# Patient Record
Sex: FEMALE | Race: WHITE | NOT HISPANIC OR LATINO | Employment: OTHER | URBAN - METROPOLITAN AREA
[De-identification: names, ages, dates, MRNs, and addresses within clinical notes are randomized per-mention and may not be internally consistent; named-entity substitution may affect disease eponyms.]

---

## 2017-03-28 ENCOUNTER — OFFICE VISIT (OUTPATIENT)
Dept: INTERNAL MEDICINE | Facility: CLINIC | Age: 57
End: 2017-03-28

## 2017-03-28 VITALS
DIASTOLIC BLOOD PRESSURE: 80 MMHG | SYSTOLIC BLOOD PRESSURE: 128 MMHG | HEART RATE: 75 BPM | HEIGHT: 55 IN | BODY MASS INDEX: 28 KG/M2 | WEIGHT: 121 LBS | OXYGEN SATURATION: 98 %

## 2017-03-28 DIAGNOSIS — R20.2 LEFT LEG PARESTHESIAS: ICD-10-CM

## 2017-03-28 DIAGNOSIS — R20.2 TINGLING IN EXTREMITIES: Primary | ICD-10-CM

## 2017-03-28 DIAGNOSIS — R20.2 ARM PARESTHESIA, LEFT: ICD-10-CM

## 2017-03-28 LAB
ALBUMIN SERPL-MCNC: 4.7 G/DL (ref 3.5–5.2)
ALBUMIN/GLOB SERPL: 1.6 G/DL
ALP SERPL-CCNC: 127 U/L (ref 39–117)
ALT SERPL-CCNC: 17 U/L (ref 1–33)
AST SERPL-CCNC: 22 U/L (ref 1–32)
BASOPHILS # BLD AUTO: 0.03 10*3/MM3 (ref 0–0.2)
BASOPHILS NFR BLD AUTO: 0.6 % (ref 0–1.5)
BILIRUB SERPL-MCNC: 0.3 MG/DL (ref 0.1–1.2)
BUN SERPL-MCNC: 11 MG/DL (ref 6–20)
BUN/CREAT SERPL: 15.9 (ref 7–25)
CALCIUM SERPL-MCNC: 9.7 MG/DL (ref 8.6–10.5)
CHLORIDE SERPL-SCNC: 97 MMOL/L (ref 98–107)
CO2 SERPL-SCNC: 27.1 MMOL/L (ref 22–29)
CREAT SERPL-MCNC: 0.69 MG/DL (ref 0.57–1)
EOSINOPHIL # BLD AUTO: 0.09 10*3/MM3 (ref 0–0.7)
EOSINOPHIL NFR BLD AUTO: 1.7 % (ref 0.3–6.2)
ERYTHROCYTE [DISTWIDTH] IN BLOOD BY AUTOMATED COUNT: 14.1 % (ref 11.7–13)
GLOBULIN SER CALC-MCNC: 2.9 GM/DL
GLUCOSE SERPL-MCNC: 90 MG/DL (ref 65–99)
HCT VFR BLD AUTO: 42.5 % (ref 35.6–45.5)
HGB BLD-MCNC: 13.4 G/DL (ref 11.9–15.5)
IMM GRANULOCYTES # BLD: 0 10*3/MM3 (ref 0–0.03)
IMM GRANULOCYTES NFR BLD: 0 % (ref 0–0.5)
LYMPHOCYTES # BLD AUTO: 1.5 10*3/MM3 (ref 0.9–4.8)
LYMPHOCYTES NFR BLD AUTO: 27.7 % (ref 19.6–45.3)
MCH RBC QN AUTO: 30.1 PG (ref 26.9–32)
MCHC RBC AUTO-ENTMCNC: 31.5 G/DL (ref 32.4–36.3)
MCV RBC AUTO: 95.5 FL (ref 80.5–98.2)
MONOCYTES # BLD AUTO: 0.3 10*3/MM3 (ref 0.2–1.2)
MONOCYTES NFR BLD AUTO: 5.5 % (ref 5–12)
NEUTROPHILS # BLD AUTO: 3.49 10*3/MM3 (ref 1.9–8.1)
NEUTROPHILS NFR BLD AUTO: 64.5 % (ref 42.7–76)
PLATELET # BLD AUTO: 327 10*3/MM3 (ref 140–500)
POTASSIUM SERPL-SCNC: 4.5 MMOL/L (ref 3.5–5.2)
PROT SERPL-MCNC: 7.6 G/DL (ref 6–8.5)
RBC # BLD AUTO: 4.45 10*6/MM3 (ref 3.9–5.2)
SODIUM SERPL-SCNC: 139 MMOL/L (ref 136–145)
TSH SERPL DL<=0.005 MIU/L-ACNC: 1.81 MIU/ML (ref 0.27–4.2)
WBC # BLD AUTO: 5.41 10*3/MM3 (ref 4.5–10.7)

## 2017-03-28 PROCEDURE — 99213 OFFICE O/P EST LOW 20 MIN: CPT | Performed by: INTERNAL MEDICINE

## 2017-03-28 NOTE — PROGRESS NOTES
Subjective     Noemi Bartholomew is a 56 y.o. female who presents with   Chief Complaint   Patient presents with   • Tingling     left side of body       History of Present Illness     Tingling on the left side of the body for 1-2 weeks.  No weakness.  It involves her face, entire arm and entire leg.  No pain is associated.  No HAs.  No vision changes.  No pain associated.  Denies any associated symptoms other than fatigue.  Greatly increase in stress at work in the last six weeks.      Review of Systems   Constitutional: Positive for fatigue. Negative for fever.   HENT: Negative for sore throat, trouble swallowing and voice change.    Eyes: Negative for visual disturbance.   Respiratory: Negative.    Cardiovascular: Negative.    Gastrointestinal: Negative.    Genitourinary: Negative.    Skin: Negative for rash.   Neurological: Negative for dizziness, tremors, seizures, light-headedness and headaches.   Psychiatric/Behavioral: The patient is nervous/anxious.        The following portions of the patient's history were reviewed and updated as appropriate: allergies, current medications and problem list.    Patient Active Problem List    Diagnosis Date Noted   • Carpal tunnel syndrome 06/08/2016   • Hyperlipidemia 06/08/2016   • Osteoporosis 06/08/2016     Note Last Updated: 6/8/2016     Description: Boniva for a number of years.  Forteo for a year.  Managed by Gyn. .  I do recommend 1200mg calcium and 1000 IUs of vitamin D in supplements/diet as well as weight bearing exercise to prevent further decline in BMD.         Current Outpatient Prescriptions on File Prior to Visit   Medication Sig Dispense Refill   • acetaminophen (TYLENOL) 500 MG tablet Take by mouth every 4 (four) hours as needed. Take 1 to 2 capsules.     • docusate sodium (COLACE) 100 MG capsule Take 1 capsule by mouth once a week.     • melatonin tablet Take 1 tablet by mouth nightly.     • vitamin B-6 (PYRIDOXINE) 50 MG tablet Take 100 mg by mouth daily.    "    No current facility-administered medications on file prior to visit.        Objective     /80  Pulse 75  Ht 54\" (137.2 cm)  Wt 121 lb (54.9 kg)  SpO2 98%  BMI 29.17 kg/m2    Physical Exam   Constitutional: She is oriented to person, place, and time. She appears well-developed and well-nourished.   HENT:   Head: Normocephalic and atraumatic.   Cardiovascular: Normal rate, regular rhythm and normal heart sounds.    Pulmonary/Chest: Effort normal and breath sounds normal.   Neurological: She is alert and oriented to person, place, and time. A sensory deficit is present. No cranial nerve deficit.   Reflex Scores:       Bicep reflexes are 2+ on the right side and 2+ on the left side.       Brachioradialis reflexes are 2+ on the right side and 2+ on the left side.       Patellar reflexes are 2+ on the right side and 2+ on the left side.       Achilles reflexes are 2+ on the right side and 2+ on the left side.  5/5 strength UE/LE     Altered sensation of left side of face/arm/leg.  She can feel but it is different.    Skin: Skin is warm and dry.   Psychiatric: She has a normal mood and affect. Her behavior is normal.       Assessment/Plan   Noemi was seen today for tingling.    Diagnoses and all orders for this visit:    Tingling in extremities  -     MRI Brain With & Without Contrast; Future  -     CBC & Differential  -     Comprehensive Metabolic Panel  -     TSH Rfx On Abnormal To Free T4    Left leg paresthesias  -     MRI Brain With & Without Contrast; Future  -     CBC & Differential  -     Comprehensive Metabolic Panel  -     TSH Rfx On Abnormal To Free T4    Arm paresthesia, left  -     MRI Brain With & Without Contrast; Future  -     CBC & Differential  -     Comprehensive Metabolic Panel  -     TSH Rfx On Abnormal To Free T4        Discussion    Patient presents with new symptoms over the past couple weeks involving the entire left side of her body including face/arm/leg.  It is of note associated " with greatly increased stress recently.  Add ASA 81 daily.  Check basic labs.  Set patient up for an MRI of the brain to further evaluate her new neurological issue.  15 minutes spent with the patient with greater than 50% of time spent counseling the following topics:, impressions         Future Appointments  Date Time Provider Department Center   6/9/2017 8:30 AM [facility] None   6/16/2017 7:45 AM Mary Lou Carroll MD [facility] None

## 2017-04-08 ENCOUNTER — HOSPITAL ENCOUNTER (OUTPATIENT)
Dept: MRI IMAGING | Facility: HOSPITAL | Age: 57
Discharge: HOME | End: 2017-04-08
Admitting: INTERNAL MEDICINE

## 2017-04-08 DIAGNOSIS — R20.2 TINGLING IN EXTREMITIES: ICD-10-CM

## 2017-04-08 DIAGNOSIS — R20.2 ARM PARESTHESIA, LEFT: ICD-10-CM

## 2017-04-08 DIAGNOSIS — R20.2 LEFT LEG PARESTHESIAS: ICD-10-CM

## 2017-04-08 PROCEDURE — A9577 INJ MULTIHANCE: HCPCS | Performed by: INTERNAL MEDICINE

## 2017-04-08 PROCEDURE — 70553 MRI BRAIN STEM W/O & W/DYE: CPT

## 2017-04-08 PROCEDURE — 0 GADOBENATE DIMEGLUMINE 529 MG/ML SOLUTION: Performed by: INTERNAL MEDICINE

## 2017-04-08 RX ADMIN — GADOBENATE DIMEGLUMINE 11 ML: 529 INJECTION, SOLUTION INTRAVENOUS at 07:40

## 2017-04-12 DIAGNOSIS — R20.0 NUMBNESS: Primary | ICD-10-CM

## 2017-04-18 ENCOUNTER — HOSPITAL ENCOUNTER (OUTPATIENT)
Dept: INFUSION THERAPY | Facility: HOSPITAL | Age: 57
Discharge: HOME | End: 2017-04-18
Attending: PSYCHIATRY & NEUROLOGY | Admitting: PSYCHIATRY & NEUROLOGY

## 2017-04-18 DIAGNOSIS — R20.0 NUMBNESS: ICD-10-CM

## 2017-04-18 PROCEDURE — 95909 NRV CNDJ TST 5-6 STUDIES: CPT

## 2017-04-18 PROCEDURE — 95886 MUSC TEST DONE W/N TEST COMP: CPT | Performed by: PSYCHIATRY & NEUROLOGY

## 2017-04-18 PROCEDURE — 95909 NRV CNDJ TST 5-6 STUDIES: CPT | Performed by: PSYCHIATRY & NEUROLOGY

## 2017-04-18 PROCEDURE — 95886 MUSC TEST DONE W/N TEST COMP: CPT

## 2017-05-09 ENCOUNTER — APPOINTMENT (OUTPATIENT)
Dept: WOMENS IMAGING | Facility: HOSPITAL | Age: 57
End: 2017-05-09

## 2017-05-09 PROCEDURE — 77067 SCR MAMMO BI INCL CAD: CPT | Performed by: RADIOLOGY

## 2017-06-06 RX ORDER — ATORVASTATIN CALCIUM 20 MG/1
TABLET, FILM COATED ORAL
Qty: 90 TABLET | Refills: 1 | Status: SHIPPED | OUTPATIENT
Start: 2017-06-06 | End: 2017-10-31 | Stop reason: SDUPTHER

## 2017-06-06 RX ORDER — LEVOTHYROXINE SODIUM 0.03 MG/1
TABLET ORAL
Qty: 90 TABLET | Refills: 1 | Status: SHIPPED | OUTPATIENT
Start: 2017-06-06 | End: 2017-10-31 | Stop reason: SDUPTHER

## 2017-06-09 DIAGNOSIS — Z00.00 LABORATORY TESTS ORDERED AS PART OF A COMPLETE PHYSICAL EXAM (CPE): Primary | ICD-10-CM

## 2017-06-09 LAB
ALBUMIN SERPL-MCNC: 4.7 G/DL (ref 3.5–5.2)
ALBUMIN/GLOB SERPL: 1.6 G/DL
ALP SERPL-CCNC: 128 U/L (ref 39–117)
ALT SERPL-CCNC: 13 U/L (ref 1–33)
APPEARANCE UR: CLEAR
AST SERPL-CCNC: 19 U/L (ref 1–32)
BACTERIA #/AREA URNS HPF: NORMAL /HPF
BASOPHILS # BLD AUTO: 0.05 10*3/MM3 (ref 0–0.2)
BASOPHILS NFR BLD AUTO: 0.9 % (ref 0–1.5)
BILIRUB SERPL-MCNC: 0.5 MG/DL (ref 0.1–1.2)
BILIRUB UR QL STRIP: NEGATIVE
BUN SERPL-MCNC: 6 MG/DL (ref 6–20)
BUN/CREAT SERPL: 9.1 (ref 7–25)
CALCIUM SERPL-MCNC: 9.3 MG/DL (ref 8.6–10.5)
CASTS URNS MICRO: NORMAL
CHLORIDE SERPL-SCNC: 98 MMOL/L (ref 98–107)
CHOLEST SERPL-MCNC: 286 MG/DL (ref 0–200)
CO2 SERPL-SCNC: 26.5 MMOL/L (ref 22–29)
COLOR UR: YELLOW
CREAT SERPL-MCNC: 0.66 MG/DL (ref 0.57–1)
EOSINOPHIL # BLD AUTO: 0.13 10*3/MM3 (ref 0–0.7)
EOSINOPHIL NFR BLD AUTO: 2.4 % (ref 0.3–6.2)
EPI CELLS #/AREA URNS HPF: NORMAL /HPF
ERYTHROCYTE [DISTWIDTH] IN BLOOD BY AUTOMATED COUNT: 14.2 % (ref 11.7–13)
GLOBULIN SER CALC-MCNC: 2.9 GM/DL
GLUCOSE SERPL-MCNC: 98 MG/DL (ref 65–99)
GLUCOSE UR QL: NEGATIVE
HCT VFR BLD AUTO: 41 % (ref 35.6–45.5)
HDLC SERPL-MCNC: 74 MG/DL (ref 40–60)
HGB BLD-MCNC: 13.2 G/DL (ref 11.9–15.5)
HGB UR QL STRIP: NEGATIVE
IMM GRANULOCYTES # BLD: 0 10*3/MM3 (ref 0–0.03)
IMM GRANULOCYTES NFR BLD: 0 % (ref 0–0.5)
KETONES UR QL STRIP: NEGATIVE
LDLC SERPL CALC-MCNC: 180 MG/DL (ref 0–100)
LEUKOCYTE ESTERASE UR QL STRIP: NEGATIVE
LYMPHOCYTES # BLD AUTO: 1.6 10*3/MM3 (ref 0.9–4.8)
LYMPHOCYTES NFR BLD AUTO: 29.4 % (ref 19.6–45.3)
MCH RBC QN AUTO: 30.6 PG (ref 26.9–32)
MCHC RBC AUTO-ENTMCNC: 32.2 G/DL (ref 32.4–36.3)
MCV RBC AUTO: 94.9 FL (ref 80.5–98.2)
MONOCYTES # BLD AUTO: 0.4 10*3/MM3 (ref 0.2–1.2)
MONOCYTES NFR BLD AUTO: 7.4 % (ref 5–12)
NEUTROPHILS # BLD AUTO: 3.26 10*3/MM3 (ref 1.9–8.1)
NEUTROPHILS NFR BLD AUTO: 59.9 % (ref 42.7–76)
NITRITE UR QL STRIP: NEGATIVE
PH UR STRIP: 6 [PH] (ref 5–8)
PLATELET # BLD AUTO: 302 10*3/MM3 (ref 140–500)
POTASSIUM SERPL-SCNC: 4.3 MMOL/L (ref 3.5–5.2)
PROT SERPL-MCNC: 7.6 G/DL (ref 6–8.5)
PROT UR QL STRIP: NEGATIVE
RBC # BLD AUTO: 4.32 10*6/MM3 (ref 3.9–5.2)
RBC #/AREA URNS HPF: NORMAL /HPF
SODIUM SERPL-SCNC: 139 MMOL/L (ref 136–145)
SP GR UR: 1.02 (ref 1–1.03)
TRIGL SERPL-MCNC: 160 MG/DL (ref 0–150)
TSH SERPL DL<=0.005 MIU/L-ACNC: 2.32 MIU/ML (ref 0.27–4.2)
UROBILINOGEN UR STRIP-MCNC: (no result) MG/DL
VLDLC SERPL CALC-MCNC: 32 MG/DL (ref 5–40)
WBC # BLD AUTO: 5.44 10*3/MM3 (ref 4.5–10.7)
WBC #/AREA URNS HPF: NORMAL /HPF

## 2017-06-12 ENCOUNTER — OFFICE VISIT (OUTPATIENT)
Dept: INTERNAL MEDICINE | Facility: CLINIC | Age: 57
End: 2017-06-12

## 2017-06-12 VITALS
BODY MASS INDEX: 32.29 KG/M2 | HEIGHT: 55 IN | SYSTOLIC BLOOD PRESSURE: 108 MMHG | WEIGHT: 139.5 LBS | HEART RATE: 64 BPM | OXYGEN SATURATION: 96 % | DIASTOLIC BLOOD PRESSURE: 72 MMHG

## 2017-06-12 DIAGNOSIS — E03.9 HYPOTHYROIDISM, UNSPECIFIED TYPE: Primary | ICD-10-CM

## 2017-06-12 DIAGNOSIS — E55.9 VITAMIN D DEFICIENCY: ICD-10-CM

## 2017-06-12 DIAGNOSIS — R73.03 PREDIABETES: ICD-10-CM

## 2017-06-12 DIAGNOSIS — E78.5 HYPERLIPIDEMIA, UNSPECIFIED HYPERLIPIDEMIA TYPE: ICD-10-CM

## 2017-06-12 PROCEDURE — 99213 OFFICE O/P EST LOW 20 MIN: CPT | Performed by: NURSE PRACTITIONER

## 2017-06-12 RX ORDER — CHOLECALCIFEROL (VITAMIN D3) 125 MCG
1 CAPSULE ORAL DAILY
COMMUNITY

## 2017-06-13 NOTE — PROGRESS NOTES
Subjective   Lexus Trinh is a 57 y.o. female. Patient is here today for   Chief Complaint   Patient presents with   • Hyperlipidemia     Pt here to follow up on HPL & elevated glucose. Pt denies needing any rf's today.    .    History of Present Illness   Patient is here to follow up on hyperlipidemia and is taking medication as prescribed with no side effects. She is currently on atorvastatin 20 mg daily.  She also is here to follow-up on elevated blood sugar.  Patient did not have her labs done prior to her appointment. Denies any problems.  Patient is here to f/u on hypothyroidism. Denies problems with fatigue, weight gain.     The following portions of the patient's history were reviewed and updated as appropriate: allergies, current medications, past family history, past medical history, past social history, past surgical history and problem list.    Review of Systems   Constitutional: Negative.    Respiratory: Negative.    Cardiovascular: Negative.    Gastrointestinal: Negative.    Endocrine: Negative.        Objective   Vitals:    06/12/17 1526   BP: 108/72   Pulse: 64   SpO2: 96%     Physical Exam   Constitutional: Vital signs are normal. She appears well-developed and well-nourished.   Neck: No thyroid mass and no thyromegaly present.   Cardiovascular: Normal rate, regular rhythm and normal heart sounds.    Pulmonary/Chest: Effort normal and breath sounds normal.   Skin: Skin is warm and dry.   Psychiatric: She has a normal mood and affect. Her speech is normal and behavior is normal. Thought content normal.   Nursing note and vitals reviewed.      Assessment/Plan   Lexus was seen today for hyperlipidemia.    Diagnoses and all orders for this visit:    Hypothyroidism, unspecified type  -     TSH    Hyperlipidemia, unspecified hyperlipidemia type  -     Comprehensive Metabolic Panel  -     Lipid Panel With / Chol / HDL Ratio    Vitamin D deficiency  -     Vitamin D 25 Hydroxy    Prediabetes  -      Comprehensive Metabolic Panel  -     Hemoglobin A1c     She will return in the next week or so to have labs done.  She will then follow up in 6 months with labs prior.

## 2017-06-16 ENCOUNTER — OFFICE VISIT (OUTPATIENT)
Dept: INTERNAL MEDICINE | Facility: CLINIC | Age: 57
End: 2017-06-16

## 2017-06-16 VITALS
SYSTOLIC BLOOD PRESSURE: 110 MMHG | WEIGHT: 122 LBS | BODY MASS INDEX: 28.23 KG/M2 | OXYGEN SATURATION: 98 % | HEIGHT: 55 IN | HEART RATE: 78 BPM | DIASTOLIC BLOOD PRESSURE: 85 MMHG

## 2017-06-16 DIAGNOSIS — M54.42 CHRONIC LEFT-SIDED LOW BACK PAIN WITH LEFT-SIDED SCIATICA: ICD-10-CM

## 2017-06-16 DIAGNOSIS — G89.29 CHRONIC LEFT-SIDED LOW BACK PAIN WITH LEFT-SIDED SCIATICA: ICD-10-CM

## 2017-06-16 DIAGNOSIS — Z00.00 WELL ADULT EXAM: Primary | ICD-10-CM

## 2017-06-16 PROCEDURE — 72110 X-RAY EXAM L-2 SPINE 4/>VWS: CPT | Performed by: INTERNAL MEDICINE

## 2017-06-16 PROCEDURE — 99396 PREV VISIT EST AGE 40-64: CPT | Performed by: INTERNAL MEDICINE

## 2017-06-16 PROCEDURE — 93000 ELECTROCARDIOGRAM COMPLETE: CPT | Performed by: INTERNAL MEDICINE

## 2017-06-16 RX ORDER — MELOXICAM 7.5 MG/1
7.5 TABLET ORAL DAILY
Qty: 30 TABLET | Refills: 2 | Status: SHIPPED | OUTPATIENT
Start: 2017-06-16 | End: 2018-06-10

## 2017-06-16 NOTE — PATIENT INSTRUCTIONS
Osteoporosis  Osteoporosis is the thinning and loss of density in the bones. Osteoporosis makes the bones more brittle, fragile, and likely to break (fracture). Over time, osteoporosis can cause the bones to become so weak that they fracture after a simple fall. The bones most likely to fracture are the bones in the hip, wrist, and spine.  CAUSES   The exact cause is not known.  RISK FACTORS  Anyone can develop osteoporosis. You may be at greater risk if you have a family history of the condition or have poor nutrition. You may also have a higher risk if you are:   · Female.    · 50 years old or older.  · A smoker.  · Not physically active.    · White or .  · Slender.  SIGNS AND SYMPTOMS   A fracture might be the first sign of the disease, especially if it results from a fall or injury that would not usually cause a bone to break. Other signs and symptoms include:   · Low back and neck pain.  · Stooped posture.  · Height loss.  DIAGNOSIS   To make a diagnosis, your health care provider may:  · Take a medical history.  · Perform a physical exam.  · Order tests, such as:    A bone mineral density test.    A dual-energy X-ray absorptiometry test.  TREATMENT   The goal of osteoporosis treatment is to strengthen your bones to reduce your risk of a fracture. Treatment may involve:  · Making lifestyle changes, such as:    Eating a diet rich in calcium.    Doing weight-bearing and muscle-strengthening exercises.    Stopping tobacco use.    Limiting alcohol intake.  · Taking medicine to slow the process of bone loss or to increase bone density.  · Monitoring your levels of calcium and vitamin D.  HOME CARE INSTRUCTIONS  · Include calcium and vitamin D in your diet. Calcium is important for bone health, and vitamin D helps the body absorb calcium.  · Perform weight-bearing and muscle-strengthening exercises as directed by your health care provider.  · Do not use any tobacco products, including cigarettes, chewing  "tobacco, and electronic cigarettes. If you need help quitting, ask your health care provider.  · Limit your alcohol intake.  · Take medicines only as directed by your health care provider.  · Keep all follow-up visits as directed by your health care provider. This is important.  · Take precautions at home to lower your risk of falling, such as:    Keeping rooms well lit and clutter free.    Installing safety rails on stairs.    Using rubber mats in the bathroom and other areas that are often wet or slippery.  SEEK IMMEDIATE MEDICAL CARE IF:   You fall or injure yourself.      This information is not intended to replace advice given to you by your health care provider. Make sure you discuss any questions you have with your health care provider.     Document Released: 09/27/2006 Document Revised: 04/10/2017 Document Reviewed: 05/28/2015  [employer] Interactive Patient Education ©2017 [employer]  DASH Eating Plan  DASH stands for \"Dietary Approaches to Stop Hypertension.\" The DASH eating plan is a healthy eating plan that has been shown to reduce high blood pressure (hypertension). Additional health benefits may include reducing the risk of type 2 diabetes mellitus, heart disease, and stroke. The DASH eating plan may also help with weight loss.  WHAT DO I NEED TO KNOW ABOUT THE DASH EATING PLAN?  For the DASH eating plan, you will follow these general guidelines:  · Choose foods with less than 150 milligrams of sodium per serving (as listed on the food label).  · Use salt-free seasonings or herbs instead of table salt or sea salt.  · Check with your health care provider or pharmacist before using salt substitutes.  · Eat lower-sodium products. These are often labeled as \"low-sodium\" or \"no salt added.\"  · Eat fresh foods. Avoid eating a lot of canned foods.  · Eat more vegetables, fruits, and low-fat dairy products.  · Choose whole grains. Look for the word \"whole\" as the first word in the ingredient list.  · Choose " fish and skinless chicken or turkey more often than red meat. Limit fish, poultry, and meat to 6 oz (170 g) each day.  · Limit sweets, desserts, sugars, and sugary drinks.  · Choose heart-healthy fats.  · Eat more home-cooked food and less restaurant, buffet, and fast food.  · Limit fried foods.  · Do not pruett foods. Cook foods using methods such as baking, boiling, grilling, and broiling instead.  · When eating at a restaurant, ask that your food be prepared with less salt, or no salt if possible.  WHAT FOODS CAN I EAT?  Seek help from a dietitian for individual calorie needs.  Grains  Whole grain or whole wheat bread. Brown rice. Whole grain or whole wheat pasta. Quinoa, bulgur, and whole grain cereals. Low-sodium cereals. Corn or whole wheat flour tortillas. Whole grain cornbread. Whole grain crackers. Low-sodium crackers.  Vegetables  Fresh or frozen vegetables (raw, steamed, roasted, or grilled). Low-sodium or reduced-sodium tomato and vegetable juices. Low-sodium or reduced-sodium tomato sauce and paste. Low-sodium or reduced-sodium canned vegetables.   Fruits  All fresh, canned (in natural juice), or frozen fruits.  Meat and Other Protein Products  Ground beef (85% or leaner), grass-fed beef, or beef trimmed of fat. Skinless chicken or turkey. Ground chicken or turkey. Pork trimmed of fat. All fish and seafood. Eggs. Dried beans, peas, or lentils. Unsalted nuts and seeds. Unsalted canned beans.  Dairy  Low-fat dairy products, such as skim or 1% milk, 2% or reduced-fat cheeses, low-fat ricotta or cottage cheese, or plain low-fat yogurt. Low-sodium or reduced-sodium cheeses.  Fats and Oils  Tub margarines without trans fats. Light or reduced-fat mayonnaise and salad dressings (reduced sodium). Avocado. Safflower, olive, or canola oils. Natural peanut or almond butter.  Other  Unsalted popcorn and pretzels.  The items listed above may not be a complete list of recommended foods or beverages. Contact your  dietitian for more options.  WHAT FOODS ARE NOT RECOMMENDED?  Grains  White bread. White pasta. White rice. Refined cornbread. Bagels and croissants. Crackers that contain trans fat.  Vegetables  Creamed or fried vegetables. Vegetables in a cheese sauce. Regular canned vegetables. Regular canned tomato sauce and paste. Regular tomato and vegetable juices.  Fruits  Canned fruit in light or heavy syrup. Fruit juice.  Meat and Other Protein Products  Fatty cuts of meat. Ribs, chicken wings, amin, sausage, bologna, salami, chitterlings, fatback, hot dogs, bratwurst, and packaged luncheon meats. Salted nuts and seeds. Canned beans with salt.  Dairy  Whole or 2% milk, cream, half-and-half, and cream cheese. Whole-fat or sweetened yogurt. Full-fat cheeses or blue cheese. Nondairy creamers and whipped toppings. Processed cheese, cheese spreads, or cheese curds.  Condiments  Onion and garlic salt, seasoned salt, table salt, and sea salt. Canned and packaged gravies. Worcestershire sauce. Tartar sauce. Barbecue sauce. Teriyaki sauce. Soy sauce, including reduced sodium. Steak sauce. Fish sauce. Oyster sauce. Cocktail sauce. Horseradish. Ketchup and mustard. Meat flavorings and tenderizers. Bouillon cubes. Hot sauce. Tabasco sauce. Marinades. Taco seasonings. Relishes.  Fats and Oils  Butter, stick margarine, lard, shortening, ghee, and amin fat. Coconut, palm kernel, or palm oils. Regular salad dressings.  Other  Pickles and olives. Salted popcorn and pretzels.  The items listed above may not be a complete list of foods and beverages to avoid. Contact your dietitian for more information.  WHERE CAN I FIND MORE INFORMATION?  National Heart, Lung, and Blood [employer]: www.nhlbi.nih.gov/health/health-topics/topics/dash/     This information is not intended to replace advice given to you by your health care provider. Make sure you discuss any questions you have with your health care provider.     Document Released: 12/06/2012  Document Revised: 04/10/2017 Document Reviewed: 10/22/2014  [employer]vier Interactive Patient Education ©2017 [employer] Inc.

## 2017-06-16 NOTE — PROGRESS NOTES
Subjective     Noemi Bartholomew is a 57 y.o. female who presents for a complete physical exam.      History of Present Illness     Left sided LBP.  Shoot down her left leg.  Going on several months.  During the day.  No weakness.  Previous back surgery by Dr. Dacosta.  She had a fusion.  Pain is 8/10 at worst.   HLD.  Cholesterol has gone up.  She states she sees room for improvement in diet and exercise regimen.   OP.  Discussed calcium and D. Gyn manages.     Review of Systems   Constitutional: Negative.    HENT: Negative.    Eyes: Negative.    Respiratory: Negative.    Cardiovascular: Negative.    Gastrointestinal: Negative.    Endocrine: Negative.    Genitourinary: Negative.    Musculoskeletal: Positive for back pain.   Skin: Negative.    Allergic/Immunologic: Negative.    Neurological: Negative.    Hematological: Negative.    Psychiatric/Behavioral: Negative.        The following portions of the patient's history were reviewed and updated as appropriate: allergies, current medications, past family history, past medical history, past social history, past surgical history and problem list.  Health maintenance tab was reviewed and updated with the patient.       Patient Active Problem List    Diagnosis Date Noted   • Carpal tunnel syndrome 06/08/2016   • Hyperlipidemia 06/08/2016   • Osteoporosis 06/08/2016     Note Last Updated: 6/8/2016     Description: Boniva for a number of years.  Forteo for a year.  Managed by Gyn. .  I do recommend 1200mg calcium and 1000 IUs of vitamin D in supplements/diet as well as weight bearing exercise to prevent further decline in BMD.         Past Medical History:   Diagnosis Date   • Abnormal alkaline phosphatase test 06/12/2015    Resolved   • Acute bronchitis 06/12/2015    Resolved   • Diverticulosis    • Ear pressure 06/12/2015    Resolved   • H/O electromyography 10/13/2014   • H/O mammogram 07/29/2014   • Hand numbness 06/12/2015    Resolved   • History of EKG 06/12/2015  "  • Hypercalcemia 06/12/2015    Resolved, Full w/u done, Likely secondary to Forteo   • Nasal congestion 06/12/2015    Resolved   • Paresthesia 06/12/2015    Resolved       Past Surgical History:   Procedure Laterality Date   • BACK SURGERY  1999   • COLONOSCOPY  05/20/2011       Family History   Problem Relation Age of Onset   • Breast cancer Mother    • Stroke Mother      Cerebrovascular Accident   • Colon cancer Mother    • Diabetes Father      Borderline Diabetes Mellitus   • Heart disease Father      Cardiac Disorder   • Heart failure Father    • Anxiety disorder Sister    • Nephrolithiasis Sister      Kidney Stones       Social History     Social History   • Marital status: Single     Spouse name: N/A   • Number of children: N/A   • Years of education: N/A     Occupational History   • Not on file.     Social History Main Topics   • Smoking status: Never Smoker   • Smokeless tobacco: Not on file   • Alcohol use No   • Drug use: Not on file   • Sexual activity: Not on file     Other Topics Concern   • Not on file     Social History Narrative       Current Outpatient Prescriptions on File Prior to Visit   Medication Sig Dispense Refill   • docusate sodium (COLACE) 100 MG capsule Take 1 capsule by mouth once a week.     • melatonin tablet Take 1 tablet by mouth nightly.     • [DISCONTINUED] vitamin B-6 (PYRIDOXINE) 50 MG tablet Take 100 mg by mouth daily.       No current facility-administered medications on file prior to visit.        Allergies   Allergen Reactions   • Penicillins        Immunization History   Administered Date(s) Administered   • Tdap 09/10/2009, 11/04/2016       Objective     /85  Pulse 78  Ht 54\" (137.2 cm)  Wt 122 lb (55.3 kg)  SpO2 98%  BMI 29.42 kg/m2      Physical Exam   Constitutional: She is oriented to person, place, and time. She appears well-developed and well-nourished.   HENT:   Head: Normocephalic and atraumatic.   Right Ear: Hearing, tympanic membrane and external ear " normal.   Left Ear: Hearing, tympanic membrane and external ear normal.   Nose: Nose normal.   Mouth/Throat: Oropharynx is clear and moist.   Neck: Neck supple. No thyromegaly present.   Cardiovascular: Normal rate, regular rhythm and normal heart sounds.    No murmur heard.  Pulmonary/Chest: Effort normal and breath sounds normal. Right breast exhibits no mass. Left breast exhibits no mass.   Abdominal: Soft. She exhibits no distension. There is no hepatosplenomegaly. There is no tenderness.   Genitourinary: No breast tenderness.   Lymphadenopathy:     She has no cervical adenopathy.   Neurological: She is alert and oriented to person, place, and time. She has normal strength. No sensory deficit.   Reflex Scores:       Patellar reflexes are 2+ on the right side and 2+ on the left side.       Achilles reflexes are 1+ on the right side and 2+ on the left side.  Skin: Skin is warm and dry.   Psychiatric: She has a normal mood and affect.     X-rays of the lumbar spine  performed today for following indication:   pain.  X-ray reveal DDD, spurs, fusion.  There is no available x-ray for comparison.  X-ray sent to radiology for official interpretation and findings.        ECG 12 Lead  Date/Time: 6/16/2017 8:19 AM  Performed by: GIULIANO DOS SANTOS  Authorized by: GIULIANO DOS SANTOS   Comparison: compared with previous ECG from 6/15/2016  Similar to previous ECG  Rhythm: sinus rhythm  Rate: normal  Conduction: conduction normal  ST Segments: ST segments normal  T Waves: T waves normal  QRS axis: normal  Other: no other findings  Clinical impression: normal ECG            Assessment/Plan   Noemi was seen today for annual exam.    Diagnoses and all orders for this visit:    Well adult exam  -     ECG 12 Lead    Chronic left-sided low back pain with left-sided sciatica  -     XR Spine Lumbar 4+ View  -     MRI lumbar spine wo contrast; Future  -     Ambulatory Referral to Physical Therapy    Other orders  -     meloxicam (MOBIC)  7.5 MG tablet; Take 1 tablet by mouth Daily.        Discussion    Patient presents today for a CPE.      Patient follows an adequate diet.   Patient follows an inadequate exercise regimen.   Mammogram is up to date.   Colon cancer screening is up to date.   Pap smears are performed by the patient's gynecologist.   Immunizations are up to date.   DEXA is up to date.   HLD.  We discussed diet.  I recommend a diet high in fruits, vegetables, whole grains, lean meats, nuts and beans.  I recommend limiting red meat, full fat dairy, eggs and processed white carbohydrates.  I recommend aerobic exercise at least 3 days per week. I also recommend to watch salt intake.    OP.  I recommend to get 1200 mg of calcium and 1000 IUs of vitamin D through diet and supplements and to participate in a weight based exercise to prevent loss of bone mineral density. Bone mineral will be monitored every two years.    Left sided LBP with sciatica for several months and previous back fusion.   Add meloxicam.  Trial of therpay.  Given concerning history MRI is ordered.     Health Maintenance   Topic Date Due   • DXA SCAN  07/30/2017   • INFLUENZA VACCINE  08/01/2017   • LIPID PANEL  06/09/2018   • MAMMOGRAM  08/03/2018   • COLONOSCOPY  05/16/2019   • PAP SMEAR  08/03/2019   • TDAP/TD VACCINES (3 - Td) 11/04/2026   • HEPATITIS C SCREENING  Addressed            No future appointments.

## 2017-06-16 NOTE — PROGRESS NOTES
"Eduardo Bartholomew is a 57 y.o. female who presents with   Chief Complaint   Patient presents with   • Annual Exam       History of Present Illness     Left sided LBP.  Shoot down her left leg.  Going on several months.  During the day.  No weakness.  Previous back surgery by Dr. Dacosta.  She had a fusion.  Pain is 8/10 at worst.     Review of Systems    The following portions of the patient's history were reviewed and updated as appropriate: allergies, current medications and problem list.    Patient Active Problem List    Diagnosis Date Noted   • Carpal tunnel syndrome 06/08/2016   • Hyperlipidemia 06/08/2016   • Osteoporosis 06/08/2016     Note Last Updated: 6/8/2016     Description: Boniva for a number of years.  Forteo for a year.  Managed by Gyn. .  I do recommend 1200mg calcium and 1000 IUs of vitamin D in supplements/diet as well as weight bearing exercise to prevent further decline in BMD.         Current Outpatient Prescriptions on File Prior to Visit   Medication Sig Dispense Refill   • docusate sodium (COLACE) 100 MG capsule Take 1 capsule by mouth once a week.     • melatonin tablet Take 1 tablet by mouth nightly.     • [DISCONTINUED] vitamin B-6 (PYRIDOXINE) 50 MG tablet Take 100 mg by mouth daily.       No current facility-administered medications on file prior to visit.        Objective     /85  Pulse 78  Ht 54\" (137.2 cm)  Wt 122 lb (55.3 kg)  SpO2 98%  BMI 29.42 kg/m2    Physical Exam    Assessment/Plan   {Assess/PlanSmartLinks:68617}    Discussion         No future appointments.      "

## 2017-06-22 LAB
25(OH)D3+25(OH)D2 SERPL-MCNC: 33 NG/ML (ref 30–100)
ALBUMIN SERPL-MCNC: 4.9 G/DL (ref 3.5–5.2)
ALBUMIN/GLOB SERPL: 1.8 G/DL
ALP SERPL-CCNC: 122 U/L (ref 39–117)
ALT SERPL-CCNC: 16 U/L (ref 1–33)
AST SERPL-CCNC: 20 U/L (ref 1–32)
BILIRUB SERPL-MCNC: 0.4 MG/DL (ref 0.1–1.2)
BUN SERPL-MCNC: 13 MG/DL (ref 6–20)
BUN/CREAT SERPL: 16.7 (ref 7–25)
CALCIUM SERPL-MCNC: 9.7 MG/DL (ref 8.6–10.5)
CHLORIDE SERPL-SCNC: 101 MMOL/L (ref 98–107)
CHOLEST SERPL-MCNC: 179 MG/DL (ref 0–200)
CHOLEST/HDLC SERPL: 2.8 {RATIO}
CO2 SERPL-SCNC: 26.1 MMOL/L (ref 22–29)
CREAT SERPL-MCNC: 0.78 MG/DL (ref 0.57–1)
GLOBULIN SER CALC-MCNC: 2.8 GM/DL
GLUCOSE SERPL-MCNC: 111 MG/DL (ref 65–99)
HBA1C MFR BLD: 6.2 % (ref 4.8–5.6)
HDLC SERPL-MCNC: 64 MG/DL (ref 40–60)
LDLC SERPL CALC-MCNC: 93 MG/DL (ref 0–100)
POTASSIUM SERPL-SCNC: 4.9 MMOL/L (ref 3.5–5.2)
PROT SERPL-MCNC: 7.7 G/DL (ref 6–8.5)
SODIUM SERPL-SCNC: 142 MMOL/L (ref 136–145)
TRIGL SERPL-MCNC: 110 MG/DL (ref 0–150)
TSH SERPL DL<=0.005 MIU/L-ACNC: 1.92 MIU/ML (ref 0.27–4.2)
VLDLC SERPL CALC-MCNC: 22 MG/DL (ref 5–40)

## 2017-07-01 ENCOUNTER — HOSPITAL ENCOUNTER (OUTPATIENT)
Dept: MRI IMAGING | Facility: HOSPITAL | Age: 57
Discharge: HOME | End: 2017-07-01
Admitting: INTERNAL MEDICINE

## 2017-07-01 DIAGNOSIS — M54.42 CHRONIC LEFT-SIDED LOW BACK PAIN WITH LEFT-SIDED SCIATICA: ICD-10-CM

## 2017-07-01 DIAGNOSIS — G89.29 CHRONIC LEFT-SIDED LOW BACK PAIN WITH LEFT-SIDED SCIATICA: ICD-10-CM

## 2017-07-01 PROCEDURE — A9577 INJ MULTIHANCE: HCPCS | Performed by: INTERNAL MEDICINE

## 2017-07-01 PROCEDURE — 72158 MRI LUMBAR SPINE W/O & W/DYE: CPT

## 2017-07-01 PROCEDURE — 0 GADOBENATE DIMEGLUMINE 529 MG/ML SOLUTION: Performed by: INTERNAL MEDICINE

## 2017-07-01 RX ADMIN — GADOBENATE DIMEGLUMINE 11 ML: 529 INJECTION, SOLUTION INTRAVENOUS at 12:50

## 2017-07-14 ENCOUNTER — HOSPITAL ENCOUNTER (OUTPATIENT)
Dept: PHYSICAL THERAPY | Facility: HOSPITAL | Age: 57
Setting detail: THERAPIES SERIES
Discharge: HOME | End: 2017-07-14

## 2017-07-14 DIAGNOSIS — M54.42 CHRONIC BILATERAL LOW BACK PAIN WITH LEFT-SIDED SCIATICA: Primary | ICD-10-CM

## 2017-07-14 DIAGNOSIS — G89.29 CHRONIC BILATERAL LOW BACK PAIN WITH LEFT-SIDED SCIATICA: Primary | ICD-10-CM

## 2017-07-14 PROCEDURE — 97161 PT EVAL LOW COMPLEX 20 MIN: CPT

## 2017-07-14 PROCEDURE — G8978 MOBILITY CURRENT STATUS: HCPCS

## 2017-07-14 PROCEDURE — G8979 MOBILITY GOAL STATUS: HCPCS

## 2017-07-14 PROCEDURE — 97110 THERAPEUTIC EXERCISES: CPT

## 2017-07-14 NOTE — THERAPY EVALUATION
Outpatient Physical Therapy Ortho Initial Evaluation  [facility]     Patient Name: Noemi Bartholomew  : 1960  MRN: [id number]  Today's Date: 2017      Visit Date: 2017    Patient Active Problem List   Diagnosis   • Carpal tunnel syndrome   • Hyperlipidemia   • Osteoporosis        Past Medical History:   Diagnosis Date   • Abnormal alkaline phosphatase test 2015    Resolved   • Acute bronchitis 2015    Resolved   • Diverticulosis    • Ear pressure 2015    Resolved   • H/O electromyography 10/13/2014   • H/O mammogram 2014   • Hand numbness 2015    Resolved   • History of EKG 2015   • Hypercalcemia 2015    Resolved, Full w/u done, Likely secondary to Forteo   • Nasal congestion 2015    Resolved   • Paresthesia 2015    Resolved        Past Surgical History:   Procedure Laterality Date   • BACK SURGERY     • COLONOSCOPY  2011       Visit Dx:     ICD-10-CM ICD-9-CM   1. Chronic bilateral low back pain with left-sided sciatica M54.42 724.2    G89.29 724.3     338.29             Patient History       17 1300          History    Chief Complaint Pain  -LS      Type of Pain Back pain  -LS      Date Current Problem(s) Began 17  -LS      Brief Description of Current Complaint Pt reports in March she began having L sided sciatic pain and B low back pain. She had similar issues in  and had lumbar fusion in . She did well following fusion. Now is unable to tolerate prolonged sitting, standing, or walking with constant aching pain in back, shooting pain in L upper leg and numbness in L plantar surface of foot.   -LS      Previous treatment for THIS PROBLEM Surgery  -LS      Onset Date- PT 17  -LS      Surgery Date: --    lumbar fusion  -LS      Patient/Caregiver Goals Relieve pain;Improve mobility;Improve strength  -LS      Hand Dominance right-handed  -LS      Occupation/sports/leisure activities Pt worked in [employer]  but was let go 2 weeks ago.  -LS      Patient seeing anyone else for problem(s)? Yes  -LS      How has patient tried to help current problem? taking Mobic  -LS      What clinical tests have you had for this problem? MRI  -LS      Results of Clinical Tests B spondylolysis at L5 with no significant spondylolisthesis  -LS      Surgery/Hospitalization hx of lumbar fusion 1999  -LS      Pain     Pain Location Back  -LS      Pain at Present 8  -LS      Pain at Best 8  -LS      Pain at Worst 8  -LS      Pain Frequency Constant/continuous  -LS      Pain Description Aching;Shooting;Numbness  -LS      What Performance Factors Make the Current Problem(s) WORSE? prolonged positions  -LS      What Performance Factors Make the Current Problem(s) BETTER? heat, ice, medication  -LS      Tolerance Time- Standing 10 minutes  -LS      Tolerance Time- Sitting 1 hour  -LS      Tolerance Time- Walking 20 minutes  -LS      Tolerance Time- Lying no inc in pain  -LS      Is your sleep disturbed? No  -LS      What position do you sleep in? Supine  -LS      Difficulties at work? Pt is currently not working.  -LS      Difficulties with ADL's? Pt is able to perform but has pain.  -LS      Difficulties with recreational activities? Yes, difficulty walking long periods of time.  -LS      Fall Risk Assessment    Any falls in the past year: No  -LS      Services    Prior Rehab/Home Health Experiences No  -LS      Daily Activities    Primary Language English  -LS      Pt Participated in POC and Goals Yes  -LS      Safety    Are you being hurt, hit, or frightened by anyone at home or in your life? No  -LS      Are you being neglected by a caregiver No  -LS        User Key  (r) = Recorded By, (t) = Taken By, (c) = Cosigned By    Initials Name Provider Type    LS Yohana Chua, PT Physical Therapist                PT Ortho       07/14/17 1500    Subjective Comments    Subjective Comments I had pain like this before but it has been fine for many years.   -LS    Precautions and Contraindications    Precautions hx of lumbar fusion 1999  -LS    Subjective Pain    Able to rate subjective pain? yes  -LS    Pre-Treatment Pain Level 8  -LS    Posture/Observations    Posture/Observations Comments inc lumbar lordosis, slightly inc thoracic kyphosis, rounded shoulders  -LS    Sensation    Additional Comments dec sensation LLE medial calf  -LS    DTR- Lower Quarter Clearing    Patellar tendon (L2-4) Bilateral:;2- Normal response  -LS    Achilles tendon (S1-2) Bilateral:;1- Minimal response  -LS    Sensory Screen for Light Touch- Lower Quarter Clearing    L2 (anterior mid thigh) Intact  -LS    L3 (distal anterior thigh) Intact  -LS    L4 (medial lower leg/foot) Left:;Diminished  -LS    L5 (lateral lower leg/great toe) Left:;Diminished  -LS    S1 (bottom of foot) Left:;Diminished  -LS    Myotomal Screen- Lower Quarter Clearing    Hip flexion (L2) Bilateral:;4- (Good -)  -LS    Knee extension (L3) Bilateral:;4+ (Good +)  -LS    Ankle DF (L4) Bilateral:;4+ (Good +)  -LS    Ankle PF (S1) Bilateral:;4+ (Good +)  -LS    Knee flexion (S2) Bilateral:;4+ (Good +)  -LS    Lumbar ROM Screen- Lower Quarter Clearing    Lumbar Flexion Normal  -LS    Lumbar Extension Normal  -LS    Lumbar Lateral Flexion Normal  -LS    Lumbar Rotation Normal  -LS    Lumbosacral Palpation    SI Bilateral:;Tender;Guarded/taut  -LS    Lumbosacral Segment Tender  -LS    Piriformis Bilateral:;Guarded/taut;Tender  -LS    Gluteus Porfirio Guarded/taut  -LS    Quadratus Lumborum Tender  -LS    Erector Spinae (Paraspinals) Guarded/taut  -LS    Lumbosacral Accessory Motions    PA Evans- L1 Hypomobile  -LS    PA Glide- L2 Hypomobile  -LS    PA Glide- L3 Hypomobile  -LS    PA Glide- L4 Hypomobile  -LS    PA Glide- L5 Hypomobile  -LS    PA glide- Sacral base Hypomobile   painful  -LS    Lumbar/SI Special Tests    Stork Test (SI Dysfunction) Negative  -LS    Slump Test (Neural Tension) Negative  -LS    SLR (Neural Tension)  Negative  -LS    SI Compression Test (SI Dysfunction) Negative  -LS    SI Distraction Test (SI Dysfunction) Negative  -LS    DEE (hip vs. SI Dysfunction) Negative  -LS    ROM (Range of Motion)    General ROM Detail BLE WFL  -LS    MMT (Manual Muscle Testing)    General MMT Assessment Detail sidelying hip abduction 4+/5  -LS    Lower Extremity Flexibility    Hamstrings WNL  -LS    Hip External Rotators WNL  -LS    Hip Internal Rotators WNL  -LS    Gastrocnemius WNL  -LS    Pathomechanics    Spine Pathomechanics Hinges into extension at one segment in lumbar  -LS    Gait Assessment/Treatment    Gait, Elkins Level independent  -LS    Gait, Distance (Feet) 100  -LS    Gait, Comment inc R lateral movement, shifted L in stance  -LS      User Key  (r) = Recorded By, (t) = Taken By, (c) = Cosigned By    Initials Name Provider Type    KOFFI Chua PT Physical Therapist                            Therapy Education       07/14/17 1528          Therapy Education    Given HEP;Symptoms/condition management;Pain management  -LS      Program New  -LS      How Provided Verbal;Demonstration;Written  -LS      Provided to Patient  -LS      Level of Understanding Teach back education performed;Verbalized;Demonstrated  -LS        User Key  (r) = Recorded By, (t) = Taken By, (c) = Cosigned By    Initials Name Provider Type    KOFFI Chua PT Physical Therapist                PT OP Goals       07/14/17 1500       PT Short Term Goals    STG Date to Achieve 07/28/17  -LS     STG 1 Pt will demonstrate understanding and compliance with initial HEP.  -LS     STG 1 Progress New  -LS     STG 2 Pt will report improved pain at worse from 8/10 to less than or equal to 6/10 with utilization of 90/90 position and exercises.  -LS     STG 2 Progress New  -LS     STG 3 Pt will demonstrate ability to activate core musculature in standing to improve her tolerance to prolonged standing/walking.  -LS     STG 3 Progress New  -LS     Long  Term Goals    LTG Date to Achieve 08/13/17  -LS     LTG 1 Pt will demonstrate reduced overall disability evidenced by improved Oswestry disability score from 26% to less than or equal to 15%.  -LS     LTG 1 Progress New  -LS     LTG 2 Pt will report reduced LLE numbness with centralization of symptoms to L knee or more proximal.  -LS     LTG 2 Progress New  -LS     LTG 3 Pt will demonstrate hip abduction strength B 5/5 to improve stability in gait.  -LS     LTG 3 Progress New  -LS     Time Calculation    PT Goal Re-Cert Due Date 08/13/17  -LS       User Key  (r) = Recorded By, (t) = Taken By, (c) = Cosigned By    Initials Name Provider Type    LS Yohana Chua, PT Physical Therapist                PT Assessment/Plan       07/14/17 2410       PT Assessment    Functional Limitations Limitations in community activities;Performance in sport activities;Performance in work activities;Impaired gait;Impaired locomotion;Performance in leisure activities;Limitation in home management;Performance in self-care ADL  -LS     Impairments Muscle strength;Range of motion;Pain;Sensation;Gait;Joint mobility;Impaired flexibility;Locomotion;Poor body mechanics;Impaired postural alignment;Motor function;Posture  -LS     Assessment Comments Pt is 58 yo female reporting 4 month hx of B LBP with L sided radicular symptoms to medial L foot described as numbness and tingling and occasional shooting pain that inc after prolonged standing or walking. She is shifted L in standing with inc lumbar lordosis and inc lateral movement to R during ambulation. Iliac crests appear at equal height. She demonstrates normal ROM with no change in pain with repeated extension, however does hinge at thoracolumbar junction. She reports relief with decompression position and flexion based exercises. She demonstrates dec sensation LLE medial calf and plantar surface of L foot. B patellar reflexes are intact and equal. She demonstrates normal strength B with B hip  flexion slightly dec (4/5). B hip abduction 4+/5. SLR, Tex's, Slump testing, SI provocation testing were all negative. She reports pain with PA at sacral base and hypomobility with lumbar PA. LAD did not change her symptoms. She is TTP with trigger points noted in B glute max and L piriformis. She demonstrates mild tightness in B back extensors. Hamstring flexibility WFL. 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 She has hx of lumbar fusion. She will benefit from continued skilled PT services to initiate flexion based exercises to improve segmental mobility, strengthen core, and reduce muscle guarding in B hips in order to reduce pt's pain and improve her tolerance to mobility.   -LS     Please refer to paper survey for additional self-reported information Yes  -LS     Rehab Potential Good  -LS     Patient/caregiver participated in establishment of treatment plan and goals Yes  -LS     Patient would benefit from skilled therapy intervention Yes  -LS     PT Plan    PT Frequency 2x/week  -LS     Predicted Duration of Therapy Intervention (days/wks) 4 weeks   -LS     Planned CPT's? PT EVAL LOW COMPLEXITY: 41909;PT RE-EVAL: 67365;PT MANUAL THERAPY EA 15 MIN: 24158;PT HOT OR COLD PACK TREAT MCARE;PT HOT/COLD PACK WC NONMCARE: 23612;PT ELECTRICAL STIM UNATTEND: ;PT THER PROC EA 15 MIN: 78124;PT THER ACT EA 15 MIN: 59066  -LS     PT Plan Comments Initiate flexion based exercise program with BLE flexibility, core strengthening, hip abductor strengthening. Consider DDN to B glutes, piriformis muscles.   -LS       User Key  (r) = Recorded By, (t) = Taken By, (c) = Cosigned By    Initials Name Provider Type    LS Yohana Chua, PT Physical Therapist                  Exercises       07/14/17 1500          Subjective Comments    Subjective Comments I had pain like this before but it has been fine for many years.  -LS      Subjective Pain    Able to rate subjective pain? yes   -LS      Pre-Treatment Pain Level 8  -LS      Exercise 1    Exercise Name 1 SKTC  -LS      Exercise 2    Exercise Name 2 modified piriformis stretch  -LS      Exercise 3    Exercise Name 3 prayer stretch  -LS      Exercise 4    Exercise Name 4 cat/camel  -LS      Exercise 5    Exercise Name 5 hooklying TA activation  -LS        User Key  (r) = Recorded By, (t) = Taken By, (c) = Cosigned By    Initials Name Provider Type    LS Yohana Chua PT Physical Therapist                              Outcome Measures       07/14/17 1500          Modified Oswestry    Modified Oswestry Score/Comments 26% disability  -LS      Functional Assessment    Outcome Measure Options Modifed Owestry  -LS        User Key  (r) = Recorded By, (t) = Taken By, (c) = Cosigned By    Initials Name Provider Type    LS Yohana Chua PT Physical Therapist            Time Calculation:   Start Time: 1345  Stop Time: 1445  Time Calculation (min): 60 min     Therapy Charges for Today     Code Description Service Date Service Provider Modifiers Qty    11027288033 HC PT MOBILITY CURRENT 7/14/2017 Yohana Chua, PT GP, CJ 1    13797871384 HC PT MOBILITY PROJECTED 7/14/2017 Yohana Chua, PT GP, CI 1    30470421484 HC PT EVAL LOW COMPLEXITY 3 7/14/2017 Yohana Chua, PT GP 1    68694561743 HC PT THER PROC EA 15 MIN 7/14/2017 Yohana Chua, PT GP 1          PT G-Codes  PT Professional Judgement Used?: Yes  Outcome Measure Options: Modifed Owestry  Score: 26% disability   Functional Limitation: Mobility: Walking and moving around  Mobility: Walking and Moving Around Current Status (): At least 20 percent but less than 40 percent impaired, limited or restricted  Mobility: Walking and Moving Around Goal Status (): At least 1 percent but less than 20 percent impaired, limited or restricted         Yohana Chua, PT  7/14/2017

## 2017-07-26 ENCOUNTER — HOSPITAL ENCOUNTER (OUTPATIENT)
Dept: PHYSICAL THERAPY | Facility: HOSPITAL | Age: 57
Setting detail: THERAPIES SERIES
Discharge: HOME | End: 2017-07-26

## 2017-07-26 DIAGNOSIS — M54.42 CHRONIC BILATERAL LOW BACK PAIN WITH LEFT-SIDED SCIATICA: Primary | ICD-10-CM

## 2017-07-26 DIAGNOSIS — G89.29 CHRONIC BILATERAL LOW BACK PAIN WITH LEFT-SIDED SCIATICA: Primary | ICD-10-CM

## 2017-07-26 PROCEDURE — 97110 THERAPEUTIC EXERCISES: CPT | Performed by: PHYSICAL THERAPIST

## 2017-07-26 PROCEDURE — 97140 MANUAL THERAPY 1/> REGIONS: CPT | Performed by: PHYSICAL THERAPIST

## 2017-07-26 NOTE — THERAPY TREATMENT NOTE
Outpatient Physical Therapy Ortho Treatment Note  [facility]     Patient Name: Noemi Bartholomew  : 1960  MRN: [id number]  Today's Date: 2017      Visit Date: 2017    Visit Dx:    ICD-10-CM ICD-9-CM   1. Chronic bilateral low back pain with left-sided sciatica M54.42 724.2    G89.29 724.3     338.29       Patient Active Problem List   Diagnosis   • Carpal tunnel syndrome   • Hyperlipidemia   • Osteoporosis        Past Medical History:   Diagnosis Date   • Abnormal alkaline phosphatase test 2015    Resolved   • Acute bronchitis 2015    Resolved   • Diverticulosis    • Ear pressure 2015    Resolved   • H/O electromyography 10/13/2014   • H/O mammogram 2014   • Hand numbness 2015    Resolved   • History of EKG 2015   • Hypercalcemia 2015    Resolved, Full w/u done, Likely secondary to Forteo   • Nasal congestion 2015    Resolved   • Paresthesia 2015    Resolved        Past Surgical History:   Procedure Laterality Date   • BACK SURGERY     • COLONOSCOPY  2011                             PT Assessment/Plan       17 0722       PT Assessment    Assessment Comments Ms. Bartholomew returns for her first follow up.  She reports mild improvement but same pain rating.  We reviewd HEP/added hip abd strengthening and initiated trial of DDN to L glut med/min. tissue.  She demonstrated minimal response and tolerated without immediate adverse response.   -GJ     PT Plan    PT Plan Comments assess response to DDN, continue core stregthening, ,hip abductor strengthening,  She may benefit from deep tissue work to L glut tissues, not sure she is a good candidate for DDN at this time, may reassess at a later date  -JONY       User Key  (r) = Recorded By, (t) = Taken By, (c) = Cosigned By    Initials Name Provider Type    JONY Adams, PT Physical Therapist                    Exercises       17 0700          Subjective Comments    Subjective  Comments I feel like I'm a little better.   -GJ      Subjective Pain    Pre-Treatment Pain Level 8  -GJ      Exercise 1    Exercise Name 1 SKTC  -GJ      Reps 1 3  -GJ      Time (Seconds) 1 20  -GJ      Exercise 2    Exercise Name 2 modified piriformis stretch  -GJ      Cueing 2 Demo  -GJ      Reps 2 3  -GJ      Time (Seconds) 2 20  -GJ      Exercise 3    Exercise Name 3 prayer stretch, with lateral bias  -GJ      Cueing 3 Demo  -GJ      Reps 3 3  -GJ      Time (Seconds) 3 20  -GJ      Exercise 4    Exercise Name 4 cat/camel  -GJ      Cueing 4 Demo  -GJ      Reps 4 10  -GJ      Exercise 6    Exercise Name 6 Nustep, UE/LE  -GJ      Time (Minutes) 6 5  -GJ      Exercise 7    Exercise Name 7 LTR  -GJ      Cueing 7 Demo  -GJ      Reps 7 10  -GJ      Time (Seconds) 7 5  -GJ      Exercise 8    Exercise Name 8 SL hip abd  -GJ      Cueing 8 Demo  -GJ      Reps 8 10  -GJ        User Key  (r) = Recorded By, (t) = Taken By, (c) = Cosigned By    Initials Name Provider Type    JONY Adams, PT Physical Therapist                        Manual Rx (last 36 hours)      Manual Treatments       07/26/17 0700          Manual Rx 1    Manual Rx 1 Location intramuscular manual therapy  -GJ Assessed pt. for Dry Needling and intramuscular manual therapy. Discussed risks/benefits of Dry Needling with pt, including but not limited to muscle soreness, bruising, vasovagal response, pneumothorax, nerve injury. Patient signed written consent to proceed with treatment.    Patient position during treatment: R SL with pillow between knees.  Muscles treated: L glut minimus, L glut medius.    Response to treatment: minimal response from either L glut med/min. Tissues.  She tolerated with minimal pain response and no immediate adverse response.     palpation used before, during, and after to facilitate assessment Clean needle technique observed at all times, precautions for lung fields, neurovascular structures observed.    DDN performed by Jose  SCOUT Adams II, PT, DPT       User Key  (r) = Recorded By, (t) = Taken By, (c) = Cosigned By    Initials Name Provider Type    JONY Adams, PT Physical Therapist                PT OP Goals       07/26/17 0700       PT Short Term Goals    STG Date to Achieve 07/28/17  -GJ     STG 1 Pt will demonstrate understanding and compliance with initial HEP.  -GJ     STG 1 Progress Ongoing  -GJ     STG 1 Progress Comments reviewed/cues given  -GJ     STG 2 Pt will report improved pain at worse from 8/10 to less than or equal to 6/10 with utilization of 90/90 position and exercises.  -GJ     STG 2 Progress Ongoing  -GJ     STG 2 Progress Comments reporting 8/10 today  -GJ     STG 3 Pt will demonstrate ability to activate core musculature in standing to improve her tolerance to prolonged standing/walking.  -GJ     STG 3 Progress Ongoing  -GJ     Long Term Goals    LTG Date to Achieve 08/13/17  -GJ     LTG 1 Pt will demonstrate reduced overall disability evidenced by improved Oswestry disability score from 26% to less than or equal to 15%.  -GJ     LTG 1 Progress Ongoing  -GJ     LTG 2 Pt will report reduced LLE numbness with centralization of symptoms to L knee or more proximal.  -GJ     LTG 2 Progress Ongoing  -GJ     LTG 3 Pt will demonstrate hip abduction strength B 5/5 to improve stability in gait.  -GJ     LTG 3 Progress Ongoing  -GJ       User Key  (r) = Recorded By, (t) = Taken By, (c) = Cosigned By    Initials Name Provider Type    JONY Adams, PT Physical Therapist                Therapy Education       07/26/17 0701          Therapy Education    Given HEP;Symptoms/condition management;Pain management;Posture/body mechanics;Mobility training  -GJ      Program New  -GJ      How Provided Verbal;Demonstration;Written  -GJ      Provided to Patient  -GJ      Level of Understanding Verbalized;Demonstrated;Teach back education performed  -GJ        User Key  (r) = Recorded By, (t) = Taken By, (c) = Cosigned By     Initials Name Provider Type     Jose Adams, PT Physical Therapist                Time Calculation:   Start Time: 0700  Stop Time: 0743  Time Calculation (min): 43 min    Therapy Charges for Today     Code Description Service Date Service Provider Modifiers Qty    61282519474 HC PT MANUAL THERAPY EA 15 MIN 7/26/2017 Jose Adams, PT GP 1    88979232808 HC PT THER PROC EA 15 MIN 7/26/2017 Jose Adams, PT GP 2                    Jose Adams, PT  7/26/2017

## 2017-07-31 ENCOUNTER — HOSPITAL ENCOUNTER (OUTPATIENT)
Dept: PHYSICAL THERAPY | Facility: HOSPITAL | Age: 57
Setting detail: THERAPIES SERIES
Discharge: HOME | End: 2017-07-31

## 2017-07-31 DIAGNOSIS — G89.29 CHRONIC BILATERAL LOW BACK PAIN WITH LEFT-SIDED SCIATICA: Primary | ICD-10-CM

## 2017-07-31 DIAGNOSIS — M54.42 CHRONIC BILATERAL LOW BACK PAIN WITH LEFT-SIDED SCIATICA: Primary | ICD-10-CM

## 2017-07-31 PROCEDURE — 97140 MANUAL THERAPY 1/> REGIONS: CPT

## 2017-07-31 PROCEDURE — 97110 THERAPEUTIC EXERCISES: CPT

## 2017-08-03 ENCOUNTER — TELEPHONE (OUTPATIENT)
Dept: INTERNAL MEDICINE | Facility: CLINIC | Age: 57
End: 2017-08-03

## 2017-08-03 NOTE — TELEPHONE ENCOUNTER
Pt called and stated she finishes her Physical Therapy on Aug. 14th. She would like a referral to a neurosurgeon. She would like to go see dr. Huy Tsang if ok with you. LG    Advise patient referral is placed.  STEVENW

## 2017-08-04 ENCOUNTER — HOSPITAL ENCOUNTER (OUTPATIENT)
Dept: PHYSICAL THERAPY | Facility: HOSPITAL | Age: 57
Setting detail: THERAPIES SERIES
Discharge: HOME | End: 2017-08-04

## 2017-08-04 DIAGNOSIS — M51.36 BULGE OF LUMBAR DISC WITHOUT MYELOPATHY: Primary | ICD-10-CM

## 2017-08-04 DIAGNOSIS — M54.42 CHRONIC BILATERAL LOW BACK PAIN WITH LEFT-SIDED SCIATICA: Primary | ICD-10-CM

## 2017-08-04 DIAGNOSIS — G89.29 CHRONIC BILATERAL LOW BACK PAIN WITH LEFT-SIDED SCIATICA: Primary | ICD-10-CM

## 2017-08-04 PROCEDURE — 97012 MECHANICAL TRACTION THERAPY: CPT | Performed by: PHYSICAL THERAPIST

## 2017-08-04 PROCEDURE — 97140 MANUAL THERAPY 1/> REGIONS: CPT | Performed by: PHYSICAL THERAPIST

## 2017-08-04 PROCEDURE — 97110 THERAPEUTIC EXERCISES: CPT | Performed by: PHYSICAL THERAPIST

## 2017-08-04 NOTE — THERAPY TREATMENT NOTE
Outpatient Physical Therapy Ortho Treatment Note  [facility]     Patient Name: Noemi Bartholomew  : 1960  MRN: [id number]  Today's Date: 2017      Visit Date: 2017    Visit Dx:    ICD-10-CM ICD-9-CM   1. Chronic bilateral low back pain with left-sided sciatica M54.42 724.2    G89.29 724.3     338.29       Patient Active Problem List   Diagnosis   • Carpal tunnel syndrome   • Hyperlipidemia   • Osteoporosis        Past Medical History:   Diagnosis Date   • Abnormal alkaline phosphatase test 2015    Resolved   • Acute bronchitis 2015    Resolved   • Diverticulosis    • Ear pressure 2015    Resolved   • H/O electromyography 10/13/2014   • H/O mammogram 2014   • Hand numbness 2015    Resolved   • History of EKG 2015   • Hypercalcemia 2015    Resolved, Full w/u done, Likely secondary to Forteo   • Nasal congestion 2015    Resolved   • Paresthesia 2015    Resolved        Past Surgical History:   Procedure Laterality Date   • BACK SURGERY     • COLONOSCOPY  2011                             PT Assessment/Plan       17 1231       PT Assessment    Assessment Comments After reviewing pt's MRI and discussing her surgery in  further, it does not appear she had a fusion, or at least not with hardware. MRI shows laminectomy defect. Pt. unsure what level or exactly what was done. Continue DDN today after a trial of light traction. Pt. very compliant with her well-progressed HEP.   -KJ     PT Plan    PT Plan Comments Assess response to light traction, continuing DDN  -KJ       User Key  (r) = Recorded By, (t) = Taken By, (c) = Cosigned By    Initials Name Provider Type    LEW Diggs, PT Physical Therapist                Modalities       17 1100          Subjective Comments    Subjective Comments I think it is getting better, 7/10 now. The needling helped I think.   -KJ      Subjective Pain    Pre-Treatment Pain Level 7  -KJ       Moist Heat    Location Lumbar spine during traction in supine 90/90  -KJ      Rx Minutes 10 mins  -KJ      Traction 39203    Traction Type Lumbar  -KJ      Rx Minutes 10  -KJ      Duration Intermittent  -KJ      Position Hook-lying  -KJ      Weight 45   /25  -KJ      Hold 30  -KJ      Relax 10  -KJ        User Key  (r) = Recorded By, (t) = Taken By, (c) = Cosigned By    Initials Name Provider Type    LEW Diggs, LENORA Physical Therapist                Exercises       08/04/17 1100          Subjective Comments    Subjective Comments I think it is getting better, 7/10 now. The needling helped I think.   -KJ      Subjective Pain    Pre-Treatment Pain Level 7  -KJ        User Key  (r) = Recorded By, (t) = Taken By, (c) = Cosigned By    Initials Name Provider Type    LEW Diggs, PT Physical Therapist                        Manual Rx (last 36 hours)      Manual Treatments       08/04/17 1100          Manual Rx 1    Manual Rx 1 Type Intramuscular manual therapy with DDN.    Patient position during treatment: R side lying with pillow between knees     Muscles treated: L gluteus medius    Response: a few LTRs noted, small. Minimal pain response.     Clean needle technique observed at all times, precautions for lung fields, neurovascular structures observed.     Manual palpation and assessment performed before, during, and after session.    -KJ        User Key  (r) = Recorded By, (t) = Taken By, (c) = Cosigned By    Initials Name Provider Type    LEW Diggs, PT Physical Therapist                PT OP Goals       08/04/17 1200       PT Short Term Goals    STG Date to Achieve 07/28/17  -KJ     STG 1 Pt will demonstrate understanding and compliance with initial HEP.  -KJ     STG 1 Progress Met  -KJ     STG 1 Progress Comments Pt. reports compliance and no pain with HEP.   -KJ     STG 2 Pt will report improved pain at worse from 8/10 to less than or equal to 6/10 with utilization of 90/90 position  and exercises.  -KJ     STG 2 Progress Progressing  -KJ     STG 2 Progress Comments Pt. reporting slowly decreasing pain. 7/10 today.   -KJ     STG 3 Pt will demonstrate ability to activate core musculature in standing to improve her tolerance to prolonged standing/walking.  -KJ     STG 3 Progress Ongoing  -KJ     STG 3 Progress Comments Continuing to focus.   -KJ     Long Term Goals    LTG Date to Achieve 08/13/17  -KJ     LTG 1 Pt will demonstrate reduced overall disability evidenced by improved Oswestry disability score from 26% to less than or equal to 15%.  -KJ     LTG 1 Progress Ongoing  -KJ     LTG 1 Progress Comments Not formally assessed   -KJ     LTG 2 Pt will report reduced LLE numbness with centralization of symptoms to L knee or more proximal.  -KJ     LTG 2 Progress Ongoing  -KJ     LTG 2 Progress Comments Reporting pain mainly in L gluteals/lateral hip.   -KJ     LTG 3 Pt will demonstrate hip abduction strength B 5/5 to improve stability in gait.  -KJ     LTG 3 Progress Ongoing  -KJ     LTG 3 Progress Comments Not formally assessed to date.   -KJ       User Key  (r) = Recorded By, (t) = Taken By, (c) = Cosigned By    Initials Name Provider Type    LEW Diggs, PT Physical Therapist                Therapy Education       08/04/17 1300          Therapy Education    Education Details Possible causes of pain, trigger points, lumbar traction benefits.   -KJ      Given HEP;Symptoms/condition management;Pain management;Posture/body mechanics;Mobility training  -KJ      Program Reinforced  -KJ      How Provided Verbal;Demonstration  -KJ      Provided to Patient  -KJ      Level of Understanding Verbalized;Demonstrated;Teach back education performed  -KJ        User Key  (r) = Recorded By, (t) = Taken By, (c) = Cosigned By    Initials Name Provider Type    LEW Diggs, PT Physical Therapist                Time Calculation:   Start Time: 1205  Stop Time: 1300  Time Calculation (min): 55  min    Therapy Charges for Today     Code Description Service Date Service Provider Modifiers Qty    68425710531 HC PT HOT OR COLD PACK TREAT MCARE 8/4/2017 Aster Diggs, PT GP 1    74296399104 HC PT TRACTION LUMBAR 8/4/2017 Aster Diggs, PT 59, GP 1    76959563041 HC PT MANUAL THERAPY EA 15 MIN 8/4/2017 Aster Diggs, PT 59, GP 1    45616284061 HC PT THER PROC EA 15 MIN 8/4/2017 Aster iDggs, PT GP 1                    Aster Diggs, PT  8/4/2017

## 2017-08-07 ENCOUNTER — HOSPITAL ENCOUNTER (OUTPATIENT)
Dept: PHYSICAL THERAPY | Facility: HOSPITAL | Age: 57
Setting detail: THERAPIES SERIES
Discharge: HOME | End: 2017-08-07

## 2017-08-07 DIAGNOSIS — M54.42 CHRONIC BILATERAL LOW BACK PAIN WITH LEFT-SIDED SCIATICA: Primary | ICD-10-CM

## 2017-08-07 DIAGNOSIS — G89.29 CHRONIC BILATERAL LOW BACK PAIN WITH LEFT-SIDED SCIATICA: Primary | ICD-10-CM

## 2017-08-07 PROCEDURE — 97012 MECHANICAL TRACTION THERAPY: CPT | Performed by: PHYSICAL THERAPIST

## 2017-08-07 PROCEDURE — 97110 THERAPEUTIC EXERCISES: CPT | Performed by: PHYSICAL THERAPIST

## 2017-08-07 NOTE — THERAPY TREATMENT NOTE
Outpatient Physical Therapy Ortho Treatment Note  [facility]     Patient Name: Noemi Bartholomew  : 1960  MRN: [id number]  Today's Date: 2017      Visit Date: 2017    Visit Dx:    ICD-10-CM ICD-9-CM   1. Chronic bilateral low back pain with left-sided sciatica M54.42 724.2    G89.29 724.3     338.29       Patient Active Problem List   Diagnosis   • Carpal tunnel syndrome   • Hyperlipidemia   • Osteoporosis        Past Medical History:   Diagnosis Date   • Abnormal alkaline phosphatase test 2015    Resolved   • Acute bronchitis 2015    Resolved   • Diverticulosis    • Ear pressure 2015    Resolved   • H/O electromyography 10/13/2014   • H/O mammogram 2014   • Hand numbness 2015    Resolved   • History of EKG 2015   • Hypercalcemia 2015    Resolved, Full w/u done, Likely secondary to Forteo   • Nasal congestion 2015    Resolved   • Paresthesia 2015    Resolved        Past Surgical History:   Procedure Laterality Date   • BACK SURGERY     • COLONOSCOPY  2011                             PT Assessment/Plan       17 0741       PT Assessment    Assessment Comments Ms. Bartholomew presents today reporting no difficulty with the traction which was iniated last session.  We reviewed ergonomics, log roll technique for sit to/from supine, added HS stretch an continued hip girdle/core strengthening.  We repeated lumbar spine traction increasing total time to 12 min..   Her overall pain rating is down and she continues to report numbnes of her LLE to her foot.    -GJ     PT Plan    PT Plan Comments assess response to traction, continue if beneficial, consider DDN to L>R glut tissues, continue hip/core strengthening.    -JONY       User Key  (r) = Recorded By, (t) = Taken By, (c) = Cosigned By    Initials Name Provider Type    JONY Adams, PT Physical Therapist                Modalities       17 0700          Moist Heat    Location  Lumbar spine during traction in supine 90/90  -GJ      Rx Minutes 12 mins  -GJ      Traction 45536    Traction Type Lumbar  -GJ      Rx Minutes 12  -GJ      Duration Intermittent  -GJ      Position Hook-lying  -GJ      Weight 45   /25  -GJ      Hold 30  -GJ      Relax 10  -GJ      Progression --   increased total time to 12 min.   -GJ        User Key  (r) = Recorded By, (t) = Taken By, (c) = Cosigned By    Initials Name Provider Type    GJ Jose Adams, PT Physical Therapist                Exercises       08/07/17 0700          Subjective Comments    Subjective Comments We tried traction last time for the first time.  It was ok.    -GJ      Subjective Pain    Pre-Treatment Pain Level 6  -GJ      Exercise 2    Exercise Name 2 modified piriformis stretch  -GJ      Cueing 2 Demo  -GJ      Reps 2 3  -GJ      Time (Seconds) 2 20  -GJ      Exercise 5    Exercise Name 5 standing HS stretch at stair, bilaterallly  -GJ      Cueing 5 Demo  -GJ      Reps 5 3  -GJ      Time (Seconds) 5 20  -GJ      Exercise 6    Exercise Name 6 Nustep, UE/LE  -GJ      Time (Minutes) 6 5  -GJ      Exercise 7    Exercise Name 7 LTR  -GJ      Cueing 7 Demo  -GJ      Reps 7 10  -GJ      Time (Seconds) 7 5  -GJ      Exercise 8    Exercise Name 8 SL hip abd (bilaterally)  -GJ      Cueing 8 Demo  -GJ      Sets 8 2  -GJ      Reps 8 10  -GJ      Exercise 10    Exercise Name 10 PPT  -GJ      Cueing 10 Verbal  -GJ      Reps 10 10  -GJ      Time (Seconds) 10 5  -GJ      Exercise 11    Exercise Name 11 Bridges with PPT  -GJ      Cueing 11 Verbal  -GJ      Sets 11 2  -GJ      Reps 11 10  -GJ      Time (Seconds) 11 3  -GJ      Exercise 12    Exercise Name 12 SL clam shells (bilaterally)  -GJ      Cueing 12 Verbal  -GJ      Sets 12 2  -GJ      Reps 12 10  -GJ        User Key  (r) = Recorded By, (t) = Taken By, (c) = Cosigned By    Initials Name Provider Type    JONY Adams PT Physical Therapist                               PT OP Goals       08/07/17  0700       PT Short Term Goals    STG Date to Achieve 07/28/17  -GJ     STG 1 Pt will demonstrate understanding and compliance with initial HEP.  -GJ     STG 1 Progress Met  -GJ     STG 2 Pt will report improved pain at worse from 8/10 to less than or equal to 6/10 with utilization of 90/90 position and exercises.  -GJ     STG 2 Progress Partially Met  -GJ     STG 2 Progress Comments reporting 6/10 pain today, will continue to monitor  -GJ     STG 3 Pt will demonstrate ability to activate core musculature in standing to improve her tolerance to prolonged standing/walking.  -GJ     STG 3 Progress Ongoing  -GJ     Long Term Goals    LTG Date to Achieve 08/13/17  -GJ     LTG 1 Pt will demonstrate reduced overall disability evidenced by improved Oswestry disability score from 26% to less than or equal to 15%.  -GJ     LTG 1 Progress Ongoing  -GJ     LTG 2 Pt will report reduced LLE numbness with centralization of symptoms to L knee or more proximal.  -GJ     LTG 2 Progress Ongoing  -GJ     LTG 2 Progress Comments continues to report numbness to foot of LLE  -GJ     LTG 3 Pt will demonstrate hip abduction strength B 5/5 to improve stability in gait.  -GJ     LTG 3 Progress Ongoing  -GJ     LTG 3 Progress Comments continuinig to work on bilateral hip strengthening, demonstrates continues weaknes/rapid fatigue  -GJ       User Key  (r) = Recorded By, (t) = Taken By, (c) = Cosigned By    Initials Name Provider Type    JONY Adams, PT Physical Therapist                Therapy Education       08/07/17 0702          Therapy Education    Given HEP;Symptoms/condition management;Pain management;Posture/body mechanics;Mobility training  -GJ      Program New  -GJ      How Provided Verbal;Demonstration;Written  -GJ      Provided to Patient  -GJ      Level of Understanding Verbalized;Teach back education performed;Demonstrated  -GJ        User Key  (r) = Recorded By, (t) = Taken By, (c) = Cosigned By    Initials Name Provider  Type    GJ Jose Adams, PT Physical Therapist                Time Calculation:   Start Time: 0702  Stop Time: 0753  Time Calculation (min): 51 min    Therapy Charges for Today     Code Description Service Date Service Provider Modifiers Qty    77395313261 HC PT THER PROC EA 15 MIN 8/7/2017 Jose Adams, PT GP 2    37014467752 HC PT HOT OR COLD PACK TREAT MCARE 8/7/2017 Jose Adams, PT GP 1    12579378777 HC PT TRACTION LUMBAR 8/7/2017 Jose Adams, PT GP 1                    Jose Adams, PT  8/7/2017

## 2017-08-09 ENCOUNTER — APPOINTMENT (OUTPATIENT)
Dept: PHYSICAL THERAPY | Facility: HOSPITAL | Age: 57
End: 2017-08-09

## 2017-08-10 ENCOUNTER — HOSPITAL ENCOUNTER (OUTPATIENT)
Dept: PHYSICAL THERAPY | Facility: HOSPITAL | Age: 57
Setting detail: THERAPIES SERIES
Discharge: HOME | End: 2017-08-10

## 2017-08-10 DIAGNOSIS — M54.42 CHRONIC BILATERAL LOW BACK PAIN WITH LEFT-SIDED SCIATICA: Primary | ICD-10-CM

## 2017-08-10 DIAGNOSIS — G89.29 CHRONIC BILATERAL LOW BACK PAIN WITH LEFT-SIDED SCIATICA: Primary | ICD-10-CM

## 2017-08-10 PROCEDURE — 97012 MECHANICAL TRACTION THERAPY: CPT

## 2017-08-10 PROCEDURE — 97110 THERAPEUTIC EXERCISES: CPT

## 2017-08-10 NOTE — THERAPY PROGRESS REPORT/RE-CERT
Outpatient Physical Therapy Ortho Re-Assessment  [facility]     Patient Name: Noemi Bartholomew  : 1960  MRN: [id number]  Today's Date: 8/10/2017      Visit Date: 08/10/2017    Patient Active Problem List   Diagnosis   • Carpal tunnel syndrome   • Hyperlipidemia   • Osteoporosis        Past Medical History:   Diagnosis Date   • Abnormal alkaline phosphatase test 2015    Resolved   • Acute bronchitis 2015    Resolved   • Diverticulosis    • Ear pressure 2015    Resolved   • H/O electromyography 10/13/2014   • H/O mammogram 2014   • Hand numbness 2015    Resolved   • History of EKG 2015   • Hypercalcemia 2015    Resolved, Full w/u done, Likely secondary to Forteo   • Nasal congestion 2015    Resolved   • Paresthesia 2015    Resolved        Past Surgical History:   Procedure Laterality Date   • BACK SURGERY     • COLONOSCOPY  2011       Visit Dx:     ICD-10-CM ICD-9-CM   1. Chronic bilateral low back pain with left-sided sciatica M54.42 724.2    G89.29 724.3     338.29                 PT Ortho       08/10/17 0900    MMT (Manual Muscle Testing)    General MMT Assessment Detail SL glute med MMT rated 4+/5 B  -CN      User Key  (r) = Recorded By, (t) = Taken By, (c) = Cosigned By    Initials Name Provider Type    DONNIE Pereyra, LENORA Physical Therapist                            Therapy Education       08/10/17 0921          Therapy Education    Given HEP;Symptoms/condition management;Pain management;Posture/body mechanics;Mobility training  -CN      Program Reinforced  -CN      How Provided Verbal;Demonstration  -CN      Provided to Patient  -CN      Level of Understanding Verbalized;Teach back education performed;Demonstrated  -CN        User Key  (r) = Recorded By, (t) = Taken By, (c) = Cosigned By    Initials Name Provider Type    DONNIE Pereyra, LENORA Physical Therapist                PT OP Goals       08/10/17 09       PT  Short Term Goals    STG Date to Achieve 07/28/17  -CN     STG 1 Pt will demonstrate understanding and compliance with initial HEP.  -CN     STG 1 Progress Met  -CN     STG 2 Pt will report improved pain at worse from 8/10 to less than or equal to 6/10 with utilization of 90/90 position and exercises.  -CN     STG 2 Progress Progressing  -CN     STG 2 Progress Comments Pain at worst is 6-7/10.   -CN     STG 3 Pt will demonstrate ability to activate core musculature in standing to improve her tolerance to prolonged standing/walking.  -CN     STG 3 Progress Ongoing  -CN     STG 3 Progress Comments Pt having difficulty incorporating TA activation with transitional movements. Reviewed today.   -CN     Long Term Goals    LTG Date to Achieve 08/13/17  -CN     LTG 1 Pt will demonstrate reduced overall disability evidenced by improved Oswestry disability score from 26% to less than or equal to 15%.  -CN     LTG 1 Progress Progressing  -CN     LTG 1 Progress Comments Pt scored 20% on the Oswestry where 0% is no disability.   -CN     LTG 2 Pt will report reduced LLE numbness with centralization of symptoms to L knee or more proximal.  -CN     LTG 2 Progress Progressing  -CN     LTG 2 Progress Comments Frequency and intensity of radicular symptoms is less, however continue to feel faintly in the foot at times.   -CN     LTG 3 Pt will demonstrate hip abduction strength B 5/5 to improve stability in gait.  -CN     LTG 3 Progress Ongoing  -CN     LTG 3 Progress Comments Pt continues to demonstrate slight hip abductor strength, rated 4+/5 today B.   -CN       User Key  (r) = Recorded By, (t) = Taken By, (c) = Cosigned By    Initials Name Provider Type    DONNIE Pereyra, PT Physical Therapist                PT Assessment/Plan       08/10/17 0931       PT Assessment    Functional Limitations Limitations in community activities;Performance in sport activities;Performance in work activities;Impaired gait;Impaired  locomotion;Performance in leisure activities;Limitation in home management;Performance in self-care ADL  -CN     Impairments Muscle strength;Range of motion;Pain;Sensation;Gait;Joint mobility;Impaired flexibility;Locomotion;Poor body mechanics;Impaired postural alignment;Motor function;Posture  -CN     Assessment Comments Pt has attended 6 skilled therapy sessions for treatment of low back pain with L sided sciatica. Pt reports gradual decrease in symptoms with PT, however continuing to have pain levels rated 6-7/10 and difficulty with transitions (supine to sit and in/out of car). Pt states that radicular symtpoms into L LE still occur, however frequency and intensity is less. In addition, low back pain continues to improve with PT exercises, although pt requires cueing to maintain TA activation during standing activities. Pt reports improvement in symptoms with DDN and gentle lumbar traction added last week. Pt continues with B hip abductor weakness and decreased self reported speed with movement. Pt scored 20% on the Oswestry where 0% is no disability. Pt would benefit from skilled PT to address the deficits and return to PLOF.   -CN     Please refer to paper survey for additional self-reported information Yes  -CN     Rehab Potential Good  -CN     Patient/caregiver participated in establishment of treatment plan and goals Yes  -CN     Patient would benefit from skilled therapy intervention Yes  -CN     PT Plan    PT Frequency 2x/week  -CN     Predicted Duration of Therapy Intervention (days/wks) 4 weeks  -CN     Planned CPT's? PT RE-EVAL: 87511;PT THER PROC EA 15 MIN: 79242;PT THER ACT EA 15 MIN: 37021;PT MANUAL THERAPY EA 15 MIN: 71267;PT NEUROMUSC RE-EDUCATION EA 15 MIN: 76228;PT GAIT TRAINING EA 15 MIN: 92757;PT AQUATIC THERAPY EA 15 MIN: 08114;PT HOT OR COLD PACK TREAT MCARE;PT ELECTRICAL STIM UNATTEND: ;PT TRACTION LUMBAR: 04892  -CN     Physical Therapy Interventions (Optional Details) neuromuscular  re-education;stretching;home exercise program;joint mobilization;patient/family education;lumbar stabilization;modalities;ROM (Range of Motion);strengthening;manual therapy techniques;postural re-education  -CN     PT Plan Comments PT to continue treating 2x/week for 4 weeks consisting of strengthening, flexibility and stability exercises. Continue to progress hip strengthening and consider DDN/traction next visit.   -CN       User Key  (r) = Recorded By, (t) = Taken By, (c) = Cosigned By    Initials Name Provider Type    DONNIE Pereyra, LENORA Physical Therapist                Modalities       08/10/17 0900          Moist Heat    Location Lumbar spine during traction in supine 90/90  -CN      Rx Minutes 12 mins  -CN      Traction 35553    Traction Type Lumbar  -CN      Rx Minutes 12  -CN      Duration Intermittent  -CN      Position Hook-lying  -CN      Weight 45   /25  -CN      Hold 30  -CN      Relax 10  -CN        User Key  (r) = Recorded By, (t) = Taken By, (c) = Cosigned By    Initials Name Provider Type    DONNIE Pereyra, LENORA Physical Therapist              Exercises       08/10/17 0900          Subjective Comments    Subjective Comments I know I'm getting better, its just slow. I have the most trouble with getting out of bed and out of the car.   -CN      Subjective Pain    Able to rate subjective pain? yes  -CN      Pre-Treatment Pain Level 7  -CN      Post-Treatment Pain Level 6  -CN      Exercise 2    Exercise Name 2 modified piriformis stretch  -CN      Cueing 2 Demo  -CN      Reps 2 3  -CN      Time (Seconds) 2 20  -CN      Exercise 5    Exercise Name 5 standing HS stretch at stair, bilaterallly  -CN      Cueing 5 Demo  -CN      Reps 5 3  -CN      Time (Seconds) 5 20  -CN      Exercise 6    Exercise Name 6 Nustep, UE/LE  -CN      Time (Minutes) 6 5  -CN      Exercise 7    Exercise Name 7 LTR  -CN      Cueing 7 Demo  -CN      Reps 7 10  -CN      Time (Seconds) 7 5  -CN      Exercise 8     Exercise Name 8 SL hip abd (bilaterally)  -CN      Cueing 8 Demo  -CN      Sets 8 2  -CN      Reps 8 10  -CN      Exercise 10    Exercise Name 10 PPT  -CN      Cueing 10 Verbal  -CN      Reps 10 10  -CN      Time (Seconds) 10 5  -CN      Exercise 11    Exercise Name 11 Bridges with PPT  -CN      Cueing 11 Verbal  -CN      Sets 11 2  -CN      Reps 11 10  -CN      Time (Seconds) 11 3  -CN      Exercise 12    Exercise Name 12 SL clam shells (bilaterally)  -CN      Cueing 12 Verbal  -CN      Sets 12 2  -CN      Reps 12 10  -CN        User Key  (r) = Recorded By, (t) = Taken By, (c) = Cosigned By    Initials Name Provider Type    DONNIE Pereyra PT Physical Therapist                              Outcome Measures       08/10/17 1000          Modified Oswestry    Modified Oswestry Score/Comments 20% where 0% is no disability  -CN      Functional Assessment    Outcome Measure Options Modifed Owestry  -CN        User Key  (r) = Recorded By, (t) = Taken By, (c) = Cosigned By    Initials Name Provider Type    DONNIE Pereyra PT Physical Therapist            Time Calculation:   Start Time: 0915  Stop Time: 1000  Time Calculation (min): 45 min     Therapy Charges for Today     Code Description Service Date Service Provider Modifiers Qty    40786892951 HC PT THER PROC EA 15 MIN 8/10/2017 Kiana Pereyra, PT GP 2    49333529915 HC PT HOT OR COLD PACK TREAT MCARE 8/10/2017 Kiana Pereyra, PT GP 1    02499788931 HC PT-TRACTION MECHANICAL 8/10/2017 Kiana Pereyra, PT  1          PT G-Codes  Outcome Measure Options: Modifed Owestry         Kiana Pereyra PT  8/10/2017

## 2017-08-14 ENCOUNTER — HOSPITAL ENCOUNTER (OUTPATIENT)
Dept: PHYSICAL THERAPY | Facility: HOSPITAL | Age: 57
Setting detail: THERAPIES SERIES
Discharge: HOME | End: 2017-08-14

## 2017-08-14 DIAGNOSIS — G89.29 CHRONIC BILATERAL LOW BACK PAIN WITH LEFT-SIDED SCIATICA: Primary | ICD-10-CM

## 2017-08-14 DIAGNOSIS — M54.42 CHRONIC BILATERAL LOW BACK PAIN WITH LEFT-SIDED SCIATICA: Primary | ICD-10-CM

## 2017-08-14 PROCEDURE — 97110 THERAPEUTIC EXERCISES: CPT | Performed by: PHYSICAL THERAPIST

## 2017-08-14 PROCEDURE — 97140 MANUAL THERAPY 1/> REGIONS: CPT | Performed by: PHYSICAL THERAPIST

## 2017-08-14 PROCEDURE — 97012 MECHANICAL TRACTION THERAPY: CPT | Performed by: PHYSICAL THERAPIST

## 2017-08-14 NOTE — THERAPY TREATMENT NOTE
Outpatient Physical Therapy Ortho Treatment Note  [facility]     Patient Name: Noemi Bartholomew  : 1960  MRN: [id number]  Today's Date: 2017      Visit Date: 2017    Visit Dx:    ICD-10-CM ICD-9-CM   1. Chronic bilateral low back pain with left-sided sciatica M54.42 724.2    G89.29 724.3     338.29       Patient Active Problem List   Diagnosis   • Carpal tunnel syndrome   • Hyperlipidemia   • Osteoporosis        Past Medical History:   Diagnosis Date   • Abnormal alkaline phosphatase test 2015    Resolved   • Acute bronchitis 2015    Resolved   • Diverticulosis    • Ear pressure 2015    Resolved   • H/O electromyography 10/13/2014   • H/O mammogram 2014   • Hand numbness 2015    Resolved   • History of EKG 2015   • Hypercalcemia 2015    Resolved, Full w/u done, Likely secondary to Forteo   • Nasal congestion 2015    Resolved   • Paresthesia 2015    Resolved        Past Surgical History:   Procedure Laterality Date   • BACK SURGERY     • COLONOSCOPY  2011                             PT Assessment/Plan       17 1106       PT Assessment    Assessment Comments Pt. reporting decreased pain in L LE, continued in low back. Slow overall improvement. Pt. may benefit from follow up with back specialist and/or imaging.   -KJ     PT Plan    PT Plan Comments Assess response to treatment this week, if no change, refer to back specialist. Await orthonet approval.   -KJ       User Key  (r) = Recorded By, (t) = Taken By, (c) = Cosigned By    Initials Name Provider Type    LEW Diggs, PT Physical Therapist                Modalities       17 0700          Subjective Comments    Subjective Comments The leg is better, still in the back. 6/10. I got a letter saying today was my last approved day.   -KJ      Subjective Pain    Pre-Treatment Pain Level 6  -KJ      Moist Heat    Location Lumbar spine during traction in supine 90/90   -KJ      Rx Minutes 12 mins  -KJ      Ice    Location Ice pack to L gluteals in R side lying following needling.   -KJ      Rx Minutes 10 mins  -KJ      Traction 63112    Traction Type Lumbar  -KJ      Rx Minutes 13  -KJ      Position Hook-lying  -KJ      Weight 50   /35; pt. weighs 120  -KJ      Hold 45  -KJ      Relax 10  -KJ        User Key  (r) = Recorded By, (t) = Taken By, (c) = Cosigned By    Initials Name Provider Type    LEW Diggs, PT Physical Therapist                Exercises       08/14/17 0700          Subjective Comments    Subjective Comments The leg is better, still in the back. 6/10. I got a letter saying today was my last approved day.   -KJ      Subjective Pain    Pre-Treatment Pain Level 6  -KJ        User Key  (r) = Recorded By, (t) = Taken By, (c) = Cosigned By    Initials Name Provider Type    LEW Diggs, PT Physical Therapist                        Manual Rx (last 36 hours)      Manual Treatments       08/14/17 0900          Manual Rx 1    Manual Rx 1 Type Intramuscular manual therapy with DDN.  Patient position during treatment: R side lying with pillow between knees     Muscles treated: L gluteus medius    Response: a few LTRs noted, small. Minimal to no pain response.     Clean needle technique observed at all times, precautions for lung fields, neurovascular structures observed.     Manual palpation and assessment performed before, during, and after session.    -KJ        User Key  (r) = Recorded By, (t) = Taken By, (c) = Cosigned By    Initials Name Provider Type    LEW Diggs, PT Physical Therapist                    Therapy Education       08/14/17 1103          Therapy Education    Education Details Option of imaging or referral to back specialist if no significant change this week.   -KJ      Given HEP;Symptoms/condition management;Pain management;Posture/body mechanics;Mobility training  -KJ      Program Reinforced  -KJ      How Provided  Verbal;Demonstration  -KJ      Provided to Patient  -KJ      Level of Understanding Verbalized;Teach back education performed;Demonstrated  -KJ        User Key  (r) = Recorded By, (t) = Taken By, (c) = Cosigned By    Initials Name Provider Type    LEW Diggs, PT Physical Therapist                Time Calculation:   Start Time: 0830  Stop Time: 0920  Time Calculation (min): 50 min    Therapy Charges for Today     Code Description Service Date Service Provider Modifiers Qty    26069874277 HC PT TRACTION LUMBAR 8/14/2017 Aster Diggs, PT 59, GP 1    82024898274 HC PT HOT OR COLD PACK TREAT MCARE 8/14/2017 Aster Diggs, PT GP 1    44692514289 HC PT MANUAL THERAPY EA 15 MIN 8/14/2017 Aster Diggs, PT 59, GP 1    31745913479 HC PT THER PROC EA 15 MIN 8/14/2017 Aster Diggs, PT GP 1                    Aster Diggs, PT  8/14/2017

## 2017-08-16 ENCOUNTER — APPOINTMENT (OUTPATIENT)
Dept: WOMENS IMAGING | Facility: HOSPITAL | Age: 57
End: 2017-08-16

## 2017-08-16 PROCEDURE — 77067 SCR MAMMO BI INCL CAD: CPT | Performed by: RADIOLOGY

## 2017-08-16 PROCEDURE — 77063 BREAST TOMOSYNTHESIS BI: CPT | Performed by: RADIOLOGY

## 2017-08-17 ENCOUNTER — APPOINTMENT (OUTPATIENT)
Dept: PHYSICAL THERAPY | Facility: HOSPITAL | Age: 57
End: 2017-08-17

## 2017-08-21 ENCOUNTER — APPOINTMENT (OUTPATIENT)
Dept: PHYSICAL THERAPY | Facility: HOSPITAL | Age: 57
End: 2017-08-21

## 2017-09-11 ENCOUNTER — OFFICE VISIT (OUTPATIENT)
Dept: INTERNAL MEDICINE | Facility: CLINIC | Age: 57
End: 2017-09-11

## 2017-09-11 VITALS
SYSTOLIC BLOOD PRESSURE: 120 MMHG | DIASTOLIC BLOOD PRESSURE: 88 MMHG | WEIGHT: 124 LBS | OXYGEN SATURATION: 96 % | BODY MASS INDEX: 28.7 KG/M2 | HEART RATE: 86 BPM | HEIGHT: 55 IN

## 2017-09-11 DIAGNOSIS — M25.512 LEFT SHOULDER PAIN, UNSPECIFIED CHRONICITY: Primary | ICD-10-CM

## 2017-09-11 DIAGNOSIS — R35.0 URINE FREQUENCY: ICD-10-CM

## 2017-09-11 LAB
BILIRUB BLD-MCNC: NEGATIVE MG/DL
CLARITY, POC: CLEAR
COLOR UR: YELLOW
GLUCOSE UR STRIP-MCNC: NEGATIVE MG/DL
KETONES UR QL: NEGATIVE
LEUKOCYTE EST, POC: NEGATIVE
NITRITE UR-MCNC: NEGATIVE MG/ML
PH UR: 7 [PH] (ref 5–8)
PROT UR STRIP-MCNC: ABNORMAL MG/DL
RBC # UR STRIP: NEGATIVE /UL
SP GR UR: 1.02 (ref 1–1.03)
UROBILINOGEN UR QL: NORMAL

## 2017-09-11 PROCEDURE — 99213 OFFICE O/P EST LOW 20 MIN: CPT | Performed by: INTERNAL MEDICINE

## 2017-09-11 PROCEDURE — 81003 URINALYSIS AUTO W/O SCOPE: CPT | Performed by: INTERNAL MEDICINE

## 2017-09-11 PROCEDURE — 73030 X-RAY EXAM OF SHOULDER: CPT | Performed by: INTERNAL MEDICINE

## 2017-09-11 RX ORDER — IBANDRONATE SODIUM 150 MG/1
150 TABLET, FILM COATED ORAL
COMMUNITY
End: 2018-06-10

## 2017-09-11 NOTE — PROGRESS NOTES
"Eduardo Bartholomew is a 57 y.o. female who presents with   Chief Complaint   Patient presents with   • left shoulder pain   • urine frequency       History of Present Illness     Left shoulder pain.  Started 2-3 months ago.  Bothered by lifting.  Certain motions bother it.  No injury.      Chronic LBP.  She is seeing ONEYDA next week.     Review of Systems   Genitourinary: Positive for frequency.   Musculoskeletal: Positive for back pain.   Neurological: Positive for numbness.       The following portions of the patient's history were reviewed and updated as appropriate: allergies, current medications and problem list.    Patient Active Problem List    Diagnosis Date Noted   • Carpal tunnel syndrome 06/08/2016   • Hyperlipidemia 06/08/2016   • Osteoporosis 06/08/2016     Note Last Updated: 6/8/2016     Description: Boniva for a number of years.  Forteo for a year.  Managed by Gyn. .  I do recommend 1200mg calcium and 1000 IUs of vitamin D in supplements/diet as well as weight bearing exercise to prevent further decline in BMD.         Current Outpatient Prescriptions on File Prior to Visit   Medication Sig Dispense Refill   • docusate sodium (COLACE) 100 MG capsule Take 1 capsule by mouth once a week.     • melatonin tablet Take 1 tablet by mouth nightly.     • meloxicam (MOBIC) 7.5 MG tablet Take 1 tablet by mouth Daily. 30 tablet 2     No current facility-administered medications on file prior to visit.        Objective     /88  Pulse 86  Ht 54\" (137.2 cm)  Wt 124 lb (56.2 kg)  SpO2 96%  BMI 29.9 kg/m2    Physical Exam   Constitutional: She is oriented to person, place, and time. She appears well-developed and well-nourished.   HENT:   Head: Normocephalic and atraumatic.   Pulmonary/Chest: Effort normal.   Musculoskeletal:        Left shoulder: She exhibits decreased range of motion. She exhibits no tenderness, no bony tenderness, no swelling and no effusion.   Neurological: She is alert and " oriented to person, place, and time.   Psychiatric: She has a normal mood and affect. Her behavior is normal.     X-rays of the left shoulder  performed today for following indication:   pain.  X-ray reveal nad.  There is no available x-ray for comparison.  X-ray sent to radiology for official interpretation and findings.        Assessment/Plan   Noemi was seen today for left shoulder pain and urine frequency.    Diagnoses and all orders for this visit:    Left shoulder pain, unspecified chronicity  -     XR Shoulder 1 View Left; Future    Urine frequency  -     POC Urinalysis Dipstick, Automated  -     Urine Culture        Discussion  Patient presents with new left shoulder pain for the past two months.  Trial of conservative management with NSAIDs.  I offered physical therapy referral.  She declines right now.    Let me know if not feeling better over the next several weeks or if there is any change in symptoms.  Urine frequency.  No evidence of infection on dip.  Send for culture.   Chronic LBP.  She has appointment with ONEYDA next week.           Future Appointments  Date Time Provider Department Center   9/27/2017 10:10 AM [facility] None   6/18/2018 8:00 AM [facility] None   6/22/2018 7:45 AM Mary Lou Carroll MD [facility] None

## 2017-09-13 LAB
BACTERIA UR CULT: NO GROWTH
BACTERIA UR CULT: NORMAL

## 2017-09-18 ENCOUNTER — HOSPITAL ENCOUNTER (OUTPATIENT)
Dept: GENERAL RADIOLOGY | Facility: HOSPITAL | Age: 57
Discharge: HOME | End: 2017-09-18
Attending: NEUROLOGICAL SURGERY | Admitting: NEUROLOGICAL SURGERY

## 2017-09-18 DIAGNOSIS — Z01.89 PHYSICAL THERAPY EVALUATION, INITIAL: ICD-10-CM

## 2017-09-18 PROCEDURE — 72110 X-RAY EXAM L-2 SPINE 4/>VWS: CPT

## 2017-09-27 DIAGNOSIS — E78.5 HYPERLIPIDEMIA, UNSPECIFIED HYPERLIPIDEMIA TYPE: Primary | ICD-10-CM

## 2017-09-27 LAB
ALBUMIN SERPL-MCNC: 4.6 G/DL (ref 3.5–5.2)
ALBUMIN/GLOB SERPL: 1.5 G/DL
ALP SERPL-CCNC: 126 U/L (ref 39–117)
ALT SERPL-CCNC: 21 U/L (ref 1–33)
AST SERPL-CCNC: 20 U/L (ref 1–32)
BILIRUB SERPL-MCNC: 0.2 MG/DL (ref 0.1–1.2)
BUN SERPL-MCNC: 21 MG/DL (ref 6–20)
BUN/CREAT SERPL: 32.3 (ref 7–25)
CALCIUM SERPL-MCNC: 9.6 MG/DL (ref 8.6–10.5)
CHLORIDE SERPL-SCNC: 103 MMOL/L (ref 98–107)
CHOLEST SERPL-MCNC: 287 MG/DL (ref 0–200)
CO2 SERPL-SCNC: 24.9 MMOL/L (ref 22–29)
CREAT SERPL-MCNC: 0.65 MG/DL (ref 0.57–1)
GLOBULIN SER CALC-MCNC: 3.1 GM/DL
GLUCOSE SERPL-MCNC: 95 MG/DL (ref 65–99)
HDLC SERPL-MCNC: 81 MG/DL (ref 40–60)
LDLC SERPL CALC-MCNC: 187 MG/DL (ref 0–100)
POTASSIUM SERPL-SCNC: 4.7 MMOL/L (ref 3.5–5.2)
PROT SERPL-MCNC: 7.7 G/DL (ref 6–8.5)
SODIUM SERPL-SCNC: 144 MMOL/L (ref 136–145)
TRIGL SERPL-MCNC: 97 MG/DL (ref 0–150)
VLDLC SERPL CALC-MCNC: 19.4 MG/DL (ref 5–40)

## 2017-10-02 RX ORDER — ATORVASTATIN CALCIUM 20 MG/1
20 TABLET, FILM COATED ORAL DAILY
Qty: 30 TABLET | Refills: 11 | Status: SHIPPED | OUTPATIENT
Start: 2017-10-02 | End: 2018-10-05 | Stop reason: SDUPTHER

## 2017-10-10 ENCOUNTER — TELEPHONE (OUTPATIENT)
Dept: INTERNAL MEDICINE | Facility: CLINIC | Age: 57
End: 2017-10-10

## 2017-10-10 DIAGNOSIS — G89.29 CHRONIC BILATERAL LOW BACK PAIN WITHOUT SCIATICA: Primary | ICD-10-CM

## 2017-10-10 DIAGNOSIS — M54.50 CHRONIC BILATERAL LOW BACK PAIN WITHOUT SCIATICA: Primary | ICD-10-CM

## 2017-10-10 NOTE — TELEPHONE ENCOUNTER
Pt called and would like a different pain management Dr. She does not feel comfortable with the one Dr. Tsang sent her too. He sent her to [facility].    I placed referral to [facility].  GREGORIO

## 2017-10-31 RX ORDER — LEVOTHYROXINE SODIUM 0.03 MG/1
TABLET ORAL
Qty: 90 TABLET | Refills: 1 | OUTPATIENT
Start: 2017-10-31

## 2017-10-31 RX ORDER — LEVOTHYROXINE SODIUM 0.03 MG/1
25 TABLET ORAL DAILY
Qty: 90 TABLET | Refills: 0 | Status: SHIPPED | OUTPATIENT
Start: 2017-10-31 | End: 2017-11-06 | Stop reason: SDUPTHER

## 2017-10-31 RX ORDER — ATORVASTATIN CALCIUM 20 MG/1
TABLET, FILM COATED ORAL
Qty: 90 TABLET | Refills: 1 | OUTPATIENT
Start: 2017-10-31

## 2017-10-31 RX ORDER — ATORVASTATIN CALCIUM 20 MG/1
20 TABLET, FILM COATED ORAL NIGHTLY
Qty: 90 TABLET | Refills: 1 | Status: SHIPPED | OUTPATIENT
Start: 2017-10-31 | End: 2017-11-06 | Stop reason: SDUPTHER

## 2017-11-06 RX ORDER — LEVOTHYROXINE SODIUM 0.03 MG/1
25 TABLET ORAL DAILY
Qty: 90 TABLET | Refills: 1 | Status: SHIPPED | OUTPATIENT
Start: 2017-11-06 | End: 2018-04-25 | Stop reason: SDUPTHER

## 2017-11-06 RX ORDER — ATORVASTATIN CALCIUM 20 MG/1
20 TABLET, FILM COATED ORAL NIGHTLY
Qty: 90 TABLET | Refills: 1 | Status: SHIPPED | OUTPATIENT
Start: 2017-11-06 | End: 2018-04-25 | Stop reason: SDUPTHER

## 2017-12-13 ENCOUNTER — OFFICE VISIT (OUTPATIENT)
Dept: FAMILY MEDICINE CLINIC | Facility: CLINIC | Age: 57
End: 2017-12-13

## 2017-12-13 VITALS
HEIGHT: 55 IN | OXYGEN SATURATION: 97 % | TEMPERATURE: 97.8 F | DIASTOLIC BLOOD PRESSURE: 80 MMHG | BODY MASS INDEX: 33.03 KG/M2 | SYSTOLIC BLOOD PRESSURE: 125 MMHG | HEART RATE: 68 BPM | WEIGHT: 142.7 LBS

## 2017-12-13 DIAGNOSIS — Z00.00 VISIT FOR PREVENTIVE HEALTH EXAMINATION: Primary | ICD-10-CM

## 2017-12-13 DIAGNOSIS — M81.0 OSTEOPOROSIS WITHOUT CURRENT PATHOLOGICAL FRACTURE, UNSPECIFIED OSTEOPOROSIS TYPE: ICD-10-CM

## 2017-12-13 DIAGNOSIS — R73.03 PREDIABETES: ICD-10-CM

## 2017-12-13 DIAGNOSIS — E03.9 HYPOTHYROIDISM, UNSPECIFIED TYPE: ICD-10-CM

## 2017-12-13 PROCEDURE — 99396 PREV VISIT EST AGE 40-64: CPT | Performed by: FAMILY MEDICINE

## 2017-12-13 RX ORDER — LANOLIN ALCOHOL/MO/W.PET/CERES
CREAM (GRAM) TOPICAL
COMMUNITY
Start: 2017-06-01

## 2017-12-13 RX ORDER — KETOCONAZOLE 20 MG/ML
SHAMPOO TOPICAL
Refills: 0 | COMMUNITY
Start: 2017-12-04

## 2017-12-13 RX ORDER — MINOCYCLINE HYDROCHLORIDE 90 MG/1
TABLET, FILM COATED, EXTENDED RELEASE ORAL
COMMUNITY
Start: 2017-11-29

## 2017-12-14 LAB
HBA1C MFR BLD: 6.3 % (ref 4.8–5.6)
TSH SERPL DL<=0.005 MIU/L-ACNC: 2.87 MIU/ML (ref 0.27–4.2)

## 2017-12-16 NOTE — PROGRESS NOTES
Subjective   Lexus Trinh is a 57 y.o. female. Pt presented to establish care and needs her annual physical. She was previously seen by Dr. Butt, transitioned to a nurse practitioner and is wanting to be seen by a doctor again.    Chief Complaint   Patient presents with   • Annual Exam     routine        History of Present Illness    Preventive exam  Pt had mammogram 4/2016 that was normal but has not had follow up. She had a normal pap smear last year with her gynecologist per her report. She needs to call for another appointment with them.   Colonoscopy without abnormality on path report with one ileocecal valve biopsy, no polyps, 2014.   She is due for TDap.  Has been told she is borderline for diabetes but does not have diagnosis of diabetes.  DXA 2015 with L spine osteoporosis and L hip osteopenia. No results for R hip on scanned in report. Taking Calcium and vitamin D3. On ibandronate monthly.    Per lab review pt had labs 12/2016 with elevated BG, TAGs, alk phos and A1c to 6.1. 6/2017 labs repeated and had normal cholesterol and TAGs, elevated BG and A1c of 6.2, alk phos still elevated but mildly, trended down. Vit D normal.       The following portions of the patient's history were reviewed and updated as appropriate: allergies, current medications, past family history, past medical history, past social history, past surgical history and problem list.      Review of Systems   Constitutional: Negative for activity change, appetite change, fatigue and unexpected weight change.   HENT: Negative for congestion, postnasal drip, rhinorrhea, sinus pain and sore throat.    Eyes: Negative for visual disturbance.   Respiratory: Negative for cough and shortness of breath.    Cardiovascular: Negative for chest pain and leg swelling.   Gastrointestinal: Negative for abdominal pain, blood in stool, constipation, diarrhea, nausea and vomiting.   Endocrine: Negative for polydipsia and polyuria.   Genitourinary: Negative  "for difficulty urinating, dysuria, frequency and urgency.   Musculoskeletal: Positive for arthralgias and back pain. Negative for gait problem and joint swelling.   Allergic/Immunologic: Positive for environmental allergies.   Neurological: Negative for weakness and headaches.   Psychiatric/Behavioral: Negative for dysphoric mood and sleep disturbance.       Objective   Blood pressure 125/80, pulse 68, temperature 97.8 °F (36.6 °C), height 137.2 cm (54\"), weight 64.7 kg (142 lb 11.2 oz), SpO2 97 %.  Physical Exam   Constitutional: She is oriented to person, place, and time. She appears well-nourished. No distress.   HENT:   Right Ear: External ear normal.   Left Ear: External ear normal.   Nose: Nose normal.   Mouth/Throat: Oropharynx is clear and moist. No oropharyngeal exudate.   Bilateral ear canals and tympanic membranes appear normal.   Eyes: Conjunctivae and EOM are normal. Right eye exhibits no discharge. Left eye exhibits no discharge. No scleral icterus.   Neck: Neck supple. No thyromegaly present.   Cardiovascular: Normal rate, regular rhythm, normal heart sounds and intact distal pulses.  Exam reveals no gallop and no friction rub.    No murmur heard.  Pulmonary/Chest: Effort normal and breath sounds normal. No respiratory distress. She has no wheezes. She has no rales. She exhibits no tenderness.   Abdominal: Soft. Bowel sounds are normal. She exhibits no distension and no mass. There is no tenderness. There is no guarding.   Musculoskeletal: She exhibits no edema or deformity.   No palpable spinal tenderness or paraspinal spasm.   Lymphadenopathy:     She has no cervical adenopathy.   Neurological: She is alert and oriented to person, place, and time. She exhibits normal muscle tone. Coordination normal.   Normal gait.   Skin: Skin is warm and dry.   Psychiatric: She has a normal mood and affect. Her behavior is normal.   Vitals reviewed.      Assessment/Plan   Lexus was seen today for annual " exam.    Diagnoses and all orders for this visit:    Visit for preventive health examination  -     DEXA Bone Density Axial; Future  -     TSH Rfx On Abnormal To Free T4  -     Hemoglobin A1c    Hypothyroidism, unspecified type  -     TSH Rfx On Abnormal To Free T4    Osteoporosis without current pathological fracture, unspecified osteoporosis type  -     DEXA Bone Density Axial; Future    Prediabetes  -     Hemoglobin A1c

## 2017-12-27 ENCOUNTER — HOSPITAL ENCOUNTER (OUTPATIENT)
Dept: BONE DENSITY | Facility: HOSPITAL | Age: 57
Discharge: HOME | End: 2017-12-27
Admitting: FAMILY MEDICINE

## 2017-12-27 DIAGNOSIS — Z00.00 VISIT FOR PREVENTIVE HEALTH EXAMINATION: ICD-10-CM

## 2017-12-27 DIAGNOSIS — M81.0 OSTEOPOROSIS WITHOUT CURRENT PATHOLOGICAL FRACTURE, UNSPECIFIED OSTEOPOROSIS TYPE: ICD-10-CM

## 2017-12-27 PROCEDURE — 77080 DXA BONE DENSITY AXIAL: CPT

## 2018-01-02 ENCOUNTER — TELEPHONE (OUTPATIENT)
Dept: FAMILY MEDICINE CLINIC | Facility: CLINIC | Age: 58
End: 2018-01-02

## 2018-01-02 NOTE — TELEPHONE ENCOUNTER
Left message about study results. Bone density, stable, continues to be in moderate osteopenic range. Continue her medications for this. Mammogram was normal. Call if she has any questions.

## 2018-01-29 ENCOUNTER — OFFICE VISIT (OUTPATIENT)
Dept: FAMILY MEDICINE CLINIC | Facility: CLINIC | Age: 58
End: 2018-01-29

## 2018-01-29 VITALS
HEART RATE: 104 BPM | SYSTOLIC BLOOD PRESSURE: 108 MMHG | WEIGHT: 141.9 LBS | HEIGHT: 55 IN | TEMPERATURE: 98.2 F | OXYGEN SATURATION: 96 % | DIASTOLIC BLOOD PRESSURE: 60 MMHG | BODY MASS INDEX: 32.84 KG/M2

## 2018-01-29 DIAGNOSIS — J30.9 ACUTE ALLERGIC RHINITIS, UNSPECIFIED SEASONALITY, UNSPECIFIED TRIGGER: Primary | ICD-10-CM

## 2018-01-29 DIAGNOSIS — K21.9 GASTROESOPHAGEAL REFLUX DISEASE WITHOUT ESOPHAGITIS: ICD-10-CM

## 2018-01-29 PROCEDURE — 99213 OFFICE O/P EST LOW 20 MIN: CPT | Performed by: NURSE PRACTITIONER

## 2018-01-29 RX ORDER — OMEPRAZOLE 20 MG/1
20 CAPSULE, DELAYED RELEASE ORAL DAILY
Qty: 30 CAPSULE | Refills: 3 | Status: SHIPPED | OUTPATIENT
Start: 2018-01-29 | End: 2018-04-25 | Stop reason: SDUPTHER

## 2018-01-29 NOTE — PATIENT INSTRUCTIONS
Food Choices for Gastroesophageal Reflux Disease, Adult  When you have gastroesophageal reflux disease (GERD), the foods you eat and your eating habits are very important. Choosing the right foods can help ease your discomfort.  What guidelines do I need to follow?  · Choose fruits, vegetables, whole grains, and low-fat dairy products.  · Choose low-fat meat, fish, and poultry.  · Limit fats such as oils, salad dressings, butter, nuts, and avocado.  · Keep a food diary. This helps you identify foods that cause symptoms.  · Avoid foods that cause symptoms. These may be different for everyone.  · Eat small meals often instead of 3 large meals a day.  · Eat your meals slowly, in a place where you are relaxed.  · Limit fried foods.  · Cook foods using methods other than frying.  · Avoid drinking alcohol.  · Avoid drinking large amounts of liquids with your meals.  · Avoid bending over or lying down until 2-3 hours after eating.  What foods are not recommended?  These are some foods and drinks that may make your symptoms worse:  Vegetables   Tomatoes. Tomato juice. Tomato and spaghetti sauce. Chili peppers. Onion and garlic. Horseradish.  Fruits   Oranges, grapefruit, and lemon (fruit and juice).  Meats   High-fat meats, fish, and poultry. This includes hot dogs, ribs, ham, sausage, salami, and li.  Dairy   Whole milk and chocolate milk. Sour cream. Cream. Butter. Ice cream. Cream cheese.  Drinks   Coffee and tea. Bubbly (carbonated) drinks or energy drinks.  Condiments   Hot sauce. Barbecue sauce.  Sweets/Desserts   Chocolate and cocoa. Donuts. Peppermint and spearmint.  Fats and Oils   High-fat foods. This includes French fries and potato chips.  Other   Vinegar. Strong spices. This includes black pepper, white pepper, red pepper, cayenne, ferreira powder, cloves, ginger, and chili powder.  The items listed above may not be a complete list of foods and drinks to avoid. Contact your dietitian for more information.    This information is not intended to replace advice given to you by your health care provider. Make sure you discuss any questions you have with your health care provider.  Document Released: 06/18/2013 Document Revised: 05/25/2017 Document Reviewed: 10/22/2014  Elsevier Interactive Patient Education © 2017 [employer]

## 2018-01-29 NOTE — PROGRESS NOTES
Subjective   Lexus Trinh is a 57 y.o. female presents with continued GERD. Symptoms have been going on for over a month. Now with worsening cough, post nasal drainage, mild runny nose. Denies increased belching or upper abdominal pain. Denies burning in throat/esophagus. Symptoms do not coincide with meals.    Heartburn   She complains of coughing, heartburn and a sore throat. She reports no abdominal pain, no belching, no chest pain, no choking, no dysphagia, no early satiety, no globus sensation, no hoarse voice, no nausea, no stridor, no tooth decay, no water brash or no wheezing. This is a new problem. The current episode started 1 to 4 weeks ago. The problem occurs occasionally. The problem has been waxing and waning. Nothing aggravates the symptoms. Pertinent negatives include no anemia, fatigue, melena, muscle weakness, orthopnea or weight loss.        The following portions of the patient's history were reviewed and updated as appropriate: allergies, current medications, past family history, past medical history, past social history, past surgical history and problem list.    Review of Systems   Constitutional: Negative.  Negative for activity change, appetite change, chills, fatigue, fever and weight loss.   HENT: Positive for postnasal drip, rhinorrhea and sore throat. Negative for hoarse voice.    Eyes: Negative.    Respiratory: Positive for cough. Negative for choking, shortness of breath and wheezing.    Cardiovascular: Negative.  Negative for chest pain.   Gastrointestinal: Positive for heartburn. Negative for abdominal distention, abdominal pain, constipation, diarrhea, dysphagia, melena and nausea.   Endocrine: Negative.    Musculoskeletal: Negative for muscle weakness.       Objective   Physical Exam   Constitutional: She is oriented to person, place, and time. She appears well-developed and well-nourished.   HENT:   Head: Normocephalic and atraumatic.   Mouth/Throat: Uvula is midline and mucous  membranes are normal. Posterior oropharyngeal erythema present. No tonsillar exudate.   Eyes: Pupils are equal, round, and reactive to light.   Cardiovascular: Normal rate, regular rhythm and normal heart sounds.  Exam reveals no gallop and no friction rub.    No murmur heard.  Pulmonary/Chest: Effort normal and breath sounds normal. No respiratory distress. She has no wheezes. She has no rales.   Abdominal: Soft. Bowel sounds are normal. She exhibits no distension. There is no tenderness.   Neurological: She is alert and oriented to person, place, and time.   Skin: Skin is warm and dry.   Psychiatric: She has a normal mood and affect.   Vitals reviewed.      Assessment/Plan   Lexus was seen today for heartburn.    Diagnoses and all orders for this visit:    Acute allergic rhinitis, unspecified seasonality, unspecified trigger    Gastroesophageal reflux disease without esophagitis    Other orders  -     omeprazole (PRILOSEC) 20 MG capsule; Take 1 capsule by mouth Daily.

## 2018-03-23 ENCOUNTER — TELEPHONE (OUTPATIENT)
Dept: INTERNAL MEDICINE | Facility: CLINIC | Age: 58
End: 2018-03-23

## 2018-03-23 DIAGNOSIS — M54.5 CHRONIC LOW BACK PAIN, UNSPECIFIED BACK PAIN LATERALITY, WITH SCIATICA PRESENCE UNSPECIFIED: Primary | ICD-10-CM

## 2018-03-23 DIAGNOSIS — G89.29 CHRONIC LOW BACK PAIN, UNSPECIFIED BACK PAIN LATERALITY, WITH SCIATICA PRESENCE UNSPECIFIED: Primary | ICD-10-CM

## 2018-03-23 NOTE — TELEPHONE ENCOUNTER
She has finished PT and needs a referral for Dr Noe for back pain. [facility]LC    Referral is placed.  SLW    Patient advised. CLC

## 2018-04-24 ENCOUNTER — TELEPHONE (OUTPATIENT)
Dept: NEUROSURGERY | Facility: CLINIC | Age: 58
End: 2018-04-24

## 2018-04-24 NOTE — TELEPHONE ENCOUNTER
----- Message from Aysha Reynolds sent at 4/24/2018 11:14 AM EDT -----  Regarding: appt  Dr. Carroll's office called today to request that this pt be moved up. Dr. Carroll is asking personally.

## 2018-04-24 NOTE — TELEPHONE ENCOUNTER
Left a message with the patient to call back so that her appt can be moved up. She can be worked in on 5/2/18 at 2:00. Her packet has already been scanned into the chart.

## 2018-04-24 NOTE — TELEPHONE ENCOUNTER
This is a second opinion for back pain. The patient recently saw Dr. Tsang. Dr. Carroll wants to know if you will move the patient up to a sooner appt. She is scheduled on 8/6/18.

## 2018-04-25 RX ORDER — LEVOTHYROXINE SODIUM 0.03 MG/1
25 TABLET ORAL DAILY
Qty: 90 TABLET | Refills: 1 | Status: SHIPPED | OUTPATIENT
Start: 2018-04-25 | End: 2019-01-24 | Stop reason: SDUPTHER

## 2018-04-25 RX ORDER — OMEPRAZOLE 20 MG/1
20 CAPSULE, DELAYED RELEASE ORAL DAILY
Qty: 90 CAPSULE | Refills: 1 | Status: SHIPPED | OUTPATIENT
Start: 2018-04-25 | End: 2018-10-29 | Stop reason: SDUPTHER

## 2018-04-25 RX ORDER — ATORVASTATIN CALCIUM 20 MG/1
20 TABLET, FILM COATED ORAL NIGHTLY
Qty: 90 TABLET | Refills: 1 | Status: SHIPPED | OUTPATIENT
Start: 2018-04-25 | End: 2019-04-10 | Stop reason: SDUPTHER

## 2018-05-02 ENCOUNTER — OFFICE VISIT (OUTPATIENT)
Dept: NEUROSURGERY | Facility: CLINIC | Age: 58
End: 2018-05-02

## 2018-05-02 VITALS
DIASTOLIC BLOOD PRESSURE: 67 MMHG | HEIGHT: 55 IN | SYSTOLIC BLOOD PRESSURE: 110 MMHG | WEIGHT: 129 LBS | HEART RATE: 81 BPM | BODY MASS INDEX: 29.85 KG/M2

## 2018-05-02 DIAGNOSIS — M51.36 DDD (DEGENERATIVE DISC DISEASE), LUMBAR: ICD-10-CM

## 2018-05-02 DIAGNOSIS — Q76.2 CONGENITAL SPONDYLOLYSIS, LUMBOSACRAL REGION: Primary | ICD-10-CM

## 2018-05-02 DIAGNOSIS — M54.16 CHRONIC LEFT LUMBAR RADICULOPATHY: ICD-10-CM

## 2018-05-02 PROBLEM — M51.369 DDD (DEGENERATIVE DISC DISEASE), LUMBAR: Status: ACTIVE | Noted: 2018-05-02

## 2018-05-02 PROCEDURE — 99244 OFF/OP CNSLTJ NEW/EST MOD 40: CPT | Performed by: NEUROLOGICAL SURGERY

## 2018-05-02 RX ORDER — GABAPENTIN 100 MG/1
100 CAPSULE ORAL 2 TIMES DAILY
Qty: 60 CAPSULE | Refills: 3 | Status: SHIPPED | OUTPATIENT
Start: 2018-05-02 | End: 2018-06-10

## 2018-05-02 NOTE — PROGRESS NOTES
Subjective   Patient ID: Noemi Bartholomew is a 58 y.o. female is being seen for consultation today at the request of Mary Lou Carroll MD for back pain.    Today the patient reports left sided low back pain that radiates into the left leg down to the foot. She also complains of numbness, tingling and weakness in the left leg.    Back Pain   This is a new problem. The current episode started more than 1 year ago. The problem occurs constantly. The problem has been gradually worsening since onset. The pain is present in the gluteal, lumbar spine and sacro-iliac. The quality of the pain is described as aching, shooting and stabbing. The pain radiates to the left foot. The pain is at a severity of 10/10. The pain is the same all the time. The symptoms are aggravated by bending, position, lying down, sitting and standing. Stiffness is present in the morning and all day. Associated symptoms include leg pain, numbness, paresthesias and tingling. Pertinent negatives include no abdominal pain, bladder incontinence, bowel incontinence, chest pain, dysuria, fever, headaches, paresis, pelvic pain, perianal numbness or weight loss. Risk factors include history of osteoporosis, lack of exercise, menopause and obesity.       The following portions of the patient's history were reviewed and updated as appropriate: allergies, current medications, past family history, past medical history, past social history, past surgical history and problem list.    Review of Systems   Constitutional: Negative for fever and weight loss.   Cardiovascular: Negative for chest pain.   Gastrointestinal: Negative for abdominal pain and bowel incontinence.   Genitourinary: Negative for bladder incontinence, dysuria and pelvic pain.   Musculoskeletal: Positive for back pain.   Neurological: Positive for tingling, numbness and paresthesias. Negative for headaches.   All other systems reviewed and are negative.    This very pleasant lady had a history of an  L5-S1 fusion by Dr. Cassidy in 1999. She said at that time she was having back pain and left leg pain and did quite well up until a little over a year ago when she had recurrence of the pain in the left buttock and leg and in the back. Most of the pain is radicular. She went to see Dr. Tsang who recommended injections which were done by Dr. Wells. From her description, it sounded like she had some epidural injections but also had a radiofrequency ablation which did not help. She has gone through physical therapy which really has not helped. She is an  and does a lot of sitting but has more difficulty with tolerating the symptoms. She feels that she cannot tolerate them much longer and they are getting worse. The right leg is fine. The low back pain is modest. It is mainly the buttock and the leg pain. We discussed the MRI. She may be having more problems at the L5-S1 level. She may, in fact, even be slipped at that level. I think it is time to get a myelogram and sort out if there are any surgical indications, even another fusion. Will also try some low-dose gabapentin, which has not yet been tried, at 100 mg b.i.d.      Objective   Physical Exam   Constitutional: She is oriented to person, place, and time. She appears well-developed and well-nourished.   HENT:   Head: Normocephalic and atraumatic.   Eyes: Conjunctivae and EOM are normal. Pupils are equal, round, and reactive to light.   Fundoscopic exam:       The right eye shows no papilledema. The right eye shows venous pulsations.        The left eye shows no papilledema. The left eye shows venous pulsations.   Neck: Carotid bruit is not present.   Neurological: She is oriented to person, place, and time. She has a normal Finger-Nose-Finger Test and a normal Heel to Shin Test. Gait normal.   Reflex Scores:       Tricep reflexes are 2+ on the right side and 2+ on the left side.       Bicep reflexes are 2+ on the right side and 2+ on the left  side.       Brachioradialis reflexes are 2+ on the right side and 2+ on the left side.       Patellar reflexes are 2+ on the right side and 2+ on the left side.       Achilles reflexes are 2+ on the right side and 2+ on the left side.  Psychiatric: Her speech is normal.     Neurologic Exam     Mental Status   Oriented to person, place, and time.   Registration of memory: Good recent and remote memory.   Attention: normal. Concentration: normal.   Speech: speech is normal   Level of consciousness: alert  Knowledge: consistent with education.     Cranial Nerves     CN II   Visual fields full to confrontation.   Visual acuity: normal    CN III, IV, VI   Pupils are equal, round, and reactive to light.  Extraocular motions are normal.     CN V   Facial sensation intact.   Right corneal reflex: normal  Left corneal reflex: normal    CN VII   Facial expression full, symmetric.   Right facial weakness: none  Left facial weakness: none    CN VIII   Hearing: intact    CN IX, X   Palate: symmetric    CN XI   Right sternocleidomastoid strength: normal  Left sternocleidomastoid strength: normal    CN XII   Tongue: not atrophic  Tongue deviation: none    Motor Exam   Muscle bulk: normal  Right arm tone: normal  Left arm tone: normal  Right leg tone: normal  Left leg tone: normal    Strength   Strength 5/5 except as noted.     Sensory Exam   Light touch normal.     Gait, Coordination, and Reflexes     Gait  Gait: normal    Coordination   Finger to nose coordination: normal  Heel to shin coordination: normal    Reflexes   Right brachioradialis: 2+  Left brachioradialis: 2+  Right biceps: 2+  Left biceps: 2+  Right triceps: 2+  Left triceps: 2+  Right patellar: 2+  Left patellar: 2+  Right achilles: 2+  Left achilles: 2+  Right : 2+  Left : 2+      Assessment/Plan   Independent Review of Radiographic Studies:    He had a lumbar MRI done 7/1/17 which did show a spondylolysis at L5-S1 and disc bulging at L1-L2.  Nothing overly  dramatic.  I agree with the report.      Medical Decision Making:    She does have a true lumbar radiculopathy of this may be related to some progression of problems at L5-S1.  We'll try some gabapentin 100 mg twice a day and then proceeded with a myelogram.  It's possible that she may need to be operated on again and maybe even fused again but will decide this after the myelogram.  She is aware of the risk of a spinal headache and the potential need for blood patch.      There are no diagnoses linked to this encounter.  No Follow-up on file.

## 2018-05-23 ENCOUNTER — APPOINTMENT (OUTPATIENT)
Dept: WOMENS IMAGING | Facility: HOSPITAL | Age: 58
End: 2018-05-23

## 2018-05-23 PROCEDURE — 77067 SCR MAMMO BI INCL CAD: CPT | Performed by: RADIOLOGY

## 2018-05-23 PROCEDURE — 77063 BREAST TOMOSYNTHESIS BI: CPT | Performed by: RADIOLOGY

## 2018-06-10 RX ORDER — ACETAMINOPHEN 500 MG
TABLET ORAL
COMMUNITY
Start: 2018-01-01 | End: 2018-06-10 | Stop reason: SDUPTHER

## 2018-06-10 RX ORDER — ACETAMINOPHEN 500 MG
500 TABLET ORAL EVERY 6 HOURS PRN
Status: ON HOLD | COMMUNITY
End: 2019-12-13

## 2018-06-10 RX ORDER — ALENDRONATE SODIUM 70 MG/1
70 TABLET ORAL
COMMUNITY
End: 2019-06-28

## 2018-06-10 RX ORDER — CETIRIZINE HYDROCHLORIDE 10 MG/1
TABLET ORAL AS NEEDED
COMMUNITY
Start: 2018-01-01 | End: 2019-06-28

## 2018-06-14 DIAGNOSIS — Z00.00 HEALTH MAINTENANCE EXAMINATION: Primary | ICD-10-CM

## 2018-06-15 ENCOUNTER — HOSPITAL ENCOUNTER (OUTPATIENT)
Dept: GENERAL RADIOLOGY | Facility: HOSPITAL | Age: 58
Discharge: HOME | End: 2018-06-15
Attending: NEUROLOGICAL SURGERY

## 2018-06-15 ENCOUNTER — HOSPITAL ENCOUNTER (OUTPATIENT)
Dept: CT IMAGING | Facility: HOSPITAL | Age: 58
Discharge: HOME | End: 2018-06-15
Attending: NEUROLOGICAL SURGERY | Admitting: NEUROLOGICAL SURGERY

## 2018-06-15 VITALS
TEMPERATURE: 97.1 F | BODY MASS INDEX: 27.08 KG/M2 | RESPIRATION RATE: 16 BRPM | HEIGHT: 55 IN | SYSTOLIC BLOOD PRESSURE: 112 MMHG | WEIGHT: 117 LBS | OXYGEN SATURATION: 97 % | HEART RATE: 59 BPM | DIASTOLIC BLOOD PRESSURE: 68 MMHG

## 2018-06-15 DIAGNOSIS — M51.36 DDD (DEGENERATIVE DISC DISEASE), LUMBAR: ICD-10-CM

## 2018-06-15 DIAGNOSIS — Q76.2 CONGENITAL SPONDYLOLYSIS, LUMBOSACRAL REGION: ICD-10-CM

## 2018-06-15 PROCEDURE — 62304 MYELOGRAPHY LUMBAR INJECTION: CPT

## 2018-06-15 PROCEDURE — 72131 CT LUMBAR SPINE W/O DYE: CPT

## 2018-06-15 PROCEDURE — 0 IOPAMIDOL 41 % SOLUTION: Performed by: RADIOLOGY

## 2018-06-15 PROCEDURE — 72240 MYELOGRAPHY NECK SPINE: CPT

## 2018-06-15 RX ORDER — LIDOCAINE HYDROCHLORIDE 10 MG/ML
10 INJECTION, SOLUTION INFILTRATION; PERINEURAL ONCE
Status: COMPLETED | OUTPATIENT
Start: 2018-06-15 | End: 2018-06-15

## 2018-06-15 RX ADMIN — LIDOCAINE HYDROCHLORIDE 8 ML: 10 INJECTION, SOLUTION INFILTRATION; PERINEURAL at 09:08

## 2018-06-15 RX ADMIN — IOPAMIDOL 15 ML: 408 INJECTION, SOLUTION INTRATHECAL at 09:12

## 2018-06-15 NOTE — NURSING NOTE
D/C instructions discussed and understood by patient and family member. No distress. Home by vehicle.

## 2018-06-15 NOTE — DISCHARGE INSTRUCTIONS

## 2018-06-15 NOTE — PROGRESS NOTES
Subjective   Patient ID: Noemi Bartholomew is a 58 y.o. female is here today for follow-up on back pain.    At the last visit the patient reported left sided low back pain that radiates into the left leg down to the foot along with numbness, tingling and weakness.    Today the patient is here with a new myelogram of the lumbar spine. She denies having any complications from the myelogram. She reports that there have been no changes with her symptoms since the last visit.    Back Pain   This is a chronic problem. The current episode started more than 1 year ago. The problem occurs constantly. The problem is unchanged. The pain is present in the gluteal, lumbar spine and sacro-iliac. The pain radiates to the left thigh, left knee and left foot. Associated symptoms include numbness, paresthesias, tingling and weakness. Pertinent negatives include no bladder incontinence or bowel incontinence.       The following portions of the patient's history were reviewed and updated as appropriate: allergies, current medications, past family history, past medical history, past social history, past surgical history and problem list.    Review of Systems   Gastrointestinal: Negative for bowel incontinence.   Genitourinary: Negative for bladder incontinence.   Musculoskeletal: Positive for back pain.   Neurological: Positive for tingling, weakness, numbness and paresthesias.   All other systems reviewed and are negative.    She returns after the myelogram. She has had about 1-1/2 years worth of left buttock and leg pain that has not gotten any better. She was previously decompressed and fused by Shiva Cassidy MD, in 1999 and did well up until recently. We discussed the myelogram. I simply do not see anything that another surgery or another fusion, in particular, would be helpful for. She has mainly buttock pain down the left. The gabapentin made her too sleepy. We talked about possibly considering spinal cord stimulation but she  was reluctant to do that. Alternatively we could try another medicine, specifically Topamax, and try selective nerve root injections involving the left L5 and S1 root by Mp Monreal MD. We will try that and then have her come back in a few months but I think ultimately we might need to revisit the idea of spinal cord stimulation. I do not think she has piriformis syndrome as the left buttock is not tender to palpation.         Objective   Physical Exam   Constitutional: She is oriented to person, place, and time. She appears well-developed and well-nourished.   HENT:   Head: Normocephalic and atraumatic.   Eyes: Conjunctivae and EOM are normal. Pupils are equal, round, and reactive to light.   Fundoscopic exam:       The right eye shows no papilledema. The right eye shows venous pulsations.        The left eye shows no papilledema. The left eye shows venous pulsations.   Neck: Carotid bruit is not present.   Neurological: She is oriented to person, place, and time. She has a normal Finger-Nose-Finger Test and a normal Heel to Shin Test. Gait normal.   Reflex Scores:       Tricep reflexes are 2+ on the right side and 2+ on the left side.       Bicep reflexes are 2+ on the right side and 2+ on the left side.       Brachioradialis reflexes are 2+ on the right side and 2+ on the left side.       Patellar reflexes are 2+ on the right side and 2+ on the left side.       Achilles reflexes are 2+ on the right side and 2+ on the left side.  Psychiatric: Her speech is normal.     Neurologic Exam     Mental Status   Oriented to person, place, and time.   Registration of memory: Good recent and remote memory.   Attention: normal. Concentration: normal.   Speech: speech is normal   Level of consciousness: alert  Knowledge: consistent with education.     Cranial Nerves     CN II   Visual fields full to confrontation.   Visual acuity: normal    CN III, IV, VI   Pupils are equal, round, and reactive to light.  Extraocular  motions are normal.     CN V   Facial sensation intact.   Right corneal reflex: normal  Left corneal reflex: normal    CN VII   Facial expression full, symmetric.   Right facial weakness: none  Left facial weakness: none    CN VIII   Hearing: intact    CN IX, X   Palate: symmetric    CN XI   Right sternocleidomastoid strength: normal  Left sternocleidomastoid strength: normal    CN XII   Tongue: not atrophic  Tongue deviation: none    Motor Exam   Muscle bulk: normal  Right arm tone: normal  Left arm tone: normal  Right leg tone: normal  Left leg tone: normal    Strength   Strength 5/5 except as noted.     Sensory Exam   Light touch normal.     Gait, Coordination, and Reflexes     Gait  Gait: normal    Coordination   Finger to nose coordination: normal  Heel to shin coordination: normal    Reflexes   Right brachioradialis: 2+  Left brachioradialis: 2+  Right biceps: 2+  Left biceps: 2+  Right triceps: 2+  Left triceps: 2+  Right patellar: 2+  Left patellar: 2+  Right achilles: 2+  Left achilles: 2+  Right : 2+  Left : 2+      Assessment/Plan   Independent Review of Radiographic Studies:    Reviewed the myelogram done 6/15/18 which does show minimal anterolisthesis at L5-S1 that is stable and pars defects at L5 but no significant nerve root compression and even on the lateral recess at L5 or S1.  Agree with the report.      Medical Decision Making:    Again, I don't think another fusion surgery is indicated.  She seems reluctant to consider spinal cord stimulation so we'll try Topamax at 25 mg daily at bedtime and selective nerve root injections on the left at the L5 and S1 root.  We'll have her come back in 3 months.      Noemi was seen today for back pain.    Diagnoses and all orders for this visit:    Congenital spondylolysis, lumbosacral region    DDD (degenerative disc disease), lumbar    Neuropathic pain, leg, left  -     Ambulatory Referral to Pain Management    Other orders  -     topiramate (TOPAMAX)  25 MG tablet; Take 1 tablet by mouth Every Night.      Return in about 3 months (around 9/25/2018).

## 2018-06-15 NOTE — H&P
Name: Noemi Bartholomew ADMIT: 6/15/2018   : 1960  PCP: Mary Lou Carroll MD    MRN: [id number] LOS: 0 days   AGE/SEX: 58 y.o. female  ROOM: Room/bed info not found       Chief complaint   Patient is a 58 y.o. female presents with pain.     Past Surgical History:  Past Surgical History:   Procedure Laterality Date   • BACK SURGERY     • COLONOSCOPY  2011   • TYMPANOPLASTY         Past Medical History:  Past Medical History:   Diagnosis Date   • Abnormal alkaline phosphatase test 2015    Resolved   • Acute bronchitis 2015    Resolved   • Diverticulosis    • Ear pressure 2015    Resolved   • H/O electromyography 10/13/2014   • H/O mammogram 2014   • Hand numbness 2015    Resolved   • High cholesterol    • History of EKG 2015   • Hypercalcemia 2015    Resolved, Full w/u done, Likely secondary to Forteo   • Nasal congestion 2015    Resolved   • Osteoporosis    • Paresthesia 2015    Resolved       Home Medications:    (Not in a hospital admission)    Allergies:  Penicillins    Family History:  Family History   Problem Relation Age of Onset   • Breast cancer Mother    • Stroke Mother         Cerebrovascular Accident   • Colon cancer Mother    • Diabetes Father         Borderline Diabetes Mellitus   • Heart disease Father         Cardiac Disorder   • Heart failure Father    • Anxiety disorder Sister    • Nephrolithiasis Sister         Kidney Stones       Social History:  Social History   Substance Use Topics   • Smoking status: Never Smoker   • Smokeless tobacco: Never Used   • Alcohol use No        Objective     Physical Exam:   n/a    Vital Signs  Temp:  [97.1 °F (36.2 °C)] 97.1 °F (36.2 °C)  Heart Rate:  [83] 83  Resp:  [16] 16  BP: (127)/(80) 127/80    Anticipated Surgical Procedure:  Lumbar myelogram    The risks, benefits and alternatives of this procedure have been discussed with the patient or responsible party: Yes        Calvin Hernandez,  MD  06/15/18  8:31 AM

## 2018-06-19 LAB
25(OH)D3+25(OH)D2 SERPL-MCNC: 22.2 NG/ML (ref 30–100)
ALBUMIN SERPL-MCNC: 4.9 G/DL (ref 3.5–5.2)
ALBUMIN/GLOB SERPL: 1.9 G/DL
ALP SERPL-CCNC: 104 U/L (ref 39–117)
ALT SERPL-CCNC: 32 U/L (ref 1–33)
APPEARANCE UR: ABNORMAL
AST SERPL-CCNC: 29 U/L (ref 1–32)
BACTERIA #/AREA URNS HPF: ABNORMAL /HPF
BASOPHILS # BLD AUTO: 0.03 10*3/MM3 (ref 0–0.2)
BASOPHILS NFR BLD AUTO: 0.4 % (ref 0–1.5)
BILIRUB SERPL-MCNC: 0.4 MG/DL (ref 0.1–1.2)
BILIRUB UR QL STRIP: NEGATIVE
BUN SERPL-MCNC: 12 MG/DL (ref 6–20)
BUN/CREAT SERPL: 17.6 (ref 7–25)
CALCIUM SERPL-MCNC: 9.8 MG/DL (ref 8.6–10.5)
CASTS URNS MICRO: ABNORMAL
CASTS URNS QL MICRO: PRESENT /LPF
CHLORIDE SERPL-SCNC: 104 MMOL/L (ref 98–107)
CHOLEST SERPL-MCNC: 163 MG/DL (ref 0–200)
CO2 SERPL-SCNC: 27.9 MMOL/L (ref 22–29)
COLOR UR: YELLOW
CREAT SERPL-MCNC: 0.68 MG/DL (ref 0.57–1)
EOSINOPHIL # BLD AUTO: 0.12 10*3/MM3 (ref 0–0.7)
EOSINOPHIL NFR BLD AUTO: 1.7 % (ref 0.3–6.2)
EPI CELLS #/AREA URNS HPF: ABNORMAL /HPF
ERYTHROCYTE [DISTWIDTH] IN BLOOD BY AUTOMATED COUNT: 15.2 % (ref 11.7–13)
GFR SERPLBLD CREATININE-BSD FMLA CKD-EPI: 108 ML/MIN/1.73
GFR SERPLBLD CREATININE-BSD FMLA CKD-EPI: 89 ML/MIN/1.73
GLOBULIN SER CALC-MCNC: 2.6 GM/DL
GLUCOSE SERPL-MCNC: 105 MG/DL (ref 65–99)
GLUCOSE UR QL: NEGATIVE
HCT VFR BLD AUTO: 44.1 % (ref 35.6–45.5)
HDLC SERPL-MCNC: 52 MG/DL (ref 40–60)
HGB BLD-MCNC: 13.9 G/DL (ref 11.9–15.5)
HGB UR QL STRIP: NEGATIVE
IMM GRANULOCYTES # BLD: 0 10*3/MM3 (ref 0–0.03)
IMM GRANULOCYTES NFR BLD: 0 % (ref 0–0.5)
KETONES UR QL STRIP: NEGATIVE
LDLC SERPL CALC-MCNC: 85 MG/DL (ref 0–100)
LEUKOCYTE ESTERASE UR QL STRIP: NEGATIVE
LYMPHOCYTES # BLD AUTO: 1.45 10*3/MM3 (ref 0.9–4.8)
LYMPHOCYTES NFR BLD AUTO: 21 % (ref 19.6–45.3)
MCH RBC QN AUTO: 30.5 PG (ref 26.9–32)
MCHC RBC AUTO-ENTMCNC: 31.5 G/DL (ref 32.4–36.3)
MCV RBC AUTO: 96.7 FL (ref 80.5–98.2)
MICRO URNS: ABNORMAL
MICRO URNS: ABNORMAL
MONOCYTES # BLD AUTO: 0.34 10*3/MM3 (ref 0.2–1.2)
MONOCYTES NFR BLD AUTO: 4.9 % (ref 5–12)
MUCOUS THREADS URNS QL MICRO: PRESENT /HPF
NEUTROPHILS # BLD AUTO: 4.97 10*3/MM3 (ref 1.9–8.1)
NEUTROPHILS NFR BLD AUTO: 72 % (ref 42.7–76)
NITRITE UR QL STRIP: NEGATIVE
PH UR STRIP: 5 [PH] (ref 5–7.5)
PLATELET # BLD AUTO: 268 10*3/MM3 (ref 140–500)
POTASSIUM SERPL-SCNC: 4.3 MMOL/L (ref 3.5–5.2)
PROT SERPL-MCNC: 7.5 G/DL (ref 6–8.5)
PROT UR QL STRIP: ABNORMAL
RBC # BLD AUTO: 4.56 10*6/MM3 (ref 3.9–5.2)
RBC #/AREA URNS HPF: ABNORMAL /HPF
SODIUM SERPL-SCNC: 145 MMOL/L (ref 136–145)
SP GR UR: 1.03 (ref 1–1.03)
TRIGL SERPL-MCNC: 129 MG/DL (ref 0–150)
TSH SERPL DL<=0.005 MIU/L-ACNC: 2.54 MIU/ML (ref 0.27–4.2)
URINALYSIS REFLEX: ABNORMAL
UROBILINOGEN UR STRIP-MCNC: 0.2 MG/DL (ref 0.2–1)
VLDLC SERPL CALC-MCNC: 25.8 MG/DL (ref 5–40)
WBC # BLD AUTO: 6.91 10*3/MM3 (ref 4.5–10.7)
WBC #/AREA URNS HPF: ABNORMAL /HPF

## 2018-06-22 ENCOUNTER — OFFICE VISIT (OUTPATIENT)
Dept: INTERNAL MEDICINE | Facility: CLINIC | Age: 58
End: 2018-06-22

## 2018-06-22 VITALS
SYSTOLIC BLOOD PRESSURE: 116 MMHG | HEIGHT: 55 IN | HEART RATE: 68 BPM | RESPIRATION RATE: 16 BRPM | WEIGHT: 117 LBS | TEMPERATURE: 98.2 F | DIASTOLIC BLOOD PRESSURE: 70 MMHG | BODY MASS INDEX: 27.08 KG/M2

## 2018-06-22 DIAGNOSIS — Z00.00 WELL ADULT EXAM: Primary | ICD-10-CM

## 2018-06-22 PROCEDURE — 93000 ELECTROCARDIOGRAM COMPLETE: CPT | Performed by: INTERNAL MEDICINE

## 2018-06-22 PROCEDURE — 99396 PREV VISIT EST AGE 40-64: CPT | Performed by: INTERNAL MEDICINE

## 2018-06-22 RX ORDER — HEPATITIS A VACCINE 1440 [IU]/ML
INJECTION, SUSPENSION INTRAMUSCULAR
Refills: 0 | COMMUNITY
Start: 2018-05-22 | End: 2018-09-24

## 2018-06-22 NOTE — PROGRESS NOTES
Subjective     Noemi Bartholomew is a 58 y.o. female who presents for a complete physical exam.      History of Present Illness     HLD.  She is maintained on atorvastatin.    OP.  She is maintained on Fosamax by gynecologist.    Review of Systems   Constitutional: Negative.    HENT: Negative.    Eyes: Negative.    Respiratory: Negative.    Cardiovascular: Negative.    Gastrointestinal: Negative.    Endocrine: Negative.    Genitourinary: Negative.    Musculoskeletal: Negative.    Skin: Negative.    Allergic/Immunologic: Negative.    Neurological: Negative.    Hematological: Negative.    Psychiatric/Behavioral: Negative.        The following portions of the patient's history were reviewed and updated as appropriate: allergies, current medications, past family history, past medical history, past social history, past surgical history and problem list.  Health maintenance tab was reviewed and updated with the patient.       Patient Active Problem List    Diagnosis Date Noted   • DDD (degenerative disc disease), lumbar 05/02/2018   • Chronic left lumbar radiculopathy 05/02/2018   • Congenital spondylolysis, lumbosacral region 05/02/2018   • Carpal tunnel syndrome 06/08/2016   • Hyperlipidemia 06/08/2016   • Osteoporosis 06/08/2016     Note Last Updated: 6/8/2016     Description: Boniva for a number of years.  Forteo for a year.  Managed by Gyn. .  I do recommend 1200mg calcium and 1000 IUs of vitamin D in supplements/diet as well as weight bearing exercise to prevent further decline in BMD.         Past Medical History:   Diagnosis Date   • Abnormal alkaline phosphatase test 06/12/2015    Resolved   • Acute bronchitis 06/12/2015    Resolved   • Diverticulosis    • Ear pressure 06/12/2015    Resolved   • H/O electromyography 10/13/2014   • H/O mammogram 07/29/2014   • Hand numbness 06/12/2015    Resolved   • High cholesterol    • History of EKG 06/12/2015   • Hypercalcemia 06/12/2015    Resolved, Full w/u done, Likely  secondary to Forteo   • Nasal congestion 06/12/2015    Resolved   • Osteoporosis    • Paresthesia 06/12/2015    Resolved       Past Surgical History:   Procedure Laterality Date   • BACK SURGERY  1999   • COLONOSCOPY  05/20/2011   • TYMPANOPLASTY         Family History   Problem Relation Age of Onset   • Breast cancer Mother    • Stroke Mother         Cerebrovascular Accident   • Colon cancer Mother    • Diabetes Father         Borderline Diabetes Mellitus   • Heart disease Father         Cardiac Disorder   • Heart failure Father    • Anxiety disorder Sister    • Nephrolithiasis Sister         Kidney Stones       Social History     Social History   • Marital status: Single     Spouse name: N/A   • Number of children: 0   • Years of education: college     Occupational History   •       Social History Main Topics   • Smoking status: Never Smoker   • Smokeless tobacco: Never Used   • Alcohol use No   • Drug use: No   • Sexual activity: Defer     Other Topics Concern   • Not on file     Social History Narrative   • No narrative on file       Current Outpatient Prescriptions on File Prior to Visit   Medication Sig Dispense Refill   • acetaminophen (TYLENOL) 500 MG tablet Take 500 mg by mouth Every 6 (Six) Hours As Needed for Mild Pain .     • alendronate (FOSAMAX) 70 MG tablet Take 70 mg by mouth Every 7 (Seven) Days.     • atorvastatin (LIPITOR) 20 MG tablet Take 1 tablet by mouth Daily. Due recheck labs in 1/1/2018 30 tablet 11   • cetirizine (ZYRTEC ALLERGY) 10 MG tablet As Needed.     • docusate sodium (COLACE) 100 MG capsule Take 1 capsule by mouth once a week.     • melatonin tablet Take 1 tablet by mouth At Night As Needed.       No current facility-administered medications on file prior to visit.        Allergies   Allergen Reactions   • Penicillins Rash       Immunization History   Administered Date(s) Administered   • Hepatitis A 05/22/2018   • Tdap 09/10/2009, 11/04/2016   • Zoster, Unspecified  "05/22/2018       Objective     /70   Pulse 68   Temp 98.2 °F (36.8 °C)   Resp 16   Ht 134.6 cm (52.99\")   Wt 53.1 kg (117 lb)   BMI 29.29 kg/m²       Physical Exam   Constitutional: She is oriented to person, place, and time. She appears well-developed and well-nourished.   HENT:   Head: Normocephalic and atraumatic.   Right Ear: Hearing, tympanic membrane and external ear normal.   Left Ear: Hearing, tympanic membrane and external ear normal.   Nose: Nose normal.   Mouth/Throat: Oropharynx is clear and moist.   Neck: Neck supple. No thyromegaly present.   Cardiovascular: Normal rate, regular rhythm and normal heart sounds.    No murmur heard.  Pulmonary/Chest: Effort normal and breath sounds normal. Right breast exhibits no mass. Left breast exhibits no mass.   Abdominal: Soft. She exhibits no distension. There is no hepatosplenomegaly. There is no tenderness.   Genitourinary: No breast tenderness.   Lymphadenopathy:     She has no cervical adenopathy.   Neurological: She is alert and oriented to person, place, and time.   Skin: Skin is warm and dry.   Psychiatric: She has a normal mood and affect. Her speech is normal and behavior is normal. Judgment and thought content normal. Cognition and memory are normal.       ECG 12 Lead  Date/Time: 6/22/2018 8:13 AM  Performed by: GIULIANO DOS SANTOS  Authorized by: GIULIANO DOS SANTOS   Comparison: compared with previous ECG from 6/16/2017  Rhythm: sinus rhythm  Rate: normal  Conduction: conduction normal  ST Segments: ST segments normal  T Waves: T waves normal  QRS axis: normal  Clinical impression: normal ECG            Assessment/Plan   Noemi was seen today for annual exam.    Diagnoses and all orders for this visit:    Well adult exam  -     ECG 12 Lead        Discussion    Patient presents today for a CPE.      Patient follows a healthy diet.   Patient follows an adequate exercise regimen. Mammogram is up to date.   Colon cancer screening is up to date.   Pap " smears are performed by the patient's gynecologist.   Immunizations are up to date.   DEXA is up to date.      Health Maintenance   Topic Date Due   • ZOSTER VACCINE (1 of 2) 04/30/2010   • DXA SCAN  07/30/2017   • INFLUENZA VACCINE  08/01/2018   • COLONOSCOPY  05/16/2019   • LIPID PANEL  06/18/2019   • ANNUAL PHYSICAL  06/23/2019   • MAMMOGRAM  08/16/2019   • PAP SMEAR  08/16/2020   • TDAP/TD VACCINES (3 - Td) 11/04/2026   • HEPATITIS C SCREENING  Addressed            Future Appointments  Date Time Provider Department Center   6/25/2018 1:30 PM Antonio Noe MD [facility] None   12/27/2018 8:10 AM [facility] None   6/21/2019 8:00 AM [facility] None   6/28/2019 7:45 AM Mary Lou Carroll MD [facility] None

## 2018-06-25 ENCOUNTER — OFFICE VISIT (OUTPATIENT)
Dept: NEUROSURGERY | Facility: CLINIC | Age: 58
End: 2018-06-25

## 2018-06-25 VITALS
HEART RATE: 76 BPM | SYSTOLIC BLOOD PRESSURE: 119 MMHG | BODY MASS INDEX: 27.08 KG/M2 | WEIGHT: 117 LBS | HEIGHT: 55 IN | DIASTOLIC BLOOD PRESSURE: 76 MMHG

## 2018-06-25 DIAGNOSIS — Q76.2 CONGENITAL SPONDYLOLYSIS, LUMBOSACRAL REGION: Primary | ICD-10-CM

## 2018-06-25 DIAGNOSIS — M79.2 NEUROPATHIC PAIN, LEG, LEFT: ICD-10-CM

## 2018-06-25 DIAGNOSIS — M51.36 DDD (DEGENERATIVE DISC DISEASE), LUMBAR: ICD-10-CM

## 2018-06-25 PROCEDURE — 99214 OFFICE O/P EST MOD 30 MIN: CPT | Performed by: NEUROLOGICAL SURGERY

## 2018-06-25 RX ORDER — TOPIRAMATE 25 MG/1
25 TABLET ORAL NIGHTLY
Qty: 30 TABLET | Refills: 3 | Status: SHIPPED | OUTPATIENT
Start: 2018-06-25 | End: 2018-09-24 | Stop reason: SINTOL

## 2018-07-23 ENCOUNTER — DOCUMENTATION (OUTPATIENT)
Dept: PHYSICAL THERAPY | Facility: HOSPITAL | Age: 58
End: 2018-07-23

## 2018-07-23 DIAGNOSIS — M54.42 CHRONIC BILATERAL LOW BACK PAIN WITH LEFT-SIDED SCIATICA: Primary | ICD-10-CM

## 2018-07-23 DIAGNOSIS — G89.29 CHRONIC BILATERAL LOW BACK PAIN WITH LEFT-SIDED SCIATICA: Primary | ICD-10-CM

## 2018-07-23 NOTE — THERAPY DISCHARGE NOTE
Outpatient Physical Therapy Discharge Summary         Patient Name: Noemi Bartholomew  : 1960  MRN: [id number]    Today's Date: 2018    Visit Dx:    ICD-10-CM ICD-9-CM   1. Chronic bilateral low back pain with left-sided sciatica M54.42 724.2    G89.29 724.3     338.29             PT OP Goals     Row Name 18 1300          PT Short Term Goals    STG Date to Achieve 17  -CN     STG 1 Pt will demonstrate understanding and compliance with initial HEP.  -CN     STG 1 Progress Met  -CN     STG 2 Pt will report improved pain at worse from 8/10 to less than or equal to 6/10 with utilization of 90/90 position and exercises.  -CN     STG 2 Progress Not Met  -CN     STG 3 Pt will demonstrate ability to activate core musculature in standing to improve her tolerance to prolonged standing/walking.  -CN     STG 3 Progress Not Met  -CN        Long Term Goals    LTG Date to Achieve 17  -CN     LTG 1 Pt will demonstrate reduced overall disability evidenced by improved Oswestry disability score from 26% to less than or equal to 15%.  -CN     LTG 1 Progress Not Met  -CN     LTG 2 Pt will report reduced LLE numbness with centralization of symptoms to L knee or more proximal.  -CN     LTG 2 Progress Not Met  -CN     LTG 3 Pt will demonstrate hip abduction strength B 5/5 to improve stability in gait.  -CN     LTG 3 Progress Not Met  -CN       User Key  (r) = Recorded By, (t) = Taken By, (c) = Cosigned By    Initials Name Provider Type    DONNIE Pereyra, PT Physical Therapist          OP PT Discharge Summary  Date of Discharge: 18  Reason for Discharge: Unable to pay/insurance denied care  Outcomes Achieved: Unable to make functional progress toward goals at this time  Discharge Destination: Home with home program  Discharge Instructions/Additional Comments: Pt attended 7 skilled therapy sessions for treatment of low back pain with L sided radiation. Pt given HEP with emphasis on TA  contraction for symptom management and D/C from PT due to insurance approval.       Time Calculation:        Therapy Suggested Charges     Code   Minutes Charges    None                       Kiana Pereyra, PT  7/23/2018

## 2018-08-21 ENCOUNTER — APPOINTMENT (OUTPATIENT)
Dept: WOMENS IMAGING | Facility: HOSPITAL | Age: 58
End: 2018-08-21

## 2018-08-21 PROCEDURE — 77067 SCR MAMMO BI INCL CAD: CPT | Performed by: RADIOLOGY

## 2018-08-21 PROCEDURE — 77063 BREAST TOMOSYNTHESIS BI: CPT | Performed by: RADIOLOGY

## 2018-09-17 NOTE — PROGRESS NOTES
Subjective   Patient ID: Noemi Bartholomew is a 58 y.o. female is here today for follow-up on back pain.    At the last visit the patient reported no changes with the left sided low back pain that radiates into the left leg down to the foot along with numbness, tingling and weakness. She was referred to pain management and started on Topamax 25 mg at the last visit.    Today the patient reports that she stopped taking the Topamax because it gave her a rash. She states that she decided that she didn't want to do the injections because her pain has improved.    Back Pain   This is a chronic problem. The current episode started more than 1 year ago. The problem occurs constantly. The problem has been gradually improving since onset. The pain is present in the lumbar spine. The pain radiates to the left thigh, left knee and left foot. Associated symptoms include numbness, paresthesias, tingling and weakness. Pertinent negatives include no bladder incontinence or bowel incontinence.       The following portions of the patient's history were reviewed and updated as appropriate: allergies, current medications, past family history, past medical history, past social history, past surgical history and problem list.    Review of Systems   Gastrointestinal: Negative for bowel incontinence.   Genitourinary: Negative for bladder incontinence.   Musculoskeletal: Positive for back pain.   Neurological: Positive for tingling, weakness, numbness and paresthesias.   All other systems reviewed and are negative.    She was decompressed and fused by another neurosurgeon in 1999. She had recurrent left buttock and leg pain about a year and half ago that has not got any better, at least the last time I spoke with her, but since then she feels that it has improved. She never ended up getting the transforaminal epidural blocks.  She tried Topamax for a month but it caused a rash and she stopped it. Overall, she is a little bit better.  No motor  deficits. She feels that she can live with her symptoms for now and so we can keep it open-ended. She was not interested in spinal cord stimulation, but a transforaminal block could be considered if she feels that she worsens.  She will let us know if that happens, otherwise will keep it open-ended.       Objective   Physical Exam   Constitutional: She is oriented to person, place, and time. She appears well-developed and well-nourished.   HENT:   Head: Normocephalic and atraumatic.   Eyes: Pupils are equal, round, and reactive to light. Conjunctivae and EOM are normal.   Fundoscopic exam:       The right eye shows no papilledema. The right eye shows venous pulsations.        The left eye shows no papilledema. The left eye shows venous pulsations.   Neck: Carotid bruit is not present.   Neurological: She is oriented to person, place, and time. She has a normal Finger-Nose-Finger Test and a normal Heel to Shin Test. Gait normal.   Reflex Scores:       Tricep reflexes are 2+ on the right side and 2+ on the left side.       Bicep reflexes are 2+ on the right side and 2+ on the left side.       Brachioradialis reflexes are 2+ on the right side and 2+ on the left side.       Patellar reflexes are 2+ on the right side and 2+ on the left side.       Achilles reflexes are 2+ on the right side and 2+ on the left side.  Psychiatric: Her speech is normal.     Neurologic Exam     Mental Status   Oriented to person, place, and time.   Registration of memory: Good recent and remote memory.   Attention: normal. Concentration: normal.   Speech: speech is normal   Level of consciousness: alert  Knowledge: consistent with education.     Cranial Nerves     CN II   Visual fields full to confrontation.   Visual acuity: normal    CN III, IV, VI   Pupils are equal, round, and reactive to light.  Extraocular motions are normal.     CN V   Facial sensation intact.   Right corneal reflex: normal  Left corneal reflex: normal    CN VII    Facial expression full, symmetric.   Right facial weakness: none  Left facial weakness: none    CN VIII   Hearing: intact    CN IX, X   Palate: symmetric    CN XI   Right sternocleidomastoid strength: normal  Left sternocleidomastoid strength: normal    CN XII   Tongue: not atrophic  Tongue deviation: none    Motor Exam   Muscle bulk: normal  Right arm tone: normal  Left arm tone: normal  Right leg tone: normal  Left leg tone: normal    Strength   Strength 5/5 except as noted.     Sensory Exam   Light touch normal.     Gait, Coordination, and Reflexes     Gait  Gait: normal    Coordination   Finger to nose coordination: normal  Heel to shin coordination: normal    Reflexes   Right brachioradialis: 2+  Left brachioradialis: 2+  Right biceps: 2+  Left biceps: 2+  Right triceps: 2+  Left triceps: 2+  Right patellar: 2+  Left patellar: 2+  Right achilles: 2+  Left achilles: 2+  Right : 2+  Left : 2+      Assessment/Plan   Independent Review of Radiographic Studies:    The myelogram done on 6/15/18 which does show minimal anterolisthesis at L5-S1 that is stable on a pars defect at L5 but no significant nerve root compression in the lateral recesses.  Agree with the report.      Medical Decision Making:    She feels that she can live with her symptoms for now and so we can keep it open-ended.  If symptoms flareup, she can gently be reevaluated and we will consider a transforaminal epidural blocks.      Noemi was seen today for back pain.    Diagnoses and all orders for this visit:    Congenital spondylolysis, lumbosacral region    DDD (degenerative disc disease), lumbar    Neuropathic pain, leg, left      Return if symptoms worsen or fail to improve.

## 2018-09-24 ENCOUNTER — OFFICE VISIT (OUTPATIENT)
Dept: NEUROSURGERY | Facility: CLINIC | Age: 58
End: 2018-09-24

## 2018-09-24 VITALS
HEIGHT: 55 IN | DIASTOLIC BLOOD PRESSURE: 72 MMHG | SYSTOLIC BLOOD PRESSURE: 111 MMHG | BODY MASS INDEX: 23.19 KG/M2 | HEART RATE: 65 BPM | WEIGHT: 100.2 LBS

## 2018-09-24 DIAGNOSIS — M79.2 NEUROPATHIC PAIN, LEG, LEFT: ICD-10-CM

## 2018-09-24 DIAGNOSIS — M51.36 DDD (DEGENERATIVE DISC DISEASE), LUMBAR: ICD-10-CM

## 2018-09-24 DIAGNOSIS — Q76.2 CONGENITAL SPONDYLOLYSIS, LUMBOSACRAL REGION: Primary | ICD-10-CM

## 2018-09-24 PROCEDURE — 99213 OFFICE O/P EST LOW 20 MIN: CPT | Performed by: NEUROLOGICAL SURGERY

## 2018-09-24 RX ORDER — FEXOFENADINE HCL 180 MG/1
TABLET ORAL
COMMUNITY
Start: 2018-08-13 | End: 2019-06-28

## 2018-09-24 RX ORDER — FLUTICASONE PROPIONATE 50 MCG
1 SPRAY, SUSPENSION (ML) NASAL NIGHTLY
Status: ON HOLD | COMMUNITY
Start: 2018-08-13 | End: 2019-12-13

## 2018-10-05 RX ORDER — ATORVASTATIN CALCIUM 20 MG/1
TABLET, FILM COATED ORAL
Qty: 30 TABLET | Refills: 9 | Status: SHIPPED | OUTPATIENT
Start: 2018-10-05 | End: 2019-08-02 | Stop reason: SDUPTHER

## 2018-10-24 ENCOUNTER — CLINICAL SUPPORT (OUTPATIENT)
Dept: FAMILY MEDICINE CLINIC | Facility: CLINIC | Age: 58
End: 2018-10-24

## 2018-10-24 VITALS — HEART RATE: 86 BPM | SYSTOLIC BLOOD PRESSURE: 112 MMHG | OXYGEN SATURATION: 98 % | DIASTOLIC BLOOD PRESSURE: 80 MMHG

## 2018-10-31 RX ORDER — OMEPRAZOLE 20 MG/1
CAPSULE, DELAYED RELEASE ORAL
Qty: 90 CAPSULE | Refills: 1 | Status: SHIPPED | OUTPATIENT
Start: 2018-10-31 | End: 2019-04-10 | Stop reason: SDUPTHER

## 2018-12-10 DIAGNOSIS — R73.03 PREDIABETES: Primary | ICD-10-CM

## 2018-12-10 LAB
ALBUMIN SERPL-MCNC: 4.7 G/DL (ref 3.5–5.2)
ALBUMIN/GLOB SERPL: 2 G/DL
ALP SERPL-CCNC: 94 U/L (ref 39–117)
ALT SERPL-CCNC: 32 U/L (ref 1–33)
AST SERPL-CCNC: 30 U/L (ref 1–32)
BILIRUB SERPL-MCNC: 0.3 MG/DL (ref 0.1–1.2)
BUN SERPL-MCNC: 14 MG/DL (ref 6–20)
BUN/CREAT SERPL: 18.9 (ref 7–25)
CALCIUM SERPL-MCNC: 9.6 MG/DL (ref 8.6–10.5)
CHLORIDE SERPL-SCNC: 99 MMOL/L (ref 98–107)
CO2 SERPL-SCNC: 27.6 MMOL/L (ref 22–29)
CREAT SERPL-MCNC: 0.74 MG/DL (ref 0.57–1)
GLOBULIN SER CALC-MCNC: 2.3 GM/DL
GLUCOSE SERPL-MCNC: 111 MG/DL (ref 65–99)
HBA1C MFR BLD: 5.82 % (ref 4.8–5.6)
POTASSIUM SERPL-SCNC: 4 MMOL/L (ref 3.5–5.2)
PROT SERPL-MCNC: 7 G/DL (ref 6–8.5)
SODIUM SERPL-SCNC: 139 MMOL/L (ref 136–145)

## 2018-12-11 ENCOUNTER — OFFICE (OUTPATIENT)
Dept: URBAN - METROPOLITAN AREA CLINIC 66 | Facility: CLINIC | Age: 58
End: 2018-12-11

## 2018-12-11 VITALS — HEART RATE: 116 BPM | SYSTOLIC BLOOD PRESSURE: 97 MMHG | DIASTOLIC BLOOD PRESSURE: 68 MMHG | WEIGHT: 102 LBS

## 2018-12-11 DIAGNOSIS — K21.9 GASTRO-ESOPHAGEAL REFLUX DISEASE WITHOUT ESOPHAGITIS: ICD-10-CM

## 2018-12-11 DIAGNOSIS — Z12.11 ENCOUNTER FOR SCREENING FOR MALIGNANT NEOPLASM OF COLON: ICD-10-CM

## 2018-12-11 DIAGNOSIS — Z80.0 FAMILY HISTORY OF MALIGNANT NEOPLASM OF DIGESTIVE ORGANS: ICD-10-CM

## 2018-12-11 PROCEDURE — 99203 OFFICE O/P NEW LOW 30 MIN: CPT | Performed by: NURSE PRACTITIONER

## 2018-12-17 ENCOUNTER — OFFICE VISIT (OUTPATIENT)
Dept: FAMILY MEDICINE CLINIC | Facility: CLINIC | Age: 58
End: 2018-12-17

## 2018-12-17 VITALS
SYSTOLIC BLOOD PRESSURE: 108 MMHG | WEIGHT: 101.1 LBS | DIASTOLIC BLOOD PRESSURE: 70 MMHG | BODY MASS INDEX: 24.38 KG/M2 | OXYGEN SATURATION: 99 % | HEART RATE: 77 BPM

## 2018-12-17 DIAGNOSIS — E03.9 ACQUIRED HYPOTHYROIDISM: ICD-10-CM

## 2018-12-17 DIAGNOSIS — E78.5 HYPERLIPIDEMIA, UNSPECIFIED HYPERLIPIDEMIA TYPE: ICD-10-CM

## 2018-12-17 DIAGNOSIS — Z00.00 VISIT FOR PREVENTIVE HEALTH EXAMINATION: Primary | ICD-10-CM

## 2018-12-17 PROBLEM — R73.9 HYPERGLYCEMIA: Status: ACTIVE | Noted: 2018-12-17

## 2018-12-17 PROBLEM — K21.9 ACID REFLUX: Status: ACTIVE | Noted: 2017-08-01

## 2018-12-17 LAB
CHOLEST SERPL-MCNC: 150 MG/DL (ref 0–200)
HDLC SERPL-MCNC: 55 MG/DL (ref 40–60)
LDLC SERPL CALC-MCNC: 77 MG/DL (ref 0–100)
TRIGL SERPL-MCNC: 90 MG/DL (ref 0–150)
TSH SERPL DL<=0.005 MIU/L-ACNC: 1.79 MIU/ML (ref 0.27–4.2)
VLDLC SERPL CALC-MCNC: 18 MG/DL (ref 5–40)

## 2018-12-17 PROCEDURE — 99396 PREV VISIT EST AGE 40-64: CPT | Performed by: FAMILY MEDICINE

## 2018-12-17 RX ORDER — HEPATITIS A VACCINE 1440 [IU]/ML
INJECTION, SUSPENSION INTRAMUSCULAR
Refills: 0 | COMMUNITY
Start: 2018-11-22

## 2018-12-17 RX ORDER — INFLUENZA A VIRUS A/MICHIGAN/45/2015 X-275 (H1N1) ANTIGEN (FORMALDEHYDE INACTIVATED), INFLUENZA A VIRUS A/SINGAPORE/INFIMH-16-0019/2016 IVR-186 (H3N2) ANTIGEN (FORMALDEHYDE INACTIVATED), INFLUENZA B VIRUS B/PHUKET/3073/2013 ANTIGEN (FORMALDEHYDE INACTIVATED), AND INFLUENZA B VIRUS B/MARYLAND/15/2016 BX-69A ANTIGEN (FORMALDEHYDE INACTIVATED) 15; 15; 15; 15 UG/.5ML; UG/.5ML; UG/.5ML; UG/.5ML
INJECTION, SUSPENSION INTRAMUSCULAR
Refills: 0 | COMMUNITY
Start: 2018-10-05

## 2018-12-17 RX ORDER — ZOSTER VACCINE RECOMBINANT, ADJUVANTED 50 MCG/0.5
KIT INTRAMUSCULAR
Refills: 0 | COMMUNITY
Start: 2018-11-22

## 2018-12-17 RX ORDER — CETIRIZINE HYDROCHLORIDE 10 MG/1
TABLET ORAL
COMMUNITY
Start: 2018-03-01

## 2018-12-17 NOTE — PROGRESS NOTES
Subjective    Chief Complaint   Patient presents with   • Annual Exam       Lexus Trinh is a 58 y.o. female.     History of Present Illness   Hyperglycemia  She has hx of diabetes   Needs a not for weight watchers  She has lost 43 lbs    Hx of lung nodule  Reviewed pt's CT scans and a nodule identified 2013 had been followed up in 2014 and 2015.  Showed stability and benign appearance for over 2 years.  Her screening with this nodule is completed.  Recommended no scans today.    Annual exam   Has colonoscopy and endoscopy scheduled for 3/2019 to evaluate her acid reflux and for colon cancer screening. She does have hx colon cancer in the family.  Seen by gynecology.  She was seen this past May and is up-to-date on her pelvic exams and mammograms.  She had a bone density scan last December that showed moderate osteopenia.    The following portions of the patient's history were reviewed and updated as appropriate: allergies, current medications, past family history, past medical history, past social history, past surgical history and problem list.    Review of Systems   Constitutional: Negative.    HENT: Negative.    Eyes: Negative.    Respiratory: Negative.    Cardiovascular: Negative.    Gastrointestinal: Negative.    Endocrine: Negative.    Genitourinary: Negative.    Musculoskeletal: Negative.    Skin: Negative.    Allergic/Immunologic: Negative.    Neurological: Positive for dizziness.   Psychiatric/Behavioral: Negative.        Objective    Vitals:    12/17/18 0835   BP: 108/70   Pulse: 77   SpO2: 99%     Physical Exam   Constitutional: She is oriented to person, place, and time. She appears well-nourished. No distress.   HENT:   Right Ear: External ear normal.   Left Ear: External ear normal.   Nose: Nose normal.   Mouth/Throat: Oropharynx is clear and moist.   TMs and canals are clear, normal.   Eyes: Conjunctivae are normal. Right eye exhibits no discharge. Left eye exhibits no discharge. No scleral icterus.    Neck: Neck supple. No thyromegaly present.   Cardiovascular: Normal rate, regular rhythm, normal heart sounds and intact distal pulses.   No murmur heard.  Pulmonary/Chest: Effort normal and breath sounds normal. No respiratory distress. She has no wheezes.   Abdominal: Soft. Bowel sounds are normal. She exhibits no distension. There is no tenderness.   Musculoskeletal: She exhibits no edema.   Lymphadenopathy:     She has no cervical adenopathy.   Neurological: She is alert and oriented to person, place, and time. She exhibits normal muscle tone.   Psychiatric: She has a normal mood and affect. Her behavior is normal.   Vitals reviewed.      Assessment/Plan   Lexus was seen today for annual exam.    Diagnoses and all orders for this visit:    Visit for preventive health examination  Reviewed lab results she does have mild hyperglycemia but her A1c has trended down from 6.3-5.8.  Likely related to her weight loss.  She was encouraged to continue working on her diet and she is close to her weight loss goals.  I did prepare a note today for [employer] indicating that her weight loss goals are nearly obtained.  Weight goal  pounds as this will put her well within the normal range of her BMI.  Acquired hypothyroidism  -     TSH Rfx On Abnormal To Free T4  Check in on the thyroid function at the one-year point which is today.  Hyperlipidemia, unspecified hyperlipidemia type  -     Lipid Panel  Last lipids were checked in 6/2017 but were very well controlled.  Her HDL was 64 and other lipids in normal range.

## 2019-01-24 RX ORDER — LEVOTHYROXINE SODIUM 0.03 MG/1
TABLET ORAL
Qty: 90 TABLET | Refills: 1 | Status: SHIPPED | OUTPATIENT
Start: 2019-01-24 | End: 2019-07-17 | Stop reason: SDUPTHER

## 2019-03-11 ENCOUNTER — HOSPITAL ENCOUNTER (OUTPATIENT)
Facility: HOSPITAL | Age: 59
Setting detail: HOSPITAL OUTPATIENT SURGERY
End: 2019-03-11
Attending: INTERNAL MEDICINE | Admitting: INTERNAL MEDICINE

## 2019-03-15 ENCOUNTER — ANESTHESIA (OUTPATIENT)
Dept: GASTROENTEROLOGY | Facility: HOSPITAL | Age: 59
End: 2019-03-15

## 2019-03-15 ENCOUNTER — HOSPITAL ENCOUNTER (OUTPATIENT)
Facility: HOSPITAL | Age: 59
Setting detail: HOSPITAL OUTPATIENT SURGERY
Discharge: HOME | End: 2019-03-15
Attending: INTERNAL MEDICINE | Admitting: INTERNAL MEDICINE

## 2019-03-15 ENCOUNTER — ON CAMPUS - OUTPATIENT (OUTPATIENT)
Dept: URBAN - METROPOLITAN AREA HOSPITAL 114 | Facility: HOSPITAL | Age: 59
End: 2019-03-15
Payer: COMMERCIAL

## 2019-03-15 ENCOUNTER — ANESTHESIA EVENT (OUTPATIENT)
Dept: GASTROENTEROLOGY | Facility: HOSPITAL | Age: 59
End: 2019-03-15

## 2019-03-15 VITALS
DIASTOLIC BLOOD PRESSURE: 87 MMHG | RESPIRATION RATE: 18 BRPM | SYSTOLIC BLOOD PRESSURE: 113 MMHG | TEMPERATURE: 98 F | OXYGEN SATURATION: 97 % | BODY MASS INDEX: 25.21 KG/M2 | WEIGHT: 104.56 LBS | HEART RATE: 77 BPM

## 2019-03-15 DIAGNOSIS — Z80.0 FAMILY HISTORY OF MALIGNANT NEOPLASM OF DIGESTIVE ORGANS: ICD-10-CM

## 2019-03-15 DIAGNOSIS — R12 HEARTBURN: ICD-10-CM

## 2019-03-15 DIAGNOSIS — K57.30 DIVERTICULOSIS OF LARGE INTESTINE WITHOUT PERFORATION OR ABS: ICD-10-CM

## 2019-03-15 DIAGNOSIS — K21.0 GASTRO-ESOPHAGEAL REFLUX DISEASE WITH ESOPHAGITIS: ICD-10-CM

## 2019-03-15 DIAGNOSIS — K21.9 ACID REFLUX: ICD-10-CM

## 2019-03-15 PROCEDURE — 43239 EGD BIOPSY SINGLE/MULTIPLE: CPT | Performed by: INTERNAL MEDICINE

## 2019-03-15 PROCEDURE — 45378 DIAGNOSTIC COLONOSCOPY: CPT | Mod: 33 | Performed by: INTERNAL MEDICINE

## 2019-03-15 PROCEDURE — 25010000002 PROPOFOL 10 MG/ML EMULSION: Performed by: ANESTHESIOLOGY

## 2019-03-15 PROCEDURE — 88305 TISSUE EXAM BY PATHOLOGIST: CPT | Performed by: INTERNAL MEDICINE

## 2019-03-15 RX ORDER — PROPOFOL 10 MG/ML
VIAL (ML) INTRAVENOUS AS NEEDED
Status: DISCONTINUED | OUTPATIENT
Start: 2019-03-15 | End: 2019-03-15 | Stop reason: SURG

## 2019-03-15 RX ORDER — SODIUM CHLORIDE, SODIUM LACTATE, POTASSIUM CHLORIDE, CALCIUM CHLORIDE 600; 310; 30; 20 MG/100ML; MG/100ML; MG/100ML; MG/100ML
1000 INJECTION, SOLUTION INTRAVENOUS CONTINUOUS
Status: DISCONTINUED | OUTPATIENT
Start: 2019-03-15 | End: 2019-03-15 | Stop reason: HOSPADM

## 2019-03-15 RX ADMIN — PROPOFOL 50 MG: 10 INJECTION, EMULSION INTRAVENOUS at 08:09

## 2019-03-15 RX ADMIN — SODIUM CHLORIDE, POTASSIUM CHLORIDE, SODIUM LACTATE AND CALCIUM CHLORIDE 1000 ML: 600; 310; 30; 20 INJECTION, SOLUTION INTRAVENOUS at 07:25
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
## 2019-03-15 NOTE — DISCHARGE INSTRUCTIONS
For the next 24 hours patient needs to be with a responsible adult.    For 24 hours DO NOT drive, operate machinery, appliances, drink alcohol, make important decisions or sign legal documents.    Start with a light or bland diet if you are feeling sick to your stomach otherwise advance to regular diet as tolerated.    Follow recommendations on procedure report if provided by your doctor.    Call Dr Lawson for problems [phone]    Problems may include but not limited to: large amounts of bleeding, trouble breathing, repeated vomiting, severe unrelieved pain, fever or chills.

## 2019-03-15 NOTE — ANESTHESIA POSTPROCEDURE EVALUATION
Patient: Lexus BRADFORD Ziggy    Procedure Summary     Date:  03/15/19 Room / Location:   [facility]    Anesthesia Start:  0806 Anesthesia Stop:  0840    Procedures:       COLONOSCOPY to Cecum and TI (N/A )      ESOPHAGOGASTRODUODENOSCOPY with Bx's (N/A Esophagus) Diagnosis:      Surgeon:  Simeon Lawson MD Provider:  Manisha Antonio MD    Anesthesia Type:  MAC ASA Status:  2          Anesthesia Type: MAC  Last vitals  BP   111/72 (03/15/19 0842)   Temp   36.7 °C (98 °F) (03/15/19 0842)   Pulse   66 (03/15/19 0842)   Resp   14 (03/15/19 0842)     SpO2   97 % (03/15/19 0842)     Post Anesthesia Care and Evaluation    Patient location during evaluation: PACU  Patient participation: complete - patient participated  Level of consciousness: sleepy but conscious  Pain score: 0  Pain management: adequate  Airway patency: patent  Anesthetic complications: No anesthetic complications    Cardiovascular status: acceptable  Respiratory status: acceptable  Hydration status: acceptable    Comments: Blood pressure 111/72, pulse 66, temperature 36.7 °C (98 °F), resp. rate 14, weight 47.4 kg (104 lb 9 oz), SpO2 97 %.    No anesthesia care post op

## 2019-03-18 LAB
CYTO UR: NORMAL
LAB AP CASE REPORT: NORMAL
PATH REPORT.FINAL DX SPEC: NORMAL
PATH REPORT.GROSS SPEC: NORMAL

## 2019-04-10 RX ORDER — ATORVASTATIN CALCIUM 20 MG/1
TABLET, FILM COATED ORAL
Qty: 90 TABLET | Refills: 1 | Status: SHIPPED | OUTPATIENT
Start: 2019-04-10 | End: 2019-11-06 | Stop reason: SDUPTHER

## 2019-04-10 RX ORDER — OMEPRAZOLE 20 MG/1
CAPSULE, DELAYED RELEASE ORAL
Qty: 90 CAPSULE | Refills: 1 | Status: SHIPPED | OUTPATIENT
Start: 2019-04-10 | End: 2019-11-06 | Stop reason: SDUPTHER

## 2019-05-17 ENCOUNTER — APPOINTMENT (OUTPATIENT)
Dept: CT IMAGING | Facility: HOSPITAL | Age: 59
End: 2019-05-17

## 2019-05-17 ENCOUNTER — HOSPITAL ENCOUNTER (EMERGENCY)
Facility: HOSPITAL | Age: 59
Discharge: HOME | End: 2019-05-17
Attending: EMERGENCY MEDICINE | Admitting: EMERGENCY MEDICINE

## 2019-05-17 ENCOUNTER — APPOINTMENT (OUTPATIENT)
Dept: GENERAL RADIOLOGY | Facility: HOSPITAL | Age: 59
End: 2019-05-17

## 2019-05-17 VITALS
WEIGHT: 89.3 LBS | OXYGEN SATURATION: 99 % | TEMPERATURE: 96.6 F | HEIGHT: 55 IN | BODY MASS INDEX: 20.67 KG/M2 | RESPIRATION RATE: 16 BRPM | SYSTOLIC BLOOD PRESSURE: 108 MMHG | HEART RATE: 80 BPM | DIASTOLIC BLOOD PRESSURE: 57 MMHG

## 2019-05-17 DIAGNOSIS — R55 SYNCOPE AND COLLAPSE: Primary | ICD-10-CM

## 2019-05-17 DIAGNOSIS — S02.2XXA CLOSED FRACTURE OF NASAL BONE, INITIAL ENCOUNTER: ICD-10-CM

## 2019-05-17 LAB
ALBUMIN SERPL-MCNC: 4.9 G/DL (ref 3.5–5.2)
ALBUMIN/GLOB SERPL: 1.6 G/DL
ALP SERPL-CCNC: 105 U/L (ref 39–117)
ALT SERPL W P-5'-P-CCNC: 18 U/L (ref 1–33)
ANION GAP SERPL CALCULATED.3IONS-SCNC: 18 MMOL/L
AST SERPL-CCNC: 33 U/L (ref 1–32)
BACTERIA UR QL AUTO: ABNORMAL /HPF
BASOPHILS # BLD AUTO: 0.06 10*3/MM3 (ref 0–0.2)
BASOPHILS NFR BLD AUTO: 0.5 % (ref 0–1.5)
BILIRUB SERPL-MCNC: 0.6 MG/DL (ref 0.2–1.2)
BILIRUB UR QL STRIP: NEGATIVE
BUN BLD-MCNC: 13 MG/DL (ref 6–20)
BUN/CREAT SERPL: 17.6 (ref 7–25)
CALCIUM SPEC-SCNC: 9.2 MG/DL (ref 8.6–10.5)
CHLORIDE SERPL-SCNC: 101 MMOL/L (ref 98–107)
CLARITY UR: ABNORMAL
CO2 SERPL-SCNC: 21 MMOL/L (ref 22–29)
COLOR UR: YELLOW
CREAT BLD-MCNC: 0.74 MG/DL (ref 0.57–1)
DEPRECATED RDW RBC AUTO: 47.7 FL (ref 37–54)
EOSINOPHIL # BLD AUTO: 0.02 10*3/MM3 (ref 0–0.4)
EOSINOPHIL NFR BLD AUTO: 0.2 % (ref 0.3–6.2)
ERYTHROCYTE [DISTWIDTH] IN BLOOD BY AUTOMATED COUNT: 13.4 % (ref 12.3–15.4)
GFR SERPL CREATININE-BSD FRML MDRD: 80 ML/MIN/1.73
GLOBULIN UR ELPH-MCNC: 3.1 GM/DL
GLUCOSE BLD-MCNC: 103 MG/DL (ref 65–99)
GLUCOSE UR STRIP-MCNC: NEGATIVE MG/DL
HCT VFR BLD AUTO: 46.2 % (ref 34–46.6)
HGB BLD-MCNC: 14.3 G/DL (ref 12–15.9)
HGB UR QL STRIP.AUTO: NEGATIVE
HYALINE CASTS UR QL AUTO: ABNORMAL /LPF
IMM GRANULOCYTES # BLD AUTO: 0.05 10*3/MM3 (ref 0–0.05)
IMM GRANULOCYTES NFR BLD AUTO: 0.4 % (ref 0–0.5)
KETONES UR QL STRIP: ABNORMAL
LEUKOCYTE ESTERASE UR QL STRIP.AUTO: NEGATIVE
LYMPHOCYTES # BLD AUTO: 0.82 10*3/MM3 (ref 0.7–3.1)
LYMPHOCYTES NFR BLD AUTO: 6.6 % (ref 19.6–45.3)
MCH RBC QN AUTO: 29.7 PG (ref 26.6–33)
MCHC RBC AUTO-ENTMCNC: 31 G/DL (ref 31.5–35.7)
MCV RBC AUTO: 96 FL (ref 79–97)
MONOCYTES # BLD AUTO: 0.51 10*3/MM3 (ref 0.1–0.9)
MONOCYTES NFR BLD AUTO: 4.1 % (ref 5–12)
NEUTROPHILS # BLD AUTO: 11.04 10*3/MM3 (ref 1.7–7)
NEUTROPHILS NFR BLD AUTO: 88.2 % (ref 42.7–76)
NITRITE UR QL STRIP: NEGATIVE
NRBC BLD AUTO-RTO: 0 /100 WBC (ref 0–0.2)
NT-PROBNP SERPL-MCNC: 51.1 PG/ML (ref 5–900)
PH UR STRIP.AUTO: <=5 [PH] (ref 5–8)
PLATELET # BLD AUTO: 254 10*3/MM3 (ref 140–450)
PMV BLD AUTO: 9.9 FL (ref 6–12)
POTASSIUM BLD-SCNC: 4.9 MMOL/L (ref 3.5–5.2)
PROT SERPL-MCNC: 8 G/DL (ref 6–8.5)
PROT UR QL STRIP: ABNORMAL
RBC # BLD AUTO: 4.81 10*6/MM3 (ref 3.77–5.28)
RBC # UR: ABNORMAL /HPF
REF LAB TEST METHOD: ABNORMAL
SODIUM BLD-SCNC: 140 MMOL/L (ref 136–145)
SP GR UR STRIP: >=1.03 (ref 1–1.03)
SQUAMOUS #/AREA URNS HPF: ABNORMAL /HPF
TROPONIN T SERPL-MCNC: <0.01 NG/ML (ref 0–0.03)
UROBILINOGEN UR QL STRIP: ABNORMAL
WBC NRBC COR # BLD: 12.5 10*3/MM3 (ref 3.4–10.8)
WBC UR QL AUTO: ABNORMAL /HPF

## 2019-05-17 PROCEDURE — 93005 ELECTROCARDIOGRAM TRACING: CPT | Performed by: NURSE PRACTITIONER

## 2019-05-17 PROCEDURE — 70450 CT HEAD/BRAIN W/O DYE: CPT

## 2019-05-17 PROCEDURE — 83880 ASSAY OF NATRIURETIC PEPTIDE: CPT | Performed by: NURSE PRACTITIONER

## 2019-05-17 PROCEDURE — 96360 HYDRATION IV INFUSION INIT: CPT

## 2019-05-17 PROCEDURE — 80053 COMPREHEN METABOLIC PANEL: CPT | Performed by: NURSE PRACTITIONER

## 2019-05-17 PROCEDURE — 71045 X-RAY EXAM CHEST 1 VIEW: CPT

## 2019-05-17 PROCEDURE — 93010 ELECTROCARDIOGRAM REPORT: CPT | Performed by: INTERNAL MEDICINE

## 2019-05-17 PROCEDURE — 99284 EMERGENCY DEPT VISIT MOD MDM: CPT

## 2019-05-17 PROCEDURE — 84484 ASSAY OF TROPONIN QUANT: CPT | Performed by: NURSE PRACTITIONER

## 2019-05-17 PROCEDURE — 81001 URINALYSIS AUTO W/SCOPE: CPT | Performed by: NURSE PRACTITIONER

## 2019-05-17 PROCEDURE — 85025 COMPLETE CBC W/AUTO DIFF WBC: CPT | Performed by: NURSE PRACTITIONER

## 2019-05-17 PROCEDURE — 70486 CT MAXILLOFACIAL W/O DYE: CPT

## 2019-05-17 RX ADMIN — SODIUM CHLORIDE 1000 ML: 9 INJECTION, SOLUTION INTRAVENOUS at 08:32

## 2019-06-04 ENCOUNTER — APPOINTMENT (OUTPATIENT)
Dept: WOMENS IMAGING | Facility: HOSPITAL | Age: 59
End: 2019-06-04

## 2019-06-04 PROCEDURE — 77063 BREAST TOMOSYNTHESIS BI: CPT | Performed by: RADIOLOGY

## 2019-06-04 PROCEDURE — 77067 SCR MAMMO BI INCL CAD: CPT | Performed by: RADIOLOGY

## 2019-06-21 DIAGNOSIS — Z00.00 HEALTH CARE MAINTENANCE: Primary | ICD-10-CM

## 2019-06-21 DIAGNOSIS — M81.0 OSTEOPOROSIS, UNSPECIFIED OSTEOPOROSIS TYPE, UNSPECIFIED PATHOLOGICAL FRACTURE PRESENCE: ICD-10-CM

## 2019-06-22 LAB
25(OH)D3+25(OH)D2 SERPL-MCNC: 23 NG/ML (ref 30–100)
ALBUMIN SERPL-MCNC: 5 G/DL (ref 3.5–5.2)
ALBUMIN/GLOB SERPL: 2.2 G/DL
ALP SERPL-CCNC: 85 U/L (ref 39–117)
ALT SERPL-CCNC: 17 U/L (ref 1–33)
APPEARANCE UR: CLEAR
AST SERPL-CCNC: 23 U/L (ref 1–32)
BACTERIA #/AREA URNS HPF: NORMAL /HPF
BASOPHILS # BLD AUTO: 0.06 10*3/MM3 (ref 0–0.2)
BASOPHILS NFR BLD AUTO: 1.5 % (ref 0–1.5)
BILIRUB SERPL-MCNC: 0.5 MG/DL (ref 0.2–1.2)
BILIRUB UR QL STRIP: NEGATIVE
BUN SERPL-MCNC: 11 MG/DL (ref 6–20)
BUN/CREAT SERPL: 17.2 (ref 7–25)
CALCIUM SERPL-MCNC: 9.2 MG/DL (ref 8.6–10.5)
CHLORIDE SERPL-SCNC: 102 MMOL/L (ref 98–107)
CHOLEST SERPL-MCNC: 203 MG/DL (ref 0–200)
CO2 SERPL-SCNC: 28.7 MMOL/L (ref 22–29)
COLOR UR: YELLOW
CREAT SERPL-MCNC: 0.64 MG/DL (ref 0.57–1)
EOSINOPHIL # BLD AUTO: 0.06 10*3/MM3 (ref 0–0.4)
EOSINOPHIL NFR BLD AUTO: 1.5 % (ref 0.3–6.2)
EPI CELLS #/AREA URNS HPF: NORMAL /HPF
ERYTHROCYTE [DISTWIDTH] IN BLOOD BY AUTOMATED COUNT: 13.7 % (ref 12.3–15.4)
GLOBULIN SER CALC-MCNC: 2.3 GM/DL
GLUCOSE SERPL-MCNC: 91 MG/DL (ref 65–99)
GLUCOSE UR QL: NEGATIVE
HCT VFR BLD AUTO: 40.3 % (ref 34–46.6)
HDLC SERPL-MCNC: 90 MG/DL (ref 40–60)
HGB BLD-MCNC: 12.8 G/DL (ref 12–15.9)
HGB UR QL STRIP: NEGATIVE
IMM GRANULOCYTES # BLD AUTO: 0.01 10*3/MM3 (ref 0–0.05)
IMM GRANULOCYTES NFR BLD AUTO: 0.2 % (ref 0–0.5)
KETONES UR QL STRIP: NEGATIVE
LDLC SERPL CALC-MCNC: 98 MG/DL (ref 0–100)
LEUKOCYTE ESTERASE UR QL STRIP: NEGATIVE
LYMPHOCYTES # BLD AUTO: 1.34 10*3/MM3 (ref 0.7–3.1)
LYMPHOCYTES NFR BLD AUTO: 32.7 % (ref 19.6–45.3)
MCH RBC QN AUTO: 30 PG (ref 26.6–33)
MCHC RBC AUTO-ENTMCNC: 31.8 G/DL (ref 31.5–35.7)
MCV RBC AUTO: 94.6 FL (ref 79–97)
MICRO URNS: ABNORMAL
MICRO URNS: ABNORMAL
MONOCYTES # BLD AUTO: 0.31 10*3/MM3 (ref 0.1–0.9)
MONOCYTES NFR BLD AUTO: 7.6 % (ref 5–12)
MUCOUS THREADS URNS QL MICRO: PRESENT /HPF
NEUTROPHILS # BLD AUTO: 2.32 10*3/MM3 (ref 1.7–7)
NEUTROPHILS NFR BLD AUTO: 56.5 % (ref 42.7–76)
NITRITE UR QL STRIP: NEGATIVE
NRBC BLD AUTO-RTO: 0 /100 WBC (ref 0–0.2)
PH UR STRIP: 5.5 [PH] (ref 5–7.5)
PLATELET # BLD AUTO: 274 10*3/MM3 (ref 140–450)
POTASSIUM SERPL-SCNC: 4.5 MMOL/L (ref 3.5–5.2)
PROT SERPL-MCNC: 7.3 G/DL (ref 6–8.5)
PROT UR QL STRIP: NEGATIVE
RBC # BLD AUTO: 4.26 10*6/MM3 (ref 3.77–5.28)
RBC #/AREA URNS HPF: NORMAL /HPF
SODIUM SERPL-SCNC: 141 MMOL/L (ref 136–145)
SP GR UR: >=1.03 (ref 1–1.03)
TRIGL SERPL-MCNC: 75 MG/DL (ref 0–150)
TSH SERPL DL<=0.005 MIU/L-ACNC: 2.14 MIU/ML (ref 0.27–4.2)
URINALYSIS REFLEX: ABNORMAL
UROBILINOGEN UR STRIP-MCNC: 1 MG/DL (ref 0.2–1)
VLDLC SERPL CALC-MCNC: 15 MG/DL
WBC # BLD AUTO: 4.1 10*3/MM3 (ref 3.4–10.8)
WBC #/AREA URNS HPF: NORMAL /HPF

## 2019-06-28 ENCOUNTER — OFFICE VISIT (OUTPATIENT)
Dept: INTERNAL MEDICINE | Facility: CLINIC | Age: 59
End: 2019-06-28

## 2019-06-28 VITALS
SYSTOLIC BLOOD PRESSURE: 112 MMHG | OXYGEN SATURATION: 98 % | BODY MASS INDEX: 20.37 KG/M2 | WEIGHT: 88 LBS | DIASTOLIC BLOOD PRESSURE: 74 MMHG | HEART RATE: 61 BPM | HEIGHT: 55 IN

## 2019-06-28 DIAGNOSIS — M81.0 OSTEOPOROSIS, UNSPECIFIED OSTEOPOROSIS TYPE, UNSPECIFIED PATHOLOGICAL FRACTURE PRESENCE: ICD-10-CM

## 2019-06-28 DIAGNOSIS — E78.5 HYPERLIPIDEMIA, UNSPECIFIED HYPERLIPIDEMIA TYPE: ICD-10-CM

## 2019-06-28 DIAGNOSIS — Z00.00 WELL ADULT EXAM: Primary | ICD-10-CM

## 2019-06-28 DIAGNOSIS — R20.0 BILATERAL HAND NUMBNESS: ICD-10-CM

## 2019-06-28 PROCEDURE — 99396 PREV VISIT EST AGE 40-64: CPT | Performed by: INTERNAL MEDICINE

## 2019-06-28 RX ORDER — OMEPRAZOLE 20 MG/1
20 CAPSULE, DELAYED RELEASE ORAL DAILY
Refills: 2 | COMMUNITY
Start: 2019-06-06 | End: 2019-08-07

## 2019-06-28 RX ORDER — CHOLECALCIFEROL (VITAMIN D3) 125 MCG
2000 CAPSULE ORAL DAILY
Qty: 30 TABLET | Status: ON HOLD
Start: 2019-06-28 | End: 2019-12-13

## 2019-06-28 RX ORDER — CETIRIZINE HYDROCHLORIDE 10 MG/1
10 TABLET ORAL DAILY
Status: ON HOLD | COMMUNITY
End: 2019-12-13

## 2019-06-28 RX ORDER — IBANDRONATE SODIUM 150 MG/1
TABLET, FILM COATED ORAL
Refills: 2 | COMMUNITY
Start: 2019-06-09 | End: 2019-10-14

## 2019-06-28 RX ORDER — FEXOFENADINE HCL 180 MG/1
180 TABLET ORAL DAILY
Start: 2019-06-28 | End: 2019-10-14
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
## 2019-07-17 ENCOUNTER — TELEPHONE (OUTPATIENT)
Dept: FAMILY MEDICINE CLINIC | Facility: CLINIC | Age: 59
End: 2019-07-17

## 2019-07-17 RX ORDER — LEVOTHYROXINE SODIUM 0.03 MG/1
25 TABLET ORAL DAILY
Qty: 90 TABLET | Refills: 0 | Status: SHIPPED | OUTPATIENT
Start: 2019-07-17 | End: 2019-11-05 | Stop reason: SDUPTHER

## 2019-08-02 RX ORDER — ATORVASTATIN CALCIUM 20 MG/1
TABLET, FILM COATED ORAL
Qty: 30 TABLET | Refills: 9 | Status: SHIPPED | OUTPATIENT
Start: 2019-08-02 | End: 2019-12-02

## 2019-08-07 ENCOUNTER — OFFICE VISIT (OUTPATIENT)
Dept: INTERNAL MEDICINE | Facility: CLINIC | Age: 59
End: 2019-08-07

## 2019-08-07 VITALS
SYSTOLIC BLOOD PRESSURE: 100 MMHG | BODY MASS INDEX: 20.6 KG/M2 | HEIGHT: 55 IN | DIASTOLIC BLOOD PRESSURE: 68 MMHG | OXYGEN SATURATION: 98 % | HEART RATE: 64 BPM | WEIGHT: 89 LBS

## 2019-08-07 DIAGNOSIS — S02.2XXS CLOSED FRACTURE OF NASAL BONE, SEQUELA: Primary | ICD-10-CM

## 2019-08-07 PROCEDURE — 99212 OFFICE O/P EST SF 10 MIN: CPT | Performed by: INTERNAL MEDICINE

## 2019-08-07 NOTE — PROGRESS NOTES
Subjective     Noemi Bartholomew is a 59 y.o. female who presents with   Chief Complaint   Patient presents with   • Upper lip Pain       History of Present Illness     Patient has continued to have pain between lips and nose after recent fall in May.  She sustained nasal fractures at that time.  Pain increased with yawning or speaking.     Review of Systems    The following portions of the patient's history were reviewed and updated as appropriate: allergies, current medications and problem list.    Patient Active Problem List    Diagnosis Date Noted   • Neuropathic pain, leg, left 06/25/2018   • DDD (degenerative disc disease), lumbar 05/02/2018   • Chronic left lumbar radiculopathy 05/02/2018   • Congenital spondylolysis, lumbosacral region 05/02/2018   • Carpal tunnel syndrome 06/08/2016   • Hyperlipidemia 06/08/2016   • Osteoporosis 06/08/2016     Note Last Updated: 6/8/2016     Description: Boniva for a number of years.  Forteo for a year.  Managed by Gyn. .  I do recommend 1200mg calcium and 1000 IUs of vitamin D in supplements/diet as well as weight bearing exercise to prevent further decline in BMD.         Current Outpatient Medications on File Prior to Visit   Medication Sig Dispense Refill   • acetaminophen (TYLENOL) 500 MG tablet Take 500 mg by mouth Every 6 (Six) Hours As Needed for Mild Pain .     • atorvastatin (LIPITOR) 20 MG tablet TAKE 1 TABLET BY MOUTH EVERY DAY 30 tablet 9   • Calcium-Magnesium-Vitamin D ER (CITRACAL SLOW RELEASE) 600- MG-MG-UNIT tablet sustained-release 24 hour Take 2 tablets daily     • cetirizine (zyrTEC) 10 MG tablet Take 10 mg by mouth Daily.     • Cholecalciferol (VITAMIN D3) 2000 units tablet Take 2,000 Units by mouth Daily. 30 tablet    • docusate sodium (COLACE) 100 MG capsule Take 1 capsule by mouth once a week.     • fexofenadine (ALLEGRA ALLERGY) 180 MG tablet Take 1 tablet by mouth Daily.     • fluticasone (FLONASE) 50 MCG/ACT nasal spray      • ibandronate  "(BONIVA) 150 MG tablet TAKE 1 TABLET BY MOUTH ONCE A MONTH  2   • melatonin tablet Take 1 tablet by mouth At Night As Needed.     • [DISCONTINUED] omeprazole (priLOSEC) 20 MG capsule Take 20 mg by mouth Daily.  2     No current facility-administered medications on file prior to visit.        Objective     /68   Pulse 64   Ht 134.6 cm (52.99\")   Wt 40.4 kg (89 lb)   SpO2 98%   BMI 22.28 kg/m²     Physical Exam   Constitutional: She is oriented to person, place, and time. She appears well-developed and well-nourished.   HENT:   Head: Normocephalic and atraumatic.   Nose: Sinus tenderness present. No nose lacerations, nasal deformity or septal deviation.       Pulmonary/Chest: Effort normal.   Neurological: She is alert and oriented to person, place, and time.   Psychiatric: She has a normal mood and affect. Her behavior is normal.       Assessment/Plan   Noemi was seen today for upper lip pain.    Diagnoses and all orders for this visit:    Closed fracture of nasal bone, sequela        Discussion    Patient presents with persistent pain in face after recent nasal fracture.  She is going to start with ENT follow up.  She will let me know if he feels she needs an alternative specialist.        Future Appointments   Date Time Provider Department Center   12/30/2019  9:50 AM [facility] None   6/24/2020  8:20 AM [facility] None   6/29/2020  7:45 AM Mary Lou Carroll MD [facility] None         "

## 2019-08-22 ENCOUNTER — APPOINTMENT (OUTPATIENT)
Dept: WOMENS IMAGING | Facility: HOSPITAL | Age: 59
End: 2019-08-22

## 2019-08-22 PROCEDURE — 77063 BREAST TOMOSYNTHESIS BI: CPT | Performed by: RADIOLOGY

## 2019-08-22 PROCEDURE — 77067 SCR MAMMO BI INCL CAD: CPT | Performed by: RADIOLOGY

## 2019-10-14 ENCOUNTER — OFFICE VISIT (OUTPATIENT)
Dept: INTERNAL MEDICINE | Facility: CLINIC | Age: 59
End: 2019-10-14

## 2019-10-14 VITALS
BODY MASS INDEX: 20.6 KG/M2 | DIASTOLIC BLOOD PRESSURE: 70 MMHG | OXYGEN SATURATION: 98 % | HEIGHT: 55 IN | WEIGHT: 89 LBS | HEART RATE: 76 BPM | SYSTOLIC BLOOD PRESSURE: 110 MMHG

## 2019-10-14 DIAGNOSIS — J20.8 ACUTE BRONCHITIS DUE TO OTHER SPECIFIED ORGANISMS: Primary | ICD-10-CM
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
## 2019-10-14 PROCEDURE — 99213 OFFICE O/P EST LOW 20 MIN: CPT | Performed by: INTERNAL MEDICINE

## 2019-10-14 RX ORDER — BENZONATATE 100 MG/1
100 CAPSULE ORAL 3 TIMES DAILY PRN
Qty: 30 CAPSULE | Refills: 0 | Status: SHIPPED | OUTPATIENT
Start: 2019-10-14 | End: 2019-10-23

## 2019-10-14 RX ORDER — AZITHROMYCIN 250 MG/1
TABLET, FILM COATED ORAL
Qty: 6 TABLET | Refills: 0 | Status: SHIPPED | OUTPATIENT
Start: 2019-10-14 | End: 2019-10-23

## 2019-10-14 NOTE — PROGRESS NOTES
Subjective     Noemi Bartholomew is a 59 y.o. female who presents with   Chief Complaint   Patient presents with   • Cough   • Sore Throat   • Nasal Congestion   • Fatigue   • Arm Pain     from fall on 10/10/19       History of Present Illness     She fell last Thursday by tripping.  She fell on arm.  Both arms are aching.      C/o head and nasal congestion for one week.  Cough is associated.  No Fever.  No SOA.  No wheezing.  No sinus pain.  No teeth pain.  Started one week ago.  Symptoms are constant.  Cough with production.      Review of Systems   Constitutional: Positive for fatigue.       The following portions of the patient's history were reviewed and updated as appropriate: allergies, current medications and problem list.    Patient Active Problem List    Diagnosis Date Noted   • Neuropathic pain, leg, left 06/25/2018   • DDD (degenerative disc disease), lumbar 05/02/2018   • Chronic left lumbar radiculopathy 05/02/2018   • Congenital spondylolysis, lumbosacral region 05/02/2018   • Carpal tunnel syndrome 06/08/2016   • Hyperlipidemia 06/08/2016   • Osteoporosis 06/08/2016     Note Last Updated: 6/8/2016     Description: Boniva for a number of years.  Forteo for a year.  Managed by Gyn. .  I do recommend 1200mg calcium and 1000 IUs of vitamin D in supplements/diet as well as weight bearing exercise to prevent further decline in BMD.         Current Outpatient Medications on File Prior to Visit   Medication Sig Dispense Refill   • acetaminophen (TYLENOL) 500 MG tablet Take 500 mg by mouth Every 6 (Six) Hours As Needed for Mild Pain .     • atorvastatin (LIPITOR) 20 MG tablet TAKE 1 TABLET BY MOUTH EVERY DAY 30 tablet 9   • Calcium-Magnesium-Vitamin D ER (CITRACAL SLOW RELEASE) 600- MG-MG-UNIT tablet sustained-release 24 hour Take 2 tablets daily     • cetirizine (zyrTEC) 10 MG tablet Take 10 mg by mouth Daily.     • Cholecalciferol (VITAMIN D3) 2000 units tablet Take 2,000 Units by mouth Daily. 30 tablet   "  • docusate sodium (COLACE) 100 MG capsule Take 1 capsule by mouth once a week.     • fluticasone (FLONASE) 50 MCG/ACT nasal spray      • melatonin tablet Take 1 tablet by mouth At Night As Needed.       No current facility-administered medications on file prior to visit.        Objective     /70   Pulse 76   Ht 134.6 cm (52.99\")   Wt 40.4 kg (89 lb)   SpO2 98%   BMI 22.28 kg/m²     Physical Exam   Constitutional: She is oriented to person, place, and time. She appears well-developed and well-nourished.   HENT:   Head: Normocephalic and atraumatic.   Right Ear: Hearing and tympanic membrane normal.   Left Ear: Hearing and tympanic membrane normal.   Mouth/Throat: No oropharyngeal exudate or posterior oropharyngeal erythema.   Cardiovascular: Normal rate, regular rhythm and normal heart sounds.   Pulmonary/Chest: Effort normal and breath sounds normal.   Musculoskeletal:        Right forearm: She exhibits tenderness. She exhibits no bony tenderness, no swelling, no edema and no deformity.        Left forearm: She exhibits tenderness. She exhibits no bony tenderness, no swelling, no edema and no deformity.   Neurological: She is alert and oriented to person, place, and time.   Skin: Skin is warm and dry.   Psychiatric: She has a normal mood and affect. Her behavior is normal.       Assessment/Plan   Noemi was seen today for cough, sore throat, nasal congestion, fatigue and arm pain.    Diagnoses and all orders for this visit:    Acute bronchitis due to other specified organisms    Fall, initial encounter    Other orders  -     azithromycin (ZITHROMAX Z-JESUS) 250 MG tablet; Take 2 tablets the first day, then 1 tablet daily for 4 days.  -     benzonatate (TESSALON PERLES) 100 MG capsule; Take 1 capsule by mouth 3 (Three) Times a Day As Needed for Cough.        Discussion    Patient presents with episode of acute bronchitis.  A prescription for antibiotics is provided today.  The patient is instructed to take " along with tessalon perrles.  Let me know they are not feeling better over the next 3 days or if there is any change in symptoms.    Fall.  No evidence of fracture or other internal derangement on exam.         Future Appointments   Date Time Provider Department Center   12/30/2019  9:50 AM [facility] None   6/24/2020  8:20 AM [facility] None   6/29/2020  7:45 AM Mary Lou Carroll MD [facility] None

## 2019-10-17 ENCOUNTER — TRANSCRIBE ORDERS (OUTPATIENT)
Dept: ADMINISTRATIVE | Facility: HOSPITAL | Age: 59
End: 2019-10-17

## 2019-10-17 DIAGNOSIS — M81.0 SENILE OSTEOPOROSIS: Primary | ICD-10-CM

## 2019-10-23 ENCOUNTER — OFFICE VISIT (OUTPATIENT)
Dept: INTERNAL MEDICINE | Facility: CLINIC | Age: 59
End: 2019-10-23

## 2019-10-23 VITALS
OXYGEN SATURATION: 98 % | DIASTOLIC BLOOD PRESSURE: 80 MMHG | WEIGHT: 88 LBS | HEART RATE: 76 BPM | SYSTOLIC BLOOD PRESSURE: 114 MMHG | BODY MASS INDEX: 20.37 KG/M2 | HEIGHT: 55 IN

## 2019-10-23 DIAGNOSIS — R20.2 LEFT LEG PARESTHESIAS: ICD-10-CM

## 2019-10-23 DIAGNOSIS — M25.512 ACUTE PAIN OF LEFT SHOULDER: Primary | ICD-10-CM

## 2019-10-23 DIAGNOSIS — R53.83 OTHER FATIGUE: ICD-10-CM
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
## 2019-10-23 DIAGNOSIS — R53.1 LEFT-SIDED WEAKNESS: ICD-10-CM

## 2019-10-23 DIAGNOSIS — IMO0001 PARESTHESIAS/NUMBNESS: ICD-10-CM

## 2019-10-23 PROCEDURE — 73030 X-RAY EXAM OF SHOULDER: CPT | Performed by: INTERNAL MEDICINE

## 2019-10-23 PROCEDURE — 99214 OFFICE O/P EST MOD 30 MIN: CPT | Performed by: INTERNAL MEDICINE

## 2019-10-23 NOTE — PROGRESS NOTES
Subjective     Noemi Bartholomew is a 59 y.o. female who presents with   Chief Complaint   Patient presents with   • left side weakness       History of Present Illness     She feels overall tired and no energy.  She states her entire the arm is hurting.  Hurts to lift things with her hands.  She feels like her entire left side of her body is weak.  Her leg is tingling as well.  Mild LBP.  No trouble with speech.  No facial weakness, numbness, tingling.  She is unsure if all of this has occurred since the fall.  Since her fall her left shoulder hurts as well.     Review of Systems   Constitutional: Positive for fatigue.   HENT: Negative.    Respiratory: Negative.    Cardiovascular: Negative.    Gastrointestinal: Negative.    Musculoskeletal: Positive for back pain and neck pain.   Neurological: Positive for weakness and numbness. Negative for dizziness and headaches.       The following portions of the patient's history were reviewed and updated as appropriate: allergies, current medications and problem list.    Patient Active Problem List    Diagnosis Date Noted   • Neuropathic pain, leg, left 06/25/2018   • DDD (degenerative disc disease), lumbar 05/02/2018   • Chronic left lumbar radiculopathy 05/02/2018   • Congenital spondylolysis, lumbosacral region 05/02/2018   • Carpal tunnel syndrome 06/08/2016   • Hyperlipidemia 06/08/2016   • Osteoporosis 06/08/2016     Note Last Updated: 6/8/2016     Description: Boniva for a number of years.  Forteo for a year.  Managed by Gyn. .  I do recommend 1200mg calcium and 1000 IUs of vitamin D in supplements/diet as well as weight bearing exercise to prevent further decline in BMD.         Current Outpatient Medications on File Prior to Visit   Medication Sig Dispense Refill   • acetaminophen (TYLENOL) 500 MG tablet Take 500 mg by mouth Every 6 (Six) Hours As Needed for Mild Pain .     • atorvastatin (LIPITOR) 20 MG tablet TAKE 1 TABLET BY MOUTH EVERY DAY 30 tablet 9   •  "Calcium-Magnesium-Vitamin D ER (CITRACAL SLOW RELEASE) 600- MG-MG-UNIT tablet sustained-release 24 hour Take 2 tablets daily     • cetirizine (zyrTEC) 10 MG tablet Take 10 mg by mouth Daily.     • Cholecalciferol (VITAMIN D3) 2000 units tablet Take 2,000 Units by mouth Daily. 30 tablet    • docusate sodium (COLACE) 100 MG capsule Take 1 capsule by mouth once a week.     • fluticasone (FLONASE) 50 MCG/ACT nasal spray      • melatonin tablet Take 1 tablet by mouth At Night As Needed.       No current facility-administered medications on file prior to visit.        Objective     /80   Pulse 76   Ht 134.6 cm (52.99\")   Wt 39.9 kg (88 lb)   SpO2 98%   BMI 22.03 kg/m²     Physical Exam   Constitutional: She is oriented to person, place, and time. She appears well-developed and well-nourished.   HENT:   Head: Normocephalic and atraumatic.   Right Ear: Hearing and tympanic membrane normal.   Left Ear: Hearing and tympanic membrane normal.   Mouth/Throat: No oropharyngeal exudate or posterior oropharyngeal erythema.   Cardiovascular: Normal rate, regular rhythm and normal heart sounds.   Pulmonary/Chest: Effort normal and breath sounds normal.   Musculoskeletal:        Left shoulder: She exhibits normal range of motion, no tenderness, no bony tenderness, no swelling and no effusion.   Neurological: She is alert and oriented to person, place, and time. She has normal strength. No cranial nerve deficit or sensory deficit.   Reflex Scores:       Bicep reflexes are 2+ on the right side and 2+ on the left side.       Brachioradialis reflexes are 2+ on the right side and 2+ on the left side.       Patellar reflexes are 2+ on the right side and 2+ on the left side.       Achilles reflexes are 1+ on the right side and 1+ on the left side.  Skin: Skin is warm and dry.   Psychiatric: She has a normal mood and affect. Her behavior is normal.     X-rays of the left shoulder  performed today for following indication:   " pain.  X-ray reveal nad.  There is no available x-ray for comparison.  X-ray sent to radiology for official interpretation and findings.        Assessment/Plan   Noemi was seen today for left side weakness.    Diagnoses and all orders for this visit:    Acute pain of left shoulder  -     XR Shoulder 2+ View Left (In Office)    Left-sided weakness  -     CBC & Differential  -     Comprehensive Metabolic Panel  -     TSH Rfx On Abnormal To Free T4  -     Magnesium  -     MRI Brain With & Without Contrast; Future  -     MRI Cervical Spine Without Contrast; Future    Left leg paresthesias  -     CBC & Differential  -     Comprehensive Metabolic Panel  -     TSH Rfx On Abnormal To Free T4  -     Magnesium  -     MRI Brain With & Without Contrast; Future  -     MRI Cervical Spine Without Contrast; Future    Paresthesias/numbness  -     CBC & Differential  -     Comprehensive Metabolic Panel  -     TSH Rfx On Abnormal To Free T4  -     Magnesium  -     MRI Brain With & Without Contrast; Future  -     MRI Cervical Spine Without Contrast; Future    Fall, subsequent encounter    Other fatigue        Discussion    Patient presents with new left sided weakness and paresthesias with a rather unremarkable exam.  She is also fatigued.  Check labs to further evaluate.  Given these concerning symptoms, an MRI of the brain and cervical spine is ordered.    Left shoulder pain after fall.  Xray is unremarkable.  Offered PT to start.  She declines today.    15/25 minutes was spent in counseling of the following topics:, test results, impressions         Future Appointments   Date Time Provider Department Center   10/31/2019  2:30 PM REFERRED INJECTION CHAIR EP [facility]   11/14/2019  4:45 PM [facility] MRI 2  [facility]   11/14/2019  5:45 PM [facility] MRI 3  [facility]   12/30/2019  9:50 AM [facility] None   5/4/2020  2:30 PM REFERRED INJECTION CHAIR EP [facility]   6/24/2020  8:20 AM [facility] None   6/29/2020  7:45 AM Mary Lou Carroll MD [facility] None

## 2019-10-24 LAB
ALBUMIN SERPL-MCNC: 5.2 G/DL (ref 3.5–5.2)
ALBUMIN/GLOB SERPL: 2.2 G/DL
ALP SERPL-CCNC: 79 U/L (ref 39–117)
ALT SERPL-CCNC: 14 U/L (ref 1–33)
AST SERPL-CCNC: 20 U/L (ref 1–32)
BASOPHILS # BLD AUTO: 0.05 10*3/MM3 (ref 0–0.2)
BASOPHILS NFR BLD AUTO: 0.9 % (ref 0–1.5)
BILIRUB SERPL-MCNC: 0.3 MG/DL (ref 0.2–1.2)
BUN SERPL-MCNC: 8 MG/DL (ref 6–20)
BUN/CREAT SERPL: 12.7 (ref 7–25)
CALCIUM SERPL-MCNC: 10.6 MG/DL (ref 8.6–10.5)
CHLORIDE SERPL-SCNC: 97 MMOL/L (ref 98–107)
CO2 SERPL-SCNC: 32.5 MMOL/L (ref 22–29)
CREAT SERPL-MCNC: 0.63 MG/DL (ref 0.57–1)
EOSINOPHIL # BLD AUTO: 0.11 10*3/MM3 (ref 0–0.4)
EOSINOPHIL NFR BLD AUTO: 2.1 % (ref 0.3–6.2)
ERYTHROCYTE [DISTWIDTH] IN BLOOD BY AUTOMATED COUNT: 12.9 % (ref 12.3–15.4)
GLOBULIN SER CALC-MCNC: 2.4 GM/DL
GLUCOSE SERPL-MCNC: 93 MG/DL (ref 65–99)
HCT VFR BLD AUTO: 40.1 % (ref 34–46.6)
HGB BLD-MCNC: 13.1 G/DL (ref 12–15.9)
IMM GRANULOCYTES # BLD AUTO: 0.01 10*3/MM3 (ref 0–0.05)
IMM GRANULOCYTES NFR BLD AUTO: 0.2 % (ref 0–0.5)
LYMPHOCYTES # BLD AUTO: 1.76 10*3/MM3 (ref 0.7–3.1)
LYMPHOCYTES NFR BLD AUTO: 32.8 % (ref 19.6–45.3)
MAGNESIUM SERPL-MCNC: 2.2 MG/DL (ref 1.6–2.6)
MCH RBC QN AUTO: 30.4 PG (ref 26.6–33)
MCHC RBC AUTO-ENTMCNC: 32.7 G/DL (ref 31.5–35.7)
MCV RBC AUTO: 93 FL (ref 79–97)
MONOCYTES # BLD AUTO: 0.4 10*3/MM3 (ref 0.1–0.9)
MONOCYTES NFR BLD AUTO: 7.5 % (ref 5–12)
NEUTROPHILS # BLD AUTO: 3.03 10*3/MM3 (ref 1.7–7)
NEUTROPHILS NFR BLD AUTO: 56.5 % (ref 42.7–76)
NRBC BLD AUTO-RTO: 0 /100 WBC (ref 0–0.2)
PLATELET # BLD AUTO: 275 10*3/MM3 (ref 140–450)
POTASSIUM SERPL-SCNC: 4.3 MMOL/L (ref 3.5–5.2)
PROT SERPL-MCNC: 7.6 G/DL (ref 6–8.5)
RBC # BLD AUTO: 4.31 10*6/MM3 (ref 3.77–5.28)
SODIUM SERPL-SCNC: 141 MMOL/L (ref 136–145)
TSH SERPL DL<=0.005 MIU/L-ACNC: 2.18 UIU/ML (ref 0.27–4.2)
WBC # BLD AUTO: 5.36 10*3/MM3 (ref 3.4–10.8)

## 2019-10-30 DIAGNOSIS — IMO0001 PARESTHESIAS/NUMBNESS: ICD-10-CM

## 2019-10-30 DIAGNOSIS — R20.2 LEFT LEG PARESTHESIAS: ICD-10-CM

## 2019-10-30 DIAGNOSIS — R53.1 LEFT-SIDED WEAKNESS: Primary | ICD-10-CM

## 2019-10-31 ENCOUNTER — INFUSION (OUTPATIENT)
Dept: ONCOLOGY | Facility: HOSPITAL | Age: 59
End: 2019-10-31

## 2019-10-31 VITALS
DIASTOLIC BLOOD PRESSURE: 77 MMHG | WEIGHT: 88.6 LBS | HEART RATE: 75 BPM | OXYGEN SATURATION: 97 % | TEMPERATURE: 97.6 F | SYSTOLIC BLOOD PRESSURE: 120 MMHG | BODY MASS INDEX: 22.18 KG/M2

## 2019-10-31 DIAGNOSIS — M81.0 OSTEOPOROSIS, UNSPECIFIED OSTEOPOROSIS TYPE, UNSPECIFIED PATHOLOGICAL FRACTURE PRESENCE: Primary | ICD-10-CM

## 2019-10-31 NOTE — NURSING NOTE
Arrived per ambulatory alone to receive her first Prolia injection. Reviewed informational booklet with her and she states that she had a root canal done in July. This nurse verified with pharmacist whom stated that it would be adviseable to hold todays injection and that the patient would need to reschedule after talkng with Dr. REECE Hernandez and her dentist. Also advised pt that the labs would need to be redone closer to time of next scheduled time for injection. Understanding verbalized. Left unit with the printed informational brouchere.

## 2019-11-05 RX ORDER — LEVOTHYROXINE SODIUM 25 MCG
TABLET ORAL
Qty: 90 TABLET | Refills: 0 | Status: SHIPPED | OUTPATIENT
Start: 2019-11-05

## 2019-11-06 ENCOUNTER — TELEPHONE (OUTPATIENT)
Dept: INTERNAL MEDICINE | Facility: CLINIC | Age: 59
End: 2019-11-06

## 2019-11-06 RX ORDER — OMEPRAZOLE 20 MG/1
20 CAPSULE, DELAYED RELEASE ORAL DAILY
Qty: 90 CAPSULE | Refills: 1 | Status: SHIPPED | OUTPATIENT
Start: 2019-11-06

## 2019-11-06 RX ORDER — OMEPRAZOLE 20 MG/1
CAPSULE, DELAYED RELEASE ORAL
Qty: 90 CAPSULE | Refills: 1 | Status: SHIPPED | OUTPATIENT
Start: 2019-11-06 | End: 2019-11-11

## 2019-11-06 RX ORDER — ATORVASTATIN CALCIUM 20 MG/1
TABLET, FILM COATED ORAL
Qty: 90 TABLET | Refills: 1 | Status: SHIPPED | OUTPATIENT
Start: 2019-11-06

## 2019-11-06 NOTE — TELEPHONE ENCOUNTER
For FYI purposes.  Patient calling as she received a message from [facility] RX for Omeprazole ws approved.  Patient is not on medication, the medication belongs to her twin sister.  Reviewed the medication refill and it was sent in by Dr. Shante Chang's office.  I explained to patient it was an honest error, I would contact the office where the RX was ordered from to stop the RX from being sent.  Patient expressed concern about another office being in her chart.  I tried to explain [facility] [place] has the same EHR system statewide.  I also informed patient I would be contacting compliance department for direction.

## 2019-11-07 NOTE — TELEPHONE ENCOUNTER
I have a pt with very similar name and appearance and same medication requested today. That one was filled. This one needs to be approved by Dr. Carroll. Thanks.

## 2019-11-14 ENCOUNTER — HOSPITAL ENCOUNTER (OUTPATIENT)
Dept: CT IMAGING | Facility: HOSPITAL | Age: 59
Discharge: HOME | End: 2019-11-14

## 2019-11-14 ENCOUNTER — HOSPITAL ENCOUNTER (OUTPATIENT)
Dept: MRI IMAGING | Facility: HOSPITAL | Age: 59
Discharge: HOME | End: 2019-11-14
Admitting: INTERNAL MEDICINE

## 2019-11-14 DIAGNOSIS — IMO0001 PARESTHESIAS/NUMBNESS: ICD-10-CM

## 2019-11-14 DIAGNOSIS — R20.2 LEFT LEG PARESTHESIAS: ICD-10-CM

## 2019-11-14 DIAGNOSIS — R53.1 LEFT-SIDED WEAKNESS: ICD-10-CM

## 2019-11-14 LAB — CREAT BLDA-MCNC: 0.6 MG/DL (ref 0.6–1.3)

## 2019-11-14 PROCEDURE — 0 IOPAMIDOL PER 1 ML: Performed by: INTERNAL MEDICINE

## 2019-11-14 PROCEDURE — 82565 ASSAY OF CREATININE: CPT

## 2019-11-14 PROCEDURE — 70470 CT HEAD/BRAIN W/O & W/DYE: CPT

## 2019-11-14 PROCEDURE — 72141 MRI NECK SPINE W/O DYE: CPT

## 2019-11-14 RX ADMIN — IOPAMIDOL 50 ML: 755 INJECTION, SOLUTION INTRAVENOUS at 16:55

## 2019-11-18 DIAGNOSIS — R53.1 LEFT-SIDED WEAKNESS: ICD-10-CM

## 2019-11-18 DIAGNOSIS — IMO0001 PARESTHESIAS/NUMBNESS: ICD-10-CM

## 2019-11-18 DIAGNOSIS — M48.02 CERVICAL SPINAL STENOSIS: Primary | ICD-10-CM

## 2019-11-19 NOTE — PROGRESS NOTES
Subjective   History of Present Illness: Noemi Bartholomew is a 59 y.o. female is here today for constant severe neck, left arm pain and left arm weakness.  She is also having left leg pain, but no low back pain or leg weakness. She has N/T right toes and bilateral hands/fingers.  She was recently diagnosed with carpal tunnel by Dr Lipscomb and had steroid injection right hand and is to return in 2 weeks for left hand injection. She is wearing bilateral wrist brace at night.  She denies problems with urinary incontinence.  She has noticed unsteadiness with her gait and difficulty with fine motor skills of her hands.  She has osteoporosis and usually gets Prolia, however, she has had recent dental surgery and cannot have Prolia at this time.     Patient states that her symptoms started in mid October without cause.  Patient had cervical MRI at [facility] 11.14.19 as ordered by her PCP.  She has not had any PT, SANDRA or pain management    Patient was last seen 9.24.18 for low back and left leg pain    Neck Pain    The current episode started more than 1 month ago. The problem occurs constantly. The pain is present in the midline. The pain is at a severity of 10/10. The pain is severe. Associated symptoms include leg pain, numbness and weakness.   Arm Pain    The pain is present in the left forearm and upper left arm. The pain is at a severity of 9/10. The pain is severe. The pain has been constant since the incident. Associated symptoms include numbness.   Extremity Weakness    The pain is present in the left arm. The current episode started 1 to 4 weeks ago. The problem occurs constantly. The pain is at a severity of 5/10. The pain is moderate. Associated symptoms include numbness.   Leg Pain    The pain is present in the left leg. The pain is at a severity of 9/10. Associated symptoms include numbness.       The following portions of the patient's history were reviewed and updated as appropriate: allergies, current medications,  "past family history, past medical history, past social history, past surgical history and problem list.    Review of Systems   Musculoskeletal: Positive for extremity weakness and neck pain. Negative for arthralgias, back pain, gait problem, joint swelling, myalgias and neck stiffness.   Neurological: Positive for weakness and numbness.   All other systems reviewed and are negative.      Objective     Vitals:    11/21/19 1348   BP: 122/74   Pulse: 72   Weight: 39.9 kg (88 lb)   Height: 134.6 cm (52.99\")     Body mass index is 22.03 kg/m².      Physical Exam   Constitutional: She is oriented to person, place, and time.   Neurological: She is oriented to person, place, and time. She has an abnormal Romberg Test. Gait normal.   Reflex Scores:       Tricep reflexes are 3+ on the right side and 3+ on the left side.       Bicep reflexes are 3+ on the right side and 3+ on the left side.       Brachioradialis reflexes are 3+ on the right side and 3+ on the left side.       Patellar reflexes are 3+ on the right side and 3+ on the left side.       Achilles reflexes are 2+ on the right side and 2+ on the left side.  Psychiatric: Her speech is normal.     Neurologic Exam     Mental Status   Oriented to person, place, and time.   Speech: speech is normal   Level of consciousness: alert    Motor Exam   Muscle bulk: normal  Overall muscle tone: normal    Strength   Right deltoid: 5/5  Left deltoid: 5/5  Right biceps: 5/5  Left biceps: 5/5  Right triceps: 5/5  Left triceps: 5/5  Right interossei: 4/5  Left interossei: 4/5  Right iliopsoas: 5/5  Left iliopsoas: 5/5  Right quadriceps: 5/5  Left quadriceps: 5/5  Right anterior tibial: 4/5  Left anterior tibial: 5/5  Right gastroc: 5/5  Left gastroc: 5/5    Sensory Exam   Right arm pinprick: decreased from wrist  Left arm pinprick: decreased from wrist    Gait, Coordination, and Reflexes     Gait  Gait: normal    Coordination   Romberg: positive    Reflexes   Right brachioradialis: " 3+  Left brachioradialis: 3+  Right biceps: 3+  Left biceps: 3+  Right triceps: 3+  Left triceps: 3+  Right patellar: 3+  Left patellar: 3+  Right achilles: 2+  Left achilles: 2+  Right Rivers: present  Left Rivers: present        Assessment/Plan   Independent Review of Radiographic Studies:      I personally reviewed the images from the following studies.  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  Disc osteophyte complex in combination with the OPLL contributes to severe central stenosis C5-6 and C6-7 and to a lesser degree at L3-4 and 4 5.  There appears to be myelomalacia posterior to the C5 vertebral body.      Medical Decision Making:      She is clearly myelopathic on exam.  She describes Lhermitte's and has bilateral Rivers's.  She has quicker than average reflexes.  She describes imbalance and incoordination of her hands.  In this situation there is really no conservative treatment that can offer decompression of the cervical spine.  The only treatment option would be surgery to decompress the spinal cord.  We will have her follow-up with Dr. Noe as soon as possible to discuss surgical treatments.  She understands that because the myelomalacia, surgery is offered more to prevent progression of symptoms and only offers a 50% chance of complete symptom medic recovery.    Noemi was seen today for neck pain, arm pain, extremity weakness, leg pain and numbness.    Diagnoses and all orders for this visit:    Cervical spondylosis with myelopathy    Ossification of spinal ligament      Return for with Dr Noe.

## 2019-11-21 ENCOUNTER — OFFICE VISIT (OUTPATIENT)
Dept: NEUROSURGERY | Facility: CLINIC | Age: 59
End: 2019-11-21

## 2019-11-21 VITALS
HEART RATE: 72 BPM | SYSTOLIC BLOOD PRESSURE: 122 MMHG | DIASTOLIC BLOOD PRESSURE: 74 MMHG | BODY MASS INDEX: 20.37 KG/M2 | HEIGHT: 55 IN | WEIGHT: 88 LBS

## 2019-11-21 DIAGNOSIS — M24.28: ICD-10-CM

## 2019-11-21 DIAGNOSIS — M47.12 CERVICAL SPONDYLOSIS WITH MYELOPATHY: Primary | ICD-10-CM

## 2019-11-21 PROCEDURE — 99214 OFFICE O/P EST MOD 30 MIN: CPT | Performed by: NURSE PRACTITIONER

## 2019-11-21 NOTE — PROGRESS NOTES
Subjective   She is with her sister. I have seen her in the past for some low back issues. Over the last 6 weeks she has begun developing some gait ataxia and some weakness. She has some pain in her left trapezius region and some burning symptoms I her arms and legs. She was found to have cervical stenosis from OPLL mainly at C6-C7 but also involving C5-C6 and C7-T1. She is clearly myelopathic, as documented below and she needs to have surgery. She does have a history of osteoporosis and I am concerned about hardware problems but I think the initial operation should be a corpectomy at C6-C7 with cage and plate from C5 to T1. I think because of her small stature we can do this from the front but it is possible she might need a supplemental fusion later on. She is generally healthy. I told her that she will probably need about 3 months to recover before going to work but I do not want her to drive. In fact, I do not want her to work right now. She knows that I am out of town next week but will get this done on 12/06/2019. I described the surgery below. Will watch the C2-C3 herniated disk for now, as I do not think that is really causing a problem. Will try some gabapentin to help some of her dysesthetic pain between now and the surgery.     ID: Noemi Bartholomew is a 59 y.o. female is here today for follow-up to discuss surgery.   Patient was seen yesterday 11.21.19 and was found to be myelopathic and hyper reflexive.     Patient has constant severe neck pain, left arm pain and weakness, problems with her balance and gait and N/T bilateral hands/fingers and right toes.  Patient also has left leg pain and weakness.  She has osteoporosis and is on Prolia, but has not had it recently since she has had recently due to dental procedure.  She denies urinary incontinence.    She has not had PT, SANDRA or pain management.      Neck Pain    The problem occurs constantly. The problem has been unchanged. The pain is present in the  midline. The pain is at a severity of 7/10. The pain is moderate. Associated symptoms include leg pain, numbness and weakness.   Arm Pain    The pain is present in the upper left arm and left forearm. The quality of the pain is described as shooting. The pain radiates to the left arm. The pain is at a severity of 7/10. The pain is moderate. The pain has been constant since the incident. Associated symptoms include numbness.   Extremity Weakness    The pain is present in the left arm, left lower leg and left upper leg. The problem occurs constantly. The pain is at a severity of 5/10. The pain is moderate. Associated symptoms include numbness.   Difficulty Walking   The problem occurs constantly. The problem has been unchanged. Associated symptoms include neck pain, numbness and weakness. Pertinent negatives include no arthralgias or myalgias.   Leg Pain    The pain is present in the left leg. The pain is at a severity of 6/10. The pain is moderate. Associated symptoms include numbness.       The following portions of the patient's history were reviewed and updated as appropriate: allergies, current medications, past family history, past medical history, past social history, past surgical history and problem list.    Review of Systems   Musculoskeletal: Positive for extremity weakness, gait problem, neck pain and neck stiffness. Negative for arthralgias and myalgias.   Neurological: Positive for weakness and numbness.   All other systems reviewed and are negative.      Objective   Physical Exam   Constitutional: She is oriented to person, place, and time. She appears well-developed and well-nourished.   HENT:   Head: Normocephalic and atraumatic.   Eyes: Conjunctivae and EOM are normal. Pupils are equal, round, and reactive to light.   Fundoscopic exam:       The right eye shows no papilledema. The right eye shows venous pulsations.        The left eye shows no papilledema. The left eye shows venous pulsations.    Neck: Carotid bruit is not present.   Neurological: She is oriented to person, place, and time. She has a normal Finger-Nose-Finger Test and a normal Heel to Shin Test. Gait normal.   Reflex Scores:       Tricep reflexes are 3+ on the right side and 3+ on the left side.       Bicep reflexes are 3+ on the right side and 3+ on the left side.       Brachioradialis reflexes are 3+ on the right side and 3+ on the left side.       Patellar reflexes are 4+ on the right side and 4+ on the left side.       Achilles reflexes are 4+ on the right side and 4+ on the left side.  Psychiatric: Her speech is normal.     Neurologic Exam     Mental Status   Oriented to person, place, and time.   Registration of memory: Good recent and remote memory.   Attention: normal. Concentration: normal.   Speech: speech is normal   Level of consciousness: alert  Knowledge: consistent with education.     Cranial Nerves     CN II   Visual fields full to confrontation.   Visual acuity: normal    CN III, IV, VI   Pupils are equal, round, and reactive to light.  Extraocular motions are normal.     CN V   Facial sensation intact.   Right corneal reflex: normal  Left corneal reflex: normal    CN VII   Facial expression full, symmetric.   Right facial weakness: none  Left facial weakness: none    CN VIII   Hearing: intact    CN IX, X   Palate: symmetric    CN XI   Right sternocleidomastoid strength: normal  Left sternocleidomastoid strength: normal    CN XII   Tongue: not atrophic  Tongue deviation: none    Motor Exam   Muscle bulk: normal  Right arm tone: normal  Left arm tone: normal  Right leg tone: normal  Left leg tone: normal    Strength   Strength 5/5 except as noted.     Sensory Exam   Light touch normal.     Gait, Coordination, and Reflexes     Gait  Gait: normal    Coordination   Finger to nose coordination: normal  Heel to shin coordination: normal    Reflexes   Right brachioradialis: 3+  Left brachioradialis: 3+  Right biceps: 3+  Left  biceps: 3+  Right triceps: 3+  Left triceps: 3+  Right patellar: 4+  Left patellar: 4+  Right achilles: 4+  Left achilles: 4+  Right : 2+  Left : 2+  Right Rivers: present  Left Rivers: present      Assessment/Plan   Independent Review of Radiographic Studies:    I reviewed the cervical MRI done on 11/14/1990 OPLL behind the C6-C7 vertebral body with cervical stenosis from C5-C6 to C6-7 and C7-T1.  There is a right-sided herniated disc at C2-C3 and loss of the cervical lordosis.  Agree with the report.      Medical Decision Making:    I described and recommended and anterior cervical corpectomy with cage and plate at C6 and C7. The goal of surgery is arrest of a progressive myelopathy, hopefully return of spinal cord function, and reduction in overall neck and arm pain, if present. The risks include, but are not limited to, infection, hemorrhage requiring transfusion or reoperation, CSF leak requiring reoperation, incomplete relief of symptoms, difficulty swallowing, hoarseness of voice, hardware problems requiring revision surgery, potential need for additional surgery in the future, stroke, paralysis, coma, and death. The patient agrees to proceed.     Noemi was seen today for neck pain, arm pain, extremity weakness, difficulty walking and extremity weakness.    Diagnoses and all orders for this visit:    Ossification of spinal ligament    Cervical spinal stenosis    Cervical spondylosis with myelopathy      Return for f/u with Kyung, 2 weeks.

## 2019-11-22 ENCOUNTER — PREP FOR SURGERY (OUTPATIENT)
Dept: OTHER | Facility: HOSPITAL | Age: 59
End: 2019-11-22

## 2019-11-22 ENCOUNTER — OFFICE VISIT (OUTPATIENT)
Dept: NEUROSURGERY | Facility: CLINIC | Age: 59
End: 2019-11-22

## 2019-11-22 DIAGNOSIS — M24.28: ICD-10-CM

## 2019-11-22 DIAGNOSIS — M24.28: Primary | ICD-10-CM

## 2019-11-22 DIAGNOSIS — M48.02 CERVICAL SPINAL STENOSIS: ICD-10-CM

## 2019-11-22 DIAGNOSIS — M47.12 CERVICAL SPONDYLOSIS WITH MYELOPATHY: ICD-10-CM

## 2019-11-22 DIAGNOSIS — M47.12 CERVICAL SPONDYLOSIS WITH MYELOPATHY: Primary | ICD-10-CM

## 2019-11-22 DIAGNOSIS — M48.02 SPINAL STENOSIS IN CERVICAL REGION: ICD-10-CM

## 2019-11-22 PROCEDURE — 99214 OFFICE O/P EST MOD 30 MIN: CPT | Performed by: NEUROLOGICAL SURGERY

## 2019-11-22 RX ORDER — GABAPENTIN 100 MG/1
100 CAPSULE ORAL 2 TIMES DAILY
Qty: 60 CAPSULE | Refills: 3 | Status: ON HOLD | OUTPATIENT
Start: 2019-11-22 | End: 2019-12-13

## 2019-12-02 ENCOUNTER — APPOINTMENT (OUTPATIENT)
Dept: PREADMISSION TESTING | Facility: HOSPITAL | Age: 59
End: 2019-12-02

## 2019-12-02 VITALS
DIASTOLIC BLOOD PRESSURE: 85 MMHG | HEIGHT: 55 IN | OXYGEN SATURATION: 98 % | WEIGHT: 87.5 LBS | RESPIRATION RATE: 16 BRPM | BODY MASS INDEX: 20.25 KG/M2 | HEART RATE: 86 BPM | SYSTOLIC BLOOD PRESSURE: 121 MMHG | TEMPERATURE: 97.3 F

## 2019-12-02 LAB
ANION GAP SERPL CALCULATED.3IONS-SCNC: 10 MMOL/L (ref 5–15)
BUN BLD-MCNC: 14 MG/DL (ref 6–20)
BUN/CREAT SERPL: 20.6 (ref 7–25)
CALCIUM SPEC-SCNC: 9.6 MG/DL (ref 8.6–10.5)
CHLORIDE SERPL-SCNC: 99 MMOL/L (ref 98–107)
CO2 SERPL-SCNC: 31 MMOL/L (ref 22–29)
CREAT BLD-MCNC: 0.68 MG/DL (ref 0.57–1)
DEPRECATED RDW RBC AUTO: 45.2 FL (ref 37–54)
ERYTHROCYTE [DISTWIDTH] IN BLOOD BY AUTOMATED COUNT: 13.2 % (ref 12.3–15.4)
GFR SERPL CREATININE-BSD FRML MDRD: 89 ML/MIN/1.73
GLUCOSE BLD-MCNC: 108 MG/DL (ref 65–99)
HCT VFR BLD AUTO: 40.4 % (ref 34–46.6)
HGB BLD-MCNC: 13.3 G/DL (ref 12–15.9)
MCH RBC QN AUTO: 30.9 PG (ref 26.6–33)
MCHC RBC AUTO-ENTMCNC: 32.9 G/DL (ref 31.5–35.7)
MCV RBC AUTO: 94 FL (ref 79–97)
PLATELET # BLD AUTO: 284 10*3/MM3 (ref 140–450)
PMV BLD AUTO: 8.7 FL (ref 6–12)
POTASSIUM BLD-SCNC: 4 MMOL/L (ref 3.5–5.2)
RBC # BLD AUTO: 4.3 10*6/MM3 (ref 3.77–5.28)
SODIUM BLD-SCNC: 140 MMOL/L (ref 136–145)
WBC NRBC COR # BLD: 7.12 10*3/MM3 (ref 3.4–10.8)

## 2019-12-02 PROCEDURE — 85027 COMPLETE CBC AUTOMATED: CPT | Performed by: NEUROLOGICAL SURGERY

## 2019-12-02 PROCEDURE — 36415 COLL VENOUS BLD VENIPUNCTURE: CPT

## 2019-12-02 PROCEDURE — 80048 BASIC METABOLIC PNL TOTAL CA: CPT | Performed by: NEUROLOGICAL SURGERY

## 2019-12-02 RX ORDER — ATORVASTATIN CALCIUM 20 MG/1
20 TABLET, FILM COATED ORAL NIGHTLY
COMMUNITY
End: 2019-12-06 | Stop reason: SDUPTHER

## 2019-12-02 NOTE — DISCHARGE INSTRUCTIONS
Take the following medications the morning of surgery with a small sip of water:  GABAPENTIN      General Instructions: CLEAR LIQUIDS UNTIL 11:30 AM MORNING OF SURGERY  • Do not eat solid food after midnight the night before surgery.  • You may drink clear liquids day of surgery but must stop at least one hour before your hospital arrival time.  • It is beneficial for you to have a clear drink that contains carbohydrates the day of surgery.  We suggest a 12 to 20 ounce bottle of Gatorade or Powerade for non-diabetic patients or a 12 to 20 ounce bottle of G2 or Powerade Zero for diabetic patients. (Pediatric patients, are not advised to drink a 12 to 20 ounce carbohydrate drink)    Clear liquids are liquids you can see through.  Nothing red in color.     Plain water                               Sports drinks  Sodas                                   Gelatin (Jell-O)  Fruit juices without pulp such as white grape juice and apple juice  Popsicles that contain no fruit or yogurt  Tea or coffee (no cream or milk added)  Gatorade / Powerade  G2 / Powerade Zero    • Infants may have breast milk up to four hours before surgery.  • Infants drinking formula may drink formula up to six hours before surgery.   • Patients who avoid smoking, chewing tobacco and alcohol for 4 weeks prior to surgery have a reduced risk of post-operative complications.  Quit smoking as many days before surgery as you can.  • Do not smoke, use chewing tobacco or drink alcohol the day of surgery.   • If applicable bring your C-PAP/ BI-PAP machine.  • Bring any papers given to you in the doctor’s office.  • Wear clean comfortable clothes.  • Do not wear contact lenses, false eyelashes or make-up.  Bring a case for your glasses.   • Bring crutches or walker if applicable.  • Remove all piercings.  Leave jewelry and any other valuables at home.  • Hair extensions with metal clips must be removed prior to surgery.  • The Pre-Admission Testing nurse will  instruct you to bring medications if unable to obtain an accurate list in Pre-Admission Testing.        If you were given a blood bank ID arm band remember to bring it with you the day of surgery.    Preventing a Surgical Site Infection:  • For 2 to 3 days before surgery, avoid shaving with a razor because the razor can irritate skin and make it easier to develop an infection.    • Any areas of open skin can increase the risk of a post-operative wound infection by allowing bacteria to enter and travel throughout the body.  Notify your surgeon if you have any skin wounds / rashes even if it is not near the expected surgical site.  The area will need assessed to determine if surgery should be delayed until it is healed.  • The night prior to surgery sleep in a clean bed with clean clothing.  Do not allow pets to sleep with you.  • Shower on the morning of surgery using a fresh bar of anti-bacterial soap (such as Dial) and clean washcloth.  Dry with a clean towel and dress in clean clothing.  • Ask your surgeon if you will be receiving antibiotics prior to surgery.  • Make sure you, your family, and all healthcare providers clean their hands with soap and water or an alcohol based hand  before caring for you or your wound.    Day of surgery: 12/6/2019 ARRIVAL TIME 12:30 PM  Your arrival time is approximately two hours before your scheduled surgery time.  Upon arrival, a Pre-op nurse and Anesthesiologist will review your health history, obtain vital signs, and answer questions you may have.  The only belongings needed at this time will be a list of your home medications and if applicable your C-PAP/BI-PAP machine.  If you are staying overnight your family can leave the rest of your belongings in the car and bring them to your room later.  A Pre-op nurse will start an IV and you may receive medication in preparation for surgery, including something to help you relax.  Your family will be able to see you in the  Pre-op area.  Two visitors at a time will be allowed in the Pre-op room.  While you are in surgery your family should notify the waiting room  if they leave the waiting room area and provide a contact phone number.    Please be aware that surgery does come with discomfort.  We want to make every effort to control your discomfort so please discuss any uncontrolled symptoms with your nurse.   Your doctor will most likely have prescribed pain medications.      If you are going home after surgery you will receive individualized written care instructions before being discharged.  A responsible adult must drive you to and from the hospital on the day of your surgery and stay with you for 24 hours.    If you are staying overnight following surgery, you will be transported to your hospital room following the recovery period.  [facility] has all private rooms.    You have received a list of surgical assistants for your reference.  If you have any questions please call Pre-Admission Testing at [phone].  Deductibles and co-payments are collected on the day of service. Please be prepared to pay the required co-pay, deductible or deposit on the day of service as defined by your plan.

## 2019-12-06 ENCOUNTER — ANESTHESIA (OUTPATIENT)
Dept: PERIOP | Facility: HOSPITAL | Age: 59
End: 2019-12-06

## 2019-12-06 ENCOUNTER — ANESTHESIA EVENT (OUTPATIENT)
Dept: PERIOP | Facility: HOSPITAL | Age: 59
End: 2019-12-06

## 2019-12-06 ENCOUNTER — APPOINTMENT (OUTPATIENT)
Dept: GENERAL RADIOLOGY | Facility: HOSPITAL | Age: 59
End: 2019-12-06

## 2019-12-06 ENCOUNTER — HOSPITAL ENCOUNTER (INPATIENT)
Facility: HOSPITAL | Age: 59
LOS: 7 days | End: 2019-12-13
Attending: NEUROLOGICAL SURGERY | Admitting: NEUROLOGICAL SURGERY

## 2019-12-06 PROBLEM — M48.02 CERVICAL STENOSIS OF SPINE: Status: ACTIVE | Noted: 2019-12-06

## 2019-12-06 PROCEDURE — 25010000002 MIDAZOLAM PER 1 MG: Performed by: ANESTHESIOLOGY

## 2019-12-06 PROCEDURE — 76000 FLUOROSCOPY <1 HR PHYS/QHP: CPT

## 2019-12-06 PROCEDURE — 0RB30ZZ EXCISION OF CERVICAL VERTEBRAL DISC, OPEN APPROACH: ICD-10-PCS | Performed by: NEUROLOGICAL SURGERY

## 2019-12-06 PROCEDURE — 63081 REMOVE VERT BODY DCMPRN CRVL: CPT | Performed by: SPECIALIST/TECHNOLOGIST, OTHER

## 2019-12-06 PROCEDURE — C1713 ANCHOR/SCREW BN/BN,TIS/BN: HCPCS | Performed by: NEUROLOGICAL SURGERY

## 2019-12-06 PROCEDURE — 00NW0ZZ RELEASE CERVICAL SPINAL CORD, OPEN APPROACH: ICD-10-PCS | Performed by: NEUROLOGICAL SURGERY

## 2019-12-06 PROCEDURE — 25010000002 FENTANYL CITRATE (PF) 100 MCG/2ML SOLUTION: Performed by: NURSE ANESTHETIST, CERTIFIED REGISTERED

## 2019-12-06 PROCEDURE — 22554 ARTHRD ANT NTRBD MIN DSC CRV: CPT | Performed by: SPECIALIST/TECHNOLOGIST, OTHER

## 2019-12-06 PROCEDURE — 25010000002 METHOCARBAMOL 1000 MG/10ML SOLUTION: Performed by: NEUROLOGICAL SURGERY

## 2019-12-06 PROCEDURE — 63082 REMOVE VERTEBRAL BODY ADD-ON: CPT | Performed by: SPECIALIST/TECHNOLOGIST, OTHER

## 2019-12-06 PROCEDURE — 25010000002 DEXAMETHASONE PER 1 MG: Performed by: NURSE ANESTHETIST, CERTIFIED REGISTERED

## 2019-12-06 PROCEDURE — 0RG20A0 FUSION OF 2 OR MORE CERVICAL VERTEBRAL JOINTS WITH INTERBODY FUSION DEVICE, ANTERIOR APPROACH, ANTERIOR COLUMN, OPEN APPROACH: ICD-10-PCS | Performed by: NEUROLOGICAL SURGERY

## 2019-12-06 PROCEDURE — 72040 X-RAY EXAM NECK SPINE 2-3 VW: CPT

## 2019-12-06 PROCEDURE — 22585 ARTHRD ANT NTRBD MIN DSC EA: CPT | Performed by: NEUROLOGICAL SURGERY

## 2019-12-06 PROCEDURE — 22854 INSJ BIOMECHANICAL DEVICE: CPT | Performed by: NEUROLOGICAL SURGERY

## 2019-12-06 PROCEDURE — 25010000002 PROPOFOL 10 MG/ML EMULSION: Performed by: NURSE ANESTHETIST, CERTIFIED REGISTERED

## 2019-12-06 PROCEDURE — 63081 REMOVE VERT BODY DCMPRN CRVL: CPT | Performed by: NEUROLOGICAL SURGERY

## 2019-12-06 PROCEDURE — 25010000002 VANCOMYCIN 1 G RECONSTITUTED SOLUTION 1 EACH VIAL: Performed by: NEUROLOGICAL SURGERY

## 2019-12-06 PROCEDURE — 25010000002 HEPARIN (PORCINE) PER 1000 UNITS: Performed by: NEUROLOGICAL SURGERY

## 2019-12-06 PROCEDURE — 63082 REMOVE VERTEBRAL BODY ADD-ON: CPT | Performed by: NEUROLOGICAL SURGERY

## 2019-12-06 PROCEDURE — L0172 CERV COL SR FOAM 2PC PRE OTS: HCPCS | Performed by: NEUROLOGICAL SURGERY

## 2019-12-06 PROCEDURE — 22846 INSERT SPINE FIXATION DEVICE: CPT | Performed by: SPECIALIST/TECHNOLOGIST, OTHER

## 2019-12-06 PROCEDURE — 22846 INSERT SPINE FIXATION DEVICE: CPT | Performed by: NEUROLOGICAL SURGERY

## 2019-12-06 PROCEDURE — 22585 ARTHRD ANT NTRBD MIN DSC EA: CPT | Performed by: SPECIALIST/TECHNOLOGIST, OTHER

## 2019-12-06 PROCEDURE — 0RG2070 FUSION OF 2 OR MORE CERVICAL VERTEBRAL JOINTS WITH AUTOLOGOUS TISSUE SUBSTITUTE, ANTERIOR APPROACH, ANTERIOR COLUMN, OPEN APPROACH: ICD-10-PCS | Performed by: NEUROLOGICAL SURGERY

## 2019-12-06 PROCEDURE — 22854 INSJ BIOMECHANICAL DEVICE: CPT | Performed by: SPECIALIST/TECHNOLOGIST, OTHER

## 2019-12-06 PROCEDURE — L0120 CERV FLEX N/ADJ FOAM PRE OTS: HCPCS | Performed by: NEUROLOGICAL SURGERY

## 2019-12-06 PROCEDURE — 22554 ARTHRD ANT NTRBD MIN DSC CRV: CPT | Performed by: NEUROLOGICAL SURGERY

## 2019-12-06 DEVICE — STRUT 6241041 ANATOMIC 14X11X5MM
Type: IMPLANTABLE DEVICE | Site: SPINE CERVICAL | Status: FUNCTIONAL
Brand: VERTE-STACK® SPINAL SYSTEM

## 2019-12-06 DEVICE — SEALANT WND FIBRIN TISSEEL PREFIL/SYR/PRIMAFZ 4ML: Type: IMPLANTABLE DEVICE | Site: SPINE CERVICAL | Status: FUNCTIONAL

## 2019-12-06 DEVICE — SCREW 7723515 ZEVO VAR ST 3.5MM X 15MM
Type: IMPLANTABLE DEVICE | Status: FUNCTIONAL
Brand: ZEVO™ ANTERIOR CERVICAL PLATE SYSTEM

## 2019-12-06 DEVICE — DURAGEN® PLUS DURAL REGENERATION MATRIX, 2 IN X 2 IN (5 CM X 5 CM)
Type: IMPLANTABLE DEVICE | Site: SPINE CERVICAL | Status: FUNCTIONAL
Brand: DURAGEN® PLUS

## 2019-12-06 DEVICE — PLATE 3003045 ZEVO 45MM 3 LVL
Type: IMPLANTABLE DEVICE | Status: FUNCTIONAL
Brand: ZEVO™ ANTERIOR CERVICAL PLATE SYSTEM

## 2019-12-06 DEVICE — DBM T43103 2.5CC GRAFTON PUTTY
Type: IMPLANTABLE DEVICE | Site: SPINE CERVICAL | Status: FUNCTIONAL
Brand: GRAFTON®AND GRAFTON PLUS®DEMINERALIZED BONE MATRIX (DBM)

## 2019-12-06 RX ORDER — MAGNESIUM HYDROXIDE 1200 MG/15ML
LIQUID ORAL AS NEEDED
Status: DISCONTINUED | OUTPATIENT
Start: 2019-12-06 | End: 2019-12-06 | Stop reason: HOSPADM

## 2019-12-06 RX ORDER — HYDRALAZINE HYDROCHLORIDE 20 MG/ML
5 INJECTION INTRAMUSCULAR; INTRAVENOUS
Status: DISCONTINUED | OUTPATIENT
Start: 2019-12-06 | End: 2019-12-07

## 2019-12-06 RX ORDER — PROMETHAZINE HYDROCHLORIDE 25 MG/ML
6.25 INJECTION, SOLUTION INTRAMUSCULAR; INTRAVENOUS
Status: DISCONTINUED | OUTPATIENT
Start: 2019-12-06 | End: 2019-12-07

## 2019-12-06 RX ORDER — MIDAZOLAM HYDROCHLORIDE 1 MG/ML
1 INJECTION INTRAMUSCULAR; INTRAVENOUS
Status: DISCONTINUED | OUTPATIENT
Start: 2019-12-06 | End: 2019-12-06 | Stop reason: HOSPADM

## 2019-12-06 RX ORDER — FAMOTIDINE 10 MG/ML
20 INJECTION, SOLUTION INTRAVENOUS ONCE
Status: COMPLETED | OUTPATIENT
Start: 2019-12-06 | End: 2019-12-06

## 2019-12-06 RX ORDER — LABETALOL HYDROCHLORIDE 5 MG/ML
5 INJECTION, SOLUTION INTRAVENOUS
Status: DISCONTINUED | OUTPATIENT
Start: 2019-12-06 | End: 2019-12-07

## 2019-12-06 RX ORDER — HYDROMORPHONE HYDROCHLORIDE 1 MG/ML
0.5 INJECTION, SOLUTION INTRAMUSCULAR; INTRAVENOUS; SUBCUTANEOUS
Status: DISCONTINUED | OUTPATIENT
Start: 2019-12-06 | End: 2019-12-07

## 2019-12-06 RX ORDER — PROMETHAZINE HYDROCHLORIDE 25 MG/1
25 TABLET ORAL ONCE AS NEEDED
Status: DISCONTINUED | OUTPATIENT
Start: 2019-12-06 | End: 2019-12-07

## 2019-12-06 RX ORDER — SODIUM CHLORIDE 0.9 % (FLUSH) 0.9 %
3-10 SYRINGE (ML) INJECTION AS NEEDED
Status: DISCONTINUED | OUTPATIENT
Start: 2019-12-06 | End: 2019-12-06 | Stop reason: HOSPADM

## 2019-12-06 RX ORDER — HYDROMORPHONE HCL IN 0.9% NACL 10 MG/50ML
PATIENT CONTROLLED ANALGESIA SYRINGE INTRAVENOUS CONTINUOUS
Status: DISCONTINUED | OUTPATIENT
Start: 2019-12-06 | End: 2019-12-08

## 2019-12-06 RX ORDER — DIPHENHYDRAMINE HYDROCHLORIDE 50 MG/ML
12.5 INJECTION INTRAMUSCULAR; INTRAVENOUS
Status: DISCONTINUED | OUTPATIENT
Start: 2019-12-06 | End: 2019-12-07

## 2019-12-06 RX ORDER — NALOXONE HCL 0.4 MG/ML
0.2 VIAL (ML) INJECTION AS NEEDED
Status: DISCONTINUED | OUTPATIENT
Start: 2019-12-06 | End: 2019-12-07

## 2019-12-06 RX ORDER — ROCURONIUM BROMIDE 10 MG/ML
INJECTION, SOLUTION INTRAVENOUS AS NEEDED
Status: DISCONTINUED | OUTPATIENT
Start: 2019-12-06 | End: 2019-12-06 | Stop reason: SURG

## 2019-12-06 RX ORDER — SODIUM CHLORIDE, SODIUM LACTATE, POTASSIUM CHLORIDE, CALCIUM CHLORIDE 600; 310; 30; 20 MG/100ML; MG/100ML; MG/100ML; MG/100ML
9 INJECTION, SOLUTION INTRAVENOUS CONTINUOUS
Status: DISCONTINUED | OUTPATIENT
Start: 2019-12-06 | End: 2019-12-07

## 2019-12-06 RX ORDER — HYDROCODONE BITARTRATE AND ACETAMINOPHEN 7.5; 325 MG/1; MG/1
1 TABLET ORAL ONCE AS NEEDED
Status: DISCONTINUED | OUTPATIENT
Start: 2019-12-06 | End: 2019-12-07

## 2019-12-06 RX ORDER — MIDAZOLAM HYDROCHLORIDE 1 MG/ML
2 INJECTION INTRAMUSCULAR; INTRAVENOUS
Status: DISCONTINUED | OUTPATIENT
Start: 2019-12-06 | End: 2019-12-06 | Stop reason: HOSPADM

## 2019-12-06 RX ORDER — METHOCARBAMOL 100 MG/ML
500 INJECTION, SOLUTION INTRAMUSCULAR; INTRAVENOUS ONCE
Status: COMPLETED | OUTPATIENT
Start: 2019-12-06 | End: 2019-12-06

## 2019-12-06 RX ORDER — MEPERIDINE HYDROCHLORIDE 25 MG/ML
12.5 INJECTION INTRAMUSCULAR; INTRAVENOUS; SUBCUTANEOUS
Status: DISCONTINUED | OUTPATIENT
Start: 2019-12-06 | End: 2019-12-07

## 2019-12-06 RX ORDER — DIPHENHYDRAMINE HCL 25 MG
25 CAPSULE ORAL
Status: DISCONTINUED | OUTPATIENT
Start: 2019-12-06 | End: 2019-12-07

## 2019-12-06 RX ORDER — OXYCODONE AND ACETAMINOPHEN 7.5; 325 MG/1; MG/1
1 TABLET ORAL ONCE AS NEEDED
Status: DISCONTINUED | OUTPATIENT
Start: 2019-12-06 | End: 2019-12-07

## 2019-12-06 RX ORDER — ACETAMINOPHEN 325 MG/1
650 TABLET ORAL ONCE AS NEEDED
Status: DISCONTINUED | OUTPATIENT
Start: 2019-12-06 | End: 2019-12-07

## 2019-12-06 RX ORDER — PROMETHAZINE HYDROCHLORIDE 25 MG/1
25 SUPPOSITORY RECTAL ONCE AS NEEDED
Status: DISCONTINUED | OUTPATIENT
Start: 2019-12-06 | End: 2019-12-07

## 2019-12-06 RX ORDER — LIDOCAINE HYDROCHLORIDE 10 MG/ML
0.5 INJECTION, SOLUTION EPIDURAL; INFILTRATION; INTRACAUDAL; PERINEURAL ONCE AS NEEDED
Status: DISCONTINUED | OUTPATIENT
Start: 2019-12-06 | End: 2019-12-06 | Stop reason: HOSPADM

## 2019-12-06 RX ORDER — LIDOCAINE HYDROCHLORIDE 20 MG/ML
INJECTION, SOLUTION INFILTRATION; PERINEURAL AS NEEDED
Status: DISCONTINUED | OUTPATIENT
Start: 2019-12-06 | End: 2019-12-06 | Stop reason: SURG

## 2019-12-06 RX ORDER — PROPOFOL 10 MG/ML
VIAL (ML) INTRAVENOUS AS NEEDED
Status: DISCONTINUED | OUTPATIENT
Start: 2019-12-06 | End: 2019-12-06 | Stop reason: SURG

## 2019-12-06 RX ORDER — FENTANYL CITRATE 50 UG/ML
INJECTION, SOLUTION INTRAMUSCULAR; INTRAVENOUS
Status: COMPLETED
Start: 2019-12-06 | End: 2019-12-06

## 2019-12-06 RX ORDER — ONDANSETRON 2 MG/ML
4 INJECTION INTRAMUSCULAR; INTRAVENOUS ONCE AS NEEDED
Status: DISCONTINUED | OUTPATIENT
Start: 2019-12-06 | End: 2019-12-07

## 2019-12-06 RX ORDER — EPHEDRINE SULFATE 50 MG/ML
INJECTION, SOLUTION INTRAVENOUS AS NEEDED
Status: DISCONTINUED | OUTPATIENT
Start: 2019-12-06 | End: 2019-12-06 | Stop reason: SURG

## 2019-12-06 RX ORDER — EPHEDRINE SULFATE 50 MG/ML
5 INJECTION, SOLUTION INTRAVENOUS ONCE AS NEEDED
Status: DISCONTINUED | OUTPATIENT
Start: 2019-12-06 | End: 2019-12-07

## 2019-12-06 RX ORDER — FENTANYL CITRATE 50 UG/ML
INJECTION, SOLUTION INTRAMUSCULAR; INTRAVENOUS AS NEEDED
Status: DISCONTINUED | OUTPATIENT
Start: 2019-12-06 | End: 2019-12-06 | Stop reason: SURG

## 2019-12-06 RX ORDER — NALOXONE HCL 0.4 MG/ML
0.1 VIAL (ML) INJECTION
Status: DISCONTINUED | OUTPATIENT
Start: 2019-12-06 | End: 2019-12-08

## 2019-12-06 RX ORDER — HYDROMORPHONE HYDROCHLORIDE 1 MG/ML
INJECTION, SOLUTION INTRAMUSCULAR; INTRAVENOUS; SUBCUTANEOUS
Status: DISPENSED
Start: 2019-12-06 | End: 2019-12-07

## 2019-12-06 RX ORDER — DEXAMETHASONE SODIUM PHOSPHATE 10 MG/ML
INJECTION INTRAMUSCULAR; INTRAVENOUS AS NEEDED
Status: DISCONTINUED | OUTPATIENT
Start: 2019-12-06 | End: 2019-12-06 | Stop reason: SURG

## 2019-12-06 RX ORDER — ATORVASTATIN CALCIUM 20 MG/1
20 TABLET, FILM COATED ORAL NIGHTLY
Qty: 90 TABLET | Refills: 1 | Status: ON HOLD | OUTPATIENT
Start: 2019-12-06 | End: 2019-12-13

## 2019-12-06 RX ORDER — FLUMAZENIL 0.1 MG/ML
0.2 INJECTION INTRAVENOUS AS NEEDED
Status: DISCONTINUED | OUTPATIENT
Start: 2019-12-06 | End: 2019-12-07

## 2019-12-06 RX ORDER — SODIUM CHLORIDE, SODIUM LACTATE, POTASSIUM CHLORIDE, CALCIUM CHLORIDE 600; 310; 30; 20 MG/100ML; MG/100ML; MG/100ML; MG/100ML
30 INJECTION, SOLUTION INTRAVENOUS CONTINUOUS
Status: DISCONTINUED | OUTPATIENT
Start: 2019-12-06 | End: 2019-12-08

## 2019-12-06 RX ORDER — PROMETHAZINE HYDROCHLORIDE 25 MG/ML
12.5 INJECTION, SOLUTION INTRAMUSCULAR; INTRAVENOUS ONCE AS NEEDED
Status: DISCONTINUED | OUTPATIENT
Start: 2019-12-06 | End: 2019-12-07

## 2019-12-06 RX ORDER — FENTANYL CITRATE 50 UG/ML
50 INJECTION, SOLUTION INTRAMUSCULAR; INTRAVENOUS
Status: DISCONTINUED | OUTPATIENT
Start: 2019-12-06 | End: 2019-12-06 | Stop reason: HOSPADM

## 2019-12-06 RX ORDER — SODIUM CHLORIDE 0.9 % (FLUSH) 0.9 %
3 SYRINGE (ML) INJECTION EVERY 12 HOURS SCHEDULED
Status: DISCONTINUED | OUTPATIENT
Start: 2019-12-06 | End: 2019-12-06 | Stop reason: HOSPADM

## 2019-12-06 RX ORDER — FENTANYL CITRATE 50 UG/ML
50 INJECTION, SOLUTION INTRAMUSCULAR; INTRAVENOUS
Status: DISCONTINUED | OUTPATIENT
Start: 2019-12-06 | End: 2019-12-07

## 2019-12-06 RX ADMIN — LIDOCAINE HYDROCHLORIDE 100 MG: 20 INJECTION, SOLUTION INFILTRATION; PERINEURAL at 18:53

## 2019-12-06 RX ADMIN — SUGAMMADEX 157 MG: 100 INJECTION, SOLUTION INTRAVENOUS at 22:01

## 2019-12-06 RX ADMIN — EPHEDRINE SULFATE 5 MG: 50 INJECTION INTRAVENOUS at 20:52

## 2019-12-06 RX ADMIN — PROPOFOL 100 MG: 10 INJECTION, EMULSION INTRAVENOUS at 18:53

## 2019-12-06 RX ADMIN — MIDAZOLAM 2 MG: 1 INJECTION INTRAMUSCULAR; INTRAVENOUS at 16:36

## 2019-12-06 RX ADMIN — MIDAZOLAM 2 MG: 1 INJECTION INTRAMUSCULAR; INTRAVENOUS at 14:25

## 2019-12-06 RX ADMIN — METHOCARBAMOL 500 MG: 100 INJECTION, SOLUTION INTRAMUSCULAR; INTRAVENOUS at 22:51

## 2019-12-06 RX ADMIN — HYDROMORPHONE HYDROCHLORIDE: 10 INJECTION, SOLUTION INTRAMUSCULAR; INTRAVENOUS; SUBCUTANEOUS at 22:44

## 2019-12-06 RX ADMIN — DEXAMETHASONE SODIUM PHOSPHATE 10 MG: 10 INJECTION INTRAMUSCULAR; INTRAVENOUS at 19:03

## 2019-12-06 RX ADMIN — FENTANYL CITRATE 50 MCG: 50 INJECTION INTRAMUSCULAR; INTRAVENOUS at 21:26

## 2019-12-06 RX ADMIN — PROPOFOL 20 MG: 10 INJECTION, EMULSION INTRAVENOUS at 19:14

## 2019-12-06 RX ADMIN — FENTANYL CITRATE 50 MCG: 50 INJECTION INTRAMUSCULAR; INTRAVENOUS at 19:33

## 2019-12-06 RX ADMIN — SODIUM CHLORIDE 500 MG: 900 INJECTION, SOLUTION INTRAVENOUS at 16:55

## 2019-12-06 RX ADMIN — ROCURONIUM BROMIDE 10 MG: 10 INJECTION INTRAVENOUS at 20:57

## 2019-12-06 RX ADMIN — FAMOTIDINE 20 MG: 10 INJECTION INTRAVENOUS at 14:25

## 2019-12-06 RX ADMIN — DEXAMETHASONE SODIUM PHOSPHATE 10 MG: 10 INJECTION INTRAMUSCULAR; INTRAVENOUS at 20:35

## 2019-12-06 RX ADMIN — FENTANYL CITRATE 100 MCG: 50 INJECTION INTRAMUSCULAR; INTRAVENOUS at 18:47

## 2019-12-06 RX ADMIN — SODIUM CHLORIDE, POTASSIUM CHLORIDE, SODIUM LACTATE AND CALCIUM CHLORIDE 9 ML/HR: 600; 310; 30; 20 INJECTION, SOLUTION INTRAVENOUS at 14:24

## 2019-12-06 RX ADMIN — EPHEDRINE SULFATE 5 MG: 50 INJECTION INTRAVENOUS at 20:45

## 2019-12-06 RX ADMIN — ROCURONIUM BROMIDE 10 MG: 10 INJECTION INTRAVENOUS at 19:51

## 2019-12-06 RX ADMIN — SODIUM CHLORIDE, POTASSIUM CHLORIDE, SODIUM LACTATE AND CALCIUM CHLORIDE 75 ML/HR: 600; 310; 30; 20 INJECTION, SOLUTION INTRAVENOUS at 22:41

## 2019-12-06 RX ADMIN — ROCURONIUM BROMIDE 40 MG: 10 INJECTION INTRAVENOUS at 18:53

## 2019-12-06 NOTE — ANESTHESIA PREPROCEDURE EVALUATION
Anesthesia Evaluation     Patient summary reviewed and Nursing notes reviewed   NPO Solid Status: > 8 hours  NPO Liquid Status: > 8 hours           Airway   Mallampati: II  Neck ROM: full  No difficulty expected  Dental      Comment: Upper front tooth cracked, loose sensitive    Pulmonary     breath sounds clear to auscultation  Cardiovascular     Rhythm: regular    (+) hyperlipidemia,       Neuro/Psych  (+) headaches, numbness,     GI/Hepatic/Renal/Endo      Musculoskeletal     Abdominal    Substance History      OB/GYN          Other   arthritis,                      Anesthesia Plan    ASA 2     general   ([facility])  intravenous induction     Anesthetic plan, all risks, benefits, and alternatives have been provided, discussed and informed consent has been obtained with: patient.

## 2019-12-07 LAB
ANION GAP SERPL CALCULATED.3IONS-SCNC: 12.5 MMOL/L (ref 5–15)
BASOPHILS # BLD AUTO: 0.02 10*3/MM3 (ref 0–0.2)
BASOPHILS NFR BLD AUTO: 0.2 % (ref 0–1.5)
BUN BLD-MCNC: 10 MG/DL (ref 6–20)
BUN/CREAT SERPL: 18.5 (ref 7–25)
CALCIUM SPEC-SCNC: 8.1 MG/DL (ref 8.6–10.5)
CHLORIDE SERPL-SCNC: 99 MMOL/L (ref 98–107)
CO2 SERPL-SCNC: 22.5 MMOL/L (ref 22–29)
CREAT BLD-MCNC: 0.54 MG/DL (ref 0.57–1)
DEPRECATED RDW RBC AUTO: 45 FL (ref 37–54)
EOSINOPHIL # BLD AUTO: 0 10*3/MM3 (ref 0–0.4)
EOSINOPHIL NFR BLD AUTO: 0 % (ref 0.3–6.2)
ERYTHROCYTE [DISTWIDTH] IN BLOOD BY AUTOMATED COUNT: 13.3 % (ref 12.3–15.4)
GFR SERPL CREATININE-BSD FRML MDRD: 116 ML/MIN/1.73
GLUCOSE BLD-MCNC: 152 MG/DL (ref 65–99)
HCT VFR BLD AUTO: 31.9 % (ref 34–46.6)
HGB BLD-MCNC: 10.4 G/DL (ref 12–15.9)
IMM GRANULOCYTES # BLD AUTO: 0.07 10*3/MM3 (ref 0–0.05)
IMM GRANULOCYTES NFR BLD AUTO: 0.6 % (ref 0–0.5)
LYMPHOCYTES # BLD AUTO: 0.5 10*3/MM3 (ref 0.7–3.1)
LYMPHOCYTES NFR BLD AUTO: 4.4 % (ref 19.6–45.3)
MCH RBC QN AUTO: 30.1 PG (ref 26.6–33)
MCHC RBC AUTO-ENTMCNC: 32.6 G/DL (ref 31.5–35.7)
MCV RBC AUTO: 92.5 FL (ref 79–97)
MONOCYTES # BLD AUTO: 0.39 10*3/MM3 (ref 0.1–0.9)
MONOCYTES NFR BLD AUTO: 3.4 % (ref 5–12)
NEUTROPHILS # BLD AUTO: 10.51 10*3/MM3 (ref 1.7–7)
NEUTROPHILS NFR BLD AUTO: 91.4 % (ref 42.7–76)
NRBC BLD AUTO-RTO: 0 /100 WBC (ref 0–0.2)
PLATELET # BLD AUTO: 192 10*3/MM3 (ref 140–450)
PMV BLD AUTO: 8.3 FL (ref 6–12)
POTASSIUM BLD-SCNC: 4.6 MMOL/L (ref 3.5–5.2)
RBC # BLD AUTO: 3.45 10*6/MM3 (ref 3.77–5.28)
SODIUM BLD-SCNC: 134 MMOL/L (ref 136–145)
WBC NRBC COR # BLD: 11.49 10*3/MM3 (ref 3.4–10.8)

## 2019-12-07 PROCEDURE — 80048 BASIC METABOLIC PNL TOTAL CA: CPT | Performed by: NEUROLOGICAL SURGERY

## 2019-12-07 PROCEDURE — 25010000002 VANCOMYCIN 1 G RECONSTITUTED SOLUTION 1 EACH VIAL: Performed by: NEUROLOGICAL SURGERY

## 2019-12-07 PROCEDURE — 99024 POSTOP FOLLOW-UP VISIT: CPT | Performed by: NURSE PRACTITIONER

## 2019-12-07 PROCEDURE — 25010000003 HYDROCORTISONE SOD SUCCINATE PF 250 MG RECONSTITUTED SOLUTION: Performed by: NURSE PRACTITIONER

## 2019-12-07 PROCEDURE — 25010000002 ONDANSETRON PER 1 MG: Performed by: NEUROLOGICAL SURGERY

## 2019-12-07 PROCEDURE — 85025 COMPLETE CBC W/AUTO DIFF WBC: CPT | Performed by: NEUROLOGICAL SURGERY

## 2019-12-07 RX ORDER — FAMOTIDINE 10 MG/ML
20 INJECTION, SOLUTION INTRAVENOUS EVERY 12 HOURS SCHEDULED
Status: DISCONTINUED | OUTPATIENT
Start: 2019-12-07 | End: 2019-12-13 | Stop reason: HOSPADM

## 2019-12-07 RX ORDER — SENNA AND DOCUSATE SODIUM 50; 8.6 MG/1; MG/1
1 TABLET, FILM COATED ORAL NIGHTLY PRN
Status: DISCONTINUED | OUTPATIENT
Start: 2019-12-07 | End: 2019-12-13 | Stop reason: HOSPADM

## 2019-12-07 RX ORDER — HYDROCODONE BITARTRATE AND ACETAMINOPHEN 5; 325 MG/1; MG/1
1 TABLET ORAL EVERY 4 HOURS PRN
Status: DISCONTINUED | OUTPATIENT
Start: 2019-12-07 | End: 2019-12-13 | Stop reason: HOSPADM

## 2019-12-07 RX ORDER — GABAPENTIN 100 MG/1
100 CAPSULE ORAL 2 TIMES DAILY
Status: DISCONTINUED | OUTPATIENT
Start: 2019-12-07 | End: 2019-12-08

## 2019-12-07 RX ORDER — ONDANSETRON 4 MG/1
4 TABLET, FILM COATED ORAL EVERY 6 HOURS PRN
Status: DISCONTINUED | OUTPATIENT
Start: 2019-12-07 | End: 2019-12-13 | Stop reason: HOSPADM

## 2019-12-07 RX ORDER — ONDANSETRON 2 MG/ML
4 INJECTION INTRAMUSCULAR; INTRAVENOUS EVERY 6 HOURS PRN
Status: DISCONTINUED | OUTPATIENT
Start: 2019-12-07 | End: 2019-12-13 | Stop reason: HOSPADM

## 2019-12-07 RX ORDER — DOCUSATE SODIUM 100 MG/1
100 CAPSULE, LIQUID FILLED ORAL 2 TIMES DAILY PRN
Status: DISCONTINUED | OUTPATIENT
Start: 2019-12-07 | End: 2019-12-07

## 2019-12-07 RX ORDER — FLUTICASONE PROPIONATE 50 MCG
1 SPRAY, SUSPENSION (ML) NASAL NIGHTLY
Status: DISCONTINUED | OUTPATIENT
Start: 2019-12-07 | End: 2019-12-12

## 2019-12-07 RX ORDER — SODIUM CHLORIDE 0.9 % (FLUSH) 0.9 %
10 SYRINGE (ML) INJECTION AS NEEDED
Status: DISCONTINUED | OUTPATIENT
Start: 2019-12-07 | End: 2019-12-08

## 2019-12-07 RX ORDER — DOCUSATE SODIUM 100 MG/1
100 CAPSULE, LIQUID FILLED ORAL 2 TIMES DAILY
Status: DISCONTINUED | OUTPATIENT
Start: 2019-12-07 | End: 2019-12-13 | Stop reason: HOSPADM

## 2019-12-07 RX ORDER — ACETAMINOPHEN 325 MG/1
650 TABLET ORAL EVERY 4 HOURS PRN
Status: DISCONTINUED | OUTPATIENT
Start: 2019-12-07 | End: 2019-12-13 | Stop reason: HOSPADM

## 2019-12-07 RX ORDER — SODIUM CHLORIDE 0.9 % (FLUSH) 0.9 %
3 SYRINGE (ML) INJECTION EVERY 12 HOURS SCHEDULED
Status: DISCONTINUED | OUTPATIENT
Start: 2019-12-07 | End: 2019-12-13 | Stop reason: HOSPADM

## 2019-12-07 RX ORDER — ATORVASTATIN CALCIUM 20 MG/1
20 TABLET, FILM COATED ORAL NIGHTLY
Status: DISCONTINUED | OUTPATIENT
Start: 2019-12-07 | End: 2019-12-13 | Stop reason: HOSPADM

## 2019-12-07 RX ORDER — CYCLOBENZAPRINE HCL 10 MG
10 TABLET ORAL 3 TIMES DAILY PRN
Status: DISCONTINUED | OUTPATIENT
Start: 2019-12-07 | End: 2019-12-13 | Stop reason: HOSPADM

## 2019-12-07 RX ORDER — CETIRIZINE HYDROCHLORIDE 10 MG/1
10 TABLET ORAL DAILY
Status: DISCONTINUED | OUTPATIENT
Start: 2019-12-07 | End: 2019-12-12

## 2019-12-07 RX ADMIN — HYDROCODONE BITARTRATE AND ACETAMINOPHEN 1 TABLET: 5; 325 TABLET ORAL at 01:57

## 2019-12-07 RX ADMIN — VANCOMYCIN HYDROCHLORIDE 500 MG: 1 INJECTION, POWDER, LYOPHILIZED, FOR SOLUTION INTRAVENOUS at 04:35

## 2019-12-07 RX ADMIN — SODIUM CHLORIDE, PRESERVATIVE FREE 3 ML: 5 INJECTION INTRAVENOUS at 01:00

## 2019-12-07 RX ADMIN — GABAPENTIN 100 MG: 100 CAPSULE ORAL at 09:39

## 2019-12-07 RX ADMIN — VANCOMYCIN HYDROCHLORIDE 500 MG: 1 INJECTION, POWDER, LYOPHILIZED, FOR SOLUTION INTRAVENOUS at 17:07

## 2019-12-07 RX ADMIN — SODIUM CHLORIDE, PRESERVATIVE FREE 3 ML: 5 INJECTION INTRAVENOUS at 20:54

## 2019-12-07 RX ADMIN — SODIUM CHLORIDE, POTASSIUM CHLORIDE, SODIUM LACTATE AND CALCIUM CHLORIDE 75 ML/HR: 600; 310; 30; 20 INJECTION, SOLUTION INTRAVENOUS at 12:55

## 2019-12-07 RX ADMIN — DOCUSATE SODIUM 100 MG: 100 CAPSULE, LIQUID FILLED ORAL at 20:52

## 2019-12-07 RX ADMIN — ONDANSETRON 4 MG: 2 INJECTION INTRAMUSCULAR; INTRAVENOUS at 11:19

## 2019-12-07 RX ADMIN — HYDROCODONE BITARTRATE AND ACETAMINOPHEN 1 TABLET: 5; 325 TABLET ORAL at 20:51

## 2019-12-07 RX ADMIN — ATORVASTATIN CALCIUM 20 MG: 20 TABLET, FILM COATED ORAL at 20:52

## 2019-12-07 RX ADMIN — GABAPENTIN 100 MG: 100 CAPSULE ORAL at 20:52

## 2019-12-07 RX ADMIN — HYDROCODONE BITARTRATE AND ACETAMINOPHEN 1 TABLET: 5; 325 TABLET ORAL at 05:43

## 2019-12-07 RX ADMIN — ONDANSETRON 4 MG: 2 INJECTION INTRAMUSCULAR; INTRAVENOUS at 17:08

## 2019-12-07 RX ADMIN — HYDROCORTISONE SODIUM SUCCINATE 250 MG: 250 INJECTION, POWDER, FOR SOLUTION INTRAMUSCULAR; INTRAVENOUS at 20:50

## 2019-12-07 RX ADMIN — FAMOTIDINE 20 MG: 10 INJECTION INTRAVENOUS at 20:51

## 2019-12-07 RX ADMIN — HYDROCODONE BITARTRATE AND ACETAMINOPHEN 1 TABLET: 5; 325 TABLET ORAL at 10:18

## 2019-12-07 RX ADMIN — ATORVASTATIN CALCIUM 20 MG: 20 TABLET, FILM COATED ORAL at 01:57

## 2019-12-07 RX ADMIN — HYDROCODONE BITARTRATE AND ACETAMINOPHEN 1 TABLET: 5; 325 TABLET ORAL at 17:08

## 2019-12-07 NOTE — ANESTHESIA POSTPROCEDURE EVALUATION
Patient: Noemi LIU [place]    Procedure Summary     Date:  12/06/19 Room / Location:  [facility] OR  / [facility] MAIN OR    Anesthesia Start:  1843 Anesthesia Stop:  2233    Procedure:  CERVICAL 6, CERVICAL 7 ANTERIOR CERVICAL CORPECTOMY WITH CAGE AND PLATE (N/A ) Diagnosis:       Ossification of spinal ligament      Spinal stenosis in cervical region      Cervical spondylosis with myelopathy      (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])      (Spinal stenosis in cervical region [M48.02])      (Cervical spondylosis with myelopathy [M47.12])    Surgeon:  Antonio Noe MD Provider:  Abran Madrigal MD    Anesthesia Type:  general ASA Status:  2          Anesthesia Type: general  Last vitals  BP   98/56 (12/06/19 2315)   Temp   37 °C (98.6 °F) (12/06/19 2226)   Pulse   93 (12/06/19 2315)   Resp   16 (12/06/19 2315)     SpO2   100 % (12/06/19 2315)     Post Anesthesia Care and Evaluation    Patient location during evaluation: bedside  Patient participation: complete - patient participated  Level of consciousness: awake and alert  Pain management: adequate  Airway patency: patent  Anesthetic complications: No anesthetic complications    Cardiovascular status: acceptable  Respiratory status: acceptable  Hydration status: acceptable    Comments: BP 98/56 (BP Location: Left arm, Patient Position: Lying)   Pulse 93   Temp 37 °C (98.6 °F) (Oral)   Resp 16   Wt 39.3 kg (86 lb 10.3 oz)   SpO2 100%   BMI 21.69 kg/m²

## 2019-12-07 NOTE — PLAN OF CARE
Problem: Patient Care Overview  Goal: Plan of Care Review  Outcome: Ongoing (interventions implemented as appropriate)  Flowsheets (Taken 12/7/2019 0637)  Progress: no change  Plan of Care Reviewed With: patient; sibling  Note:   Pt is AOx4, VSS, No neuro changes. Pt came up to floor around 0030 after C6-C7 Anterior cervical corpectomy with cage and plate. Dressing CDI. HOB at 15 degrees, bedrest maintained. Soft collar applied. ALONDRA has only scant drainage in tubing. [place] in and draining. Prn pain meds given, Pt having trouble pushing PCA button d/t baseline weakness in her hands. Will cont to monitor.      Problem: Patient Care Overview  Goal: Individualization and Mutuality  Outcome: Ongoing (interventions implemented as appropriate)     Problem: Pain, Acute (Adult)  Goal: Identify Related Risk Factors and Signs and Symptoms  Outcome: Ongoing (interventions implemented as appropriate)     Problem: Pain, Acute (Adult)  Goal: Acceptable Pain Control/Comfort Level  Outcome: Ongoing (interventions implemented as appropriate)  Flowsheets (Taken 12/7/2019 0637)  Acceptable Pain Control/Comfort Level: making progress toward outcome

## 2019-12-07 NOTE — PROGRESS NOTES
"Postoperative visit      Complains of worsening pain in her forearms and hands.  Also having some forearm weakness and  weakness.  Swallowing okay.  Denies headache.  Tolerating bedrest okay.    Blood pressure 110/68, pulse 62, temperature 97.8 °F (36.6 °C), temperature source Oral, resp. rate 16, height 134.6 cm (52.99\"), weight 39.3 kg (86 lb 10.3 oz), SpO2 100 %, not currently breastfeeding.        Cervical collar in place.  Anterior cervical incision well approximated.  Dressing is dry and intact.  Biceps strength 4/5 left; 5/5 right; triceps strength 3 out of 5 bilaterally; wrist extension strength 2 out of 5 bilaterally.  Intrinsics 3 out of 5 bilaterally.   strength 3 out of 5 bilaterally.      Results from last 7 days   Lab Units 12/07/19  0551   WBC 10*3/mm3 11.49*   HEMOGLOBIN g/dL 10.4*   HEMATOCRIT % 31.9*   PLATELETS 10*3/mm3 192         POD 1 anterior cervical corpectomy C6, C7 with peek cage and cervical plate C5-T1 for C5-T1 cervical stenosis with ossification of the posterior longitudinal ligament and progressive cervical myelopathy.  CSF leak repair.  Postoperative cervical neuritis  Postoperative leukocytosis      Repeat CBC a.m.  Maintain bedrest with head of bed flat  Add steroids        "

## 2019-12-07 NOTE — BRIEF OP NOTE
ANTERIOR CHANNEL VERTEBRECTOMY/CORPECTOMY WITH INSTRUMENTATION  Progress Note    Noemi Bartholomew  12/6/2019    Pre-op Diagnosis:   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]  Spinal stenosis in cervical region [M48.02]  Cervical spondylosis with myelopathy [M47.12]       Post-Op Diagnosis Codes:     * 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]     * Spinal stenosis in cervical region [M48.02]     * Cervical spondylosis with myelopathy [M47.12]    Procedure/CPT® Codes:      Procedure(s):  CERVICAL 6, CERVICAL 7 ANTERIOR CERVICAL CORPECTOMY WITH CAGE AND ZEVO PLATE    Surgeon(s):  Antonio Noe MD    Anesthesia: General    Staff:   Cell Saver : Jacob Martin  Circulator: Irina Mcgregor RN; Shannon Beltran RN  Scrub Person: Essie Negro; Heather Watson; Nathalie Vincent; Bettye Neal RN  Assistant: Cheli Bran CSA    Estimated Blood Loss: 150 mL    Urine Voided: NONE    Specimens:              NONE          Drains:   Closed/Suction Drain Midline Neck Bulb 10 Fr. (Active)       Findings: severe cord compression, dura adherent to PLL    Complications: CSF leak, sealed with DuraGen and Tisseel glue      Antonio Noe MD     Date: 12/6/2019  Time: 10:02 PM       #chest pain with recent cocaine use  #abnormal EKG  -chest pain likely from GERD/acid reflux  -cont PPI  -ASA, statin  -EKG noted  -cardio consult, Dr Girard  -pain control, may be related to cancer spreading?    #Delirium/agitation  -Possible hospital acquired  -Psych consulted- no capacity at this time, but improved today  -Tried to reach daughter/significant other from numbers in EMR, unsuccessful at this time    #hypotension  -SBP 80s-90s, pt alert, oriented, awake- better now- 90s-100s  -pt reports SBP ~105 baseline  -will monitor BP    #aortic aneurysm  -per CT report, stable    #prostate cancer on chemo  -pt reports getting chemo at [facility]. He reports that they didn't tell him anything  -CT noted with peritoneal carcinomatosis and new hepatic lobe lesion  -oncology consult, Dr Youssef    #advanced care planning  -discussed with patient regarding advanced directives/code status. Pt reports "I've seen people get chest compressions. I want to be DNR". Pt reports he doesn't want to be intubated either.  -pt is DNR/DNI    DVT ppx  -Heparin SC

## 2019-12-07 NOTE — OP NOTE
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) and progresive myelopathy    Postoperative diagnosis: Same as above    Procedures performed: Anterior cervical corpectomy C6, C7 with PEEK cage and anterior Zevo cervical plate from C5 to T1    Surgeon: Hasmukh    First Assistant: Cheli Bran  (She greatly assisted in the exposure, visualization of neural structures, control of bleeding, retraction, and closure of the incision.)    Anesthesia: GET    EBL: 100 cc    Complications: CSF leak, sealed with DuraGen and Tissell glue    Specimen sent: none    Drains: 10 mm round ALONDRA drain    Findings: severe cord compression, dura adherent to PLL    Postoperative condition: good    Indications for the operation: The patient is a 59-year-old female whom I have been following for lumbar problems. Over the last few months she has developed new problems involving her balance, hand function and strength. She was seen in our office and found to have severe cord compression particularly at C6-C7 from a combination of cervical disc disease as well as ossification of the posterior longitudinal ligament worse at C6-C7 with cord signal changes. Because of progressive myelopathy and her downhill course, she was brought to the operating room for a corpectomy.         Informed consent: she understood that the goal of surgery is arrest of a progressive myelopathy, hopefully return of spinal cord function, and reduction in overall neck and arm pain, if present. The risks include, but are not limited to, infection, hemorrhage requiring transfusion or reoperation, CSF leak requiring reoperation, incomplete relief of symptoms, difficulty swallowing, hoarseness of voice, hardware problems requiring revision surgery, potential need for additional surgery in the future, stroke, paralysis, coma, and death. The patient agrees to proceed.       Details of the operation: The  patient was taken to the operating room and placed on the operating table in the supine position. After induction of endotracheal intubation, she got 500 mg of vancomycin. No Lizarraga was placed initially. SCDs were placed. She was put in extension with a shoulder roll and the shoulders were taped inferiorly. Carey-Wells tongs were placed. The anterior surface of the neck was then prepped and draped in the usual sterile fashion. We did a surgical timeout. I made a transverse incision at the level of the C6-C7 with a #10 skin knife. Hemostasis was obtained with monopolar cautery. Dissection was taken all the way down to the platysmas and divided in a transverse fashion and undermine with Metzenbaum scissors. The omohyoid was identified and retracted medially. The sternocleidomastoid was identified and retracted laterally. The pretracheal and prevertebral fascia were opened up sharply. The anterior surface of the spine was palpated. I got a picture with 3 needles and 3 disc spaces by C-arm to be at C5-C6, C6-C7 and C7-T1. I mobilized the longus coli and marked the disc spaces and put self-retaining retractors medially, laterally, superiorly and inferiorly. The microscope was draped and brought into the field. Its use was essential to the performance of microneurosurgical technique. Starting with the C6-C7 and using the electric Chattaroy drill using the 3 mm matchstick, I did a generous discectomy at C6-C7 from uncovertebral joint to uncovertebral joint all the way down to the posterior longitudinal ligament. I did the same thing at C5-C6 and C7-T1. I then used Leksell rongeur and harvested vertebral body autograft at C6 and C7. I continued with use of the microscope and  magnification. With a 3 mmm matchstick. I did a near complete 100% corpectomy at C6 and C7. I drove all the way down to the posterior longitudinal ligament. The ligament was very thickened and adherent to the dura. When I finally  it, it  created a durotomy which was fairly significant because the dura was struck off in the process of removing the bone. There was obvious CSF that was seen, but I continued on decompressing the cord at its greatest degree of compression at C6-C7, but also taking out the C5-C6 and C7-T1 disc. There was calcified ligament that was removed as well as thickened bone, but the cord, which was compressing more on the left compared to the right was totally unroofed and much more pulsatile. Copious irrigation was used and I sealed the dural defect with duragen and Tisseel glue. I measured out for a 3 mm cervical cage consisting of an 8 x 14 x 11 mm dimension. I packed it with autograft and demineralized bone matrix and impacted into place as anesthesia provided axial traction. I then used a 45 mm Zebo plate and secured it in place with 4 x 15 mm screws, 2 at C5 and 2 at T1 angled in upwards with excellent purchase. I tightened up the central locking screws and got pictures and the PA and lateral view with C-arm which showed good position of the cage, plate and screws. Hemostasis was obtained. I added more Tisseel glue and Floseal and placed a 10 mm round Dontae-Ricardo drain in the pretracheal space and exited through a separate stab incision and secured to the skin with 2-0 silk. The subcutaneous layer was closed with 3-0 interrupted Vicryl suture and the skin was closed with running 4-0 Vicryl subcuticular. Steri-Strips and a clean dry dressing was placed. She was put in a hard collar and extubated and took her to recovery in satisfactory condition. Because of the CSF leak, we added a Lizarraga catheter prior to extubating her. She will need to stay flat for a few days.

## 2019-12-07 NOTE — ANESTHESIA PROCEDURE NOTES
Airway  Urgency: elective    Date/Time: 12/6/2019 7:08 PM  Difficult airway    General Information and Staff    Patient location during procedure: OR  Anesthesiologist: Adrian Graham MD  CRNA: Nicky Bal CRNA    Indications and Patient Condition  Indications for airway management: airway protection    Preoxygenated: yes  MILS not maintained throughout  Mask difficulty assessment: 1 - vent by mask    Final Airway Details  Final airway type: endotracheal airway      Successful airway: ETT  Cuffed: yes   Successful intubation technique: flexible bronchoscopy and fiberoptic  Facilitating devices/methods: Bougie and intubating stylet  Endotracheal tube insertion site: oral  ETT size (mm): 6.5  Cormack-Lehane Classification: grade IV - neither glottis nor epiglottis seen  Placement verified by: chest auscultation and capnometry   Cuff volume (mL): 6  Measured from: lips  ETT/EBT  to lips (cm): 22  Number of attempts at approach: 3 or more  Assessment: lips, teeth, and gum same as pre-op and atraumatic intubation    Additional Comments  Pt preoxygenated prior to induction, easy mask airway,DL x 1 with Mac 3, Vincent 2, CMAC D & 3, fiberoptic.  Grade IV.   Fiberoptic Atraumatic intubation,+ ETCO2, + bs bilat,  ETT secured and connected to ventilator.    Direct and video laryngoscopy were challenging given small oropharynx and limited mouth opening. For future intubations, I would recommend fiberoptic intubation.

## 2019-12-08 ENCOUNTER — APPOINTMENT (OUTPATIENT)
Dept: MRI IMAGING | Facility: HOSPITAL | Age: 59
End: 2019-12-08

## 2019-12-08 LAB
DEPRECATED RDW RBC AUTO: 43.6 FL (ref 37–54)
ERYTHROCYTE [DISTWIDTH] IN BLOOD BY AUTOMATED COUNT: 13.3 % (ref 12.3–15.4)
HCT VFR BLD AUTO: 29.6 % (ref 34–46.6)
HGB BLD-MCNC: 10.2 G/DL (ref 12–15.9)
MCH RBC QN AUTO: 31.3 PG (ref 26.6–33)
MCHC RBC AUTO-ENTMCNC: 34.5 G/DL (ref 31.5–35.7)
MCV RBC AUTO: 90.8 FL (ref 79–97)
PLATELET # BLD AUTO: 184 10*3/MM3 (ref 140–450)
PMV BLD AUTO: 9.1 FL (ref 6–12)
RBC # BLD AUTO: 3.26 10*6/MM3 (ref 3.77–5.28)
WBC NRBC COR # BLD: 14.76 10*3/MM3 (ref 3.4–10.8)

## 2019-12-08 PROCEDURE — 85027 COMPLETE CBC AUTOMATED: CPT | Performed by: NURSE PRACTITIONER

## 2019-12-08 PROCEDURE — 25010000003 HYDROCORTISONE SOD SUCCINATE PF 250 MG RECONSTITUTED SOLUTION: Performed by: NURSE PRACTITIONER

## 2019-12-08 PROCEDURE — 72141 MRI NECK SPINE W/O DYE: CPT

## 2019-12-08 PROCEDURE — 94799 UNLISTED PULMONARY SVC/PX: CPT

## 2019-12-08 PROCEDURE — 99024 POSTOP FOLLOW-UP VISIT: CPT | Performed by: NURSE PRACTITIONER

## 2019-12-08 RX ORDER — GABAPENTIN 100 MG/1
100 CAPSULE ORAL EVERY 8 HOURS SCHEDULED
Status: DISCONTINUED | OUTPATIENT
Start: 2019-12-08 | End: 2019-12-13 | Stop reason: HOSPADM

## 2019-12-08 RX ADMIN — HYDROCODONE BITARTRATE AND ACETAMINOPHEN 1 TABLET: 5; 325 TABLET ORAL at 22:41

## 2019-12-08 RX ADMIN — SODIUM CHLORIDE, PRESERVATIVE FREE 3 ML: 5 INJECTION INTRAVENOUS at 21:14

## 2019-12-08 RX ADMIN — FAMOTIDINE 20 MG: 10 INJECTION INTRAVENOUS at 09:48

## 2019-12-08 RX ADMIN — DOCUSATE SODIUM 100 MG: 100 CAPSULE, LIQUID FILLED ORAL at 21:14

## 2019-12-08 RX ADMIN — HYDROCORTISONE SODIUM SUCCINATE 250 MG: 250 INJECTION, POWDER, FOR SOLUTION INTRAMUSCULAR; INTRAVENOUS at 09:48

## 2019-12-08 RX ADMIN — HYDROCODONE BITARTRATE AND ACETAMINOPHEN 1 TABLET: 5; 325 TABLET ORAL at 18:22

## 2019-12-08 RX ADMIN — HYDROCORTISONE SODIUM SUCCINATE 250 MG: 250 INJECTION, POWDER, FOR SOLUTION INTRAMUSCULAR; INTRAVENOUS at 01:12

## 2019-12-08 RX ADMIN — GABAPENTIN 100 MG: 100 CAPSULE ORAL at 09:48

## 2019-12-08 RX ADMIN — HYDROCODONE BITARTRATE AND ACETAMINOPHEN 1 TABLET: 5; 325 TABLET ORAL at 04:49

## 2019-12-08 RX ADMIN — HYDROCODONE BITARTRATE AND ACETAMINOPHEN 1 TABLET: 5; 325 TABLET ORAL at 01:12

## 2019-12-08 RX ADMIN — HYDROCODONE BITARTRATE AND ACETAMINOPHEN 1 TABLET: 5; 325 TABLET ORAL at 12:30

## 2019-12-08 RX ADMIN — SODIUM CHLORIDE, PRESERVATIVE FREE 3 ML: 5 INJECTION INTRAVENOUS at 09:48

## 2019-12-08 RX ADMIN — ATORVASTATIN CALCIUM 20 MG: 20 TABLET, FILM COATED ORAL at 21:13

## 2019-12-08 RX ADMIN — HYDROCORTISONE SODIUM SUCCINATE 250 MG: 250 INJECTION, POWDER, FOR SOLUTION INTRAMUSCULAR; INTRAVENOUS at 18:16

## 2019-12-08 RX ADMIN — GABAPENTIN 100 MG: 100 CAPSULE ORAL at 21:14

## 2019-12-08 RX ADMIN — FAMOTIDINE 20 MG: 10 INJECTION INTRAVENOUS at 21:14

## 2019-12-08 NOTE — PROGRESS NOTES
"Postoperative visit      Hands and forearms are feeling better with the steroids.  She is able to rest her hands on the pillows beside her body and appears more at ease.  She still has weakness in her forearms and hands.  Unable to feed herself.      Blood pressure 90/59, pulse 67, temperature 97.9 °F (36.6 °C), temperature source Oral, resp. rate 16, height 134.6 cm (52.99\"), weight 39.3 kg (86 lb 10.3 oz), SpO2 95 %, not currently breastfeeding.        .  Results from last 7 days   Lab Units 12/08/19  0506 12/07/19  0551 12/02/19  1601   WBC 10*3/mm3 14.76* 11.49* 7.12   HEMOGLOBIN g/dL 10.2* 10.4* 13.3   HEMATOCRIT % 29.6* 31.9* 40.4   PLATELETS 10*3/mm3 184 192 284       .  Results from last 7 days   Lab Units 12/07/19  0551 12/02/19  1600   SODIUM mmol/L 134* 140   POTASSIUM mmol/L 4.6 4.0   CHLORIDE mmol/L 99 99   CO2 mmol/L 22.5 31.0*   BUN mg/dL 10 14   CREATININE mg/dL 0.54* 0.68   GLUCOSE mg/dL 152* 108*   CALCIUM mg/dL 8.1* 9.6         POD 2 anterior cervical corpectomy C6, C7 with cage and plate C5-T1 for C5-T1 cervical stenosis and ossification of the posterior longitudinal ligament; progressive cervical myelopathy. CSF leak repair  Postoperative cervical neuritis; forearm/hand weakness; unable to perform ADLs, self-care  Postoperative leukocytosis slightly worse; secondary to steroids  Hypotension, hyponatremia, hyperglycemia (secondary to steroids)      Consult hospitalist for management of above medical issues  Consult OT for assessment of hand strength at bedside today  Continue bedrest; keep Lizarraga until tomorrow  Keep drain until tomorrow after patient mobilizes.  Continue IV steroids; switch to oral taper tomorrow if pain persists; increase gabapentin  PT/OT/rehab consults after patient able to mobilize  CBC, BMP in a.m.  Not able to use PCA; will DC      ADDENDUM:    Spoke to Dr. Castillo regarding the continued weakness in the arms and hands.  Will order MRI cervical spine without contrast to " evaluate for compression.

## 2019-12-08 NOTE — PLAN OF CARE
Problem: Patient Care Overview  Goal: Plan of Care Review  Outcome: Ongoing (interventions implemented as appropriate)  Flowsheets (Taken 12/8/2019 0518)  Progress: improving  Plan of Care Reviewed With: patient; family  Outcome Summary: AOx4, VSS, pain well controlled with prn oral medicine. Pt has not been using PCA pump. ALONDRA drain has some drainage in tubing. Small amount of drainage on dressing. No c/o headache. Soft collar applied, HOB at 15, bedrest maintained. Will cont to monitor.

## 2019-12-09 PROBLEM — T38.0X5A STEROID-INDUCED HYPERGLYCEMIA: Status: ACTIVE | Noted: 2019-12-09

## 2019-12-09 PROBLEM — R73.9 STEROID-INDUCED HYPERGLYCEMIA: Status: ACTIVE | Noted: 2019-12-09

## 2019-12-09 PROBLEM — D62 POSTOPERATIVE ANEMIA DUE TO ACUTE BLOOD LOSS: Status: ACTIVE | Noted: 2019-12-09

## 2019-12-09 PROBLEM — G95.20 CORD COMPRESSION: Status: ACTIVE | Noted: 2019-12-09

## 2019-12-09 PROBLEM — D72.829 LEUKOCYTOSIS: Status: ACTIVE | Noted: 2019-12-09

## 2019-12-09 LAB
ANION GAP SERPL CALCULATED.3IONS-SCNC: 9.7 MMOL/L (ref 5–15)
BUN BLD-MCNC: 9 MG/DL (ref 6–20)
BUN/CREAT SERPL: 19.1 (ref 7–25)
CALCIUM SPEC-SCNC: 7.9 MG/DL (ref 8.6–10.5)
CHLORIDE SERPL-SCNC: 106 MMOL/L (ref 98–107)
CO2 SERPL-SCNC: 26.3 MMOL/L (ref 22–29)
CREAT BLD-MCNC: 0.47 MG/DL (ref 0.57–1)
DEPRECATED RDW RBC AUTO: 43.6 FL (ref 37–54)
ERYTHROCYTE [DISTWIDTH] IN BLOOD BY AUTOMATED COUNT: 13 % (ref 12.3–15.4)
GFR SERPL CREATININE-BSD FRML MDRD: 136 ML/MIN/1.73
GLUCOSE BLD-MCNC: 141 MG/DL (ref 65–99)
HCT VFR BLD AUTO: 26.9 % (ref 34–46.6)
HGB BLD-MCNC: 8.7 G/DL (ref 12–15.9)
MCH RBC QN AUTO: 30 PG (ref 26.6–33)
MCHC RBC AUTO-ENTMCNC: 32.3 G/DL (ref 31.5–35.7)
MCV RBC AUTO: 92.8 FL (ref 79–97)
PLATELET # BLD AUTO: 177 10*3/MM3 (ref 140–450)
PMV BLD AUTO: 9.3 FL (ref 6–12)
POTASSIUM BLD-SCNC: 3.6 MMOL/L (ref 3.5–5.2)
RBC # BLD AUTO: 2.9 10*6/MM3 (ref 3.77–5.28)
SODIUM BLD-SCNC: 142 MMOL/L (ref 136–145)
WBC NRBC COR # BLD: 12.98 10*3/MM3 (ref 3.4–10.8)

## 2019-12-09 PROCEDURE — 97162 PT EVAL MOD COMPLEX 30 MIN: CPT

## 2019-12-09 PROCEDURE — 99024 POSTOP FOLLOW-UP VISIT: CPT | Performed by: PHYSICIAN ASSISTANT

## 2019-12-09 PROCEDURE — 85027 COMPLETE CBC AUTOMATED: CPT | Performed by: NURSE PRACTITIONER

## 2019-12-09 PROCEDURE — 25010000003 HYDROCORTISONE SOD SUCCINATE PF 250 MG RECONSTITUTED SOLUTION: Performed by: PHYSICIAN ASSISTANT

## 2019-12-09 PROCEDURE — 80048 BASIC METABOLIC PNL TOTAL CA: CPT | Performed by: NURSE PRACTITIONER

## 2019-12-09 PROCEDURE — 97110 THERAPEUTIC EXERCISES: CPT

## 2019-12-09 RX ORDER — POLYETHYLENE GLYCOL 3350 17 G/17G
17 POWDER, FOR SOLUTION ORAL DAILY PRN
Status: DISCONTINUED | OUTPATIENT
Start: 2019-12-09 | End: 2019-12-13 | Stop reason: HOSPADM

## 2019-12-09 RX ADMIN — ONDANSETRON HYDROCHLORIDE 4 MG: 4 TABLET, FILM COATED ORAL at 09:42

## 2019-12-09 RX ADMIN — DOCUSATE SODIUM 100 MG: 100 CAPSULE, LIQUID FILLED ORAL at 09:42

## 2019-12-09 RX ADMIN — HYDROCODONE BITARTRATE AND ACETAMINOPHEN 1 TABLET: 5; 325 TABLET ORAL at 02:37

## 2019-12-09 RX ADMIN — HYDROCORTISONE SODIUM SUCCINATE 250 MG: 250 INJECTION, POWDER, FOR SOLUTION INTRAMUSCULAR; INTRAVENOUS at 19:58

## 2019-12-09 RX ADMIN — HYDROCODONE BITARTRATE AND ACETAMINOPHEN 1 TABLET: 5; 325 TABLET ORAL at 19:58

## 2019-12-09 RX ADMIN — GABAPENTIN 100 MG: 100 CAPSULE ORAL at 21:22

## 2019-12-09 RX ADMIN — HYDROCODONE BITARTRATE AND ACETAMINOPHEN 1 TABLET: 5; 325 TABLET ORAL at 06:37

## 2019-12-09 RX ADMIN — SODIUM CHLORIDE, PRESERVATIVE FREE 3 ML: 5 INJECTION INTRAVENOUS at 21:22

## 2019-12-09 RX ADMIN — FAMOTIDINE 20 MG: 10 INJECTION INTRAVENOUS at 21:00

## 2019-12-09 RX ADMIN — HYDROCORTISONE SODIUM SUCCINATE 250 MG: 250 INJECTION, POWDER, FOR SOLUTION INTRAMUSCULAR; INTRAVENOUS at 13:04

## 2019-12-09 RX ADMIN — FAMOTIDINE 20 MG: 10 INJECTION INTRAVENOUS at 09:42

## 2019-12-09 RX ADMIN — DOCUSATE SODIUM 100 MG: 100 CAPSULE, LIQUID FILLED ORAL at 21:22

## 2019-12-09 RX ADMIN — GABAPENTIN 100 MG: 100 CAPSULE ORAL at 06:36

## 2019-12-09 RX ADMIN — GABAPENTIN 100 MG: 100 CAPSULE ORAL at 13:04

## 2019-12-09 RX ADMIN — FLUTICASONE PROPIONATE 1 SPRAY: 50 SPRAY, METERED NASAL at 21:22

## 2019-12-09 RX ADMIN — ATORVASTATIN CALCIUM 20 MG: 20 TABLET, FILM COATED ORAL at 21:22

## 2019-12-09 RX ADMIN — SODIUM CHLORIDE, PRESERVATIVE FREE 3 ML: 5 INJECTION INTRAVENOUS at 09:43

## 2019-12-09 NOTE — CONSULTS
Patient Name:  Noemi Bartholomew  YOB: 1960  MRN:  [id number]  Date of Admission:  12/6/2019  Date of Consult:  12/9/2019  Patient Care Team:  Mary Lou Carroll MD as PCP - General (Internal Medicine)    Inpatient Internal Medicine Consult  Consult performed by: Deejay Cottrell MD  Consult ordered by: Audrey Funez APRN  Reason for consult: Evaluate status and make recommendations regarding treatment for hyperglycemia and hypotension.        Subjective   History of Present Illness  Ms. Bartholomew is a 59 y.o. female that has been admitted to [facility] following elective CERVICAL 6, CERVICAL 7 ANTERIOR CERVICAL CORPECTOMY WITH CAGE AND PLATE.  She has been admitted to an orthopedic floor following surgery and we were asked to see and assist with her medical problems, specifically relating to her blood pressure which has been low and blood sugar which has been high.  At the time of my visit she denies any chest pain, SOA, vomiting or diarrhea.  She has tolerated a diet.  She does complain of expected postoperative discomfort.  She reports being in a normal state of health leading up to surgery.  She reports discomfort and tingling in both of her upper extremities.  Mild nausea.  No history of diabetes or elevated blood sugar.  She does not take BP medication.      Past Medical History:   Diagnosis Date   • Abnormal alkaline phosphatase test 06/12/2015    Resolved   • Acute bronchitis 06/12/2015    Resolved   • Allergic child age    pencillin   • Cervical spinal stenosis    • Diverticulosis    • H/O electromyography 10/13/2014   • H/O mammogram 07/29/2014   • Headache occassionally   • High cholesterol    • History of EKG 06/12/2015   • Hypercalcemia 06/12/2015    Resolved, Full w/u done, Likely secondary to Forteo   • Left arm pain    • Low back pain March 2017   • Numbness in both hands    • Osteoporosis    • Paresthesia 06/12/2015    Resolved     Past Surgical History:   Procedure  Laterality Date   • BACK SURGERY  1999   • COLONOSCOPY  05/20/2011   • SPINE SURGERY  1999 back    dr abad quintero   • TONSILLECTOMY  child age   • TYMPANOPLASTY       Family History   Problem Relation Age of Onset   • Breast cancer Mother    • Stroke Mother         Cerebrovascular Accident   • Colon cancer Mother    • Diabetes Father         Borderline Diabetes Mellitus   • Heart disease Father         Cardiac Disorder   • Heart failure Father    • Anxiety disorder Sister    • Nephrolithiasis Sister         Kidney Stones   • Malig Hyperthermia Neg Hx      Social History     Tobacco Use   • Smoking status: Never Smoker   • Smokeless tobacco: Never Used   Substance Use Topics   • Alcohol use: No   • Drug use: No     Medications Prior to Admission   Medication Sig Dispense Refill Last Dose   • acetaminophen (TYLENOL) 500 MG tablet Take 500 mg by mouth Every 6 (Six) Hours As Needed for Mild Pain .   Past Week at Unknown time   • atorvastatin (LIPITOR) 20 MG tablet Take 1 tablet by mouth Every Night. 90 tablet 1 12/5/2019 at 2200   • Cholecalciferol (VITAMIN D3) 2000 units tablet Take 2,000 Units by mouth Daily. 30 tablet  12/5/2019 at 1200   • gabapentin (NEURONTIN) 100 MG capsule Take 1 capsule by mouth 2 (Two) Times a Day. 60 capsule 3 12/6/2019 at 0900   • Tretinoin Microsphere (RETIN-A MICRO PUMP) 0.06 % gel Apply  topically As Needed.   Patient Taking Differently at Unknown time   • Calcium-Magnesium-Vitamin D ER (CITRACAL SLOW RELEASE) 600- MG-MG-UNIT tablet sustained-release 24 hour Take 2 tablets daily (Patient taking differently: Take 2 tablets daily/HOLD BEFORE SURGERY)   12/2/2019   • cetirizine (zyrTEC) 10 MG tablet Take 10 mg by mouth Daily.   More than a month at Unknown time   • docusate sodium (COLACE) 100 MG capsule Take 1 capsule by mouth once a week.   12/1/2019   • fluticasone (FLONASE) 50 MCG/ACT nasal spray 1 spray into the nostril(s) as directed by provider Every Night.   More than a  month at Unknown time   • melatonin tablet Take 1 tablet by mouth At Night As Needed.   More than a month at Unknown time     Allergies:  Penicillins and Topamax [topiramate]    Review of Systems   HENT: Negative.    Respiratory: Negative.    Cardiovascular: Negative.    Gastrointestinal: Negative.    Endocrine: Negative.    Genitourinary: Negative.    Musculoskeletal: Negative.    Skin: Negative.    Neurological: Positive for weakness and numbness.   Hematological: Negative.    Psychiatric/Behavioral: Negative.        Objective      Vital Signs  Temp:  [97.2 °F (36.2 °C)-98.3 °F (36.8 °C)] 97.2 °F (36.2 °C)  Heart Rate:  [63-65] 65  Resp:  [16-18] 18  BP: ()/(52-66) 104/66  Body mass index is 21.69 kg/m².    Physical Exam   Constitutional: She is oriented to person, place, and time. She appears well-developed and well-nourished. No distress.   HENT:   Head: Normocephalic and atraumatic.   Eyes: Conjunctivae and EOM are normal. No scleral icterus.   Neck:   Anterior surgical wound dressed   Cardiovascular: Normal rate and regular rhythm.   Pulmonary/Chest: Effort normal and breath sounds normal.   Abdominal: Soft. Bowel sounds are normal. She exhibits no distension. There is no tenderness.   Musculoskeletal: Normal range of motion. She exhibits no edema.   Neurological: She is alert and oriented to person, place, and time.   Skin: Skin is warm and dry.   Psychiatric: She has a normal mood and affect. Her behavior is normal.   Nursing note and vitals reviewed.      Results Review:   I reviewed the patient's new clinical results.  I reviewed the patient's new imaging results and agree with the interpretation.  I reviewed the patient's other test results and agree with the interpretation  I personally viewed and interpreted the patient's EKG/Telemetry data         Assessment/Plan     Active Hospital Problems    Diagnosis POA   • **Spinal stenosis in cervical region [M48.02] Yes     Added automatically from  request for surgery [id number]     • Postoperative anemia due to acute blood loss [D62] Yes   • Steroid-induced hyperglycemia [R73.9, T38.0X5A] No   • Cord compression (CMS/HCC) [G95.20] Unknown   • Leukocytosis (due to steroids) [D72.829] No   • Cervical stenosis of spine [M48.02] Yes   • 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] Yes     Added automatically from request for surgery [id number]     • Cervical spondylosis with myelopathy [M47.12] Yes     Added automatically from request for surgery [id number]     • Hyperlipidemia [E78.5] Yes       Ms. Bartholomew is a 59 y.o. female who is s/p CERVICAL 6, CERVICAL 7 ANTERIOR CERVICAL CORPECTOMY WITH CAGE AND PLATE.    · Discussed with ONEYDA.  Steroids to be continued at least additional 24 hours.  At this point only mild elevations in blood glucose that should not require any insulin coverage.  Continue to monitor with repeat BMP in a.m.  · BP slightly low which is likely chronic as she is asymptomatic.  · She does have significant postoperative blood loss anemia with hemoglobin prior to surgery 13.3 down to 8.7 today.  Defer further management and monitoring of this to primary.  · Suspect elevated WBC related to steroid use.  No signs of infection.  · Continue home dose atorvastatin for hyperglycemia.  · SCDs have been ordered for DVT prophylaxis per surgery.      Thank you very much for asking [facility] to be involved in this patient's care. We will follow along with you.      Deejay Cottrell MD  [facility]  12/09/19  9:08 AM

## 2019-12-09 NOTE — PROGRESS NOTES
"Doing ok. Steroids helping.  Concerned with residual N/T in hands as well as bilateral hand/arm weakness. This is all stable compared to before surgery per the patient.  I reassured her that none of the N/T or weakness was expected to improve immediately and will take quite a while.  She is swallowing well.  Pain well controlled.     Had a HA this am but it is NOT positional    Both Dr. Castillo and Dr. Noe have reviewed the MRI.  It does show new cord edema which is not unexpected given the amount of manipulation required to address the OPLL/stenosis.  The fluid is also expected given known CSF leak (calcified ligament had adhered to the dura).  No unexpected findings that warrant any additional intervention.       Blood pressure 118/72, pulse 74, temperature 98 °F (36.7 °C), temperature source Oral, resp. rate 17, height 134.6 cm (52.99\"), weight 39.3 kg (86 lb 10.3 oz), SpO2 96 %, not currently breastfeeding.      AA, Ox3  Neck soft and flat  ALONDRA:  Empty-RN asked to remove  MONEA well.  Has hand>bilateral tricep weakness, all stable per pt and family        ..  Results from last 7 days   Lab Units 12/09/19  0515   WBC 10*3/mm3 12.98*   HEMOGLOBIN g/dL 8.7*   HEMATOCRIT % 26.9*   PLATELETS 10*3/mm3 177       ..  Results from last 7 days   Lab Units 12/09/19  0515   SODIUM mmol/L 142   POTASSIUM mmol/L 3.6   CHLORIDE mmol/L 106   CO2 mmol/L 26.3   BUN mg/dL 9   CREATININE mg/dL 0.47*   GLUCOSE mg/dL 141*   CALCIUM mg/dL 7.9*       POD#3 s/p ACCF at C6, C7 with cage and plate from C5-T1 for stenosis secondary to OPLL/myelopathy  CSF leak-kept flat till today  tricep and hand weakness-stable compared to preop      Begin to mobilize. Asked RN to sit her up and if she tolerates that then mobilize  PT/OT/rehab eval  D/c drain  CBC in am  Cont with steroids for 24 hrs, then switch to MDP tomorrow    "

## 2019-12-09 NOTE — PLAN OF CARE
Problem: Patient Care Overview  Goal: Plan of Care Review  Flowsheets (Taken 12/9/2019 1122)  Outcome Summary: Pt agreeable to PT eval this am. SHe is s/p C6-C7 anterior cervical corpectomy with cage and plate. She is now off of bedrest after having a CSF leak. Pt with minimal complaints of pain. She does continue to report BUE weakness. Today, pt able to sit EOB with mod A; however, complained of increased dizziness with sitting and then nausea.  Therefore, pt unable to attempt standing or ambulation at this time. Unsure of DC plans. Pt may need rehab pending progress with therapy.  She will contiue to benefit from skilled PT to maximize safety and independence with mobility.

## 2019-12-09 NOTE — PLAN OF CARE
Problem: Patient Care Overview  Goal: Plan of Care Review  Outcome: Ongoing (interventions implemented as appropriate)  Flowsheets (Taken 12/9/2019 0780)  Progress: improving  Plan of Care Reviewed With: patient; family  Outcome Summary: VSS. Dressing is CDI. No output in ALONDRA drain this shift. No HA this shift. Still c/o pain/weakness in her hands. Prn Norco given. Called in [facility] consult, Hanny Beasley to see her today. Will cont to monitor.

## 2019-12-09 NOTE — PAYOR COMM NOTE
"UR CONTACT:  DAYLIN  P: [phone]        F: [phone]  CONTINUED STAY REVIEW    REF #[id number]        Noemi Pugh (59 y.o. Female)     Date of Birth Social Security Number Address Home Phone MRN    1960  [address] [facility] [address] [phone] [id number]    Worship Marital Status          [facility] Single       Admission Date Admission Type Admitting Provider Attending Provider Department, Room/Bed    12/6/19 Elective Antonio Noe MD Villanueva, Wayne, MD [facility] [address] [place], P591/1    Discharge Date Discharge Disposition Discharge Destination                       Attending Provider:  Antonio Noe MD    Allergies:  Penicillins, Topamax [Topiramate]    Isolation:  None   Infection:  None   Code Status:  CPR    Ht:  134.6 cm (52.99\")   Wt:  39.3 kg (86 lb 10.3 oz)    Admission Cmt:  None   Principal Problem:  Spinal stenosis in cervical region [M48.02] More...                 Active Insurance as of 12/6/2019     Primary Coverage     Payor Plan Insurance Group Employer/Plan Group    [employer]HEM BLUE CROSS ANTHEM BLUE CROSS BLUE SHIELD PPO [id number]     Payor Plan Address Payor Plan Phone Number Payor Plan Fax Number Effective Dates    [address] [phone]  1/1/2019 - None Entered    [place] [address]       Subscriber Name Subscriber Birth Date Member ID       NOEMI PUGH 1960 [id number]                 Emergency Contacts      (Rel.) Home Phone Work Phone Mobile Phone    Donita Pugh (Sister) [phone] -- [phone]            Lines, Drains & Airways    Active LDAs     Name:   Placement date:   Placement time:   Site:   Days:    Peripheral IV 12/06/19 1350 Right Hand   12/06/19    1350    Hand   2    Closed/Suction Drain Midline Neck Bulb 10 Fr.   12/06/19    2114    Neck   2    Urethral Catheter Silicone 12 Fr.   12/06/19    2212     2                Hospital Medications (active)       Dose Frequency Start End    acetaminophen " "(TYLENOL) tablet 650 mg 650 mg Every 4 Hours PRN 12/7/2019     Route: Oral    atorvastatin (LIPITOR) tablet 20 mg 20 mg Nightly 12/7/2019     Admin Instructions: Avoid grapefruit juice.    Route: Oral    cetirizine (zyrTEC) tablet 10 mg 10 mg Daily 12/7/2019     Route: Oral    cyclobenzaprine (FLEXERIL) tablet 10 mg 10 mg 3 Times Daily PRN 12/7/2019     Route: Oral    docusate sodium (COLACE) capsule 100 mg 100 mg 2 Times Daily 12/7/2019     Admin Instructions: Swallow whole.  Do not open, crush, or chew capsule.    Route: Oral    famotidine (PEPCID) injection 20 mg 20 mg Every 12 Hours Scheduled 12/7/2019     Admin Instructions: Dilute to 10 mL total volume and give IV push over 2 minutes.    Route: Intravenous    fluticasone (FLONASE) 50 MCG/ACT nasal spray 1 spray 1 spray Nightly 12/7/2019     Route: Nasal    gabapentin (NEURONTIN) capsule 100 mg 100 mg Every 8 Hours Scheduled 12/8/2019     Admin Instructions:     Route: Oral    HYDROcodone-acetaminophen (NORCO) 5-325 MG per tablet 1 tablet 1 tablet Every 4 Hours PRN 12/7/2019 12/17/2019    Admin Instructions: [AFTAB]<BR><BR>Do not exceed 4 grams of acetaminophen in a 24 hr period.<BR><BR>If given for pain, use the following pain scale: <BR>Mild Pain = Pain Score of 1-3, CPOT 1-2<BR>Moderate Pain = Pain Score of 4-6, CPOT 3-4<BR>Severe Pain = Pain Score of 7-10, CPOT 5-8    Route: Oral    hydrocortisone sod succinate PF (Solu-CORTEF) injection 250 mg 250 mg Every 8 Hours 12/9/2019 12/10/2019    Admin Instructions: Caution: Look alike/sound alike drug alert    Route: Intravenous    Cosign for Ordering: Required by Antonio Noe MD    ondansetron (ZOFRAN) injection 4 mg 4 mg Every 6 Hours PRN 12/7/2019     Admin Instructions: If BOTH ondansetron (ZOFRAN) and promethazine (PHENERGAN) are ordered use ondansetron first and THEN promethazine IF ondansetron is ineffective.    Route: Intravenous    Linked Group 1:  \"Or\" Linked Group Details        ondansetron (ZOFRAN) " "tablet 4 mg 4 mg Every 6 Hours PRN 12/7/2019     Admin Instructions: If BOTH ondansetron (ZOFRAN) and promethazine (PHENERGAN) are ordered use ondansetron first and THEN promethazine IF ondansetron is ineffective.    Route: Oral    Linked Group 1:  \"Or\" Linked Group Details        polyethylene glycol (MIRALAX) powder 17 g 17 g Daily PRN 12/9/2019     Admin Instructions: Mix dose in 6-8 ounces of water.    Route: Oral    senna-docusate sodium (SENOKOT-S) 8.6-50 MG tablet 1 tablet 1 tablet Nightly PRN 12/7/2019     Route: Oral    sodium chloride 0.9 % flush 3 mL 3 mL Every 12 Hours Scheduled 12/7/2019     Route: Intravenous          Orders (active)      Start     Ordered    12/10/19 0600  Basic Metabolic Panel  Morning Draw      12/09/19 1014    12/10/19 0600  CBC (No Diff)  Morning Draw      12/09/19 1014    12/09/19 1400  Inpatient Physical Medicine Rehab Consult  Once     Specialty:  Physical Medicine and Rehabilitation  Provider:  Shiva Abraham MD    12/08/19 1236    12/09/19 1200  PT Consult: Eval & Treat As Tolerated; Post Surgery Care, Functional Mobility Below Baseline  Once     Comments:  Please evaluate patient for ambulatory independence.  Patient will be allowed out of bed on Monday, 12/9 12/08/19 1236    12/09/19 1145  hydrocortisone sod succinate PF (Solu-CORTEF) injection 250 mg  Every 8 Hours      12/09/19 1048    12/09/19 1111  Ambulate Patient  Every Shift      12/09/19 1110    12/09/19 0940  polyethylene glycol (MIRALAX) powder 17 g  Daily PRN      12/09/19 0940    12/08/19 2200  gabapentin (NEURONTIN) capsule 100 mg  Every 8 Hours Scheduled      12/08/19 1236    12/08/19 1236  Change Dressing  Once     Comments:  Change dressing now; leave drain in place.  Notify neurosurgery if there is any evidence of drainage on the new dressing.    12/08/19 1236    12/08/19 1234  Inpatient Case Management  Consult  Once     Provider:  (Not yet assigned)    12/08/19 1236    12/08/19 " 1230  OT Consult: Eval & Treat  Once      12/08/19 1236    12/07/19 2100  docusate sodium (COLACE) capsule 100 mg  2 Times Daily      12/07/19 1635    12/07/19 2100  famotidine (PEPCID) injection 20 mg  Every 12 Hours Scheduled      12/07/19 1635    12/07/19 0900  cetirizine (zyrTEC) tablet 10 mg  Daily      12/07/19 0058    12/07/19 0400  Strict Intake and Output  Every 4 Hours      12/07/19 0058    12/07/19 0100  atorvastatin (LIPITOR) tablet 20 mg  Nightly      12/07/19 0058    12/07/19 0100  fluticasone (FLONASE) 50 MCG/ACT nasal spray 1 spray  Nightly      12/07/19 0058    12/07/19 0100  sodium chloride 0.9 % flush 3 mL  Every 12 Hours Scheduled      12/07/19 0058    12/07/19 0059  Notify Provider of Changes in Neuro Status  Until Discontinued      12/07/19 0058    12/07/19 0059  Advance Diet as Tolerated  Until Discontinued      12/07/19 0058    12/07/19 0059  Insert Peripheral IV  Once      12/07/19 0058    12/07/19 0059  Saline Lock & Maintain IV Access  Continuous      12/07/19 0058    12/07/19 0059  Code Status and Medical Interventions:  Continuous      12/07/19 0058    12/07/19 0059  Head of Bed  Until Discontinued      12/07/19 0058    12/07/19 0059  Empty Drain  Every Shift      12/07/19 0058    12/07/19 0059  Assess Need for Indwelling Urinary Catheter - Follow Removal Protocol  Continuous     Comments:  Indwelling Urinary Catheter Removal Criteria  Discontinue Indwelling Urinary Catheter Unless One of the Following is Present:  Urinary Retention or Obstruction  Chronic Urinary Catheter Use  End of Life  Critical Illness with Strict I/O   Tract or Abdominal Surgery  Stage 3/4 Sacral / Perineal Wound  Required Activity Restriction: Trauma  Required Activity Restriction: Spine Surgery  If Patient is Being Followed by Urology Contact Them PRIOR to Removal  Do Not Remove Indwelling Urinary Catheter Order is Present with a CLINICAL REASON to Maintain the Catheter. Provider is Required to Include a  Clinical Reason to Maintain a Urinary Catheter    Patient Admitted With Indwelling Urinary Catheter (Not Placed at [facility])  Assess for Continued Need & Document Medical Necessity  If Infection is Suspected, Contact the Provider        12/07/19 0058    12/07/19 0059  Urinary Catheter Care  Every Shift      12/07/19 0058    12/07/19 0059  Diet Regular  Diet Effective Now      12/07/19 0058    12/07/19 0058  acetaminophen (TYLENOL) tablet 650 mg  Every 4 Hours PRN      12/07/19 0058    12/07/19 0058  senna-docusate sodium (SENOKOT-S) 8.6-50 MG tablet 1 tablet  Nightly PRN      12/07/19 0058    12/07/19 0058  ondansetron (ZOFRAN) tablet 4 mg  Every 6 Hours PRN      12/07/19 0058    12/07/19 0058  ondansetron (ZOFRAN) injection 4 mg  Every 6 Hours PRN      12/07/19 0058    12/07/19 0058  HYDROcodone-acetaminophen (NORCO) 5-325 MG per tablet 1 tablet  Every 4 Hours PRN      12/07/19 0058    12/07/19 0058  cyclobenzaprine (FLEXERIL) tablet 10 mg  3 Times Daily PRN      12/07/19 0058    12/07/19 0058  Maintain Sequential Compression Device  Continuous      12/07/19 0057    12/06/19 2210  Pulse Oximetry, Continuous  Continuous      12/06/19 2209    12/06/19 1836  HYDROmorphone PF (DILAUDID) 1 MG/ML injection  - ADS Override Pull     Note to Pharmacy:  Created by cabinet override    12/06/19 1836    12/06/19 1618  Follow Anesthesia Guidelines / Standing Orders  Continuous      12/06/19 1617    Unscheduled  Oxygen Therapy- Nasal Cannula; Titrate for SPO2: Per Policy  Continuous PRN      12/06/19 1358    Unscheduled  Oxygen Therapy- Nasal Cannula; Titrate for SPO2: per policy  Continuous PRN      12/06/19 2209    Unscheduled  Apply Ice to Affected Area / Incision As Needed For Pain or Swelling  As Needed      12/07/19 0058                   Operative/Procedure Notes       Antonio Noe MD at 12/06/19 1927  Version 1 of 1         ANTERIOR CHANNEL VERTEBRECTOMY/CORPECTOMY WITH INSTRUMENTATION  Progress Note    Noemi  ALEXA Bartholomew  12/6/2019    Pre-op Diagnosis:   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]  Spinal stenosis in cervical region [M48.02]  Cervical spondylosis with myelopathy [M47.12]       Post-Op Diagnosis Codes:     * 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]     * Spinal stenosis in cervical region [M48.02]     * Cervical spondylosis with myelopathy [M47.12]    Procedure/CPT® Codes:      Procedure(s):  CERVICAL 6, CERVICAL 7 ANTERIOR CERVICAL CORPECTOMY WITH CAGE AND ZEVO PLATE    Surgeon(s):  Antonio Noe MD    Anesthesia: General    Staff:   Cell Saver : Jacob Martin  Circulator: Irina Mcgregor RN; Shannon Beltran RN  Scrub Person: Essie Negro; Heather Watson; Nathalie Vincent; Bettye Neal RN  Assistant: Cheli Bran CSA    Estimated Blood Loss: 150 mL    Urine Voided: NONE    Specimens:              NONE          Drains:   Closed/Suction Drain Midline Neck Bulb 10 Fr. (Active)       Findings: severe cord compression, dura adherent to PLL    Complications: CSF leak, sealed with DuraGen and Tisseel glue      Antonio Noe MD     Date: 12/6/2019  Time: 10:02 PM        Electronically signed by Antonio Noe MD at 12/06/19 2203     Antonio Noe MD at 12/06/19 1927  Version 2 of 2       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) and progresive myelopathy    Postoperative diagnosis: Same as above    Procedures performed: Anterior cervical corpectomy C6, C7 with PEEK cage and anterior Zevo cervical plate from C5 to T1    Surgeon: Hasmukh    First Assistant: Cheli Bran  (She greatly assisted in the exposure, visualization of neural structures, control of bleeding, retraction, and closure of the incision.)    Anesthesia: GET    EBL: 100 cc    Complications: CSF leak, sealed with DuraGen and Tissell glue    Specimen sent: none    Drains: 10 mm round ALONDRA drain    Findings: severe cord  compression, dura adherent to PLL    Postoperative condition: good    Indications for the operation: The patient is a 59-year-old female whom I have been following for lumbar problems. Over the last few months she has developed new problems involving her balance, hand function and strength. She was seen in our office and found to have severe cord compression particularly at C6-C7 from a combination of cervical disc disease as well as ossification of the posterior longitudinal ligament worse at C6-C7 with cord signal changes. Because of progressive myelopathy and her downhill course, she was brought to the operating room for a corpectomy.         Informed consent: she understood that the goal of surgery is arrest of a progressive myelopathy, hopefully return of spinal cord function, and reduction in overall neck and arm pain, if present. The risks include, but are not limited to, infection, hemorrhage requiring transfusion or reoperation, CSF leak requiring reoperation, incomplete relief of symptoms, difficulty swallowing, hoarseness of voice, hardware problems requiring revision surgery, potential need for additional surgery in the future, stroke, paralysis, coma, and death. The patient agrees to proceed.       Details of the operation:  Dictation on: 12/06/2019 10:13 PM by: ANTONIO KOWALSKI [[id number]]         Electronically signed by Antonio Kowalski MD at 12/07/19 1022     Antonio Kowalski MD at 12/06/19 1927  Version 1 of 2       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) and progresive myelopathy    Postoperative diagnosis: Same as above    Procedures performed: Anterior cervical corpectomy C6, C7 with PEEK cage and anterior Zevo cervical plate from C5 to T1    Surgeon: Hasmukh    First Assistant: Cheli Bran  (She greatly assisted in the exposure, visualization of neural structures, control of bleeding, retraction, and closure of  the incision.)    Anesthesia: GET    EBL: 100 cc    Complications: CSF leak, sealed with DuraGen and Tissell glue    Specimen sent: none    Drains: 10 mm round ALONDRA drain    Findings: severe cord compression, dura adherent to PLL    Postoperative condition: good    Indications for the operation:  Dictation on: 12/06/2019 10:07 PM by: ANTONIO KOWALSKI [[id number]]         Informed consent: she understood that the goal of surgery is arrest of a progressive myelopathy, hopefully return of spinal cord function, and reduction in overall neck and arm pain, if present. The risks include, but are not limited to, infection, hemorrhage requiring transfusion or reoperation, CSF leak requiring reoperation, incomplete relief of symptoms, difficulty swallowing, hoarseness of voice, hardware problems requiring revision surgery, potential need for additional surgery in the future, stroke, paralysis, coma, and death. The patient agrees to proceed.       Details of the operation:  Dictation on: 12/06/2019 10:13 PM by: ANTONIO KOWALSKI [[id number]]         Electronically signed by Antonio Kowalski MD at 12/06/19 3292          Physician Progress Notes       Nicole Wagner PA-C at 12/09/19 1110          Doing ok. Steroids helping.  Concerned with residual N/T in hands as well as bilateral hand/arm weakness. This is all stable compared to before surgery per the patient.  I reassured her that none of the N/T or weakness was expected to improve immediately and will take quite a while.  She is swallowing well.  Pain well controlled.     Had a HA this am but it is NOT positional    Both Dr. Castillo and Dr. Kowalski have reviewed the MRI.  It does show new cord edema which is not unexpected given the amount of manipulation required to address the OPLL/stenosis.  The fluid is also expected given known CSF leak (calcified ligament had adhered to the dura).  No unexpected findings that warrant any additional intervention.       Blood pressure  "118/72, pulse 74, temperature 98 °F (36.7 °C), temperature source Oral, resp. rate 17, height 134.6 cm (52.99\"), weight 39.3 kg (86 lb 10.3 oz), SpO2 96 %, not currently breastfeeding.      AA, Ox3  Neck soft and flat  ALONDRA:  Empty-RN asked to remove  MONAE well.  Has hand>bilateral tricep weakness, all stable per pt and family        ..  Results from last 7 days   Lab Units 12/09/19  0515   WBC 10*3/mm3 12.98*   HEMOGLOBIN g/dL 8.7*   HEMATOCRIT % 26.9*   PLATELETS 10*3/mm3 177       ..  Results from last 7 days   Lab Units 12/09/19  0515   SODIUM mmol/L 142   POTASSIUM mmol/L 3.6   CHLORIDE mmol/L 106   CO2 mmol/L 26.3   BUN mg/dL 9   CREATININE mg/dL 0.47*   GLUCOSE mg/dL 141*   CALCIUM mg/dL 7.9*       POD#3 s/p ACCF at C6, C7 with cage and plate from C5-T1 for stenosis secondary to OPLL/myelopathy  CSF leak-kept flat till today  tricep and hand weakness-stable compared to preop      Begin to mobilize. Asked RN to sit her up and if she tolerates that then mobilize  PT/OT/rehab eval  D/c drain  CBC in am  Cont with steroids for 24 hrs, then switch to MDP tomorrow      Electronically signed by Nicole Wagner PA-C at 12/09/19 1124     Audrey Funez APRN at 12/08/19 1140     Attestation signed by Ke Castillo MD at 12/09/19 0848    I have reviewed the documentation above and agree.                    Postoperative visit      Hands and forearms are feeling better with the steroids.  She is able to rest her hands on the pillows beside her body and appears more at ease.  She still has weakness in her forearms and hands.  Unable to feed herself.      Blood pressure 90/59, pulse 67, temperature 97.9 °F (36.6 °C), temperature source Oral, resp. rate 16, height 134.6 cm (52.99\"), weight 39.3 kg (86 lb 10.3 oz), SpO2 95 %, not currently breastfeeding.        .  Results from last 7 days   Lab Units 12/08/19  0506 12/07/19  0551 12/02/19  1601   WBC 10*3/mm3 14.76* 11.49* 7.12   HEMOGLOBIN g/dL 10.2* 10.4* 13.3   " "  HEMATOCRIT % 29.6* 31.9* 40.4   PLATELETS 10*3/mm3 184 192 284       .  Results from last 7 days   Lab Units 12/07/19  0551 12/02/19  1600   SODIUM mmol/L 134* 140   POTASSIUM mmol/L 4.6 4.0   CHLORIDE mmol/L 99 99   CO2 mmol/L 22.5 31.0*   BUN mg/dL 10 14   CREATININE mg/dL 0.54* 0.68   GLUCOSE mg/dL 152* 108*   CALCIUM mg/dL 8.1* 9.6         POD 2 anterior cervical corpectomy C6, C7 with cage and plate C5-T1 for C5-T1 cervical stenosis and ossification of the posterior longitudinal ligament; progressive cervical myelopathy. CSF leak repair  Postoperative cervical neuritis; forearm/hand weakness; unable to perform ADLs, self-care  Postoperative leukocytosis slightly worse; secondary to steroids  Hypotension, hyponatremia, hyperglycemia (secondary to steroids)      Consult hospitalist for management of above medical issues  Consult OT for assessment of hand strength at bedside today  Continue bedrest; keep [place] until tomorrow  Keep drain until tomorrow after patient mobilizes.  Continue IV steroids; switch to oral taper tomorrow if pain persists; increase gabapentin  PT/OT/rehab consults after patient able to mobilize  CBC, BMP in a.m.  Not able to use PCA; will DC      ADDENDUM:    Spoke to Dr. Castillo regarding the continued weakness in the arms and hands.  Will order MRI cervical spine without contrast to evaluate for compression.           Electronically signed by Ke Castillo MD at 12/09/19 0848     Audrey Funez, APRN at 12/07/19 1628     Attestation signed by Ke Castillo MD at 12/09/19 0850    I have reviewed the documentation above and agree.                    Postoperative visit      Complains of worsening pain in her forearms and hands.  Also having some forearm weakness and  weakness.  Swallowing okay.  Denies headache.  Tolerating bedrest okay.    Blood pressure 110/68, pulse 62, temperature 97.8 °F (36.6 °C), temperature source Oral, resp. rate 16, height 134.6 cm (52.99\"), " weight 39.3 kg (86 lb 10.3 oz), SpO2 100 %, not currently breastfeeding.        Cervical collar in place.  Anterior cervical incision well approximated.  Dressing is dry and intact.  Biceps strength 4/5 left; 5/5 right; triceps strength 3 out of 5 bilaterally; wrist extension strength 2 out of 5 bilaterally.  Intrinsics 3 out of 5 bilaterally.   strength 3 out of 5 bilaterally.      Results from last 7 days   Lab Units 12/07/19  0551   WBC 10*3/mm3 11.49*   HEMOGLOBIN g/dL 10.4*   HEMATOCRIT % 31.9*   PLATELETS 10*3/mm3 192         POD 1 anterior cervical corpectomy C6, C7 with peek cage and cervical plate C5-T1 for C5-T1 cervical stenosis with ossification of the posterior longitudinal ligament and progressive cervical myelopathy.  CSF leak repair.  Postoperative cervical neuritis  Postoperative leukocytosis      Repeat CBC a.m.  Maintain bedrest with head of bed flat  Add steroids          Electronically signed by Ke Castillo MD at 12/09/19 0850          Consult Notes       Deejay Cottrell MD at 12/09/19 0908      Consult Orders    1. Inpatient Internal Medicine Consult [[id number]] ordered by Audrey Funez APRN at 12/08/19 0947                    Patient Name:  Noemi Bartholomew  YOB: 1960  MRN:  [id number]  Date of Admission:  12/6/2019  Date of Consult:  12/9/2019  Patient Care Team:  Mary Lou Carroll MD as PCP - General (Internal Medicine)    Inpatient Internal Medicine Consult  Consult performed by: Deejay Cottrell MD  Consult ordered by: Audrey Funez APRN  Reason for consult: Evaluate status and make recommendations regarding treatment for hyperglycemia and hypotension.        Subjective   History of Present Illness  Ms. Bartholomew is a 59 y.o. female that has been admitted to [facility] following elective CERVICAL 6, CERVICAL 7 ANTERIOR CERVICAL CORPECTOMY WITH CAGE AND PLATE.  She has been admitted to an orthopedic floor following surgery and we were asked to see  and assist with her medical problems, specifically relating to her blood pressure which has been low and blood sugar which has been high.  At the time of my visit she denies any chest pain, SOA, vomiting or diarrhea.  She has tolerated a diet.  She does complain of expected postoperative discomfort.  She reports being in a normal state of health leading up to surgery.  She reports discomfort and tingling in both of her upper extremities.  Mild nausea.  No history of diabetes or elevated blood sugar.  She does not take BP medication.      Past Medical History:   Diagnosis Date   • Abnormal alkaline phosphatase test 06/12/2015    Resolved   • Acute bronchitis 06/12/2015    Resolved   • Allergic child age    pencillin   • Cervical spinal stenosis    • Diverticulosis    • H/O electromyography 10/13/2014   • H/O mammogram 07/29/2014   • Headache occassionally   • High cholesterol    • History of EKG 06/12/2015   • Hypercalcemia 06/12/2015    Resolved, Full w/u done, Likely secondary to Forteo   • Left arm pain    • Low back pain March 2017   • Numbness in both hands    • Osteoporosis    • Paresthesia 06/12/2015    Resolved     Past Surgical History:   Procedure Laterality Date   • BACK SURGERY  1999   • COLONOSCOPY  05/20/2011   • SPINE SURGERY  1999 back    dr abad quintero   • TONSILLECTOMY  child age   • TYMPANOPLASTY       Family History   Problem Relation Age of Onset   • Breast cancer Mother    • Stroke Mother         Cerebrovascular Accident   • Colon cancer Mother    • Diabetes Father         Borderline Diabetes Mellitus   • Heart disease Father         Cardiac Disorder   • Heart failure Father    • Anxiety disorder Sister    • Nephrolithiasis Sister         Kidney Stones   • Malig Hyperthermia Neg Hx      Social History     Tobacco Use   • Smoking status: Never Smoker   • Smokeless tobacco: Never Used   Substance Use Topics   • Alcohol use: No   • Drug use: No     Medications Prior to Admission   Medication  Sig Dispense Refill Last Dose   • acetaminophen (TYLENOL) 500 MG tablet Take 500 mg by mouth Every 6 (Six) Hours As Needed for Mild Pain .   Past Week at Unknown time   • atorvastatin (LIPITOR) 20 MG tablet Take 1 tablet by mouth Every Night. 90 tablet 1 12/5/2019 at 2200   • Cholecalciferol (VITAMIN D3) 2000 units tablet Take 2,000 Units by mouth Daily. 30 tablet  12/5/2019 at 1200   • gabapentin (NEURONTIN) 100 MG capsule Take 1 capsule by mouth 2 (Two) Times a Day. 60 capsule 3 12/6/2019 at 0900   • Tretinoin Microsphere (RETIN-A MICRO PUMP) 0.06 % gel Apply  topically As Needed.   Patient Taking Differently at Unknown time   • Calcium-Magnesium-Vitamin D ER (CITRACAL SLOW RELEASE) 600- MG-MG-UNIT tablet sustained-release 24 hour Take 2 tablets daily (Patient taking differently: Take 2 tablets daily/HOLD BEFORE SURGERY)   12/2/2019   • cetirizine (zyrTEC) 10 MG tablet Take 10 mg by mouth Daily.   More than a month at Unknown time   • docusate sodium (COLACE) 100 MG capsule Take 1 capsule by mouth once a week.   12/1/2019   • fluticasone (FLONASE) 50 MCG/ACT nasal spray 1 spray into the nostril(s) as directed by provider Every Night.   More than a month at Unknown time   • melatonin tablet Take 1 tablet by mouth At Night As Needed.   More than a month at Unknown time     Allergies:  Penicillins and Topamax [topiramate]    Review of Systems   HENT: Negative.    Respiratory: Negative.    Cardiovascular: Negative.    Gastrointestinal: Negative.    Endocrine: Negative.    Genitourinary: Negative.    Musculoskeletal: Negative.    Skin: Negative.    Neurological: Positive for weakness and numbness.   Hematological: Negative.    Psychiatric/Behavioral: Negative.        Objective      Vital Signs  Temp:  [97.2 °F (36.2 °C)-98.3 °F (36.8 °C)] 97.2 °F (36.2 °C)  Heart Rate:  [63-65] 65  Resp:  [16-18] 18  BP: ()/(52-66) 104/66  Body mass index is 21.69 kg/m².    Physical Exam   Constitutional: She is oriented to  person, place, and time. She appears well-developed and well-nourished. No distress.   HENT:   Head: Normocephalic and atraumatic.   Eyes: Conjunctivae and EOM are normal. No scleral icterus.   Neck:   Anterior surgical wound dressed   Cardiovascular: Normal rate and regular rhythm.   Pulmonary/Chest: Effort normal and breath sounds normal.   Abdominal: Soft. Bowel sounds are normal. She exhibits no distension. There is no tenderness.   Musculoskeletal: Normal range of motion. She exhibits no edema.   Neurological: She is alert and oriented to person, place, and time.   Skin: Skin is warm and dry.   Psychiatric: She has a normal mood and affect. Her behavior is normal.   Nursing note and vitals reviewed.      Results Review:   I reviewed the patient's new clinical results.  I reviewed the patient's new imaging results and agree with the interpretation.  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 Bartholomew is a 59 y.o. female who is s/p CERVICAL 6, CERVICAL 7 ANTERIOR CERVICAL CORPECTOMY WITH CAGE AND PLATE.    · Discussed with ONEYDA.  Steroids to be continued at least additional 24 hours.  At this point only mild elevations in blood  glucose that should not require any insulin coverage.  Continue to monitor with repeat BMP in a.m.  · BP slightly low which is likely chronic as she is asymptomatic.  · She does have significant postoperative blood loss anemia with hemoglobin prior to surgery 13.3 down to 8.7 today.  Defer further management and monitoring of this to primary.  · Suspect elevated WBC related to steroid use.  No signs of infection.  · Continue home dose atorvastatin for hyperglycemia.  · SCDs have been ordered for DVT prophylaxis per surgery.      Thank you very much for asking A to be involved in this patient's care. We will follow along with you.      Deejay Cottrell MD  [facility]  12/09/19  9:08 AM      Electronically signed by Deejay Cottrell MD at 12/09/19 1014             Lynn Tanner, PT   Physical Therapist   Physical Therapy   Plan of Care   Signed   Date of Service:  12/09/19 1131   Creation Time:  12/09/19 1131            Signed                Problem: Patient Care Overview  Goal: Plan of Care Review  Flowsheets (Taken 12/9/2019 1122)  Outcome Summary: Pt agreeable to PT eval this am. SHe is s/p C6-C7 anterior cervical corpectomy with cage and plate. She is now off of bedrest after having a CSF leak. Pt with minimal complaints of pain. She does continue to report BUE weakness. Today, pt able to sit EOB with mod A; however, complained of increased dizziness with sitting and then nausea.  Therefore, pt unable to attempt standing or ambulation at this time. Unsure of DC plans. Pt may need rehab pending progress with therapy.  She will contiue to benefit from skilled PT to maximize safety and independence with mobility.                         Diya Lemos, RN   Registered Nurse   Neurology   Plan of Care   Signed   Date of Service:  12/09/19 0532   Creation Time:  12/09/19 0532            Signed                Problem: Patient Care Overview  Goal: Plan of Care Review  Outcome: Ongoing  (interventions implemented as appropriate)  Flowsheets (Taken 12/9/2019 0525)  Progress: improving  Plan of Care Reviewed With: patient; family  Outcome Summary: VSS. Dressing is CDI. No output in ALONDRA drain this shift. No HA this shift. Still c/o pain/weakness in her hands. Prn Norco given. Called in [facility] consult, Hanny Beasley to see her today. Will cont to monitor.                     Osbaldo Deleon, RN   Registered Nurse      Plan of Care   Signed   Date of Service:  12/08/19 2033   Creation Time:  12/08/19 2033            Signed                Problem: Patient Care Overview  Goal: Plan of Care Review  Outcome: Ongoing (interventions implemented as appropriate)  Note:   VSS.  A&Ox4. Norco given for pain in bilateral hands. Soft collar in place.  ALONDRA drain in place/Dressing changed per order. HOB flat. Had MRI today and Neurosurgery notified of results. Lizarraga in place. Family visited.                      Diya Lemos RN   Registered Nurse   Neurology   Plan of Care   Signed   Date of Service:  12/07/19 0652   Creation Time:  12/07/19 0652            Signed                Problem: Patient Care Overview  Goal: Plan of Care Review  Outcome: Ongoing (interventions implemented as appropriate)  Flowsheets (Taken 12/7/2019 0637)  Progress: no change  Plan of Care Reviewed With: patient; sibling  Note:   Pt is AOx4, VSS, No neuro changes. Pt came up to floor around 0030 after C6-C7 Anterior cervical corpectomy with cage and plate. Dressing CDI. HOB at 15 degrees, bedrest maintained. Soft collar applied. ALONDRA has only scant drainage in tubing. Lizarraga in and draining. Prn pain meds given, Pt having trouble pushing PCA button d/t baseline weakness in her hands. Will cont to monitor.

## 2019-12-09 NOTE — THERAPY EVALUATION
Patient Name: Noemi Bartholomew  : 1960    MRN: [id number]                              Today's Date: 2019       Admit Date: 2019    Visit Dx: No diagnosis found.  Patient Active Problem List   Diagnosis   • Carpal tunnel syndrome   • Hyperlipidemia   • Osteoporosis   • DDD (degenerative disc disease), lumbar   • Chronic left lumbar radiculopathy   • Congenital spondylolysis, lumbosacral region   • Neuropathic pain, leg, left   • Ossification of spinal ligament   • Spinal stenosis in cervical region   • Cervical spondylosis with myelopathy   • Cervical stenosis of spine   • Postoperative anemia due to acute blood loss   • Steroid-induced hyperglycemia   • Cord compression (CMS/HCC)   • Leukocytosis (due to steroids)     Past Medical History:   Diagnosis Date   • Abnormal alkaline phosphatase test 2015    Resolved   • Acute bronchitis 2015    Resolved   • Allergic child age    pencillin   • Cervical spinal stenosis    • Diverticulosis    • H/O electromyography 10/13/2014   • H/O mammogram 2014   • Headache occassionally   • High cholesterol    • History of EKG 2015   • Hypercalcemia 2015    Resolved, Full w/u done, Likely secondary to Forteo   • Left arm pain    • Low back pain 2017   • Numbness in both hands    • Osteoporosis    • Paresthesia 2015    Resolved     Past Surgical History:   Procedure Laterality Date   • BACK SURGERY     • COLONOSCOPY  2011   • SPINE SURGERY   back    dr abad quintero   • TONSILLECTOMY  child age   • TYMPANOPLASTY       General Information     Row Name 19 1120          PT Evaluation Time/Intention    Document Type  evaluation  -EJ     Mode of Treatment  physical therapy  -EJ     Row Name 19 1120          General Information    Patient Profile Reviewed?  yes  -EJ     Prior Level of Function  independent:;ADL's;all household mobility  -EJ     Existing Precautions/Restrictions  fall;brace worn when out of  bed cervical collar on at all times  -     Barriers to Rehab  none identified  -[facility] Name 12/09/19 1120          Relationship/Environment    Lives With  sibling(s)  -[facility] Name 12/09/19 1120          Resource/Environmental Concerns    Current Living Arrangements  home/apartment/condo  -[facility] Name 12/09/19 1120          Home Main Entrance    Number of Stairs, Main Entrance  three  -[facility] Name 12/09/19 1120          Stairs Within Home, Primary    Number of Stairs, Within Home, Primary  none  -[facility] Name 12/09/19 1120          Cognitive Assessment/Intervention- PT/OT    Orientation Status (Cognition)  oriented x 3  -[facility] Name 12/09/19 1120          Safety Issues, Functional Mobility    Impairments Affecting Function (Mobility)  balance;motor control;pain;range of motion (ROM);strength  -       User Key  (r) = Recorded By, (t) = Taken By, (c) = Cosigned By    Initials Name Provider Type     Lynn Tanner, PT Physical Therapist        Mobility     Row Name 12/09/19 1121          Bed Mobility Assessment/Treatment    Bed Mobility Assessment/Treatment  supine-sit;sit-supine  -     Supine-Sit Grant (Bed Mobility)  verbal cues;nonverbal cues (demo/gesture);minimum assist (75% patient effort);moderate assist (50% patient effort)  -     Sit-Supine Grant (Bed Mobility)  verbal cues;minimum assist (75% patient effort)  -     Assistive Device (Bed Mobility)  bed rails;head of bed elevated  -[facility] Name 12/09/19 1121          Transfer Assessment/Treatment    Comment (Transfers)  unable to attempt due to increased dizziness with sitting EOB  -[facility] Name 12/09/19 1121          Bed-Chair Transfer    Bed-Chair Grant (Transfers)  unable to assess  -[facility] Name 12/09/19 1121          Sit-Stand Transfer    Sit-Stand Grant (Transfers)  unable to assess  -[facility] Name 12/09/19 1121          Gait/Stairs Assessment/Training    Grant Level  (Gait)  unable to assess  -EJ       User Key  (r) = Recorded By, (t) = Taken By, (c) = Cosigned By    Initials Name Provider Type    Lynn Correa, PT Physical Therapist        Obj/Interventions     Row Name 12/09/19 1122          General ROM    GENERAL ROM COMMENTS  BLE WFL  -EJ     Row Name 12/09/19 1122          MMT (Manual Muscle Testing)    General MMT Comments  BUE weakness, difficult to assess BLE due to dizziness  -EJ     Row Name 12/09/19 1122          Static Sitting Balance    Level of [employer] (Unsupported Sitting, Static Balance)  supervision  -EJ     Sitting Position (Unsupported Sitting, Static Balance)  sitting on edge of bed  -EJ     Time Able to Maintain Position (Unsupported Sitting, Static Balance)  3 to 4 minutes  -EJ       User Key  (r) = Recorded By, (t) = Taken By, (c) = Cosigned By    Initials Name Provider Type    Lynn Correa, PT Physical Therapist        Goals/Plan     Row Name 12/09/19 1129          Bed Mobility Goal 1 (PT)    Activity/Assistive Device (Bed Mobility Goal 1, PT)  bed mobility activities, all  -EJ     [employer] Level/Cues Needed (Bed Mobility Goal 1, PT)  supervision required  -EJ     Time Frame (Bed Mobility Goal 1, PT)  1 week  -EJ     Row Name 12/09/19 1129          Transfer Goal 1 (PT)    Activity/Assistive Device (Transfer Goal 1, PT)  transfers, all;walker, rolling  -EJ     [employer] Level/Cues Needed (Transfer Goal 1, PT)  standby assist  -EJ     Time Frame (Transfer Goal 1, PT)  1 week  -EJ     Row Name 12/09/19 1129          Gait Training Goal 1 (PT)    Activity/Assistive Device (Gait Training Goal 1, PT)  gait (walking locomotion);walker, rolling  -EJ     [employer] Level (Gait Training Goal 1, PT)  contact guard assist  -EJ     Distance (Gait Goal 1, PT)  100  -EJ     Time Frame (Gait Training Goal 1, PT)  1 week  -EJ       User Key  (r) = Recorded By, (t) = Taken By, (c) = Cosigned By    Initials Name Provider Type    JAZZMINE Tanner  Lynn ABBOTT, PT Physical Therapist        Clinical Impression     Row Name 12/09/19 1122          Pain Assessment    Additional Documentation  Pain Scale: Numbers Pre/Post-Treatment (Group)  -EJ     Row Name 12/09/19 1122          Pain Scale: Numbers Pre/Post-Treatment    Pain Scale: Numbers, Pretreatment  4/10  -EJ     Pain Scale: Numbers, Post-Treatment  4/10  -EJ     Pain Location  neck  -EJ     Pain Intervention(s)  Repositioned  -EJ     Row Name 12/09/19 1122          Plan of Care Review    Plan of Care Reviewed With  patient;sibling  -EJ     Progress  improving  -EJ     Outcome Summary  Pt agreeable to PT eval this am. SHe is s/p C6-C7 anterior cervical corpectomy with cage and plate. She is now off of bedrest after having a CSF leak. Pt with minimal complaints of pain. She does continue to report BUE weakness. Today, pt able to sit EOB with mod A; however, complained of increased dizziness with sitting and then nausea.  Therefore, pt unable to attempt standing or ambulation at this time. Unsure of DC plans. Pt may need rehab pending progress with therapy.  She will contiue to benefit from skilled PT to maximize safety and independence with mobility.    -     Row Name 12/09/19 1122          Physical Therapy Clinical Impression    Patient/Family Goals Statement (PT Clinical Impression)  pt may need SNU at [place], pending progress  -EJ     Criteria for Skilled Interventions Met (PT Clinical Impression)  yes  -EJ     Rehab Potential (PT Clinical Summary)  good, to achieve stated therapy goals  -EJ     Row Name 12/09/19 1122          Vital Signs    O2 Delivery Pre Treatment  room air  -EJ     O2 Delivery Intra Treatment  room air  -EJ     O2 Delivery Post Treatment  room air  -EJ     Pre Patient Position  Supine  -EJ     Intra Patient Position  Sitting  -EJ     Post Patient Position  Supine  -EJ     Row Name 12/09/19 1122          Positioning and Restraints    Pre-Treatment Position  in bed  -EJ     Post Treatment  Position  bed  -EJ     In Bed  notified nsg;supine;call light within reach;encouraged to call for assist;exit alarm on;with family/caregiver  -EJ       User Key  (r) = Recorded By, (t) = Taken By, (c) = Cosigned By    Initials Name Provider Type    Lynn Correa, PT Physical Therapist        Outcome Measures     Row Name 12/09/19 1130          How much help from another person do you currently need...    Turning from your back to your side while in flat bed without using bedrails?  3  -EJ     Moving from lying on back to sitting on the side of a flat bed without bedrails?  2  -EJ     Moving to and from a bed to a chair (including a wheelchair)?  1  -EJ     Standing up from a chair using your arms (e.g., wheelchair, bedside chair)?  1  -EJ     Climbing 3-5 steps with a railing?  1  -EJ     To walk in hospital room?  1  -EJ     AM-PAC 6 Clicks Score (PT)  9  -EJ     Row Name 12/09/19 1130          Functional Assessment    Outcome Measure Options  AM-PAC 6 Clicks Basic Mobility (PT)  -EJ       User Key  (r) = Recorded By, (t) = Taken By, (c) = Cosigned By    Initials Name Provider Type    Lynn Correa, PT Physical Therapist          PT Recommendation and Plan  Planned Therapy Interventions (PT Eval): balance training, bed mobility training, gait training, home exercise program, patient/family education, ROM (range of motion), stair training, strengthening, transfer training  Outcome Summary/Treatment Plan (PT)  Anticipated Discharge Disposition (PT): home with assist, skilled nursing facility, home with home health  Plan of Care Reviewed With: patient, sibling  Progress: improving  Outcome Summary: Pt agreeable to PT eval this am. SHe is s/p C6-C7 anterior cervical corpectomy with cage and plate. She is now off of bedrest after having a CSF leak. Pt with minimal complaints of pain. She does continue to report BUE weakness. Today, pt able to sit EOB with mod A; however, complained of increased dizziness  with sitting and then nausea.  Therefore, pt unable to attempt standing or ambulation at this time. Unsure of DC plans. Pt may need rehab pending progress with therapy.  She will contiue to benefit from skilled PT to maximize safety and independence with mobility.       Time Calculation:   PT Charges     Row Name 12/09/19 1131             Time Calculation    Start Time  1050  -EJ      Stop Time  1114  -EJ      Time Calculation (min)  24 min  -EJ      PT Received On  12/09/19  -EJ      PT - Next Appointment  12/10/19  -EJ      PT Goal Re-Cert Due Date  12/16/19  -EJ         Time Calculation- PT    Total Timed Code Minutes- PT  14 minute(s)  -EJ        User Key  (r) = Recorded By, (t) = Taken By, (c) = Cosigned By    Initials Name Provider Type    EJ Lynn Tanner, PT Physical Therapist        Therapy Charges for Today     Code Description Service Date Service Provider Modifiers Qty    41086736691 HC PT EVAL MOD COMPLEXITY 2 12/9/2019 Lynn Tanner, PT GP 1    40253742345 HC PT THER PROC EA 15 MIN 12/9/2019 Lynn Tanner, PT GP 1          PT G-Codes  Outcome Measure Options: AM-PAC 6 Clicks Basic Mobility (PT)  AM-PAC 6 Clicks Score (PT): 9    Lynn Tanner PT  12/9/2019

## 2019-12-09 NOTE — PLAN OF CARE
Problem: Patient Care Overview  Goal: Plan of Care Review  Outcome: Ongoing (interventions implemented as appropriate)  Note:   VSS.  A&Ox4. Norco given for pain in bilateral hands. Soft collar in place.  ALONDRA drain in place/Dressing changed per order. HOB flat. Had MRI today and Neurosurgery notified of results. Lizarraga in place. Family visited.      Problem: Pain, Acute (Adult)  Goal: Identify Related Risk Factors and Signs and Symptoms  Outcome: Ongoing (interventions implemented as appropriate)     Problem: Pain, Acute (Adult)  Goal: Acceptable Pain Control/Comfort Level  Outcome: Ongoing (interventions implemented as appropriate)     Problem: Skin Injury Risk (Adult)  Goal: Identify Related Risk Factors and Signs and Symptoms  Outcome: Ongoing (interventions implemented as appropriate)     Problem: Skin Injury Risk (Adult)  Goal: Skin Health and Integrity  Outcome: Ongoing (interventions implemented as appropriate)

## 2019-12-09 NOTE — DISCHARGE PLACEMENT REQUEST
"Noemi Pugh (59 y.o. Female)     Date of Birth Social Security Number Address Home Phone MRN    1960  [address] [address] [phone] [id number]    [employer] Mar[employer] Status          [facility] Single       Admission Date Admission Type Admitting Provider Attending Provider Department, Room/Bed    12/6/19 Elective Antonio Noe MD Villanueva, Wayne, MD [address], P591/1    Discharge Date Discharge Disposition Discharge Destination                       Attending Provider:  Antonio Noe MD    Allergies:  Penicillins, Topamax [Topiramate]    Isolation:  None   Infection:  None   Code Status:  CPR    Ht:  134.6 cm (52.99\")   Wt:  39.3 kg (86 lb 10.3 oz)    Admission Cmt:  None   Principal Problem:  Spinal stenosis in cervical region [M48.02] More...                 Active Insurance as of 12/6/2019     Primary Coverage     Payor Plan Insurance Group Employer/Plan Group    [facility] [employer] [facility] [employer] [facility] [id number]     Payor Plan Address Payor Plan Phone Number Payor Plan Fax Number Effective Dates    [address] [phone]  1/1/2019 - None Entered    [facility] [id number]       Subscriber Name Subscriber Birth Date Member ID       NOEMI PUGH 1960 [id number]                 Emergency Contacts      (Rel.) Home Phone Work Phone Mobile Phone    Donita Pugh (Sister) [phone] -- [phone]              "

## 2019-12-10 DIAGNOSIS — M48.02 CERVICAL SPINAL STENOSIS: Primary | ICD-10-CM

## 2019-12-10 DIAGNOSIS — M47.12 CERVICAL SPONDYLOSIS WITH MYELOPATHY: ICD-10-CM

## 2019-12-10 LAB
ANION GAP SERPL CALCULATED.3IONS-SCNC: 10.9 MMOL/L (ref 5–15)
BUN BLD-MCNC: 12 MG/DL (ref 6–20)
BUN/CREAT SERPL: 26.7 (ref 7–25)
CALCIUM SPEC-SCNC: 8.3 MG/DL (ref 8.6–10.5)
CHLORIDE SERPL-SCNC: 102 MMOL/L (ref 98–107)
CO2 SERPL-SCNC: 26.1 MMOL/L (ref 22–29)
CREAT BLD-MCNC: 0.45 MG/DL (ref 0.57–1)
DEPRECATED RDW RBC AUTO: 44.9 FL (ref 37–54)
ERYTHROCYTE [DISTWIDTH] IN BLOOD BY AUTOMATED COUNT: 13.2 % (ref 12.3–15.4)
GFR SERPL CREATININE-BSD FRML MDRD: 143 ML/MIN/1.73
GLUCOSE BLD-MCNC: 135 MG/DL (ref 65–99)
HCT VFR BLD AUTO: 29.2 % (ref 34–46.6)
HGB BLD-MCNC: 9.7 G/DL (ref 12–15.9)
MCH RBC QN AUTO: 30.6 PG (ref 26.6–33)
MCHC RBC AUTO-ENTMCNC: 33.2 G/DL (ref 31.5–35.7)
MCV RBC AUTO: 92.1 FL (ref 79–97)
PLATELET # BLD AUTO: 192 10*3/MM3 (ref 140–450)
PMV BLD AUTO: 9.1 FL (ref 6–12)
POTASSIUM BLD-SCNC: 3.5 MMOL/L (ref 3.5–5.2)
RBC # BLD AUTO: 3.17 10*6/MM3 (ref 3.77–5.28)
SODIUM BLD-SCNC: 139 MMOL/L (ref 136–145)
WBC NRBC COR # BLD: 10.02 10*3/MM3 (ref 3.4–10.8)

## 2019-12-10 PROCEDURE — 97535 SELF CARE MNGMENT TRAINING: CPT

## 2019-12-10 PROCEDURE — 25010000003 HYDROCORTISONE SOD SUCCINATE PF 250 MG RECONSTITUTED SOLUTION: Performed by: PHYSICIAN ASSISTANT

## 2019-12-10 PROCEDURE — 97165 OT EVAL LOW COMPLEX 30 MIN: CPT

## 2019-12-10 PROCEDURE — 99024 POSTOP FOLLOW-UP VISIT: CPT | Performed by: NURSE PRACTITIONER

## 2019-12-10 PROCEDURE — 85027 COMPLETE CBC AUTOMATED: CPT | Performed by: HOSPITALIST

## 2019-12-10 PROCEDURE — 63710000001 METHYLPREDNISOLONE 4 MG TABLET THERAPY PACK 21 EACH DISP PACK: Performed by: NURSE PRACTITIONER

## 2019-12-10 PROCEDURE — 80048 BASIC METABOLIC PNL TOTAL CA: CPT | Performed by: HOSPITALIST

## 2019-12-10 RX ORDER — METHYLPREDNISOLONE 4 MG/1
4 TABLET ORAL
Status: DISCONTINUED | OUTPATIENT
Start: 2019-12-14 | End: 2019-12-13 | Stop reason: HOSPADM

## 2019-12-10 RX ORDER — METHYLPREDNISOLONE 4 MG/1
8 TABLET ORAL
Status: COMPLETED | OUTPATIENT
Start: 2019-12-11 | End: 2019-12-11

## 2019-12-10 RX ORDER — METHYLPREDNISOLONE 4 MG/1
4 TABLET ORAL
Status: COMPLETED | OUTPATIENT
Start: 2019-12-12 | End: 2019-12-12

## 2019-12-10 RX ORDER — METHYLPREDNISOLONE 4 MG/1
4 TABLET ORAL
Status: COMPLETED | OUTPATIENT
Start: 2019-12-11 | End: 2019-12-11

## 2019-12-10 RX ORDER — METHYLPREDNISOLONE 4 MG/1
4 TABLET ORAL
Status: COMPLETED | OUTPATIENT
Start: 2019-12-10 | End: 2019-12-10

## 2019-12-10 RX ORDER — METHYLPREDNISOLONE 4 MG/1
8 TABLET ORAL
Status: COMPLETED | OUTPATIENT
Start: 2019-12-10 | End: 2019-12-10

## 2019-12-10 RX ORDER — METHYLPREDNISOLONE 4 MG/1
4 TABLET ORAL
Status: DISCONTINUED | OUTPATIENT
Start: 2019-12-13 | End: 2019-12-13 | Stop reason: HOSPADM

## 2019-12-10 RX ORDER — METHYLPREDNISOLONE 4 MG/1
4 TABLET ORAL
Status: DISCONTINUED | OUTPATIENT
Start: 2019-12-15 | End: 2019-12-13 | Stop reason: HOSPADM

## 2019-12-10 RX ORDER — BISACODYL 10 MG
10 SUPPOSITORY, RECTAL RECTAL EVERY OTHER DAY
Status: DISCONTINUED | OUTPATIENT
Start: 2019-12-11 | End: 2019-12-13 | Stop reason: HOSPADM

## 2019-12-10 RX ADMIN — DOCUSATE SODIUM 100 MG: 100 CAPSULE, LIQUID FILLED ORAL at 09:20

## 2019-12-10 RX ADMIN — GABAPENTIN 100 MG: 100 CAPSULE ORAL at 14:30

## 2019-12-10 RX ADMIN — GABAPENTIN 100 MG: 100 CAPSULE ORAL at 05:52

## 2019-12-10 RX ADMIN — GABAPENTIN 100 MG: 100 CAPSULE ORAL at 22:54

## 2019-12-10 RX ADMIN — HYDROCODONE BITARTRATE AND ACETAMINOPHEN 1 TABLET: 5; 325 TABLET ORAL at 09:21

## 2019-12-10 RX ADMIN — METHYLPREDNISOLONE 4 MG: 4 TABLET ORAL at 18:08

## 2019-12-10 RX ADMIN — POLYETHYLENE GLYCOL 3350 17 G: 17 POWDER, FOR SOLUTION ORAL at 09:20

## 2019-12-10 RX ADMIN — METHYLPREDNISOLONE 8 MG: 4 TABLET ORAL at 18:07

## 2019-12-10 RX ADMIN — ATORVASTATIN CALCIUM 20 MG: 20 TABLET, FILM COATED ORAL at 22:54

## 2019-12-10 RX ADMIN — ACETAMINOPHEN 650 MG: 325 TABLET, FILM COATED ORAL at 18:07

## 2019-12-10 RX ADMIN — HYDROCODONE BITARTRATE AND ACETAMINOPHEN 1 TABLET: 5; 325 TABLET ORAL at 04:30

## 2019-12-10 RX ADMIN — HYDROCORTISONE SODIUM SUCCINATE 250 MG: 250 INJECTION, POWDER, FOR SOLUTION INTRAMUSCULAR; INTRAVENOUS at 04:26

## 2019-12-10 RX ADMIN — METHYLPREDNISOLONE 8 MG: 4 TABLET ORAL at 22:52

## 2019-12-10 RX ADMIN — FAMOTIDINE 20 MG: 10 INJECTION INTRAVENOUS at 09:20

## 2019-12-10 RX ADMIN — ACETAMINOPHEN 650 MG: 325 TABLET, FILM COATED ORAL at 22:54

## 2019-12-10 RX ADMIN — SODIUM CHLORIDE, PRESERVATIVE FREE 3 ML: 5 INJECTION INTRAVENOUS at 09:20

## 2019-12-10 RX ADMIN — SENNOSIDES AND DOCUSATE SODIUM 1 TABLET: 8.6; 5 TABLET ORAL at 22:54

## 2019-12-10 RX ADMIN — DOCUSATE SODIUM 100 MG: 100 CAPSULE, LIQUID FILLED ORAL at 22:54

## 2019-12-10 NOTE — NURSING NOTE
Spoke with pt aunt. She was independent prior and will have 24/7 asst after dc if needed. Will follow with therapies and with Dr. Abraham.     Jenny Casas RN  [facility] Admission Nurse

## 2019-12-10 NOTE — PLAN OF CARE
Problem: Patient Care Overview  Goal: Plan of Care Review  Outcome: Ongoing (interventions implemented as appropriate)  Flowsheets (Taken 12/10/2019 1431)  Plan of Care Reviewed With: patient; family  Outcome Summary: Pt presents to OT with decreased UE ROM and strength, limited grasp with both right and left hand but pt is able to lightly grasp washcloth with LUE. Pt may benefit from built up foam for utensils to assist with initiation of self feeding skills. Pt motivated for therapy and will continue to benefit from OT. OT eval limited today due to pt fatigue as pt states just returned to bed after being on BSC.

## 2019-12-10 NOTE — THERAPY EVALUATION
Acute Care - Occupational Therapy Initial Evaluation  [facility]     Patient Name: Noemi Bartholomew  : 1960  MRN: [id number]  Today's Date: 12/10/2019             Admit Date: 2019     No diagnosis found.  Patient Active Problem List   Diagnosis   • Carpal tunnel syndrome   • Hyperlipidemia   • Osteoporosis   • DDD (degenerative disc disease), lumbar   • Chronic left lumbar radiculopathy   • Congenital spondylolysis, lumbosacral region   • Neuropathic pain, leg, left   • Ossification of spinal ligament   • Spinal stenosis in cervical region   • Cervical spondylosis with myelopathy   • Cervical stenosis of spine   • Postoperative anemia due to acute blood loss   • Steroid-induced hyperglycemia   • Cord compression (CMS/HCC)   • Leukocytosis (due to steroids)     Past Medical History:   Diagnosis Date   • Abnormal alkaline phosphatase test 2015    Resolved   • Acute bronchitis 2015    Resolved   • Allergic child age    pencillin   • Cervical spinal stenosis    • Diverticulosis    • H/O electromyography 10/13/2014   • H/O mammogram 2014   • Headache occassionally   • High cholesterol    • History of EKG 2015   • Hypercalcemia 2015    Resolved, Full w/u done, Likely secondary to Forteo   • Left arm pain    • Low back pain 2017   • Numbness in both hands    • Osteoporosis    • Paresthesia 2015    Resolved     Past Surgical History:   Procedure Laterality Date   • BACK SURGERY     • COLONOSCOPY  2011   • SPINE SURGERY   back    dr abad quintero   • TONSILLECTOMY  child age   • TYMPANOPLASTY            OT ASSESSMENT FLOWSHEET (last 12 hours)      Occupational Therapy Evaluation     Row Name 12/10/19 1419                   OT Evaluation Time/Intention    Document Type  evaluation  -SG        Mode of Treatment  individual therapy;occupational therapy  -SG           General Information    Patient Profile Reviewed?  yes  -SG        Prior Level of Function   independent:;ADL's  -SG        Existing Precautions/Restrictions  fall cervical collar at all times  -SG        Limitations/Impairments  hearing request talk to right side  -SG           Cognitive Assessment/Intervention- PT/OT    Orientation Status (Cognition)  oriented x 3  -SG        Follows Commands (Cognition)  WFL  -SG           ADL Assessment/Intervention    BADL Assessment/Intervention  grooming;feeding;toileting;bathing;upper body dressing;lower body dressing  -SG           Bathing Assessment/Intervention    Bathing [place] Level  bathing skills;dependent (less than 25% patient effort)  -SG        Comment (Bathing)  pt able to grasp washcloth with left hand  with max effort. unable to manipulate to perform bathing task  -SG           Grooming Assessment/Training    [place] Level (Grooming)  dependent (less than 25% patient effort)  -SG        Comment (Grooming)  unable to hold items for task. Limited UE ROM  -SG           Self-Feeding Assessment/Training    [place] Level (Feeding)  feeding skills;dependent (less than 25% patient effort)  -SG        Comment (Feeding)  unable to grasp with RUE, Limited grasp LUE . May benefit from built up foam  to assist  -SG           Toileting Assessment/Training    [place] Level (Toileting)  -- pt and family states dependent. Assist for balance with sit  -SG        Comment (Toileting)  Pt states just trasferred to BSC and back to bed. Fatigued   -SG           BADL Safety/Performance    Impairments, BADL Safety/Performance  balance;endurance/activity tolerance;grasp/prehension;sensory awareness;strength  -SG        Skilled BADL Treatment/Intervention  BADL process/adaptation training;hand-over-hand training/cues  -SG           General ROM    GENERAL ROM COMMENTS  right hand  limited to 1/8 finger flexion, Left finger flexion 2/3 AROM. [place] elbow flexion 2/3 AROM, [place] shld flex 1/4 AROM  -SG           Sensory Assessment/Intervention    Sensory  General Assessment  light touch sensation deficits identified  -SG           Light Touch Sensation Assessment    Left Upper Extremity: Light Touch Sensation Assessment  moderate impairment, 50 to 74% correct responses  -SG        Right Upper Extremity: Light Touch Sensation Assessment  moderate impairment, 50 to 74% correct responses  -SG           Positioning and Restraints    Pre-Treatment Position  in bed  -SG        Post Treatment Position  bed  -SG        In Bed  supine;call light within reach;encouraged to call for assist;exit alarm on;with family/caregiver  -SG           Pain Assessment    Additional Documentation  Pain Scale: Word Pre/Post-Treatment (Group)  -SG           Pain Scale: Word Pre/Post-Treatment    Pain: Word Scale, Pretreatment  2 - mild pain  -SG        Pain: Word Scale, Post-Treatment  2 - mild pain  -SG           Wound 12/06/19 2151 Other (See comments) neck Incision    Wound - Properties Group Date first assessed: 12/06/19  - Time first assessed: 2151  -JF Side: Other (See comments)  -JF Location: neck  -JF Primary Wound Type: Incision  -JF       Wound 12/06/19 2153 Other (See comments) neck Incision    Wound - Properties Group Date first assessed: 12/06/19  - Time first assessed: 2153  -JF Side: Other (See comments)  -JF Location: neck  -JF Primary Wound Type: Incision  -JF       Clinical Impression (OT)    OT Diagnosis  need for assist with personal care  -        Criteria for Skilled Therapeutic Interventions Met (OT Eval)  yes;treatment indicated  -        Therapy Frequency (OT Eval)  5 times/wk  -SG        Care Plan Review (OT)  evaluation/treatment results reviewed  -SG        Care Plan Review, Other Participant (OT Eval)  family  -SG           Planned OT Interventions    Planned Therapy Interventions (OT Eval)  activity tolerance training;adaptive equipment training;BADL retraining;ROM/therapeutic exercise  -SG           OT Goals    Grooming Goal Selection (OT)  grooming, OT  goal 1  -SG        Self-Feeding Goal Selection (OT)  self feeding, OT goal 1  -SG        Activity Tolerance Goal Selection (OT)  activity tolerance, OT goal 1  -SG        Additional Documentation  Self-Feeding Goal Selection (OT) (Row);Grooming Goal Selection (OT) (Row);Activity Tolerance Goal Selection (OT) (Row)  -SG           Grooming Goal 1 (OT)    Activity/Device (Grooming Goal 1, OT)  grooming skills, all;wash face, hands  -SG        [place] (Grooming Goal 1, OT)  moderate assist (50-74% patient effort)  -SG        Time Frame (Grooming Goal 1, OT)  1 week  -SG        Progress/Outcome (Grooming Goal 1, OT)  goal ongoing  -SG           Self-Feeding Goal 1 (OT)    Activity/Assistive Device (Self-Feeding Goal 1, OT)  self-feeding skills, all;built-up handle utensils  -SG        [place] Level/Cues Needed (Self-Feeding Goal 1, OT)  moderate assist (50-74% patient effort)  -SG        Time Frame (Self-Feeding Goal 1, OT)  1 week  -SG        Progress/Outcomes (Self-Feeding Goal 1, OT)  goal ongoing  -SG            Activity Tolerance Goal 1 (OT)    Activity Tolerance Goal 1 (OT)  Increase activity tolerance to assist with adls  -SG        Activity Level (Endurance Goal 1, OT)  20 min activity  -SG        Time Frame (Activity Tolerance Goal 1, OT)  1 week  -SG        Progress/Outcome (Activity Tolerance Goal 1, OT)  goal ongoing  -SG          User Key  (r) = Recorded By, (t) = Taken By, (c) = Cosigned By    Initials Name Effective Dates    SG Danay Hassan OTR 06/08/18 -      Shannon Beltran RN 06/16/16 -                OT Recommendation and Plan  Planned Therapy Interventions (OT Eval): activity tolerance training, adaptive equipment training, BADL retraining, ROM/therapeutic exercise  Therapy Frequency (OT Eval): 5 times/wk  Plan of Care Review  Plan of Care Reviewed With: patient, family  Plan of Care Reviewed With: patient, family  Outcome Summary: Pt presents to OT with decreased UE ROM and strength,  limited grasp with both right and left hand but pt is able to lightly grasp washcloth with LUE. Pt may benefit from built up foam for utensils to assist with initiation of self feeding skills. Pt motivated for therapy and will continue to benefit from OT. OT eval limited today due to pt fatigue as pt states just returned to bed after being on BSC.    Outcome Measures     Row Name 12/10/19 1436             How much help from another is currently needed...    Putting on and taking off regular lower body clothing?  1  -SG      Bathing (including washing, rinsing, and drying)  1  -SG      Toileting (which includes using toilet bed pan or urinal)  1  -SG      Putting on and taking off regular upper body clothing  1  -SG      Taking care of personal grooming (such as brushing teeth)  1  -SG      Eating meals  1  -SG      AM-PAC 6 Clicks Score (OT)  6  -SG         Functional Assessment    Outcome Measure Options  AM-PAC 6 Clicks Daily Activity (OT)  -SG        User Key  (r) = Recorded By, (t) = Taken By, (c) = Cosigned By    Initials Name Provider Type    Danay Tran OTR Occupational Therapist          Time Calculation:   Time Calculation- OT     Row Name 12/10/19 1437             Time Calculation- OT    OT Start Time  1359  -SG      OT Stop Time  1413  -      OT Time Calculation (min)  14 min  -      Total Timed Code Minutes- OT  8 minute(s)  -      OT Received On  12/10/19  -      OT Goal Re-Cert Due Date  12/17/19  -        User Key  (r) = Recorded By, (t) = Taken By, (c) = Cosigned By    Initials Name Provider Type    Danay Tran OTR Occupational Therapist        Therapy Charges for Today     Code Description Service Date Service Provider Modifiers Qty    12161443513 HC OT EVAL LOW COMPLEXITY 2 12/10/2019 Danay Hassan OTR GO 1    15188913505 HC OT SELF CARE/MGMT/TRAIN EA 15 MIN 12/10/2019 Danay Hassan OTR GO 1               BREONNA Cabral  12/10/2019

## 2019-12-10 NOTE — PROGRESS NOTES
Consult request received today.  Chart reviewed.  Therapy notes reviewed.  Off bedrest and started with PT today.   OT evaluation pending.  Will plan to see late tomorrow for full rehab assessment.

## 2019-12-10 NOTE — PROGRESS NOTES
Name: Noemi Bartholomew ADMIT: 2019   : 1960  PCP: Mary Lou Carroll MD    MRN: [id number] LOS: 4 days   AGE/SEX: 59 y.o. female  ROOM: [facility]     Subjective   Subjective   She denies any chest pain, SOA, nausea, vomiting or diarrhea.      Objective   Objective   Vital Signs  Temp:  [97.7 °F (36.5 °C)-98.2 °F (36.8 °C)] 98 °F (36.7 °C)  Heart Rate:  [56-80] 80  Resp:  [16-18] 17  BP: (117-143)/(70-86) 136/82  SpO2:  [93 %-96 %] 96 %  on   ;   Device (Oxygen Therapy): room air  Body mass index is 21.69 kg/m².  Physical Exam   Constitutional: She is oriented to person, place, and time. No distress.   Cardiovascular: Normal rate and regular rhythm.   No murmur heard.  Pulmonary/Chest: Effort normal and breath sounds normal.   Abdominal: Soft. Bowel sounds are normal. She exhibits no distension. There is no tenderness.   Musculoskeletal: Normal range of motion. She exhibits no edema.   Neurological: She is alert and oriented to person, place, and time.   Skin: Skin is warm and dry. She is not diaphoretic.       Results Review:       I reviewed the patient's new clinical results.  Results from last 7 days   Lab Units 12/10/19  051915 19  0506 19  0551   WBC 10*3/mm3 10.02 12.98* 14.76* 11.49*   HEMOGLOBIN g/dL 9.7* 8.7* 10.2* 10.4*   PLATELETS 10*3/mm3 192 177 184 192     Results from last 7 days   Lab Units 12/10/19  0517 19  0515 19  0551   SODIUM mmol/L 139 142 134*   POTASSIUM mmol/L 3.5 3.6 4.6   CHLORIDE mmol/L 102 106 99   CO2 mmol/L 26.1 26.3 22.5   BUN mg/dL 12 9 10   CREATININE mg/dL 0.45* 0.47* 0.54*   GLUCOSE mg/dL 135* 141* 152*   Estimated Creatinine Clearance: 83.5 mL/min (A) (by C-G formula based on SCr of 0.45 mg/dL (L)).    Results from last 7 days   Lab Units 12/10/19  0517 19  0515 19  0551   CALCIUM mg/dL 8.3* 7.9* 8.1*       No results found for: HGBA1C, POCGLU      atorvastatin 20 mg Oral Nightly   cetirizine 10 mg Oral Daily   docusate  sodium 100 mg Oral BID   famotidine 20 mg Intravenous Q12H   fluticasone 1 spray Nasal Nightly   gabapentin 100 mg Oral Q8H   sodium chloride 3 mL Intravenous Q12H      Diet Regular       Assessment/Plan     Active Hospital Problems    Diagnosis  POA   • **Spinal stenosis in cervical region [M48.02]  Yes   • Postoperative anemia due to acute blood loss [D62]  Yes   • Steroid-induced hyperglycemia [R73.9, T38.0X5A]  No   • Cord compression (CMS/HCC) [G95.20]  Unknown   • Leukocytosis (due to steroids) [D72.829]  No   • Cervical stenosis of spine [M48.02]  Yes   • 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]  Yes   • Cervical spondylosis with myelopathy [M47.12]  Yes   • Hyperlipidemia [E78.5]  Yes      Resolved Hospital Problems   No resolved problems to display.       Ms. Bartholomew is a 59 y.o. female who is POD#4 CERVICAL 6, CERVICAL 7 ANTERIOR CERVICAL CORPECTOMY WITH CAGE AND PLATE.    · Only complaint remains some discomfort and numbness in her hands.  She is frustrated that she has not been able to do much therapy.  · Otherwise seems very stable medically.  · Hemoglobin improved today.  · Renal function and electrolytes stable.  · Not much else to offer.  Will follow peripherally.      Deejay Cottrell MD  [facility]  12/10/19  2:16 PM

## 2019-12-10 NOTE — PROGRESS NOTES
"Postoperative visit      Feeling some better.  The pain in her arms forearms and hands is better.  Although there is still some weakness, she reports slight improvement in forearm strength. The hands are still considerably weak.  She is having no difficulty swallowing.  When I raise the head of her bed, she denied any headache.  Sat on the edge of the bed with physical therapy this morning but became nauseated. Standing not attempted for this reason.  Occupational therapy ordered on Sunday. Patient has still not been seen.  Dr. Abraham is to see the patient sometime today for acute rehab consult.      .Blood pressure 143/86, pulse 60, temperature 98.2 °F (36.8 °C), temperature source Oral, resp. rate 17, height 134.6 cm (52.99\"), weight 39.3 kg (86 lb 10.3 oz), SpO2 96 %, not currently breastfeeding.        Awake, alert, oriented x 3.  No distress noted.  Voice quality normal.  Anterior cervical incision is well approximated.    Mild surrounding edema; nonfluctuant.  Wearing soft cervical collar  Able to raise both arms off of bed above shoulder level.  Bicep weakness is slightly improved although still weaker on the right.  Continues to have bilateral tricep, wrist extensor, and intrinsic weakness bilaterally right greater than left.      .  Results from last 7 days   Lab Units 12/10/19  0517 12/09/19  0515 12/08/19  0506   WBC 10*3/mm3 10.02 12.98* 14.76*   HEMOGLOBIN g/dL 9.7* 8.7* 10.2*   HEMATOCRIT % 29.2* 26.9* 29.6*   PLATELETS 10*3/mm3 192 177 184       POD 4 anterior cervical corpectomy C6, C7 with cage and plate C5-T1 for C5-T1 cervical stenosis and OPPL and progressive cervical myelopathy.   CSF leak repair up yesterday, tolerating well  Postoperative cervical neuritis; improved with steroids  Distal arm and hand weakness bilaterally  Postoperative leukocytosis resolved  Postoperative acute blood loss anemia, improving    Off IV steroids.  Switched to oral Medrol Dosepak.  Continue to mobilize  Await " acute rehab evaluation  CBC in a.m.

## 2019-12-10 NOTE — CONSULTS
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  She has been to [facility] on December 6, 2019 and underwent anterior cervical corpectomy C6, C7 with cage and anterior cervical plate from C5-T1.  CSF leak repair.  Postoperative cervical neuritis.  Treated with steroids.  Postoperatively she is had increased weakness in her arms and hands.  She complains of numbness in her hands up to the mid forearm and in the feet up to the distal shins.  Has weakness in the left arm more than the right arm.  Also has some weakness in the left leg.  Her last bowel movement was 4 days ago.  She did void on her own today without having to be catheterized and had a low bladder scan postvoid residual of 0 cc.  She denies any difficulty with her swallowing..  With occupational therapy she was dependent for her bathing and grooming and feeding.  With physical therapy she sat on the edge of bed with moderate assist but had complaints of dizziness and  nausea.  Was unable to attempt standing yesterday.   Review of Systems   Some neck pain.  No difficulty with swallowing.  Comfortable with breathing.  No abdominal pain.  Voiding on her own.  Constipation.  Past Medical History:   Diagnosis Date   • Abnormal alkaline phosphatase test 06/12/2015    Resolved   • Acute bronchitis 06/12/2015    Resolved   • Allergic child age    pencillin   • Cervical spinal stenosis    • Diverticulosis    • H/O electromyography 10/13/2014   • H/O mammogram 07/29/2014   • Headache occassionally   • High cholesterol    • History of EKG 06/12/2015   • Hypercalcemia 06/12/2015    Resolved, Full w/u done, Likely secondary to Forteo   • Left arm pain    • Low back pain March 2017   • Numbness in both hands    • Osteoporosis    • Paresthesia 06/12/2015    Resolved   ,   Past Surgical History:   Procedure Laterality Date   • BACK SURGERY  1999   • COLONOSCOPY  05/20/2011   • SPINE SURGERY  1999 back    dr abad quintero   • TONSILLECTOMY  child age   • TYMPANOPLASTY     ,   Family History   Problem Relation Age of Onset   • Breast cancer Mother    • Stroke Mother         Cerebrovascular Accident   • Colon cancer Mother    • Diabetes Father         Borderline Diabetes Mellitus   • Heart disease Father         Cardiac Disorder   • Heart failure Father    • Anxiety disorder Sister    • Nephrolithiasis Sister         Kidney Stones   • Malig Hyperthermia Neg Hx    ,   Social History     Tobacco Use   • Smoking status: Never Smoker   • Smokeless tobacco: Never Used   Substance Use Topics   • Alcohol use: No   • Drug use: No   Patient works in accounting.  She lives in a home with 3 steps to enter, first floor bedroom / bathroom.  Her sister is available to provide some assistance.      Scheduled Meds:    atorvastatin 20 mg Oral Nightly   cetirizine 10 mg Oral Daily   docusate sodium 100 mg Oral BID   famotidine 20 mg Intravenous Q12H   fluticasone 1 spray Nasal Nightly   gabapentin 100 mg Oral  Q8H   methylPREDNISolone 4 mg Oral After Lunch   methylPREDNISolone 4 mg Oral After Dinner   [START ON 12/11/2019] methylPREDNISolone 4 mg Oral TID Around Food   [START ON 12/12/2019] methylPREDNISolone 4 mg Oral 4x Daily Taper   [START ON 12/13/2019] methylPREDNISolone 4 mg Oral TID Around Food   [START ON 12/14/2019] methylPREDNISolone 4 mg Oral Before Breakfast   [START ON 12/14/2019] methylPREDNISolone 4 mg Oral Tonight   [START ON 12/15/2019] methylPREDNISolone 4 mg Oral Before Breakfast   methylPREDNISolone 8 mg Oral Before Breakfast   methylPREDNISolone 8 mg Oral Tonight   [START ON 12/11/2019] methylPREDNISolone 8 mg Oral Tonight   sodium chloride 3 mL Intravenous Q12H   , Continuous Infusions:   , PRN Meds:  •  acetaminophen  •  cyclobenzaprine  •  HYDROcodone-acetaminophen  •  ondansetron **OR** ondansetron  •  polyethylene glycol  •  senna-docusate sodium and Allergies:  Penicillins and Topamax [topiramate]    Objective      Vital Signs  Temp:  [97.7 °F (36.5 °C)-98.2 °F (36.8 °C)] 98 °F (36.7 °C)  Heart Rate:  [56-80] 80  Resp:  [16-17] 17  BP: (117-143)/(70-86) 136/82    Physical Exam  MENTAL STATUS -  AWAKE / ALERT  HEENT- NCAT,   SCLERA NON-ICTERIC, CONJUNCTIVA PINK, OP MOIST, cervical collar soft  LUNGS - CTA, NO WHEEZES, RALES OR RHONCHI  HEART- RRR, NO RUB, MURMUR, OR GALLOP  ABD - NORMOACTIVE BOWEL SOUNDS, SOFT, NT.  EXT - NO EDEMA OR CYANOSIS  NEURO -awake alert.  Oriented x4.  Light touch decreased distally in the hands and feet.  Pinprick was not available.  Proprioception accurate at the great toes bilaterally.  MOTOR EXAM -right/left shoulder flexion 5/4, elbow flexion 4/3+, wrist extension 3/3-, elbow extension 3/2, finger flexion 2-/2-, hand intrinsics 2-/2-, hip flexion 5/3, knee extension 5/4, ankle dorsiflexion 5/3.    Results Review:   Results from last 7 days   Lab Units 12/10/19  0517 12/09/19  0515 12/08/19  0506   WBC 10*3/mm3 10.02 12.98* 14.76*   HEMOGLOBIN g/dL 9.7* 8.7* 10.2*    HEMATOCRIT % 29.2* 26.9* 29.6*   PLATELETS 10*3/mm3 192 177 184     Results from last 7 days   Lab Units 12/10/19  0517 12/09/19  0515 12/07/19  0551   SODIUM mmol/L 139 142 134*   POTASSIUM mmol/L 3.5 3.6 4.6   CHLORIDE mmol/L 102 106 99   CO2 mmol/L 26.1 26.3 22.5   BUN mg/dL 12 9 10   CREATININE mg/dL 0.45* 0.47* 0.54*   CALCIUM mg/dL 8.3* 7.9* 8.1*   GLUCOSE mg/dL 135* 141* 152*        I reviewed the patient's new clinical results.        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  Add scheduled suppository every other day after lunch to take advantage of gastrocolic reflex.    Current plan will be to pursue acute inpatient rehab.  This would be a comprehensive interdisciplinary program with physical therapy 1.5 hours and occupational therapy 1.5 hours 5 days a week.  Areas to be addressed include gross and fine motor control, strengthening, ADL training with adaptive techniques, trunk control, transfers, balance, progressive ambulation.  Rehabilitation nursing for carryover monitoring of her neurologic status, bowel bladder and skin.  Ongoing physician  follow-up.  I discussed the patients findings and my recommendations with patient, family and nursing staff    Shiva Abraham MD  12/10/19  5:22 PM    Time:

## 2019-12-11 ENCOUNTER — PREP FOR SURGERY (OUTPATIENT)
Dept: OTHER | Facility: HOSPITAL | Age: 59
End: 2019-12-11

## 2019-12-11 DIAGNOSIS — G97.82 POSTOPERATIVE CSF LEAK: Primary | ICD-10-CM

## 2019-12-11 DIAGNOSIS — G96.00 POSTOPERATIVE CSF LEAK: Primary | ICD-10-CM

## 2019-12-11 LAB
DEPRECATED RDW RBC AUTO: 45.3 FL (ref 37–54)
ERYTHROCYTE [DISTWIDTH] IN BLOOD BY AUTOMATED COUNT: 13.4 % (ref 12.3–15.4)
HCT VFR BLD AUTO: 30.2 % (ref 34–46.6)
HGB BLD-MCNC: 10.1 G/DL (ref 12–15.9)
MCH RBC QN AUTO: 30.5 PG (ref 26.6–33)
MCHC RBC AUTO-ENTMCNC: 33.4 G/DL (ref 31.5–35.7)
MCV RBC AUTO: 91.2 FL (ref 79–97)
PLATELET # BLD AUTO: 224 10*3/MM3 (ref 140–450)
PMV BLD AUTO: 9.1 FL (ref 6–12)
RBC # BLD AUTO: 3.31 10*6/MM3 (ref 3.77–5.28)
WBC NRBC COR # BLD: 10.04 10*3/MM3 (ref 3.4–10.8)

## 2019-12-11 PROCEDURE — 99024 POSTOP FOLLOW-UP VISIT: CPT | Performed by: NURSE PRACTITIONER

## 2019-12-11 PROCEDURE — 25010000002 ONDANSETRON PER 1 MG: Performed by: NEUROLOGICAL SURGERY

## 2019-12-11 PROCEDURE — 97530 THERAPEUTIC ACTIVITIES: CPT

## 2019-12-11 PROCEDURE — 85027 COMPLETE CBC AUTOMATED: CPT | Performed by: NURSE PRACTITIONER

## 2019-12-11 PROCEDURE — 63710000001 METHYLPREDNISOLONE 4 MG TABLET THERAPY PACK 21 EACH DISP PACK: Performed by: NURSE PRACTITIONER

## 2019-12-11 PROCEDURE — 97112 NEUROMUSCULAR REEDUCATION: CPT | Performed by: OCCUPATIONAL THERAPIST

## 2019-12-11 RX ADMIN — ACETAMINOPHEN 650 MG: 325 TABLET, FILM COATED ORAL at 08:57

## 2019-12-11 RX ADMIN — DOCUSATE SODIUM 100 MG: 100 CAPSULE, LIQUID FILLED ORAL at 21:40

## 2019-12-11 RX ADMIN — SODIUM CHLORIDE, PRESERVATIVE FREE 3 ML: 5 INJECTION INTRAVENOUS at 08:08

## 2019-12-11 RX ADMIN — GABAPENTIN 100 MG: 100 CAPSULE ORAL at 21:40

## 2019-12-11 RX ADMIN — HYDROCODONE BITARTRATE AND ACETAMINOPHEN 1 TABLET: 5; 325 TABLET ORAL at 17:46

## 2019-12-11 RX ADMIN — GABAPENTIN 100 MG: 100 CAPSULE ORAL at 15:09

## 2019-12-11 RX ADMIN — GABAPENTIN 100 MG: 100 CAPSULE ORAL at 08:06

## 2019-12-11 RX ADMIN — ACETAMINOPHEN 650 MG: 325 TABLET, FILM COATED ORAL at 15:09

## 2019-12-11 RX ADMIN — DOCUSATE SODIUM 100 MG: 100 CAPSULE, LIQUID FILLED ORAL at 08:08

## 2019-12-11 RX ADMIN — METHYLPREDNISOLONE 4 MG: 4 TABLET ORAL at 08:07

## 2019-12-11 RX ADMIN — FAMOTIDINE 20 MG: 10 INJECTION INTRAVENOUS at 21:41

## 2019-12-11 RX ADMIN — ONDANSETRON 4 MG: 2 INJECTION INTRAMUSCULAR; INTRAVENOUS at 09:59

## 2019-12-11 RX ADMIN — HYDROCODONE BITARTRATE AND ACETAMINOPHEN 1 TABLET: 5; 325 TABLET ORAL at 12:28

## 2019-12-11 RX ADMIN — ATORVASTATIN CALCIUM 20 MG: 20 TABLET, FILM COATED ORAL at 21:40

## 2019-12-11 RX ADMIN — METHYLPREDNISOLONE 4 MG: 4 TABLET ORAL at 12:28

## 2019-12-11 RX ADMIN — SODIUM CHLORIDE, PRESERVATIVE FREE 3 ML: 5 INJECTION INTRAVENOUS at 21:42

## 2019-12-11 RX ADMIN — HYDROCODONE BITARTRATE AND ACETAMINOPHEN 1 TABLET: 5; 325 TABLET ORAL at 21:40

## 2019-12-11 RX ADMIN — METHYLPREDNISOLONE 4 MG: 4 TABLET ORAL at 17:46

## 2019-12-11 RX ADMIN — FAMOTIDINE 20 MG: 10 INJECTION INTRAVENOUS at 08:08

## 2019-12-11 RX ADMIN — METHYLPREDNISOLONE 8 MG: 4 TABLET ORAL at 21:42

## 2019-12-11 RX ADMIN — ACETAMINOPHEN 650 MG: 325 TABLET, FILM COATED ORAL at 04:46

## 2019-12-11 RX ADMIN — ONDANSETRON 4 MG: 2 INJECTION INTRAMUSCULAR; INTRAVENOUS at 17:46

## 2019-12-11 NOTE — PROGRESS NOTES
Continued Stay Note  [facility]     Patient Name: Noemi Bartholomew  MRN: [id number]  Today's Date: 12/11/2019    Admit Date: 12/6/2019    Discharge Plan     Row Name 12/11/19 1533       Plan    Plan  BAR (pending Pre-cert)    Plan Comments  Pre-cert still pending for BAR        Discharge Codes    No documentation.             Danay Kaufman RN

## 2019-12-11 NOTE — PLAN OF CARE
Problem: Patient Care Overview  Goal: Plan of Care Review  Flowsheets (Taken 12/11/2019 8882)  Progress: improving  Plan of Care Reviewed With: patient;sibling  Outcome Summary: pt completed opposition w. B digits, AROM/AAROM B hands, elbow, forearm and wrist and hands. pt progressing well and ed on built up foam for utensils. pt cont to benefit from OT to address FM, ADLs, and strength.

## 2019-12-11 NOTE — THERAPY TREATMENT NOTE
Patient Name: Noemi Bartholomew  : 1960    MRN: [id number]                              Today's Date: 2019       Admit Date: 2019    Visit Dx: No diagnosis found.  Patient Active Problem List   Diagnosis   • Carpal tunnel syndrome   • Hyperlipidemia   • Osteoporosis   • DDD (degenerative disc disease), lumbar   • Chronic left lumbar radiculopathy   • Congenital spondylolysis, lumbosacral region   • Neuropathic pain, leg, left   • Ossification of spinal ligament   • Spinal stenosis in cervical region   • Cervical spondylosis with myelopathy   • Cervical stenosis of spine   • Postoperative anemia due to acute blood loss   • Steroid-induced hyperglycemia   • Cord compression (CMS/HCC)   • Leukocytosis (due to steroids)     Past Medical History:   Diagnosis Date   • Abnormal alkaline phosphatase test 2015    Resolved   • Acute bronchitis 2015    Resolved   • Allergic child age    pencillin   • Cervical spinal stenosis    • Diverticulosis    • H/O electromyography 10/13/2014   • H/O mammogram 2014   • Headache occassionally   • High cholesterol    • History of EKG 2015   • Hypercalcemia 2015    Resolved, Full w/u done, Likely secondary to Forteo   • Left arm pain    • Low back pain 2017   • Numbness in both hands    • Osteoporosis    • Paresthesia 2015    Resolved     Past Surgical History:   Procedure Laterality Date   • BACK SURGERY     • COLONOSCOPY  2011   • SPINE SURGERY   back    dr abad quintero   • TONSILLECTOMY  child age   • TYMPANOPLASTY       General Information     Row Name 19 0956          PT Evaluation Time/Intention    Document Type  therapy note (daily note)  -     Mode of Treatment  physical therapy  -     Row Name 19 0956          General Information    Existing Precautions/Restrictions  fall;brace worn when out of bed soft c-collar  -     Row Name 19 0956          Cognitive Assessment/Intervention- PT/OT     Orientation Status (Cognition)  oriented x 3  -SM       User Key  (r) = Recorded By, (t) = Taken By, (c) = Cosigned By    Initials Name Provider Type    Harriet Davidson PTA Physical Therapy Assistant        Mobility     Row Name 12/11/19 0956          Bed Mobility Assessment/Treatment    Bed Mobility Assessment/Treatment  sit-supine  -SM     Supine-Sit [place] (Bed Mobility)  not tested  -     Sit-Supine [place] (Bed Mobility)  moderate assist (50% patient effort)  -SM     Row Name 12/11/19 0956          Sit-Stand Transfer    Sit-Stand [place] (Transfers)  contact guard  -     Assistive Device (Sit-Stand Transfers)  -- HHA  -SM     Row Name 12/11/19 0956          Gait/Stairs Assessment/Training    [place] Level (Gait)  minimum assist (75% patient effort)  -     Assistive Device (Gait)  -- HHA  -     Distance in Feet (Gait)  3  -SM     Pattern (Gait)  step-to  -SM     Deviations/Abnormal Patterns (Gait)  ally decreased;stride length decreased  -SM     Bilateral Gait Deviations  forward flexed posture  -     [place] Level (Stairs)  moderate assist (50% patient effort)  -     Comment (Gait/Stairs)  stepped up onto step stool, though difficulty bringing R LE after leading w/ L; required mod A to step up  -SM       User Key  (r) = Recorded By, (t) = Taken By, (c) = Cosigned By    Initials Name Provider Type    Harriet Davidson PTA Physical Therapy Assistant        Obj/Interventions     Row Name 12/11/19 1002          Therapeutic Exercise    Lower Extremity Range of Motion (Therapeutic Exercise)  ankle dorsiflexion/plantar flexion, bilateral  -       User Key  (r) = Recorded By, (t) = Taken By, (c) = Cosigned By    Initials Name Provider Type    Harriet Davidson PTA Physical Therapy Assistant        Goals/Plan    No documentation.       Clinical Impression     Row Name 12/11/19 1003          Pain Assessment    Additional Documentation  Pain Scale: Numbers  "Pre/Post-Treatment (Group)  -     Row Name 12/11/19 1003          Pain Scale: Numbers Pre/Post-Treatment    Pre/Post Treatment Pain Comment  \"headache and nauseas\"  -     Row Name 12/11/19 1003          Positioning and Restraints    Pre-Treatment Position  sitting in chair/recliner  -     Post Treatment Position  bed  -SM     In Bed  supine;call light within reach;encouraged to call for assist;with family/caregiver;with nsg  -       User Key  (r) = Recorded By, (t) = Taken By, (c) = Cosigned By    Initials Name Provider Type    Harriet Davidson PTA Physical Therapy Assistant        Outcome Measures     Row Name 12/11/19 1005          How much help from another person do you currently need...    Turning from your back to your side while in flat bed without using bedrails?  3  -SM     Moving from lying on back to sitting on the side of a flat bed without bedrails?  2  -SM     Moving to and from a bed to a chair (including a wheelchair)?  3  -SM     Standing up from a chair using your arms (e.g., wheelchair, bedside chair)?  3  -SM     Climbing 3-5 steps with a railing?  2  -SM     To walk in hospital room?  2  -SM     AM-PAC 6 Clicks Score (PT)  15  -     Row Name 12/11/19 1005          Functional Assessment    Outcome Measure Options  AM-PAC 6 Clicks Basic Mobility (PT)  -       User Key  (r) = Recorded By, (t) = Taken By, (c) = Cosigned By    Initials Name Provider Type    Harriet Davidson PTA Physical Therapy Assistant          PT Recommendation and Plan     Outcome Summary/Treatment Plan (PT)  Anticipated Discharge Disposition (PT): skilled nursing facility  Plan of Care Reviewed With: patient  Progress: improving  Outcome Summary: Pt tolerated treatment fair this date. C/o headache and nausea, limiting overall activity. Pt requested to get back in bed, required min A to ambulate 3ft, then mod A to step onto step stool. Good success leading w/ L LE on step, though increased difficulty " raising R LE. Encouraged pt to perform LE exercises in bed throughout the day. Pt will continue to address functional mobility deficits as tolerated.     Time Calculation:   PT Charges     Row Name 12/11/19 1009             Time Calculation    Start Time  0938  -      Stop Time  0954  -      Time Calculation (min)  16 min  -      PT Received On  12/11/19  -      PT - Next Appointment  12/12/19  -        User Key  (r) = Recorded By, (t) = Taken By, (c) = Cosigned By    Initials Name Provider Type     Harriet Vincent PTA Physical Therapy Assistant        Therapy Charges for Today     Code Description Service Date Service Provider Modifiers Qty    15863935638 HC PT THERAPEUTIC ACT EA 15 MIN 12/11/2019 Harriet Vincent PTA GP 1          PT G-Codes  Outcome Measure Options: AM-PAC 6 Clicks Basic Mobility (PT)  AM-PAC 6 Clicks Score (PT): 15  AM-PAC 6 Clicks Score (OT): 6    Harriet Vincent PTA  12/11/2019

## 2019-12-11 NOTE — PROGRESS NOTES
Postop   LOS: 5 days   Patient Care Team:  Mary Lou Carroll MD as PCP - General (Internal Medicine)    Chief Complaint: Postop cervical fusion    Subjective     Complains of headache 10/10 this morning.  She denied it being positional but then stated it was worse after she sat up in the chair this morning.  Sister at bedside.    Interval History:     History taken from: patient chart RN    Objective      Alert and orient x3  Sensation intact with subjective dysesthesia in both hands  No hoarseness of voice  Anterior cervical incision dry and intact, no swelling, steri-strips in place  Soft collar on  Bilateral upper extremity reflexes 3/4, it appears preop bilateral Rivers's has resolved  Bilateral hand grasps 3/5  Lung effort normal    Vital Signs  Temp:  [97.6 °F (36.4 °C)-98.4 °F (36.9 °C)] 98 °F (36.7 °C)  Heart Rate:  [52-80] 61  Resp:  [14-17] 16  BP: (126-136)/(75-85) 127/77       Results Review:     I reviewed the patient's new clinical results.    .  Results from last 7 days   Lab Units 12/11/19  0457 12/10/19  0517 12/09/19  0515   WBC 10*3/mm3 10.04 10.02 12.98*   HEMOGLOBIN g/dL 10.1* 9.7* 8.7*   HEMATOCRIT % 30.2* 29.2* 26.9*   PLATELETS 10*3/mm3 224 192 177       Assessment/Plan       Spinal stenosis in cervical region    Hyperlipidemia    Ossification of spinal ligament    Cervical spondylosis with myelopathy    Cervical stenosis of spine    Postoperative anemia due to acute blood loss    Steroid-induced hyperglycemia    Cord compression (CMS/HCC)    Leukocytosis (due to steroids)    Postop day #5 anterior cervical corpectomy C6-C7 with cage and plate C5-T1 for cervical spinal stenosis with myelopathy and OPLL by Dr. Noe.  Incidental durotomy during decompression.  She is doing fairly well from a surgical perspective.  Bilateral upper extremity weakness persists, but she feels like she has had some improvement.  No leukocytosis today and hemoglobin improving.  Dr. Abraham has evaluated for  acute rehab.  She reports a severe headache today 10/10.  She says she has had a mild headache since surgery but worse today.  Did encourage her to try Norco as she has been trying to avoid pain medication.  Has also had some postop nausea. Discussed the headache with Dr Noe. Will place her flat again and keep her NPO after midnight. Will recheck in the morning to see if the headache persists with HOB elevated early tomorrow. If it does, Dr Noe will likely take her to OR for lumbar drain and discuss this with the patient prior to proceeding.         Kyung Hamilton, APRN  12/11/19  11:18 AM

## 2019-12-11 NOTE — PROGRESS NOTES
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  Also has a complaint of headache.  From neurosurgery's description sounds positional.  She continues with weakness in the upper extremities.  Does not describe any new change in her strength today.  Voiding on her own.  Has not yet had a bowel movement.  To do   suppository after lunch today.    History taken from: patient chart    Objective     Vital Signs  Temp:  [97.6 °F (36.4 °C)-98.4 °F (36.9 °C)] 98 °F (36.7 °C)  Heart Rate:  [52-65] 61  Resp:  [14-16] 16  BP: (126-135)/(75-85) 127/77    Objective  Physical Exam  MENTAL STATUS -  AWAKE / ALERT  HEENT- NCAT,   SCLERA NON-ICTERIC, CONJUNCTIVA PINK, OP MOIST, cervical collar soft  LUNGS - CTA, NO WHEEZES, RALES OR RHONCHI  HEART- RRR, NO RUB, MURMUR, OR GALLOP  ABD - NORMOACTIVE BOWEL SOUNDS, SOFT, NT.  EXT - NO EDEMA OR CYANOSIS  NEURO -awake alert.  Oriented x4.  Light touch decreased distally in the hands and feet.  Pinprick was not available.  Proprioception accurate at the great toes bilaterally.  MOTOR EXAM -right/left shoulder flexion 5/4, elbow flexion 4/3+, wrist extension 3/3-, elbow extension 3/2, finger flexion 2-/2-, hand intrinsics 2-/2-, hip flexion 5/3, knee extension 5/4, ankle dorsiflexion 5/3.  Results Review:     I reviewed the patient's new clinical  results.  Results from last 7 days   Lab Units 12/11/19  0457 12/10/19  0517 12/09/19  0515   WBC 10*3/mm3 10.04 10.02 12.98*   HEMOGLOBIN g/dL 10.1* 9.7* 8.7*   HEMATOCRIT % 30.2* 29.2* 26.9*   PLATELETS 10*3/mm3 224 192 177     Results from last 7 days   Lab Units 12/10/19  0517 12/09/19  0515 12/07/19  0551   SODIUM mmol/L 139 142 134*   POTASSIUM mmol/L 3.5 3.6 4.6   CHLORIDE mmol/L 102 106 99   CO2 mmol/L 26.1 26.3 22.5   BUN mg/dL 12 9 10   CREATININE mg/dL 0.45* 0.47* 0.54*   CALCIUM mg/dL 8.3* 7.9* 8.1*   GLUCOSE mg/dL 135* 141* 152*       Medication Review: done  Scheduled Meds:  atorvastatin 20 mg Oral Nightly   bisacodyl 10 mg Rectal Every Other Day   cetirizine 10 mg Oral Daily   docusate sodium 100 mg Oral BID   famotidine 20 mg Intravenous Q12H   fluticasone 1 spray Nasal Nightly   gabapentin 100 mg Oral Q8H   methylPREDNISolone 4 mg Oral TID Around Food   [START ON 12/12/2019] methylPREDNISolone 4 mg Oral 4x Daily Taper   [START ON 12/13/2019] methylPREDNISolone 4 mg Oral TID Around Food   [START ON 12/14/2019] methylPREDNISolone 4 mg Oral Before Breakfast   [START ON 12/14/2019] methylPREDNISolone 4 mg Oral Tonight   [START ON 12/15/2019] methylPREDNISolone 4 mg Oral Before Breakfast   methylPREDNISolone 8 mg Oral Tonight   sodium chloride 3 mL Intravenous Q12H     Continuous Infusions:   PRN Meds:.•  acetaminophen  •  cyclobenzaprine  •  HYDROcodone-acetaminophen  •  ondansetron **OR** ondansetron  •  polyethylene glycol  •  senna-docusate sodium      Assessment/Plan       Spinal stenosis in cervical region    Hyperlipidemia    Ossification of spinal ligament    Cervical spondylosis with myelopathy    Cervical stenosis of spine    Postoperative anemia due to acute blood loss    Steroid-induced hyperglycemia    Cord compression (CMS/HCC)    Leukocytosis (due to steroids)      Assessment & Plan  Cervical myelopathy with upper extremity greater than  lower extremity and left side greater than right  side involvement  History of cervical stenosis C5 to T1 with 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  Add scheduled suppository every other day after lunch to take advantage of gastrocolic reflex.     Current plan will be to pursue acute inpatient rehab.  This would be a comprehensive interdisciplinary program with physical therapy 1.5 hours and occupational therapy 1.5 hours 5 days a week.  Areas to be addressed include gross and fine motor control, strengthening, ADL training with adaptive techniques, trunk control, transfers, balance, progressive ambulation.  Rehabilitation nursing for carryover monitoring of her neurologic status, bowel bladder and skin.  Ongoing physician follow-up.    Shiva Abraham MD  12/11/19  1:05 PM    Time:

## 2019-12-11 NOTE — PLAN OF CARE
A+0x4.   and upper extremity strength very weak.  C/o severe HA today relieved some w/ tylenol and norco.  Ice between shoulder blades greatly helping stiffness per patient.  NPO at MN and strict bed rest for rest of day per Dr. WOODS  Neurosurgery to re-evaluate in AM for possible continued CSF leak.  Continue to monitor and progress towards goals as tolerated.

## 2019-12-11 NOTE — PLAN OF CARE
Problem: Patient Care Overview  Goal: Plan of Care Review  Outcome: Ongoing (interventions implemented as appropriate)  Flowsheets (Taken 12/11/2019 1006)  Progress: improving  Plan of Care Reviewed With: patient  Outcome Summary: Pt tolerated treatment fair this date. C/o headache and nausea, limiting overall activity. Pt requested to get back in bed, required min A to ambulate 3ft, then mod A to step onto step stool. Good success leading w/ L LE on step, though increased difficulty raising R LE. Encouraged pt to perform LE exercises in bed throughout the day. Pt will continue to address functional mobility deficits as tolerated.

## 2019-12-11 NOTE — THERAPY TREATMENT NOTE
Acute Care - Occupational Therapy Treatment Note  [facility]     Patient Name: Noemi Bartholomew  : 1960  MRN: [id number]  Today's Date: 2019             Admit Date: 2019     No diagnosis found.  Patient Active Problem List   Diagnosis   • Carpal tunnel syndrome   • Hyperlipidemia   • Osteoporosis   • DDD (degenerative disc disease), lumbar   • Chronic left lumbar radiculopathy   • Congenital spondylolysis, lumbosacral region   • Neuropathic pain, leg, left   • Ossification of spinal ligament   • Spinal stenosis in cervical region   • Cervical spondylosis with myelopathy   • Cervical stenosis of spine   • Postoperative anemia due to acute blood loss   • Steroid-induced hyperglycemia   • Cord compression (CMS/HCC)   • Leukocytosis (due to steroids)     Past Medical History:   Diagnosis Date   • Abnormal alkaline phosphatase test 2015    Resolved   • Acute bronchitis 2015    Resolved   • Allergic child age    pencillin   • Cervical spinal stenosis    • Diverticulosis    • H/O electromyography 10/13/2014   • H/O mammogram 2014   • Headache occassionally   • High cholesterol    • History of EKG 2015   • Hypercalcemia 2015    Resolved, Full w/u done, Likely secondary to Forteo   • Left arm pain    • Low back pain 2017   • Numbness in both hands    • Osteoporosis    • Paresthesia 2015    Resolved     Past Surgical History:   Procedure Laterality Date   • BACK SURGERY     • COLONOSCOPY  2011   • SPINE SURGERY   back    dr abad quintero   • TONSILLECTOMY  child age   • TYMPANOPLASTY         Therapy Treatment    Rehabilitation Treatment Summary     Row Name 19 1553             Treatment Time/Intention    Discipline  occupational therapist  -      Document Type  therapy note (daily note)  -      Subjective Information  no complaints  -      Mode of Treatment  individual therapy;occupational therapy  -      Patient/Family Observations   pt supine in bed. sister present in room.  -      Care Plan Review  care plan/treatment goals reviewed  -      Care Plan Review, Other Participant(s)  sibling  -      Patient Effort  excellent  -KP      Comment  tx in bed, due to posisble csf leak. bed rest orders. but allowed to complete therapy ex in bed  -KP      Existing Precautions/Restrictions  fall;other (see comments);spinal neck brace at all times  -KP      Recorded by [] Aster Ziegler, OTR 12/11/19 1557      Row Name 12/11/19 1553             Cognitive Assessment/Intervention- PT/OT    Orientation Status (Cognition)  oriented x 4  -KP      Follows Commands (Cognition)  WFL  -KP      Recorded by [] Aster Ziegler, OTR 12/11/19 1557      Row Name 12/11/19 1553             Bed Mobility Assessment/Treatment    Comment (Bed Mobility)  NT  -KP      Recorded by [] Aster Ziegler, OTR 12/11/19 1557      Row Name 12/11/19 1553             Grooming Assessment/Training    Comment (Grooming)  washed up this am w. nsg aide A  -KP      Recorded by [] Aster Ziegler, OTR 12/11/19 1557      Row Name 12/11/19 1553             Motor Skills Assessment/Interventions    Additional Documentation  Therapeutic Exercise (Group);Therapeutic Exercise Interventions (Group);Fine Motor Testing & Training (Group)  -KP      Recorded by [] Aster Ziegler, OTR 12/11/19 1557      Row Name 12/11/19 1553             Therapeutic Exercise    Upper Extremity (Therapeutic Exercise)  bicep curl, right;bicep curl, left;tricep extension, left;tricep extension, right  -      Upper Extremity Range of Motion (Therapeutic Exercise)  wrist flexion/extension, left;wrist flexion/extension, right;forearm supination/pronation, left;forearm supination/pronation, right  -KP      Hand (Therapeutic Exercise)  hand , right;hand , left;thumb finger opposition, right;thumb finger opposition, left  -KP      Exercise Type (Therapeutic Exercise)  AROM  (active range of motion);AAROM (active assistive range of motion)  -KP      Position (Therapeutic Exercise)  supine  -KP      Sets/Reps (Therapeutic Exercise)  10x3  -KP      Recorded by [KP] Aster Ziegler, OTR 12/11/19 1557      Row Name 12/11/19 1553             Fine Motor Testing & Training    Comment, Fine Motor Coordination  opposition. and built up foam supplied and ed and demo for pt on how to use  -KP      Recorded by [KP] Aster Ziegler OTR 12/11/19 1557      Row Name 12/11/19 1553             Positioning and Restraints    Pre-Treatment Position  in bed  -KP      Post Treatment Position  bed  -KP      In Bed  supine;call light within reach;encouraged to call for assist;exit alarm on;with family/caregiver  -KP      Recorded by [KP] Aster Ziegler, OTR 12/11/19 1557      Row Name 12/11/19 1553             Pain Scale: Numbers Pre/Post-Treatment    Pain Scale: Numbers, Pretreatment  5/10  -KP      Pain Scale: Numbers, Post-Treatment  5/10  -KP      Pain Location  head  -KP      Pain Intervention(s)  Medication (See MAR)  -KP      Recorded by [KP] Aster Ziegler, OTR 12/11/19 1557      Row Name                Wound 12/06/19 2151 Other (See comments) neck Incision    Wound - Properties Group Date first assessed: 12/06/19 [JF] Time first assessed: 2151 [JF] Side: Other (See comments) [JF] Location: neck [JF] Primary Wound Type: Incision [JF] Recorded by:  [FIDEL] Shannon Beltran RN 12/06/19 2151    Row Name                Wound 12/06/19 2153 Other (See comments) neck Incision    Wound - Properties Group Date first assessed: 12/06/19 [JF] Time first assessed: 2153 [JF] Side: Other (See comments) [JF] Location: neck [JF] Primary Wound Type: Incision [JF] Recorded by:  [FIDEL] Shannon Beltran RN 12/06/19 2153    Row Name 12/11/19 1553             Plan of Care Review    Plan of Care Reviewed With  patient;sibling  -KP      Progress  improving  -KP      Outcome Summary  pt completed heavenly tapia  B digits, AROM/AAROM B hands, elbow, forearm and wrist and hands. pt progressing well and ed on built up foam for utensils. pt cont to benefit from OT to address FM, ADLs, and strength.  -KP      Recorded by [KP] Aster Ziegler, OTR 12/11/19 1557      Row Name 12/11/19 1553             Outcome Summary/Treatment Plan (OT)    Daily Summary of Progress (OT)  progress toward functional goals is good  -KP      Recorded by [KP] Aster Ziegler, OTR 12/11/19 1557        User Key  (r) = Recorded By, (t) = Taken By, (c) = Cosigned By    Initials Name Effective Dates Discipline    KP Aster Ziegler OTR 06/08/18 -  OT    JF Shannon Beltran RN 06/16/16 -  Nurse        Wound 12/06/19 2151 Other (See comments) neck Incision (Active)   Dressing Appearance dry;intact;dried drainage 12/11/2019  8:30 AM   Closure Liquid skin adhesive;Approximated 12/11/2019  8:30 AM   Base blanchable;pink 12/11/2019  8:30 AM   Periwound ecchymotic;swelling 12/11/2019  8:30 AM   Periwound Temperature warm 12/11/2019  8:30 AM   Periwound Skin Turgor firm 12/11/2019  8:30 AM   Drainage Characteristics/Odor serosanguineous 12/10/2019 10:54 PM   Drainage Amount scant 12/10/2019 10:54 PM   Dressing Care, Wound open to air 12/11/2019  8:30 AM       Wound 12/06/19 2153 Other (See comments) neck Incision (Active)   Dressing Appearance dry;intact 12/11/2019  8:30 AM   Closure Liquid skin adhesive 12/11/2019  8:30 AM   Base pink 12/11/2019  8:30 AM   Periwound Skin Turgor firm 12/11/2019  8:30 AM           OT Recommendation and Plan  Outcome Summary/Treatment Plan (OT)  Daily Summary of Progress (OT): progress toward functional goals is good  Daily Summary of Progress (OT): progress toward functional goals is good  Plan of Care Review  Plan of Care Reviewed With: patient, sibling  Plan of Care Reviewed With: patient, sibling  Outcome Summary: pt completed opposition w. B digits, AROM/AAROM B hands, elbow, forearm and wrist and hands. pt  progressing well and ed on built up foam for utensils. pt cont to benefit from OT to address FM, ADLs, and strength.  Outcome Measures     Row Name 12/11/19 1557 12/10/19 1436          How much help from another is currently needed...    Putting on and taking off regular lower body clothing?  2  -KP  1  -SG     Bathing (including washing, rinsing, and drying)  2  -KP  1  -SG     Toileting (which includes using toilet bed pan or urinal)  2  -KP  1  -SG     Putting on and taking off regular upper body clothing  2  -KP  1  -SG     Taking care of personal grooming (such as brushing teeth)  2  -KP  1  -SG     Eating meals  2  -KP  1  -SG     AM-PAC 6 Clicks Score (OT)  12  -  6  -SG        Functional Assessment    Outcome Measure Options  AM-PAC 6 Clicks Daily Activity (OT)  -KP  AM-PAC 6 Clicks Daily Activity (OT)  -       User Key  (r) = Recorded By, (t) = Taken By, (c) = Cosigned By    Initials Name Provider Type    Danay Tran OTR Occupational Therapist    Aster Bush OTR Occupational Therapist           Time Calculation:   Time Calculation- OT     Row Name 12/11/19 1557             Time Calculation- OT    OT Start Time  1504  -      OT Stop Time  1532  -      OT Time Calculation (min)  28 min  -      Total Timed Code Minutes- OT  28 minute(s)  -      OT Received On  12/11/19  -        User Key  (r) = Recorded By, (t) = Taken By, (c) = Cosigned By    Initials Name Provider Type    Aster Bush OTR Occupational Therapist        Therapy Charges for Today     Code Description Service Date Service Provider Modifiers Qty    32260282287  OT NEUROMUSC RE EDUCATION EA 15 MIN 12/11/2019 Aster Ziegler OTR GO 2               BREONNA Jimenez  12/11/2019

## 2019-12-12 PROCEDURE — 97112 NEUROMUSCULAR REEDUCATION: CPT | Performed by: OCCUPATIONAL THERAPIST

## 2019-12-12 PROCEDURE — 25010000002 ONDANSETRON PER 1 MG: Performed by: NEUROLOGICAL SURGERY

## 2019-12-12 PROCEDURE — 63710000001 METHYLPREDNISOLONE 4 MG TABLET THERAPY PACK 21 EACH DISP PACK: Performed by: NURSE PRACTITIONER

## 2019-12-12 PROCEDURE — 99024 POSTOP FOLLOW-UP VISIT: CPT | Performed by: NURSE PRACTITIONER

## 2019-12-12 PROCEDURE — 97116 GAIT TRAINING THERAPY: CPT

## 2019-12-12 PROCEDURE — 97110 THERAPEUTIC EXERCISES: CPT

## 2019-12-12 RX ORDER — SENNA AND DOCUSATE SODIUM 50; 8.6 MG/1; MG/1
2 TABLET, FILM COATED ORAL NIGHTLY
Status: DISCONTINUED | OUTPATIENT
Start: 2019-12-12 | End: 2019-12-13 | Stop reason: HOSPADM

## 2019-12-12 RX ORDER — FLUTICASONE PROPIONATE 50 MCG
1 SPRAY, SUSPENSION (ML) NASAL NIGHTLY PRN
Status: DISCONTINUED | OUTPATIENT
Start: 2019-12-12 | End: 2019-12-13 | Stop reason: HOSPADM

## 2019-12-12 RX ADMIN — SENNOSIDES AND DOCUSATE SODIUM 2 TABLET: 8.6; 5 TABLET ORAL at 20:50

## 2019-12-12 RX ADMIN — ATORVASTATIN CALCIUM 20 MG: 20 TABLET, FILM COATED ORAL at 20:50

## 2019-12-12 RX ADMIN — METHYLPREDNISOLONE 4 MG: 4 TABLET ORAL at 18:00

## 2019-12-12 RX ADMIN — SODIUM CHLORIDE, PRESERVATIVE FREE 3 ML: 5 INJECTION INTRAVENOUS at 11:34

## 2019-12-12 RX ADMIN — METHYLPREDNISOLONE 4 MG: 4 TABLET ORAL at 20:49

## 2019-12-12 RX ADMIN — POLYETHYLENE GLYCOL 3350 17 G: 17 POWDER, FOR SOLUTION ORAL at 11:34

## 2019-12-12 RX ADMIN — FAMOTIDINE 20 MG: 10 INJECTION INTRAVENOUS at 11:33

## 2019-12-12 RX ADMIN — METHYLPREDNISOLONE 4 MG: 4 TABLET ORAL at 11:33

## 2019-12-12 RX ADMIN — GABAPENTIN 100 MG: 100 CAPSULE ORAL at 23:46

## 2019-12-12 RX ADMIN — FAMOTIDINE 20 MG: 10 INJECTION INTRAVENOUS at 20:50

## 2019-12-12 RX ADMIN — ONDANSETRON 4 MG: 2 INJECTION INTRAMUSCULAR; INTRAVENOUS at 11:33

## 2019-12-12 RX ADMIN — HYDROCODONE BITARTRATE AND ACETAMINOPHEN 1 TABLET: 5; 325 TABLET ORAL at 15:59

## 2019-12-12 RX ADMIN — GABAPENTIN 100 MG: 100 CAPSULE ORAL at 15:58

## 2019-12-12 RX ADMIN — METHYLPREDNISOLONE 4 MG: 4 TABLET ORAL at 12:57

## 2019-12-12 RX ADMIN — DOCUSATE SODIUM 100 MG: 100 CAPSULE, LIQUID FILLED ORAL at 11:34

## 2019-12-12 RX ADMIN — SODIUM CHLORIDE, PRESERVATIVE FREE 3 ML: 5 INJECTION INTRAVENOUS at 20:51

## 2019-12-12 RX ADMIN — ACETAMINOPHEN 650 MG: 325 TABLET, FILM COATED ORAL at 11:34

## 2019-12-12 NOTE — PROGRESS NOTES
NEUROSURGERY PROGRESS NOTE    PATIENT IDENTIFICATION:   Name:  Noemi Bartholomew      MRN:  [id number]     59 y.o.  female               CC: POD #6 s/p anterior cervical corpectomy C6-C7 with cage and plate C5-T1 for cervical spinal stenosis with myelopathy/ BUE weakness      Subjective     Interval History: has complaints of ongoing hand N/T/weakness. HA much better this morning, sitting up in chair at beside    Objective     Vital signs in last 24 hours:  Temp:  [97.6 °F (36.4 °C)-98.7 °F (37.1 °C)] 97.8 °F (36.6 °C)  Heart Rate:  [57-70] 70  Resp:  [14-18] 17  BP: (116-142)/(74-87) 142/87  ICP ranges-    Intake/Output last 3 shifts:  No intake/output data recorded.    Intake/Output this shift:  No intake/output data recorded.      Physical Exam:  General:   Awake, alert, and oriented x 3. NAD  Appears well  Sitting up in bedside chair  Well cervical soft collar  R ant incision CDI with steri strips, normal surrounding skin  Decreased strength BUE, worse distally  Able to touch all fingers to thumb- improvement with dexterity/fine motor movement  Full cervical ROM  Walking with assistance      LABS:    Lab Results (last 24 hours)     ** No results found for the last 24 hours. **          IMAGING STUDIES:    No new imaging      Meds reviewed/changed: Yes    Current Facility-Administered Medications   Medication Dose Route Frequency Provider Last Rate Last Dose   • acetaminophen (TYLENOL) tablet 650 mg  650 mg Oral Q4H PRN Antonio Noe MD   650 mg at 12/11/19 1509   • atorvastatin (LIPITOR) tablet 20 mg  20 mg Oral Nightly Antonio Noe MD   20 mg at 12/11/19 2140   • bisacodyl (DULCOLAX) suppository 10 mg  10 mg Rectal Every Other Day Shiva Abraham MD   Stopped at 12/11/19 1228   • cetirizine (zyrTEC) tablet 10 mg  10 mg Oral Daily Antonio Noe MD       • cyclobenzaprine (FLEXERIL) tablet 10 mg  10 mg Oral TID PRN Antonio Noe MD       • docusate sodium (COLACE) capsule 100 mg  100 mg  Oral BID Audrey Funez APRN   100 mg at 12/11/19 2140   • famotidine (PEPCID) injection 20 mg  20 mg Intravenous Q12H Audrey Funez APRN   20 mg at 12/11/19 2141   • fluticasone (FLONASE) 50 MCG/ACT nasal spray 1 spray  1 spray Nasal Nightly Antonio Noe MD   1 spray at 12/09/19 2122   • gabapentin (NEURONTIN) capsule 100 mg  100 mg Oral Q8H Audrey Funez APRN   100 mg at 12/11/19 2140   • HYDROcodone-acetaminophen (NORCO) 5-325 MG per tablet 1 tablet  1 tablet Oral Q4H PRN Antonio Noe MD   1 tablet at 12/11/19 2140   • methylPREDNISolone (MEDROL (JESUS)) tablet 4 mg  4 mg Oral 4x Daily Taper Audrey Funez APRN       • [START ON 12/13/2019] methylPREDNISolone (MEDROL (JESUS)) tablet 4 mg  4 mg Oral TID Around Food Audrey Funez APRN       • [START ON 12/14/2019] methylPREDNISolone (MEDROL (JESUS)) tablet 4 mg  4 mg Oral Before Breakfast Audrey Funez APRN       • [START ON 12/14/2019] methylPREDNISolone (MEDROL (JESUS)) tablet 4 mg  4 mg Oral Tonight Audrey Funez APRN       • [START ON 12/15/2019] methylPREDNISolone (MEDROL (JESUS)) tablet 4 mg  4 mg Oral Before Breakfast Audrey Funez APRN       • ondansetron (ZOFRAN) tablet 4 mg  4 mg Oral Q6H PRN Antonio Noe MD   4 mg at 12/09/19 0942    Or   • ondansetron (ZOFRAN) injection 4 mg  4 mg Intravenous Q6H PRN Antonio Noe MD   4 mg at 12/11/19 1746   • polyethylene glycol (MIRALAX) powder 17 g  17 g Oral Daily PRN Nicole Wagner PA-C   17 g at 12/10/19 0920   • senna-docusate sodium (SENOKOT-S) 8.6-50 MG tablet 1 tablet  1 tablet Oral Nightly PRN Antonio Noe MD   1 tablet at 12/10/19 7443   • sodium chloride 0.9 % flush 3 mL  3 mL Intravenous Q12H Antoino Noe MD   3 mL at 12/11/19 2226       Assessment/Plan     ASSESSMENT:      Spinal stenosis in cervical region    Hyperlipidemia    Ossification of spinal ligament    Cervical spondylosis with myelopathy    Cervical stenosis of spine    Postoperative anemia due  "to acute blood loss    Steroid-induced hyperglycemia    Cord compression (CMS/HCC)    Leukocytosis (due to steroids)    Postoperative CSF leak      PLAN: HA pain is much better this morning. Will hold off on lumbar drain for now and she can resume a diet. Continue therapies, hopeful DC to [facility] soon.      I discussed the patients findings and my recommendations with patient, nursing staff and Dr Noe       LOS: 6 days       Ruby Preciado, APRN  12/12/2019  10:12 AM      \"Dictated utilizing Dragon dictation\".      "

## 2019-12-12 NOTE — PLAN OF CARE
Pt cont to complain of headache, dull and consistant. MD aware and plan to eval pt tomorrow for treatment plan for possibly OR. Family at bs with quesitons and concerns addressed, pain tolerated with po pain meds per mar. Pt NPO at midnight for possibility of surgical treatment on 12/12. VSS no s/s of distress noted, will cont to monitor

## 2019-12-12 NOTE — PROGRESS NOTES
Continued Stay Note  [facility]     Patient Name: Noemi Bartholomew  MRN: [id number]  Today's Date: 12/12/2019    Admit Date: 12/6/2019    Discharge Plan     Row Name 12/12/19 1557       Plan    Plan  [facility]    Plan Comments  Poss admit to [facility] tomorrow. Pre-cert obtained.        Discharge Codes    No documentation.             Danay Kaufman RN

## 2019-12-12 NOTE — PLAN OF CARE
Problem: Patient Care Overview  Goal: Plan of Care Review  Flowsheets (Taken 12/12/2019 0614)  Progress: improving  Plan of Care Reviewed With: patient;sibling  Outcome Summary: pt complete opposition w. B hands, wrist fl/ext, elbow fl/ext. and forearm pron/sup. Sat EOB SBA. Bed mob min A. pt cont to benefit from OT to incr ADL, FMC, and tsf.

## 2019-12-12 NOTE — NURSING NOTE
Precert obtained. Events noted. WIll continue to follow progress and will plan for admit when medically ready. WIll inform insurance of delayed admission.     Jenny Casas RN  Acute Rehab Admission Nurse

## 2019-12-12 NOTE — THERAPY TREATMENT NOTE
Acute Care - Occupational Therapy Treatment Note  [facility]     Patient Name: Noemi Bartholomew  : 1960  MRN: [id number]  Today's Date: 2019             Admit Date: 2019     No diagnosis found.  Patient Active Problem List   Diagnosis   • Carpal tunnel syndrome   • Hyperlipidemia   • Osteoporosis   • DDD (degenerative disc disease), lumbar   • Chronic left lumbar radiculopathy   • Congenital spondylolysis, lumbosacral region   • Neuropathic pain, leg, left   • Ossification of spinal ligament   • Spinal stenosis in cervical region   • Cervical spondylosis with myelopathy   • Cervical stenosis of spine   • Postoperative anemia due to acute blood loss   • Steroid-induced hyperglycemia   • Cord compression (CMS/HCC)   • Leukocytosis (due to steroids)   • Postoperative CSF leak     Past Medical History:   Diagnosis Date   • Abnormal alkaline phosphatase test 2015    Resolved   • Acute bronchitis 2015    Resolved   • Allergic child age    pencillin   • Cervical spinal stenosis    • Diverticulosis    • H/O electromyography 10/13/2014   • H/O mammogram 2014   • Headache occassionally   • High cholesterol    • History of EKG 2015   • Hypercalcemia 2015    Resolved, Full w/u done, Likely secondary to Forteo   • Left arm pain    • Low back pain 2017   • Numbness in both hands    • Osteoporosis    • Paresthesia 2015    Resolved     Past Surgical History:   Procedure Laterality Date   • BACK SURGERY     • COLONOSCOPY  2011   • SPINE SURGERY   back    dr abad quintero   • TONSILLECTOMY  child age   • TYMPANOPLASTY         Therapy Treatment    Rehabilitation Treatment Summary     Row Name 19 1539             Treatment Time/Intention    Discipline  occupational therapist  -      Document Type  therapy note (daily note)  -      Subjective Information  no complaints  -      Mode of Treatment  individual therapy;occupational therapy  -       Patient/Family Observations  pt supine in bed. sister present in room  -KP      Patient Effort  excellent  -KP      Comment  pt allowed to get OOB now  -KP      Existing Precautions/Restrictions  fall;spinal;other (see comments) neck brace as needed now per pt.   -KP      Recorded by [KP] Aster Ziegler, OTR 12/12/19 1543      Row Name 12/12/19 1539             Cognitive Assessment/Intervention- PT/OT    Orientation Status (Cognition)  oriented x 4  -KP      Follows Commands (Cognition)  WFL  -KP      Recorded by [KP] Aster Ziegler, OTR 12/12/19 1543      Row Name 12/12/19 1539             Bed Mobility Assessment/Treatment    Supine-Sit [place] (Bed Mobility)  set up;minimum assist (75% patient effort)  -KP      Sit-Supine [place] (Bed Mobility)  set up;minimum assist (75% patient effort)  -KP      Recorded by [KP] Aster Ziegler, OTR 12/12/19 1543      Row Name 12/12/19 1539             Grooming Assessment/Training    Comment (Grooming)  states she doesn't want to wash up now, would rather work on FMC and UE strength  -KP      Recorded by [KP] Aster Ziegler, OTR 12/12/19 1543      Row Name 12/12/19 1539             Therapeutic Exercise    Upper Extremity (Therapeutic Exercise)  bicep curl, left;bicep curl, right  -KP      Upper Extremity Range of Motion (Therapeutic Exercise)  forearm supination/pronation, right;forearm supination/pronation, left;wrist flexion/extension, left;wrist flexion/extension, right;elbow flexion/extension, left;elbow flexion/extension, right  -KP      Hand (Therapeutic Exercise)  thumb finger opposition, left;thumb finger opposition, right;finger flexion/extension, right;finger flexion/extension, left  -KP      Exercise Type (Therapeutic Exercise)  AROM (active range of motion);AAROM (active assistive range of motion)  -KP      Position (Therapeutic Exercise)  seated;supine  -KP      Sets/Reps (Therapeutic Exercise)  10x3 w rest breaks. AAROM L UE  at times, also AROM w. L UE. R UE AROM  -KP      Expected Outcome (Therapeutic Exercise)  improve neuromuscular control;improve performance, BADLs;improve performance, transfer skills;improve functional tolerance, self-care activity;improve motor control  -KP      Recorded by [KP] Aster Ziegler, OTR 12/12/19 1543      Row Name 12/12/19 1539             Static Sitting Balance    Level of [place] (Unsupported Sitting, Static Balance)  supervision  -KP      Sitting Position (Unsupported Sitting, Static Balance)  sitting on edge of bed  -KP      Time Able to Maintain Position (Unsupported Sitting, Static Balance)  more than 5 minutes 10 minutes  -KP      Recorded by [KP] Aster Ziegler OTR 12/12/19 1543      Row Name 12/12/19 1539             Fine Motor Testing & Training    Comment, Fine Motor Coordination  stacked cups in various locations w. OT A pt in getting cup into her hand to icnr GM opposition w. L and R hand with min difficulty, opposition improving  -KP      Recorded by [KP] Aster Ziegler OTR 12/12/19 1544      Row Name 12/12/19 1539             Positioning and Restraints    Pre-Treatment Position  in bed  -KP      Post Treatment Position  bed  -KP      In Bed  supine;call light within reach;encouraged to call for assist;exit alarm on;with family/caregiver  -KP      Recorded by [KP] Aster Ziegler, OTR 12/12/19 1543      Row Name 12/12/19 1539             Pain Scale: Numbers Pre/Post-Treatment    Pain Scale: Numbers, Pretreatment  0/10 - no pain  -KP      Pain Scale: Numbers, Post-Treatment  0/10 - no pain  -KP      Recorded by [KP] Aster Ziegler, OTR 12/12/19 1543      Row Name                Wound 12/06/19 2151 Other (See comments) neck Incision    Wound - Properties Group Date first assessed: 12/06/19 [JF] Time first assessed: 2151 [JF] Side: Other (See comments) [JF] Location: neck [JF] Primary Wound Type: Incision [JF] Recorded by:  [JF] Shannon Beltran RN  12/06/19 2151    Row Name                Wound 12/06/19 2153 Other (See comments) neck Incision    Wound - Properties Group Date first assessed: 12/06/19 [JF] Time first assessed: 2153 [JF] Side: Other (See comments) [JF] Location: neck [JF] Primary Wound Type: Incision [JF] Recorded by:  [JF] Shannon Beltran RN 12/06/19 2153    Row Name 12/12/19 1539             Plan of Care Review    Plan of Care Reviewed With  patient;sibling  -KP      Progress  improving  -KP      Outcome Summary  pt complete opposition w. B hands, wrist fl/ext, elbow fl/ext. and forearm pron/sup. Sat EOB SBA. Bed mob min A. pt cont to benefit from OT to incr ADL, FMC, and tsf.  -KP      Recorded by [AMBER] Aster Ziegler OTR 12/12/19 1543      Row Name 12/12/19 1539             Outcome Summary/Treatment Plan (OT)    Daily Summary of Progress (OT)  progress toward functional goals is good  -KP      Recorded by [KP] Aster Ziegler OTR 12/12/19 1543        User Key  (r) = Recorded By, (t) = Taken By, (c) = Cosigned By    Initials Name Effective Dates Discipline    KP Aster Ziegler OTR 06/08/18 -  OT    JF Shannon Beltran RN 06/16/16 -  Nurse        Wound 12/06/19 2151 Other (See comments) neck Incision (Active)   Dressing Appearance dry;intact;dried drainage 12/12/2019 11:47 AM   Closure Liquid skin adhesive;Approximated 12/12/2019 11:47 AM   Base blanchable;pink 12/12/2019 11:47 AM   Periwound ecchymotic;swelling 12/12/2019 11:47 AM   Periwound Temperature warm 12/12/2019 11:47 AM   Periwound Skin Turgor firm 12/12/2019 11:47 AM   Drainage Amount none 12/12/2019 11:47 AM   Dressing Care, Wound open to air 12/12/2019 11:47 AM       Wound 12/06/19 2153 Other (See comments) neck Incision (Active)   Dressing Appearance dry;intact 12/12/2019 11:47 AM   Closure Liquid skin adhesive 12/12/2019 11:47 AM   Base pink 12/12/2019 11:47 AM   Periwound Skin Turgor firm 12/12/2019 11:47 AM   Drainage Amount none 12/12/2019 11:47 AM   Dressing  Care, Wound open to air 12/12/2019 11:47 AM           OT Recommendation and Plan  Outcome Summary/Treatment Plan (OT)  Daily Summary of Progress (OT): progress toward functional goals is good  Daily Summary of Progress (OT): progress toward functional goals is good  Plan of Care Review  Plan of Care Reviewed With: patient, sibling  Plan of Care Reviewed With: patient, sibling  Outcome Summary: pt complete opposition w. B hands, wrist fl/ext, elbow fl/ext. and forearm pron/sup. Sat EOB SBA. Bed mob min A. pt cont to benefit from OT to incr ADL, FMC, and tsf.  Outcome Measures     Row Name 12/12/19 1543 12/11/19 1557 12/10/19 1436       How much help from another is currently needed...    Putting on and taking off regular lower body clothing?  2  -KP  2  -KP  1  -SG    Bathing (including washing, rinsing, and drying)  2  -KP  2  -KP  1  -SG    Toileting (which includes using toilet bed pan or urinal)  2  -KP  2  -KP  1  -SG    Putting on and taking off regular upper body clothing  3  -KP  2  -KP  1  -SG    Taking care of personal grooming (such as brushing teeth)  3  -KP  2  -KP  1  -SG    Eating meals  3  -KP  2  -KP  1  -SG    AM-PAC 6 Clicks Score (OT)  15  -KP  12  -KP  6  -SG       Functional Assessment    Outcome Measure Options  AM-PAC 6 Clicks Daily Activity (OT)  -KP  AM-PAC 6 Clicks Daily Activity (OT)  -  AM-PAC 6 Clicks Daily Activity (OT)  -      User Key  (r) = Recorded By, (t) = Taken By, (c) = Cosigned By    Initials Name Provider Type    SG Danay Hassan, OTR Occupational Therapist     Aster Ziegler, OTR Occupational Therapist           Time Calculation:   Time Calculation- OT     Row Name 12/12/19 1544             Time Calculation- OT    OT Start Time  1440  -      OT Stop Time  1510  -      OT Time Calculation (min)  30 min  -      Total Timed Code Minutes- OT  30 minute(s)  -      OT Received On  12/12/19  -        User Key  (r) = Recorded By, (t) = Taken By, (c) =  Cosigned By    Initials Name Provider Type     Aster Ziegler, OTHUMBLE Occupational Therapist        Therapy Charges for Today     Code Description Service Date Service Provider Modifiers Qty    14745141968 HC OT NEUROMUSC RE EDUCATION EA 15 MIN 12/11/2019 Aster Ziegler, BREONNA GO 2    82123630625 HC OT NEUROMUSC RE EDUCATION EA 15 MIN 12/12/2019 Aster Ziegler, BREONNA GO 2               BREONNA Jimenez  12/12/2019

## 2019-12-12 NOTE — THERAPY TREATMENT NOTE
Patient Name: Noemi Bartholomew  : 1960    MRN: [id number]                              Today's Date: 2019       Admit Date: 2019    Visit Dx: No diagnosis found.  Patient Active Problem List   Diagnosis   • Carpal tunnel syndrome   • Hyperlipidemia   • Osteoporosis   • DDD (degenerative disc disease), lumbar   • Chronic left lumbar radiculopathy   • Congenital spondylolysis, lumbosacral region   • Neuropathic pain, leg, left   • Ossification of spinal ligament   • Spinal stenosis in cervical region   • Cervical spondylosis with myelopathy   • Cervical stenosis of spine   • Postoperative anemia due to acute blood loss   • Steroid-induced hyperglycemia   • Cord compression (CMS/HCC)   • Leukocytosis (due to steroids)   • Postoperative CSF leak     Past Medical History:   Diagnosis Date   • Abnormal alkaline phosphatase test 2015    Resolved   • Acute bronchitis 2015    Resolved   • Allergic child age    pencillin   • Cervical spinal stenosis    • Diverticulosis    • H/O electromyography 10/13/2014   • H/O mammogram 2014   • Headache occassionally   • High cholesterol    • History of EKG 2015   • Hypercalcemia 2015    Resolved, Full w/u done, Likely secondary to Forteo   • Left arm pain    • Low back pain 2017   • Numbness in both hands    • Osteoporosis    • Paresthesia 2015    Resolved     Past Surgical History:   Procedure Laterality Date   • BACK SURGERY     • COLONOSCOPY  2011   • SPINE SURGERY   back    dr abad quintero   • TONSILLECTOMY  child age   • TYMPANOPLASTY       General Information     Row Name 19 1609          PT Evaluation Time/Intention    Document Type  therapy note (daily note)  -     Mode of Treatment  physical therapy  -     Row Name 19 1609          General Information    Existing Precautions/Restrictions  fall  -     Row Name 19 1609          Cognitive Assessment/Intervention- PT/OT     Orientation Status (Cognition)  oriented x 4  -       User Key  (r) = Recorded By, (t) = Taken By, (c) = Cosigned By    Initials Name Provider Type    Harriet Davidson PTA Physical Therapy Assistant        Mobility     Row Name 12/12/19 1610          Bed Mobility Assessment/Treatment    Bed Mobility Assessment/Treatment  supine-sit;sit-supine  -SM     Supine-Sit [place] (Bed Mobility)  minimum assist (75% patient effort)  -     Sit-Supine [place] (Bed Mobility)  minimum assist (75% patient effort)  -     Assistive Device (Bed Mobility)  bed rails  -     Row Name 12/12/19 1610          Sit-Stand Transfer    Sit-Stand [place] (Transfers)  contact guard;minimum assist (75% patient effort)  -     Assistive Device (Sit-Stand Transfers)  -- HHA  -[facility] Name 12/12/19 1610          Gait/Stairs Assessment/Training    [place] Level (Gait)  contact guard;minimum assist (75% patient effort);2 person assist  -     Assistive Device (Gait)  -- HHA  -     Distance in Feet (Gait)  25  -     Pattern (Gait)  step-through  -     Deviations/Abnormal Patterns (Gait)  ally decreased;stride length decreased  -SM       User Key  (r) = Recorded By, (t) = Taken By, (c) = Cosigned By    Initials Name Provider Type    Harriet Davidson PTA Physical Therapy Assistant        Obj/Interventions     Row Name 12/12/19 1611          Therapeutic Exercise    Lower Extremity (Therapeutic Exercise)  LAQ (long arc quad), bilateral;marching while seated  -     Lower Extremity Range of Motion (Therapeutic Exercise)  ankle dorsiflexion/plantar flexion, bilateral  -     Exercise Type (Therapeutic Exercise)  AROM (active range of motion)  -     Position (Therapeutic Exercise)  seated  -     Sets/Reps (Therapeutic Exercise)  10 reps  -       User Key  (r) = Recorded By, (t) = Taken By, (c) = Cosigned By    Initials Name Provider Type    Harriet Davidson PTA Physical Therapy Assistant         Goals/Plan    No documentation.       Clinical Impression     Row Name 12/12/19 1612          Pain Assessment    Additional Documentation  Pain Scale: Numbers Pre/Post-Treatment (Group)  -[facility] Name 12/12/19 1612          Pain Scale: Numbers Pre/Post-Treatment    Pain Scale: Numbers, Pretreatment  0/10 - no pain  -     Pain Scale: Numbers, Post-Treatment  0/10 - no pain  -[facility] Name 12/12/19 1612          Positioning and Restraints    Pre-Treatment Position  in bed  -     Post Treatment Position  bed  -SM     In Bed  supine;call light within reach;encouraged to call for assist;exit alarm on;with family/caregiver  -       User Key  (r) = Recorded By, (t) = Taken By, (c) = Cosigned By    Initials Name Provider Type    Harriet Davidson PTA Physical Therapy Assistant        Outcome Measures     [facility] Name 12/12/19 1613          How much help from another person do you currently need...    Turning from your back to your side while in flat bed without using bedrails?  3  -SM     Moving from lying on back to sitting on the side of a flat bed without bedrails?  3  -SM     Moving to and from a bed to a chair (including a wheelchair)?  3  -SM     Standing up from a chair using your arms (e.g., wheelchair, bedside chair)?  3  -SM     Climbing 3-5 steps with a railing?  2  -SM     To walk in hospital room?  3  -SM     AM-PAC 6 Clicks Score (PT)  17  -[facility] Name 12/12/19 1613          Functional Assessment    Outcome Measure Options  AM-PAC 6 Clicks Basic Mobility (PT)  -       User Key  (r) = Recorded By, (t) = Taken By, (c) = Cosigned By    Initials Name Provider Type    Harriet Davidson PTA Physical Therapy Assistant          PT Recommendation and Plan     Outcome Summary/Treatment Plan (PT)  Anticipated Discharge Disposition (PT): skilled nursing facility  Plan of Care Reviewed With: patient  Progress: improving  Outcome Summary: Pt tolerated treatment well this date. Increased gait  distance to 25ft w/ HHA-min A x2. Much improved success, though still having some difficulty w/ stepping up. Educated pt to step up w/ R LE first. PT will continue to address functional mobility deficits as tolerated.     Time Calculation:   PT Charges     Row Name 12/12/19 1618             Time Calculation    Start Time  1530  -SM      Stop Time  1556  -SM      Time Calculation (min)  26 min  -SM      PT Received On  12/12/19  -      PT - Next Appointment  12/13/19  -        User Key  (r) = Recorded By, (t) = Taken By, (c) = Cosigned By    Initials Name Provider Type     Harriet Vincent PTA Physical Therapy Assistant        Therapy Charges for Today     Code Description Service Date Service Provider Modifiers Qty    58470920396 HC PT THERAPEUTIC ACT EA 15 MIN 12/11/2019 Harriet Vincent PTA GP 1    34896546967 HC GAIT TRAINING EA 15 MIN 12/12/2019 Harriet Vincent, EBENEZER GP 1    04343445392 HC PT THER PROC EA 15 MIN 12/12/2019 Harriet Vincent PTA GP 1    82444206941 HC PT THER SUPP EA 15 MIN 12/12/2019 Harriet Vincent, EBENEZER GP 1          PT G-Codes  Outcome Measure Options: AM-PAC 6 Clicks Basic Mobility (PT)  AM-PAC 6 Clicks Score (PT): 17  AM-PAC 6 Clicks Score (OT): 15    Harriet Vincent PTA  12/12/2019

## 2019-12-13 ENCOUNTER — HOSPITAL ENCOUNTER (INPATIENT)
Facility: HOSPITAL | Age: 59
LOS: 6 days | Discharge: NURSING, REHAB OR LONG-TERM CARE | End: 2019-12-19
Attending: PHYSICAL MEDICINE & REHABILITATION | Admitting: PHYSICAL MEDICINE & REHABILITATION

## 2019-12-13 VITALS
HEART RATE: 95 BPM | SYSTOLIC BLOOD PRESSURE: 135 MMHG | TEMPERATURE: 97.6 F | HEIGHT: 55 IN | RESPIRATION RATE: 18 BRPM | OXYGEN SATURATION: 97 % | WEIGHT: 86.64 LBS | BODY MASS INDEX: 20.05 KG/M2 | DIASTOLIC BLOOD PRESSURE: 82 MMHG

## 2019-12-13 DIAGNOSIS — G96.00 POSTOPERATIVE CSF LEAK: ICD-10-CM

## 2019-12-13 DIAGNOSIS — G97.82 POSTOPERATIVE CSF LEAK: ICD-10-CM

## 2019-12-13 DIAGNOSIS — Z74.09 IMPAIRED FUNCTIONAL MOBILITY, BALANCE, GAIT, AND ENDURANCE: Primary | ICD-10-CM

## 2019-12-13 PROBLEM — G95.9 CERVICAL MYELOPATHY: Status: ACTIVE | Noted: 2019-12-13

## 2019-12-13 PROCEDURE — 97535 SELF CARE MNGMENT TRAINING: CPT

## 2019-12-13 PROCEDURE — 99024 POSTOP FOLLOW-UP VISIT: CPT | Performed by: PHYSICIAN ASSISTANT

## 2019-12-13 PROCEDURE — 63710000001 METHYLPREDNISOLONE 4 MG TABLET THERAPY PACK 21 EACH DISP PACK: Performed by: PHYSICAL MEDICINE & REHABILITATION

## 2019-12-13 PROCEDURE — 63710000001 METHYLPREDNISOLONE 4 MG TABLET THERAPY PACK 21 EACH DISP PACK: Performed by: NURSE PRACTITIONER

## 2019-12-13 PROCEDURE — 97116 GAIT TRAINING THERAPY: CPT

## 2019-12-13 RX ORDER — BISACODYL 10 MG
10 SUPPOSITORY, RECTAL RECTAL EVERY OTHER DAY
Status: DISCONTINUED | OUTPATIENT
Start: 2019-12-15 | End: 2019-12-19 | Stop reason: HOSPADM

## 2019-12-13 RX ORDER — BISACODYL 10 MG
10 SUPPOSITORY, RECTAL RECTAL EVERY OTHER DAY
Status: CANCELLED | OUTPATIENT
Start: 2019-12-13

## 2019-12-13 RX ORDER — GABAPENTIN 100 MG/1
100 CAPSULE ORAL EVERY 8 HOURS SCHEDULED
Status: CANCELLED | OUTPATIENT
Start: 2019-12-13

## 2019-12-13 RX ORDER — METHYLPREDNISOLONE 4 MG/1
4 TABLET ORAL
Status: COMPLETED | OUTPATIENT
Start: 2019-12-15 | End: 2019-12-15

## 2019-12-13 RX ORDER — METHYLPREDNISOLONE 4 MG/1
4 TABLET ORAL
Status: COMPLETED | OUTPATIENT
Start: 2019-12-14 | End: 2019-12-14

## 2019-12-13 RX ORDER — ONDANSETRON 2 MG/ML
4 INJECTION INTRAMUSCULAR; INTRAVENOUS EVERY 6 HOURS PRN
Status: DISCONTINUED | OUTPATIENT
Start: 2019-12-13 | End: 2019-12-19 | Stop reason: HOSPADM

## 2019-12-13 RX ORDER — DOCUSATE SODIUM 100 MG/1
100 CAPSULE, LIQUID FILLED ORAL 2 TIMES DAILY
Status: CANCELLED | OUTPATIENT
Start: 2019-12-13

## 2019-12-13 RX ORDER — METHYLPREDNISOLONE 4 MG/1
4 TABLET ORAL
Status: CANCELLED | OUTPATIENT
Start: 2019-12-14 | End: 2019-12-15

## 2019-12-13 RX ORDER — METHYLPREDNISOLONE 4 MG/1
4 TABLET ORAL
Status: COMPLETED | OUTPATIENT
Start: 2019-12-13 | End: 2019-12-13

## 2019-12-13 RX ORDER — SODIUM CHLORIDE 0.9 % (FLUSH) 0.9 %
3 SYRINGE (ML) INJECTION EVERY 12 HOURS SCHEDULED
Status: CANCELLED | OUTPATIENT
Start: 2019-12-13

## 2019-12-13 RX ORDER — ONDANSETRON 4 MG/1
4 TABLET, FILM COATED ORAL EVERY 6 HOURS PRN
Status: CANCELLED | OUTPATIENT
Start: 2019-12-13

## 2019-12-13 RX ORDER — METHYLPREDNISOLONE 4 MG/1
4 TABLET ORAL
Status: CANCELLED | OUTPATIENT
Start: 2019-12-13 | End: 2019-12-14

## 2019-12-13 RX ORDER — UREA 10 %
3 LOTION (ML) TOPICAL NIGHTLY PRN
Status: DISCONTINUED | OUTPATIENT
Start: 2019-12-13 | End: 2019-12-19 | Stop reason: HOSPADM

## 2019-12-13 RX ORDER — UREA 10 %
3 LOTION (ML) TOPICAL NIGHTLY PRN
Status: CANCELLED | OUTPATIENT
Start: 2019-12-13

## 2019-12-13 RX ORDER — FLUTICASONE PROPIONATE 50 MCG
1 SPRAY, SUSPENSION (ML) NASAL NIGHTLY PRN
Status: CANCELLED | OUTPATIENT
Start: 2019-12-13

## 2019-12-13 RX ORDER — METHYLPREDNISOLONE 4 MG/1
4 TABLET ORAL
Status: CANCELLED | OUTPATIENT
Start: 2019-12-15 | End: 2019-12-16

## 2019-12-13 RX ORDER — CYCLOBENZAPRINE HCL 10 MG
10 TABLET ORAL 3 TIMES DAILY PRN
Status: DISCONTINUED | OUTPATIENT
Start: 2019-12-13 | End: 2019-12-19 | Stop reason: HOSPADM

## 2019-12-13 RX ORDER — SENNA AND DOCUSATE SODIUM 50; 8.6 MG/1; MG/1
1 TABLET, FILM COATED ORAL NIGHTLY PRN
Status: DISCONTINUED | OUTPATIENT
Start: 2019-12-13 | End: 2019-12-19 | Stop reason: HOSPADM

## 2019-12-13 RX ORDER — SENNA AND DOCUSATE SODIUM 50; 8.6 MG/1; MG/1
2 TABLET, FILM COATED ORAL NIGHTLY
Status: CANCELLED | OUTPATIENT
Start: 2019-12-13

## 2019-12-13 RX ORDER — ACETAMINOPHEN 325 MG/1
650 TABLET ORAL EVERY 4 HOURS PRN
Status: CANCELLED | OUTPATIENT
Start: 2019-12-13

## 2019-12-13 RX ORDER — ACETAMINOPHEN 325 MG/1
650 TABLET ORAL EVERY 4 HOURS PRN
Status: DISCONTINUED | OUTPATIENT
Start: 2019-12-13 | End: 2019-12-19 | Stop reason: HOSPADM

## 2019-12-13 RX ORDER — DOCUSATE SODIUM 100 MG/1
100 CAPSULE, LIQUID FILLED ORAL 2 TIMES DAILY
Status: DISCONTINUED | OUTPATIENT
Start: 2019-12-13 | End: 2019-12-19 | Stop reason: HOSPADM

## 2019-12-13 RX ORDER — SENNA AND DOCUSATE SODIUM 50; 8.6 MG/1; MG/1
2 TABLET, FILM COATED ORAL NIGHTLY
Status: DISCONTINUED | OUTPATIENT
Start: 2019-12-13 | End: 2019-12-19 | Stop reason: HOSPADM

## 2019-12-13 RX ORDER — GABAPENTIN 100 MG/1
100 CAPSULE ORAL EVERY 8 HOURS SCHEDULED
Status: DISCONTINUED | OUTPATIENT
Start: 2019-12-13 | End: 2019-12-19 | Stop reason: HOSPADM

## 2019-12-13 RX ORDER — HYDROCODONE BITARTRATE AND ACETAMINOPHEN 5; 325 MG/1; MG/1
1 TABLET ORAL EVERY 4 HOURS PRN
Status: CANCELLED | OUTPATIENT
Start: 2019-12-13 | End: 2019-12-17

## 2019-12-13 RX ORDER — POLYETHYLENE GLYCOL 3350 17 G/17G
17 POWDER, FOR SOLUTION ORAL DAILY PRN
Status: CANCELLED | OUTPATIENT
Start: 2019-12-13

## 2019-12-13 RX ORDER — ATORVASTATIN CALCIUM 20 MG/1
20 TABLET, FILM COATED ORAL NIGHTLY
COMMUNITY
End: 2020-06-29 | Stop reason: SDUPTHER

## 2019-12-13 RX ORDER — CYCLOBENZAPRINE HCL 10 MG
10 TABLET ORAL 3 TIMES DAILY PRN
Status: CANCELLED | OUTPATIENT
Start: 2019-12-13

## 2019-12-13 RX ORDER — FAMOTIDINE 10 MG/ML
20 INJECTION, SOLUTION INTRAVENOUS EVERY 12 HOURS SCHEDULED
Status: CANCELLED | OUTPATIENT
Start: 2019-12-13

## 2019-12-13 RX ORDER — ONDANSETRON 2 MG/ML
4 INJECTION INTRAMUSCULAR; INTRAVENOUS EVERY 6 HOURS PRN
Status: CANCELLED | OUTPATIENT
Start: 2019-12-13

## 2019-12-13 RX ORDER — ATORVASTATIN CALCIUM 20 MG/1
20 TABLET, FILM COATED ORAL NIGHTLY
Status: CANCELLED | OUTPATIENT
Start: 2019-12-13

## 2019-12-13 RX ORDER — ONDANSETRON 4 MG/1
4 TABLET, FILM COATED ORAL EVERY 6 HOURS PRN
Status: DISCONTINUED | OUTPATIENT
Start: 2019-12-13 | End: 2019-12-19 | Stop reason: HOSPADM

## 2019-12-13 RX ORDER — HYDROCODONE BITARTRATE AND ACETAMINOPHEN 5; 325 MG/1; MG/1
1 TABLET ORAL EVERY 4 HOURS PRN
Status: DISPENSED | OUTPATIENT
Start: 2019-12-13 | End: 2019-12-17

## 2019-12-13 RX ORDER — FAMOTIDINE 10 MG/ML
20 INJECTION, SOLUTION INTRAVENOUS EVERY 12 HOURS SCHEDULED
Status: DISCONTINUED | OUTPATIENT
Start: 2019-12-13 | End: 2019-12-13

## 2019-12-13 RX ORDER — DOCUSATE SODIUM 100 MG/1
100 CAPSULE, LIQUID FILLED ORAL WEEKLY
Status: ON HOLD | COMMUNITY
End: 2019-12-27

## 2019-12-13 RX ORDER — GABAPENTIN 100 MG/1
100 CAPSULE ORAL 2 TIMES DAILY
Status: ON HOLD | COMMUNITY
End: 2020-01-12 | Stop reason: SDUPTHER

## 2019-12-13 RX ORDER — FLUTICASONE PROPIONATE 50 MCG
1 SPRAY, SUSPENSION (ML) NASAL NIGHTLY PRN
Status: DISCONTINUED | OUTPATIENT
Start: 2019-12-13 | End: 2019-12-19 | Stop reason: HOSPADM

## 2019-12-13 RX ORDER — SENNA AND DOCUSATE SODIUM 50; 8.6 MG/1; MG/1
1 TABLET, FILM COATED ORAL NIGHTLY PRN
Status: CANCELLED | OUTPATIENT
Start: 2019-12-13

## 2019-12-13 RX ORDER — ACETAMINOPHEN 500 MG
1000 TABLET ORAL EVERY 6 HOURS PRN
COMMUNITY
End: 2020-01-12 | Stop reason: HOSPADM

## 2019-12-13 RX ORDER — POLYETHYLENE GLYCOL 3350 17 G/17G
17 POWDER, FOR SOLUTION ORAL DAILY PRN
Status: DISCONTINUED | OUTPATIENT
Start: 2019-12-13 | End: 2019-12-19 | Stop reason: HOSPADM

## 2019-12-13 RX ORDER — OMEGA-3S/DHA/EPA/FISH OIL/D3 300MG-1000
2000 CAPSULE ORAL DAILY
COMMUNITY
End: 2022-07-12

## 2019-12-13 RX ORDER — ATORVASTATIN CALCIUM 20 MG/1
20 TABLET, FILM COATED ORAL NIGHTLY
Status: DISCONTINUED | OUTPATIENT
Start: 2019-12-13 | End: 2019-12-19 | Stop reason: HOSPADM

## 2019-12-13 RX ORDER — FAMOTIDINE 20 MG/1
20 TABLET, FILM COATED ORAL
Status: DISCONTINUED | OUTPATIENT
Start: 2019-12-13 | End: 2019-12-19 | Stop reason: HOSPADM

## 2019-12-13 RX ADMIN — BISACODYL 10 MG: 10 SUPPOSITORY RECTAL at 13:38

## 2019-12-13 RX ADMIN — DOCUSATE SODIUM 100 MG: 100 CAPSULE, LIQUID FILLED ORAL at 09:28

## 2019-12-13 RX ADMIN — SENNOSIDES AND DOCUSATE SODIUM 2 TABLET: 8.6; 5 TABLET ORAL at 20:53

## 2019-12-13 RX ADMIN — ACETAMINOPHEN 650 MG: 325 TABLET, FILM COATED ORAL at 17:01

## 2019-12-13 RX ADMIN — ACETAMINOPHEN 650 MG: 325 TABLET, FILM COATED ORAL at 20:54

## 2019-12-13 RX ADMIN — METHYLPREDNISOLONE 4 MG: 4 TABLET ORAL at 13:38

## 2019-12-13 RX ADMIN — GABAPENTIN 100 MG: 100 CAPSULE ORAL at 07:32

## 2019-12-13 RX ADMIN — GABAPENTIN 100 MG: 100 CAPSULE ORAL at 13:38

## 2019-12-13 RX ADMIN — METHYLPREDNISOLONE 4 MG: 4 TABLET ORAL at 09:28

## 2019-12-13 RX ADMIN — METHYLPREDNISOLONE 4 MG: 4 TABLET ORAL at 17:02

## 2019-12-13 RX ADMIN — GABAPENTIN 100 MG: 100 CAPSULE ORAL at 20:54

## 2019-12-13 RX ADMIN — FAMOTIDINE 20 MG: 10 INJECTION INTRAVENOUS at 09:28

## 2019-12-13 RX ADMIN — FAMOTIDINE 20 MG: 20 TABLET, FILM COATED ORAL at 20:53

## 2019-12-13 RX ADMIN — ATORVASTATIN CALCIUM 20 MG: 20 TABLET, FILM COATED ORAL at 20:53

## 2019-12-13 RX ADMIN — DOCUSATE SODIUM 100 MG: 100 CAPSULE, LIQUID FILLED ORAL at 20:54

## 2019-12-13 RX ADMIN — SODIUM CHLORIDE, PRESERVATIVE FREE 3 ML: 5 INJECTION INTRAVENOUS at 09:29

## 2019-12-13 NOTE — PROGRESS NOTES
Continued Stay Note  [facility]     Patient Name: Noemi Bartholomew  MRN: [id number]  Today's Date: 12/13/2019    Admit Date: 12/6/2019    Discharge Plan     Row Name 12/13/19 1611       Plan    Final Discharge Disposition Code  62 - inpatient rehab facility    Final Note  Discharged BAR        Discharge Codes    No documentation.       Expected Discharge Date and Time     Expected Discharge Date Expected Discharge Time    Dec 13, 2019             Danay Kaufman RN

## 2019-12-13 NOTE — PROGRESS NOTES
Case Management Discharge Note      Final Note: Discharged BAR    Provided post acute provider list?: Yes  Post Acute Provider Lists: Home Health, Nursing Home  Post Acuite Provider List: Delivered  Delivered To: Support Person  Support Person: The patient's sister Donita Bartholomew [phone] was provided with a HH/SNF list and CCP telephone contact number.    Method of Delivery: In person         Final Discharge Disposition Code: 62 - inpatient rehab facility

## 2019-12-13 NOTE — DISCHARGE SUMMARY
Date of admission: December 6, 2019    Date of discharge: December 13, 2019    Hospital course: Ms. Bartholomew underwent an uncomplicated anterior cervical corpectomy at C6 and C7 with cage and plate from C5-T1 by Dr. Noe on December 6, 2019.  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  She was kept flat for 3 days.      Postoperatively she continued to complain of hand numbness tingling and weakness, similar to her preoperative state.  An MRI was done which revealed the CSF collection as expected given her known leak.  There was new cord edema which was not surprising given the degree of manipulation required to address the stenosis.  She did complain of a headache several days after she was mobilized.  Dr. Noe did consider placing a lumbar drain but her headache thankfully improved and at this point she is ambulating without any headache and has very little discomfort.  Her hand numbness tingling and weakness is again at baseline.  She is weaning off of steroids with a Medrol Dosepak.  Her wound is healing nicely.  She is voiding without difficulty. No swallowing issues.       Will be transferred to acute rehab today for further therapy and rehabilitation.      Wound care: She can shower daily.  Please clean the wound with peroxide twice daily and call with any fever or chills or incisional redness swelling or drainage.      Activity restrictions: No lifting over 5 pounds.  She has a soft collar to use for comfort purposes only.  The head of bed can be as tolerated.  She is encouraged to ambulate and participate with therapies as tolerated. No driving.

## 2019-12-13 NOTE — PLAN OF CARE
Problem: Patient Care Overview  Goal: Plan of Care Review  Outcome: Ongoing (interventions implemented as appropriate)  Note:   Pt is assist of 1 to get OOB, ambulate to bathroom for toileting and grooming.  Education on body mechanics and adaptive measures given during tasks.  Pt with fatigue and ready to return to bed.

## 2019-12-13 NOTE — THERAPY TREATMENT NOTE
Patient Name: Noemi Bartholomew  : 1960    MRN: [id number]                              Today's Date: 2019       Admit Date: 2019    Visit Dx: No diagnosis found.  Patient Active Problem List   Diagnosis   • Carpal tunnel syndrome   • Hyperlipidemia   • Osteoporosis   • DDD (degenerative disc disease), lumbar   • Chronic left lumbar radiculopathy   • Congenital spondylolysis, lumbosacral region   • Neuropathic pain, leg, left   • Ossification of spinal ligament   • Spinal stenosis in cervical region   • Cervical spondylosis with myelopathy   • Cervical stenosis of spine   • Postoperative anemia due to acute blood loss   • Steroid-induced hyperglycemia   • Cord compression (CMS/HCC)   • Leukocytosis (due to steroids)   • Postoperative CSF leak     Past Medical History:   Diagnosis Date   • Abnormal alkaline phosphatase test 2015    Resolved   • Acute bronchitis 2015    Resolved   • Allergic child age    pencillin   • Cervical spinal stenosis    • Diverticulosis    • H/O electromyography 10/13/2014   • H/O mammogram 2014   • Headache occassionally   • High cholesterol    • History of EKG 2015   • Hypercalcemia 2015    Resolved, Full w/u done, Likely secondary to Forteo   • Left arm pain    • Low back pain 2017   • Numbness in both hands    • Osteoporosis    • Paresthesia 2015    Resolved     Past Surgical History:   Procedure Laterality Date   • BACK SURGERY     • COLONOSCOPY  2011   • SPINE SURGERY   back    dr abad quintero   • TONSILLECTOMY  child age   • TYMPANOPLASTY       General Information     Row Name 19 1500          PT Evaluation Time/Intention    Document Type  therapy note (daily note)  -     Mode of Treatment  physical therapy  -     Row Name 19 1500          General Information    Existing Precautions/Restrictions  fall  -     Row Name 19 1500          Cognitive Assessment/Intervention- PT/OT     Orientation Status (Cognition)  oriented x 4  -       User Key  (r) = Recorded By, (t) = Taken By, (c) = Cosigned By    Initials Name Provider Type    Harriet Davidson PTA Physical Therapy Assistant        Mobility     Row Name 12/13/19 1500          Bed Mobility Assessment/Treatment    Bed Mobility Assessment/Treatment  sit-supine  -SM     Supine-Sit [place] (Bed Mobility)  not tested  -     Sit-Supine [place] (Bed Mobility)  moderate assist (50% patient effort)  -     Assistive Device (Bed Mobility)  bed rails  -     Comment (Bed Mobility)  required more assist since pt was on EOB; did not want to use step stool this date  -     Row Name 12/13/19 1500          Sit-Stand Transfer    Sit-Stand [place] (Transfers)  contact guard  -     Assistive Device (Sit-Stand Transfers)  -- HHA  -[facility] Name 12/13/19 1500          Gait/Stairs Assessment/Training    [place] Level (Gait)  contact guard;minimum assist (75% patient effort)  -     Assistive Device (Gait)  -- HHA  -     Distance in Feet (Gait)  60  -     Pattern (Gait)  step-through  -     Deviations/Abnormal Patterns (Gait)  ally decreased;stride length decreased  -       User Key  (r) = Recorded By, (t) = Taken By, (c) = Cosigned By    Initials Name Provider Type    Harriet Davidson PTA Physical Therapy Assistant        Obj/Interventions    No documentation.       Goals/Plan    No documentation.       Clinical Impression     Row Name 12/13/19 1504          Pain Assessment    Additional Documentation  Pain Scale: Numbers Pre/Post-Treatment (Group)  -SM     Row Name 12/13/19 1504          Pain Scale: Numbers Pre/Post-Treatment    Pain Scale: Numbers, Pretreatment  0/10 - no pain  -     Pain Scale: Numbers, Post-Treatment  0/10 - no pain  -[facility] Name 12/13/19 1504          Positioning and Restraints    Pre-Treatment Position  standing in room  -SM     Post Treatment Position  bed  -SM     In Bed   supine;call light within reach;encouraged to call for assist;with family/caregiver  -       User Key  (r) = Recorded By, (t) = Taken By, (c) = Cosigned By    Initials Name Provider Type    Harriet Davidson PTA Physical Therapy Assistant        Outcome Measures     Row Name 12/13/19 150          How much help from another person do you currently need...    Turning from your back to your side while in flat bed without using bedrails?  3  -SM     Moving from lying on back to sitting on the side of a flat bed without bedrails?  3  -SM     Moving to and from a bed to a chair (including a wheelchair)?  3  -SM     Standing up from a chair using your arms (e.g., wheelchair, bedside chair)?  3  -SM     Climbing 3-5 steps with a railing?  2  -SM     To walk in hospital room?  3  -SM     AM-PAC 6 Clicks Score (PT)  17  -SM     Row Name 12/13/19 1509          Functional Assessment    Outcome Measure Options  AM-PAC 6 Clicks Basic Mobility (PT)  -       User Key  (r) = Recorded By, (t) = Taken By, (c) = Cosigned By    Initials Name Provider Type    Harriet Davidson PTA Physical Therapy Assistant          PT Recommendation and Plan     Outcome Summary/Treatment Plan (PT)  Anticipated Discharge Disposition (PT): inpatient rehabilitation facility, skilled nursing facility  Plan of Care Reviewed With: patient  Progress: improving  Outcome Summary: Pt tolerated treatment well this date. Increased gait distance to 60ft w/ HHA-min A. Slightly unsteady at times. Pt required more assist to get back into bed d/t sitting on EOB, though pt requested to not use step stool today. Pt is to be transferred to rehab today.     Time Calculation:   PT Charges     Row Name 12/13/19 6972             Time Calculation    Start Time  1433  -      Stop Time  1450  -      Time Calculation (min)  17 min  -      PT Received On  12/13/19  -      PT - Next Appointment  12/14/19  -        User Key  (r) = Recorded By, (t) = Taken  By, (c) = Cosigned By    Initials Name Provider Type     Harriet Vincent, PTA Physical Therapy Assistant        Therapy Charges for Today     Code Description Service Date Service Provider Modifiers Qty    33200024106 HC GAIT TRAINING EA 15 MIN 12/12/2019 Harriet Vincent, EBENEZER GP 1    89275690192 HC PT THER PROC EA 15 MIN 12/12/2019 Harriet Vincent, EBENEZER GP 1    86925526602 HC PT THER SUPP EA 15 MIN 12/12/2019 Harriet Vincent, EBENEZER GP 1    19365859065 HC GAIT TRAINING EA 15 MIN 12/13/2019 Harriet Vincent, EBENEZER GP 1          PT G-Codes  Outcome Measure Options: AM-PAC 6 Clicks Basic Mobility (PT)  AM-PAC 6 Clicks Score (PT): 17  AM-PAC 6 Clicks Score (OT): 15    Harriet Vincent PTA  12/13/2019

## 2019-12-13 NOTE — PLAN OF CARE
Pt A&O, VSS.  No s/s of distress and no acute changes overnight.  Pt sibling at bedside assisting in care.  Pt still prefers to lay flat, encouraged to increase mobility. No BM  since 12/6/19.  No new neuro deficits, baseline deficits noted.  Continue to educate patient on ambulation, safety and increased IS use. CTM

## 2019-12-13 NOTE — PLAN OF CARE
Problem: Patient Care Overview  Goal: Plan of Care Review  Outcome: Ongoing (interventions implemented as appropriate)  Flowsheets (Taken 12/13/2019 1505)  Progress: improving  Plan of Care Reviewed With: patient  Outcome Summary: Pt tolerated treatment well this date. Increased gait distance to 60ft w/ HHA-min A. Slightly unsteady at times. Pt required more assist to get back into bed d/t sitting on EOB, though pt requested to not use step stool today. Pt is to be transferred to rehab today.

## 2019-12-13 NOTE — PROGRESS NOTES
Continued Stay Note  [facility]     Patient Name: Noemi Bartholomew  MRN: [id number]  Today's Date: 12/13/2019    Admit Date: 12/6/2019    Discharge Plan     Row Name 12/13/19 1024       Plan    Plan  [facility]    Plan Comments  Per Jenny with [facility], patient approved and bed available for admit today. Informend Harriet SILVER with [facility].         Discharge Codes    No documentation.             Danay Kaufman RN

## 2019-12-13 NOTE — THERAPY TREATMENT NOTE
Acute Care - Occupational Therapy Progress Note  [facility]     Patient Name: Noemi Bartholomew  : 1960  MRN: [id number]  Today's Date: 2019             Admit Date: 2019     No diagnosis found.  Patient Active Problem List   Diagnosis   • Carpal tunnel syndrome   • Hyperlipidemia   • Osteoporosis   • DDD (degenerative disc disease), lumbar   • Chronic left lumbar radiculopathy   • Congenital spondylolysis, lumbosacral region   • Neuropathic pain, leg, left   • Ossification of spinal ligament   • Spinal stenosis in cervical region   • Cervical spondylosis with myelopathy   • Cervical stenosis of spine   • Postoperative anemia due to acute blood loss   • Steroid-induced hyperglycemia   • Cord compression (CMS/HCC)   • Leukocytosis (due to steroids)   • Postoperative CSF leak     Past Medical History:   Diagnosis Date   • Abnormal alkaline phosphatase test 2015    Resolved   • Acute bronchitis 2015    Resolved   • Allergic child age    pencillin   • Cervical spinal stenosis    • Diverticulosis    • H/O electromyography 10/13/2014   • H/O mammogram 2014   • Headache occassionally   • High cholesterol    • History of EKG 2015   • Hypercalcemia 2015    Resolved, Full w/u done, Likely secondary to Forteo   • Left arm pain    • Low back pain 2017   • Numbness in both hands    • Osteoporosis    • Paresthesia 2015    Resolved     Past Surgical History:   Procedure Laterality Date   • BACK SURGERY     • COLONOSCOPY  2011   • SPINE SURGERY   back    dr abad quintero   • TONSILLECTOMY  child age   • TYMPANOPLASTY         Therapy Treatment    Rehabilitation Treatment Summary     Row Name 19 1008             Treatment Time/Intention    Discipline  occupational therapist  -LE      Document Type  therapy note (daily note)  -LE      Subjective Information  complains of;fatigue  -LE      Mode of Treatment  individual therapy;occupational therapy  -LE       Patient/Family Observations  supine in bed.  soft collar donned while up out of bed and took off per pt request when supine.   -LE      Care Plan Review  care plan/treatment goals reviewed  -LE      Patient Effort  good  -LE      Existing Precautions/Restrictions  fall soft collar on when OOB.   -LE      Recorded by [LE] Audrey Mcdermott, OTR 12/13/19 1010      Row Name 12/13/19 1008             Vital Signs    Pre SpO2 (%)  96  -LE      O2 Delivery Pre Treatment  room air  -LE      Activity Duration  -- no SOA but fatigue with walk to BR  -LE      Recorded by [LE] Audrey Mcdermott, OTR 12/13/19 1010      Row Name 12/13/19 1008             Bed Mobility Assessment/Treatment    Supine-Sit [place] (Bed Mobility)  minimum assist (75% patient effort)  -LE      Sit-Supine [place] (Bed Mobility)  minimum assist (75% patient effort)  -LE      Bed Mobility, Safety Issues  decreased use of legs for bridging/pushing;decreased use of arms for pushing/pulling;impaired trunk control for bed mobility  -LE      Assistive Device (Bed Mobility)  bed rails  -LE      Comment (Bed Mobility)  cues and assist for body mechanics during task.    -LE      Recorded by [LE] Audrey Mcdermott, OTR 12/13/19 1010      Row Name 12/13/19 1008             Functional Mobility    Functional Mobility- Ind. Level  contact guard assist  -LE      Functional Mobility- Device  -- hand held assist  -LE      Functional Mobility-Distance (Feet)  -- to/from bathroom  -LE      Functional Mobility- Safety Issues  balance decreased during turns;sequencing ability decreased;step length decreased  -LE      Functional Mobility- Comment  increase unsteadiness when fatigue for walk back to bed  -LE      Recorded by [LE] Audrey Mcdermott, OTR 12/13/19 1010      Row Name 12/13/19 1008             Transfer Assessment/Treatment    Transfer Assessment/Treatment  toilet transfer  -LE      Recorded by [LE] Audrey Mcdermott, OTR 12/13/19 1010      Row Name 12/13/19 1008              Sit-Stand Transfer    Sit-Stand [place] (Transfers)  contact guard  -LE      Recorded by [LE] Audrey Mcdermott OTR 12/13/19 1010      Row Name 12/13/19 1008             Toilet Transfer    Type (Toilet Transfer)  stand pivot/stand step  -LE      [place] Level (Toilet Transfer)  contact guard  -LE      Assistive Device (Toilet Transfer)  commode, bedside without drop arms BSC next to toilet (BSC lower than standard toilet)  -LE      Recorded by [LE] Audrey Mcdermott, OTR 12/13/19 1010      Row Name 12/13/19 1008             Grooming Assessment/Training    [place] Level (Grooming)  set up;supervision;oral care regimen  -LE      Comment (Grooming)  SBA stand at sink.  ed using larger muscles to squeeze paste onto brush  -LE      Recorded by [LE] Audrey Mcdermott OTR 12/13/19 1014      Row Name 12/13/19 1008             Toileting Assessment/Training    [place] Level (Toileting)  maximum assist (25% patient effort);adjust/manage clothing;perform perineal hygiene  -LE      Comment (Toileting)  pt reports diff reach bottom. ed stand tech with pt improved reach but still requires assist thoroughness.  ed using thumb web space of hands to push underwear down.  -LE      Recorded by [LE] Audrey Mcdermott, OTR 12/13/19 1014      Row Name 12/13/19 1008             Static Sitting Balance    Level of [place] (Unsupported Sitting, Static Balance)  supervision  -LE      Recorded by [LE] Audrey Mcdermott OTR 12/13/19 1014      Row Name 12/13/19 1008             Positioning and Restraints    Pre-Treatment Position  in bed  -LE      Post Treatment Position  bed  -LE      In Bed  supine;call light within reach;encouraged to call for assist;exit alarm on;RUE elevated;LUE elevated  -LE      Recorded by [LE] Audrey Mcdermott, OTR 12/13/19 1014      Row Name 12/13/19 1008             Pain Scale: Numbers Pre/Post-Treatment    Pain Scale: Numbers, Pretreatment  2/10  -LE      Pain Scale: Numbers, Post-Treatment  2/10  -LE      Pain  Location  neck  -LE      Pre/Post Treatment Pain Comment  adjust position in bed to comfort  -LE      Recorded by [LE] Audrey Mcdermott, OTR 12/13/19 1014      Row Name                Wound 12/06/19 2151 Other (See comments) neck Incision    Wound - Properties Group Date first assessed: 12/06/19 [JF] Time first assessed: 2151 [JF] Side: Other (See comments) [JF] Location: neck [JF] Primary Wound Type: Incision [JF] Recorded by:  [JF] Shannon Beltran RN 12/06/19 2151    Row Name                Wound 12/06/19 2153 Other (See comments) neck Incision    Wound - Properties Group Date first assessed: 12/06/19 [JF] Time first assessed: 2153 [JF] Side: Other (See comments) [JF] Location: neck [JF] Primary Wound Type: Incision [JF] Recorded by:  [JF] Shannon Beltran RN 12/06/19 2153    Row Name 12/13/19 1008             Coping    Observed Emotional State  calm;cooperative  -LE      Recorded by [LE] Audrey Mcdermott OTR 12/13/19 1014        User Key  (r) = Recorded By, (t) = Taken By, (c) = Cosigned By    Initials Name Effective Dates Discipline    LE Audrey Mcdermott, BREONNA 06/08/18 -  OT    Shannon Jin RN 06/16/16 -  Nurse        Wound 12/06/19 2151 Other (See comments) neck Incision (Active)   Dressing Appearance dry;intact;dried drainage 12/12/2019  8:50 PM   Closure Liquid skin adhesive;Approximated;Adhesive closure strips 12/12/2019  8:50 PM   Base blanchable;pink 12/12/2019  8:50 PM   Periwound ecchymotic;swelling 12/12/2019  8:50 PM   Periwound Temperature warm 12/12/2019  8:50 PM   Periwound Skin Turgor firm 12/12/2019  8:50 PM   Drainage Amount none 12/12/2019  8:50 PM   Dressing Care, Wound open to air 12/12/2019 11:47 AM       Wound 12/06/19 2153 Other (See comments) neck Incision (Active)   Dressing Appearance dry;intact 12/12/2019 11:47 AM   Closure Liquid skin adhesive 12/12/2019 11:47 AM   Base pink 12/12/2019 11:47 AM   Periwound Skin Turgor firm 12/12/2019 11:47 AM   Drainage Amount none 12/12/2019 11:47 AM   Dressing  Care, Wound open to air 12/12/2019 11:47 AM           OT Recommendation and Plan        Outcome Measures     Row Name 12/13/19 1006 12/13/19 1000 12/12/19 1543       How much help from another is currently needed...    Putting on and taking off regular lower body clothing?  2  -LE  --  2  -KP    Bathing (including washing, rinsing, and drying)  2  -LE  --  2  -KP    Toileting (which includes using toilet bed pan or urinal)  2  -LE  --  2  -KP    Putting on and taking off regular upper body clothing  3  -LE  --  3  -KP    Taking care of personal grooming (such as brushing teeth)  3  -LE  --  3  -KP    Eating meals  3  -LE  --  3  -KP    AM-PAC 6 Clicks Score (OT)  15  -LE  --  15  -KP       Functional Assessment    Outcome Measure Options  --  AM-PAC 6 Clicks Daily Activity (OT)  -LE  AM-PAC 6 Clicks Daily Activity (OT)  -KP    Row Name 12/11/19 1557 12/10/19 1436          How much help from another is currently needed...    Putting on and taking off regular lower body clothing?  2  -KP  1  -SG     Bathing (including washing, rinsing, and drying)  2  -KP  1  -SG     Toileting (which includes using toilet bed pan or urinal)  2  -KP  1  -SG     Putting on and taking off regular upper body clothing  2  -KP  1  -SG     Taking care of personal grooming (such as brushing teeth)  2  -KP  1  -SG     Eating meals  2  -KP  1  -SG     AM-PAC 6 Clicks Score (OT)  12  -  6  -SG        Functional Assessment    Outcome Measure Options  AM-PAC 6 Clicks Daily Activity (OT)  -  AM-PAC 6 Clicks Daily Activity (OT)  -SG       User Key  (r) = Recorded By, (t) = Taken By, (c) = Cosigned By    Initials Name Provider Type    Danay Tran, OTR Occupational Therapist    Audrey Yeung, OTR Occupational Therapist    Aster Bush, OTR Occupational Therapist           Time Calculation:   Time Calculation- OT     Row Name 12/13/19 1014             Time Calculation- OT    OT Start Time  0931  -LE      OT Stop Time  1004   -LE      OT Time Calculation (min)  33 min  -LE      Total Timed Code Minutes- OT  33 minute(s)  -LE      OT Received On  12/13/19  -LE        User Key  (r) = Recorded By, (t) = Taken By, (c) = Cosigned By    Initials Name Provider Type    Audrey Yeung OTR Occupational Therapist        Therapy Charges for Today     Code Description Service Date Service Provider Modifiers Qty    21610167928 HC OT SELF CARE/MGMT/TRAIN EA 15 MIN 12/13/2019 Audrey Mcdermott OTR GO 2               BREONNA Chery  12/13/2019

## 2019-12-13 NOTE — NURSING NOTE
Precert obtained with admit date 12/13/19 if medically ready. A bed is available today. Please complete the DISCHARGE/READMIT with orders you wish to continue while on rehab alone with a DC SUMMARY and also please address cervical precautions and if/ when pt may shower.     Thank you    Jenny Casas RN  Acute Rehab Admission Nurse  [phone]    Pt's last recorded BM was 12/6/19. Discussed with DARLING Miles (reports + bowel sounds)  and also with CHELI Rivera. Will inform acute rehab RN as well.

## 2019-12-14 PROCEDURE — 63710000001 METHYLPREDNISOLONE 4 MG TABLET THERAPY PACK 21 EACH DISP PACK: Performed by: PHYSICAL MEDICINE & REHABILITATION

## 2019-12-14 PROCEDURE — 97535 SELF CARE MNGMENT TRAINING: CPT

## 2019-12-14 PROCEDURE — 97110 THERAPEUTIC EXERCISES: CPT

## 2019-12-14 PROCEDURE — 97165 OT EVAL LOW COMPLEX 30 MIN: CPT

## 2019-12-14 PROCEDURE — 97162 PT EVAL MOD COMPLEX 30 MIN: CPT

## 2019-12-14 PROCEDURE — 97112 NEUROMUSCULAR REEDUCATION: CPT

## 2019-12-14 RX ADMIN — FAMOTIDINE 20 MG: 20 TABLET, FILM COATED ORAL at 15:32

## 2019-12-14 RX ADMIN — CYCLOBENZAPRINE 10 MG: 10 TABLET, FILM COATED ORAL at 22:03

## 2019-12-14 RX ADMIN — DOCUSATE SODIUM 100 MG: 100 CAPSULE, LIQUID FILLED ORAL at 20:21

## 2019-12-14 RX ADMIN — DOCUSATE SODIUM 100 MG: 100 CAPSULE, LIQUID FILLED ORAL at 08:11

## 2019-12-14 RX ADMIN — ATORVASTATIN CALCIUM 20 MG: 20 TABLET, FILM COATED ORAL at 20:20

## 2019-12-14 RX ADMIN — GABAPENTIN 100 MG: 100 CAPSULE ORAL at 22:03

## 2019-12-14 RX ADMIN — FAMOTIDINE 20 MG: 20 TABLET, FILM COATED ORAL at 05:57

## 2019-12-14 RX ADMIN — CYCLOBENZAPRINE 10 MG: 10 TABLET, FILM COATED ORAL at 08:11

## 2019-12-14 RX ADMIN — METHYLPREDNISOLONE 4 MG: 4 TABLET ORAL at 20:21

## 2019-12-14 RX ADMIN — METHYLPREDNISOLONE 4 MG: 4 TABLET ORAL at 08:11

## 2019-12-14 RX ADMIN — HYDROCODONE BITARTRATE AND ACETAMINOPHEN 1 TABLET: 5; 325 TABLET ORAL at 15:31

## 2019-12-14 RX ADMIN — GABAPENTIN 100 MG: 100 CAPSULE ORAL at 05:55

## 2019-12-14 RX ADMIN — HYDROCODONE BITARTRATE AND ACETAMINOPHEN 1 TABLET: 5; 325 TABLET ORAL at 05:56

## 2019-12-14 RX ADMIN — HYDROCODONE BITARTRATE AND ACETAMINOPHEN 1 TABLET: 5; 325 TABLET ORAL at 10:57

## 2019-12-14 RX ADMIN — GABAPENTIN 100 MG: 100 CAPSULE ORAL at 15:31

## 2019-12-15 PROCEDURE — 97110 THERAPEUTIC EXERCISES: CPT

## 2019-12-15 PROCEDURE — 63710000001 METHYLPREDNISOLONE 4 MG TABLET THERAPY PACK 21 EACH DISP PACK: Performed by: PHYSICAL MEDICINE & REHABILITATION

## 2019-12-15 RX ADMIN — GABAPENTIN 100 MG: 100 CAPSULE ORAL at 07:10

## 2019-12-15 RX ADMIN — CYCLOBENZAPRINE 10 MG: 10 TABLET, FILM COATED ORAL at 16:02

## 2019-12-15 RX ADMIN — HYDROCODONE BITARTRATE AND ACETAMINOPHEN 1 TABLET: 5; 325 TABLET ORAL at 21:57

## 2019-12-15 RX ADMIN — FAMOTIDINE 20 MG: 20 TABLET, FILM COATED ORAL at 07:10

## 2019-12-15 RX ADMIN — HYDROCODONE BITARTRATE AND ACETAMINOPHEN 1 TABLET: 5; 325 TABLET ORAL at 16:02

## 2019-12-15 RX ADMIN — ATORVASTATIN CALCIUM 20 MG: 20 TABLET, FILM COATED ORAL at 21:46

## 2019-12-15 RX ADMIN — METHYLPREDNISOLONE 4 MG: 4 TABLET ORAL at 07:10

## 2019-12-15 RX ADMIN — HYDROCODONE BITARTRATE AND ACETAMINOPHEN 1 TABLET: 5; 325 TABLET ORAL at 01:07

## 2019-12-15 RX ADMIN — GABAPENTIN 100 MG: 100 CAPSULE ORAL at 21:46

## 2019-12-15 RX ADMIN — CYCLOBENZAPRINE 10 MG: 10 TABLET, FILM COATED ORAL at 07:12

## 2019-12-15 RX ADMIN — GABAPENTIN 100 MG: 100 CAPSULE ORAL at 12:28

## 2019-12-15 RX ADMIN — HYDROCODONE BITARTRATE AND ACETAMINOPHEN 1 TABLET: 5; 325 TABLET ORAL at 07:10

## 2019-12-15 RX ADMIN — FAMOTIDINE 20 MG: 20 TABLET, FILM COATED ORAL at 17:28

## 2019-12-15 RX ADMIN — HYDROCODONE BITARTRATE AND ACETAMINOPHEN 1 TABLET: 5; 325 TABLET ORAL at 12:28

## 2019-12-15 RX ADMIN — SENNOSIDES AND DOCUSATE SODIUM 2 TABLET: 8.6; 5 TABLET ORAL at 21:46

## 2019-12-15 RX ADMIN — DOCUSATE SODIUM 100 MG: 100 CAPSULE, LIQUID FILLED ORAL at 07:46

## 2019-12-15 RX ADMIN — DOCUSATE SODIUM 100 MG: 100 CAPSULE, LIQUID FILLED ORAL at 21:46

## 2019-12-16 PROCEDURE — 97110 THERAPEUTIC EXERCISES: CPT

## 2019-12-16 PROCEDURE — 97535 SELF CARE MNGMENT TRAINING: CPT

## 2019-12-16 RX ORDER — LIDOCAINE 50 MG/G
1 PATCH TOPICAL EVERY 24 HOURS
Status: DISCONTINUED | OUTPATIENT
Start: 2019-12-16 | End: 2019-12-19 | Stop reason: HOSPADM

## 2019-12-16 RX ADMIN — HYDROCODONE BITARTRATE AND ACETAMINOPHEN 1 TABLET: 5; 325 TABLET ORAL at 21:05

## 2019-12-16 RX ADMIN — GABAPENTIN 100 MG: 100 CAPSULE ORAL at 21:05

## 2019-12-16 RX ADMIN — DOCUSATE SODIUM 100 MG: 100 CAPSULE, LIQUID FILLED ORAL at 08:33

## 2019-12-16 RX ADMIN — GABAPENTIN 100 MG: 100 CAPSULE ORAL at 13:41

## 2019-12-16 RX ADMIN — HYDROCODONE BITARTRATE AND ACETAMINOPHEN 1 TABLET: 5; 325 TABLET ORAL at 02:31

## 2019-12-16 RX ADMIN — DOCUSATE SODIUM 100 MG: 100 CAPSULE, LIQUID FILLED ORAL at 21:05

## 2019-12-16 RX ADMIN — LIDOCAINE 1 PATCH: 50 PATCH CUTANEOUS at 13:37

## 2019-12-16 RX ADMIN — HYDROCODONE BITARTRATE AND ACETAMINOPHEN 1 TABLET: 5; 325 TABLET ORAL at 11:59

## 2019-12-16 RX ADMIN — FAMOTIDINE 20 MG: 20 TABLET, FILM COATED ORAL at 05:59

## 2019-12-16 RX ADMIN — FAMOTIDINE 20 MG: 20 TABLET, FILM COATED ORAL at 16:35

## 2019-12-16 RX ADMIN — ATORVASTATIN CALCIUM 20 MG: 20 TABLET, FILM COATED ORAL at 21:05

## 2019-12-16 RX ADMIN — SENNOSIDES AND DOCUSATE SODIUM 2 TABLET: 8.6; 5 TABLET ORAL at 21:05

## 2019-12-16 RX ADMIN — HYDROCODONE BITARTRATE AND ACETAMINOPHEN 1 TABLET: 5; 325 TABLET ORAL at 16:35

## 2019-12-16 RX ADMIN — GABAPENTIN 100 MG: 100 CAPSULE ORAL at 05:59

## 2019-12-16 RX ADMIN — HYDROCODONE BITARTRATE AND ACETAMINOPHEN 1 TABLET: 5; 325 TABLET ORAL at 07:30

## 2019-12-16 NOTE — PAYOR COMM NOTE
"UR CONTACT:  DAYLIN  P: [phone]        F: [phone]  REF #[id number]     AND DC SUMMARY        Noemi Pugh (59 y.o. Female)     Date of Birth Social Security Number Address Home Phone MRN    1960  [address] [facility] [phone] [id number]    Protestant Marital Status          [place] Single       Admission Date Admission Type Admitting Provider Attending Provider Department, Room/Bed    12/6/19 Elective Antonio Noe MD  [address], P591/1    Discharge Date Discharge Disposition Discharge Destination        12/13/2019 Rehab Facility or Unit ([facility])              Attending Provider:  (none)   Allergies:  Penicillins, Topamax [Topiramate]    Isolation:  None   Infection:  None   Code Status:  CPR    Ht:  134.6 cm (52.99\")   Wt:  39.3 kg (86 lb 10.3 oz)    Admission Cmt:  None   Principal Problem:  Spinal stenosis in cervical region [M48.02] More...                 Active Insurance as of 12/6/2019     Primary Coverage     Payor Plan Insurance Group Employer/Plan Group    ANTHEM BLUE CROSS ANTHEM BLUE CROSS BLUE SHIELD PPO [id number]     Payor Plan Address Payor Plan Phone Number Payor Plan Fax Number Effective Dates    [phone]  1/1/2019 - None Entered    [place] [address]       Subscriber Name Subscriber Birth Date Member ID       NOEMI PUGH 1960 [id number]                 Emergency Contacts      (Rel.) Home Phone Work Phone Mobile Phone    Donita Pugh (Sister) [phone] -- [phone]               Physician Progress Notes       Ruby Preciado APRN at 12/12/19 1009     Attestation signed by Antonio Noe MD at 12/12/19 1235    I have reviewed the documentation above and agree.                    NEUROSURGERY PROGRESS NOTE    PATIENT IDENTIFICATION:   Name:  Noemi Pugh      MRN:  [id number]     59 y.o.  female               CC: POD #6 s/p anterior cervical corpectomy C6-C7 " with cage and plate C5-T1 for cervical spinal stenosis with myelopathy/ BUE weakness      Subjective     Interval History: has complaints of ongoing hand N/T/weakness. HA much better this morning, sitting up in chair at beside    Objective     Vital signs in last 24 hours:  Temp:  [97.6 °F (36.4 °C)-98.7 °F (37.1 °C)] 97.8 °F (36.6 °C)  Heart Rate:  [57-70] 70  Resp:  [14-18] 17  BP: (116-142)/(74-87) 142/87  ICP ranges-    Intake/Output last 3 shifts:  No intake/output data recorded.    Intake/Output this shift:  No intake/output data recorded.      Physical Exam:  General:   Awake, alert, and oriented x 3. NAD  Appears well  Sitting up in bedside chair  Well cervical soft collar  R ant incision CDI with steri strips, normal surrounding skin  Decreased strength BUE, worse distally  Able to touch all fingers to thumb- improvement with dexterity/fine motor movement  Full cervical ROM  Walking with assistance      LABS:    Lab Results (last 24 hours)     ** No results found for the last 24 hours. **          IMAGING STUDIES:    No new imaging      Meds reviewed/changed: Yes    Current Facility-Administered Medications   Medication Dose Route Frequency Provider Last Rate Last Dose   • acetaminophen (TYLENOL) tablet 650 mg  650 mg Oral Q4H PRN Antonio Noe MD   650 mg at 12/11/19 1509   • atorvastatin (LIPITOR) tablet 20 mg  20 mg Oral Nightly Antonio Noe MD   20 mg at 12/11/19 2140   • bisacodyl (DULCOLAX) suppository 10 mg  10 mg Rectal Every Other Day Shiva Abraham MD   Stopped at 12/11/19 1228   • cetirizine (zyrTEC) tablet 10 mg  10 mg Oral Daily Antonio Noe MD       • cyclobenzaprine (FLEXERIL) tablet 10 mg  10 mg Oral TID PRN Antonio Noe MD       • docusate sodium (COLACE) capsule 100 mg  100 mg Oral BID Audrey Funez APRN   100 mg at 12/11/19 2140   • famotidine (PEPCID) injection 20 mg  20 mg Intravenous Q12H Audrey Funez APRN   20 mg at 12/11/19 2141   • fluticasone  (FLONASE) 50 MCG/ACT nasal spray 1 spray  1 spray Nasal Nightly Antonio Noe MD   1 spray at 12/09/19 2122   • gabapentin (NEURONTIN) capsule 100 mg  100 mg Oral Q8H Audrey Funez APRN   100 mg at 12/11/19 2140   • HYDROcodone-acetaminophen (NORCO) 5-325 MG per tablet 1 tablet  1 tablet Oral Q4H PRN Antonio Noe MD   1 tablet at 12/11/19 2140   • methylPREDNISolone (MEDROL (JESUS)) tablet 4 mg  4 mg Oral 4x Daily Taper Audrey Funez APRN       • [START ON 12/13/2019] methylPREDNISolone (MEDROL (JESUS)) tablet 4 mg  4 mg Oral TID Around Food Audrey Funez APRN       • [START ON 12/14/2019] methylPREDNISolone (MEDROL (JESUS)) tablet 4 mg  4 mg Oral Before Breakfast Audrey Funez APRN       • [START ON 12/14/2019] methylPREDNISolone (MEDROL (JESUS)) tablet 4 mg  4 mg Oral Tonight Audrey Funez APRN       • [START ON 12/15/2019] methylPREDNISolone (MEDROL (JESUS)) tablet 4 mg  4 mg Oral Before Breakfast Audrey Funez APRN       • ondansetron (ZOFRAN) tablet 4 mg  4 mg Oral Q6H PRN Antonio Noe MD   4 mg at 12/09/19 0942    Or   • ondansetron (ZOFRAN) injection 4 mg  4 mg Intravenous Q6H PRN Antonio Noe MD   4 mg at 12/11/19 1746   • polyethylene glycol (MIRALAX) powder 17 g  17 g Oral Daily PRN Nicole Wagner PA-C   17 g at 12/10/19 0920   • senna-docusate sodium (SENOKOT-S) 8.6-50 MG tablet 1 tablet  1 tablet Oral Nightly PRN Antonio Noe MD   1 tablet at 12/10/19 2254   • sodium chloride 0.9 % flush 3 mL  3 mL Intravenous Q12H Antonio Noe MD   3 mL at 12/11/19 2142       Assessment/Plan     ASSESSMENT:      Spinal stenosis in cervical region    Hyperlipidemia    Ossification of spinal ligament    Cervical spondylosis with myelopathy    Cervical stenosis of spine    Postoperative anemia due to acute blood loss    Steroid-induced hyperglycemia    Cord compression (CMS/HCC)    Leukocytosis (due to steroids)    Postoperative CSF leak      PLAN: HA pain is much better this  "morning. Will hold off on lumbar drain for now and she can resume a diet. Continue therapies, hopeful DC to [facility] soon.      I discussed the patients findings and my recommendations with patient, nursing staff and Dr Noe       LOS: 6 days       Ruby CLEMENT Preciado, APRN  12/12/2019  10:12 AM      \"Dictated utilizing Dragon dictation\".        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  Also has a complaint of headache.  From neurosurgery's description sounds positional.  She continues with weakness in the upper extremities.  Does not describe any new change in her strength today.  Voiding on her own.  Has not yet had a bowel movement.  To do   suppository after lunch today.    History taken from: patient chart    Objective     Vital Signs  Temp:  [97.6 °F (36.4 °C)-98.4 °F (36.9 °C)] 98 °F (36.7 °C)  Heart Rate:  [52-65] 61  Resp:  [14-16] 16  BP: (126-135)/(75-85) 127/77    Objective  Physical Exam  MENTAL STATUS -  AWAKE / ALERT  HEENT- NCAT,   SCLERA NON-ICTERIC, CONJUNCTIVA PINK, OP MOIST, cervical collar soft  LUNGS - CTA, NO WHEEZES, RALES OR RHONCHI  HEART- RRR, NO RUB, MURMUR, OR GALLOP  ABD - NORMOACTIVE BOWEL SOUNDS, SOFT, NT.  EXT - NO EDEMA " OR CYANOSIS  NEURO -awake alert.  Oriented x4.  Light touch decreased distally in the hands and feet.  Pinprick was not available.  Proprioception accurate at the great toes bilaterally.  MOTOR EXAM -right/left shoulder flexion 5/4, elbow flexion 4/3+, wrist extension 3/3-, elbow extension 3/2, finger flexion 2-/2-, hand intrinsics 2-/2-, hip flexion 5/3, knee extension 5/4, ankle dorsiflexion 5/3.  Results Review:     I reviewed the patient's new clinical results.  Results from last 7 days   Lab Units 12/11/19  0457 12/10/19  0517 12/09/19  0515   WBC 10*3/mm3 10.04 10.02 12.98*   HEMOGLOBIN g/dL 10.1* 9.7* 8.7*   HEMATOCRIT % 30.2* 29.2* 26.9*   PLATELETS 10*3/mm3 224 192 177     Results from last 7 days   Lab Units 12/10/19  0517 12/09/19  0515 12/07/19  0551   SODIUM mmol/L 139 142 134*   POTASSIUM mmol/L 3.5 3.6 4.6   CHLORIDE mmol/L 102 106 99   CO2 mmol/L 26.1 26.3 22.5   BUN mg/dL 12 9 10   CREATININE mg/dL 0.45* 0.47* 0.54*   CALCIUM mg/dL 8.3* 7.9* 8.1*   GLUCOSE mg/dL 135* 141* 152*       Medication Review: done  Scheduled Meds:  atorvastatin 20 mg Oral Nightly   bisacodyl 10 mg Rectal Every Other Day   cetirizine 10 mg Oral Daily   docusate sodium 100 mg Oral BID   famotidine 20 mg Intravenous Q12H   fluticasone 1 spray Nasal Nightly   gabapentin 100 mg Oral Q8H   methylPREDNISolone 4 mg Oral TID Around Food   [START ON 12/12/2019] methylPREDNISolone 4 mg Oral 4x Daily Taper   [START ON 12/13/2019] methylPREDNISolone 4 mg Oral TID Around Food   [START ON 12/14/2019] methylPREDNISolone 4 mg Oral Before Breakfast   [START ON 12/14/2019] methylPREDNISolone 4 mg Oral Tonight   [START ON 12/15/2019] methylPREDNISolone 4 mg Oral Before Breakfast   methylPREDNISolone 8 mg Oral Tonight   sodium chloride 3 mL Intravenous Q12H     Continuous Infusions:   PRN Meds:.•  acetaminophen  •  cyclobenzaprine  •  HYDROcodone-acetaminophen  •  ondansetron **OR** ondansetron  •  polyethylene glycol  •  senna-docusate  sodium      Assessment/Plan       Spinal stenosis in cervical region    Hyperlipidemia    Ossification of spinal ligament    Cervical spondylosis with myelopathy    Cervical stenosis of spine    Postoperative anemia due to acute blood loss    Steroid-induced hyperglycemia    Cord compression (CMS/HCC)    Leukocytosis (due to steroids)      Assessment & Plan  Cervical myelopathy with upper extremity greater than  lower extremity and left side greater than right side involvement  History of cervical stenosis C5 to T1 with 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  Add scheduled suppository every other day after lunch to take advantage of gastrocolic reflex.     Current plan will be to pursue acute inpatient rehab.  This would be a comprehensive interdisciplinary program with physical therapy 1.5 hours and occupational therapy 1.5 hours 5 days a week.  Areas to be addressed include gross and fine motor control, strengthening, ADL training with adaptive techniques, trunk control, transfers, balance, progressive ambulation.  Rehabilitation nursing for carryover monitoring of her neurologic status, bowel bladder and skin.  Ongoing physician follow-up.    Shiva Abraham MD  12/11/19  1:05 PM    Time:         Electronically signed by Shiva Abraham MD at 12/11/19 1307     Kyung Hamilton, MIRYAM at 12/11/19 1118     Attestation signed by Antonio Noe MD at 12/11/19 1527    I have reviewed the documentation above and agree.                    Postop   LOS: 5 days   Patient Care Team:  Mary Lou Carroll MD as PCP - General (Internal Medicine)    Chief  Complaint: Postop cervical fusion    Subjective     Complains of headache 10/10 this morning.  She denied it being positional but then stated it was worse after she sat up in the chair this morning.  Sister at bedside.    Interval History:     History taken from: patient chart RN    Objective      Alert and orient x3  Sensation intact with subjective dysesthesia in both hands  No hoarseness of voice  Anterior cervical incision dry and intact, no swelling, steri-strips in place  Soft collar on  Bilateral upper extremity reflexes 3/4, it appears preop bilateral Rivers's has resolved  Bilateral hand grasps 3/5  Lung effort normal    Vital Signs  Temp:  [97.6 °F (36.4 °C)-98.4 °F (36.9 °C)] 98 °F (36.7 °C)  Heart Rate:  [52-80] 61  Resp:  [14-17] 16  BP: (126-136)/(75-85) 127/77       Results Review:     I reviewed the patient's new clinical results.    .  Results from last 7 days   Lab Units 12/11/19  0457 12/10/19  0517 12/09/19  0515   WBC 10*3/mm3 10.04 10.02 12.98*   HEMOGLOBIN g/dL 10.1* 9.7* 8.7*   HEMATOCRIT % 30.2* 29.2* 26.9*   PLATELETS 10*3/mm3 224 192 177       Assessment/Plan       Spinal stenosis in cervical region    Hyperlipidemia    Ossification of spinal ligament    Cervical spondylosis with myelopathy    Cervical stenosis of spine    Postoperative anemia due to acute blood loss    Steroid-induced hyperglycemia    Cord compression (CMS/HCC)    Leukocytosis (due to steroids)    Postop day #5 anterior cervical corpectomy C6-C7 with cage and plate C5-T1 for cervical spinal stenosis with myelopathy and OPLL by Dr. Noe.  Incidental durotomy during decompression.  She is doing fairly well from a surgical perspective.  Bilateral upper extremity weakness persists, but she feels like she has had some improvement.  No leukocytosis today and hemoglobin improving.  Dr. Abraham has evaluated for acute rehab.  She reports a severe headache today 10/10.  She says she has had a mild headache since surgery but  worse today.  Did encourage her to try Norco as she has been trying to avoid pain medication.  Has also had some postop nausea. Discussed the headache with Dr Noe. Will place her flat again and keep her NPO after midnight. Will recheck in the morning to see if the headache persists with HOB elevated early tomorrow. If it does, Dr Noe will likely take her to OR for lumbar drain and discuss this with the patient prior to proceeding.         Kyung Hamilton, APRN  19  11:18 AM    Electronically signed by Antonio Noe MD at 19 1527     Deejay Cottrell MD at 12/10/19 1416              Name: Noemi Bartholomew ADMIT: 2019   : 1960  PCP: Mary Lou Carroll MD    MRN: [id number] LOS: 4 days   AGE/SEX: 59 y.o. female  ROOM: [facility]     Subjective   Subjective   She denies any chest pain, SOA, nausea, vomiting or diarrhea.     Objective   Objective   Vital Signs  Temp:  [97.7 °F (36.5 °C)-98.2 °F (36.8 °C)] 98 °F (36.7 °C)  Heart Rate:  [56-80] 80  Resp:  [16-18] 17  BP: (117-143)/(70-86) 136/82  SpO2:  [93 %-96 %] 96 %  on   ;   Device (Oxygen Therapy): room air  Body mass index is 21.69 kg/m².  Physical Exam   Constitutional: She is oriented to person, place, and time. No distress.   Cardiovascular: Normal rate and regular rhythm.   No murmur heard.  Pulmonary/Chest: Effort normal and breath sounds normal.   Abdominal: Soft. Bowel sounds are normal. She exhibits no distension. There is no tenderness.   Musculoskeletal: Normal range of motion. She exhibits no edema.   Neurological: She is alert and oriented to person, place, and time.   Skin: Skin is warm and dry. She is not diaphoretic.       Results Review:       I reviewed the patient's new clinical results.  Results from last 7 days   Lab Units 12/10/19  0517 19  0515 19  0506 19  0551   WBC 10*3/mm3 10.02 12.98* 14.76* 11.49*   HEMOGLOBIN g/dL 9.7* 8.7* 10.2* 10.4*   PLATELETS 10*3/mm3 192 177 184 192     Results  from last 7 days   Lab Units 12/10/19  0517 12/09/19  0515 12/07/19  0551   SODIUM mmol/L 139 142 134*   POTASSIUM mmol/L 3.5 3.6 4.6   CHLORIDE mmol/L 102 106 99   CO2 mmol/L 26.1 26.3 22.5   BUN mg/dL 12 9 10   CREATININE mg/dL 0.45* 0.47* 0.54*   GLUCOSE mg/dL 135* 141* 152*   Estimated Creatinine Clearance: 83.5 mL/min (A) (by C-G formula based on SCr of 0.45 mg/dL (L)).    Results from last 7 days   Lab Units 12/10/19  0517 12/09/19  0515 12/07/19  0551   CALCIUM mg/dL 8.3* 7.9* 8.1*       No results found for: HGBA1C, POCGLU      atorvastatin 20 mg Oral Nightly   cetirizine 10 mg Oral Daily   docusate sodium 100 mg Oral BID   famotidine 20 mg Intravenous Q12H   fluticasone 1 spray Nasal Nightly   gabapentin 100 mg Oral Q8H   sodium chloride 3 mL Intravenous Q12H      Diet Regular      Assessment/Plan     Active Hospital Problems    Diagnosis  POA   • **Spinal stenosis in cervical region [M48.02]  Yes   • Postoperative anemia due to acute blood loss [D62]  Yes   • Steroid-induced hyperglycemia [R73.9, T38.0X5A]  No   • Cord compression (CMS/HCC) [G95.20]  Unknown   • Leukocytosis (due to steroids) [D72.829]  No   • Cervical stenosis of spine [M48.02]  Yes   • 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]  Yes   • Cervical spondylosis with myelopathy [M47.12]  Yes   • Hyperlipidemia [E78.5]  Yes      Resolved Hospital Problems   No resolved problems to display.       Ms. Bartholomew is a 59 y.o. female who is POD#4 CERVICAL 6, CERVICAL 7 ANTERIOR CERVICAL CORPECTOMY WITH CAGE AND PLATE.    · Only complaint remains some discomfort and numbness in her hands.  She is frustrated that she has not been able to do much therapy.  · Otherwise seems very stable medically.  · Hemoglobin improved today.  · Renal function and electrolytes stable.  · Not much else to offer.  Will follow peripherally.      Deejay Cottrell MD  [facility]  12/10/19  2:16 PM        Electronically signed by Deejay Cottrell MD at 12/10/19  "1933     Audrey Funez, APRN at 12/10/19 1240          Postoperative visit      Feeling some better.  The pain in her arms forearms and hands is better.  Although there is still some weakness, she reports slight improvement in forearm strength. The hands are still considerably weak.  She is having no difficulty swallowing.  When I raise the head of her bed, she denied any headache.  Sat on the edge of the bed with physical therapy this morning but became nauseated. Standing not attempted for this reason.  Occupational therapy ordered on Sunday. Patient has still not been seen.  Dr. Abraham is to see the patient sometime today for acute rehab consult.      .Blood pressure 143/86, pulse 60, temperature 98.2 °F (36.8 °C), temperature source Oral, resp. rate 17, height 134.6 cm (52.99\"), weight 39.3 kg (86 lb 10.3 oz), SpO2 96 %, not currently breastfeeding.        Awake, alert, oriented x 3.  No distress noted.  Voice quality normal.  Anterior cervical incision is well approximated.    Mild surrounding edema; nonfluctuant.  Wearing soft cervical collar  Able to raise both arms off of bed above shoulder level.  Bicep weakness is slightly improved although still weaker on the right.  Continues to have bilateral tricep, wrist extensor, and intrinsic weakness bilaterally right greater than left.      .  Results from last 7 days   Lab Units 12/10/19  0517 12/09/19  0515 12/08/19  0506   WBC 10*3/mm3 10.02 12.98* 14.76*   HEMOGLOBIN g/dL 9.7* 8.7* 10.2*   HEMATOCRIT % 29.2* 26.9* 29.6*   PLATELETS 10*3/mm3 192 177 184       POD 4 anterior cervical corpectomy C6, C7 with cage and plate C5-T1 for C5-T1 cervical stenosis and  OPPL and progressive cervical myelopathy.   CSF leak repair up yesterday, tolerating well  Postoperative cervical neuritis; improved with steroids  Distal arm and hand weakness bilaterally  Postoperative leukocytosis resolved  Postoperative acute blood loss anemia, improving    Off IV steroids.  Switched " "to oral Medrol Dosepak.  Continue to mobilize  Await acute rehab evaluation  CBC in a.m.    Electronically signed by Audrey Funez APRN at 12/10/19 1549     Shiva Abraham MD at 12/09/19 1920        Consult request received today.  Chart reviewed.  Therapy notes reviewed.  Off bedrest and started with PT today.   OT evaluation pending.  Will plan to see late tomorrow for full rehab assessment.     Electronically signed by Shiva Abraham MD at 12/09/19 1922     Nicole Wagner PA-C at 12/09/19 1110     Attestation signed by Antonio Noe MD at 12/09/19 1229    I have reviewed the documentation above and agree.                    Doing ok. Steroids helping.  Concerned with residual N/T in hands as well as bilateral hand/arm weakness. This is all stable compared to before surgery per the patient.  I reassured her that none of the N/T or weakness was expected to improve immediately and will take quite a while.  She is swallowing well.  Pain well controlled.     Had a HA this am but it is NOT positional    Both Dr. Castillo and Dr. Noe have reviewed the MRI.  It does show new cord edema which is not unexpected given the amount of manipulation required to address the OPLL/stenosis.  The fluid is also expected given known CSF leak (calcified ligament had adhered to the dura).  No unexpected findings that warrant any additional intervention.       Blood pressure 118/72, pulse 74, temperature 98 °F (36.7 °C), temperature source Oral, resp. rate 17, height 134.6 cm (52.99\"), weight 39.3 kg (86 lb 10.3 oz), SpO2 96 %, not currently breastfeeding.      AA, Ox3  Neck soft and flat  ALONDRA:  Empty-RN asked to remove  MONAE well.  Has hand>bilateral tricep weakness, all stable per pt and family        ..  Results from last 7 days   Lab Units 12/09/19  0515   WBC 10*3/mm3 12.98*   HEMOGLOBIN g/dL 8.7*   HEMATOCRIT % 26.9*   PLATELETS 10*3/mm3 177       ..  Results from last 7 days   Lab Units 12/09/19  0515 "   SODIUM mmol/L 142   POTASSIUM mmol/L 3.6   CHLORIDE mmol/L 106   CO2 mmol/L 26.3   BUN mg/dL 9   CREATININE mg/dL 0.47*   GLUCOSE mg/dL 141*   CALCIUM mg/dL 7.9*       POD#3 s/p ACCF at C6, C7 with cage and plate from C5-T1 for stenosis secondary to OPLL/myelopathy  CSF leak-kept flat till today  tricep and hand weakness-stable compared to preop      Begin to mobilize. Asked RN to sit her up and if she tolerates that then mobilize  PT/OT/rehab eval  D/c drain  CBC in am  Cont with steroids for 24 hrs, then switch to MDP tomorrow      Electronically signed by Antonio Noe MD at 12/09/19 1229          Consult Notes       Deejay Cottrell MD at 12/09/19 0908      Consult Orders    1. Inpatient Internal Medicine Consult [[id number]] ordered by Audrey Funez APRN at 12/08/19 0947                    Patient Name:  Noemi Bartholomew  YOB: 1960  MRN:  [id number]  Date of Admission:  12/6/2019  Date of Consult:  12/9/2019  Patient Care Team:  Mary Lou Carroll MD as PCP - General (Internal Medicine)    Inpatient Internal Medicine Consult  Consult performed by: Deejay Cottrell MD  Consult ordered by: Audrey Funez APRN  Reason for consult: Evaluate status and make recommendations regarding treatment for hyperglycemia and hypotension.        Subjective   History of Present Illness  Ms. Bartholomew is a 59 y.o. female that has been admitted to [facility] following elective CERVICAL 6, CERVICAL 7 ANTERIOR CERVICAL CORPECTOMY WITH CAGE AND PLATE.  She has been admitted to an orthopedic floor following surgery and we were asked to see and assist with her medical problems, specifically relating to her blood pressure which has been low and blood sugar which has been high.  At the time of my visit she denies any chest pain, SOA, vomiting or diarrhea.  She has tolerated a diet.  She does complain of expected postoperative discomfort.  She reports being in a normal state of health leading up to surgery.   She reports discomfort and tingling in both of her upper extremities.  Mild nausea.  No history of diabetes or elevated blood sugar.  She does not take BP medication.      Past Medical History:   Diagnosis Date   • Abnormal alkaline phosphatase test 06/12/2015    Resolved   • Acute bronchitis 06/12/2015    Resolved   • Allergic child age    pencillin   • Cervical spinal stenosis    • Diverticulosis    • H/O electromyography 10/13/2014   • H/O mammogram 07/29/2014   • Headache occassionally   • High cholesterol    • History of EKG 06/12/2015   • Hypercalcemia 06/12/2015    Resolved, Full w/u done, Likely secondary to Forteo   • Left arm pain    • Low back pain March 2017   • Numbness in both hands    • Osteoporosis    • Paresthesia 06/12/2015    Resolved     Past Surgical History:   Procedure Laterality Date   • BACK SURGERY  1999   • COLONOSCOPY  05/20/2011   • SPINE SURGERY  1999 back    dr abad quintero   • TONSILLECTOMY  child age   • TYMPANOPLASTY       Family History   Problem Relation Age of Onset   • Breast cancer Mother    • Stroke Mother         Cerebrovascular Accident   • Colon cancer Mother    • Diabetes Father         Borderline Diabetes Mellitus   • Heart disease Father         Cardiac Disorder   • Heart failure Father    • Anxiety disorder Sister    • Nephrolithiasis Sister         Kidney Stones   • Malig Hyperthermia Neg Hx      Social History     Tobacco Use   • Smoking status: Never Smoker   • Smokeless tobacco: Never Used   Substance Use Topics   • Alcohol use: No   • Drug use: No     Medications Prior to Admission   Medication Sig Dispense Refill Last Dose   • acetaminophen (TYLENOL) 500 MG tablet Take 500 mg by mouth Every 6 (Six) Hours As Needed for Mild Pain .   Past Week at Unknown time   • atorvastatin (LIPITOR) 20 MG tablet Take 1 tablet by mouth Every Night. 90 tablet 1 12/5/2019 at 2200   • Cholecalciferol (VITAMIN D3) 2000 units tablet Take 2,000 Units by mouth Daily. 30 tablet   12/5/2019 at 1200   • gabapentin (NEURONTIN) 100 MG capsule Take 1 capsule by mouth 2 (Two) Times a Day. 60 capsule 3 12/6/2019 at 0900   • Tretinoin Microsphere (RETIN-A MICRO PUMP) 0.06 % gel Apply  topically As Needed.   Patient Taking Differently at Unknown time   • Calcium-Magnesium-Vitamin D ER (CITRACAL SLOW RELEASE) 600- MG-MG-UNIT tablet sustained-release 24 hour Take 2 tablets daily (Patient taking differently: Take 2 tablets daily/HOLD BEFORE SURGERY)   12/2/2019   • cetirizine (zyrTEC) 10 MG tablet Take 10 mg by mouth Daily.   More than a month at Unknown time   • docusate sodium (COLACE) 100 MG capsule Take 1 capsule by mouth once a week.   12/1/2019   • fluticasone (FLONASE) 50 MCG/ACT nasal spray 1 spray into the nostril(s) as directed by provider Every Night.   More than a month at Unknown time   • melatonin tablet Take 1 tablet by mouth At Night As Needed.   More than a month at Unknown time     Allergies:  Penicillins and Topamax [topiramate]    Review of Systems   HENT: Negative.    Respiratory: Negative.    Cardiovascular: Negative.    Gastrointestinal: Negative.    Endocrine: Negative.    Genitourinary: Negative.    Musculoskeletal: Negative.    Skin: Negative.    Neurological: Positive for weakness and numbness.   Hematological: Negative.    Psychiatric/Behavioral: Negative.        Objective      Vital Signs  Temp:  [97.2 °F (36.2 °C)-98.3 °F (36.8 °C)] 97.2 °F (36.2 °C)  Heart Rate:  [63-65] 65  Resp:  [16-18] 18  BP: ()/(52-66) 104/66  Body mass index is 21.69 kg/m².    Physical Exam   Constitutional: She is oriented to person, place, and time. She appears well-developed and well-nourished. No distress.   HENT:   Head: Normocephalic and atraumatic.   Eyes: Conjunctivae and EOM are normal. No scleral icterus.   Neck:   Anterior surgical wound dressed   Cardiovascular: Normal rate and regular rhythm.   Pulmonary/Chest: Effort normal and breath sounds normal.   Abdominal: Soft. Bowel  sounds are normal. She exhibits no distension. There is no tenderness.   Musculoskeletal: Normal range of motion. She exhibits no edema.   Neurological: She is alert and oriented to person, place, and time.   Skin: Skin is warm and dry.   Psychiatric: She has a normal mood and affect. Her behavior is normal.   Nursing note and vitals reviewed.      Results Review:   I reviewed the patient's new clinical results.  I reviewed the patient's new imaging results and agree with the interpretation.  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 Bartholomew is a 59 y.o. female who is s/p CERVICAL 6, CERVICAL 7 ANTERIOR CERVICAL CORPECTOMY WITH CAGE AND PLATE.    · Discussed with ONEYDA.  Steroids to be continued at least additional 24 hours.  At this point only mild elevations in blood glucose that should not require any insulin coverage.  Continue to monitor with repeat BMP in a.m.  · BP slightly low which is likely chronic as she is asymptomatic.  · She does have significant postoperative blood loss anemia with hemoglobin prior to surgery 13.3 down to 8.7 today.  Defer further management and monitoring of this to primary.  · Suspect elevated WBC  related to steroid use.  No signs of infection.  · Continue home dose atorvastatin for hyperglycemia.  · SCDs have been ordered for DVT prophylaxis per surgery.      Thank you very much for asking A to be involved in this patient's care. We will follow along with you.      Deejay Cottrell MD  [facility]  12/09/19  9:08 AM      Electronically signed by Deejay Cottrell MD at 12/09/19 1014     Shiva Abraham MD at 12/10/19 1722      Consult Orders    1. 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  She has been to [facility] on December 6, 2019 and underwent anterior cervical corpectomy C6, C7 with cage and anterior cervical plate from C5-T1.  CSF leak repair.  Postoperative cervical neuritis.  Treated with steroids.  Postoperatively she is had increased weakness in her arms and hands.   She complains of numbness in her hands up to the mid forearm and in the feet up to the distal shins.  Has weakness in the left arm more than the right arm.  Also has some weakness in the left leg.  Her last bowel movement was 4 days ago.  She did void on her own today without having to be catheterized and had a low bladder scan postvoid residual of 0 cc.  She denies any difficulty with her swallowing..  With occupational therapy she was dependent for her bathing and grooming and feeding.  With physical therapy she sat on the edge of bed with moderate assist but had complaints of dizziness and nausea.  Was unable to attempt standing yesterday.   Review of Systems   Some neck pain.  No difficulty with swallowing.  Comfortable with breathing.  No abdominal pain.  Voiding on her own.  Constipation.  Past Medical History:   Diagnosis Date   • Abnormal alkaline phosphatase test 06/12/2015    Resolved   • Acute bronchitis 06/12/2015    Resolved   • Allergic child age    pencillin   • Cervical spinal stenosis    • Diverticulosis    • H/O electromyography 10/13/2014   • H/O mammogram 07/29/2014   • Headache occassionally   • High cholesterol    • History of EKG 06/12/2015   • Hypercalcemia 06/12/2015    Resolved, Full w/u done, Likely secondary to Forteo   • Left arm pain    • Low back pain March 2017   • Numbness in both hands    • Osteoporosis    • Paresthesia 06/12/2015    Resolved   ,   Past Surgical History:   Procedure Laterality Date   • BACK SURGERY  1999   • COLONOSCOPY  05/20/2011   • SPINE SURGERY  1999 back    dr abad quintero   • TONSILLECTOMY  child age   • TYMPANOPLASTY     ,   Family History   Problem Relation Age of Onset   • Breast cancer Mother    • Stroke Mother         Cerebrovascular Accident   • Colon cancer Mother    • Diabetes Father         Borderline Diabetes Mellitus   • Heart disease Father         Cardiac Disorder   • Heart failure Father    • Anxiety disorder Sister    • Nephrolithiasis  Sister         Kidney Stones   • Malig Hyperthermia Neg Hx    ,   Social History     Tobacco Use   • Smoking status: Never Smoker   • Smokeless tobacco: Never Used   Substance Use Topics   • Alcohol use: No   • Drug use: No   Patient works in accounting.  She lives in a home with 3 steps to enter, first floor bedroom / bathroom.  Her sister is available to provide some assistance.      Scheduled Meds:    atorvastatin 20 mg Oral Nightly   cetirizine 10 mg Oral Daily   docusate sodium 100 mg Oral BID   famotidine 20 mg Intravenous Q12H   fluticasone 1 spray Nasal Nightly   gabapentin 100 mg Oral Q8H   methylPREDNISolone 4 mg Oral After Lunch   methylPREDNISolone 4 mg Oral After Dinner   [START ON 12/11/2019] methylPREDNISolone 4 mg Oral TID Around Food   [START ON 12/12/2019] methylPREDNISolone 4 mg Oral 4x Daily Taper   [START ON 12/13/2019] methylPREDNISolone 4 mg Oral TID Around Food   [START ON 12/14/2019] methylPREDNISolone 4 mg Oral Before Breakfast   [START ON 12/14/2019] methylPREDNISolone 4 mg Oral Tonight   [START ON 12/15/2019] methylPREDNISolone 4 mg Oral Before Breakfast   methylPREDNISolone 8 mg Oral Before Breakfast   methylPREDNISolone 8 mg Oral Tonight   [START ON 12/11/2019] methylPREDNISolone 8 mg Oral Tonight   sodium chloride 3 mL Intravenous Q12H   , Continuous Infusions:   , PRN Meds:  •  acetaminophen  •  cyclobenzaprine  •  HYDROcodone-acetaminophen  •  ondansetron **OR** ondansetron  •  polyethylene glycol  •  senna-docusate sodium and Allergies:  Penicillins and Topamax [topiramate]    Objective      Vital Signs  Temp:  [97.7 °F (36.5 °C)-98.2 °F (36.8 °C)] 98 °F (36.7 °C)  Heart Rate:  [56-80] 80  Resp:  [16-17] 17  BP: (117-143)/(70-86) 136/82    Physical Exam  MENTAL STATUS -  AWAKE / ALERT  HEENT- NCAT,   SCLERA NON-ICTERIC, CONJUNCTIVA PINK, OP MOIST, cervical collar soft  LUNGS - CTA, NO WHEEZES, RALES OR RHONCHI  HEART- RRR, NO RUB, MURMUR, OR GALLOP  ABD - NORMOACTIVE BOWEL SOUNDS,  SOFT, NT.  EXT - NO EDEMA OR CYANOSIS  NEURO -awake alert.  Oriented x4.  Light touch decreased distally in the hands and feet.  Pinprick was not available.  Proprioception accurate at the great toes bilaterally.  MOTOR EXAM -right/left shoulder flexion 5/4, elbow flexion 4/3+, wrist extension 3/3-, elbow extension 3/2, finger flexion 2-/2-, hand intrinsics 2-/2-, hip flexion 5/3, knee extension 5/4, ankle dorsiflexion 5/3.    Results Review:   Results from last 7 days   Lab Units 12/10/19  0517 12/09/19  0515 12/08/19  0506   WBC 10*3/mm3 10.02 12.98* 14.76*   HEMOGLOBIN g/dL 9.7* 8.7* 10.2*   HEMATOCRIT % 29.2* 26.9* 29.6*   PLATELETS 10*3/mm3 192 177 184     Results from last 7 days   Lab Units 12/10/19  0517 12/09/19  0515 12/07/19  0551   SODIUM mmol/L 139 142 134*   POTASSIUM mmol/L 3.5 3.6 4.6   CHLORIDE mmol/L 102 106 99   CO2 mmol/L 26.1 26.3 22.5   BUN mg/dL 12 9 10   CREATININE mg/dL 0.45* 0.47* 0.54*   CALCIUM mg/dL 8.3* 7.9* 8.1*   GLUCOSE mg/dL 135* 141* 152*        I reviewed the patient's new clinical results.        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  Add scheduled suppository every other day after lunch to take advantage of gastrocolic reflex.    Current plan will be to pursue acute inpatient rehab.  This would be a comprehensive interdisciplinary program with physical therapy 1.5 hours and occupational therapy 1.5 hours 5 days a week.  Areas to be addressed include gross and fine motor control, strengthening, ADL training with adaptive techniques, trunk control, transfers, balance, progressive ambulation.  Rehabilitation nursing for carryover monitoring of her neurologic status, bowel bladder and skin.  Ongoing physician follow-up.  I discussed the patients findings and my recommendations with patient, family and nursing staff    Shiva Abraham MD  12/10/19  5:22 PM    Time:         Electronically signed by Shiva Abraham MD at 12/10/19 6792          Discharge Summary      Nicole Wagner PA-C at 12/13/19 1157     Attestation signed by Antonio Noe MD at 12/13/19 1605    Agree with above.                Date of admission: December 6, 2019    Date of discharge: December 13, 2019    Hospital course: Ms. Bartholomew underwent an uncomplicated anterior cervical corpectomy at C6 and C7 with cage and plate from C5-T1 by Dr. Noe on December 6, 2019.  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  She was kept flat for 3 days.      Postoperatively she continued to complain of hand numbness tingling and weakness, similar to her preoperative state.  An MRI was done which revealed the CSF collection as expected given her known leak.  There was new cord edema which was not surprising given the degree of manipulation required to address the stenosis.  She did complain of a headache several days after she was  mobilized.  Dr. Noe did consider placing a lumbar drain but her headache thankfully improved and at this point she is ambulating without any headache and has very little discomfort.  Her hand numbness tingling and weakness is again at baseline.  She is weaning off of steroids with a Medrol Dosepak.  Her wound is healing nicely.  She is voiding without difficulty. No swallowing issues.       Will be transferred to acute rehab today for further therapy and rehabilitation.      Wound care: She can shower daily.  Please clean the wound with peroxide twice daily and call with any fever or chills or incisional redness swelling or drainage.      Activity restrictions: No lifting over 5 pounds.  She has a soft collar to use for comfort purposes only.  The head of bed can be as tolerated.  She is encouraged to ambulate and participate with therapies as tolerated. No driving.     Electronically signed by Antonio Noe MD at 12/13/19 5620

## 2019-12-17 ENCOUNTER — APPOINTMENT (OUTPATIENT)
Dept: MRI IMAGING | Facility: HOSPITAL | Age: 59
End: 2019-12-17

## 2019-12-17 ENCOUNTER — APPOINTMENT (OUTPATIENT)
Dept: GENERAL RADIOLOGY | Facility: HOSPITAL | Age: 59
End: 2019-12-17

## 2019-12-17 ENCOUNTER — HOSPITAL ENCOUNTER (OUTPATIENT)
Facility: HOSPITAL | Age: 59
Setting detail: SURGERY ADMIT
End: 2019-12-17
Attending: NEUROLOGICAL SURGERY | Admitting: NEUROLOGICAL SURGERY

## 2019-12-17 LAB
BASOPHILS # BLD AUTO: 0.02 10*3/MM3 (ref 0–0.2)
BASOPHILS NFR BLD AUTO: 0.2 % (ref 0–1.5)
DEPRECATED RDW RBC AUTO: 45.5 FL (ref 37–54)
EOSINOPHIL # BLD AUTO: 0.1 10*3/MM3 (ref 0–0.4)
EOSINOPHIL NFR BLD AUTO: 0.9 % (ref 0.3–6.2)
ERYTHROCYTE [DISTWIDTH] IN BLOOD BY AUTOMATED COUNT: 13.2 % (ref 12.3–15.4)
HCT VFR BLD AUTO: 35.7 % (ref 34–46.6)
HGB BLD-MCNC: 12.1 G/DL (ref 12–15.9)
IMM GRANULOCYTES # BLD AUTO: 0.1 10*3/MM3 (ref 0–0.05)
IMM GRANULOCYTES NFR BLD AUTO: 0.9 % (ref 0–0.5)
LYMPHOCYTES # BLD AUTO: 1.76 10*3/MM3 (ref 0.7–3.1)
LYMPHOCYTES NFR BLD AUTO: 15 % (ref 19.6–45.3)
MCH RBC QN AUTO: 31.6 PG (ref 26.6–33)
MCHC RBC AUTO-ENTMCNC: 33.9 G/DL (ref 31.5–35.7)
MCV RBC AUTO: 93.2 FL (ref 79–97)
MONOCYTES # BLD AUTO: 0.64 10*3/MM3 (ref 0.1–0.9)
MONOCYTES NFR BLD AUTO: 5.5 % (ref 5–12)
NEUTROPHILS # BLD AUTO: 9.08 10*3/MM3 (ref 1.7–7)
NEUTROPHILS NFR BLD AUTO: 77.5 % (ref 42.7–76)
NRBC BLD AUTO-RTO: 0 /100 WBC (ref 0–0.2)
PLATELET # BLD AUTO: 231 10*3/MM3 (ref 140–450)
PMV BLD AUTO: 8.5 FL (ref 6–12)
RBC # BLD AUTO: 3.83 10*6/MM3 (ref 3.77–5.28)
WBC NRBC COR # BLD: 11.7 10*3/MM3 (ref 3.4–10.8)

## 2019-12-17 PROCEDURE — 99024 POSTOP FOLLOW-UP VISIT: CPT | Performed by: NURSE PRACTITIONER

## 2019-12-17 PROCEDURE — 97110 THERAPEUTIC EXERCISES: CPT

## 2019-12-17 PROCEDURE — 72156 MRI NECK SPINE W/O & W/DYE: CPT

## 2019-12-17 PROCEDURE — 85025 COMPLETE CBC W/AUTO DIFF WBC: CPT | Performed by: NURSE PRACTITIONER

## 2019-12-17 PROCEDURE — 97535 SELF CARE MNGMENT TRAINING: CPT | Performed by: OCCUPATIONAL THERAPIST

## 2019-12-17 PROCEDURE — A9577 INJ MULTIHANCE: HCPCS | Performed by: PHYSICAL MEDICINE & REHABILITATION

## 2019-12-17 PROCEDURE — 72040 X-RAY EXAM NECK SPINE 2-3 VW: CPT

## 2019-12-17 PROCEDURE — 0 GADOBENATE DIMEGLUMINE 529 MG/ML SOLUTION: Performed by: PHYSICAL MEDICINE & REHABILITATION

## 2019-12-17 RX ORDER — HYDROCODONE BITARTRATE AND ACETAMINOPHEN 5; 325 MG/1; MG/1
1 TABLET ORAL EVERY 4 HOURS PRN
Status: DISCONTINUED | OUTPATIENT
Start: 2019-12-17 | End: 2019-12-19 | Stop reason: HOSPADM

## 2019-12-17 RX ADMIN — GABAPENTIN 100 MG: 100 CAPSULE ORAL at 14:00

## 2019-12-17 RX ADMIN — FAMOTIDINE 20 MG: 20 TABLET, FILM COATED ORAL at 18:04

## 2019-12-17 RX ADMIN — HYDROCODONE BITARTRATE AND ACETAMINOPHEN 1 TABLET: 5; 325 TABLET ORAL at 11:51

## 2019-12-17 RX ADMIN — LIDOCAINE 1 PATCH: 50 PATCH CUTANEOUS at 14:00

## 2019-12-17 RX ADMIN — HYDROCODONE BITARTRATE AND ACETAMINOPHEN 1 TABLET: 5; 325 TABLET ORAL at 18:04

## 2019-12-17 RX ADMIN — GABAPENTIN 100 MG: 100 CAPSULE ORAL at 06:20

## 2019-12-17 RX ADMIN — ATORVASTATIN CALCIUM 20 MG: 20 TABLET, FILM COATED ORAL at 20:27

## 2019-12-17 RX ADMIN — DOCUSATE SODIUM 100 MG: 100 CAPSULE, LIQUID FILLED ORAL at 07:50

## 2019-12-17 RX ADMIN — HYDROCODONE BITARTRATE AND ACETAMINOPHEN 1 TABLET: 5; 325 TABLET ORAL at 03:23

## 2019-12-17 RX ADMIN — FAMOTIDINE 20 MG: 20 TABLET, FILM COATED ORAL at 06:19

## 2019-12-17 RX ADMIN — GABAPENTIN 100 MG: 100 CAPSULE ORAL at 20:28

## 2019-12-17 RX ADMIN — HYDROCODONE BITARTRATE AND ACETAMINOPHEN 1 TABLET: 5; 325 TABLET ORAL at 07:50

## 2019-12-17 RX ADMIN — SENNOSIDES AND DOCUSATE SODIUM 2 TABLET: 8.6; 5 TABLET ORAL at 20:27

## 2019-12-17 RX ADMIN — DOCUSATE SODIUM 100 MG: 100 CAPSULE, LIQUID FILLED ORAL at 20:27

## 2019-12-17 RX ADMIN — GADOBENATE DIMEGLUMINE 7 ML: 529 INJECTION, SOLUTION INTRAVENOUS at 16:57

## 2019-12-18 LAB
ALBUMIN SERPL-MCNC: 3.4 G/DL (ref 3.5–5.2)
ALBUMIN/GLOB SERPL: 1.1 G/DL
ALP SERPL-CCNC: 66 U/L (ref 39–117)
ALT SERPL W P-5'-P-CCNC: 10 U/L (ref 1–33)
ANION GAP SERPL CALCULATED.3IONS-SCNC: 9.6 MMOL/L (ref 5–15)
AST SERPL-CCNC: 13 U/L (ref 1–32)
BILIRUB SERPL-MCNC: 0.3 MG/DL (ref 0.2–1.2)
BUN BLD-MCNC: 17 MG/DL (ref 6–20)
BUN/CREAT SERPL: 45.9 (ref 7–25)
CALCIUM SPEC-SCNC: 8.6 MG/DL (ref 8.6–10.5)
CHLORIDE SERPL-SCNC: 92 MMOL/L (ref 98–107)
CO2 SERPL-SCNC: 27.4 MMOL/L (ref 22–29)
CREAT BLD-MCNC: 0.37 MG/DL (ref 0.57–1)
GFR SERPL CREATININE-BSD FRML MDRD: >150 ML/MIN/1.73
GLOBULIN UR ELPH-MCNC: 3.1 GM/DL
GLUCOSE BLD-MCNC: 95 MG/DL (ref 65–99)
INR PPP: 0.98 (ref 0.9–1.1)
OSMOLALITY UR: 811 MOSM/KG
POTASSIUM BLD-SCNC: 4.1 MMOL/L (ref 3.5–5.2)
PROT SERPL-MCNC: 6.5 G/DL (ref 6–8.5)
PROTHROMBIN TIME: 12.7 SECONDS (ref 11.7–14.2)
SODIUM BLD-SCNC: 129 MMOL/L (ref 136–145)

## 2019-12-18 PROCEDURE — 97110 THERAPEUTIC EXERCISES: CPT

## 2019-12-18 PROCEDURE — 85610 PROTHROMBIN TIME: CPT | Performed by: NURSE PRACTITIONER

## 2019-12-18 PROCEDURE — 99024 POSTOP FOLLOW-UP VISIT: CPT | Performed by: NURSE PRACTITIONER

## 2019-12-18 PROCEDURE — 83935 ASSAY OF URINE OSMOLALITY: CPT | Performed by: PHYSICAL MEDICINE & REHABILITATION

## 2019-12-18 PROCEDURE — 80053 COMPREHEN METABOLIC PANEL: CPT | Performed by: NURSE PRACTITIONER

## 2019-12-18 RX ADMIN — DOCUSATE SODIUM 100 MG: 100 CAPSULE, LIQUID FILLED ORAL at 20:55

## 2019-12-18 RX ADMIN — HYDROCODONE BITARTRATE AND ACETAMINOPHEN 1 TABLET: 5; 325 TABLET ORAL at 20:55

## 2019-12-18 RX ADMIN — FAMOTIDINE 20 MG: 20 TABLET, FILM COATED ORAL at 17:00

## 2019-12-18 RX ADMIN — ATORVASTATIN CALCIUM 20 MG: 20 TABLET, FILM COATED ORAL at 20:56

## 2019-12-18 RX ADMIN — SENNOSIDES AND DOCUSATE SODIUM 2 TABLET: 8.6; 5 TABLET ORAL at 20:56

## 2019-12-18 RX ADMIN — HYDROCODONE BITARTRATE AND ACETAMINOPHEN 1 TABLET: 5; 325 TABLET ORAL at 13:05

## 2019-12-18 RX ADMIN — GABAPENTIN 100 MG: 100 CAPSULE ORAL at 13:05

## 2019-12-18 RX ADMIN — GABAPENTIN 100 MG: 100 CAPSULE ORAL at 20:56

## 2019-12-18 RX ADMIN — HYDROCODONE BITARTRATE AND ACETAMINOPHEN 1 TABLET: 5; 325 TABLET ORAL at 04:31

## 2019-12-18 RX ADMIN — HYDROCODONE BITARTRATE AND ACETAMINOPHEN 1 TABLET: 5; 325 TABLET ORAL at 17:00

## 2019-12-18 RX ADMIN — LIDOCAINE 1 PATCH: 50 PATCH CUTANEOUS at 13:05

## 2019-12-18 RX ADMIN — HYDROCODONE BITARTRATE AND ACETAMINOPHEN 1 TABLET: 5; 325 TABLET ORAL at 08:34

## 2019-12-19 ENCOUNTER — ANESTHESIA (OUTPATIENT)
Dept: PERIOP | Facility: HOSPITAL | Age: 59
End: 2019-12-19

## 2019-12-19 ENCOUNTER — ANESTHESIA EVENT (OUTPATIENT)
Dept: PERIOP | Facility: HOSPITAL | Age: 59
End: 2019-12-19

## 2019-12-19 ENCOUNTER — HOSPITAL ENCOUNTER (INPATIENT)
Facility: HOSPITAL | Age: 59
LOS: 8 days | End: 2019-12-27
Attending: NEUROLOGICAL SURGERY | Admitting: NEUROLOGICAL SURGERY

## 2019-12-19 VITALS
DIASTOLIC BLOOD PRESSURE: 67 MMHG | HEIGHT: 55 IN | SYSTOLIC BLOOD PRESSURE: 110 MMHG | OXYGEN SATURATION: 98 % | HEART RATE: 93 BPM | RESPIRATION RATE: 18 BRPM | WEIGHT: 88 LBS | TEMPERATURE: 98.5 F | BODY MASS INDEX: 20.37 KG/M2

## 2019-12-19 PROBLEM — G96.00 CSF LEAK: Status: ACTIVE | Noted: 2019-12-19

## 2019-12-19 LAB
ANION GAP SERPL CALCULATED.3IONS-SCNC: 11.2 MMOL/L (ref 5–15)
BASOPHILS # BLD AUTO: 0.02 10*3/MM3 (ref 0–0.2)
BASOPHILS NFR BLD AUTO: 0.2 % (ref 0–1.5)
BUN BLD-MCNC: 18 MG/DL (ref 6–20)
BUN/CREAT SERPL: 43.9 (ref 7–25)
CALCIUM SPEC-SCNC: 8.4 MG/DL (ref 8.6–10.5)
CHLORIDE SERPL-SCNC: 102 MMOL/L (ref 98–107)
CO2 SERPL-SCNC: 27.8 MMOL/L (ref 22–29)
CREAT BLD-MCNC: 0.41 MG/DL (ref 0.57–1)
DEPRECATED RDW RBC AUTO: 44 FL (ref 37–54)
EOSINOPHIL # BLD AUTO: 0.12 10*3/MM3 (ref 0–0.4)
EOSINOPHIL NFR BLD AUTO: 1.2 % (ref 0.3–6.2)
ERYTHROCYTE [DISTWIDTH] IN BLOOD BY AUTOMATED COUNT: 13 % (ref 12.3–15.4)
GFR SERPL CREATININE-BSD FRML MDRD: >150 ML/MIN/1.73
GLUCOSE BLD-MCNC: 99 MG/DL (ref 65–99)
HCT VFR BLD AUTO: 32.3 % (ref 34–46.6)
HGB BLD-MCNC: 10.7 G/DL (ref 12–15.9)
IMM GRANULOCYTES # BLD AUTO: 0.05 10*3/MM3 (ref 0–0.05)
IMM GRANULOCYTES NFR BLD AUTO: 0.5 % (ref 0–0.5)
LYMPHOCYTES # BLD AUTO: 1.3 10*3/MM3 (ref 0.7–3.1)
LYMPHOCYTES NFR BLD AUTO: 12.8 % (ref 19.6–45.3)
MCH RBC QN AUTO: 31.2 PG (ref 26.6–33)
MCHC RBC AUTO-ENTMCNC: 33.1 G/DL (ref 31.5–35.7)
MCV RBC AUTO: 94.2 FL (ref 79–97)
MONOCYTES # BLD AUTO: 0.52 10*3/MM3 (ref 0.1–0.9)
MONOCYTES NFR BLD AUTO: 5.1 % (ref 5–12)
NEUTROPHILS # BLD AUTO: 8.12 10*3/MM3 (ref 1.7–7)
NEUTROPHILS NFR BLD AUTO: 80.2 % (ref 42.7–76)
NRBC BLD AUTO-RTO: 0 /100 WBC (ref 0–0.2)
OSMOLALITY SERPL: 291 MOSM/KG (ref 275–300)
PLATELET # BLD AUTO: 271 10*3/MM3 (ref 140–450)
PMV BLD AUTO: 8.9 FL (ref 6–12)
POTASSIUM BLD-SCNC: 4.3 MMOL/L (ref 3.5–5.2)
RBC # BLD AUTO: 3.43 10*6/MM3 (ref 3.77–5.28)
SODIUM BLD-SCNC: 141 MMOL/L (ref 136–145)
WBC NRBC COR # BLD: 10.13 10*3/MM3 (ref 3.4–10.8)

## 2019-12-19 PROCEDURE — 25010000002 ONDANSETRON PER 1 MG: Performed by: ANESTHESIOLOGY

## 2019-12-19 PROCEDURE — C1729 CATH, DRAINAGE: HCPCS | Performed by: NEUROLOGICAL SURGERY

## 2019-12-19 PROCEDURE — 10140 I&D HMTMA SEROMA/FLUID COLLJ: CPT | Performed by: NEUROLOGICAL SURGERY

## 2019-12-19 PROCEDURE — 25010000002 DEXAMETHASONE PER 1 MG: Performed by: NURSE ANESTHETIST, CERTIFIED REGISTERED

## 2019-12-19 PROCEDURE — 00QT0ZZ REPAIR SPINAL MENINGES, OPEN APPROACH: ICD-10-PCS | Performed by: NEUROLOGICAL SURGERY

## 2019-12-19 PROCEDURE — 25010000002 PROPOFOL 10 MG/ML EMULSION: Performed by: NURSE ANESTHETIST, CERTIFIED REGISTERED

## 2019-12-19 PROCEDURE — 25010000002 FENTANYL CITRATE (PF) 100 MCG/2ML SOLUTION: Performed by: NURSE ANESTHETIST, CERTIFIED REGISTERED

## 2019-12-19 PROCEDURE — 85025 COMPLETE CBC W/AUTO DIFF WBC: CPT | Performed by: NURSE PRACTITIONER

## 2019-12-19 PROCEDURE — 80048 BASIC METABOLIC PNL TOTAL CA: CPT | Performed by: PHYSICAL MEDICINE & REHABILITATION

## 2019-12-19 PROCEDURE — 62272 THER SPI PNXR DRG CSF: CPT | Performed by: NEUROLOGICAL SURGERY

## 2019-12-19 PROCEDURE — 83930 ASSAY OF BLOOD OSMOLALITY: CPT | Performed by: PHYSICAL MEDICINE & REHABILITATION

## 2019-12-19 PROCEDURE — 97535 SELF CARE MNGMENT TRAINING: CPT

## 2019-12-19 PROCEDURE — 25010000002 VANCOMYCIN 1 G RECONSTITUTED SOLUTION 1 EACH VIAL: Performed by: NEUROLOGICAL SURGERY

## 2019-12-19 PROCEDURE — 97110 THERAPEUTIC EXERCISES: CPT

## 2019-12-19 RX ORDER — LIDOCAINE 50 MG/G
1 PATCH TOPICAL EVERY 24 HOURS
Status: DISCONTINUED | OUTPATIENT
Start: 2019-12-20 | End: 2019-12-26

## 2019-12-19 RX ORDER — SENNA AND DOCUSATE SODIUM 50; 8.6 MG/1; MG/1
1 TABLET, FILM COATED ORAL NIGHTLY PRN
Status: CANCELLED | OUTPATIENT
Start: 2019-12-19

## 2019-12-19 RX ORDER — MIDAZOLAM HYDROCHLORIDE 1 MG/ML
1 INJECTION INTRAMUSCULAR; INTRAVENOUS
Status: DISCONTINUED | OUTPATIENT
Start: 2019-12-19 | End: 2019-12-19 | Stop reason: HOSPADM

## 2019-12-19 RX ORDER — CYCLOBENZAPRINE HCL 10 MG
10 TABLET ORAL 3 TIMES DAILY PRN
Status: DISCONTINUED | OUTPATIENT
Start: 2019-12-19 | End: 2019-12-27 | Stop reason: HOSPADM

## 2019-12-19 RX ORDER — ATORVASTATIN CALCIUM 20 MG/1
20 TABLET, FILM COATED ORAL NIGHTLY
Status: CANCELLED | OUTPATIENT
Start: 2019-12-19

## 2019-12-19 RX ORDER — ONDANSETRON 2 MG/ML
4 INJECTION INTRAMUSCULAR; INTRAVENOUS EVERY 6 HOURS PRN
Status: DISCONTINUED | OUTPATIENT
Start: 2019-12-19 | End: 2019-12-22 | Stop reason: SDUPTHER

## 2019-12-19 RX ORDER — MIDAZOLAM HYDROCHLORIDE 1 MG/ML
2 INJECTION INTRAMUSCULAR; INTRAVENOUS
Status: DISCONTINUED | OUTPATIENT
Start: 2019-12-19 | End: 2019-12-19 | Stop reason: HOSPADM

## 2019-12-19 RX ORDER — FAMOTIDINE 20 MG/1
20 TABLET, FILM COATED ORAL
Status: CANCELLED | OUTPATIENT
Start: 2019-12-19

## 2019-12-19 RX ORDER — OMEGA-3S/DHA/EPA/FISH OIL/D3 300MG-1000
2000 CAPSULE ORAL DAILY
Status: DISCONTINUED | OUTPATIENT
Start: 2019-12-20 | End: 2019-12-27 | Stop reason: HOSPADM

## 2019-12-19 RX ORDER — DOCUSATE SODIUM 100 MG/1
100 CAPSULE, LIQUID FILLED ORAL 2 TIMES DAILY
Status: CANCELLED | OUTPATIENT
Start: 2019-12-19

## 2019-12-19 RX ORDER — GABAPENTIN 100 MG/1
100 CAPSULE ORAL 2 TIMES DAILY
Status: DISCONTINUED | OUTPATIENT
Start: 2019-12-19 | End: 2019-12-27 | Stop reason: HOSPADM

## 2019-12-19 RX ORDER — CALCIUM CARBONATE 500(1250)
500 TABLET ORAL 2 TIMES DAILY
Status: DISCONTINUED | OUTPATIENT
Start: 2019-12-19 | End: 2019-12-27 | Stop reason: HOSPADM

## 2019-12-19 RX ORDER — ACETAMINOPHEN 325 MG/1
650 TABLET ORAL EVERY 4 HOURS PRN
Status: DISCONTINUED | OUTPATIENT
Start: 2019-12-19 | End: 2019-12-27 | Stop reason: HOSPADM

## 2019-12-19 RX ORDER — GABAPENTIN 100 MG/1
100 CAPSULE ORAL EVERY 8 HOURS SCHEDULED
Status: DISCONTINUED | OUTPATIENT
Start: 2019-12-19 | End: 2019-12-22 | Stop reason: SDUPTHER

## 2019-12-19 RX ORDER — LIDOCAINE HYDROCHLORIDE 20 MG/ML
INJECTION, SOLUTION INFILTRATION; PERINEURAL AS NEEDED
Status: DISCONTINUED | OUTPATIENT
Start: 2019-12-19 | End: 2019-12-19 | Stop reason: SURG

## 2019-12-19 RX ORDER — SENNA AND DOCUSATE SODIUM 50; 8.6 MG/1; MG/1
1 TABLET, FILM COATED ORAL NIGHTLY PRN
Status: DISCONTINUED | OUTPATIENT
Start: 2019-12-19 | End: 2019-12-19 | Stop reason: SDUPTHER

## 2019-12-19 RX ORDER — FENTANYL CITRATE 50 UG/ML
50 INJECTION, SOLUTION INTRAMUSCULAR; INTRAVENOUS
Status: DISCONTINUED | OUTPATIENT
Start: 2019-12-19 | End: 2019-12-19 | Stop reason: HOSPADM

## 2019-12-19 RX ORDER — ONDANSETRON 4 MG/1
4 TABLET, FILM COATED ORAL EVERY 6 HOURS PRN
Status: DISCONTINUED | OUTPATIENT
Start: 2019-12-19 | End: 2019-12-27 | Stop reason: HOSPADM

## 2019-12-19 RX ORDER — ACETAMINOPHEN 500 MG
1000 TABLET ORAL EVERY 6 HOURS PRN
Status: DISCONTINUED | OUTPATIENT
Start: 2019-12-19 | End: 2019-12-27 | Stop reason: HOSPADM

## 2019-12-19 RX ORDER — SODIUM CHLORIDE 0.9 % (FLUSH) 0.9 %
10 SYRINGE (ML) INJECTION AS NEEDED
Status: DISCONTINUED | OUTPATIENT
Start: 2019-12-19 | End: 2019-12-27 | Stop reason: HOSPADM

## 2019-12-19 RX ORDER — ROCURONIUM BROMIDE 10 MG/ML
INJECTION, SOLUTION INTRAVENOUS AS NEEDED
Status: DISCONTINUED | OUTPATIENT
Start: 2019-12-19 | End: 2019-12-19 | Stop reason: SURG

## 2019-12-19 RX ORDER — ACETAMINOPHEN 325 MG/1
650 TABLET ORAL EVERY 4 HOURS PRN
Status: DISCONTINUED | OUTPATIENT
Start: 2019-12-19 | End: 2019-12-19 | Stop reason: SDUPTHER

## 2019-12-19 RX ORDER — DOCUSATE SODIUM 100 MG/1
100 CAPSULE, LIQUID FILLED ORAL 2 TIMES DAILY
Status: DISCONTINUED | OUTPATIENT
Start: 2019-12-19 | End: 2019-12-27 | Stop reason: HOSPADM

## 2019-12-19 RX ORDER — BISACODYL 10 MG
10 SUPPOSITORY, RECTAL RECTAL EVERY OTHER DAY
Status: CANCELLED | OUTPATIENT
Start: 2019-12-19

## 2019-12-19 RX ORDER — FLUTICASONE PROPIONATE 50 MCG
1 SPRAY, SUSPENSION (ML) NASAL NIGHTLY PRN
Status: CANCELLED | OUTPATIENT
Start: 2019-12-19

## 2019-12-19 RX ORDER — POLYETHYLENE GLYCOL 3350 17 G/17G
17 POWDER, FOR SOLUTION ORAL DAILY PRN
Status: CANCELLED | OUTPATIENT
Start: 2019-12-19

## 2019-12-19 RX ORDER — POLYETHYLENE GLYCOL 3350 17 G/17G
17 POWDER, FOR SOLUTION ORAL DAILY PRN
Status: DISCONTINUED | OUTPATIENT
Start: 2019-12-19 | End: 2019-12-27 | Stop reason: HOSPADM

## 2019-12-19 RX ORDER — ONDANSETRON 4 MG/1
4 TABLET, FILM COATED ORAL EVERY 6 HOURS PRN
Status: CANCELLED | OUTPATIENT
Start: 2019-12-19

## 2019-12-19 RX ORDER — ACETAMINOPHEN 325 MG/1
650 TABLET ORAL EVERY 4 HOURS PRN
Status: CANCELLED | OUTPATIENT
Start: 2019-12-19

## 2019-12-19 RX ORDER — ONDANSETRON 4 MG/1
4 TABLET, FILM COATED ORAL EVERY 6 HOURS PRN
Status: DISCONTINUED | OUTPATIENT
Start: 2019-12-19 | End: 2019-12-22 | Stop reason: SDUPTHER

## 2019-12-19 RX ORDER — ONDANSETRON 2 MG/ML
INJECTION INTRAMUSCULAR; INTRAVENOUS AS NEEDED
Status: DISCONTINUED | OUTPATIENT
Start: 2019-12-19 | End: 2019-12-19 | Stop reason: SURG

## 2019-12-19 RX ORDER — NALOXONE HCL 0.4 MG/ML
0.4 VIAL (ML) INJECTION
Status: DISCONTINUED | OUTPATIENT
Start: 2019-12-19 | End: 2019-12-27 | Stop reason: HOSPADM

## 2019-12-19 RX ORDER — MORPHINE SULFATE 2 MG/ML
1 INJECTION, SOLUTION INTRAMUSCULAR; INTRAVENOUS EVERY 4 HOURS PRN
Status: DISCONTINUED | OUTPATIENT
Start: 2019-12-19 | End: 2019-12-27 | Stop reason: HOSPADM

## 2019-12-19 RX ORDER — SODIUM CHLORIDE, SODIUM LACTATE, POTASSIUM CHLORIDE, CALCIUM CHLORIDE 600; 310; 30; 20 MG/100ML; MG/100ML; MG/100ML; MG/100ML
50 INJECTION, SOLUTION INTRAVENOUS CONTINUOUS
Status: DISCONTINUED | OUTPATIENT
Start: 2019-12-19 | End: 2019-12-24

## 2019-12-19 RX ORDER — UREA 10 %
3 LOTION (ML) TOPICAL NIGHTLY PRN
Status: CANCELLED | OUTPATIENT
Start: 2019-12-19

## 2019-12-19 RX ORDER — ONDANSETRON 2 MG/ML
4 INJECTION INTRAMUSCULAR; INTRAVENOUS EVERY 6 HOURS PRN
Status: DISCONTINUED | OUTPATIENT
Start: 2019-12-19 | End: 2019-12-27 | Stop reason: HOSPADM

## 2019-12-19 RX ORDER — PROPOFOL 10 MG/ML
VIAL (ML) INTRAVENOUS AS NEEDED
Status: DISCONTINUED | OUTPATIENT
Start: 2019-12-19 | End: 2019-12-19 | Stop reason: SURG

## 2019-12-19 RX ORDER — DEXAMETHASONE SODIUM PHOSPHATE 10 MG/ML
INJECTION INTRAMUSCULAR; INTRAVENOUS AS NEEDED
Status: DISCONTINUED | OUTPATIENT
Start: 2019-12-19 | End: 2019-12-19 | Stop reason: SURG

## 2019-12-19 RX ORDER — HYDROCODONE BITARTRATE AND ACETAMINOPHEN 5; 325 MG/1; MG/1
1 TABLET ORAL EVERY 4 HOURS PRN
Status: DISCONTINUED | OUTPATIENT
Start: 2019-12-19 | End: 2019-12-27 | Stop reason: HOSPADM

## 2019-12-19 RX ORDER — UREA 10 %
3 LOTION (ML) TOPICAL NIGHTLY PRN
Status: DISCONTINUED | OUTPATIENT
Start: 2019-12-19 | End: 2019-12-27 | Stop reason: HOSPADM

## 2019-12-19 RX ORDER — SODIUM CHLORIDE, SODIUM LACTATE, POTASSIUM CHLORIDE, CALCIUM CHLORIDE 600; 310; 30; 20 MG/100ML; MG/100ML; MG/100ML; MG/100ML
9 INJECTION, SOLUTION INTRAVENOUS CONTINUOUS
Status: DISCONTINUED | OUTPATIENT
Start: 2019-12-19 | End: 2019-12-24

## 2019-12-19 RX ORDER — FLUTICASONE PROPIONATE 50 MCG
1 SPRAY, SUSPENSION (ML) NASAL NIGHTLY PRN
Status: DISCONTINUED | OUTPATIENT
Start: 2019-12-19 | End: 2019-12-27 | Stop reason: HOSPADM

## 2019-12-19 RX ORDER — GABAPENTIN 100 MG/1
100 CAPSULE ORAL EVERY 8 HOURS SCHEDULED
Status: CANCELLED | OUTPATIENT
Start: 2019-12-19

## 2019-12-19 RX ORDER — DOCUSATE SODIUM 100 MG/1
100 CAPSULE, LIQUID FILLED ORAL WEEKLY
Status: DISCONTINUED | OUTPATIENT
Start: 2019-12-19 | End: 2019-12-22 | Stop reason: SDUPTHER

## 2019-12-19 RX ORDER — CYCLOBENZAPRINE HCL 10 MG
10 TABLET ORAL 3 TIMES DAILY PRN
Status: CANCELLED | OUTPATIENT
Start: 2019-12-19

## 2019-12-19 RX ORDER — ONDANSETRON 2 MG/ML
4 INJECTION INTRAMUSCULAR; INTRAVENOUS EVERY 6 HOURS PRN
Status: CANCELLED | OUTPATIENT
Start: 2019-12-19

## 2019-12-19 RX ORDER — HYDROCODONE BITARTRATE AND ACETAMINOPHEN 5; 325 MG/1; MG/1
1 TABLET ORAL EVERY 4 HOURS PRN
Status: DISPENSED | OUTPATIENT
Start: 2019-12-19 | End: 2019-12-21

## 2019-12-19 RX ORDER — SODIUM CHLORIDE 0.9 % (FLUSH) 0.9 %
3 SYRINGE (ML) INJECTION EVERY 12 HOURS SCHEDULED
Status: DISCONTINUED | OUTPATIENT
Start: 2019-12-19 | End: 2019-12-19 | Stop reason: HOSPADM

## 2019-12-19 RX ORDER — FAMOTIDINE 20 MG/1
20 TABLET, FILM COATED ORAL
Status: DISCONTINUED | OUTPATIENT
Start: 2019-12-20 | End: 2019-12-27 | Stop reason: HOSPADM

## 2019-12-19 RX ORDER — HYDROCODONE BITARTRATE AND ACETAMINOPHEN 5; 325 MG/1; MG/1
1 TABLET ORAL EVERY 4 HOURS PRN
Status: CANCELLED | OUTPATIENT
Start: 2019-12-19 | End: 2019-12-21

## 2019-12-19 RX ORDER — DOCUSATE SODIUM 100 MG/1
100 CAPSULE, LIQUID FILLED ORAL 2 TIMES DAILY PRN
Status: DISCONTINUED | OUTPATIENT
Start: 2019-12-19 | End: 2019-12-27 | Stop reason: HOSPADM

## 2019-12-19 RX ORDER — LIDOCAINE 50 MG/G
1 PATCH TOPICAL EVERY 24 HOURS
Status: CANCELLED | OUTPATIENT
Start: 2019-12-19

## 2019-12-19 RX ORDER — FENTANYL CITRATE 50 UG/ML
INJECTION, SOLUTION INTRAMUSCULAR; INTRAVENOUS AS NEEDED
Status: DISCONTINUED | OUTPATIENT
Start: 2019-12-19 | End: 2019-12-19 | Stop reason: SURG

## 2019-12-19 RX ORDER — MAGNESIUM HYDROXIDE 1200 MG/15ML
LIQUID ORAL AS NEEDED
Status: DISCONTINUED | OUTPATIENT
Start: 2019-12-19 | End: 2019-12-19 | Stop reason: HOSPADM

## 2019-12-19 RX ORDER — VANCOMYCIN HYDROCHLORIDE 500 MG/10ML
500 INJECTION, POWDER, LYOPHILIZED, FOR SOLUTION INTRAVENOUS ONCE
Status: DISCONTINUED | OUTPATIENT
Start: 2019-12-19 | End: 2019-12-19

## 2019-12-19 RX ORDER — SODIUM CHLORIDE 0.9 % (FLUSH) 0.9 %
3 SYRINGE (ML) INJECTION EVERY 12 HOURS SCHEDULED
Status: DISCONTINUED | OUTPATIENT
Start: 2019-12-19 | End: 2019-12-27 | Stop reason: HOSPADM

## 2019-12-19 RX ORDER — ATORVASTATIN CALCIUM 20 MG/1
20 TABLET, FILM COATED ORAL NIGHTLY
Status: DISCONTINUED | OUTPATIENT
Start: 2019-12-19 | End: 2019-12-19 | Stop reason: SDUPTHER

## 2019-12-19 RX ORDER — SENNA AND DOCUSATE SODIUM 50; 8.6 MG/1; MG/1
2 TABLET, FILM COATED ORAL NIGHTLY
Status: DISCONTINUED | OUTPATIENT
Start: 2019-12-19 | End: 2019-12-27 | Stop reason: HOSPADM

## 2019-12-19 RX ORDER — BISACODYL 10 MG
10 SUPPOSITORY, RECTAL RECTAL EVERY OTHER DAY
Status: DISCONTINUED | OUTPATIENT
Start: 2019-12-19 | End: 2019-12-27 | Stop reason: HOSPADM

## 2019-12-19 RX ORDER — LIDOCAINE HYDROCHLORIDE 10 MG/ML
0.5 INJECTION, SOLUTION EPIDURAL; INFILTRATION; INTRACAUDAL; PERINEURAL ONCE AS NEEDED
Status: DISCONTINUED | OUTPATIENT
Start: 2019-12-19 | End: 2019-12-19 | Stop reason: HOSPADM

## 2019-12-19 RX ORDER — ATORVASTATIN CALCIUM 20 MG/1
20 TABLET, FILM COATED ORAL NIGHTLY
Status: DISCONTINUED | OUTPATIENT
Start: 2019-12-19 | End: 2019-12-27 | Stop reason: HOSPADM

## 2019-12-19 RX ORDER — SENNA AND DOCUSATE SODIUM 50; 8.6 MG/1; MG/1
2 TABLET, FILM COATED ORAL NIGHTLY
Status: CANCELLED | OUTPATIENT
Start: 2019-12-19

## 2019-12-19 RX ORDER — SODIUM CHLORIDE 0.9 % (FLUSH) 0.9 %
3-10 SYRINGE (ML) INJECTION AS NEEDED
Status: DISCONTINUED | OUTPATIENT
Start: 2019-12-19 | End: 2019-12-19 | Stop reason: HOSPADM

## 2019-12-19 RX ORDER — SENNA AND DOCUSATE SODIUM 50; 8.6 MG/1; MG/1
1 TABLET, FILM COATED ORAL NIGHTLY PRN
Status: DISCONTINUED | OUTPATIENT
Start: 2019-12-19 | End: 2019-12-27 | Stop reason: HOSPADM

## 2019-12-19 RX ORDER — FAMOTIDINE 10 MG/ML
20 INJECTION, SOLUTION INTRAVENOUS ONCE
Status: COMPLETED | OUTPATIENT
Start: 2019-12-19 | End: 2019-12-19

## 2019-12-19 RX ADMIN — PROPOFOL 100 MG: 10 INJECTION, EMULSION INTRAVENOUS at 18:34

## 2019-12-19 RX ADMIN — HYDROCODONE BITARTRATE AND ACETAMINOPHEN 1 TABLET: 5; 325 TABLET ORAL at 02:53

## 2019-12-19 RX ADMIN — SODIUM CHLORIDE, POTASSIUM CHLORIDE, SODIUM LACTATE AND CALCIUM CHLORIDE 9 ML/HR: 600; 310; 30; 20 INJECTION, SOLUTION INTRAVENOUS at 17:56

## 2019-12-19 RX ADMIN — LIDOCAINE HYDROCHLORIDE 80 MG: 20 INJECTION, SOLUTION INFILTRATION; PERINEURAL at 18:34

## 2019-12-19 RX ADMIN — SUGAMMADEX 200 MG: 100 INJECTION, SOLUTION INTRAVENOUS at 19:36

## 2019-12-19 RX ADMIN — GABAPENTIN 100 MG: 100 CAPSULE ORAL at 22:14

## 2019-12-19 RX ADMIN — FAMOTIDINE 20 MG: 10 INJECTION INTRAVENOUS at 17:55

## 2019-12-19 RX ADMIN — DEXAMETHASONE SODIUM PHOSPHATE 10 MG: 10 INJECTION INTRAMUSCULAR; INTRAVENOUS at 19:33

## 2019-12-19 RX ADMIN — HYDROCODONE BITARTRATE AND ACETAMINOPHEN 1 TABLET: 5; 325 TABLET ORAL at 23:01

## 2019-12-19 RX ADMIN — PROPOFOL 50 MG: 10 INJECTION, EMULSION INTRAVENOUS at 18:55

## 2019-12-19 RX ADMIN — SODIUM CHLORIDE, POTASSIUM CHLORIDE, SODIUM LACTATE AND CALCIUM CHLORIDE 50 ML/HR: 600; 310; 30; 20 INJECTION, SOLUTION INTRAVENOUS at 22:14

## 2019-12-19 RX ADMIN — FENTANYL CITRATE 50 MCG: 50 INJECTION INTRAMUSCULAR; INTRAVENOUS at 18:34

## 2019-12-19 RX ADMIN — FENTANYL CITRATE 50 MCG: 50 INJECTION INTRAMUSCULAR; INTRAVENOUS at 19:45

## 2019-12-19 RX ADMIN — ROCURONIUM BROMIDE 40 MG: 10 INJECTION INTRAVENOUS at 18:34

## 2019-12-19 RX ADMIN — ONDANSETRON HYDROCHLORIDE 4 MG: 2 SOLUTION INTRAMUSCULAR; INTRAVENOUS at 20:09

## 2019-12-19 RX ADMIN — SODIUM CHLORIDE 500 MG: 900 INJECTION, SOLUTION INTRAVENOUS at 17:57

## 2019-12-19 NOTE — ANESTHESIA PREPROCEDURE EVALUATION
Anesthesia Evaluation     Patient summary reviewed and Nursing notes reviewed   NPO Solid Status: > 8 hours  NPO Liquid Status: > 8 hours           Airway   Mallampati: II  TM distance: >3 FB  Neck ROM: full  no difficulty expected  Dental - normal exam     Comment: Upper front tooth cracked, loose sensitive    Pulmonary - normal exam    breath sounds clear to auscultation  Cardiovascular - normal exam    Rhythm: regular    (+) hyperlipidemia,       Neuro/Psych  (+) headaches, numbness,     GI/Hepatic/Renal/Endo      Musculoskeletal     Abdominal  - normal exam   Substance History      OB/GYN          Other   arthritis,                        Anesthesia Plan    ASA 3     general   (CMAC)  intravenous induction     Anesthetic plan, all risks, benefits, and alternatives have been provided, discussed and informed consent has been obtained with: patient.

## 2019-12-20 ENCOUNTER — HOSPITAL ENCOUNTER (INPATIENT)
Facility: HOSPITAL | Age: 59
End: 2019-12-20
Attending: NEUROLOGICAL SURGERY | Admitting: NEUROLOGICAL SURGERY

## 2019-12-20 PROCEDURE — 25010000002 VANCOMYCIN PER 500 MG: Performed by: NEUROLOGICAL SURGERY

## 2019-12-20 PROCEDURE — 99024 POSTOP FOLLOW-UP VISIT: CPT | Performed by: NURSE PRACTITIONER

## 2019-12-20 PROCEDURE — 97110 THERAPEUTIC EXERCISES: CPT

## 2019-12-20 PROCEDURE — 97165 OT EVAL LOW COMPLEX 30 MIN: CPT

## 2019-12-20 RX ADMIN — VANCOMYCIN HYDROCHLORIDE 500 MG: 500 INJECTION, POWDER, LYOPHILIZED, FOR SOLUTION INTRAVENOUS at 16:41

## 2019-12-20 RX ADMIN — ACETAMINOPHEN 650 MG: 325 TABLET, FILM COATED ORAL at 09:48

## 2019-12-20 RX ADMIN — GABAPENTIN 100 MG: 100 CAPSULE ORAL at 20:38

## 2019-12-20 RX ADMIN — FAMOTIDINE 20 MG: 20 TABLET, FILM COATED ORAL at 08:26

## 2019-12-20 RX ADMIN — FAMOTIDINE 20 MG: 20 TABLET, FILM COATED ORAL at 16:41

## 2019-12-20 RX ADMIN — LIDOCAINE 1 PATCH: 50 PATCH CUTANEOUS at 15:14

## 2019-12-20 RX ADMIN — ACETAMINOPHEN 650 MG: 325 TABLET, FILM COATED ORAL at 05:04

## 2019-12-20 RX ADMIN — DOCUSATE SODIUM 100 MG: 100 CAPSULE, LIQUID FILLED ORAL at 08:25

## 2019-12-20 RX ADMIN — DOCUSATE SODIUM 100 MG: 100 CAPSULE, LIQUID FILLED ORAL at 20:38

## 2019-12-20 RX ADMIN — GABAPENTIN 100 MG: 100 CAPSULE ORAL at 15:15

## 2019-12-20 RX ADMIN — GABAPENTIN 100 MG: 100 CAPSULE ORAL at 08:25

## 2019-12-20 RX ADMIN — SENNOSIDES AND DOCUSATE SODIUM 2 TABLET: 8.6; 5 TABLET ORAL at 20:38

## 2019-12-20 RX ADMIN — ATORVASTATIN CALCIUM 20 MG: 20 TABLET, FILM COATED ORAL at 20:38

## 2019-12-20 RX ADMIN — SODIUM CHLORIDE, POTASSIUM CHLORIDE, SODIUM LACTATE AND CALCIUM CHLORIDE 50 ML/HR: 600; 310; 30; 20 INJECTION, SOLUTION INTRAVENOUS at 16:41

## 2019-12-20 RX ADMIN — VANCOMYCIN HYDROCHLORIDE 500 MG: 500 INJECTION, POWDER, LYOPHILIZED, FOR SOLUTION INTRAVENOUS at 05:05

## 2019-12-20 RX ADMIN — SODIUM CHLORIDE, PRESERVATIVE FREE 3 ML: 5 INJECTION INTRAVENOUS at 08:27

## 2019-12-20 RX ADMIN — GABAPENTIN 100 MG: 100 CAPSULE ORAL at 05:04

## 2019-12-20 NOTE — ANESTHESIA POSTPROCEDURE EVALUATION
Patient: Noemi LIU [place]    Procedure Summary     Date:  12/19/19 Room / Location:  [facility] OR [address] [place] [address] MAIN OR    Anesthesia Start:  1826 Anesthesia Stop:  2022    Procedures:       LUMBAR DRAIN INSERTION EXTERNAL (N/A Spine Lumbar)      followed by drainage of anterior cervical fluid collection (N/A ) Diagnosis:       Postoperative CSF leak      (Postoperative CSF leak [G97.82])    Surgeon:  Antonio Noe MD Provider:  Yovany Nagy MD    Anesthesia Type:  general ASA Status:  3          Anesthesia Type: general    Vitals  Vitals Value Taken Time   /70 12/19/2019  8:55 PM   Temp 37.1 °C (98.7 °F) 12/19/2019  8:16 PM   Pulse 92 12/19/2019  9:04 PM   Resp 18 12/19/2019  8:30 PM   SpO2 98 % 12/19/2019  9:04 PM   Vitals shown include unvalidated device data.        Post Anesthesia Care and Evaluation    Patient location during evaluation: PACU  Patient participation: complete - patient participated  Level of consciousness: awake  Pain score: 2  Pain management: adequate  Airway patency: patent  Anesthetic complications: No anesthetic complications  PONV Status: none  Cardiovascular status: acceptable  Respiratory status: acceptable  Hydration status: acceptable

## 2019-12-20 NOTE — ANESTHESIA PROCEDURE NOTES
Airway  Urgency: elective    Date/Time: 12/19/2019 7:05 PM  Airway not difficult    General Information and Staff    Patient location during procedure: OR  CRNA: Samina Jimenez CRNA    Indications and Patient Condition  Indications for airway management: airway protection    Preoxygenated: yes  Mask difficulty assessment: 1 - vent by mask    Final Airway Details  Final airway type: supraglottic airway      Successful airway: classic  Size 3    Number of attempts at approach: 1    Additional Comments  LMA placed easily.  Cuff MOP.

## 2019-12-20 NOTE — ANESTHESIA PROCEDURE NOTES
Airway  Urgency: elective    Date/Time: 12/19/2019 7:05 PM  Difficult airway    General Information and Staff    Patient location during procedure: OR  CRNA: Samina Jimenez CRNA    Indications and Patient Condition  Indications for airway management: airway protection    Preoxygenated: yes  Mask difficulty assessment: 1 - vent by mask    Final Airway Details  Final airway type: endotracheal airway      Successful airway: ETT  Cuffed: yes   Successful intubation technique: direct laryngoscopy, video laryngoscopy and fiberoptic  Facilitating devices/methods: intubating stylet  Endotracheal tube insertion site: oral  Cormack-Lehane Classification: grade IV - neither glottis nor epiglottis seen  Placement verified by: chest auscultation and capnometry   Measured from: lips  Number of attempts at approach: 3 or more  Assessment: lips, teeth, and gum same as pre-op and atraumatic intubation    Additional Comments  DL attempted first to see only epiglottis, unable to lift epiglottis.  CMAC with D blade used to attempt visualization, unable to visualize epiglottis.  CMAC mac 3 smaller but still unable to visualize epiglottis or cords.  Pediatric fiberoptic used orally with used of laryngoscope blade, unable to visualize cords.  Pediatric fiberoptic used through right nare, cords visualized so 6.0 ETT passed right nare without difficulty but unable to visualize cords with fiberoptic.  Talked with Dr LABOY, decision made to use LMA for these two procedures.

## 2019-12-21 LAB
BASOPHILS # BLD AUTO: 0.02 10*3/MM3 (ref 0–0.2)
BASOPHILS NFR BLD AUTO: 0.2 % (ref 0–1.5)
DEPRECATED RDW RBC AUTO: 44.5 FL (ref 37–54)
EOSINOPHIL # BLD AUTO: 0.04 10*3/MM3 (ref 0–0.4)
EOSINOPHIL NFR BLD AUTO: 0.5 % (ref 0.3–6.2)
ERYTHROCYTE [DISTWIDTH] IN BLOOD BY AUTOMATED COUNT: 13.3 % (ref 12.3–15.4)
HCT VFR BLD AUTO: 25 % (ref 34–46.6)
HGB BLD-MCNC: 8.6 G/DL (ref 12–15.9)
IMM GRANULOCYTES # BLD AUTO: 0.04 10*3/MM3 (ref 0–0.05)
IMM GRANULOCYTES NFR BLD AUTO: 0.5 % (ref 0–0.5)
LYMPHOCYTES # BLD AUTO: 1.7 10*3/MM3 (ref 0.7–3.1)
LYMPHOCYTES NFR BLD AUTO: 19.5 % (ref 19.6–45.3)
MCH RBC QN AUTO: 31.7 PG (ref 26.6–33)
MCHC RBC AUTO-ENTMCNC: 34.4 G/DL (ref 31.5–35.7)
MCV RBC AUTO: 92.3 FL (ref 79–97)
MONOCYTES # BLD AUTO: 0.54 10*3/MM3 (ref 0.1–0.9)
MONOCYTES NFR BLD AUTO: 6.2 % (ref 5–12)
NEUTROPHILS # BLD AUTO: 6.36 10*3/MM3 (ref 1.7–7)
NEUTROPHILS NFR BLD AUTO: 73.1 % (ref 42.7–76)
NRBC BLD AUTO-RTO: 0 /100 WBC (ref 0–0.2)
PLATELET # BLD AUTO: 273 10*3/MM3 (ref 140–450)
PMV BLD AUTO: 8.8 FL (ref 6–12)
RBC # BLD AUTO: 2.71 10*6/MM3 (ref 3.77–5.28)
WBC NRBC COR # BLD: 8.7 10*3/MM3 (ref 3.4–10.8)

## 2019-12-21 PROCEDURE — 99024 POSTOP FOLLOW-UP VISIT: CPT | Performed by: NURSE PRACTITIONER

## 2019-12-21 PROCEDURE — 85025 COMPLETE CBC W/AUTO DIFF WBC: CPT | Performed by: NURSE PRACTITIONER

## 2019-12-21 RX ADMIN — FAMOTIDINE 20 MG: 20 TABLET, FILM COATED ORAL at 06:34

## 2019-12-21 RX ADMIN — GABAPENTIN 100 MG: 100 CAPSULE ORAL at 14:38

## 2019-12-21 RX ADMIN — SODIUM CHLORIDE, POTASSIUM CHLORIDE, SODIUM LACTATE AND CALCIUM CHLORIDE 50 ML/HR: 600; 310; 30; 20 INJECTION, SOLUTION INTRAVENOUS at 12:54

## 2019-12-21 RX ADMIN — ATORVASTATIN CALCIUM 20 MG: 20 TABLET, FILM COATED ORAL at 22:49

## 2019-12-21 RX ADMIN — SODIUM CHLORIDE, PRESERVATIVE FREE 3 ML: 5 INJECTION INTRAVENOUS at 10:31

## 2019-12-21 RX ADMIN — DOCUSATE SODIUM 100 MG: 100 CAPSULE, LIQUID FILLED ORAL at 09:10

## 2019-12-21 RX ADMIN — GABAPENTIN 100 MG: 100 CAPSULE ORAL at 05:34

## 2019-12-21 RX ADMIN — FAMOTIDINE 20 MG: 20 TABLET, FILM COATED ORAL at 17:37

## 2019-12-21 RX ADMIN — GABAPENTIN 100 MG: 100 CAPSULE ORAL at 09:10

## 2019-12-21 RX ADMIN — DOCUSATE SODIUM 100 MG: 100 CAPSULE, LIQUID FILLED ORAL at 22:47

## 2019-12-21 RX ADMIN — ACETAMINOPHEN 650 MG: 325 TABLET, FILM COATED ORAL at 23:10

## 2019-12-21 RX ADMIN — LIDOCAINE 1 PATCH: 50 PATCH CUTANEOUS at 14:38

## 2019-12-21 RX ADMIN — SENNOSIDES AND DOCUSATE SODIUM 2 TABLET: 8.6; 5 TABLET ORAL at 22:50

## 2019-12-21 RX ADMIN — CYCLOBENZAPRINE 10 MG: 10 TABLET, FILM COATED ORAL at 09:10

## 2019-12-21 RX ADMIN — HYDROCODONE BITARTRATE AND ACETAMINOPHEN 1 TABLET: 5; 325 TABLET ORAL at 17:43

## 2019-12-21 RX ADMIN — SODIUM CHLORIDE, PRESERVATIVE FREE 3 ML: 5 INJECTION INTRAVENOUS at 22:52

## 2019-12-21 RX ADMIN — HYDROCODONE BITARTRATE AND ACETAMINOPHEN 1 TABLET: 5; 325 TABLET ORAL at 05:35

## 2019-12-21 RX ADMIN — GABAPENTIN 100 MG: 100 CAPSULE ORAL at 22:50

## 2019-12-21 RX ADMIN — HYDROCODONE BITARTRATE AND ACETAMINOPHEN 1 TABLET: 5; 325 TABLET ORAL at 10:30

## 2019-12-22 PROCEDURE — 99024 POSTOP FOLLOW-UP VISIT: CPT | Performed by: NURSE PRACTITIONER

## 2019-12-22 RX ADMIN — SODIUM CHLORIDE, POTASSIUM CHLORIDE, SODIUM LACTATE AND CALCIUM CHLORIDE 50 ML/HR: 600; 310; 30; 20 INJECTION, SOLUTION INTRAVENOUS at 08:40

## 2019-12-22 RX ADMIN — HYDROCODONE BITARTRATE AND ACETAMINOPHEN 1 TABLET: 5; 325 TABLET ORAL at 04:15

## 2019-12-22 RX ADMIN — ATORVASTATIN CALCIUM 20 MG: 20 TABLET, FILM COATED ORAL at 21:30

## 2019-12-22 RX ADMIN — MAGNESIUM HYDROXIDE 10 ML: 2400 SUSPENSION ORAL at 15:51

## 2019-12-22 RX ADMIN — LIDOCAINE 1 PATCH: 50 PATCH CUTANEOUS at 15:52

## 2019-12-22 RX ADMIN — SENNOSIDES AND DOCUSATE SODIUM 2 TABLET: 8.6; 5 TABLET ORAL at 21:31

## 2019-12-22 RX ADMIN — FAMOTIDINE 20 MG: 20 TABLET, FILM COATED ORAL at 17:34

## 2019-12-22 RX ADMIN — SODIUM CHLORIDE, PRESERVATIVE FREE 3 ML: 5 INJECTION INTRAVENOUS at 21:46

## 2019-12-22 RX ADMIN — DOCUSATE SODIUM 100 MG: 100 CAPSULE, LIQUID FILLED ORAL at 08:37

## 2019-12-22 RX ADMIN — GABAPENTIN 100 MG: 100 CAPSULE ORAL at 21:31

## 2019-12-22 RX ADMIN — BISACODYL 10 MG: 10 SUPPOSITORY RECTAL at 08:30

## 2019-12-22 RX ADMIN — FAMOTIDINE 20 MG: 20 TABLET, FILM COATED ORAL at 06:43

## 2019-12-22 RX ADMIN — DOCUSATE SODIUM 100 MG: 100 CAPSULE, LIQUID FILLED ORAL at 21:31

## 2019-12-22 RX ADMIN — GABAPENTIN 100 MG: 100 CAPSULE ORAL at 08:37

## 2019-12-22 RX ADMIN — ACETAMINOPHEN 650 MG: 325 TABLET, FILM COATED ORAL at 15:52

## 2019-12-22 RX ADMIN — SODIUM CHLORIDE, PRESERVATIVE FREE 3 ML: 5 INJECTION INTRAVENOUS at 15:52

## 2019-12-23 PROCEDURE — 97110 THERAPEUTIC EXERCISES: CPT

## 2019-12-23 PROCEDURE — 99024 POSTOP FOLLOW-UP VISIT: CPT | Performed by: NURSE PRACTITIONER

## 2019-12-23 RX ADMIN — SODIUM CHLORIDE, PRESERVATIVE FREE 3 ML: 5 INJECTION INTRAVENOUS at 22:45

## 2019-12-23 RX ADMIN — HYDROCODONE BITARTRATE AND ACETAMINOPHEN 1 TABLET: 5; 325 TABLET ORAL at 10:01

## 2019-12-23 RX ADMIN — SENNOSIDES AND DOCUSATE SODIUM 2 TABLET: 8.6; 5 TABLET ORAL at 22:45

## 2019-12-23 RX ADMIN — MAGNESIUM HYDROXIDE 10 ML: 2400 SUSPENSION ORAL at 10:00

## 2019-12-23 RX ADMIN — FAMOTIDINE 20 MG: 20 TABLET, FILM COATED ORAL at 16:58

## 2019-12-23 RX ADMIN — HYDROCODONE BITARTRATE AND ACETAMINOPHEN 1 TABLET: 5; 325 TABLET ORAL at 03:10

## 2019-12-23 RX ADMIN — SODIUM CHLORIDE, PRESERVATIVE FREE 3 ML: 5 INJECTION INTRAVENOUS at 10:01

## 2019-12-23 RX ADMIN — FAMOTIDINE 20 MG: 20 TABLET, FILM COATED ORAL at 06:59

## 2019-12-23 RX ADMIN — BISACODYL 10 MG: 10 SUPPOSITORY RECTAL at 11:47

## 2019-12-23 RX ADMIN — GABAPENTIN 100 MG: 100 CAPSULE ORAL at 22:45

## 2019-12-23 RX ADMIN — HYDROCODONE BITARTRATE AND ACETAMINOPHEN 1 TABLET: 5; 325 TABLET ORAL at 22:45

## 2019-12-23 RX ADMIN — CYCLOBENZAPRINE 10 MG: 10 TABLET, FILM COATED ORAL at 22:44

## 2019-12-23 RX ADMIN — DOCUSATE SODIUM 100 MG: 100 CAPSULE, LIQUID FILLED ORAL at 10:01

## 2019-12-23 RX ADMIN — GABAPENTIN 100 MG: 100 CAPSULE ORAL at 10:01

## 2019-12-23 RX ADMIN — ATORVASTATIN CALCIUM 20 MG: 20 TABLET, FILM COATED ORAL at 22:44

## 2019-12-23 RX ADMIN — LIDOCAINE 1 PATCH: 50 PATCH CUTANEOUS at 13:32

## 2019-12-23 RX ADMIN — SODIUM CHLORIDE, POTASSIUM CHLORIDE, SODIUM LACTATE AND CALCIUM CHLORIDE 50 ML/HR: 600; 310; 30; 20 INJECTION, SOLUTION INTRAVENOUS at 03:48

## 2019-12-24 LAB
ANION GAP SERPL CALCULATED.3IONS-SCNC: 8.4 MMOL/L (ref 5–15)
BUN BLD-MCNC: 8 MG/DL (ref 6–20)
BUN/CREAT SERPL: 25 (ref 7–25)
CALCIUM SPEC-SCNC: 8.6 MG/DL (ref 8.6–10.5)
CHLORIDE SERPL-SCNC: 102 MMOL/L (ref 98–107)
CO2 SERPL-SCNC: 26.6 MMOL/L (ref 22–29)
CREAT BLD-MCNC: 0.32 MG/DL (ref 0.57–1)
DEPRECATED RDW RBC AUTO: 42.5 FL (ref 37–54)
ERYTHROCYTE [DISTWIDTH] IN BLOOD BY AUTOMATED COUNT: 13.2 % (ref 12.3–15.4)
GFR SERPL CREATININE-BSD FRML MDRD: >150 ML/MIN/1.73
GLUCOSE BLD-MCNC: 94 MG/DL (ref 65–99)
HCT VFR BLD AUTO: 27.8 % (ref 34–46.6)
HGB BLD-MCNC: 9.2 G/DL (ref 12–15.9)
MCH RBC QN AUTO: 30.1 PG (ref 26.6–33)
MCHC RBC AUTO-ENTMCNC: 33.1 G/DL (ref 31.5–35.7)
MCV RBC AUTO: 90.8 FL (ref 79–97)
PLATELET # BLD AUTO: 283 10*3/MM3 (ref 140–450)
PMV BLD AUTO: 8.5 FL (ref 6–12)
POTASSIUM BLD-SCNC: 4.3 MMOL/L (ref 3.5–5.2)
RBC # BLD AUTO: 3.06 10*6/MM3 (ref 3.77–5.28)
SODIUM BLD-SCNC: 137 MMOL/L (ref 136–145)
WBC NRBC COR # BLD: 5.18 10*3/MM3 (ref 3.4–10.8)

## 2019-12-24 PROCEDURE — 85027 COMPLETE CBC AUTOMATED: CPT | Performed by: NURSE PRACTITIONER

## 2019-12-24 PROCEDURE — 97162 PT EVAL MOD COMPLEX 30 MIN: CPT | Performed by: PHYSICAL THERAPIST

## 2019-12-24 PROCEDURE — 80048 BASIC METABOLIC PNL TOTAL CA: CPT | Performed by: NURSE PRACTITIONER

## 2019-12-24 PROCEDURE — 99024 POSTOP FOLLOW-UP VISIT: CPT | Performed by: NURSE PRACTITIONER

## 2019-12-24 PROCEDURE — 97110 THERAPEUTIC EXERCISES: CPT | Performed by: PHYSICAL THERAPIST

## 2019-12-24 RX ADMIN — SODIUM CHLORIDE, PRESERVATIVE FREE 3 ML: 5 INJECTION INTRAVENOUS at 09:32

## 2019-12-24 RX ADMIN — Medication 1 MG: at 22:36

## 2019-12-24 RX ADMIN — LIDOCAINE 1 PATCH: 50 PATCH CUTANEOUS at 15:30

## 2019-12-24 RX ADMIN — GABAPENTIN 100 MG: 100 CAPSULE ORAL at 09:32

## 2019-12-24 RX ADMIN — DOCUSATE SODIUM 100 MG: 100 CAPSULE, LIQUID FILLED ORAL at 20:20

## 2019-12-24 RX ADMIN — GABAPENTIN 100 MG: 100 CAPSULE ORAL at 20:19

## 2019-12-24 RX ADMIN — SENNOSIDES AND DOCUSATE SODIUM 2 TABLET: 8.6; 5 TABLET ORAL at 20:20

## 2019-12-24 RX ADMIN — SODIUM CHLORIDE, PRESERVATIVE FREE 3 ML: 5 INJECTION INTRAVENOUS at 20:20

## 2019-12-24 RX ADMIN — HYDROCODONE BITARTRATE AND ACETAMINOPHEN 1 TABLET: 5; 325 TABLET ORAL at 19:40

## 2019-12-24 RX ADMIN — CALCIUM 500 MG: 500 TABLET ORAL at 09:31

## 2019-12-24 RX ADMIN — FAMOTIDINE 20 MG: 20 TABLET, FILM COATED ORAL at 16:53

## 2019-12-24 RX ADMIN — MAGNESIUM HYDROXIDE 10 ML: 2400 SUSPENSION ORAL at 09:32

## 2019-12-24 RX ADMIN — CHOLECALCIFEROL TAB 10 MCG (400 UNIT) 2000 UNITS: 10 TAB at 09:31

## 2019-12-24 RX ADMIN — CALCIUM 500 MG: 500 TABLET ORAL at 20:19

## 2019-12-24 RX ADMIN — FAMOTIDINE 20 MG: 20 TABLET, FILM COATED ORAL at 09:32

## 2019-12-24 RX ADMIN — DOCUSATE SODIUM 100 MG: 100 CAPSULE, LIQUID FILLED ORAL at 09:32

## 2019-12-24 RX ADMIN — ATORVASTATIN CALCIUM 20 MG: 20 TABLET, FILM COATED ORAL at 20:19

## 2019-12-25 LAB
DEPRECATED RDW RBC AUTO: 44.5 FL (ref 37–54)
ERYTHROCYTE [DISTWIDTH] IN BLOOD BY AUTOMATED COUNT: 13.1 % (ref 12.3–15.4)
HCT VFR BLD AUTO: 28 % (ref 34–46.6)
HGB BLD-MCNC: 9.4 G/DL (ref 12–15.9)
MCH RBC QN AUTO: 31.3 PG (ref 26.6–33)
MCHC RBC AUTO-ENTMCNC: 33.6 G/DL (ref 31.5–35.7)
MCV RBC AUTO: 93.3 FL (ref 79–97)
PLATELET # BLD AUTO: 296 10*3/MM3 (ref 140–450)
PMV BLD AUTO: 8.4 FL (ref 6–12)
RBC # BLD AUTO: 3 10*6/MM3 (ref 3.77–5.28)
WBC NRBC COR # BLD: 5.37 10*3/MM3 (ref 3.4–10.8)

## 2019-12-25 PROCEDURE — 99024 POSTOP FOLLOW-UP VISIT: CPT | Performed by: NEUROLOGICAL SURGERY

## 2019-12-25 PROCEDURE — 85027 COMPLETE CBC AUTOMATED: CPT | Performed by: NURSE PRACTITIONER

## 2019-12-25 RX ADMIN — DOCUSATE SODIUM 100 MG: 100 CAPSULE, LIQUID FILLED ORAL at 08:48

## 2019-12-25 RX ADMIN — DOCUSATE SODIUM 100 MG: 100 CAPSULE, LIQUID FILLED ORAL at 21:23

## 2019-12-25 RX ADMIN — LIDOCAINE 1 PATCH: 50 PATCH CUTANEOUS at 15:38

## 2019-12-25 RX ADMIN — ATORVASTATIN CALCIUM 20 MG: 20 TABLET, FILM COATED ORAL at 21:23

## 2019-12-25 RX ADMIN — MAGNESIUM HYDROXIDE 10 ML: 2400 SUSPENSION ORAL at 08:48

## 2019-12-25 RX ADMIN — FAMOTIDINE 20 MG: 20 TABLET, FILM COATED ORAL at 06:56

## 2019-12-25 RX ADMIN — SENNOSIDES AND DOCUSATE SODIUM 2 TABLET: 8.6; 5 TABLET ORAL at 21:23

## 2019-12-25 RX ADMIN — CALCIUM 500 MG: 500 TABLET ORAL at 21:23

## 2019-12-25 RX ADMIN — HYDROCODONE BITARTRATE AND ACETAMINOPHEN 1 TABLET: 5; 325 TABLET ORAL at 08:06

## 2019-12-25 RX ADMIN — BISACODYL 10 MG: 10 SUPPOSITORY RECTAL at 15:36

## 2019-12-25 RX ADMIN — GABAPENTIN 100 MG: 100 CAPSULE ORAL at 21:22

## 2019-12-25 RX ADMIN — CALCIUM 500 MG: 500 TABLET ORAL at 08:47

## 2019-12-25 RX ADMIN — HYDROCODONE BITARTRATE AND ACETAMINOPHEN 1 TABLET: 5; 325 TABLET ORAL at 21:28

## 2019-12-25 RX ADMIN — SODIUM CHLORIDE, PRESERVATIVE FREE 3 ML: 5 INJECTION INTRAVENOUS at 08:48

## 2019-12-25 RX ADMIN — CHOLECALCIFEROL TAB 10 MCG (400 UNIT) 2000 UNITS: 10 TAB at 08:47

## 2019-12-25 RX ADMIN — GABAPENTIN 100 MG: 100 CAPSULE ORAL at 08:47

## 2019-12-25 RX ADMIN — SODIUM CHLORIDE, PRESERVATIVE FREE 3 ML: 5 INJECTION INTRAVENOUS at 21:29

## 2019-12-26 PROCEDURE — 97116 GAIT TRAINING THERAPY: CPT

## 2019-12-26 PROCEDURE — 97110 THERAPEUTIC EXERCISES: CPT

## 2019-12-26 PROCEDURE — 99024 POSTOP FOLLOW-UP VISIT: CPT | Performed by: NURSE PRACTITIONER

## 2019-12-26 RX ORDER — LIDOCAINE 50 MG/G
1 PATCH TOPICAL EVERY 24 HOURS
Status: DISCONTINUED | OUTPATIENT
Start: 2019-12-26 | End: 2019-12-27 | Stop reason: HOSPADM

## 2019-12-26 RX ADMIN — FAMOTIDINE 20 MG: 20 TABLET, FILM COATED ORAL at 17:12

## 2019-12-26 RX ADMIN — CALCIUM 500 MG: 500 TABLET ORAL at 08:52

## 2019-12-26 RX ADMIN — FAMOTIDINE 20 MG: 20 TABLET, FILM COATED ORAL at 06:33

## 2019-12-26 RX ADMIN — MAGNESIUM HYDROXIDE 10 ML: 2400 SUSPENSION ORAL at 08:52

## 2019-12-26 RX ADMIN — LIDOCAINE 1 PATCH: 50 PATCH CUTANEOUS at 20:02

## 2019-12-26 RX ADMIN — ATORVASTATIN CALCIUM 20 MG: 20 TABLET, FILM COATED ORAL at 20:05

## 2019-12-26 RX ADMIN — SENNOSIDES AND DOCUSATE SODIUM 2 TABLET: 8.6; 5 TABLET ORAL at 20:05

## 2019-12-26 RX ADMIN — CYCLOBENZAPRINE 10 MG: 10 TABLET, FILM COATED ORAL at 20:05

## 2019-12-26 RX ADMIN — DOCUSATE SODIUM 100 MG: 100 CAPSULE, LIQUID FILLED ORAL at 20:05

## 2019-12-26 RX ADMIN — SODIUM CHLORIDE, PRESERVATIVE FREE 3 ML: 5 INJECTION INTRAVENOUS at 08:53

## 2019-12-26 RX ADMIN — HYDROCODONE BITARTRATE AND ACETAMINOPHEN 1 TABLET: 5; 325 TABLET ORAL at 10:15

## 2019-12-26 RX ADMIN — DOCUSATE SODIUM 100 MG: 100 CAPSULE, LIQUID FILLED ORAL at 08:52

## 2019-12-26 RX ADMIN — CALCIUM 500 MG: 500 TABLET ORAL at 20:05

## 2019-12-26 RX ADMIN — CHOLECALCIFEROL TAB 10 MCG (400 UNIT) 2000 UNITS: 10 TAB at 08:52

## 2019-12-26 RX ADMIN — HYDROCODONE BITARTRATE AND ACETAMINOPHEN 1 TABLET: 5; 325 TABLET ORAL at 20:05

## 2019-12-26 RX ADMIN — GABAPENTIN 100 MG: 100 CAPSULE ORAL at 08:52

## 2019-12-26 RX ADMIN — GABAPENTIN 100 MG: 100 CAPSULE ORAL at 20:04

## 2019-12-27 ENCOUNTER — HOSPITAL ENCOUNTER (INPATIENT)
Facility: HOSPITAL | Age: 59
LOS: 13 days | Discharge: ANOTHER HOSPITAL | End: 2020-01-09
Attending: PHYSICAL MEDICINE & REHABILITATION | Admitting: PHYSICAL MEDICINE & REHABILITATION

## 2019-12-27 VITALS
OXYGEN SATURATION: 91 % | BODY MASS INDEX: 20.37 KG/M2 | RESPIRATION RATE: 16 BRPM | WEIGHT: 88 LBS | HEART RATE: 90 BPM | HEIGHT: 55 IN | SYSTOLIC BLOOD PRESSURE: 111 MMHG | TEMPERATURE: 98 F | DIASTOLIC BLOOD PRESSURE: 77 MMHG

## 2019-12-27 LAB
ANION GAP SERPL CALCULATED.3IONS-SCNC: 12.3 MMOL/L (ref 5–15)
BUN BLD-MCNC: 15 MG/DL (ref 6–20)
BUN/CREAT SERPL: 20.5 (ref 7–25)
CALCIUM SPEC-SCNC: 10.3 MG/DL (ref 8.6–10.5)
CHLORIDE SERPL-SCNC: 92 MMOL/L (ref 98–107)
CO2 SERPL-SCNC: 32.7 MMOL/L (ref 22–29)
CREAT BLD-MCNC: 0.73 MG/DL (ref 0.57–1)
DEPRECATED RDW RBC AUTO: 45.5 FL (ref 37–54)
ERYTHROCYTE [DISTWIDTH] IN BLOOD BY AUTOMATED COUNT: 13 % (ref 12.3–15.4)
GFR SERPL CREATININE-BSD FRML MDRD: 82 ML/MIN/1.73
GLUCOSE BLD-MCNC: 120 MG/DL (ref 65–99)
HCT VFR BLD AUTO: 32.8 % (ref 34–46.6)
HGB BLD-MCNC: 10.5 G/DL (ref 12–15.9)
MCH RBC QN AUTO: 30.5 PG (ref 26.6–33)
MCHC RBC AUTO-ENTMCNC: 32 G/DL (ref 31.5–35.7)
MCV RBC AUTO: 95.3 FL (ref 79–97)
PLATELET # BLD AUTO: 355 10*3/MM3 (ref 140–450)
PMV BLD AUTO: 8.7 FL (ref 6–12)
POTASSIUM BLD-SCNC: 3.7 MMOL/L (ref 3.5–5.2)
RBC # BLD AUTO: 3.44 10*6/MM3 (ref 3.77–5.28)
SODIUM BLD-SCNC: 137 MMOL/L (ref 136–145)
WBC NRBC COR # BLD: 5.6 10*3/MM3 (ref 3.4–10.8)

## 2019-12-27 PROCEDURE — 97116 GAIT TRAINING THERAPY: CPT

## 2019-12-27 PROCEDURE — 99024 POSTOP FOLLOW-UP VISIT: CPT | Performed by: NURSE PRACTITIONER

## 2019-12-27 PROCEDURE — 97110 THERAPEUTIC EXERCISES: CPT

## 2019-12-27 PROCEDURE — 85027 COMPLETE CBC AUTOMATED: CPT | Performed by: NURSE PRACTITIONER

## 2019-12-27 PROCEDURE — 80048 BASIC METABOLIC PNL TOTAL CA: CPT | Performed by: NURSE PRACTITIONER

## 2019-12-27 RX ORDER — ATORVASTATIN CALCIUM 20 MG/1
20 TABLET, FILM COATED ORAL NIGHTLY
Status: CANCELLED | OUTPATIENT
Start: 2019-12-27

## 2019-12-27 RX ORDER — ONDANSETRON 2 MG/ML
4 INJECTION INTRAMUSCULAR; INTRAVENOUS EVERY 6 HOURS PRN
Status: CANCELLED | OUTPATIENT
Start: 2019-12-27

## 2019-12-27 RX ORDER — OMEGA-3S/DHA/EPA/FISH OIL/D3 300MG-1000
2000 CAPSULE ORAL DAILY
Status: CANCELLED | OUTPATIENT
Start: 2019-12-28

## 2019-12-27 RX ORDER — ATORVASTATIN CALCIUM 20 MG/1
20 TABLET, FILM COATED ORAL NIGHTLY
Status: DISCONTINUED | OUTPATIENT
Start: 2019-12-27 | End: 2020-01-09 | Stop reason: HOSPADM

## 2019-12-27 RX ORDER — UREA 10 %
3 LOTION (ML) TOPICAL NIGHTLY PRN
Status: DISCONTINUED | OUTPATIENT
Start: 2019-12-27 | End: 2020-01-09 | Stop reason: HOSPADM

## 2019-12-27 RX ORDER — SENNA AND DOCUSATE SODIUM 50; 8.6 MG/1; MG/1
1 TABLET, FILM COATED ORAL NIGHTLY PRN
Status: DISCONTINUED | OUTPATIENT
Start: 2019-12-27 | End: 2020-01-09

## 2019-12-27 RX ORDER — SENNA AND DOCUSATE SODIUM 50; 8.6 MG/1; MG/1
1 TABLET, FILM COATED ORAL NIGHTLY PRN
Status: CANCELLED | OUTPATIENT
Start: 2019-12-27

## 2019-12-27 RX ORDER — ACETAMINOPHEN 500 MG
1000 TABLET ORAL EVERY 6 HOURS PRN
Status: DISCONTINUED | OUTPATIENT
Start: 2019-12-27 | End: 2020-01-09 | Stop reason: HOSPADM

## 2019-12-27 RX ORDER — FAMOTIDINE 20 MG/1
20 TABLET, FILM COATED ORAL
Status: DISCONTINUED | OUTPATIENT
Start: 2019-12-27 | End: 2020-01-08

## 2019-12-27 RX ORDER — MELATONIN
2000 DAILY
Status: DISCONTINUED | OUTPATIENT
Start: 2019-12-27 | End: 2020-01-09 | Stop reason: HOSPADM

## 2019-12-27 RX ORDER — OMEGA-3S/DHA/EPA/FISH OIL/D3 300MG-1000
2000 CAPSULE ORAL DAILY
Status: CANCELLED | OUTPATIENT
Start: 2019-12-27

## 2019-12-27 RX ORDER — FAMOTIDINE 20 MG/1
20 TABLET, FILM COATED ORAL
Status: CANCELLED | OUTPATIENT
Start: 2019-12-27

## 2019-12-27 RX ORDER — CYCLOBENZAPRINE HCL 10 MG
5 TABLET ORAL 3 TIMES DAILY PRN
Status: DISCONTINUED | OUTPATIENT
Start: 2019-12-27 | End: 2020-01-09

## 2019-12-27 RX ORDER — HYDROCODONE BITARTRATE AND ACETAMINOPHEN 5; 325 MG/1; MG/1
1 TABLET ORAL EVERY 6 HOURS PRN
Status: DISCONTINUED | OUTPATIENT
Start: 2019-12-27 | End: 2020-01-09 | Stop reason: HOSPADM

## 2019-12-27 RX ORDER — LIDOCAINE 50 MG/G
1 PATCH TOPICAL EVERY 24 HOURS
Status: CANCELLED | OUTPATIENT
Start: 2019-12-27

## 2019-12-27 RX ORDER — ONDANSETRON 2 MG/ML
4 INJECTION INTRAMUSCULAR; INTRAVENOUS EVERY 6 HOURS PRN
Status: DISCONTINUED | OUTPATIENT
Start: 2019-12-27 | End: 2020-01-09

## 2019-12-27 RX ORDER — SENNA AND DOCUSATE SODIUM 50; 8.6 MG/1; MG/1
2 TABLET, FILM COATED ORAL NIGHTLY
Status: CANCELLED | OUTPATIENT
Start: 2019-12-27

## 2019-12-27 RX ORDER — HYDROCODONE BITARTRATE AND ACETAMINOPHEN 5; 325 MG/1; MG/1
1 TABLET ORAL EVERY 6 HOURS PRN
Status: CANCELLED | OUTPATIENT
Start: 2019-12-27 | End: 2019-12-29

## 2019-12-27 RX ORDER — BISACODYL 10 MG
10 SUPPOSITORY, RECTAL RECTAL EVERY OTHER DAY
Status: CANCELLED | OUTPATIENT
Start: 2019-12-27

## 2019-12-27 RX ORDER — SODIUM CHLORIDE 0.9 % (FLUSH) 0.9 %
3 SYRINGE (ML) INJECTION EVERY 12 HOURS SCHEDULED
Status: CANCELLED | OUTPATIENT
Start: 2019-12-27

## 2019-12-27 RX ORDER — SODIUM CHLORIDE 0.9 % (FLUSH) 0.9 %
10 SYRINGE (ML) INJECTION AS NEEDED
Status: CANCELLED | OUTPATIENT
Start: 2019-12-27

## 2019-12-27 RX ORDER — GABAPENTIN 100 MG/1
100 CAPSULE ORAL 2 TIMES DAILY
Status: DISCONTINUED | OUTPATIENT
Start: 2019-12-27 | End: 2020-01-09 | Stop reason: HOSPADM

## 2019-12-27 RX ORDER — DOCUSATE SODIUM 100 MG/1
100 CAPSULE, LIQUID FILLED ORAL 2 TIMES DAILY
Status: DISCONTINUED | OUTPATIENT
Start: 2019-12-27 | End: 2020-01-09 | Stop reason: HOSPADM

## 2019-12-27 RX ORDER — POLYETHYLENE GLYCOL 3350 17 G/17G
17 POWDER, FOR SOLUTION ORAL DAILY PRN
Status: CANCELLED | OUTPATIENT
Start: 2019-12-27

## 2019-12-27 RX ORDER — ACETAMINOPHEN 500 MG
1000 TABLET ORAL EVERY 6 HOURS PRN
Status: CANCELLED | OUTPATIENT
Start: 2019-12-27

## 2019-12-27 RX ORDER — POLYETHYLENE GLYCOL 3350 17 G/17G
17 POWDER, FOR SOLUTION ORAL DAILY PRN
Status: DISCONTINUED | OUTPATIENT
Start: 2019-12-27 | End: 2020-01-09 | Stop reason: HOSPADM

## 2019-12-27 RX ORDER — DOCUSATE SODIUM 100 MG/1
100 CAPSULE, LIQUID FILLED ORAL 2 TIMES DAILY
Status: CANCELLED | OUTPATIENT
Start: 2019-12-27

## 2019-12-27 RX ORDER — HYDROCODONE BITARTRATE AND ACETAMINOPHEN 5; 325 MG/1; MG/1
1 TABLET ORAL EVERY 6 HOURS PRN
Status: DISCONTINUED | OUTPATIENT
Start: 2019-12-27 | End: 2019-12-27

## 2019-12-27 RX ORDER — CYCLOBENZAPRINE HCL 10 MG
5 TABLET ORAL 3 TIMES DAILY PRN
Status: CANCELLED | OUTPATIENT
Start: 2019-12-27

## 2019-12-27 RX ORDER — SODIUM CHLORIDE 0.9 % (FLUSH) 0.9 %
10 SYRINGE (ML) INJECTION AS NEEDED
Status: DISCONTINUED | OUTPATIENT
Start: 2019-12-27 | End: 2020-01-09

## 2019-12-27 RX ORDER — ONDANSETRON 4 MG/1
4 TABLET, FILM COATED ORAL EVERY 6 HOURS PRN
Status: DISCONTINUED | OUTPATIENT
Start: 2019-12-27 | End: 2020-01-09 | Stop reason: HOSPADM

## 2019-12-27 RX ORDER — CALCIUM CARBONATE 500(1250)
500 TABLET ORAL 2 TIMES DAILY
Status: CANCELLED | OUTPATIENT
Start: 2019-12-27

## 2019-12-27 RX ORDER — UREA 10 %
3 LOTION (ML) TOPICAL NIGHTLY PRN
Status: CANCELLED | OUTPATIENT
Start: 2019-12-27

## 2019-12-27 RX ORDER — GABAPENTIN 100 MG/1
100 CAPSULE ORAL 2 TIMES DAILY
Status: CANCELLED | OUTPATIENT
Start: 2019-12-27

## 2019-12-27 RX ORDER — FLUTICASONE PROPIONATE 50 MCG
1 SPRAY, SUSPENSION (ML) NASAL NIGHTLY PRN
Status: DISCONTINUED | OUTPATIENT
Start: 2019-12-27 | End: 2020-01-09

## 2019-12-27 RX ORDER — FLUTICASONE PROPIONATE 50 MCG
1 SPRAY, SUSPENSION (ML) NASAL NIGHTLY PRN
Status: CANCELLED | OUTPATIENT
Start: 2019-12-27

## 2019-12-27 RX ORDER — ONDANSETRON 4 MG/1
4 TABLET, FILM COATED ORAL EVERY 6 HOURS PRN
Status: CANCELLED | OUTPATIENT
Start: 2019-12-27

## 2019-12-27 RX ORDER — CALCIUM CARBONATE 500(1250)
500 TABLET ORAL 2 TIMES DAILY
Status: DISCONTINUED | OUTPATIENT
Start: 2019-12-27 | End: 2020-01-09 | Stop reason: HOSPADM

## 2019-12-27 RX ORDER — SODIUM CHLORIDE 0.9 % (FLUSH) 0.9 %
3 SYRINGE (ML) INJECTION EVERY 12 HOURS SCHEDULED
Status: DISCONTINUED | OUTPATIENT
Start: 2019-12-27 | End: 2020-01-02

## 2019-12-27 RX ORDER — LIDOCAINE 50 MG/G
1 PATCH TOPICAL EVERY 24 HOURS
Status: DISCONTINUED | OUTPATIENT
Start: 2019-12-27 | End: 2019-12-31

## 2019-12-27 RX ORDER — BISACODYL 10 MG
10 SUPPOSITORY, RECTAL RECTAL EVERY OTHER DAY
Status: DISCONTINUED | OUTPATIENT
Start: 2019-12-29 | End: 2019-12-31

## 2019-12-27 RX ORDER — SENNA AND DOCUSATE SODIUM 50; 8.6 MG/1; MG/1
2 TABLET, FILM COATED ORAL NIGHTLY
Status: DISCONTINUED | OUTPATIENT
Start: 2019-12-27 | End: 2020-01-09 | Stop reason: HOSPADM

## 2019-12-27 RX ADMIN — DOCUSATE SODIUM 100 MG: 100 CAPSULE, LIQUID FILLED ORAL at 08:04

## 2019-12-27 RX ADMIN — SENNOSIDES AND DOCUSATE SODIUM 2 TABLET: 8.6; 5 TABLET ORAL at 21:14

## 2019-12-27 RX ADMIN — CHOLECALCIFEROL TAB 10 MCG (400 UNIT) 2000 UNITS: 10 TAB at 08:05

## 2019-12-27 RX ADMIN — GABAPENTIN 100 MG: 100 CAPSULE ORAL at 08:04

## 2019-12-27 RX ADMIN — CALCIUM 500 MG: 500 TABLET ORAL at 08:05

## 2019-12-27 RX ADMIN — SODIUM CHLORIDE, PRESERVATIVE FREE 3 ML: 5 INJECTION INTRAVENOUS at 08:06

## 2019-12-27 RX ADMIN — FAMOTIDINE 20 MG: 20 TABLET, FILM COATED ORAL at 21:15

## 2019-12-27 RX ADMIN — DOCUSATE SODIUM 100 MG: 100 CAPSULE, LIQUID FILLED ORAL at 21:15

## 2019-12-27 RX ADMIN — MAGNESIUM HYDROXIDE 10 ML: 2400 SUSPENSION ORAL at 08:04

## 2019-12-27 RX ADMIN — GABAPENTIN 100 MG: 100 CAPSULE ORAL at 21:15

## 2019-12-27 RX ADMIN — CYCLOBENZAPRINE 10 MG: 10 TABLET, FILM COATED ORAL at 08:05

## 2019-12-27 RX ADMIN — CALCIUM 500 MG: 500 TABLET ORAL at 21:14

## 2019-12-27 RX ADMIN — LIDOCAINE 1 PATCH: 50 PATCH CUTANEOUS at 21:20

## 2019-12-27 RX ADMIN — HYDROCODONE BITARTRATE AND ACETAMINOPHEN 1 TABLET: 5; 325 TABLET ORAL at 08:04

## 2019-12-27 RX ADMIN — VITAMIN D, TAB 1000IU (100/BT) 2000 UNITS: 25 TAB at 21:14

## 2019-12-27 RX ADMIN — Medication 3 MG: at 21:25

## 2019-12-27 RX ADMIN — FAMOTIDINE 20 MG: 20 TABLET, FILM COATED ORAL at 06:38

## 2019-12-27 RX ADMIN — ATORVASTATIN CALCIUM 20 MG: 20 TABLET, FILM COATED ORAL at 21:14

## 2019-12-28 PROCEDURE — 97110 THERAPEUTIC EXERCISES: CPT

## 2019-12-28 PROCEDURE — 97162 PT EVAL MOD COMPLEX 30 MIN: CPT

## 2019-12-28 PROCEDURE — 97166 OT EVAL MOD COMPLEX 45 MIN: CPT

## 2019-12-28 PROCEDURE — 97535 SELF CARE MNGMENT TRAINING: CPT

## 2019-12-28 RX ADMIN — LIDOCAINE 1 PATCH: 50 PATCH CUTANEOUS at 21:52

## 2019-12-28 RX ADMIN — FAMOTIDINE 20 MG: 20 TABLET, FILM COATED ORAL at 17:05

## 2019-12-28 RX ADMIN — DOCUSATE SODIUM 100 MG: 100 CAPSULE, LIQUID FILLED ORAL at 09:48

## 2019-12-28 RX ADMIN — GABAPENTIN 100 MG: 100 CAPSULE ORAL at 21:53

## 2019-12-28 RX ADMIN — HYDROCODONE BITARTRATE AND ACETAMINOPHEN 1 TABLET: 5; 325 TABLET ORAL at 21:59

## 2019-12-28 RX ADMIN — CALCIUM 500 MG: 500 TABLET ORAL at 21:53

## 2019-12-28 RX ADMIN — VITAMIN D, TAB 1000IU (100/BT) 2000 UNITS: 25 TAB at 09:48

## 2019-12-28 RX ADMIN — CALCIUM 500 MG: 500 TABLET ORAL at 09:48

## 2019-12-28 RX ADMIN — SENNOSIDES AND DOCUSATE SODIUM 2 TABLET: 8.6; 5 TABLET ORAL at 21:53

## 2019-12-28 RX ADMIN — FAMOTIDINE 20 MG: 20 TABLET, FILM COATED ORAL at 06:19

## 2019-12-28 RX ADMIN — DOCUSATE SODIUM 100 MG: 100 CAPSULE, LIQUID FILLED ORAL at 21:54

## 2019-12-28 RX ADMIN — GABAPENTIN 100 MG: 100 CAPSULE ORAL at 09:48

## 2019-12-28 RX ADMIN — CYCLOBENZAPRINE 5 MG: 10 TABLET, FILM COATED ORAL at 21:58

## 2019-12-28 RX ADMIN — ATORVASTATIN CALCIUM 20 MG: 20 TABLET, FILM COATED ORAL at 21:53

## 2019-12-29 PROCEDURE — 97110 THERAPEUTIC EXERCISES: CPT

## 2019-12-29 RX ADMIN — CALCIUM 500 MG: 500 TABLET ORAL at 19:46

## 2019-12-29 RX ADMIN — CALCIUM 500 MG: 500 TABLET ORAL at 08:50

## 2019-12-29 RX ADMIN — DOCUSATE SODIUM 100 MG: 100 CAPSULE, LIQUID FILLED ORAL at 08:50

## 2019-12-29 RX ADMIN — DOCUSATE SODIUM 100 MG: 100 CAPSULE, LIQUID FILLED ORAL at 19:46

## 2019-12-29 RX ADMIN — VITAMIN D, TAB 1000IU (100/BT) 2000 UNITS: 25 TAB at 08:50

## 2019-12-29 RX ADMIN — ATORVASTATIN CALCIUM 20 MG: 20 TABLET, FILM COATED ORAL at 19:46

## 2019-12-29 RX ADMIN — HYDROCODONE BITARTRATE AND ACETAMINOPHEN 1 TABLET: 5; 325 TABLET ORAL at 05:57

## 2019-12-29 RX ADMIN — FAMOTIDINE 20 MG: 20 TABLET, FILM COATED ORAL at 05:57

## 2019-12-29 RX ADMIN — Medication 3 MG: at 19:52

## 2019-12-29 RX ADMIN — HYDROCODONE BITARTRATE AND ACETAMINOPHEN 1 TABLET: 5; 325 TABLET ORAL at 19:49

## 2019-12-29 RX ADMIN — FAMOTIDINE 20 MG: 20 TABLET, FILM COATED ORAL at 16:56

## 2019-12-29 RX ADMIN — LIDOCAINE 1 PATCH: 50 PATCH CUTANEOUS at 19:47

## 2019-12-29 RX ADMIN — SENNOSIDES AND DOCUSATE SODIUM 2 TABLET: 8.6; 5 TABLET ORAL at 19:46

## 2019-12-29 RX ADMIN — GABAPENTIN 100 MG: 100 CAPSULE ORAL at 08:50

## 2019-12-29 RX ADMIN — GABAPENTIN 100 MG: 100 CAPSULE ORAL at 19:46

## 2019-12-29 RX ADMIN — MAGNESIUM HYDROXIDE 10 ML: 2400 SUSPENSION ORAL at 08:50

## 2019-12-29 RX ADMIN — BISACODYL 10 MG: 10 SUPPOSITORY RECTAL at 13:55

## 2019-12-30 PROCEDURE — 97535 SELF CARE MNGMENT TRAINING: CPT

## 2019-12-30 PROCEDURE — 97535 SELF CARE MNGMENT TRAINING: CPT | Performed by: OCCUPATIONAL THERAPIST

## 2019-12-30 PROCEDURE — 97110 THERAPEUTIC EXERCISES: CPT

## 2019-12-30 RX ADMIN — Medication 3 MG: at 21:06

## 2019-12-30 RX ADMIN — HYDROCODONE BITARTRATE AND ACETAMINOPHEN 1 TABLET: 5; 325 TABLET ORAL at 05:37

## 2019-12-30 RX ADMIN — FAMOTIDINE 20 MG: 20 TABLET, FILM COATED ORAL at 05:37

## 2019-12-30 RX ADMIN — CALCIUM 500 MG: 500 TABLET ORAL at 21:04

## 2019-12-30 RX ADMIN — DOCUSATE SODIUM 100 MG: 100 CAPSULE, LIQUID FILLED ORAL at 21:03

## 2019-12-30 RX ADMIN — ATORVASTATIN CALCIUM 20 MG: 20 TABLET, FILM COATED ORAL at 21:04

## 2019-12-30 RX ADMIN — GABAPENTIN 100 MG: 100 CAPSULE ORAL at 21:04

## 2019-12-30 RX ADMIN — FAMOTIDINE 20 MG: 20 TABLET, FILM COATED ORAL at 17:30

## 2019-12-30 RX ADMIN — DOCUSATE SODIUM 100 MG: 100 CAPSULE, LIQUID FILLED ORAL at 08:33

## 2019-12-30 RX ADMIN — GABAPENTIN 100 MG: 100 CAPSULE ORAL at 08:33

## 2019-12-30 RX ADMIN — CALCIUM 500 MG: 500 TABLET ORAL at 08:33

## 2019-12-30 RX ADMIN — MAGNESIUM HYDROXIDE 10 ML: 2400 SUSPENSION ORAL at 08:33

## 2019-12-30 RX ADMIN — SENNOSIDES AND DOCUSATE SODIUM 2 TABLET: 8.6; 5 TABLET ORAL at 21:03

## 2019-12-30 RX ADMIN — LIDOCAINE 1 PATCH: 50 PATCH CUTANEOUS at 23:02

## 2019-12-30 RX ADMIN — ACETAMINOPHEN 1000 MG: 500 TABLET, FILM COATED ORAL at 17:30

## 2019-12-30 RX ADMIN — VITAMIN D, TAB 1000IU (100/BT) 2000 UNITS: 25 TAB at 08:33

## 2019-12-31 PROCEDURE — 97112 NEUROMUSCULAR REEDUCATION: CPT

## 2019-12-31 PROCEDURE — 97110 THERAPEUTIC EXERCISES: CPT

## 2019-12-31 PROCEDURE — 97116 GAIT TRAINING THERAPY: CPT

## 2019-12-31 RX ORDER — BISACODYL 10 MG
10 SUPPOSITORY, RECTAL RECTAL DAILY PRN
Status: DISCONTINUED | OUTPATIENT
Start: 2019-12-31 | End: 2020-01-09 | Stop reason: HOSPADM

## 2019-12-31 RX ORDER — LIDOCAINE 50 MG/G
1 PATCH TOPICAL DAILY PRN
Status: DISCONTINUED | OUTPATIENT
Start: 2019-12-31 | End: 2020-01-09

## 2019-12-31 RX ADMIN — GABAPENTIN 100 MG: 100 CAPSULE ORAL at 20:42

## 2019-12-31 RX ADMIN — DOCUSATE SODIUM 100 MG: 100 CAPSULE, LIQUID FILLED ORAL at 08:27

## 2019-12-31 RX ADMIN — VITAMIN D, TAB 1000IU (100/BT) 2000 UNITS: 25 TAB at 08:27

## 2019-12-31 RX ADMIN — ATORVASTATIN CALCIUM 20 MG: 20 TABLET, FILM COATED ORAL at 20:42

## 2019-12-31 RX ADMIN — FAMOTIDINE 20 MG: 20 TABLET, FILM COATED ORAL at 18:07

## 2019-12-31 RX ADMIN — CALCIUM 500 MG: 500 TABLET ORAL at 20:42

## 2019-12-31 RX ADMIN — MAGNESIUM HYDROXIDE 10 ML: 2400 SUSPENSION ORAL at 08:27

## 2019-12-31 RX ADMIN — Medication 3 MG: at 20:42

## 2019-12-31 RX ADMIN — DOCUSATE SODIUM 100 MG: 100 CAPSULE, LIQUID FILLED ORAL at 20:42

## 2019-12-31 RX ADMIN — ACETAMINOPHEN 1000 MG: 500 TABLET, FILM COATED ORAL at 19:25

## 2019-12-31 RX ADMIN — SENNOSIDES AND DOCUSATE SODIUM 2 TABLET: 8.6; 5 TABLET ORAL at 20:42

## 2019-12-31 RX ADMIN — HYDROCODONE BITARTRATE AND ACETAMINOPHEN 1 TABLET: 5; 325 TABLET ORAL at 23:29

## 2019-12-31 RX ADMIN — FAMOTIDINE 20 MG: 20 TABLET, FILM COATED ORAL at 06:07

## 2019-12-31 RX ADMIN — CALCIUM 500 MG: 500 TABLET ORAL at 08:27

## 2019-12-31 RX ADMIN — GABAPENTIN 100 MG: 100 CAPSULE ORAL at 08:27

## 2020-01-01 PROCEDURE — 97535 SELF CARE MNGMENT TRAINING: CPT

## 2020-01-01 PROCEDURE — 97110 THERAPEUTIC EXERCISES: CPT | Performed by: PHYSICAL THERAPIST

## 2020-01-01 PROCEDURE — 97110 THERAPEUTIC EXERCISES: CPT

## 2020-01-01 RX ADMIN — CALCIUM 500 MG: 500 TABLET ORAL at 07:54

## 2020-01-01 RX ADMIN — VITAMIN D, TAB 1000IU (100/BT) 2000 UNITS: 25 TAB at 07:54

## 2020-01-01 RX ADMIN — SENNOSIDES AND DOCUSATE SODIUM 2 TABLET: 8.6; 5 TABLET ORAL at 19:51

## 2020-01-01 RX ADMIN — GABAPENTIN 100 MG: 100 CAPSULE ORAL at 19:51

## 2020-01-01 RX ADMIN — FAMOTIDINE 20 MG: 20 TABLET, FILM COATED ORAL at 17:21

## 2020-01-01 RX ADMIN — MAGNESIUM HYDROXIDE 10 ML: 2400 SUSPENSION ORAL at 07:55

## 2020-01-01 RX ADMIN — CALCIUM 500 MG: 500 TABLET ORAL at 19:51

## 2020-01-01 RX ADMIN — DOCUSATE SODIUM 100 MG: 100 CAPSULE, LIQUID FILLED ORAL at 07:55

## 2020-01-01 RX ADMIN — FAMOTIDINE 20 MG: 20 TABLET, FILM COATED ORAL at 06:17

## 2020-01-01 RX ADMIN — GABAPENTIN 100 MG: 100 CAPSULE ORAL at 07:54

## 2020-01-01 RX ADMIN — ACETAMINOPHEN 1000 MG: 500 TABLET, FILM COATED ORAL at 06:17

## 2020-01-01 RX ADMIN — DOCUSATE SODIUM 100 MG: 100 CAPSULE, LIQUID FILLED ORAL at 19:51

## 2020-01-01 RX ADMIN — ATORVASTATIN CALCIUM 20 MG: 20 TABLET, FILM COATED ORAL at 19:51

## 2020-01-01 NOTE — PLAN OF CARE
Problem: Patient Care Overview  Goal: Plan of Care Review  Flowsheets  Taken 1/1/2020 1509 by Luciana Doyle, RN  Outcome Summary: Ms. Bartholomew is pleasant. She ambulates with walker CGA to /from BR . Lumbar dsg from old drain site clean,dry and intact.Anterior right neck incision is clean, dry, intact with steri stips in place, slight swelling. No c/o pain. Tolerated therapies.  Plan of Care Reviewed With: patient  IRF Plan of Care Review: progress ongoing, continue  Taken 1/1/2020 5541 by Marybel Brush, RN  Progress, Functional Goals: demonstrating adequate progress

## 2020-01-01 NOTE — PLAN OF CARE
Problem: Patient Care Overview  Goal: Plan of Care Review  Outcome: Ongoing (interventions implemented as appropriate)  Flowsheets (Taken 1/1/2020 2881)  Outcome Summary: Pt c/o HA. tylenol and Lortab given. R neck incision approximated, steristrips intact, no drainage, edema at incision. Pt keeps head of bed down around 20degrees. states it hurts her back to keep HOB higher. Ambulates CGA with walker. Some dizziness when first sitting up side of bed and standing.  Progress, Functional Goals: demonstrating adequate progress  Plan of Care Reviewed With: patient  IRF Plan of Care Review: progress ongoing, continue

## 2020-01-01 NOTE — THERAPY TREATMENT NOTE
Inpatient Rehabilitation - Physical Therapy Treatment Note  [facility]     Patient Name: Noemi Bartholomew  : 1960  MRN: [id number]    Today's Date: 2020                 Admit Date: 2019      Visit Dx:    No diagnosis found.    Patient Active Problem List   Diagnosis   • Carpal tunnel syndrome   • Hyperlipidemia   • Osteoporosis   • DDD (degenerative disc disease), lumbar   • Chronic left lumbar radiculopathy   • Congenital spondylolysis, lumbosacral region   • Neuropathic pain, leg, left   • Ossification of spinal ligament   • Spinal stenosis in cervical region   • Cervical spondylosis with myelopathy   • Cervical stenosis of spine   • Postoperative anemia due to acute blood loss   • Steroid-induced hyperglycemia   • Cord compression (CMS/HCC)   • Leukocytosis (due to steroids)   • Postoperative CSF leak   • Cervical myelopathy (CMS/HCC)   • CSF leak       Therapy Treatment    IRF Treatment Summary     Row Name 20 1048 20 1018          Evaluation/Treatment Time and Intent    Subjective Information  no complaints  -DN  no complaints  -JK     Existing Precautions/Restrictions  fall;lifting;spinal  -DN  fall;lifting;spinal  -JK     Document Type  therapy note (daily note)  -DN  therapy note (daily note)  -JK     Mode of Treatment  occupational therapy  -DN  physical therapy  -JK     Patient/Family Observations  pt finishing up breakfast sitting in w/c  -DN  pt seated in WC  -JK     Recorded by [DN] eSe Cuadra, OT [JK] Ronda Yap PT     Row Name 20 1048 20 1018          Cognition/Psychosocial- PT/OT    Affect/Mental Status (Cognitive)  WFL  -DN  WFL  -JK     Orientation Status (Cognition)  oriented x 4  -DN  oriented x 4  -JK     Follows Commands (Cognition)  --  WFL  -JK     Personal Safety Interventions  fall prevention program maintained;gait belt  -DN  fall prevention program maintained;gait belt  -JK     Cognitive Function (Cognitive)  --  WFL  -JK     Recorded by  "[DN] See Cuadra, OT [JK] Ronda Yap, PT     Row Name 01/01/20 1048 01/01/20 1018          Sit-Stand Transfer    Sit-Stand [place] (Transfers)  supervision  -DN  stand by assist  -JK     Assistive Device (Sit-Stand Transfers)  walker, front-wheeled  -DN  walker, front-wheeled  -JK     Recorded by [DN] See Cuadra, OT [JK] Ronda Yap, PT     Row Name 01/01/20 1018             Stand-Sit Transfer    Stand-Sit [place] (Transfers)  stand by assist  -JK      Assistive Device (Stand-Sit Transfers)  walker, front-wheeled  -JK      Recorded by [JK] Ronda Yap, PT      Row Name 01/01/20 1018             Gait/Stairs Assessment/Training    [place] Level (Gait)  stand by assist  -JK      Assistive Device (Gait)  walker, front-wheeled with 5\" fixed wheels and 3\" swivel wheels  -JK      Distance in Feet (Gait)  275  -JK      Pattern (Gait)  step-through  -JK      Deviations/Abnormal Patterns (Gait)  ally decreased;stride length decreased  -JK      Bilateral Gait Deviations  forward flexed posture;heel strike decreased  -JK      Left Sided Gait Deviations  heel strike decreased  -JK      Right Sided Gait Deviations  heel strike decreased  -JK      Comment (Gait/Stairs)  practiced ambulation with 3\" swivel wheels on RW; pt ambulated at slower pace to work on control of wx but had no LOB with swivel wheels.   -JK      Recorded by [JK] Ronda Yap, PT      Row Name 01/01/20 1018             Safety Issues, Functional Mobility    Impairments Affecting Function (Mobility)  balance;motor control;pain;range of motion (ROM);strength  -JK      Recorded by [JK] Ronda Yap, PT      Row Name 01/01/20 1048             Grooming Assessment/Treatment    Grooming [place] Level  grooming skills;oral care regimen;wash face, hands;set up;verbal cues  -DN      Grooming Position  supported standing  -DN      Grooming Setup Assistance  obtain supplies;open containers  -DN      Comment (Grooming)  assist " with opening toothpaste, lotion  -DN      Recorded by [DN] See Cuadra OT      Row Name 01/01/20 1048 01/01/20 1018          Pain Scale: Numbers Pre/Post-Treatment    Pain Scale: Numbers, Pretreatment  0/10 - no pain  -DN  0/10 - no pain  -JK     Pain Scale: Numbers, Post-Treatment  0/10 - no pain  -DN  0/10 - no pain  -JK     Recorded by [DN] See Cuadra OT [JK] Ronda Yap PT     Row Name 01/01/20 1048             Upper Extremity Seated Therapeutic Exercise    Performed, Seated Upper Extremity (Therapeutic Exercise)  shoulder flexion/extension;shoulder abduction/adduction;shoulder external/internal rotation;digit flexion/extension;wrist flexion/extension;forearm supination/pronation;elbow flexion/extension;scapular protraction/retraction  -DN      Device, Seated Upper Extremity (Therapeutic Exercise)  free weights, cuff  -DN      Exercise Type, Seated Upper Extremity (Therapeutic Exercise)  AROM (active range of motion)  -DN      Expected Outcomes, Seated Upper Extremity (Therapeutic Exercise)  improve functional tolerance, self-care activity;improve performance, reaching/manipulating objects  -DN      Restrictions, Seated Upper Extremity (Therapeutic Exercise)  no lift > 5#  -DN      Sets/Reps Detail, Seated Upper Extremity (Therapeutic Exercise)  3/5 with 1# wrist cuff weights  -DN      Transfers Skills, Training to Functional Activity, Seated Upper Extremity (Therapeutic Exercise)  beginning to transfer skills to functional activity  -DN      Comment, Seated Upper Extremity (Therapeutic Exercise)  AAROM completed prior to workout with BUE shoulders in no overhead range  -DN      Recorded by [DN] See Cuadra OT      Row Name 01/01/20 1018             Lower Extremity Seated Therapeutic Exercise    Performed, Seated Lower Extremity (Therapeutic Exercise)  hip flexion/extension;hip abduction/adduction;knee flexion/extension;ankle dorsiflexion/plantarflexion  -JK      Device, Seated Lower Extremity  (Therapeutic Exercise)  medium ball  -JK      Exercise Type, Seated Lower Extremity (Therapeutic Exercise)  AROM (active range of motion)  -JK      Expected Outcomes, Seated Lower Extremity (Therapeutic Exercise)  improve performance, gait skills;improve performance, transfer skills  -JK      Sets/Reps Detail, Seated Lower Extremity (Therapeutic Exercise)  1/15  -JK      Recorded by [JK] Ronda Yap, PT      Row Name 01/01/20 1048 01/01/20 1018          Positioning and Restraints    Pre-Treatment Position  sitting in chair/recliner  -DN  sitting in chair/recliner  -JK     Post Treatment Position  wheelchair  -DN  wheelchair  -JK     In Bed  sitting;call light within reach;encouraged to call for assist  -DN  --     In Wheelchair  --  sitting;exit alarm on;with family/caregiver;encouraged to call for assist;heels elevated  -JK     Recorded by [DN] See Cuadra, OT [JK] Ronda Yap, PT       User Key  (r) = Recorded By, (t) = Taken By, (c) = Cosigned By    Initials Name Effective Dates    See Tan, OT 06/08/18 -     JK Ronda Yap, PT 04/03/18 -         Wound 12/19/19 1912 posterior;medial lumbar spine Incision (Active)   Dressing Appearance dry;intact 1/1/2020  8:00 AM   Closure BRYAN 1/1/2020  8:00 AM   Base dressing in place, unable to visualize 1/1/2020  8:00 AM   Drainage Amount none 12/31/2019  7:32 PM       Wound 12/19/19 1939 Right lateral throat Incision (Active)   Dressing Appearance open to air 1/1/2020  8:00 AM   Closure Approximated;Adhesive closure strips 1/1/2020  8:00 AM   Base clean;dry 1/1/2020  8:00 AM   Periwound intact;dry 1/1/2020  8:00 AM   Drainage Amount none 12/31/2019  7:32 PM   Care, Wound cleansed with 12/31/2019  7:32 PM           PT Recommendation and Plan                        Time Calculation:     PT Charges     Row Name 01/01/20 1404             Time Calculation    Start Time  1000  -JK      Stop Time  1030  -JK      Time Calculation (min)  30 min  -JK        User  Key  (r) = Recorded By, (t) = Taken By, (c) = Cosigned By    Initials Name Provider Type    Ronda Post, PT Physical Therapist          Therapy Charges for Today     Code Description Service Date Service Provider Modifiers Qty    54222559016  PT THER PROC EA 15 MIN 1/1/2020 Ronda Yap, PT GP 2            PT G-Codes  Outcome Measure Options: Tinetti  Tinetti Total Score: 22      Ronda Yap, PT  1/1/2020

## 2020-01-01 NOTE — PROGRESS NOTES
Occupational Therapy: Individual: 30 minutes.    Physical Therapy: Branch    Speech Language Pathology:  Branch    Signed by: BREONNA Flower/ALEXA

## 2020-01-01 NOTE — PROGRESS NOTES
Inpatient Rehabilitation Plan of Care Note    Plan of Care  Care Plan Reviewed - Updates as Follows    Sphincter Control    [RN] Bowel Management(Active)  Current Status(12/31/2019): Continent 100%; Patient states had 2 bm's 12/31.  Weekly Goal(01/09/2020): Will remain Continent 100%; Will have regular BMs  Discharge Goal: Continent 100% with regular BMs    Performed Intervention(s)  Monitor I&O      Safety    Performed Intervention(s)  safety rounds/call light within easy reach  bed alarm/chair alarm      Pain    Performed Intervention(s)  Pain assessment q shift and PRN; Offer pain medication PRN      Psychosocial    Performed Intervention(s)  Offer support      Body Systems    Performed Intervention(s)  Wound Care as ordered. Skin assessment q shift and PRN.    Signed by: Marybel Brush RN

## 2020-01-01 NOTE — PROGRESS NOTES
Occupational Therapy: Branch    Physical Therapy: Individual: 30 minutes.    Speech Language Pathology:  Branch    Signed by: Ronda Yap PT

## 2020-01-01 NOTE — PROGRESS NOTES
Inpatient Rehabilitation Plan of Care Note    Plan of Care  Care Plan Reviewed - No updates at this time.    Safety    Performed Intervention(s)  safety rounds/call light within easy reach  bed alarm/chair alarm      Pain    Performed Intervention(s)  Pain assessment q shift and PRN; Offer pain medication PRN      Psychosocial    Performed Intervention(s)  Offer support  calm enviroment  allow time to verbalize      Body Systems    Performed Intervention(s)  Wound Care as ordered. Skin assessment q shift and PRN.      Sphincter Control    Performed Intervention(s)  Monitor I&O    Signed by: Luciana Doyle RN

## 2020-01-01 NOTE — THERAPY TREATMENT NOTE
Inpatient Rehabilitation - Occupational Therapy Treatment Note    [facility]     Patient Name: Noemi Bartholomew  : 1960  MRN: [id number]    Today's Date: 2020                 Admit Date: 2019      Visit Dx:  No diagnosis found.    Patient Active Problem List   Diagnosis   • Carpal tunnel syndrome   • Hyperlipidemia   • Osteoporosis   • DDD (degenerative disc disease), lumbar   • Chronic left lumbar radiculopathy   • Congenital spondylolysis, lumbosacral region   • Neuropathic pain, leg, left   • Ossification of spinal ligament   • Spinal stenosis in cervical region   • Cervical spondylosis with myelopathy   • Cervical stenosis of spine   • Postoperative anemia due to acute blood loss   • Steroid-induced hyperglycemia   • Cord compression (CMS/HCC)   • Leukocytosis (due to steroids)   • Postoperative CSF leak   • Cervical myelopathy (CMS/HCC)   • CSF leak         Therapy Treatment    IRF Treatment Summary     Row Name 20 1048 20 1018          Evaluation/Treatment Time and Intent    Subjective Information  no complaints  -DN  no complaints  -JK     Existing Precautions/Restrictions  fall;lifting;spinal  -DN  fall;lifting;spinal  -JK     Document Type  therapy note (daily note)  -DN  therapy note (daily note)  -JK     Mode of Treatment  occupational therapy  -DN  physical therapy  -JK     Patient/Family Observations  pt finishing up breakfast sitting in w/c  -DN  pt seated in WC  -JK     Recorded by [DN] See Cuadra, OT [JK] Ronda Yap PT     Row Name 20 1048 20 1018          Cognition/Psychosocial- PT/OT    Affect/Mental Status (Cognitive)  WFL  -DN  WFL  -JK     Orientation Status (Cognition)  oriented x 4  -DN  oriented x 4  -JK     Follows Commands (Cognition)  --  WFL  -JK     Personal Safety Interventions  fall prevention program maintained;gait belt  -DN  fall prevention program maintained;gait belt  -JK     Cognitive Function (Cognitive)  --  WFL  -JK      Recorded by [DN] See Cuadra, OT [JK] Ronda Yap, PT     Row Name 01/01/20 1048 01/01/20 1018          Sit-Stand Transfer    Sit-Stand [place] (Transfers)  supervision  -DN  stand by assist  -JK     Assistive Device (Sit-Stand Transfers)  walker, front-wheeled  -DN  walker, front-wheeled  -JK     Recorded by [DN] See Cuadra OT [JK] Ronda Yap, PT     Row Name 01/01/20 1018             Stand-Sit Transfer    Stand-Sit [place] (Transfers)  stand by assist  -JK      Assistive Device (Stand-Sit Transfers)  walker, front-wheeled  -JK      Recorded by [JK] Ronda Yap, PT      Row Name 01/01/20 1018             Gait/Stairs Assessment/Training    [place] Level (Gait)  stand by assist  -JK      Assistive Device (Gait)  walker, front-wheeled  -JK      Distance in Feet (Gait)  275  -JK      Pattern (Gait)  step-through  -JK      Deviations/Abnormal Patterns (Gait)  ally decreased;stride length decreased  -JK      Bilateral Gait Deviations  forward flexed posture;heel strike decreased  -JK      Left Sided Gait Deviations  heel strike decreased  -JK      Right Sided Gait Deviations  heel strike decreased  -JK      Recorded by [JK] Ronda Yap, PT      Row Name 01/01/20 1048             Grooming Assessment/Treatment    Grooming [place] Level  grooming skills;oral care regimen;wash face, hands;set up;verbal cues  -DN      Grooming Position  supported standing  -DN      Grooming Setup Assistance  obtain supplies;open containers  -DN      Comment (Grooming)  assist with opening toothpaste, lotion  -DN      Recorded by [DN] See Cuadra OT      Row Name 01/01/20 1048             Pain Scale: Numbers Pre/Post-Treatment    Pain Scale: Numbers, Pretreatment  0/10 - no pain  -DN      Pain Scale: Numbers, Post-Treatment  0/10 - no pain  -DN      Recorded by [GARRY] See Cuadra OT      Row Name 01/01/20 1048             Upper Extremity Seated Therapeutic Exercise    Performed, Seated  Upper Extremity (Therapeutic Exercise)  shoulder flexion/extension;shoulder abduction/adduction;shoulder external/internal rotation;digit flexion/extension;wrist flexion/extension;forearm supination/pronation;elbow flexion/extension;scapular protraction/retraction  -DN      Device, Seated Upper Extremity (Therapeutic Exercise)  free weights, cuff  -DN      Exercise Type, Seated Upper Extremity (Therapeutic Exercise)  AROM (active range of motion)  -DN      Expected Outcomes, Seated Upper Extremity (Therapeutic Exercise)  improve functional tolerance, self-care activity;improve performance, reaching/manipulating objects  -DN      Restrictions, Seated Upper Extremity (Therapeutic Exercise)  no lift > 5#  -DN      Sets/Reps Detail, Seated Upper Extremity (Therapeutic Exercise)  3/5 with 1# wrist cuff weights  -DN      Transfers Skills, Training to Functional Activity, Seated Upper Extremity (Therapeutic Exercise)  beginning to transfer skills to functional activity  -DN      Comment, Seated Upper Extremity (Therapeutic Exercise)  AAROM completed prior to workout with BUE shoulders in no overhead range  -DN      Recorded by [DN] See Cuadra OT      Row Name 01/01/20 1048             Positioning and Restraints    Pre-Treatment Position  sitting in chair/recliner  -DN      Post Treatment Position  wheelchair  -DN      In Bed  sitting;call light within reach;encouraged to call for assist  -DN      Recorded by [DN] See Cuadra OT        User Key  (r) = Recorded By, (t) = Taken By, (c) = Cosigned By    Initials Name Effective Dates    See Tan OT 06/08/18 -     Ronda Post, PT 04/03/18 -           Wound 12/19/19 1912 posterior;medial lumbar spine Incision (Active)   Dressing Appearance dry;intact 1/1/2020  8:00 AM   Closure BRYAN 1/1/2020  8:00 AM   Base dressing in place, unable to visualize 1/1/2020  8:00 AM   Drainage Amount none 12/31/2019  7:32 PM       Wound 12/19/19 1939 Right lateral throat  Incision (Active)   Dressing Appearance open to air 1/1/2020  8:00 AM   Closure Approximated;Adhesive closure strips 1/1/2020  8:00 AM   Base clean;dry 1/1/2020  8:00 AM   Periwound intact;dry 1/1/2020  8:00 AM   Drainage Amount none 12/31/2019  7:32 PM   Care, Wound cleansed with 12/31/2019  7:32 PM   Dressing Care, Wound open to air 12/31/2019 12:08 PM         OT Recommendation and Plan    Anticipated Equipment Needs At Discharge (OT Eval): commode, 3-in-1, tub bench, shower chair  Planned Therapy Interventions (OT Eval): activity tolerance training, BADL retraining, neuromuscular control/coordination retraining, patient/caregiver education/training, strengthening exercise, transfer/mobility retraining            OT IRF GOALS     Row Name 12/28/19 1144             Transfer Goal 1 (OT-IRF)    Activity/Assistive Device (Transfer Goal 1, OT-IRF)  shower chair;commode, bedside without drop arms;walker, rolling  -DN      [place] Level (Transfer Goal 1, OT-IRF)  supervision required  -DN      Time Frame (Transfer Goal 1, OT-IRF)  short term goal (STG)  -DN      Progress/Outcomes (Transfer Goal 1, OT-IRF)  continuing progress toward goal  -DN         Transfer Goal 2 (OT-IRF)    Activity/Assistive Device (Transfer Goal 2, OT-IRF)  toilet;walker, rolling;shower chair  -DN      [place] Level (Transfer Goal 2, OT-IRF)  conditional independence  -DN      Time Frame (Transfer Goal 2, OT-IRF)  long term goal (LTG)  -DN      Progress/Outcomes (Transfer Goal 2, OT-IRF)  continuing progress toward goal  -DN         Bathing FIM Goal (OT-IRF)    Progress/Outcomes (Bathing FIM Goal, OT-IRF)  goal no longer appropriate  -DN         Bathing Goal 1 (OT-IRF)    Activity/Device (Bathing Goal 1, OT-IRF)  bathing skills, all  -DN      [place] Level (Bathing Goal 1, OT-IRF)  supervision required  -DN      Time Frame (Bathing Goal 1, OT-IRF)  short term goal (STG)  -DN      Progress/Outcomes (Bathing Goal 1, OT-IRF)  continuing  progress toward goal  -DN         Bathing Goal 2 (OT-IRF)    Activity/Device (Bathing Goal 2, OT-IRF)  bathing skills, all  -DN      [place] Level (Bathing Goal 2, OT-IRF)  conditional independence  -DN      Time Frame (Bathing Goal 2, OT-IRF)  long term goal (LTG)  -DN      Progress/Outcomes (Bathing Goal 2, OT-IRF)  continuing progress toward goal  -DN         UB Dressing FIM Goal (OT-IRF)    Progress/Outcomes (UB Dressing FIM Goal, OT-IRF)  goal no longer appropriate  -DN         UB Dressing Goal 1 (OT-IRF)    Activity/Device (UB Dressing Goal 1, OT-IRF)  upper body dressing  -DN      [place] (UB Dress Goal 1, OT-IRF)  supervision required  -DN      Time Frame (UB Dressing Goal 1, OT-IRF)  short term goal (STG)  -DN      Progress/Outcomes (UB Dressing Goal 1, OT-IRF)  continuing progress toward goal  -DN         UB Dressing Goal 2 (OT-IRF)    Activity/Device (UB Dressing Goal 2, OT-IRF)  upper body dressing  -DN      [place] (UB Dress Goal 2, OT-IRF)  conditional independence  -DN      Time Frame (UB Dressing Goal 2, OT-IRF)  long term goal (LTG)  -DN      Progress/Outcomes (UB Dressing Goal 2, OT-IRF)  continuing progress toward goal  -DN         LB Dressing FIM Goal (OT-IRF)    Progress/Outcomes (LB Dressing FIM Goal, OT-IRF)  goal no longer appropriate  -DN         LB Dressing Goal 1 (OT-IRF)    Activity/Device (LB Dressing Goal 1, OT-IRF)  lower body dressing  -DN      [place] (LB Dressing Goal 1, OT-IRF)  moderate assist (50-74% patient effort)  -DN      Time Frame (LB Dressing Goal 1, OT-IRF)  short term goal (STG)  -DN      Progress/Outcomes (LB Dressing Goal 1, OT-IRF)  continuing progress toward goal  -DN         LB Dressing Goal 2 (OT-IRF)    Activity/Device (LB Dressing Goal 2, OT-IRF)  lower body dressing  -DN      [place] (LB Dressing Goal 2, OT-IRF)  supervision required  -DN      Time Frame (LB Dressing Goal 2, OT-IRF)  long term goal (LTG)  -DN      Progress/Outcomes (LB  Dressing Goal 2, OT-IRF)  continuing progress toward goal  -DN        User Key  (r) = Recorded By, (t) = Taken By, (c) = Cosigned By    Initials Name Provider Type    See Tan OT Occupational Therapist                     Time Calculation:     Time Calculation- OT     Row Name 01/01/20 1053             Time Calculation- OT    OT Start Time  0800  -DN      OT Stop Time  0830  -DN      OT Time Calculation (min)  30 min  -DN      OT Received On  01/01/20  -DN        User Key  (r) = Recorded By, (t) = Taken By, (c) = Cosigned By    Initials Name Provider Type    See Tan OT Occupational Therapist          Therapy Charges for Today     Code Description Service Date Service Provider Modifiers Qty    67319151758 HC OT THER PROC EA 15 MIN 12/31/2019 See Cuadra OT GO 2    29356235196 HC OT NEUROMUSC RE EDUCATION EA 15 MIN 12/31/2019 See Cuadra OT GO 2    88320750205 HC OT NEUROMUSC RE EDUCATION EA 15 MIN 12/31/2019 See Cuadra OT GO 2    82787856150 HC OT SELF CARE/MGMT/TRAIN EA 15 MIN 1/1/2020 See Cuadra OT GO 1    21163167210 HC OT THER PROC EA 15 MIN 1/1/2020 See Cuadra OT GO 1                   See Cuadra OT  1/1/2020

## 2020-01-01 NOTE — PROGRESS NOTES
LOS: 5 days   Patient Care Team:  Mary Lou Carroll MD as PCP - General (Internal Medicine)    Chief Complaint:same    Subjective     History of Present Illness    Subjective Pt is awake and alert. Pt c/o frontal HA last PM relieved eventually by tylenol    History taken from: patient    Objective     Vital Signs  Temp:  [97 °F (36.1 °C)-98 °F (36.7 °C)] 98 °F (36.7 °C)  Heart Rate:  [] 59  Resp:  [18] 18  BP: (102-134)/(59-83) 102/59    Objective exam unchanged calves soft and NT resp unlabored and reglar    Results Review:     I reviewed the patient's new clinical results.    Medication Review:    Assessment/Plan       Cervical myelopathy (CMS/HCC)      Assessment & Plan Continue eval phase    Jacob De Dios MD  01/01/20  10:50 AM    Time:

## 2020-01-02 PROCEDURE — 97110 THERAPEUTIC EXERCISES: CPT

## 2020-01-02 RX ADMIN — FAMOTIDINE 20 MG: 20 TABLET, FILM COATED ORAL at 05:27

## 2020-01-02 RX ADMIN — DOCUSATE SODIUM 100 MG: 100 CAPSULE, LIQUID FILLED ORAL at 07:39

## 2020-01-02 RX ADMIN — DOCUSATE SODIUM 100 MG: 100 CAPSULE, LIQUID FILLED ORAL at 21:03

## 2020-01-02 RX ADMIN — FAMOTIDINE 20 MG: 20 TABLET, FILM COATED ORAL at 16:01

## 2020-01-02 RX ADMIN — CALCIUM 500 MG: 500 TABLET ORAL at 21:02

## 2020-01-02 RX ADMIN — CALCIUM 500 MG: 500 TABLET ORAL at 07:39

## 2020-01-02 RX ADMIN — SENNOSIDES AND DOCUSATE SODIUM 2 TABLET: 8.6; 5 TABLET ORAL at 21:02

## 2020-01-02 RX ADMIN — VITAMIN D, TAB 1000IU (100/BT) 2000 UNITS: 25 TAB at 07:40

## 2020-01-02 RX ADMIN — ATORVASTATIN CALCIUM 20 MG: 20 TABLET, FILM COATED ORAL at 21:02

## 2020-01-02 RX ADMIN — MAGNESIUM HYDROXIDE 10 ML: 2400 SUSPENSION ORAL at 07:39

## 2020-01-02 RX ADMIN — GABAPENTIN 100 MG: 100 CAPSULE ORAL at 07:39

## 2020-01-02 RX ADMIN — GABAPENTIN 100 MG: 100 CAPSULE ORAL at 21:02

## 2020-01-02 NOTE — PLAN OF CARE
Problem: Patient Care Overview  Goal: Plan of Care Review  Outcome: Ongoing (interventions implemented as appropriate)  Flowsheets  Taken 1/2/2020 0229 by Jocelyn Richardson, RN  Outcome Summary: Patient is pleasant and cooperative. Denies any pain tonight. Ambulatory with RW. Anterior neck incision KITTY with strips intact, just slight swelling at site. Lower back puncture has dressing intact. Continent of B&B. No new issues tonight.  Progress, Functional Goals: demonstrating adequate progress  Plan of Care Reviewed With: patient  Taken 1/1/2020 1509 by Luciana Doyle, RN  IRF Plan of Care Review: progress ongoing, continue  Goal: Individualization and Mutuality  Outcome: Ongoing (interventions implemented as appropriate)  Goal: Discharge Needs Assessment  Outcome: Ongoing (interventions implemented as appropriate)  Goal: Home Safety Plan  Outcome: Ongoing (interventions implemented as appropriate)  Goal: Coping Plan  Outcome: Ongoing (interventions implemented as appropriate)  Goal: Community Reintegration Plan  Outcome: Ongoing (interventions implemented as appropriate)     Problem: Fall Risk (Adult)  Goal: Absence of Fall  Description  Patient will demonstrate the desired outcomes by discharge/transition of care.  Outcome: Ongoing (interventions implemented as appropriate)  Flowsheets (Taken 1/2/2020 0229)  Absence of Fall: making progress toward outcome     Problem: Laminectomy/Foraminotomy/Discectomy (Adult)  Goal: Signs and Symptoms of Listed Potential Problems Will be Absent, Minimized or Managed (Laminectomy/Foraminotomy/Discectomy)  Description  Signs and symptoms of listed potential problems will be absent, minimized or managed by discharge/transition of care (reference Laminectomy/Foraminotomy/Discectomy (Adult) CPG).  Outcome: Ongoing (interventions implemented as appropriate)  Goal: Anesthesia/Sedation Recovery  Outcome: Ongoing (interventions implemented as appropriate)     Problem: Skin Injury Risk  (Adult)  Goal: Skin Health and Integrity  Description  Patient will demonstrate the desired outcomes by discharge/transition of care.  Outcome: Ongoing (interventions implemented as appropriate)  Flowsheets (Taken 1/2/2020 4024)  Skin Health and Integrity: making progress toward outcome

## 2020-01-02 NOTE — PROGRESS NOTES
Inpatient Rehabilitation Plan of Care Note    Plan of Care  Care Plan Reviewed - No updates at this time.    Body Systems    [RN] Integumentary(Active)  Current Status(01/01/2020): Anterior neck cervical incision KITTY with  steristrips. Punture to Lower back previous drain site with pressure dressing  intact.  Weekly Goal(01/10/2020): No further skin breakdown  Discharge Goal: No further skin breakdown; incision and puncture healed    Performed Intervention(s)  Wound Care as ordered. Skin assessment q shift and PRN.      Pain    [RN] Pain Management(Active)  Current Status(01/01/2020): Occasional headaches  Weekly Goal(01/08/2020): Pain will be managed  Discharge Goal: Pain will be managed    Performed Intervention(s)  Pain assessment q shift and PRN; Offer pain medication PRN      Psychosocial    [RN] Behavior(Active)  Current Status(01/01/2020): Patient pleasant and appears to be coping well with  current health status  Weekly Goal(01/08/2020): Patient will demonstrate appropriate coping strategies  Discharge Goal: Same with weekly    Performed Intervention(s)  Offer support  calm enviroment  allow time to verbalize      Safety    [RN] Potential for Injury(Active)  Current Status(01/01/2020): Risk for falls related to surgery and  Weekly Goal(01/09/2020): no falls  Discharge Goal: no falls    Performed Intervention(s)  safety rounds/call light within easy reach  bed alarm/chair alarm      Sphincter Control    [RN] Bladder Management(Active)  Current Status(01/01/2020): Continent 100%  Weekly Goal(01/09/2020): Will remain Continent 100%  Discharge Goal: Continent 100%    Performed Intervention(s)  Monitor I&O    Signed by: Jocelyn Richardson RN

## 2020-01-02 NOTE — PROGRESS NOTES
Inpatient Rehabilitation Plan of Care Note    Plan of Care  Care Plan Reviewed - No updates at this time.    Safety    Performed Intervention(s)  safety rounds/call light within easy reach  bed alarm/chair alarm      Body Systems    Performed Intervention(s)  Wound Care as ordered. Skin assessment q shift and PRN.    Signed by: Gunner Davis RN

## 2020-01-02 NOTE — PROGRESS NOTES
Occupational Therapy: Individual: 105 minutes.    Physical Therapy: Branch    Speech Language Pathology:  Branch    Signed by: BREONNA Flower/ALEXA

## 2020-01-02 NOTE — PROGRESS NOTES
"   LOS: 6 days   Patient Care Team:  Mary Lou Carroll MD as PCP - General (Internal Medicine)    Chief Complaint:   Cervical myelopathy with upper extremity greater than lower extremity and left side greater than right side involvement  History of cervical stenosis C5 to T1 with ossification of the posterior longitudinal ligament (OPLL) and progresive myelopathy  Status post December 6, 2019- Anterior cervical corpectomy C6, C7 with PEEK cage and anterior Zevo cervical plate from C5 to T1  CSF leak repair.   Status post December 19, 2019-1. Placement of external lumbar drain; 2. Drainage of cervical CSF collection and reclosure of CSF leak.  Lumbar drain removed December 24, 2019       Subjective     History of Present Illness     Subjective    She continues weak in the hands but feels she is making progress.  Balance improving.  No complaints with her bowels.  Had a bowel movement without suppository.  No bladder complaints.  No drainage from her neck incision.    History taken from: patient    Objective     Vital Signs  Temp:  [97.8 °F (36.6 °C)-98.1 °F (36.7 °C)] 98.1 °F (36.7 °C)  Heart Rate:  [] 100  Resp:  [18] 18  BP: (101-118)/(64-73) 107/71    Objective:  Vital signs: (most recent): Blood pressure 107/71, pulse 100, temperature 98.1 °F (36.7 °C), temperature source Oral, resp. rate 18, height 134.6 cm (53\"), SpO2 98 %, not currently breastfeeding.            Physical Exam  MENTAL STATUS -  AWAKE / ALERT  HEENT- NCAT,   SCLERA NON-ICTERIC, CONJUNCTIVA PINK, OP MOIST, neck incision-mild swelling.  No erythema or drainage.  LUNGS - CTA, NO WHEEZES, RALES OR RHONCHI  HEART- RRR, NO RUB, MURMUR, OR GALLOP  ABD - NORMOACTIVE BOWEL SOUNDS, SOFT, NT.  EXT - NO EDEMA OR CYANOSIS  NEURO -awake alert.  Oriented x4.  Able to oppose the thumb to the other 4 digits bilaterally.  MOTOR EXAM -bilateral upper extremity weakness greater than left lower extremity weakness.  Good strength in the right lower " extremity.    Results Review:     I reviewed the patient's new clinical results.  Results from last 7 days   Lab Units 12/27/19  0929   SODIUM mmol/L 137   POTASSIUM mmol/L 3.7   CHLORIDE mmol/L 92*   CO2 mmol/L 32.7*   BUN mg/dL 15   CREATININE mg/dL 0.73   CALCIUM mg/dL 10.3   GLUCOSE mg/dL 120*   Results for DARYN PUGH (MRN [id number]) as of 12/18/2019 10:50   Ref. Range 12/2/2019 16:00 12/7/2019 05:51 12/9/2019 05:15 12/10/2019 05:17 12/18/2019 07:05   Sodium Latest Ref Range: 136 - 145 mmol/L 140 134 (L) 142 139 129 (L)     Results from last 7 days   Lab Units 12/27/19  0929   WBC 10*3/mm3 5.60   HEMOGLOBIN g/dL 10.5*   HEMATOCRIT % 32.8*   PLATELETS 10*3/mm3 355       Medication Review: done  Scheduled Meds:    atorvastatin 20 mg Oral Nightly   calcium carbonate (oyster shell) 500 mg Oral BID   cholecalciferol 2,000 Units Oral Daily   docusate sodium 100 mg Oral BID   famotidine 20 mg Oral BID AC   gabapentin 100 mg Oral BID   magnesium hydroxide 10 mL Oral Daily   senna-docusate sodium 2 tablet Oral Nightly     Continuous Infusions:   PRN Meds:.•  acetaminophen  •  bisacodyl  •  cyclobenzaprine  •  fluticasone  •  HYDROcodone-acetaminophen  •  lidocaine  •  melatonin  •  ondansetron **OR** ondansetron  •  polyethylene glycol  •  senna-docusate sodium  •  sodium chloride      Assessment/Plan       Cervical myelopathy (CMS/HCC)      Assessment & Plan  Cervical myelopathy with upper extremity greater than  lower extremity and left side greater than right side involvement.     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  Spinal headache managed conservatively with improvement.    Dec 18 - she has had increase size of the  fluid collection at the anterior neck incision.  She is to go for placement of a lumbar drain on December 19 with admission to the acute care wing in the hospital at that time.  Dec 19 - lumbar drain today  Dec 26 -drain removed yesterday.Patient has some headache today.  She has not had any reaccumulation of fluid at the anterior neck incision.    Dec 27 - no recurrence of anterior cervical fluid collection. Return to rehab unit.      Postoperative cervical neuritis-steroids-Medrol Dosepak taper completed Dec 15.      GI prophylaxis-Pepcid     DVT prophylaxis-SCDs     Osteoporosis-h/o Prolia     Bladder-voiding on own with low PVR recorded     Bowel-constipation-on stool softeners.  Added scheduled suppository every other day after lunch to take advantage of gastrocolic reflex.  December 31- Moving her bowels better.  Using a suppository at home would be difficult with her weakness in the hands.  Discussed assessing her bowel pattern without the scheduled suppository.     Pain management-hydrocodone/Flexeril/gabapentin -Luis Alfredo report reviewed           Now admit to resume comprehensive acute inpatient rehabilitation  .  This would be an interdisciplinary program with physical therapy 1.5 hour,  occupational therapy 1.5 hour,  5 days a week.  Rehabilitation nursing for carryover, monitoring of neurologic   status, bowel and bladder, and skin  Ongoing physician follow-up.  Weekly team conferences.  Goals are to achieve a level of supervision with  mobility and self-care and improved strength and coordination.   Rehabilitation prognosis fair.  Medical prognosis deferred to neurosurgery.  Estimated length of stay is approximately 10-14  days, but is only an estimation.   The patient's functional status and clinical status is unchanged from preadmission assessment and the patient continues appropriate for acute inpatient rehabilitation.  Goal is for home with outpatient   therapies.  Barrier to discharge: Impaired  mobility/self-care- work on balance/gross and fine motor control/strengthening to overcome.      TEAM CONF - DEC 17 - INCREASED PAIN YESTERDAY WITH TRANSFERS . SWELLING AT NECK INCISION.   160 FEET CTG MIN- TRANSFERS CTG-MIN.    STRENGTH 1# RIGHT AND 0# LEFT.   UBD MIN. LBD MAX.  BATH MIN ASSIST. USING BUILT UP HAND ON UTENSILS.   [facility] - TWO WEEKS.     TEAM CONF - DEC 31- TRANSFERS CTG. 4 STAIRS CTG. GAIT 300 FEET CTG RW. TOILET TRANSFERS CTG. CONTINUED WEAK .  UBD MIN. LBD MOD ASSIST. GROOMING MIN ASSIST. BATH MIN ASSIST. SKIN INTACT. SOME SWELLING TO NECK INCISION. CONTINENT BOWEL AND BLADDER. PATIENT WOULD NOT BE ABLE TO DO SUPPOSITORY HERSELF FOR BOWEL PROGRAM  [facility] - FRIDAY ELIEL 10.     Shiva Abraham MD  01/02/20  1:14 PM    Time:

## 2020-01-02 NOTE — THERAPY TREATMENT NOTE
"Inpatient Rehabilitation - Physical Therapy Treatment Note  [facility]     Patient Name: Noemi Bartholomew  : 1960  MRN: [id number]    Today's Date: 2020                 Admit Date: 2019      Visit Dx:    No diagnosis found.    Patient Active Problem List   Diagnosis   • Carpal tunnel syndrome   • Hyperlipidemia   • Osteoporosis   • DDD (degenerative disc disease), lumbar   • Chronic left lumbar radiculopathy   • Congenital spondylolysis, lumbosacral region   • Neuropathic pain, leg, left   • Ossification of spinal ligament   • Spinal stenosis in cervical region   • Cervical spondylosis with myelopathy   • Cervical stenosis of spine   • Postoperative anemia due to acute blood loss   • Steroid-induced hyperglycemia   • Cord compression (CMS/HCC)   • Leukocytosis (due to steroids)   • Postoperative CSF leak   • Cervical myelopathy (CMS/HCC)   • CSF leak       Therapy Treatment    IRF Treatment Summary     Row Name 2046             Evaluation/Treatment Time and Intent    Subjective Information  no complaints  -LB      Existing Precautions/Restrictions  fall;lifting;spinal  -LB      Document Type  therapy note (daily note)  -LB      Mode of Treatment  physical therapy  -LB      Patient/Family Observations  Seated in w/c  -LB      Recorded by [LB] Audrey Valdivia PTA      Row Name 20 0846             Bed Mobility Assessment/Treatment    Comment (Bed Mobility)  NT  -LB      Recorded by [LB] Audrey Valdivia PTA      Row Name 20 0846             Sit-Stand Transfer    Sit-Stand Seffner (Transfers)  supervision  -LB      Assistive Device (Sit-Stand Transfers)  walker, front-wheeled  -LB      Recorded by [LB] Audrey Valdivia PTA      Row Name 2046             Stand-Sit Transfer    Stand-Sit Seffner (Transfers)  supervision  -LB      Assistive Device (Stand-Sit Transfers)  walker, front-wheeled practiced stepping up on stool to get into a 30\" bed  -LB      " "Recorded by [LB] Audrey Valdivia PTA      Row Name 01/02/20 0846             Car Transfer    Type (Car Transfer)  sit-stand;stand-sit into back seat  -LB      Jber Level (Car Transfer)  contact guard;verbal cues  -LB      Assistive Device (Car Transfer)  walker, front-wheeled  -LB      Recorded by [LB] Audrey Valdivia PTA      Row Name 01/02/20 0846             Gait/Stairs Assessment/Training    Jber Level (Gait)  stand by assist  -LB      Assistive Device (Gait)  walker, front-wheeled used 5\" nonswivel wheels, then used 3\" swivel wheels.   -LB      Distance in Feet (Gait)  350  -LB      Pattern (Gait)  step-through  -LB      Deviations/Abnormal Patterns (Gait)  ally decreased;stride length decreased  -LB      Bilateral Gait Deviations  forward flexed posture;heel strike decreased  -LB      Left Sided Gait Deviations  heel strike decreased  -LB      Right Sided Gait Deviations  heel strike decreased  -LB      Jber Level (Stairs)  contact guard  -LB      Handrail Location (Stairs)  left side (ascending) 7 steps w 1 rail, 6 steps w both hands on 1 rail  -LB      Number of Steps (Stairs)  13 (4\") steps.   -LB      Ascending Technique (Stairs)  step-to-step  -LB      Descending Technique (Stairs)  step-to-step  -LB      Negotiation (Ramp)  -- CGA w rwx  -LB      Comment (Gait/Stairs)  states that she feels much safer on the steps w both hands on the 1 rail. States that she feels safer w the 5\" non swivel wheels vs the 3\" swivel wheels on wx. Up  8 inch step, backward, to bed. CGA for up step and sitting/scooting back on bed. CGA down  8\" step forward. Uses rwx, though wx stays on the floor.   -LB      Recorded by [LB] Audrey Valdivia PTA      Row Name 01/02/20 0846             Pain Scale: Numbers Pre/Post-Treatment    Pain Scale: Numbers, Pretreatment  0/10 - no pain  -LB      Recorded by [LB] Audrey Valdivia PTA      Row Name 01/02/20 0846             Positioning and " Restraints    Post Treatment Position  wheelchair  -LB      In Wheelchair  sitting;exit alarm on;call light within reach;with OT  -LB      Recorded by [MEHNAZ] Audrey Valdivia PTA        User Key  (r) = Recorded By, (t) = Taken By, (c) = Cosigned By    Initials Name Effective Dates    Audrey Adams PTA 03/07/18 -         Wound 12/19/19 1912 posterior;medial lumbar spine Incision (Active)   Dressing Appearance dry;intact 1/2/2020  7:35 AM   Closure None 1/2/2020 10:31 AM   Drainage Amount none 1/2/2020 10:31 AM       Wound 12/19/19 1939 Right lateral throat Incision (Active)   Dressing Appearance dry;intact 1/2/2020  7:35 AM   Closure Approximated;Liquid skin adhesive;Adhesive closure strips 1/2/2020  7:35 AM   Base clean;dry 1/2/2020  7:35 AM   Periwound intact;dry 1/1/2020  8:30 PM   Periwound Temperature warm 1/1/2020  8:30 PM   Periwound Skin Turgor soft 1/1/2020  8:30 PM   Drainage Amount none 1/2/2020  7:35 AM   Care, Wound cleansed with;other (see comments) 1/2/2020  7:35 AM   Dressing Care, Wound open to air 1/1/2020  8:30 PM           PT Recommendation and Plan                        Time Calculation:     PT Charges     Row Name 01/02/20 1316 01/02/20 0845          Time Calculation    Start Time  1230  -LB  0830  -LB     Stop Time  1300  -LB  0930  -LB     Time Calculation (min)  30 min  -LB  60 min  -LB       User Key  (r) = Recorded By, (t) = Taken By, (c) = Cosigned By    Initials Name Provider Type    Audrey Adams PTA Physical Therapy Assistant          Therapy Charges for Today     Code Description Service Date Service Provider Modifiers Qty    23804124728 HC PT THER PROC EA 15 MIN 1/2/2020 Audrey Valdivia PTA GP 6            PT G-Codes  Outcome Measure Options: Tinetti  Tinetti Total Score: 22      Audrey Valdivia PTA  1/2/2020

## 2020-01-02 NOTE — THERAPY TREATMENT NOTE
Inpatient Rehabilitation - Occupational Therapy Treatment Note    [facility]     Patient Name: Noemi Bartholomew  : 1960  MRN: [id number]    Today's Date: 2020                 Admit Date: 2019      Visit Dx:  No diagnosis found.    Patient Active Problem List   Diagnosis   • Carpal tunnel syndrome   • Hyperlipidemia   • Osteoporosis   • DDD (degenerative disc disease), lumbar   • Chronic left lumbar radiculopathy   • Congenital spondylolysis, lumbosacral region   • Neuropathic pain, leg, left   • Ossification of spinal ligament   • Spinal stenosis in cervical region   • Cervical spondylosis with myelopathy   • Cervical stenosis of spine   • Postoperative anemia due to acute blood loss   • Steroid-induced hyperglycemia   • Cord compression (CMS/HCC)   • Leukocytosis (due to steroids)   • Postoperative CSF leak   • Cervical myelopathy (CMS/HCC)   • CSF leak         Therapy Treatment    IRF Treatment Summary     Row Name 20 1546 20 0846          Evaluation/Treatment Time and Intent    Subjective Information  no complaints  -DN  no complaints  -LB     Existing Precautions/Restrictions  fall;lifting;spinal  -DN  fall;lifting;spinal  -LB     Document Type  therapy note (daily note)  -DN  therapy note (daily note)  -LB     Mode of Treatment  occupational therapy  -DN  physical therapy  -LB     Patient/Family Observations  seated in w/c  -DN  Seated in w/c  -LB     Unable to Perform (Evaluation/Treatment)  -- pt declined adls this am states will shower tomorrow  -DN  --     Recorded by [DN] See [facility] OT [LB] Audrey Valdivia, EBENEZER     Row Name 20 1546             Cognition/Psychosocial- PT/OT    Affect/Mental Status (Cognitive)  WFL  -DN      Orientation Status (Cognition)  oriented x 4  -DN      Personal Safety Interventions  fall prevention program maintained;gait belt  -DN      Recorded by [DN] See [facility] OT      Row Name 20 0875             Bed Mobility  "Assessment/Treatment    Comment (Bed Mobility)  NT  -LB      Recorded by [LB] Audrey Valdivia, PTA      Row Name 01/02/20 0846             Sit-Stand Transfer    Sit-Stand [place] (Transfers)  supervision  -LB      Assistive Device (Sit-Stand Transfers)  walker, front-wheeled  -LB      Recorded by [LB] Audrey Valdivia, EBENEZER      Row Name 01/02/20 0846             Stand-Sit Transfer    Stand-Sit [place] (Transfers)  supervision  -LB      Assistive Device (Stand-Sit Transfers)  walker, front-wheeled practiced stepping up on stool to get into a 30\" bed  -LB      Recorded by [LB] Audrey Valdivia, PTA      Row Name 01/02/20 0846             Car Transfer    Type (Car Transfer)  sit-stand;stand-sit into back seat  -LB      [place] Level (Car Transfer)  contact guard;verbal cues  -LB      Assistive Device (Car Transfer)  walker, front-wheeled  -LB      Recorded by [LB] Audrey Valdivia, PTA      Row Name 01/02/20 0846             Gait/Stairs Assessment/Training    [place] Level (Gait)  stand by assist  -LB      Assistive Device (Gait)  walker, front-wheeled used 5\" nonswivel wheels, then used 3\" swivel wheels.   -LB      Distance in Feet (Gait)  350  -LB      Pattern (Gait)  step-through  -LB      Deviations/Abnormal Patterns (Gait)  ally decreased;stride length decreased  -LB      Bilateral Gait Deviations  forward flexed posture;heel strike decreased  -LB      Left Sided Gait Deviations  heel strike decreased  -LB      Right Sided Gait Deviations  heel strike decreased  -LB      [place] Level (Stairs)  contact guard  -LB      Handrail Location (Stairs)  left side (ascending) 7 steps w 1 rail, 6 steps w both hands on 1 rail  -LB      Number of Steps (Stairs)  13 (4\") steps.   -LB      Ascending Technique (Stairs)  step-to-step  -LB      Descending Technique (Stairs)  step-to-step  -LB      Negotiation (Ramp)  -- CGA w rwx  -LB      Comment (Gait/Stairs)  states that she feels much safer " "on the steps w both hands on the 1 rail. States that she feels safer w the 5\" non swivel wheels vs the 3\" swivel wheels on wx. Up  8 inch step, backward, to bed. CGA for up step and sitting/scooting back on bed. CGA down  8\" step forward. Uses rwx, though wx stays on the floor.   -LB      Recorded by [LB] Audrey Valdivia PTA      Row Name 01/02/20 1546             Fine Motor Testing & Training    Comment, Fine Motor Coordination  pt working on B hand lateral and tip to tip pinch with small colored pegs and designs from pattern, small washer, spacer and stem activty, tread and tamica beads and string, and yellow theraputty exercises  with BUEs  -DN      Recorded by [DN] See [facility] OT      Row Name 01/02/20 1546 01/02/20 0846          Pain Scale: Numbers Pre/Post-Treatment    Pain Scale: Numbers, Pretreatment  0/10 - no pain  -DN  0/10 - no pain  -LB     Pain Scale: Numbers, Post-Treatment  0/10 - no pain  -DN  --     Recorded by [DN] See [facility], OT [LB] Audrey Valdivia PTA     Row Name 01/02/20 1546             Upper Extremity Seated Therapeutic Exercise    Performed, Seated Upper Extremity (Therapeutic Exercise)  shoulder flexion/extension;shoulder abduction/adduction;shoulder external/internal rotation;shoulder horizontal abduction/adduction;scapular protraction/retraction;digit flexion/extension;wrist flexion/extension;forearm supination/pronation;elbow flexion/extension  -DN      Device, Seated Upper Extremity (Therapeutic Exercise)  free weights, cuff;free weights, barbell  -DN      Exercise Type, Seated Upper Extremity (Therapeutic Exercise)  AROM (active range of motion);resistive exercise  -DN      Expected Outcomes, Seated Upper Extremity (Therapeutic Exercise)  improve functional tolerance, self-care activity;improve performance, transfer skills  -DN      Restrictions, Seated Upper Extremity (Therapeutic Exercise)  no lift > 5#  -DN      Sets/Reps Detail, Seated Upper Extremity (Therapeutic " Exercise)  3/10 with 1# wrap weights on wrists, 1# dumbells in hands, and towel slides for shoulers at 1 set of 10- reps, also completed velcro broard exercises seated   -DN      Transfers Skills, Training to Functional Activity, Seated Upper Extremity (Therapeutic Exercise)  beginning to transfer skills to functional activity  -DN      Recorded by [GARRY] See [facility] OT      Row Name 01/02/20 1546 01/02/20 0846          Positioning and Restraints    Pre-Treatment Position  sitting in chair/recliner  -DN  --     Post Treatment Position  wheelchair  -DN  wheelchair  -LB     In Bed  sitting;call light within reach;encouraged to call for assist;exit alarm on  -DN  --     In Wheelchair  --  sitting;exit alarm on;call light within reach;with OT  -LB     Recorded by [GARRY] See [facility] OT [LB] Audrey Valdivia PTA       User Key  (r) = Recorded By, (t) = Taken By, (c) = Cosigned By    Initials Name Effective Dates    See Tan OT 06/08/18 -     LB Audrey Valdivia PTA 03/07/18 -           Wound 12/19/19 1912 posterior;medial lumbar spine Incision (Active)   Dressing Appearance dry;intact 1/2/2020  7:35 AM   Closure None 1/2/2020 10:31 AM   Drainage Amount none 1/2/2020 10:31 AM       Wound 12/19/19 1939 Right lateral throat Incision (Active)   Dressing Appearance dry;intact 1/2/2020  7:35 AM   Closure Approximated;Liquid skin adhesive;Adhesive closure strips 1/2/2020  7:35 AM   Base clean;dry 1/2/2020  7:35 AM   Periwound intact;dry 1/1/2020  8:30 PM   Periwound Temperature warm 1/1/2020  8:30 PM   Periwound Skin Turgor soft 1/1/2020  8:30 PM   Drainage Amount none 1/2/2020  7:35 AM   Care, Wound cleansed with;other (see comments) 1/2/2020  7:35 AM   Dressing Care, Wound open to air 1/1/2020  8:30 PM         OT Recommendation and Plan    Anticipated Equipment Needs At Discharge (OT Eval): commode, 3-in-1, tub bench, shower chair  Planned Therapy Interventions (OT Eval): activity tolerance training, BADL  retraining, neuromuscular control/coordination retraining, patient/caregiver education/training, strengthening exercise, transfer/mobility retraining            OT IRF GOALS     Row Name 12/28/19 1144             Transfer Goal 1 (OT-IRF)    Activity/Assistive Device (Transfer Goal 1, OT-IRF)  shower chair;commode, bedside without drop arms;walker, rolling  -DN      Dukes Level (Transfer Goal 1, OT-IRF)  supervision required  -DN      Time Frame (Transfer Goal 1, OT-IRF)  short term goal (STG)  -DN      Progress/Outcomes (Transfer Goal 1, OT-IRF)  continuing progress toward goal  -DN         Transfer Goal 2 (OT-IRF)    Activity/Assistive Device (Transfer Goal 2, OT-IRF)  toilet;walker, rolling;shower chair  -DN      Dukes Level (Transfer Goal 2, OT-IRF)  conditional independence  -DN      Time Frame (Transfer Goal 2, OT-IRF)  long term goal (LTG)  -DN      Progress/Outcomes (Transfer Goal 2, OT-IRF)  continuing progress toward goal  -DN         Bathing FIM Goal (OT-IRF)    Progress/Outcomes (Bathing FIM Goal, OT-IRF)  goal no longer appropriate  -DN         Bathing Goal 1 (OT-IRF)    Activity/Device (Bathing Goal 1, OT-IRF)  bathing skills, all  -DN      Dukes Level (Bathing Goal 1, OT-IRF)  supervision required  -DN      Time Frame (Bathing Goal 1, OT-IRF)  short term goal (STG)  -DN      Progress/Outcomes (Bathing Goal 1, OT-IRF)  continuing progress toward goal  -DN         Bathing Goal 2 (OT-IRF)    Activity/Device (Bathing Goal 2, OT-IRF)  bathing skills, all  -DN      Dukes Level (Bathing Goal 2, OT-IRF)  conditional independence  -DN      Time Frame (Bathing Goal 2, OT-IRF)  long term goal (LTG)  -DN      Progress/Outcomes (Bathing Goal 2, OT-IRF)  continuing progress toward goal  -DN         UB Dressing FIM Goal (OT-IRF)    Progress/Outcomes (UB Dressing FIM Goal, OT-IRF)  goal no longer appropriate  -DN         UB Dressing Goal 1 (OT-IRF)    Activity/Device (UB Dressing Goal 1,  OT-IRF)  upper body dressing  -DN      Bryan (UB Dress Goal 1, OT-IRF)  supervision required  -DN      Time Frame (UB Dressing Goal 1, OT-IRF)  short term goal (STG)  -DN      Progress/Outcomes (UB Dressing Goal 1, OT-IRF)  continuing progress toward goal  -DN         UB Dressing Goal 2 (OT-IRF)    Activity/Device (UB Dressing Goal 2, OT-IRF)  upper body dressing  -DN      Bryan (UB Dress Goal 2, OT-IRF)  conditional independence  -DN      Time Frame (UB Dressing Goal 2, OT-IRF)  long term goal (LTG)  -DN      Progress/Outcomes (UB Dressing Goal 2, OT-IRF)  continuing progress toward goal  -DN         LB Dressing FIM Goal (OT-IRF)    Progress/Outcomes (LB Dressing FIM Goal, OT-IRF)  goal no longer appropriate  -DN         LB Dressing Goal 1 (OT-IRF)    Activity/Device (LB Dressing Goal 1, OT-IRF)  lower body dressing  -DN      Bryan (LB Dressing Goal 1, OT-IRF)  moderate assist (50-74% patient effort)  -DN      Time Frame (LB Dressing Goal 1, OT-IRF)  short term goal (STG)  -DN      Progress/Outcomes (LB Dressing Goal 1, OT-IRF)  continuing progress toward goal  -DN         LB Dressing Goal 2 (OT-IRF)    Activity/Device (LB Dressing Goal 2, OT-IRF)  lower body dressing  -DN      Bryan (LB Dressing Goal 2, OT-IRF)  supervision required  -DN      Time Frame (LB Dressing Goal 2, OT-IRF)  long term goal (LTG)  -DN      Progress/Outcomes (LB Dressing Goal 2, OT-IRF)  continuing progress toward goal  -DN        User Key  (r) = Recorded By, (t) = Taken By, (c) = Cosigned By    Initials Name Provider Type    See Tan, OT Occupational Therapist                     Time Calculation:     Time Calculation- OT     Row Name 01/02/20 1556 01/02/20 0930          Time Calculation- OT    OT Start Time  1400  -DN  0930  -DN     OT Stop Time  1445  -DN  1030  -DN     OT Time Calculation (min)  45 min  -DN  60 min  -DN     OT Received On  01/02/20  -DN  01/02/20  -DN       User Key  (r) = Recorded  By, (t) = Taken By, (c) = Cosigned By    Initials Name Provider Type    See Tan OT Occupational Therapist          Therapy Charges for Today     Code Description Service Date Service Provider Modifiers Qty    71393074344 HC OT SELF CARE/MGMT/TRAIN EA 15 MIN 1/1/2020 See [facility] OT GO 1    13638703135 HC OT THER PROC EA 15 MIN 1/1/2020 See [facility] OT GO 1    44931434731 HC OT THER PROC EA 15 MIN 1/2/2020 See [facility] OT GO 7                   See [facility] OT  1/2/2020

## 2020-01-02 NOTE — PROGRESS NOTES
Occupational Therapy: Branch    Physical Therapy: Individual: 90 minutes.    Speech Language Pathology:  Branch    Signed by: Audrey Valdivia PTA

## 2020-01-02 NOTE — PLAN OF CARE
Problem: Patient Care Overview  Goal: Plan of Care Review  Outcome: Ongoing (interventions implemented as appropriate)  Flowsheets (Taken 1/2/2020 1246)  Outcome Summary: Removed dressing to back incision and left open to air. No pain med needed. MOM given for bowels  Progress, Functional Goals: demonstrating adequate progress

## 2020-01-03 PROCEDURE — 97110 THERAPEUTIC EXERCISES: CPT

## 2020-01-03 PROCEDURE — 97535 SELF CARE MNGMENT TRAINING: CPT

## 2020-01-03 PROCEDURE — 97530 THERAPEUTIC ACTIVITIES: CPT

## 2020-01-03 RX ADMIN — ATORVASTATIN CALCIUM 20 MG: 20 TABLET, FILM COATED ORAL at 22:23

## 2020-01-03 RX ADMIN — GABAPENTIN 100 MG: 100 CAPSULE ORAL at 08:07

## 2020-01-03 RX ADMIN — MAGNESIUM HYDROXIDE 10 ML: 2400 SUSPENSION ORAL at 08:06

## 2020-01-03 RX ADMIN — VITAMIN D, TAB 1000IU (100/BT) 2000 UNITS: 25 TAB at 08:07

## 2020-01-03 RX ADMIN — DOCUSATE SODIUM 100 MG: 100 CAPSULE, LIQUID FILLED ORAL at 08:07

## 2020-01-03 RX ADMIN — ACETAMINOPHEN 1000 MG: 500 TABLET, FILM COATED ORAL at 07:24

## 2020-01-03 RX ADMIN — GABAPENTIN 100 MG: 100 CAPSULE ORAL at 22:24

## 2020-01-03 RX ADMIN — CALCIUM 500 MG: 500 TABLET ORAL at 08:07

## 2020-01-03 RX ADMIN — ACETAMINOPHEN 1000 MG: 500 TABLET, FILM COATED ORAL at 22:23

## 2020-01-03 RX ADMIN — DOCUSATE SODIUM 100 MG: 100 CAPSULE, LIQUID FILLED ORAL at 22:24

## 2020-01-03 RX ADMIN — FAMOTIDINE 20 MG: 20 TABLET, FILM COATED ORAL at 16:32

## 2020-01-03 RX ADMIN — FAMOTIDINE 20 MG: 20 TABLET, FILM COATED ORAL at 05:30

## 2020-01-03 RX ADMIN — CALCIUM 500 MG: 500 TABLET ORAL at 22:23

## 2020-01-03 RX ADMIN — ACETAMINOPHEN 1000 MG: 500 TABLET, FILM COATED ORAL at 00:37

## 2020-01-03 NOTE — PROGRESS NOTES
Inpatient Rehabilitation Plan of Care Note    Plan of Care  Care Plan Reviewed - No updates at this time.    Body Systems    [RN] Integumentary(Active)  Current Status(01/01/2020): Anterior neck cervical incision KITTY with  steristrips.  Weekly Goal(01/10/2020): No further skin breakdown  Discharge Goal: No further skin breakdown; incision  healed    Performed Intervention(s)  Wound Care as ordered. Skin assessment q shift and PRN.      Safety    Performed Intervention(s)  safety rounds/call light within easy reach  bed alarm/chair alarm      Pain    Performed Intervention(s)  Pain assessment q shift and PRN; Offer pain medication PRN      Psychosocial    Performed Intervention(s)  Offer support  calm enviroment  allow time to verbalize      Sphincter Control    Performed Intervention(s)  Monitor I&O    Signed by: Luciana Doyle RN

## 2020-01-03 NOTE — THERAPY TREATMENT NOTE
Inpatient Rehabilitation - Occupational Therapy Treatment Note    [facility]     Patient Name: Noemi Bartholomew  : 1960  MRN: [id number]    Today's Date: 1/3/2020                 Admit Date: 2019      Visit Dx:  No diagnosis found.    Patient Active Problem List   Diagnosis   • Carpal tunnel syndrome   • Hyperlipidemia   • Osteoporosis   • DDD (degenerative disc disease), lumbar   • Chronic left lumbar radiculopathy   • Congenital spondylolysis, lumbosacral region   • Neuropathic pain, leg, left   • Ossification of spinal ligament   • Spinal stenosis in cervical region   • Cervical spondylosis with myelopathy   • Cervical stenosis of spine   • Postoperative anemia due to acute blood loss   • Steroid-induced hyperglycemia   • Cord compression (CMS/HCC)   • Leukocytosis (due to steroids)   • Postoperative CSF leak   • Cervical myelopathy (CMS/HCC)   • CSF leak         Therapy Treatment    IRF Treatment Summary     Row Name 20 1206 20 0840          Evaluation/Treatment Time and Intent    Subjective Information  no complaints  -DN  no complaints  -     Existing Precautions/Restrictions  fall  -DN  fall;spinal;lifting  -     Document Type  therapy note (daily note)  -DN  therapy note (daily note)  -     Mode of Treatment  occupational therapy  -DN  individual therapy;physical therapy  -     Patient/Family Observations  seated in w/c  -DN  pt seated in WC no acute distress  -LH     Recorded by [DN] See Cuadra, OT [] Audrey Villalobos, PT     Row Name 20 1206 20 0840          Cognition/Psychosocial- PT/OT    Affect/Mental Status (Cognitive)  WFL  -DN  WFL  -     Orientation Status (Cognition)  oriented x 4  -DN  oriented x 4  -LH     Follows Commands (Cognition)  --  L  -     Personal Safety Interventions  --  fall prevention program maintained;gait belt;supervised activity  -     Recorded by [DN] See Cuadra, OT [] Audrey Villalobos, PT     Row Name 20 0834     "         Mobility    Advanced Gait Activity  rough/uneven surfaces  -      Additional Documentation  Advanced Gait Activity (Row)  -      Recorded by [] Audrey Villalobos, PT      Row Name 01/03/20 0840             Bed Mobility Assessment/Treatment    Comment (Bed Mobility)  practiced stepping up into/out of a 30\" bed height using 7\" step stool w rwx, pt displays safe mechanics - backing up into bed and stepping fwd out of bed  -      Recorded by [] Audrey Villalobos, PT      Row Name 01/03/20 0840             Functional Mobility    Functional Mobility- Ind. Level  standby assist  -      Functional Mobility- Device  other (see comments) no AD  -      Functional Mobility-Distance (Feet)  240  -      Functional Mobility- Comment  trialed no AD gait training w good tolerance and safe ambulation- SBA, 1 minor LOB, self corrected CGA  -      Recorded by [] Audrey Villalobos, PT      Row Name 01/03/20 0840             Sit-Stand Transfer    Sit-Stand [place] (Transfers)  supervision  -      Assistive Device (Sit-Stand Transfers)  walker, front-wheeled  -      Recorded by [] Audrey Villalobos, PT      Row Name 01/03/20 0840             Stand-Sit Transfer    Stand-Sit [place] (Transfers)  supervision  -      Assistive Device (Stand-Sit Transfers)  walker, front-wheeled  -      Recorded by [] Audrey Villalobos, PT      Row Name 01/03/20 1206             Toilet Transfer    Type (Toilet Transfer)  stand pivot/stand step  -DN      [place] Level (Toilet Transfer)  supervision  -DN      Assistive Device (Toilet Transfer)  walker, front-wheeled  -DN      Recorded by [DN] See Cuadra, OT      Row Name 01/03/20 1206             Shower Transfer    Type (Shower Transfer)  stand pivot/stand step  -DN      [place] Level (Shower Transfer)  supervision  -DN      Assistive Device (Shower Transfer)  walker, front-wheeled  -DN      Recorded by [DN] See Cuadra, OT      Row Name 01/03/20 0840             " "Car Transfer    Type (Car Transfer)  sit-stand;stand-sit  -      [place] Level (Car Transfer)  stand by assist  -      Assistive Device (Car Transfer)  -- back seat  -LH      Recorded by [] Audrey Villalobos, PT      Row Name 01/03/20 0840             Gait/Stairs Assessment/Training    [place] Level (Gait)  supervision  -      Assistive Device (Gait)  walker, front-wheeled  -      Distance in Feet (Gait)  260x2  -      Pattern (Gait)  step-through  -      Bilateral Gait Deviations  forward flexed posture;heel strike decreased  -      Handrail Location (Stairs)  left side (ascending)  -      Number of Steps (Stairs)  6 4\" steps  -      Ascending Technique (Stairs)  step-to-step  -      Descending Technique (Stairs)  step-to-step  -      Recorded by [] Audrey Villalobos, PT      Row Name 01/03/20 0840             Rough/Uneven Surface Gait Skills (Mobility)    [place], Gait on Rough/Uneven Surface (Mobility)  standby assist  -      Comment, Gait Rough/Uneven Surface (Mobility)  over carpet no AD  -LH      Recorded by [] Audrey Villalobos, PT      Row Name 01/03/20 1206             Bathing Assessment/Treatment    Bathing [place] Level  bathing skills;minimum assist (75% patient effort);verbal cues  -DN      Assistive Device (Bathing)  grab bar/tub rail  -DN      Bathing Position  supported sitting  -DN      Bathing Setup Assistance  obtain supplies  -DN      Comment (Bathing)  assist with rear perineal area  -DN      Recorded by [DN] See Cuadra, OT      Row Name 01/03/20 1206             Upper Body Dressing Assessment/Treatment    Upper Body Dressing Task  upper body dressing skills;supervision  -DN      Upper Body Dressing Position  supported sitting  -DN      Set-up Assistance (Upper Body Dressing)  obtain clothing  -DN      Recorded by [DN] See Cuadra, OT      Row Name 01/03/20 1206             Lower Body Dressing Assessment/Treatment    Lower Body Dressing [place] " Level  doff;don;pants/bottoms;shoes/slippers;socks;underwear;minimum assist (75% patient effort);shoelaces  -DN      Lower Body Dressing Position  supported sitting  -DN      Comment (Lower Body Dressing)  assist with shoe tying, does not want elastic shoe laces at this time  -DN      Recorded by [DN] See Cuadra, OT      Row Name 01/03/20 1206             Grooming Assessment/Treatment    Grooming [place] Level  grooming skills;hair care, combing/brushing;oral care regimen;wash face, hands;supervision  -DN      Grooming Position  supported sitting  -DN      Grooming Setup Assistance  obtain supplies  -DN      Recorded by [DN] See Cuadra, OT      Row Name 01/03/20 1206             Toileting Assessment/Treatment    Toileting [place] Level  toileting skills;adjust/manage clothing;perform perineal hygiene;minimum assist (75% patient effort);verbal cues;nonverbal cues (demo/gesture)  -DN      Assistive Device Use (Toileting)  grab bar/safety frame  -DN      Toileting Position  supported sitting  -DN      Comment (Toileting)  no longer needs RW for steadying during pants pull up  -DN      Recorded by [DN] See Cuadra, OT      Row Name 01/03/20 1206             Self-Feeding Assessment/Treatment    [place] Level (Self-Feeding)  conditional independence  -DN      Comment (Self-Feeding)  pt now able to open containers, straw, crackers with BUEs  -DN      Recorded by [DN] See Cuadra, OT      Row Name 01/03/20 1206             Fine Motor Testing & Training    Comment, Fine Motor Coordination  theraputty exercises (yellow), coins from plam to fingers with Mod assist, small ball rotation in B hands for finger movements, started prehandwriting exercises with pencil and paper  -DN      Recorded by [DN] See Cuadra, OT      Row Name 01/03/20 1206 01/03/20 0840          Pain Scale: Numbers Pre/Post-Treatment    Pain Scale: Numbers, Pretreatment  0/10 - no pain  -DN  0/10 - no pain  -LH     Pain Scale:  Numbers, Post-Treatment  0/10 - no pain  -DN  0/10 - no pain  -LH     Recorded by [DN] See Cuadra OT [LH] Audrey Villalobos, PT     Row Name 01/03/20 0840             Balance    Balance  dynamic balance activity;standing balance activity  -LH      Recorded by [LH] Audrey Villalobos, PT      Row Name 01/03/20 0840             Standing Balance Activity    Activities Performed (Standing, Balance Training)  standing ball toss balloon taps  -LH      Support Needed for Balance (Standing, Balance Training)  SBA;CGA;balances without upper extremity support  -LH      Comment (Standing, Balance Training)  practiced retrieving items off floor SBA rwx- picking up 4 rings from ground and placing into bag at front of walker  -LH      Recorded by [LH] Audrey Villalobos, PT      Row Name 01/03/20 0840             Dynamic Balance Activity    Therapeutic Training Performed (Dynamic Balance)  side stepping  -LH      Support Needed for Balance (Dynamic Balance Training)  CGA  -LH      Comment (Dynamic Balance Training)  hemibars  -LH      Recorded by [LH] Audrey Villalobos, PT      Row Name 01/03/20 1206             Upper Extremity Seated Therapeutic Exercise    Performed, Seated Upper Extremity (Therapeutic Exercise)  shoulder flexion/extension;shoulder abduction/adduction;shoulder external/internal rotation;shoulder horizontal abduction/adduction;scapular protraction/retraction;elbow flexion/extension;forearm supination/pronation;wrist flexion/extension;digit flexion/extension  -DN      Device, Seated Upper Extremity (Therapeutic Exercise)  free weights, cuff  -DN      Exercise Type, Seated Upper Extremity (Therapeutic Exercise)  AROM (active range of motion);AAROM (active assistive range of motion)  -DN      Expected Outcomes, Seated Upper Extremity (Therapeutic Exercise)  improve functional tolerance, self-care activity;improve performance, transfer skills;improve performance, fine motor coordination skills;improve performance, gross motor  coordination skills  -DN      Restrictions, Seated Upper Extremity (Therapeutic Exercise)  no lift > 5#, no overhead movements  -DN      Sets/Reps Detail, Seated Upper Extremity (Therapeutic Exercise)  2 sets of 10 with shoulder exercises within precaution range, 2/15 with towel exerciises on incline table standing  -DN      Transfers Skills, Training to Functional Activity, Seated Upper Extremity (Therapeutic Exercise)  beginning to transfer skills to functional activity  -DN      Comment, Seated Upper Extremity (Therapeutic Exercise)  AAROM L shoulder flexion due to weakness  -DN      Recorded by [DN] See Cuadra, OT      Row Name 01/03/20 0840             Lower Extremity Standing Therapeutic Exercise    Performed, Standing Lower Extremity (Therapeutic Exercise)  mini-squats;heel/toe raises;hip flexion/extension;hip abduction/adduction MIP  -      Exercise Type, Standing Lower Extremity (Therapeutic Exercise)  AROM (active range of motion)  -      Sets/Reps Detail, Standing Lower Extremity (Therapeutic Exercise)  1/10  -LH      Recorded by [] Audrey Villalobos, PT      Row Name 01/03/20 1206 01/03/20 0840          Positioning and Restraints    Pre-Treatment Position  sitting in chair/recliner  -DN  sitting in chair/recliner  -LH     Post Treatment Position  wheelchair  -DN  wheelchair  -LH     In Bed  sitting;call light within reach;encouraged to call for assist;exit alarm on  -DN  --     In Wheelchair  --  sitting;call light within reach;encouraged to call for assist;exit alarm on  -LH     Recorded by [DN] See Cuadra, OT [LH] Audrey Villalobos, PT     Row Name 01/03/20 0700             Weekly Summary of Progress (PT)    Weekly Outcome Summary: Physical Therapy  1 out of 5 LTGs met, 4 out of 5 ongoing, anticipated DC date 1/10/20. will cont to progress pt independent w functional mobility for reintergration to home and communiity.   -LH      Recorded by [] Audrey Villalobos, PT        User Key  (r) = Recorded  By, (t) = Taken By, (c) = Cosigned By    Initials Name Effective Dates    DN See Cuadra, OT 06/08/18 -     LH Audrey Villalobos, PT 04/03/18 -           Wound 12/19/19 1939 Right lateral throat Incision (Active)   Dressing Appearance open to air 1/3/2020  8:00 AM   Closure Approximated;Adhesive closure strips 1/3/2020  8:00 AM   Base clean;dry 1/3/2020  8:00 AM   Periwound intact;dry 1/3/2020  8:00 AM   Periwound Temperature warm 1/3/2020  8:00 AM   [place]iwound Skin Turgor soft 1/2/2020  9:15 PM   Drainage Amount none 1/2/2020  9:15 PM         OT Recommendation and Plan    Anticipated Equipment Needs At Discharge (OT Eval): commode, 3-in-1, tub bench, shower chair  Planned Therapy Interventions (OT Eval): activity tolerance training, BADL retraining, neuromuscular control/coordination retraining, patient/caregiver education/training, strengthening exercise, transfer/mobility retraining            OT IRF GOALS     Row Name 01/03/20 1200 12/28/19 1144          Transfer Goal 1 (OT-IRF)    Activity/Assistive Device (Transfer Goal 1, OT-IRF)  shower chair;commode, bedside without drop arms;walker, rolling  -DN  shower chair;commode, bedside without drop arms;walker, rolling  -DN     [place] Level (Transfer Goal 1, OT-IRF)  conditional independence  -DN  supervision required  -DN     Time Frame (Transfer Goal 1, OT-IRF)  short term goal (STG)  -DN  short term goal (STG)  -DN     Progress/Outcomes (Transfer Goal 1, OT-IRF)  goal met;goal revised this date  -DN  continuing progress toward goal  -DN        Transfer Goal 2 (OT-IRF)    Activity/Assistive Device (Transfer Goal 2, OT-IRF)  toilet;shower chair  -DN  toilet;walker, rolling;shower chair  -DN     [place] Level (Transfer Goal 2, OT-IRF)  conditional independence  -DN  conditional independence  -DN     Time Frame (Transfer Goal 2, OT-IRF)  long term goal (LTG)  -DN  long term goal (LTG)  -DN     Progress/Outcomes (Transfer Goal 2, OT-IRF)  goal ongoing  -DN   continuing progress toward goal  -DN        Bathing FIM Goal (OT-IRF)    Progress/Outcomes (Bathing FIM Goal, OT-IRF)  --  goal no longer appropriate  -DN        Bathing Goal 1 (OT-IRF)    Activity/Device (Bathing Goal 1, OT-IRF)  bathing skills, all  -DN  bathing skills, all  -DN     [place] Level (Bathing Goal 1, OT-IRF)  supervision required  -DN  supervision required  -DN     Time Frame (Bathing Goal 1, OT-IRF)  short term goal (STG)  -DN  short term goal (STG)  -DN     Progress/Outcomes (Bathing Goal 1, OT-IRF)  goal not met;goal ongoing  -DN  continuing progress toward goal  -DN        Bathing Goal 2 (OT-IRF)    Activity/Device (Bathing Goal 2, OT-IRF)  bathing skills, all  -DN  bathing skills, all  -DN     [place] Level (Bathing Goal 2, OT-IRF)  conditional independence  -DN  conditional independence  -DN     Time Frame (Bathing Goal 2, OT-IRF)  long term goal (LTG)  -DN  long term goal (LTG)  -DN     Progress/Outcomes (Bathing Goal 2, OT-IRF)  goal ongoing  -DN  continuing progress toward goal  -DN        UB Dressing FIM Goal (OT-IRF)    Progress/Outcomes (UB Dressing FIM Goal, OT-IRF)  --  goal no longer appropriate  -DN        UB Dressing Goal 1 (OT-IRF)    Activity/Device (UB Dressing Goal 1, OT-IRF)  upper body dressing  -DN  upper body dressing  -DN     [place] (UB Dress Goal 1, OT-IRF)  conditional independence  -DN  supervision required  -DN     Time Frame (UB Dressing Goal 1, OT-IRF)  short term goal (STG)  -DN  short term goal (STG)  -DN     Progress/Outcomes (UB Dressing Goal 1, OT-IRF)  goal met;goal revised this date  -DN  continuing progress toward goal  -DN        UB Dressing Goal 2 (OT-IRF)    Activity/Device (UB Dressing Goal 2, OT-IRF)  upper body dressing  -DN  upper body dressing  -DN     [place] (UB Dress Goal 2, OT-IRF)  conditional independence  -DN  conditional independence  -DN     Time Frame (UB Dressing Goal 2, OT-IRF)  long term goal (LTG)  -DN  long term goal  (LTG)  -DN     Progress/Outcomes (UB Dressing Goal 2, OT-IRF)  continuing progress toward goal  -DN  continuing progress toward goal  -DN        LB Dressing FIM Goal (OT-IRF)    Progress/Outcomes (LB Dressing FIM Goal, OT-IRF)  --  goal no longer appropriate  -DN        LB Dressing Goal 1 (OT-IRF)    Activity/Device (LB Dressing Goal 1, OT-IRF)  lower body dressing  -DN  lower body dressing  -DN     [place] (LB Dressing Goal 1, OT-IRF)  supervision required  -DN  moderate assist (50-74% patient effort)  -DN     Time Frame (LB Dressing Goal 1, OT-IRF)  short term goal (STG)  -DN  short term goal (STG)  -DN     Progress/Outcomes (LB Dressing Goal 1, OT-IRF)  goal met;goal revised this date  -DN  continuing progress toward goal  -DN        LB Dressing Goal 2 (OT-IRF)    Activity/Device (LB Dressing Goal 2, OT-IRF)  lower body dressing  -DN  lower body dressing  -DN     [place] (LB Dressing Goal 2, OT-IRF)  conditional independence  -DN  supervision required  -DN     Time Frame (LB Dressing Goal 2, OT-IRF)  long term goal (LTG)  -DN  long term goal (LTG)  -DN     Progress/Outcomes (LB Dressing Goal 2, OT-IRF)  goal revised this date;goal ongoing  -DN  continuing progress toward goal  -DN       User Key  (r) = Recorded By, (t) = Taken By, (c) = Cosigned By    Initials Name Provider Type    See Tan OT Occupational Therapist                     Time Calculation:     Time Calculation- OT     Row Name 01/03/20 1231             Time Calculation- OT    OT Start Time  0930  -DN      OT Stop Time  1130  -DN      OT Time Calculation (min)  120 min  -DN      OT Received On  01/03/20  -DN        User Key  (r) = Recorded By, (t) = Taken By, (c) = Cosigned By    Initials Name Provider Type    See Tan OT Occupational Therapist          Therapy Charges for Today     Code Description Service Date Service Provider Modifiers Qty    10155910209  OT THER PROC EA 15 MIN 1/2/2020 See Cuadra OT GO 7     79800002330 HC OT SELF CARE/MGMT/TRAIN EA 15 MIN 1/3/2020 See Cuadra OT GO 4    85613836399 HC OT THER PROC EA 15 MIN 1/3/2020 See Cuadra OT GO 4                   See Cuadra OT  1/3/2020

## 2020-01-03 NOTE — PROGRESS NOTES
Occupational Therapy: Individual: 120 minutes.    Physical Therapy: Branch    Speech Language Pathology:  Branch    Signed by: BREONNA Flower/ALEXA

## 2020-01-03 NOTE — PROGRESS NOTES
Occupational Therapy: Branch    Physical Therapy: Individual: 90 minutes.    Speech Language Pathology:  Branch    Signed by: Audrey Villalobos PT

## 2020-01-03 NOTE — PLAN OF CARE
Problem: Patient Care Overview  Goal: Plan of Care Review  Outcome: Ongoing (interventions implemented as appropriate)  Flowsheets (Taken 1/3/2020 0426)  Outcome Summary: Patient pleasant and cooperative. C/O headache overnight and tylenol given PRN. Ambulatory with RW.  Progress, Functional Goals: demonstrating adequate progress  Plan of Care Reviewed With: patient  IRF Plan of Care Review: progress ongoing, continue     Problem: Fall Risk (Adult)  Goal: Absence of Fall  Description  Patient will demonstrate the desired outcomes by discharge/transition of care.  Outcome: Ongoing (interventions implemented as appropriate)  Flowsheets (Taken 1/3/2020 0426)  Absence of Fall: making progress toward outcome     Problem: Skin Injury Risk (Adult)  Goal: Skin Health and Integrity  Description  Patient will demonstrate the desired outcomes by discharge/transition of care.  Outcome: Ongoing (interventions implemented as appropriate)  Flowsheets (Taken 1/3/2020 0426)  Skin Health and Integrity: making progress toward outcome

## 2020-01-03 NOTE — THERAPY TREATMENT NOTE
Inpatient Rehabilitation - Physical Therapy Treatment Note  [facility]     Patient Name: Noemi Bartholomew  : 1960  MRN: [id number]    Today's Date: 1/3/2020                 Admit Date: 2019      Visit Dx:    No diagnosis found.    Patient Active Problem List   Diagnosis   • Carpal tunnel syndrome   • Hyperlipidemia   • Osteoporosis   • DDD (degenerative disc disease), lumbar   • Chronic left lumbar radiculopathy   • Congenital spondylolysis, lumbosacral region   • Neuropathic pain, leg, left   • Ossification of spinal ligament   • Spinal stenosis in cervical region   • Cervical spondylosis with myelopathy   • Cervical stenosis of spine   • Postoperative anemia due to acute blood loss   • Steroid-induced hyperglycemia   • Cord compression (CMS/HCC)   • Leukocytosis (due to steroids)   • Postoperative CSF leak   • Cervical myelopathy (CMS/HCC)   • CSF leak       Therapy Treatment    IRF Treatment Summary     Row Name 20 1206 20 0840          Evaluation/Treatment Time and Intent    Subjective Information  no complaints  -DN  no complaints  -     Existing Precautions/Restrictions  fall  -DN  fall;spinal;lifting  -     Document Type  therapy note (daily note)  -DN  therapy note (daily note)  -     Mode of Treatment  occupational therapy  -DN  individual therapy;physical therapy  -     Patient/Family Observations  seated in w/c  -DN  pt seated in WC no acute distress  -LH     Recorded by [DN] See Cuadra, OT [] Audrey Villalobos, PT     Row Name 20 1206 20 0840          Cognition/Psychosocial- PT/OT    Affect/Mental Status (Cognitive)  WFL  -DN  WFL  -     Orientation Status (Cognition)  oriented x 4  -DN  oriented x 4  -LH     Follows Commands (Cognition)  --  L  -     Personal Safety Interventions  --  fall prevention program maintained;gait belt;supervised activity  -     Recorded by [DN] See Cuadra, OT [] Audrey Villalobos, PT     Row Name 20 0860           "   Mobility    Advanced Gait Activity  rough/uneven surfaces;step over obstacle  -      Additional Documentation  Advanced Gait Activity (Row)  -      Recorded by [] Audrey Villalobos, PT      Row Name 01/03/20 0840             Bed Mobility Assessment/Treatment    Comment (Bed Mobility)  practiced stepping up into/out of a 30\" bed height using 7\" step stool w rwx, pt displays safe mechanics - backing up into bed and stepping fwd out of bed  -      Recorded by [] Audrey Villalobos, PT      Row Name 01/03/20 0840             Functional Mobility    Functional Mobility- Ind. Level  standby assist  -      Functional Mobility- Device  other (see comments) no AD  -      Functional Mobility-Distance (Feet)  240  -      Functional Mobility- Comment  trialed no AD gait training w good tolerance and safe ambulation- SBA, 1 minor LOB, self corrected CGA  -      Recorded by [] Audrey Villalobos, PT      Row Name 01/03/20 0840             Sit-Stand Transfer    Sit-Stand [place] (Transfers)  supervision  -      Assistive Device (Sit-Stand Transfers)  walker, front-wheeled  -      Recorded by [] Audrey Villalobos, PT      Row Name 01/03/20 0840             Stand-Sit Transfer    Stand-Sit [place] (Transfers)  supervision  -      Assistive Device (Stand-Sit Transfers)  walker, front-wheeled  -      Recorded by [] Audrey Villalobos, PT      Row Name 01/03/20 1206             Toilet Transfer    Type (Toilet Transfer)  stand pivot/stand step  -DN      [place] Level (Toilet Transfer)  supervision  -DN      Assistive Device (Toilet Transfer)  walker, front-wheeled  -DN      Recorded by [DN] See Cuadra, OT      Row Name 01/03/20 1206             Shower Transfer    Type (Shower Transfer)  stand pivot/stand step  -DN      [place] Level (Shower Transfer)  supervision  -DN      Assistive Device (Shower Transfer)  walker, front-wheeled  -DN      Recorded by [GARRY] See Cuadra, OT      Row Name 01/03/20 0840 " "            Car Transfer    Type (Car Transfer)  sit-stand;stand-sit  -      [place] Level (Car Transfer)  stand by assist  -      Assistive Device (Car Transfer)  -- back seat  -      Recorded by [] Audrey Villalobos, PT      Row Name 01/03/20 0840             Gait/Stairs Assessment/Training    [place] Level (Gait)  supervision  -      Assistive Device (Gait)  walker, front-wheeled  -      Distance in Feet (Gait)  260x2  -      Pattern (Gait)  step-through  -      Bilateral Gait Deviations  forward flexed posture;heel strike decreased  -      Handrail Location (Stairs)  left side (ascending)  -      Number of Steps (Stairs)  6 4\" steps  -      Ascending Technique (Stairs)  step-to-step  -      Descending Technique (Stairs)  step-to-step  -      Recorded by [] Audrey Villalobos, PT      Row Name 01/03/20 0840             Rough/Uneven Surface Gait Skills (Mobility)    [place], Gait on Rough/Uneven Surface (Mobility)  standby assist  -      Comment, Gait Rough/Uneven Surface (Mobility)  over carpet no AD  -      Recorded by [] Audrey Villalobos, PT      Row Name 01/03/20 0840             Step Over Obstacle (Mobility)    [place], Stepping Over Obstacles (Mobility)  contact guard  -      Comment, Stepping Over Obstacles (Mobility)  foam balance beam and foam halves, // bars   -      Recorded by [] Audrey Villalobos, PT      Row Name 01/03/20 1206             Bathing Assessment/Treatment    Bathing [place] Level  bathing skills;minimum assist (75% patient effort);verbal cues  -DN      Assistive Device (Bathing)  grab bar/tub rail  -DN      Bathing Position  supported sitting  -DN      Bathing Setup Assistance  obtain supplies  -DN      Comment (Bathing)  assist with rear perineal area  -DN      Recorded by [DN] See Cuadra OT      Row Name 01/03/20 1206             Upper Body Dressing Assessment/Treatment    Upper Body Dressing Task  upper body dressing " skills;supervision  -DN      Upper Body Dressing Position  supported sitting  -DN      Set-up Assistance (Upper Body Dressing)  obtain clothing  -DN      Recorded by [DN] eSe Cuadra, OT      Row Name 01/03/20 1206             Lower Body Dressing Assessment/Treatment    Lower Body Dressing [place] Level  doff;don;pants/bottoms;shoes/slippers;socks;underwear;minimum assist (75% patient effort);shoelaces  -DN      Lower Body Dressing Position  supported sitting  -DN      Comment (Lower Body Dressing)  assist with shoe tying, does not want elastic shoe laces at this time  -DN      Recorded by [DN] See Cuadra, OT      Row Name 01/03/20 1206             Grooming Assessment/Treatment    Grooming [place] Level  grooming skills;hair care, combing/brushing;oral care regimen;wash face, hands;supervision  -DN      Grooming Position  supported sitting  -DN      Grooming Setup Assistance  obtain supplies  -DN      Recorded by [DN] See Cuadra, OT      Row Name 01/03/20 1206             Toileting Assessment/Treatment    Toileting [place] Level  toileting skills;adjust/manage clothing;perform perineal hygiene;minimum assist (75% patient effort);verbal cues;nonverbal cues (demo/gesture)  -DN      Assistive Device Use (Toileting)  grab bar/safety frame  -DN      Toileting Position  supported sitting  -DN      Comment (Toileting)  no longer needs RW for steadying during pants pull up  -DN      Recorded by [DN] See Cuadra, OT      Row Name 01/03/20 1206             Self-Feeding Assessment/Treatment    [place] Level (Self-Feeding)  conditional independence  -DN      Comment (Self-Feeding)  pt now able to open containers, straw, crackers with BUEs  -DN      Recorded by [DN] See Cuadra, OT      Row Name 01/03/20 1206             Fine Motor Testing & Training    Comment, Fine Motor Coordination  theraputty exercises (yellow), coins from plam to fingers with Mod assist, small ball rotation in B hands  for finger movements, started prehandwriting exercises with pencil and paper  -DN      Recorded by [DN] See Cuadra, OT      Row Name 01/03/20 1206 01/03/20 0840          Pain Scale: Numbers Pre/Post-Treatment    Pain Scale: Numbers, Pretreatment  0/10 - no pain  -DN  0/10 - no pain  -LH     Pain Scale: Numbers, Post-Treatment  0/10 - no pain  -DN  0/10 - no pain  -LH     Recorded by [DN] See Cuadra OT [LH] Audrey Villalobos, PT     Row Name 01/03/20 0840             Balance    Balance  dynamic balance activity;standing balance activity  -LH      Recorded by [LH] Audrey Villalobos, PT      Row Name 01/03/20 0840             Standing Balance Activity    Activities Performed (Standing, Balance Training)  standing ball toss balloon taps  -      Support Needed for Balance (Standing, Balance Training)  SBA;CGA;balances without upper extremity support  -      Progressive Balance Activity (Standing, Balance Training)  base of support narrowed during activity;eyes closed during activity  -      Restrictions (Standing, Balance Training)  standing on foam square and wobble/air disc CGA in // bars, EO and ECs, BUE supported on // bars w EC  -      Comment (Standing, Balance Training)  practiced retrieving items off floor SBA rwx- picking up 4 rings from ground and placing into bag at front of walker  -LH      Recorded by [LH] Audrey Villalobos, PT      Row Name 01/03/20 0840             Dynamic Balance Activity    Therapeutic Training Performed (Dynamic Balance)  side stepping  -      Support Needed for Balance (Dynamic Balance Training)  CGA  -      Comment (Dynamic Balance Training)  hemibars  -LH      Recorded by [] Audrey Villalobos, PT      Row Name 01/03/20 1206             Upper Extremity Seated Therapeutic Exercise    Performed, Seated Upper Extremity (Therapeutic Exercise)  shoulder flexion/extension;shoulder abduction/adduction;shoulder external/internal rotation;shoulder horizontal abduction/adduction;scapular  protraction/retraction;elbow flexion/extension;forearm supination/pronation;wrist flexion/extension;digit flexion/extension  -DN      Device, Seated Upper Extremity (Therapeutic Exercise)  free weights, cuff  -DN      Exercise Type, Seated Upper Extremity (Therapeutic Exercise)  AROM (active range of motion);AAROM (active assistive range of motion)  -DN      Expected Outcomes, Seated Upper Extremity (Therapeutic Exercise)  improve functional tolerance, self-care activity;improve performance, transfer skills;improve performance, fine motor coordination skills;improve performance, gross motor coordination skills  -DN      Restrictions, Seated Upper Extremity (Therapeutic Exercise)  no lift > 5#, no overhead movements  -DN      Sets/Reps Detail, Seated Upper Extremity (Therapeutic Exercise)  2 sets of 10 with shoulder exercises within precaution range, 2/15 with towel exerciises on incline table standing  -DN      Transfers Skills, Training to Functional Activity, Seated Upper Extremity (Therapeutic Exercise)  beginning to transfer skills to functional activity  -DN      Comment, Seated Upper Extremity (Therapeutic Exercise)  AAROM L shoulder flexion due to weakness  -DN      Recorded by [DN] See Cuadra OT      Row Name 01/03/20 0840             Lower Extremity Standing Therapeutic Exercise    Performed, Standing Lower Extremity (Therapeutic Exercise)  mini-squats;heel/toe raises;hip flexion/extension;hip abduction/adduction [facility]  -      Exercise Type, Standing Lower Extremity (Therapeutic Exercise)  AROM (active range of motion)  -      Sets/Reps Detail, Standing Lower Extremity (Therapeutic Exercise)  1/10  -      Recorded by [] Audrey Villalobos, PT      Row Name 01/03/20 1206 01/03/20 0840          Positioning and Restraints    Pre-Treatment Position  sitting in chair/recliner  -DN  sitting in chair/recliner  -     Post Treatment Position  wheelchair  -DN  wheelchair  -     In Bed  sitting;call light  within reach;encouraged to call for assist;exit alarm on  -DN  --     In Wheelchair  --  sitting;call light within reach;encouraged to call for assist;exit alarm on  -LH     Recorded by [DN] See Cuadra, OT [] Audrey Villalobos, PT     Row Name 01/03/20 0700             Weekly Summary of Progress (PT)    Weekly Outcome Summary: Physical Therapy  1 out of 5 LTGs met, 4 out of 5 ongoing, anticipated DC date 1/10/20. will cont to progress pt independent w functional mobility for reintergration to home and communiity.   -LH      Recorded by [LH] Audrey Villalobos, PT        User Key  (r) = Recorded By, (t) = Taken By, (c) = Cosigned By    Initials Name Effective Dates    See Tan, OT 06/08/18 -     LH Audrey Villalobos, PT 04/03/18 -         Wound 12/19/19 1939 Right lateral throat Incision (Active)   Dressing Appearance open to air 1/3/2020  8:00 AM   Closure Approximated;Adhesive closure strips 1/3/2020  8:00 AM   Base clean;dry 1/3/2020  8:00 AM   Periwound intact;dry 1/3/2020  8:00 AM   Periwound Temperature warm 1/3/2020  8:00 AM   Periwound Skin Turgor soft 1/2/2020  9:15 PM   Drainage Amount none 1/2/2020  9:15 PM           PT Recommendation and Plan               PT IRF GOALS     Row Name 01/03/20 0700             Bed Mobility Goal 1 (PT-IRF)    Time Frame (Bed Mobility Goal 1, PT-IRF)  1 week  -      Progress/Outcomes (Bed Mobility Goal 1, PT-IRF)  goal ongoing  -         Transfer Goal 1 (PT-IRF)    Time Frame (Transfer Goal 1, PT-IRF)  1 week  -      Progress/Outcomes (Transfer Goal 1, PT-IRF)  goal ongoing  -         Transfer Goal 2 (PT-IRF)    Activity/Assistive Device (Transfer Goal 2, PT-IRF)  car transfer  -      [place] Level (Transfer Goal 2, PT-IRF)  standby assist  -      Time Frame (Transfer Goal 2, PT-IRF)  1 week  -      Progress/Outcomes (Transfer Goal 2, PT-IRF)  goal ongoing  -         Gait/Walking Locomotion Goal 1 (PT-IRF)    Gait/Walking Locomotion Distance Goal 1  (PT-IRF)  350  -      Time Frame (Gait/Walking Locomotion Goal 1, PT-IRF)  1 week  -      Progress/Outcomes (Gait/Walking Locomotion Goal 1, PT-IRF)  goal revised this date  -         Stairs Goal 1 (PT-IRF)    Progress/Outcomes (Stairs Goal 1, PT-IRF)  goal met  -        User Key  (r) = Recorded By, (t) = Taken By, (c) = Cosigned By    Initials Name Provider Type     Audrey Villalobos, PT Physical Therapist               Time Calculation:     PT Charges     Row Name 01/03/20 1240 01/03/20 0842 01/03/20 0754       Time Calculation    Start Time  1230  -  0830  -  --    Stop Time  1300  -  0930  -  --    Time Calculation (min)  30 min  -  60 min  -  --    PT Received On  --  01/03/20  -  --    PT - Next Appointment  --  01/04/20  [facility]  --    PT Goal Re-Cert Due Date  --  --  01/10/20  -      User Key  (r) = Recorded By, (t) = Taken By, (c) = Cosigned By    Initials Name Provider Type     Audrey Villalobos, PT Physical Therapist          Therapy Charges for Today     Code Description Service Date Service Provider Modifiers Qty    61425325545 HC PT THERAPEUTIC ACT EA 15 MIN 1/3/2020 Audrey Villalobos, PT GP 3    62940562761 HC PT THER PROC EA 15 MIN 1/3/2020 Audrey Villalobos, PT GP 3            PT G-Codes  Outcome Measure Options: Tinetti  Tinetti Total Score: 22      Audrey Villalobos, PT  1/3/2020

## 2020-01-03 NOTE — PROGRESS NOTES
Inpatient Rehabilitation Plan of Care Note    Plan of Care  Care Plan Reviewed - No updates at this time.    Safety    Performed Intervention(s)  safety rounds/call light within easy reach  bed alarm/chair alarm      Pain    Performed Intervention(s)  Pain assessment q shift and PRN; Offer pain medication PRN      Psychosocial    Performed Intervention(s)  Offer support  calm enviroment  allow time to verbalize      Body Systems    Performed Intervention(s)  Wound Care as ordered. Skin assessment q shift and PRN.      Sphincter Control    Performed Intervention(s)  Monitor I&O    Signed by: Jocelyn Richardson RN

## 2020-01-03 NOTE — PROGRESS NOTES
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   Status post December 19, 2019-1. Placement of external lumbar drain; 2. Drainage of cervical CSF collection and reclosure of CSF leak.  Lumbar drain removed December 24, 2019       Subjective     History of Present Illness     Subjective  Patient reports occasional mild headache.  Not problematic.  No drainage from her neck incision.  Still has some slight swelling there.  Comfortable with her breathing.  Having bowel movements on her own.  Continues on stool softeners.  No bladder complaints.  Still weak in her hands but again feels he is making progress.  Working on stairs and physical therapy.       History taken from: patient    Objective     Vital Signs  Temp:  [97.8 °F (36.6 °C)-98.4 °F (36.9 °C)] 97.8 °F (36.6 °C)  Heart Rate:  [] 69  Resp:  [18] 18  BP: (107-112)/(67-71) 107/67    Objective:  Vital signs: (most recent): Blood pressure 107/67, pulse 69, temperature 97.8 °F (36.6 °C), temperature source Oral, resp. rate 18, height 134.6 cm (53\"), SpO2 92 %, not currently breastfeeding.            Physical Exam  MENTAL STATUS -  AWAKE / ALERT  HEENT- NCAT,   SCLERA NON-ICTERIC, CONJUNCTIVA PINK, OP MOIST, neck incision-mild swelling.  No erythema or drainage.  LUNGS - CTA, NO WHEEZES, RALES OR RHONCHI  HEART- RRR, NO RUB, MURMUR, OR GALLOP  ABD - NORMOACTIVE BOWEL SOUNDS, SOFT, NT.  EXT - NO EDEMA OR CYANOSIS  NEURO -awake alert.  Oriented x4.  Able to oppose the thumb to the other 4 digits " bilaterally.  MOTOR EXAM -bilateral upper extremity weakness for elbow extension and finger flexion and hand intrinsics.  Able to oppose the thumb to the other 4 digits bilaterally.  Takes resistance with bilateral knee extension and ankle dorsiflexion     Results Review:     I reviewed the patient's new clinical results.        Invalid input(s): Delilah RIOS for DARYN PUGH (MRN [id number]) as of 12/18/2019 10:50   Ref. Range 12/2/2019 16:00 12/7/2019 05:51 12/9/2019 05:15 12/10/2019 05:17 12/18/2019 07:05   Sodium Latest Ref Range: 136 - 145 mmol/L 140 134 (L) 142 139 129 (L)           Medication Review: done  Scheduled Meds:    atorvastatin 20 mg Oral Nightly   calcium carbonate (oyster shell) 500 mg Oral BID   cholecalciferol 2,000 Units Oral Daily   docusate sodium 100 mg Oral BID   famotidine 20 mg Oral BID AC   gabapentin 100 mg Oral BID   magnesium hydroxide 10 mL Oral Daily   senna-docusate sodium 2 tablet Oral Nightly     Continuous Infusions:   PRN Meds:.•  acetaminophen  •  bisacodyl  •  cyclobenzaprine  •  fluticasone  •  HYDROcodone-acetaminophen  •  lidocaine  •  melatonin  •  ondansetron **OR** ondansetron  •  polyethylene glycol  •  senna-docusate sodium  •  sodium chloride      Assessment/Plan       Cervical myelopathy (CMS/HCC)      Assessment & Plan  Cervical myelopathy with upper extremity greater than  lower extremity and left side greater than right side involvement.     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  Spinal headache managed conservatively with improvement.    Dec 18 - she has had increase size of the fluid collection at the anterior neck incision.  She is to go for placement of a  lumbar drain on December 19 with admission to the acute care wing in the hospital at that time.  Dec 19 - lumbar drain today  Dec 26 -drain removed yesterday.Patient has some headache today.  She has not had any reaccumulation of fluid at the anterior neck incision.    Dec 27 - no recurrence of anterior cervical fluid collection. Return to rehab unit.      Postoperative cervical neuritis-steroids-Medrol Dosepak taper completed Dec 15.      GI prophylaxis-Pepcid     DVT prophylaxis-SCDs     Osteoporosis-h/o Prolia     Bladder-voiding on own with low PVR recorded     Bowel-constipation-on stool softeners.  Added scheduled suppository every other day after lunch to take advantage of gastrocolic reflex.  December 31- Moving her bowels better.  Using a suppository at home would be difficult with her weakness in the hands.  Discussed assessing her bowel pattern without the scheduled suppository.     Pain management-hydrocodone/Flexeril/gabapentin -Luis Alfredo report reviewed           Now admit to resume comprehensive acute inpatient rehabilitation  .  This would be an interdisciplinary program with physical therapy 1.5 hour,  occupational therapy 1.5 hour,  5 days a week.  Rehabilitation nursing for carryover, monitoring of neurologic   status, bowel and bladder, and skin  Ongoing physician follow-up.  Weekly team conferences.  Goals are to achieve a level of supervision with  mobility and self-care and improved strength and coordination.   Rehabilitation prognosis fair.  Medical prognosis deferred to neurosurgery.  Estimated length of stay is approximately 10-14  days, but is only an estimation.   The patient's functional status and clinical status is unchanged from preadmission assessment and the patient continues appropriate for acute inpatient rehabilitation.  Goal is for home with outpatient   therapies.  Barrier to discharge: Impaired mobility/self-care- work on balance/gross and fine motor control/strengthening to  overcome.      TEAM CONF - DEC 17 - INCREASED PAIN YESTERDAY WITH TRANSFERS . SWELLING AT NECK INCISION.   160 FEET CTG MIN- TRANSFERS CTG-MIN.    STRENGTH 1# RIGHT AND 0# LEFT.   UBD MIN. LBD MAX.  BATH MIN ASSIST. USING BUILT UP HAND ON UTENSILS.   [facility]OS - TWO WEEKS.     TEAM CONF - DEC 31- TRANSFERS CTG. 4 STAIRS CTG. GAIT 300 FEET CTG RW. TOILET TRANSFERS CTG. CONTINUED WEAK .  UBD MIN. LBD MOD ASSIST. GROOMING MIN ASSIST. BATH MIN ASSIST. SKIN INTACT. SOME SWELLING TO NECK INCISION. CONTINENT BOWEL AND BLADDER. PATIENT WOULD NOT BE ABLE TO DO SUPPOSITORY HERSELF FOR BOWEL PROGRAM  [facility] - FRIDAY ELIEL 10.     Shiva Abraham MD  01/03/20  11:26 AM    Time:

## 2020-01-03 NOTE — PLAN OF CARE
Problem: Patient Care Overview  Goal: Plan of Care Review  Flowsheets  Taken 1/3/2020 1422 by Luciana Doyle, RN  Outcome Summary: Ms Bartholomew is pleasant and cooperative. She has weakness of UEs especially hands but feel they are getting some stronger. No unsafe behavior. Ambulates with walker CGA of 1. Skin is intact with healing incision rt neck, slight edema, 1 steri strip.  Taken 1/3/2020 0426 by Joeclyn Richardson RN  Progress, Functional Goals: demonstrating adequate progress  IRF Plan of Care Review: progress ongoing, continue  Taken 1/3/2020 0800 by Luciana Doyle, RN  Plan of Care Reviewed With: patient

## 2020-01-04 PROCEDURE — 97535 SELF CARE MNGMENT TRAINING: CPT

## 2020-01-04 PROCEDURE — 97110 THERAPEUTIC EXERCISES: CPT

## 2020-01-04 PROCEDURE — 97112 NEUROMUSCULAR REEDUCATION: CPT

## 2020-01-04 RX ADMIN — GABAPENTIN 100 MG: 100 CAPSULE ORAL at 20:59

## 2020-01-04 RX ADMIN — ATORVASTATIN CALCIUM 20 MG: 20 TABLET, FILM COATED ORAL at 20:59

## 2020-01-04 RX ADMIN — GABAPENTIN 100 MG: 100 CAPSULE ORAL at 09:06

## 2020-01-04 RX ADMIN — ACETAMINOPHEN 1000 MG: 500 TABLET, FILM COATED ORAL at 09:12

## 2020-01-04 RX ADMIN — CALCIUM 500 MG: 500 TABLET ORAL at 09:06

## 2020-01-04 RX ADMIN — ACETAMINOPHEN 1000 MG: 500 TABLET, FILM COATED ORAL at 21:07

## 2020-01-04 RX ADMIN — FAMOTIDINE 20 MG: 20 TABLET, FILM COATED ORAL at 17:01

## 2020-01-04 RX ADMIN — CALCIUM 500 MG: 500 TABLET ORAL at 20:59

## 2020-01-04 RX ADMIN — DOCUSATE SODIUM 100 MG: 100 CAPSULE, LIQUID FILLED ORAL at 09:06

## 2020-01-04 RX ADMIN — SENNOSIDES AND DOCUSATE SODIUM 2 TABLET: 8.6; 5 TABLET ORAL at 20:59

## 2020-01-04 RX ADMIN — DOCUSATE SODIUM 100 MG: 100 CAPSULE, LIQUID FILLED ORAL at 20:59

## 2020-01-04 RX ADMIN — VITAMIN D, TAB 1000IU (100/BT) 2000 UNITS: 25 TAB at 09:06

## 2020-01-04 RX ADMIN — MAGNESIUM HYDROXIDE 10 ML: 2400 SUSPENSION ORAL at 09:12

## 2020-01-04 RX ADMIN — FAMOTIDINE 20 MG: 20 TABLET, FILM COATED ORAL at 09:06

## 2020-01-04 NOTE — PROGRESS NOTES
Inpatient Rehabilitation Plan of Care Note    Plan of Care  Care Plan Reviewed - No updates at this time.    Safety    Performed Intervention(s)  safety rounds/call light within easy reach  bed alarm/chair alarm      Pain    Performed Intervention(s)  Pain assessment q shift and PRN; Offer pain medication PRN      Body Systems    Performed Intervention(s)  Wound Care as ordered. Skin assessment q shift and PRN.    Signed by: Gunner Davis RN

## 2020-01-04 NOTE — PROGRESS NOTES
Occupational Therapy: Individual: 90 minutes.    Physical Therapy: Branch    Speech Language Pathology:  Branch    Signed by: Nadine Cazares OT

## 2020-01-04 NOTE — PROGRESS NOTES
Inpatient Rehabilitation Plan of Care Note    Plan of Care  Care Plan Reviewed - No updates at this time.    Safety    Performed Intervention(s)  safety rounds/call light within easy reach  bed alarm/chair alarm      Pain    Performed Intervention(s)  Pain assessment q shift and PRN; Offer pain medication PRN      Psychosocial    Performed Intervention(s)  Offer support  calm enviroment  allow time to verbalize      Body Systems    Performed Intervention(s)  Wound Care as ordered. Skin assessment q shift and PRN.      Sphincter Control    Performed Intervention(s)  Monitor I&O    Signed by: Emy Michelle RN

## 2020-01-04 NOTE — THERAPY TREATMENT NOTE
Inpatient Rehabilitation - Physical Therapy Treatment Note  [facility]     Patient Name: Noemi Bartholomew  : 1960  MRN: [id number]    Today's Date: 2020                 Admit Date: 2019      Visit Dx:    No diagnosis found.    Patient Active Problem List   Diagnosis   • Carpal tunnel syndrome   • Hyperlipidemia   • Osteoporosis   • DDD (degenerative disc disease), lumbar   • Chronic left lumbar radiculopathy   • Congenital spondylolysis, lumbosacral region   • Neuropathic pain, leg, left   • Ossification of spinal ligament   • Spinal stenosis in cervical region   • Cervical spondylosis with myelopathy   • Cervical stenosis of spine   • Postoperative anemia due to acute blood loss   • Steroid-induced hyperglycemia   • Cord compression (CMS/HCC)   • Leukocytosis (due to steroids)   • Postoperative CSF leak   • Cervical myelopathy (CMS/HCC)   • CSF leak       Therapy Treatment    IRF Treatment Summary     Row Name 20 0900 20 0842          Evaluation/Treatment Time and Intent    Subjective Information  no complaints  -RD  no complaints  -LB     Existing Precautions/Restrictions  fall  -RD  fall  -LB     Document Type  therapy note (daily note)  -RD  therapy note (daily note)  -LB     Mode of Treatment  occupational therapy  -RD  physical therapy  -LB     Patient/Family Observations  pt seated in w/c in room in both AM sessions; pt sister present  -RD  seated in w/c. Pleasant and cooperative.  -LB     Recorded by [RD] Nadine Man, OT [LB] Audrey Valdivia PTA     Row Name 20 0900             Cognition/Psychosocial- PT/OT    Affect/Mental Status (Cognitive)  WFL  -RD      Orientation Status (Cognition)  oriented x 4  -RD      Follows Commands (Cognition)  WFL  -RD      Personal Safety Interventions  fall prevention program maintained;gait belt;muscle strengthening facilitated;nonskid shoes/slippers when out of bed  -RD      Recorded by [RD] Nadine Man, OT      [place]  Name 01/04/20 0842             Bed Mobility Assessment/Treatment    Comment (Bed Mobility)  NT  -LB      Recorded by [LB] Audrey Valdivia PTA      Row Name 01/04/20 0900             Functional Mobility    Functional Mobility- Ind. Level  standby assist;supervision required  -RD      Functional Mobility- Device  rolling walker  -RD      Functional Mobility- Comment  pt ambulates into/out of bathroom using RW w/ SBA/sup  -RD      Recorded by [RD] Nadine Man OT      Row Name 01/04/20 0900             Transfer Assessment/Treatment    Transfer Assessment/Treatment  sit-stand transfer;stand-sit transfer  -RD      Recorded by [RD] Nadine Man OT      Row Name 01/04/20 0900 01/04/20 0842          Sit-Stand Transfer    Sit-Stand [place] (Transfers)  supervision  -RD  supervision  -LB     Assistive Device (Sit-Stand Transfers)  walker, front-wheeled  -RD  walker, front-wheeled  -LB     Recorded by [RD] Nadine Man OT [LB] Audrey Valdivia PTA     Row Name 01/04/20 0900 01/04/20 0842          Stand-Sit Transfer    Stand-Sit [place] (Transfers)  supervision  -RD  supervision  -LB     Assistive Device (Stand-Sit Transfers)  walker, front-wheeled  -RD  walker, front-wheeled  -LB     Recorded by [RD] Nadine Man OT [LB] Audrey Valdivia PTA     Row Name 01/04/20 0842             Gait/Stairs Assessment/Training    [place] Level (Gait)  supervision  -LB      Assistive Device (Gait)  walker, front-wheeled  -LB      Distance in Feet (Gait)  280  -LB      Pattern (Gait)  step-through  -LB      Deviations/Abnormal Patterns (Gait)  ally decreased;stride length decreased  -LB      Bilateral Gait Deviations  forward flexed posture;heel strike decreased  -LB      Left Sided Gait Deviations  heel strike decreased  -LB      Right Sided Gait Deviations  heel strike decreased  -LB      Handrail Location (Stairs)  left side (ascending) both hands  -LB      Number of Steps (Stairs)   " 6 (4\") steps  -LB      Ascending Technique (Stairs)  step-to-step  -LB      Descending Technique (Stairs)  step-to-step  -LB      Comment (Gait/Stairs)  amb w/o  ft cga. Curb w HHA w CGA. Ramp w rwx CGA  -LB      Recorded by [LB] Audrey Valdivia PTA      Row Name 01/04/20 0900             Basic Activities of Daily Living (BADLs)    Basic Activities of Daily Living  grooming  -RD      Additional Documentation  -- pt declines ADL- showered yesterday & already dressed today  -RD      Recorded by [RD] Nadine Man, OT      Row Name 01/04/20 0900             Grooming Assessment/Treatment    Grooming [place] Level  grooming skills;oral care regimen;wash face, hands;standby assist;supervision  -RD      Grooming Position  sink side;supported standing  -RD      Grooming Setup Assistance  obtain supplies  -RD      Comment (Grooming)  SBA/Sup while standing at sink  -RD      Recorded by [RD] Nadine Man, OT      Row Name 01/04/20 0900             Fine Motor Testing & Training    Comment, Fine Motor Coordination  Pt participates in BUE FMC activity using resistive pegs; pt alternates B hands to don/doff pegs- pt able to complete w/ min A and extra time; pt later uses B UEs to grasp and manipulate coins w/ focus on 2 and 3 point pinch and in-hand manipulation  -RD      Recorded by [RD] Nadine Man, OT      Row Name 01/04/20 0900             Pain Scale: Numbers Pre/Post-Treatment    Pain Scale: Numbers, Pretreatment  5/10  -RD      Pain Scale: Numbers, Post-Treatment  5/10  -RD      Pain Location  head  -RD      Pain Intervention(s)  Medication (See MAR);Repositioned;Ambulation/increased activity  -RD      Recorded by [RD] Nadine Man, OT      Row Name 01/04/20 0842             Standing Balance Activity    Activities Performed (Standing, Balance Training)  feet together in stance;standing on foam half roll standing on foam pad w narrow HANK, arms crossed  -LB      Support Needed for " Balance (Standing, Balance Training)  CGA  -LB      Recorded by [LB] Audrey Valdivia PTA      Row Name 01/04/20 0842             Dynamic Balance Activity    Therapeutic Training Performed (Dynamic Balance)  ambulate backward;side stepping  -LB      Support Needed for Balance (Dynamic Balance Training)  balances without upper extremity support;CGA  -LB      Comment (Dynamic Balance Training)  amb across uneven surface, red mat, x 12 ft w/o AD, CGA stepping over objects on floor w HHA, CGA  -LB      Recorded by [LB] Audrey Valdivia PTA      Row Name 01/04/20 0900             Upper Extremity Seated Therapeutic Exercise    Performed, Seated Upper Extremity (Therapeutic Exercise)  elbow flexion/extension;forearm supination/pronation;wrist flexion/extension  -RD      Device, Seated Upper Extremity (Therapeutic Exercise)  -- 1# dumbell  -RD      Exercise Type, Seated Upper Extremity (Therapeutic Exercise)  AROM (active range of motion);resistive exercise  -RD      Expected Outcomes, Seated Upper Extremity (Therapeutic Exercise)  improve functional tolerance, self-care activity;improve performance, BADLs;improve performance, gross motor coordination skills  -RD      Restrictions, Seated Upper Extremity (Therapeutic Exercise)  no lifting > 5#; no overhead movements  -RD      Sets/Reps Detail, Seated Upper Extremity (Therapeutic Exercise)  2 sets of 15  -RD      Recorded by [RD] Nadine Man, CHOCO      Row Name 01/04/20 0900 01/04/20 0842          Positioning and Restraints    Pre-Treatment Position  sitting in chair/recliner in both AM sessions  -RD  --     Post Treatment Position  wheelchair after both AM sessions  -RD  wheelchair  -LB     In Wheelchair  sitting;call light within reach;encouraged to call for assist;exit alarm on;with family/caregiver;with PT w/ PT after first AM session  -RD  sitting;call light within reach;exit alarm on  -LB     Recorded by [RD] Nadine Man OT [LB] Audrey Valdivia,  EBENEZER       User Key  (r) = Recorded By, (t) = Taken By, (c) = Cosigned By    Initials Name Effective Dates    Audrey Adams PTA 03/07/18 -     Nadine Aponte, OT 10/14/19 -         Wound 12/19/19 1939 Right lateral throat Incision (Active)   Dressing Appearance dry;intact 1/4/2020  8:35 AM   Closure Approximated;Liquid skin adhesive;Adhesive closure strips 1/4/2020  8:35 AM   Base clean;dry 1/4/2020  8:35 AM   Drainage Amount none 1/4/2020  8:35 AM   Dressing Care, Wound open to air 1/3/2020 10:23 PM           PT Recommendation and Plan                        Time Calculation:     PT Charges     Row Name 01/04/20 1429 01/04/20 1014 01/04/20 0841       Time Calculation    Start Time  1230  -LB  1000  -LB  0830  -LB    Stop Time  1300  -LB  1030  -LB  0900  -LB    Time Calculation (min)  30 min  -LB  30 min  -LB  30 min  -LB      User Key  (r) = Recorded By, (t) = Taken By, (c) = Cosigned By    Initials Name Provider Type    Audrey Adams PTA Physical Therapy Assistant          Therapy Charges for Today     Code Description Service Date Service Provider Modifiers Qty    33986938105 HC PT THER PROC EA 15 MIN 1/4/2020 Audrey Valdivia PTA GP 6            PT G-Codes  Outcome Measure Options: Tinetti  Tinetti Total Score: 22      Audrey Valdivia PTA  1/4/2020

## 2020-01-04 NOTE — PROGRESS NOTES
"   LOS: 8 days   Patient Care Team:  Mary Lou Carroll MD as PCP - General (Internal Medicine)    Chief Complaint:   Cervical myelopathy with upper extremity greater than lower extremity and left side greater than right side involvement  History of cervical stenosis C5 to T1 with ossification of the posterior longitudinal ligament (OPLL) and progresive myelopathy  Status post December 6, 2019- Anterior cervical corpectomy C6, C7 with PEEK cage and anterior Zevo cervical plate from C5 to T1  CSF leak repair.   Status post December 19, 2019-1. Placement of external lumbar drain; 2. Drainage of cervical CSF collection and reclosure of CSF leak.  Lumbar drain removed December 24, 2019       Subjective     History of Present Illness     Subjective  Seen and examined, no acute events overnight. Feels OK, denies chest pain, shortness of breath, f/c. Slept well. Continues with hand weakness but feels she is making progress         History taken from: patient    Objective     Vital Signs  Temp:  [97.1 °F (36.2 °C)-97.7 °F (36.5 °C)] 97.7 °F (36.5 °C)  Heart Rate:  [] 103  Resp:  [18] 18  BP: (116-119)/(71-72) 116/71    Objective:  Vital signs: (most recent): Blood pressure 116/71, pulse 103, temperature 97.7 °F (36.5 °C), temperature source Oral, resp. rate 18, height 134.6 cm (53\"), SpO2 96 %, not currently breastfeeding.            Physical Exam  MENTAL STATUS -  AWAKE / ALERT  HEENT- NCAT,   SCLERA NON-ICTERIC, CONJUNCTIVA PINK, OP MOIST, neck incision-mild swelling.  No erythema or drainage.  LUNGS - CTA, NO WHEEZES, RALES OR RHONCHI  HEART- RRR, NO RUB, MURMUR, OR GALLOP  ABD - NORMOACTIVE BOWEL SOUNDS, SOFT, NT.  EXT - NO EDEMA OR CYANOSIS  NEURO -awake alert.  Oriented x4.  Able to oppose the thumb to the other 4 digits bilaterally.  MOTOR EXAM -bilateral upper extremity weakness for elbow extension and finger flexion and hand intrinsics.  Able to oppose the thumb to the other 4 digits bilaterally.  Takes " resistance with bilateral knee extension and ankle dorsiflexion     Results Review:     I reviewed the patient's new clinical results.        Invalid input(s): Delilah RIOS for DARYN PUGH (MRN [id number]) as of 12/18/2019 10:50   Ref. Range 12/2/2019 16:00 12/7/2019 05:51 12/9/2019 05:15 12/10/2019 05:17 12/18/2019 07:05   Sodium Latest Ref Range: 136 - 145 mmol/L 140 134 (L) 142 139 129 (L)           Medication Review: done  Scheduled Meds:    atorvastatin 20 mg Oral Nightly   calcium carbonate (oyster shell) 500 mg Oral BID   cholecalciferol 2,000 Units Oral Daily   docusate sodium 100 mg Oral BID   famotidine 20 mg Oral BID AC   gabapentin 100 mg Oral BID   magnesium hydroxide 10 mL Oral Daily   senna-docusate sodium 2 tablet Oral Nightly     Continuous Infusions:   PRN Meds:.•  acetaminophen  •  bisacodyl  •  cyclobenzaprine  •  fluticasone  •  HYDROcodone-acetaminophen  •  lidocaine  •  melatonin  •  ondansetron **OR** ondansetron  •  polyethylene glycol  •  senna-docusate sodium  •  sodium chloride      Assessment/Plan       Cervical myelopathy (CMS/HCC)      Assessment & Plan  Cervical myelopathy with upper extremity greater than  lower extremity and left side greater than right side involvement.     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  Spinal headache managed conservatively with improvement.    Dec 18 - she has had increase size of the fluid collection at the anterior neck incision.  She is to go for placement of a lumbar drain on December 19 with admission to the acute care wing in the hospital at that time.  Dec 19 - lumbar drain today  Dec 26 -drain removed yesterday.Patient has some headache today.   She has not had any reaccumulation of fluid at the anterior neck incision.    Dec 27 - no recurrence of anterior cervical fluid collection. Return to rehab unit.      Postoperative cervical neuritis-steroids-Medrol Dosepak taper completed Dec 15.      GI prophylaxis-Pepcid     DVT prophylaxis-SCDs     Osteoporosis-h/o Prolia     Bladder-voiding on own with low PVR recorded     Bowel-constipation-on stool softeners.  Added scheduled suppository every other day after lunch to take advantage of gastrocolic reflex.  December 31- Moving her bowels better.  Using a suppository at home would be difficult with her weakness in the hands.  Discussed assessing her bowel pattern without the scheduled suppository.     Pain management-hydrocodone/Flexeril/gabapentin -Luis Alfredo report reviewed           Now admit to resume comprehensive acute inpatient rehabilitation  .  This would be an interdisciplinary program with physical therapy 1.5 hour,  occupational therapy 1.5 hour,  5 days a week.  Rehabilitation nursing for carryover, monitoring of neurologic   status, bowel and bladder, and skin  Ongoing physician follow-up.  Weekly team conferences.  Goals are to achieve a level of supervision with  mobility and self-care and improved strength and coordination.   Rehabilitation prognosis fair.  Medical prognosis deferred to neurosurgery.  Estimated length of stay is approximately 10-14  days, but is only an estimation.   The patient's functional status and clinical status is unchanged from preadmission assessment and the patient continues appropriate for acute inpatient rehabilitation.  Goal is for home with outpatient   therapies.  Barrier to discharge: Impaired mobility/self-care- work on balance/gross and fine motor control/strengthening to overcome.      TEAM CONF - DEC 17 - INCREASED PAIN YESTERDAY WITH TRANSFERS . SWELLING AT NECK INCISION.   160 FEET CTG MIN- TRANSFERS CTG-MIN.    STRENGTH 1# RIGHT AND 0# LEFT.   UBD MIN.  LBD MAX.  BATH MIN ASSIST. USING BUILT UP HAND ON UTENSILS.   [facility] - TWO WEEKS.     TEAM CONF - DEC 31- TRANSFERS CTG. 4 STAIRS CTG. GAIT 300 FEET CTG RW. TOILET TRANSFERS CTG. CONTINUED WEAK .  UBD MIN. LBD MOD ASSIST. GROOMING MIN ASSIST. BATH MIN ASSIST. SKIN INTACT. SOME SWELLING TO NECK INCISION. CONTINENT BOWEL AND BLADDER. PATIENT WOULD NOT BE ABLE TO DO SUPPOSITORY HERSELF FOR BOWEL PROGRAM  [facility] - FRIDAY ELIEL 10.    1/4  -Chart reviewed, continue with current rehabilitation plan  - Continue gabapentin 100 mg BID for neuropathic pain     Yovany Connors MD  01/04/20  1:43 PM    Time:

## 2020-01-04 NOTE — PLAN OF CARE
Problem: Patient Care Overview  Goal: Plan of Care Review  Outcome: Ongoing (interventions implemented as appropriate)  Flowsheets (Taken 1/4/2020 1413)  Outcome Summary: AAO x 4. ambulates w/ walker. tylenol for pain  Progress, Functional Goals: demonstrating adequate progress  Plan of Care Reviewed With: patient

## 2020-01-04 NOTE — THERAPY TREATMENT NOTE
Inpatient Rehabilitation - Occupational Therapy Treatment Note    [facility]     Patient Name: Noemi Bartholomew  : 1960  MRN: [id number]    Today's Date: 2020                 Admit Date: 2019      Visit Dx:  No diagnosis found.    Patient Active Problem List   Diagnosis   • Carpal tunnel syndrome   • Hyperlipidemia   • Osteoporosis   • DDD (degenerative disc disease), lumbar   • Chronic left lumbar radiculopathy   • Congenital spondylolysis, lumbosacral region   • Neuropathic pain, leg, left   • Ossification of spinal ligament   • Spinal stenosis in cervical region   • Cervical spondylosis with myelopathy   • Cervical stenosis of spine   • Postoperative anemia due to acute blood loss   • Steroid-induced hyperglycemia   • Cord compression (CMS/HCC)   • Leukocytosis (due to steroids)   • Postoperative CSF leak   • Cervical myelopathy (CMS/HCC)   • CSF leak         Therapy Treatment    IRF Treatment Summary     Row Name 20 0900 20 0842          Evaluation/Treatment Time and Intent    Subjective Information  no complaints  -RD  no complaints  -LB     Existing Precautions/Restrictions  fall  -RD  fall  -LB     Document Type  therapy note (daily note)  -RD  therapy note (daily note)  -LB     Mode of Treatment  occupational therapy  -RD  physical therapy  -LB     Patient/Family Observations  pt seated in w/c in room in both AM sessions; pt sister present  -RD  seated in w/c. Pleasant and cooperative.  -LB     Recorded by [RD] Nadine Man, OT [LB] Audrey Valdivia PTA     Row Name 20 0900             Cognition/Psychosocial- PT/OT    Affect/Mental Status (Cognitive)  WFL  -RD      Orientation Status (Cognition)  oriented x 4  -RD      Follows Commands (Cognition)  WFL  -RD      Personal Safety Interventions  fall prevention program maintained;gait belt;muscle strengthening facilitated;nonskid shoes/slippers when out of bed  -RD      Recorded by [RD] Nadine Man, OT       "Row Name 01/04/20 0900             Functional Mobility    Functional Mobility- Ind. Level  standby assist;supervision required  -RD      Functional Mobility- Device  rolling walker  -RD      Functional Mobility- Comment  pt ambulates into/out of bathroom using RW w/ SBA/sup  -RD      Recorded by [RD] Nadine Man OT      Row Name 01/04/20 0900             Transfer Assessment/Treatment    Transfer Assessment/Treatment  sit-stand transfer;stand-sit transfer  -RD      Recorded by [RD] Nadine Man OT      Row Name 01/04/20 0900 01/04/20 0842          Sit-Stand Transfer    Sit-Stand [place] (Transfers)  supervision  -RD  supervision  -LB     Assistive Device (Sit-Stand Transfers)  walker, front-wheeled  -RD  walker, front-wheeled  -LB     Recorded by [RD] Nadine Man, CHOCO [LB] Audrey Valdivia PTA     Row Name 01/04/20 0900 01/04/20 0842          Stand-Sit Transfer    Stand-Sit [place] (Transfers)  supervision  -RD  supervision  -LB     Assistive Device (Stand-Sit Transfers)  walker, front-wheeled  -RD  walker, front-wheeled  -LB     Recorded by [RD] Nadine Man, CHOCO [LB] Audrey Valdivia PTA     Row Name 01/04/20 0842             Gait/Stairs Assessment/Training    [place] Level (Gait)  supervision  -LB      Assistive Device (Gait)  walker, front-wheeled  -LB      Distance in Feet (Gait)  280  -LB      Pattern (Gait)  step-through  -LB      Handrail Location (Stairs)  left side (ascending) both hands  -LB      Number of Steps (Stairs)   6 (4\") steps  -LB      Ascending Technique (Stairs)  step-to-step  -LB      Descending Technique (Stairs)  step-to-step  -LB      Comment (Gait/Stairs)  amb w/o  ft cga  -LB      Recorded by [LB] Audrey Valdivia PTA      Row Name 01/04/20 0900             Basic Activities of Daily Living (BADLs)    Basic Activities of Daily Living  grooming  -RD      Additional Documentation  -- pt declines ADL- showered yesterday & already " dressed today  -RD      Recorded by [RD] Nadine Man, OT      Row Name 01/04/20 0900             Grooming Assessment/Treatment    Grooming [place] Level  grooming skills;oral care regimen;wash face, hands;standby assist;supervision  -RD      Grooming Position  sink side;supported standing  -RD      Grooming Setup Assistance  obtain supplies  -RD      Comment (Grooming)  SBA/Sup while standing at sink  -RD      Recorded by [RD] Nadine Man, OT      Row Name 01/04/20 0900             Fine Motor Testing & Training    Comment, Fine Motor Coordination  Pt participates in BUE FMC activity using resistive pegs; pt alternates B hands to don/doff pegs- pt able to complete w/ min A and extra time; pt later uses B UEs to grasp and manipulate coins w/ focus on 2 and 3 point pinch and in-hand manipulation  -RD      Recorded by [RD] Nadine Man, OT      Row Name 01/04/20 0900             Pain Scale: Numbers Pre/Post-Treatment    Pain Scale: Numbers, Pretreatment  5/10  -RD      Pain Scale: Numbers, Post-Treatment  5/10  -RD      Pain Location  head  -RD      Pain Intervention(s)  Medication (See MAR);Repositioned;Ambulation/increased activity  -RD      Recorded by [RD] Nadine Man, OT      Row Name 01/04/20 0842             Dynamic Balance Activity    Therapeutic Training Performed (Dynamic Balance)  ambulate backward;side stepping  -LB      Support Needed for Balance (Dynamic Balance Training)  balances without upper extremity support;CGA  -LB      Comment (Dynamic Balance Training)  amb across uneven surface, red mat, x 12 ft w/o AD, CGA  -LB      Recorded by [LB] Audrey Valdivia PTA      Row Name 01/04/20 0900             Upper Extremity Seated Therapeutic Exercise    Performed, Seated Upper Extremity (Therapeutic Exercise)  elbow flexion/extension;forearm supination/pronation;wrist flexion/extension  -RD      Device, Seated Upper Extremity (Therapeutic Exercise)  -- 1# dumbell  -RD       Exercise Type, Seated Upper Extremity (Therapeutic Exercise)  AROM (active range of motion);resistive exercise  -RD      Expected Outcomes, Seated Upper Extremity (Therapeutic Exercise)  improve functional tolerance, self-care activity;improve performance, BADLs;improve performance, gross motor coordination skills  -RD      Restrictions, Seated Upper Extremity (Therapeutic Exercise)  no lifting > 5#; no overhead movements  -RD      Sets/Reps Detail, Seated Upper Extremity (Therapeutic Exercise)  2 sets of 15  -RD      Recorded by [RD] Nadine Man OT      Row Name 01/04/20 0900             Positioning and Restraints    Pre-Treatment Position  sitting in chair/recliner in both AM sessions  -RD      Post Treatment Position  wheelchair after both AM sessions  -RD      In Wheelchair  sitting;call light within reach;encouraged to call for assist;exit alarm on;with family/caregiver;with PT w/ PT after first AM session  -RD      Recorded by [RD] Nadine Man OT        User Key  (r) = Recorded By, (t) = Taken By, (c) = Cosigned By    Initials Name Effective Dates    LB Audrey Valdivia, PTA 03/07/18 -     RD Nadine Man OT 10/14/19 -           Wound 12/19/19 1939 Right lateral throat Incision (Active)   Dressing Appearance dry;intact 1/4/2020  8:35 AM   Closure Approximated;Liquid skin adhesive;Adhesive closure strips 1/4/2020  8:35 AM   Base clean;dry 1/4/2020  8:35 AM   Drainage Amount none 1/4/2020  8:35 AM   Dressing Care, Wound open to air 1/3/2020 10:23 PM         OT Recommendation and Plan                 OT IRF GOALS     Row Name 01/03/20 1200 12/28/19 1144          Transfer Goal 1 (OT-IRF)    Activity/Assistive Device (Transfer Goal 1, OT-IRF)  shower chair;commode, bedside without drop arms;walker, rolling  -DN  shower chair;commode, bedside without drop arms;walker, rolling  -DN     [place] Level (Transfer Goal 1, OT-IRF)  conditional independence  -DN  supervision required  -DN      Time Frame (Transfer Goal 1, OT-IRF)  short term goal (STG)  -DN  short term goal (STG)  -DN     Progress/Outcomes (Transfer Goal 1, OT-IRF)  goal met;goal revised this date  -DN  continuing progress toward goal  -DN        Transfer Goal 2 (OT-IRF)    Activity/Assistive Device (Transfer Goal 2, OT-IRF)  toilet;shower chair  -DN  toilet;walker, rolling;shower chair  -DN     [place] Level (Transfer Goal 2, OT-IRF)  conditional independence  -DN  conditional independence  -DN     Time Frame (Transfer Goal 2, OT-IRF)  long term goal (LTG)  -DN  long term goal (LTG)  -DN     Progress/Outcomes (Transfer Goal 2, OT-IRF)  goal ongoing  -DN  continuing progress toward goal  -DN        Bathing FIM Goal (OT-IRF)    Progress/Outcomes (Bathing FIM Goal, OT-IRF)  --  goal no longer appropriate  -DN        Bathing Goal 1 (OT-IRF)    Activity/Device (Bathing Goal 1, OT-IRF)  bathing skills, all  -DN  bathing skills, all  -DN     [place] Level (Bathing Goal 1, OT-IRF)  supervision required  -DN  supervision required  -DN     Time Frame ([place]athing Goal 1, OT-IRF)  short term goal (STG)  -DN  short term goal (STG)  -DN     Progress/Outcomes ([place]athing Goal 1, OT-IRF)  goal not met;goal ongoing  -DN  continuing progress toward goal  -DN        Bathing Goal 2 (OT-IRF)    Activity/Device (Bathing Goal 2, OT-IRF)  bathing skills, all  -DN  bathing skills, all  -DN     [place] Level (Bathing Goal 2, OT-IRF)  conditional independence  -DN  conditional independence  -DN     Time Frame (Bathing Goal 2, OT-IRF)  long term goal (LTG)  -DN  long term goal (LTG)  -DN     Progress/Outcomes (Bathing Goal 2, OT-IRF)  goal ongoing  -DN  continuing progress toward goal  -DN        UB Dressing FIM Goal (OT-IRF)    Progress/Outcomes (UB Dressing FIM Goal, OT-IRF)  --  goal no longer appropriate  -DN        UB Dressing Goal 1 (OT-IRF)    Activity/Device (UB Dressing Goal 1, OT-IRF)  upper body dressing  -DN  upper body dressing  -DN      [place] (UB Dress Goal 1, OT-IRF)  conditional independence  -DN  supervision required  -DN     Time Frame (UB Dressing Goal 1, OT-IRF)  short term goal (STG)  -DN  short term goal (STG)  -DN     Progress/Outcomes (UB Dressing Goal 1, OT-IRF)  goal met;goal revised this date  -DN  continuing progress toward goal  -DN        UB Dressing Goal 2 (OT-IRF)    Activity/Device (UB Dressing Goal 2, OT-IRF)  upper body dressing  -DN  upper body dressing  -DN     [place] (UB Dress Goal 2, OT-IRF)  conditional independence  -DN  conditional independence  -DN     Time Frame (UB Dressing Goal 2, OT-IRF)  long term goal (LTG)  -DN  long term goal (LTG)  -DN     Progress/Outcomes (UB Dressing Goal 2, OT-IRF)  continuing progress toward goal  -DN  continuing progress toward goal  -DN        LB Dressing FIM Goal (OT-IRF)    Progress/Outcomes (LB Dressing FIM Goal, OT-IRF)  --  goal no longer appropriate  -DN        LB Dressing Goal 1 (OT-IRF)    Activity/Device (LB Dressing Goal 1, OT-IRF)  lower body dressing  -DN  lower body dressing  -DN     [place] (LB Dressing Goal 1, OT-IRF)  supervision required  -DN  moderate assist (50-74% patient effort)  -DN     Time Frame (LB Dressing Goal 1, OT-IRF)  short term goal (STG)  -DN  short term goal (STG)  -DN     Progress/Outcomes (LB Dressing Goal 1, OT-IRF)  goal met;goal revised this date  -DN  continuing progress toward goal  -DN        LB Dressing Goal 2 (OT-IRF)    Activity/Device (LB Dressing Goal 2, OT-IRF)  lower body dressing  -DN  lower body dressing  -DN     [place] (LB Dressing Goal 2, OT-IRF)  conditional independence  -DN  supervision required  -DN     Time Frame (LB Dressing Goal 2, OT-IRF)  long term goal (LTG)  -DN  long term goal (LTG)  -DN     Progress/Outcomes (LB Dressing Goal 2, OT-IRF)  goal revised this date;goal ongoing  -DN  continuing progress toward goal  -DN       User Key  (r) = Recorded By, (t) = Taken By, (c) = Cosigned By    Initials Name  Provider Type    See Tan OT Occupational Therapist                     Time Calculation:     Time Calculation- OT     Row Name 01/04/20 1107 01/04/20 1106          Time Calculation- OT    OT Start Time  1030  -RD  0900  -RD     OT Stop Time  1100  -RD  1000  -RD     OT Time Calculation (min)  30 min  -RD  60 min  -RD     OT Received On  01/04/20  -RD  01/04/20  -RD       User Key  (r) = Recorded By, (t) = Taken By, (c) = Cosigned By    Initials Name Provider Type    Nadine Apnote OT Occupational Therapist          Therapy Charges for Today     Code Description Service Date Service Provider Modifiers Qty    49339453902 HC OT SELF CARE/MGMT/TRAIN EA 15 MIN 1/4/2020 Nadine Man OT GO 1    41411044515 HC OT THER PROC EA 15 MIN 1/4/2020 Nadine Man OT GO 2    19376948767 HC OT NEUROMUSC RE EDUCATION EA 15 MIN 1/4/2020 Nadine Man OT GO 3                   Nadine Man OT  1/4/2020

## 2020-01-05 ENCOUNTER — APPOINTMENT (OUTPATIENT)
Dept: MRI IMAGING | Facility: HOSPITAL | Age: 60
End: 2020-01-05

## 2020-01-05 PROCEDURE — 72156 MRI NECK SPINE W/O & W/DYE: CPT

## 2020-01-05 PROCEDURE — 97110 THERAPEUTIC EXERCISES: CPT | Performed by: PHYSICAL THERAPIST

## 2020-01-05 PROCEDURE — A9577 INJ MULTIHANCE: HCPCS | Performed by: PHYSICAL MEDICINE & REHABILITATION

## 2020-01-05 PROCEDURE — 99024 POSTOP FOLLOW-UP VISIT: CPT | Performed by: NURSE PRACTITIONER

## 2020-01-05 PROCEDURE — 0 GADOBENATE DIMEGLUMINE 529 MG/ML SOLUTION: Performed by: PHYSICAL MEDICINE & REHABILITATION

## 2020-01-05 RX ADMIN — SENNOSIDES AND DOCUSATE SODIUM 2 TABLET: 8.6; 5 TABLET ORAL at 22:41

## 2020-01-05 RX ADMIN — GADOBENATE DIMEGLUMINE 7 ML: 529 INJECTION, SOLUTION INTRAVENOUS at 15:06

## 2020-01-05 RX ADMIN — FAMOTIDINE 20 MG: 20 TABLET, FILM COATED ORAL at 18:01

## 2020-01-05 RX ADMIN — CALCIUM 500 MG: 500 TABLET ORAL at 22:42

## 2020-01-05 RX ADMIN — DOCUSATE SODIUM 100 MG: 100 CAPSULE, LIQUID FILLED ORAL at 22:41

## 2020-01-05 RX ADMIN — GABAPENTIN 100 MG: 100 CAPSULE ORAL at 08:40

## 2020-01-05 RX ADMIN — ATORVASTATIN CALCIUM 20 MG: 20 TABLET, FILM COATED ORAL at 22:42

## 2020-01-05 RX ADMIN — ACETAMINOPHEN 1000 MG: 500 TABLET, FILM COATED ORAL at 09:43

## 2020-01-05 RX ADMIN — MAGNESIUM HYDROXIDE 10 ML: 2400 SUSPENSION ORAL at 08:43

## 2020-01-05 RX ADMIN — GABAPENTIN 100 MG: 100 CAPSULE ORAL at 22:57

## 2020-01-05 RX ADMIN — VITAMIN D, TAB 1000IU (100/BT) 2000 UNITS: 25 TAB at 08:39

## 2020-01-05 RX ADMIN — FAMOTIDINE 20 MG: 20 TABLET, FILM COATED ORAL at 06:08

## 2020-01-05 RX ADMIN — ACETAMINOPHEN 1000 MG: 500 TABLET, FILM COATED ORAL at 22:40

## 2020-01-05 RX ADMIN — DOCUSATE SODIUM 100 MG: 100 CAPSULE, LIQUID FILLED ORAL at 08:40

## 2020-01-05 RX ADMIN — CALCIUM 500 MG: 500 TABLET ORAL at 08:40

## 2020-01-05 NOTE — PROGRESS NOTES
Postop visit   LOS: 9 days   Patient Care Team:  Mary Lou Carroll MD as PCP - General (Internal Medicine)    Chief Complaint: Post op cervical fusion    Subjective     She complains of minimal headache made worse with lying down.  She denies any difficulty swallowing.  She still feels like her hand sensation and motor function is slowly improving since surgery.    Interval History:     History taken from: patient chart RN    Objective      A and O x3  Dysesthesia in bilateral hands, but sensation intact on confrontational exam  Hand grasps weak but improved since postop exam  No hoarseness of voice  Anterior cervical incision with fluctuant fluid collection superior to incision and firmer area of swelling below incision  Upper extremity reflexes 2+/4  Very slight right Jacob's  E OEM intact  Lung effort normal    Vital Signs  Temp:  [97.5 °F (36.4 °C)-98.5 °F (36.9 °C)] 98.5 °F (36.9 °C)  Heart Rate:  [] 101  Resp:  [18-19] 19  BP: (116-132)/(68-85) 118/75       Results Review:     I reviewed the patient's new clinical results.  No recent labs or imaging    Assessment/Plan       Cervical myelopathy (CMS/HCC)      She is 1 month out from a C6-C7 corpectomy with peek cage and plate C5-T1 by Dr. 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  She ended up with an unavoidable CSF leak during surgery.  She did well but 12 days later developed cervical fluid collection at the incision site.  On 12/18/2019 she had lumbar drain placed and drainage of the anterior cervical fluid collection.  Once we were satisfied that the CSF leak had healed she was transferred back to rehab after removal of lumbar drain.  Her RN called this morning concerned about new fluid collection at the cervical incision.  He stated that there was some swelling yesterday but it had increased dramatically overnight.  She does have an obvious reaccumulation of fluid at the cervical incision.  Cervical MRI with  and without contrast has been ordered.  I have discussed with Dr. Noe and he may plan lumbar shunt.  This will be discussed with the patient after results of cervical MRI are obtained.  She is not currently in any distress with no shortness of air or difficulty swallowing.      Kyung Hamilton, MIRYAM  01/05/20  1:22 PM

## 2020-01-05 NOTE — THERAPY TREATMENT NOTE
Inpatient Rehabilitation - Physical Therapy Treatment Note  [facility]     Patient Name: Noemi Bartholomew  : 1960  MRN: [id number]    Today's Date: 2020                 Admit Date: 2019      Visit Dx:    No diagnosis found.    Patient Active Problem List   Diagnosis   • Carpal tunnel syndrome   • Hyperlipidemia   • Osteoporosis   • DDD (degenerative disc disease), lumbar   • Chronic left lumbar radiculopathy   • Congenital spondylolysis, lumbosacral region   • Neuropathic pain, leg, left   • Ossification of spinal ligament   • Spinal stenosis in cervical region   • Cervical spondylosis with myelopathy   • Cervical stenosis of spine   • Postoperative anemia due to acute blood loss   • Steroid-induced hyperglycemia   • Cord compression (CMS/HCC)   • Leukocytosis (due to steroids)   • Postoperative CSF leak   • Cervical myelopathy (CMS/HCC)   • CSF leak       Therapy Treatment    IRF Treatment Summary     Row Name 20             Evaluation/Treatment Time and Intent    Subjective Information  no complaints  -JK      Existing Precautions/Restrictions  fall  -JK      Document Type  therapy note (daily note)  -JK      Mode of Treatment  physical therapy  -JK      Patient/Family Observations  pt seated in WC  -JK      Recorded by [JK] Ronda Yap, PT      Row Name 20             Cognition/Psychosocial- PT/OT    Affect/Mental Status (Cognitive)  WFL  -JK      Orientation Status (Cognition)  oriented x 4  -JK      Follows Commands (Cognition)  WFL  -JK      Personal Safety Interventions  fall prevention program maintained  -JK      Cognitive Function (Cognitive)  WFL  -JK      Recorded by [JK] Ronda Yap, PT      Row Name 20             Sit-Stand Transfer    Sit-Stand [place] (Transfers)  supervision  -JK      Assistive Device (Sit-Stand Transfers)  walker, front-wheeled  -JK      Recorded by [JK] Ronda Yap, PT      Row Name 20              Stand-Sit Transfer    Stand-Sit [place] (Transfers)  supervision  -JK      Assistive Device (Stand-Sit Transfers)  walker, front-wheeled  -JK      Recorded by [PACHECO] Ronda Yap PT      Row Name 01/05/20 0917             Gait/Stairs Assessment/Training    [place] Level (Gait)  supervision  -JK      Assistive Device (Gait)  walker, front-wheeled  -JK      Distance in Feet (Gait)  250 x 2  -JK      Pattern (Gait)  step-through  -JK      Deviations/Abnormal Patterns (Gait)  ally decreased;stride length decreased  -JK      Bilateral Gait Deviations  forward flexed posture;heel strike decreased  -JK      Left Sided Gait Deviations  heel strike decreased  -JK      Right Sided Gait Deviations  heel strike decreased  -JK      Recorded by [PACHECO] Ronda Yap, LENORA      Row Name 01/05/20 0917             Safety Issues, Functional Mobility    Impairments Affecting Function (Mobility)  balance;motor control;pain;range of motion (ROM);strength  -JK      Recorded by [PACHECO] Ronda Yap PT      Row Name 01/05/20 0917             Pain Scale: Numbers Pre/Post-Treatment    Pain Scale: Numbers, Pretreatment  0/10 - no pain  -JK      Pain Scale: Numbers, Post-Treatment  0/10 - no pain  -JK      Recorded by [PACHECO] Ronda Yap PT      Row Name 01/05/20 0917             Lower Extremity Seated Therapeutic Exercise    Performed, Seated Lower Extremity (Therapeutic Exercise)  ankle dorsiflexion/plantarflexion;knee flexion/extension  -JK      Sets/Reps Detail, Seated Lower Extremity (Therapeutic Exercise)  1/20  -JK      Recorded by [PACHECO] Ronda Yap PT      Row Name 01/05/20 0917             Positioning and Restraints    Pre-Treatment Position  sitting in chair/recliner  -JK      Post Treatment Position  wheelchair  -JK      In Wheelchair  sitting;exit alarm on;call light within reach;encouraged to call for assist;heels elevated  -JK      Recorded by [PACHECO] Ronda Yap PT        User Key  (r) = Recorded By, (t) =  Taken By, (c) = Cosigned By    Initials Name Effective Dates    Rnoda Post, PT 04/03/18 -         Wound 12/19/19 1939 Right lateral throat Incision (Active)   Dressing Appearance dry;intact 1/5/2020  6:55 AM   Closure Approximated;Liquid skin adhesive;Adhesive closure strips 1/5/2020  6:55 AM   Base clean;dry 1/5/2020  6:55 AM   Drainage Amount none 1/5/2020  6:55 AM   Periwound Care, Wound dry periwound area maintained 1/5/2020  6:55 AM           PT Recommendation and Plan                        Time Calculation:     PT Charges     Row Name 01/05/20 0932             Time Calculation    Start Time  0905  -JK      Stop Time  0930  -JK      Time Calculation (min)  25 min  -JK        User Key  (r) = Recorded By, (t) = Taken By, (c) = Cosigned By    Initials Name Provider Type    Ronda Post, PT Physical Therapist          Therapy Charges for Today     Code Description Service Date Service Provider Modifiers Qty    76266759046 HC PT THER PROC EA 15 MIN 1/5/2020 Ronda Yap, PT GP 2            PT G-Codes  Outcome Measure Options: Tinetti  Tinetti Total Score: 22      Ronda Yap, PT  1/5/2020

## 2020-01-05 NOTE — PROGRESS NOTES
Occupational Therapy: Branch    Physical Therapy: Individual: 25 minutes.    Speech Language Pathology:  Branch    Signed by: Ronda Yap PT

## 2020-01-05 NOTE — PROGRESS NOTES
Inpatient Rehabilitation Plan of Care Note    Plan of Care  Care Plan Reviewed - No updates at this time.    Safety    Performed Intervention(s)  safety rounds/call light within easy reach  bed alarm/chair alarm      Pain    Performed Intervention(s)  Pain assessment q shift and PRN; Offer pain medication PRN    Signed by: Gunner Davis RN

## 2020-01-05 NOTE — PLAN OF CARE
Problem: Patient Care Overview  Goal: Plan of Care Review  Outcome: Ongoing (interventions implemented as appropriate)  Flowsheets (Taken 1/5/2020 3099)  Outcome Summary: Pt rested well this shift.  C/o slight headache, tylenol provided some relief.  Neck incision clean, dry & intact- no steri-strips.  Pt anticipates dc next week. Weakness persists in upper extremities, however pt notices some improvement.  Meds whole with water.  Progress, Functional Goals: demonstrating adequate progress  Plan of Care Reviewed With: patient  IRF Plan of Care Review: progress ongoing, continue

## 2020-01-05 NOTE — PROGRESS NOTES
"   LOS: 9 days   Patient Care Team:  Mary Lou Carroll MD as PCP - General (Internal Medicine)    Chief Complaint:   Cervical myelopathy with upper extremity greater than lower extremity and left side greater than right side involvement  History of cervical stenosis C5 to T1 with ossification of the posterior longitudinal ligament (OPLL) and progresive myelopathy  Status post December 6, 2019- Anterior cervical corpectomy C6, C7 with PEEK cage and anterior Zevo cervical plate from C5 to T1  CSF leak repair.   Status post December 19, 2019-1. Placement of external lumbar drain; 2. Drainage of cervical CSF collection and reclosure of CSF leak.  Lumbar drain removed December 24, 2019       Subjective     History of Present Illness     Subjective  Seen and examined, no acute events overnight. Feels OK, denies chest pain, shortness of breath, f/c. Slept well. Having increased swelling in anterior neck. Surgical team made aware. Denies shortness of breath, stridor, increased pain or weakness.          History taken from: patient    Objective     Vital Signs  Temp:  [97.5 °F (36.4 °C)-98.3 °F (36.8 °C)] 97.5 °F (36.4 °C)  Heart Rate:  [] 93  Resp:  [18] 18  BP: (116-132)/(68-85) 132/85    Objective:  Vital signs: (most recent): Blood pressure 132/85, pulse 93, temperature 97.5 °F (36.4 °C), temperature source Oral, resp. rate 18, height 134.6 cm (53\"), SpO2 95 %, not currently breastfeeding.            Physical Exam  MENTAL STATUS -  AWAKE / ALERT  HEENT- NCAT,   SCLERA NON-ICTERIC, CONJUNCTIVA PINK, OP MOIST, neck incision with increased fluid accumulation anteriorly.  No erythema or drainage.  LUNGS - CTA, NO WHEEZES, RALES OR RHONCHI  HEART- RRR, NO RUB, MURMUR, OR GALLOP  ABD - NORMOACTIVE BOWEL SOUNDS, SOFT, NT.  EXT - NO EDEMA OR CYANOSIS  NEURO -awake alert.  Oriented x4.  Able to oppose the thumb to the other 4 digits bilaterally.  MOTOR EXAM -bilateral upper extremity weakness for elbow extension and finger " flexion and hand intrinsics.  Able to oppose the thumb to the other 4 digits bilaterally.  Takes resistance with bilateral knee extension and ankle dorsiflexion     Results Review:     I reviewed the patient's new clinical results.        Invalid input(s): LABALBU, PROTResults for DARYN PUGH (MRN [id number]) as of 12/18/2019 10:50   Ref. Range 12/2/2019 16:00 12/7/2019 05:51 12/9/2019 05:15 12/10/2019 05:17 12/18/2019 07:05   Sodium Latest Ref Range: 136 - 145 mmol/L 140 134 (L) 142 139 129 (L)           Medication Review: done  Scheduled Meds:    atorvastatin 20 mg Oral Nightly   calcium carbonate (oyster shell) 500 mg Oral BID   cholecalciferol 2,000 Units Oral Daily   docusate sodium 100 mg Oral BID   famotidine 20 mg Oral BID AC   gabapentin 100 mg Oral BID   magnesium hydroxide 10 mL Oral Daily   senna-docusate sodium 2 tablet Oral Nightly     Continuous Infusions:   PRN Meds:.•  acetaminophen  •  bisacodyl  •  cyclobenzaprine  •  fluticasone  •  HYDROcodone-acetaminophen  •  lidocaine  •  melatonin  •  ondansetron **OR** ondansetron  •  polyethylene glycol  •  senna-docusate sodium  •  sodium chloride      Assessment/Plan       Cervical myelopathy (CMS/HCC)      Assessment & Plan  Cervical myelopathy with upper extremity greater than  lower extremity and left side greater than right side involvement.     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  Spinal headache managed conservatively with improvement.    Dec 18 - she has had increase size of the fluid collection at the anterior neck incision.  She is to go for placement of a lumbar drain on December 19 with admission to the acute care wing in the hospital at that  time.  Dec 19 - lumbar drain today  Dec 26 -drain removed yesterday.Patient has some headache today.  She has not had any reaccumulation of fluid at the anterior neck incision.    Dec 27 - no recurrence of anterior cervical fluid collection. Return to rehab unit.      Postoperative cervical neuritis-steroids-Medrol Dosepak taper completed Dec 15.      GI prophylaxis-Pepcid     DVT prophylaxis-SCDs     Osteoporosis-h/o Prolia     Bladder-voiding on own with low PVR recorded     Bowel-constipation-on stool softeners.  Added scheduled suppository every other day after lunch to take advantage of gastrocolic reflex.  December 31- Moving her bowels better.  Using a suppository at home would be difficult with her weakness in the hands.  Discussed assessing her bowel pattern without the scheduled suppository.     Pain management-hydrocodone/Flexeril/gabapentin -Luis Alfredo report reviewed           Now admit to resume comprehensive acute inpatient rehabilitation  .  This would be an interdisciplinary program with physical therapy 1.5 hour,  occupational therapy 1.5 hour,  5 days a week.  Rehabilitation nursing for carryover, monitoring of neurologic   status, bowel and bladder, and skin  Ongoing physician follow-up.  Weekly team conferences.  Goals are to achieve a level of supervision with  mobility and self-care and improved strength and coordination.   Rehabilitation prognosis fair.  Medical prognosis deferred to neurosurgery.  Estimated length of stay is approximately 10-14  days, but is only an estimation.   The patient's functional status and clinical status is unchanged from preadmission assessment and the patient continues appropriate for acute inpatient rehabilitation.  Goal is for home with outpatient   therapies.  Barrier to discharge: Impaired mobility/self-care- work on balance/gross and fine motor control/strengthening to overcome.      TEAM CONF - DEC 17 - INCREASED PAIN YESTERDAY WITH TRANSFERS . SWELLING AT NECK  INCISION.   160 FEET CTG MIN- TRANSFERS CTG-MIN.    STRENGTH 1# RIGHT AND 0# LEFT.   UBD MIN. LBD MAX.  BATH MIN ASSIST. USING BUILT UP HAND ON UTENSILS.   ELOS - TWO WEEKS.     TEAM CONF - DEC 31- TRANSFERS CTG. 4 STAIRS CTG. GAIT 300 FEET CTG RW. TOILET TRANSFERS CTG. CONTINUED WEAK .  UBD MIN. LBD MOD ASSIST. GROOMING MIN ASSIST. BATH MIN ASSIST. SKIN INTACT. SOME SWELLING TO NECK INCISION. CONTINENT BOWEL AND BLADDER. PATIENT WOULD NOT BE ABLE TO DO SUPPOSITORY HERSELF FOR BOWEL PROGRAM  ELOS - FRIDAY ELIEL 10.    1/4  -Chart reviewed, continue with current rehabilitation plan  - Continue gabapentin 100 mg BID for neuropathic pain    1/5  -Chart reviewed, continue with current rehabilitation plan  - Surgical team made aware of increased swelling and fluid accumulation, plan for MRI w/ and w/out today, if needs drain then will likely need to be transferred back to acute, will follow up results     Yovany Connors MD  01/05/20  12:00 PM    Time:

## 2020-01-05 NOTE — PLAN OF CARE
Problem: Patient Care Overview  Goal: Plan of Care Review  Outcome: Ongoing (interventions implemented as appropriate)  Flowsheets (Taken 1/5/2020 9809)  Outcome Summary: To have MRI r/t headache and swollen neck. checking for CSF leak. May tx to house for lumbar drain.  Progress, Functional Goals: demonstrating adequate progress  Plan of Care Reviewed With: patient

## 2020-01-05 NOTE — NURSING NOTE
"1030 I have spoken with Bren WITT of Dr. Noe's office, neuro surgery about increased swelling to neck, no drainage and intermittent low grade headaches. I'm concerned about CSF leak possibility. She came to see patient, sent a picture to Dr. LABOY and then ordered MRI. Screen form is complete and called to Lisa at [phone] in MRI.    1325  Called MRI. Lisa says they are backed up but will send for her as soon as they can but does not know when.    1800 I put call out to Bren WITT who got report from radiology. There is a large collection of fluid in cervical vert.  I called office and got a call from Dr. Page.  He says it is not emergent. She stays here tonight and they will give orders tomorrow. He was professional but slightly brendan with me when I asked about collection. He said, \"she will have to go to ER, but it is not emergent.\"  They will advise us tomorrow of next step.    1810 I advised Dr. Abreu of status. He said simply to watch for any changes.    "

## 2020-01-06 ENCOUNTER — APPOINTMENT (OUTPATIENT)
Dept: GENERAL RADIOLOGY | Facility: HOSPITAL | Age: 60
End: 2020-01-06

## 2020-01-06 ENCOUNTER — HOSPITAL ENCOUNTER (OUTPATIENT)
Facility: HOSPITAL | Age: 60
Setting detail: SURGERY ADMIT
End: 2020-01-06
Attending: NEUROLOGICAL SURGERY | Admitting: NEUROLOGICAL SURGERY

## 2020-01-06 ENCOUNTER — PREP FOR SURGERY (OUTPATIENT)
Dept: OTHER | Facility: HOSPITAL | Age: 60
End: 2020-01-06

## 2020-01-06 DIAGNOSIS — G96.00 POSTOPERATIVE CSF LEAK: Primary | ICD-10-CM

## 2020-01-06 DIAGNOSIS — G97.82 POSTOPERATIVE CSF LEAK: Primary | ICD-10-CM

## 2020-01-06 LAB
DEPRECATED RDW RBC AUTO: 44.3 FL (ref 37–54)
ERYTHROCYTE [DISTWIDTH] IN BLOOD BY AUTOMATED COUNT: 13.2 % (ref 12.3–15.4)
HCT VFR BLD AUTO: 30.6 % (ref 34–46.6)
HGB BLD-MCNC: 10.2 G/DL (ref 12–15.9)
MCH RBC QN AUTO: 31.1 PG (ref 26.6–33)
MCHC RBC AUTO-ENTMCNC: 33.3 G/DL (ref 31.5–35.7)
MCV RBC AUTO: 93.3 FL (ref 79–97)
PLATELET # BLD AUTO: 458 10*3/MM3 (ref 140–450)
PMV BLD AUTO: 9.2 FL (ref 6–12)
RBC # BLD AUTO: 3.28 10*6/MM3 (ref 3.77–5.28)
WBC NRBC COR # BLD: 4.71 10*3/MM3 (ref 3.4–10.8)

## 2020-01-06 PROCEDURE — 97110 THERAPEUTIC EXERCISES: CPT

## 2020-01-06 PROCEDURE — 72040 X-RAY EXAM NECK SPINE 2-3 VW: CPT

## 2020-01-06 PROCEDURE — 97535 SELF CARE MNGMENT TRAINING: CPT

## 2020-01-06 PROCEDURE — 85027 COMPLETE CBC AUTOMATED: CPT | Performed by: NURSE PRACTITIONER

## 2020-01-06 PROCEDURE — 97112 NEUROMUSCULAR REEDUCATION: CPT

## 2020-01-06 PROCEDURE — 99024 POSTOP FOLLOW-UP VISIT: CPT | Performed by: NURSE PRACTITIONER

## 2020-01-06 RX ADMIN — CALCIUM 500 MG: 500 TABLET ORAL at 19:53

## 2020-01-06 RX ADMIN — ACETAMINOPHEN 1000 MG: 500 TABLET, FILM COATED ORAL at 19:54

## 2020-01-06 RX ADMIN — GABAPENTIN 100 MG: 100 CAPSULE ORAL at 08:10

## 2020-01-06 RX ADMIN — ACETAMINOPHEN 1000 MG: 500 TABLET, FILM COATED ORAL at 06:24

## 2020-01-06 RX ADMIN — FAMOTIDINE 20 MG: 20 TABLET, FILM COATED ORAL at 17:27

## 2020-01-06 RX ADMIN — FAMOTIDINE 20 MG: 20 TABLET, FILM COATED ORAL at 06:15

## 2020-01-06 RX ADMIN — DOCUSATE SODIUM 100 MG: 100 CAPSULE, LIQUID FILLED ORAL at 19:53

## 2020-01-06 RX ADMIN — Medication 3 MG: at 19:54

## 2020-01-06 RX ADMIN — CALCIUM 500 MG: 500 TABLET ORAL at 08:10

## 2020-01-06 RX ADMIN — DOCUSATE SODIUM 100 MG: 100 CAPSULE, LIQUID FILLED ORAL at 08:09

## 2020-01-06 RX ADMIN — ATORVASTATIN CALCIUM 20 MG: 20 TABLET, FILM COATED ORAL at 19:53

## 2020-01-06 RX ADMIN — MAGNESIUM HYDROXIDE 10 ML: 2400 SUSPENSION ORAL at 08:09

## 2020-01-06 RX ADMIN — GABAPENTIN 100 MG: 100 CAPSULE ORAL at 19:54

## 2020-01-06 RX ADMIN — VITAMIN D, TAB 1000IU (100/BT) 2000 UNITS: 25 TAB at 08:10

## 2020-01-06 NOTE — THERAPY TREATMENT NOTE
Inpatient Rehabilitation - Physical Therapy Treatment Note  [facility]     Patient Name: Noemi Bartholomew  : 1960  MRN: [id number]    Today's Date: 2020                 Admit Date: 2019      Visit Dx:    No diagnosis found.    Patient Active Problem List   Diagnosis   • Carpal tunnel syndrome   • Hyperlipidemia   • Osteoporosis   • DDD (degenerative disc disease), lumbar   • Chronic left lumbar radiculopathy   • Congenital spondylolysis, lumbosacral region   • Neuropathic pain, leg, left   • Ossification of spinal ligament   • Spinal stenosis in cervical region   • Cervical spondylosis with myelopathy   • Cervical stenosis of spine   • Postoperative anemia due to acute blood loss   • Steroid-induced hyperglycemia   • Cord compression (CMS/HCC)   • Leukocytosis (due to steroids)   • Postoperative CSF leak   • Cervical myelopathy (CMS/HCC)   • CSF leak       Therapy Treatment    IRF Treatment Summary     Row Name 20 1509 20 0909          Evaluation/Treatment Time and Intent    Subjective Information  no complaints  -DN  complains of;pain  -LB     Existing Precautions/Restrictions  fall;lifting;spinal  -DN  fall  -LB     Document Type  therapy note (daily note)  -DN  therapy note (daily note)  -LB     Mode of Treatment  occupational therapy  -DN  physical therapy  -LB     Patient/Family Observations  pt seated in w/c  -DN  Pnt seated in w/c. Twin sister present.  -LB     Recorded by [DN] See [facility] OT [LB] Audrey Valdivia PTA     Row Name 20 1509             Cognition/Psychosocial- PT/OT    Affect/Mental Status (Cognitive)  WFL  -DN      Recorded by [DN] See Cuadra OT      Row Name 20 0909             Bed Mobility Assessment/Treatment    Rolling Left Dane (Bed Mobility)  supervision;verbal cues log roll  -LB      Supine-Sit Dane (Bed Mobility)  moderate assist (50% patient effort);minimum assist (75% patient effort)  -LB      Sit-Supine  "Gregory (Bed Mobility)  supervision  -LB      Comment (Bed Mobility)  assessed on txment mat  -LB      Recorded by [LB] Audrey Valdivia PTA      Row Name 01/06/20 0909             Transfer Assessment/Treatment    Comment (Transfers)  sit to stand to sit on/from 30\" high txment mat. Used various height stools to boost herself up on to txment mat. used RLE and rwx to boost up onto mat.  -LB      Recorded by [LB] Audrey Valdivia PTA      Row Name 01/06/20 0909             Bed-Chair Transfer    Bed-Chair Gregory (Transfers)  stand by assist;contact guard  -LB      Assistive Device (Bed-Chair Transfers)  wheelchair  -LB      Recorded by [LB] Audrey Vladivia PTA      Row Name 01/06/20 0909             Chair-Bed Transfer    Chair-Bed Gregory (Transfers)  stand by assist  -LB      Assistive Device (Chair-Bed Transfers)  walker, front-wheeled  -LB      Recorded by [LB] Audrey Valdivia PTA      Row Name 01/06/20 0909             Sit-Stand Transfer    Sit-Stand Gregory (Transfers)  supervision  -LB      Assistive Device (Sit-Stand Transfers)  walker, front-wheeled  -LB      Recorded by [LB] Audrey Valdivia, EBENEZER      Row Name 01/06/20 0909             Stand-Sit Transfer    Stand-Sit Gregory (Transfers)  supervision  -LB      Assistive Device (Stand-Sit Transfers)  walker, front-wheeled  -LB      Recorded by [LB] Audrey Valdivia PTA      Row Name 01/06/20 1509             Toilet Transfer    Type (Toilet Transfer)  stand pivot/stand step  -DN      Gregory Level (Toilet Transfer)  supervision  -DN      Assistive Device (Toilet Transfer)  walker, front-wheeled  -DN      Recorded by [DN] See [facility] OT      Row Name 01/06/20 1509             Shower Transfer    Type (Shower Transfer)  stand pivot/stand step  -DN      Gregory Level (Shower Transfer)  supervision  -DN      Assistive Device (Shower Transfer)  walker, front-wheeled;shower chair  -DN      Recorded by [DN] " "See [facility] OT      Row Name 01/06/20 0909             Car Transfer    Type (Car Transfer)  sit-stand;stand-sit  -LB      [place] Level (Car Transfer)  stand by assist  -LB      Assistive Device (Car Transfer)  walker, front-wheeled back seat  -LB      Recorded by [LB] Audrey Valdivia PTA      Row Name 01/06/20 0909             Gait/Stairs Assessment/Training    [place] Level (Gait)  supervision  -LB      Assistive Device (Gait)  walker, front-wheeled  -LB      Distance in Feet (Gait)  240, 180  -LB      Pattern (Gait)  step-through  -LB      Deviations/Abnormal Patterns (Gait)  ally decreased  -LB      Bilateral Gait Deviations  forward flexed posture;heel strike decreased  -LB      Left Sided Gait Deviations  heel strike decreased  -LB      Right Sided Gait Deviations  heel strike decreased  -LB      [place] Level (Stairs)  contact guard  -LB      Handrail Location (Stairs)  left side (ascending) w both hands  -LB      Number of Steps (Stairs)  6 (4\") steps  -LB      Ascending Technique (Stairs)  step-to-step  -LB      Descending Technique (Stairs)  step-to-step  -LB      Comment (Gait/Stairs)  amb w/o ad 240 ft w cga. curb and ramp w HHA and CGA  -LB      Recorded by [LB] Audrey Valdivia PTA      Row Name 01/06/20 1507             Bathing Assessment/Treatment    Bathing [place] Level  bathing skills;minimum assist (75% patient effort)  -DN      Assistive Device (Bathing)  hand held shower spray hose;long-handled sponge;shower chair  -DN      Bathing Position  supported sitting  -DN      Bathing Setup Assistance  obtain supplies  -DN      Comment (Bathing)  assist with rear bouchra care due to strength  -DN      Recorded by [DN] See [facility] OT      Row Name 01/06/20 1509             Upper Body Dressing Assessment/Treatment    Upper Body Dressing Task  upper body dressing skills;doff;don;bra/undergarment;pull over garment;set up assistance  -DN      Upper Body Dressing Position  " supported sitting  -DN      Set-up Assistance (Upper Body Dressing)  obtain clothing  -DN      Recorded by [DN] See Cuadra, OT      Row Name 01/06/20 1509             Lower Body Dressing Assessment/Treatment    Lower Body Dressing [place] Level  doff;don;pants/bottoms;shoes/slippers;socks;underwear;minimum assist (75% patient effort);shoelaces  -DN      Lower Body Dressing Position  supported sitting  -DN      Lower Body Dressing Setup Assistance  obtain clothing  -DN      Comment (Lower Body Dressing)  needs assist with tying shoes  -DN      Recorded by [DN] See Cuadra, OT      Row Name 01/06/20 1509             Grooming Assessment/Treatment    Grooming [place] Level  grooming skills;deodorant application;hair care, combing/brushing;oral care regimen;set up  -DN      Grooming Position  supported sitting  -DN      Grooming Setup Assistance  open containers  -DN      Comment (Grooming)  needs assist with opening toothpaste and squeezing out toothpaste on brush due to strength  -DN      Recorded by [DN] See Cuadra, OT      Row Name 01/06/20 1509             Toileting Assessment/Treatment    Toileting [place] Level  toileting skills;adjust/manage clothing;perform perineal hygiene;minimum assist (75% patient effort)  -DN      Assistive Device Use (Toileting)  grab bar/safety frame  -DN      Toileting Position  supported sitting;supported standing  -DN      Comment (Toileting)  again assist with complete hygene with BMs due to strength  -DN      Recorded by [DN] See Cuadra, OT      Row Name 01/06/20 1509             Self-Feeding Assessment/Treatment    [place] Level (Self-Feeding)  conditional independence  -DN      Recorded by [DN] See Cuadra, OT      Row Name 01/06/20 1509             Fine Motor Testing & Training    Comment, Fine Motor Coordination  velcro board with B UEs, bean bag grab and reach activity  -DN      Recorded by [DN] See [facility]ra, OT      Row Name 01/06/20 1509  01/06/20 0909          Pain Scale: Numbers Pre/Post-Treatment    Pain Scale: Numbers, Pretreatment  0/10 - no pain  -DN  4/10  -LB     Pain Scale: Numbers, Post-Treatment  0/10 - no pain  -DN  --     Pain Location  --  head  -LB     Pain Intervention(s)  --  Medication (See MAR)  -LB     Recorded by [DN] See [facility] OT [LB] Audrey Valdivia, EBENEZER     Row Name 01/06/20 0909             Standing Balance Activity    Activities Performed (Standing, Balance Training)  feet together in stance;semi tandem stance on foam pad; CGA  -LB      Comment (Standing, Balance Training)  alt toe taps w CGA Trampoline: standing feet together. semi tandem stance. CGA  -LB      Recorded by [LB] Audrey Valdivia PTA      Row Name 01/06/20 0909             Dynamic Balance Activity    Therapeutic Training Performed (Dynamic Balance)  ambulate backward;side stepping;figure eights  -LB      Support Needed for Balance (Dynamic Balance Training)  balances without upper extremity support;CGA  -LB      Recorded by [LB] Audrey Valdivia PTA      Row Name 01/06/20 1509             Upper Extremity Seated Therapeutic Exercise    Performed, Seated Upper Extremity (Therapeutic Exercise)  shoulder flexion/extension;shoulder abduction/adduction;shoulder external/internal rotation;shoulder horizontal abduction/adduction;scapular protraction/retraction;scapular stabilization;elbow flexion/extension;forearm supination/pronation;wrist flexion/extension;digit flexion/extension  -DN      Device, Seated Upper Extremity (Therapeutic Exercise)  free weights, cuff;free weights, barbell  -DN      Exercise Type, Seated Upper Extremity (Therapeutic Exercise)  AROM (active range of motion)  -DN      Expected Outcomes, Seated Upper Extremity (Therapeutic Exercise)  improve functional tolerance, self-care activity;improve performance, transfer skills  -DN      Restrictions, Seated Upper Extremity (Therapeutic Exercise)  no lift >5#, No overhead movements  -DN       Sets/Reps Detail, Seated Upper Extremity (Therapeutic Exercise)  2/8 for shoulder ROM with 1# cuff weights in precaution range only, 3/10 with 1# dumbell for elbow, forearm and wrist, towel slides on incline table standing  -DN      Transfers Skills, Training to Functional Activity, Seated Upper Extremity (Therapeutic Exercise)  beginning to transfer skills to functional activity  -DN      Recorded by [DN] See [facility] OT      Row Name 01/06/20 0909             Lower Extremity Standing Therapeutic Exercise    Performed, Standing Lower Extremity (Therapeutic Exercise)  mini-squats;heel raises  -LB      Exercise Type, Standing Lower Extremity (Therapeutic Exercise)  AROM (active range of motion)  -LB      Sets/Reps Detail, Standing Lower Extremity (Therapeutic Exercise)  1/10  -LB      Recorded by [LB] Audrey Valdivia PTA      Row Name 01/06/20 0909             Lower Extremity Seated Therapeutic Exercise    Performed, Seated Lower Extremity (Therapeutic Exercise)  ankle dorsiflexion/plantarflexion ABC's w foot/ankle  -LB      Recorded by [LB] Audrey Valdivia PTA      Row Name 01/06/20 1509 01/06/20 0909          Positioning and Restraints    Pre-Treatment Position  sitting in chair/recliner  -DN  --     Post Treatment Position  wheelchair  -DN  wheelchair  -LB     In Bed  sitting;call light within reach;encouraged to call for assist;exit alarm on;with family/caregiver  -DN  --     In Wheelchair  --  sitting;call light within reach;exit alarm on;with family/caregiver  -LB     Recorded by [DN] See [facility] OT [LB] Audrey Valdivia PTA       User Key  (r) = Recorded By, (t) = Taken By, (c) = Cosigned By    Initials Name Effective Dates    See Tan OT 06/08/18 -     LB Audrey Valdivia PTA 03/07/18 -         Wound 12/19/19 1939 Right lateral throat Incision (Active)   Dressing Appearance dry;intact;open to air 1/6/2020  7:55 AM   Closure Approximated;Liquid skin adhesive 1/5/2020 11:00  PM   Base clean;dry 1/5/2020 11:00 PM   Periwound dry;swelling 1/6/2020  7:55 AM   Periwound Temperature warm 1/5/2020 11:00 PM   Drainage Amount none 1/5/2020 11:00 PM   Dressing Care, Wound open to air 1/6/2020  7:55 AM           PT Recommendation and Plan                        Time Calculation:     PT Charges     Row Name 01/06/20 1518 01/06/20 0908          Time Calculation    Start Time  1430  -LB  0830  -LB     Stop Time  1500  -LB  0930  -LB     Time Calculation (min)  30 min  -LB  60 min  -LB       User Key  (r) = Recorded By, (t) = Taken By, (c) = Cosigned By    Initials Name Provider Type    Audrey Adams PTA Physical Therapy Assistant          Therapy Charges for Today     Code Description Service Date Service Provider Modifiers Qty    94243643998 HC PT THER PROC EA 15 MIN 1/6/2020 Audrey Valdivia PTA GP 6            PT G-Codes  Outcome Measure Options: Tinetti  Tinetti Total Score: 22      Audrey Valdivia PTA  1/6/2020

## 2020-01-06 NOTE — THERAPY TREATMENT NOTE
"Inpatient Rehabilitation - Occupational Therapy Treatment Note    [facility]     Patient Name: Noemi Bartholomew  : 1960  MRN: [id number]    Today's Date: 2020                 Admit Date: 2019      Visit Dx:  No diagnosis found.    Patient Active Problem List   Diagnosis   • Carpal tunnel syndrome   • Hyperlipidemia   • Osteoporosis   • DDD (degenerative disc disease), lumbar   • Chronic left lumbar radiculopathy   • Congenital spondylolysis, lumbosacral region   • Neuropathic pain, leg, left   • Ossification of spinal ligament   • Spinal stenosis in cervical region   • Cervical spondylosis with myelopathy   • Cervical stenosis of spine   • Postoperative anemia due to acute blood loss   • Steroid-induced hyperglycemia   • Cord compression (CMS/HCC)   • Leukocytosis (due to steroids)   • Postoperative CSF leak   • Cervical myelopathy (CMS/HCC)   • CSF leak         Therapy Treatment    IRF Treatment Summary     Row Name 20 1509 20 0909          Evaluation/Treatment Time and Intent    Subjective Information  no complaints  -DN  complains of;pain  -LB     Existing Precautions/Restrictions  fall;lifting;spinal  -DN  fall  -LB     Document Type  therapy note (daily note)  -DN  therapy note (daily note)  -LB     Mode of Treatment  occupational therapy  -DN  physical therapy  -LB     Patient/Family Observations  pt seated in w/c  -DN  Pnt seated in w/c. Twin sister present.  -LB     Recorded by [DN] See [facility] OT [LB] Audrey Valdivia PTA     Row Name 20 1509             Cognition/Psychosocial- PT/OT    Affect/Mental Status (Cognitive)  WFL  -DN      Recorded by [DN] See [facility] OT      Row Name 20 0909             Transfer Assessment/Treatment    Comment (Transfers)  sit to stand to sit on/from 30\" high txment mat. Used various height stools to boost herself up on to txment mat. used RLE and rwx to boost up onto mat.  -LB      Recorded by [LB] Audrey Valdivia PTA   "    Row Name 01/06/20 0909             Sit-Stand Transfer    Sit-Stand [place] (Transfers)  supervision  -LB      Assistive Device (Sit-Stand Transfers)  walker, front-wheeled  -LB      Recorded by [LB] Audrey Valdivia PTA      Row Name 01/06/20 0909             Stand-Sit Transfer    Stand-Sit [place] (Transfers)  supervision  -LB      Assistive Device (Stand-Sit Transfers)  walker, front-wheeled  -LB      Recorded by [LB] Audrey Valdivia PTA      Row Name 01/06/20 1509             Toilet Transfer    Type (Toilet Transfer)  stand pivot/stand step  -DN      [place] Level (Toilet Transfer)  supervision  -DN      Assistive Device (Toilet Transfer)  walker, front-wheeled  -DN      Recorded by [DN] See [facility] OT      Row Name 01/06/20 1509             Shower Transfer    Type (Shower Transfer)  stand pivot/stand step  -DN      [place] Level (Shower Transfer)  supervision  -DN      Assistive Device (Shower Transfer)  walker, front-wheeled;shower chair  -DN      Recorded by [DN] See [facility] OT      Row Name 01/06/20 0909             Car Transfer    Type (Car Transfer)  sit-stand;stand-sit  -LB      [place] Level (Car Transfer)  stand by assist  -LB      Assistive Device (Car Transfer)  walker, front-wheeled back seat  -LB      Recorded by [LB] Audrey Valdivia PTA      Row Name 01/06/20 0909             Gait/Stairs Assessment/Training    [place] Level (Gait)  supervision  -LB      Assistive Device (Gait)  walker, front-wheeled  -LB      Distance in Feet (Gait)  240, 180  -LB      Pattern (Gait)  step-through  -LB      Deviations/Abnormal Patterns (Gait)  ally decreased  -LB      Bilateral Gait Deviations  forward flexed posture;heel strike decreased  -LB      Left Sided Gait Deviations  heel strike decreased  -LB      Right Sided Gait Deviations  heel strike decreased  -LB      [place] Level (Stairs)  contact guard  -LB      Handrail Location (Stairs)  left side  "(ascending) w both hands  -LB      Number of Steps (Stairs)  6 (4\") steps  -LB      Ascending Technique (Stairs)  step-to-step  -LB      Descending Technique (Stairs)  step-to-step  -LB      Comment (Gait/Stairs)  amb w/o ad 240 ft w cga. curb and ramp w HHA and CGA  -LB      Recorded by [LB] Audrey Valdivia PTA      Row Name 01/06/20 1509             Bathing Assessment/Treatment    Bathing [place] Level  bathing skills;minimum assist (75% patient effort)  -DN      Assistive Device (Bathing)  hand held shower spray hose;long-handled sponge;shower chair  -DN      Bathing Position  supported sitting  -DN      Bathing Setup Assistance  obtain supplies  -DN      Comment (Bathing)  assist with rear bouchra care due to strength  -DN      Recorded by [DN] See Cuadra, OT      Row Name 01/06/20 1509             Upper Body Dressing Assessment/Treatment    Upper Body Dressing Task  upper body dressing skills;doff;don;bra/undergarment;pull over garment;set up assistance  -DN      Upper Body Dressing Position  supported sitting  -DN      Set-up Assistance (Upper Body Dressing)  obtain clothing  -DN      Recorded by [DN] See Cuadra, OT      Row Name 01/06/20 1509             Lower Body Dressing Assessment/Treatment    Lower Body Dressing [place] Level  doff;don;pants/bottoms;shoes/slippers;socks;underwear;minimum assist (75% patient effort);shoelaces  -DN      Lower Body Dressing Position  supported sitting  -DN      Lower Body Dressing Setup Assistance  obtain clothing  -DN      Comment (Lower Body Dressing)  needs assist with tying shoes  -DN      Recorded by [DN] See [facility]ra, OT      Row Name 01/06/20 1509             Grooming Assessment/Treatment    Grooming [place] Level  grooming skills;deodorant application;hair care, combing/brushing;oral care regimen;set up  -DN      Grooming Position  supported sitting  -DN      Grooming Setup Assistance  open containers  -DN      Comment (Grooming)  needs " assist with opening toothpaste and squeezing out toothpaste on brush due to strength  -DN      Recorded by [DN] See Cu[facility]ra, OT      Row Name 01/06/20 1509             Toileting Assessment/Treatment    Toileting [place] Level  toileting skills;adjust/manage clothing;perform perineal hygiene;minimum assist (75% patient effort)  -DN      Assistive Device Use (Toileting)  grab bar/safety frame  -DN      Toileting Position  supported sitting;supported standing  -DN      Comment (Toileting)  again assist with complete hygene with BMs due to strength  -DN      Recorded by [DN] See [facility]ra, OT      Row Name 01/06/20 1509             Self-Feeding Assessment/Treatment    [place] Level (Self-Feeding)  conditional independence  -DN      Recorded by [DN] See Cuadra, OT      Row Name 01/06/20 1509             Fine Motor Testing & Training    Comment, Fine Motor Coordination  velcro board with B UEs, bean bag grab and reach activity  -DN      Recorded by [DN] See Cuadra, OT      Row Name 01/06/20 1509 01/06/20 0909          Pain Scale: Numbers Pre/Post-Treatment    Pain Scale: Numbers, Pretreatment  0/10 - no pain  -DN  4/10  -LB     Pain Scale: Numbers, Post-Treatment  0/10 - no pain  -DN  --     Pain Location  --  head  -LB     Pain Intervention(s)  --  Medication (See MAR)  -LB     Recorded by [DN] See [facility], OT [LB] Audrey Valdivia PTA     Row Name 01/06/20 0909             Standing Balance Activity    Activities Performed (Standing, Balance Training)  feet together in stance;semi tandem stance on foam pad; CGA  -LB      Comment (Standing, Balance Training)  alt toe taps w CGA  -LB      Recorded by [LB] Audrey Valdivia PTA      Row Name 01/06/20 0909             Dynamic Balance Activity    Therapeutic Training Performed (Dynamic Balance)  ambulate backward;side stepping  -LB      Support Needed for Balance (Dynamic Balance Training)  balances without upper extremity support;CGA  -LB       Recorded by [LB] Audrey Valdivia PTA      Row Name 01/06/20 1509             Upper Extremity Seated Therapeutic Exercise    Performed, Seated Upper Extremity (Therapeutic Exercise)  shoulder flexion/extension;shoulder abduction/adduction;shoulder external/internal rotation;shoulder horizontal abduction/adduction;scapular protraction/retraction;scapular stabilization;elbow flexion/extension;forearm supination/pronation;wrist flexion/extension;digit flexion/extension  -DN      Device, Seated Upper Extremity (Therapeutic Exercise)  free weights, cuff;free weights, barbell  -DN      Exercise Type, Seated Upper Extremity (Therapeutic Exercise)  AROM (active range of motion)  -DN      Expected Outcomes, Seated Upper Extremity (Therapeutic Exercise)  improve functional tolerance, self-care activity;improve performance, transfer skills  -DN      Restrictions, Seated Upper Extremity (Therapeutic Exercise)  no lift >5#, No overhead movements  -DN      Sets/Reps Detail, Seated Upper Extremity (Therapeutic Exercise)  2/8 for shoulder ROM with 1# cuff weights in precaution range only, 3/10 with 1# dumbell for elbow, forearm and wrist, towel slides on incline table standing  -DN      Transfers Skills, Training to Functional Activity, Seated Upper Extremity (Therapeutic Exercise)  beginning to transfer skills to functional activity  -DN      Recorded by [DN] See [facility] OT      Row Name 01/06/20 0909             Lower Extremity Standing Therapeutic Exercise    Performed, Standing Lower Extremity (Therapeutic Exercise)  mini-squats;heel raises  -LB      Exercise Type, Standing Lower Extremity (Therapeutic Exercise)  AROM (active range of motion)  -LB      Sets/Reps Detail, Standing Lower Extremity (Therapeutic Exercise)  1/10  -LB      Recorded by [LB] Audrey Valdivia PTA      Row Name 01/06/20 1509 01/06/20 0909          Positioning and Restraints    Pre-Treatment Position  sitting in chair/recliner  -DN  --     Post  Treatment Position  wheelchair  -DN  wheelchair  -LB     In Bed  sitting;call light within reach;encouraged to call for assist;exit alarm on;with family/caregiver  -DN  --     In Wheelchair  --  sitting;call light within reach;exit alarm on;with family/caregiver  -LB     Recorded by [DN] See [facility] OT [LB] Audrey Valdivia PTA       User Key  (r) = Recorded By, (t) = Taken By, (c) = Cosigned By    Initials Name Effective Dates    See Tan OT 06/08/18 -     LB Audrey Valdivia PTA 03/07/18 -           Wound 12/19/19 1939 Right lateral throat Incision (Active)   Dressing Appearance dry;intact;open to air 1/6/2020  7:55 AM   Closure Approximated;Liquid skin adhesive 1/5/2020 11:00 PM   Base clean;dry 1/5/2020 11:00 PM   Periwound dry;swelling 1/6/2020  7:55 AM   Periwound Temperature warm 1/5/2020 11:00 PM   Drainage Amount none 1/5/2020 11:00 PM   Dressing Care, Wound open to air 1/6/2020  7:55 AM         OT Recommendation and Plan    Anticipated Equipment Needs At Discharge (OT Eval): commode, 3-in-1, tub bench, shower chair  Planned Therapy Interventions (OT Eval): activity tolerance training, BADL retraining, neuromuscular control/coordination retraining, patient/caregiver education/training, strengthening exercise, transfer/mobility retraining            OT IRF GOALS     Row Name 01/03/20 1200 12/28/19 1144          Transfer Goal 1 (OT-IRF)    Activity/Assistive Device (Transfer Goal 1, OT-IRF)  shower chair;commode, bedside without drop arms;walker, rolling  -DN  shower chair;commode, bedside without drop arms;walker, rolling  -DN     [place] Level (Transfer Goal 1, OT-IRF)  conditional independence  -DN  supervision required  -DN     Time Frame (Transfer Goal 1, OT-IRF)  short term goal (STG)  -DN  short term goal (STG)  -DN     Progress/Outcomes (Transfer Goal 1, OT-IRF)  goal met;goal revised this date  -DN  continuing progress toward goal  -DN        Transfer Goal 2 (OT-IRF)     Activity/Assistive Device (Transfer Goal 2, OT-IRF)  toilet;shower chair  -DN  toilet;walker, rolling;shower chair  -DN     [place] Level (Transfer Goal 2, OT-IRF)  conditional independence  -DN  conditional independence  -DN     Time Frame (Transfer Goal 2, OT-IRF)  long term goal (LTG)  -DN  long term goal (LTG)  -DN     Progress/Outcomes (Transfer Goal 2, OT-IRF)  goal ongoing  -DN  continuing progress toward goal  -DN        Bathing FIM Goal (OT-IRF)    Progress/Outcomes (Bathing FIM Goal, OT-IRF)  --  goal no longer appropriate  -DN        Bathing Goal 1 (OT-IRF)    Activity/Device (Bathing Goal 1, OT-IRF)  bathing skills, all  -DN  bathing skills, all  -DN     [place] Level (Bathing Goal 1, OT-IRF)  supervision required  -DN  supervision required  -DN     Time Frame (Bathing Goal 1, OT-IRF)  short term goal (STG)  -DN  short term goal (STG)  -DN     Progress/Outcomes (Bathing Goal 1, OT-IRF)  goal not met;goal ongoing  -DN  continuing progress toward goal  -DN        Bathing Goal 2 (OT-IRF)    Activity/Device (Bathing Goal 2, OT-IRF)  bathing skills, all  -DN  bathing skills, all  -DN     [place] Level (Bathing Goal 2, OT-IRF)  conditional independence  -DN  conditional independence  -DN     Time Frame (Bathing Goal 2, OT-IRF)  long term goal (LTG)  -DN  long term goal (LTG)  -DN     Progress/Outcomes (Bathing Goal 2, OT-IRF)  goal ongoing  -DN  continuing progress toward goal  -DN        UB Dressing FIM Goal (OT-IRF)    Progress/Outcomes (UB Dressing FIM Goal, OT-IRF)  --  goal no longer appropriate  -DN        UB Dressing Goal 1 (OT-IRF)    Activity/Device (UB Dressing Goal 1, OT-IRF)  upper body dressing  -DN  upper body dressing  -DN     [place] (UB Dress Goal 1, OT-IRF)  conditional independence  -DN  supervision required  -DN     Time Frame (UB Dressing Goal 1, OT-IRF)  short term goal (STG)  -DN  short term goal (STG)  -DN     Progress/Outcomes (UB Dressing Goal 1, OT-IRF)  goal  met;goal revised this date  -DN  continuing progress toward goal  -DN        UB Dressing Goal 2 (OT-IRF)    Activity/Device (UB Dressing Goal 2, OT-IRF)  upper body dressing  -DN  upper body dressing  -DN     [place] (UB Dress Goal 2, OT-IRF)  conditional independence  -DN  conditional independence  -DN     Time Frame (UB Dressing Goal 2, OT-IRF)  long term goal (LTG)  -DN  long term goal (LTG)  -DN     Progress/Outcomes (UB Dressing Goal 2, OT-IRF)  continuing progress toward goal  -DN  continuing progress toward goal  -DN        LB Dressing FIM Goal (OT-IRF)    Progress/Outcomes (LB Dressing FIM Goal, OT-IRF)  --  goal no longer appropriate  -DN        LB Dressing Goal 1 (OT-IRF)    Activity/Device (LB Dressing Goal 1, OT-IRF)  lower body dressing  -DN  lower body dressing  -DN     [place] (LB Dressing Goal 1, OT-IRF)  supervision required  -DN  moderate assist (50-74% patient effort)  -DN     Time Frame (LB Dressing Goal 1, OT-IRF)  short term goal (STG)  -DN  short term goal (STG)  -DN     Progress/Outcomes (LB Dressing Goal 1, OT-IRF)  goal met;goal revised this date  -DN  continuing progress toward goal  -DN        LB Dressing Goal 2 (OT-IRF)    Activity/Device (LB Dressing Goal 2, OT-IRF)  lower body dressing  -DN  lower body dressing  -DN     [place] (LB Dressing Goal 2, OT-IRF)  conditional independence  -DN  supervision required  -DN     Time Frame (LB Dressing Goal 2, OT-IRF)  long term goal (LTG)  -DN  long term goal (LTG)  -DN     Progress/Outcomes (LB Dressing Goal 2, OT-IRF)  goal revised this date;goal ongoing  -DN  continuing progress toward goal  -DN       User Key  (r) = Recorded By, (t) = Taken By, (c) = Cosigned By    Initials Name Provider Type    See Tan OT Occupational Therapist                     Time Calculation:     Time Calculation- OT     Row Name 01/06/20 1520 01/06/20 1000          Time Calculation- OT    OT Start Time  1345  -DN  1000  -DN     OT Stop Time   1430  -DN  1100  -DN     OT Time Calculation (min)  45 min  -DN  60 min  -DN     OT Received On  01/06/20  -DN  01/06/20  -DN       User Key  (r) = Recorded By, (t) = Taken By, (c) = Cosigned By    Initials Name Provider Type    See Tan OT Occupational Therapist          Therapy Charges for Today     Code Description Service Date Service Provider Modifiers Qty    [id number]  OT NEUROMUSC RE EDUCATION EA 15 MIN 1/6/2020 See [facility] OT GO 3    16059081130 HC OT SELF CARE/MGMT/TRAIN EA 15 MIN 1/6/2020 See [facility] OT GO 4                   See [facility] OT  1/6/2020

## 2020-01-06 NOTE — PROGRESS NOTES
"Postoperative visit      Sitting up in chair.  Denies any pain.  Doing well with rehab.        Blood pressure 120/72, pulse 70, temperature 98 °F (36.7 °C), temperature source Oral, resp. rate 18, height 134.6 cm (53\"), SpO2 95 %, not currently breastfeeding.        Anterior cervical incision is well approximated.  There is some mild, soft swelling beneath the incision.  Nontender to palpation.  No evidence of erythema or drainage.  Voice quality normal.  Moving arms and legs well.        .  Results from last 7 days   Lab Units 01/06/20  0509   WBC 10*3/mm3 4.71   HEMOGLOBIN g/dL 10.2*   HEMATOCRIT % 30.6*   PLATELETS 10*3/mm3 458*           Mri Cervical Spine With & Without Contrast    Result Date: 1/6/2020   Overall, the findings are very similar when compared to the prior study of 12/17/2019. However, the cord signal abnormality previously seen dorsally within the cord extending from the C3-C4 level down to the C4-C5 level appears slightly less confluent. The findings could relate to evolving cord edema or infarct or myelomalacia at this time point. A more localized area of cord signal abnormality is identified at the C6-C7 level that is again compatible with either an area of evolving edema or infarct or myelomalacia. Similar findings were noted on the prior exam. A localized area of cord signal abnormality at the C5 level has been present on more remote imaging and is more compatible with an area of myelomalacia.  The patient has undergone corpectomy procedures at the C6 and C7 levels. An anterior cervical plate and traversing screws fuse C5 down to T1. Grafts are in place at the sites of the corpectomies. These postsurgical changes were evident on the prior exam.  Of note, at the C6 level extending down to level of the C6-C7 interspace, the decompressed disc osteophyte complex/ossification posterior longitudinal ligament has a spear-like area indenting the ventral left aspect of the cervical cord. The right " aspect of the cervical spinal cord herniates through the surgical defect at the site of the decompressed disc osteophyte complex/ossification of the posterior longitudinal ligament. The cervical spinal cord at these levels appears somewhat enlarged potentially due to some edema at this level. Similar findings were noted on the prior exam.  There is a large nonspecific fluid collection seen within the prevertebral soft tissues extending from the level of the C4-C5 interspace down to the level of the inferior body of T2. This collection was also seen on the previous study and has a similar craniocaudad extent. The collection is not well evaluated on the current exam due to a saturation band partially obscuring this collection. However, the collection deviates the trachea, larynx, and esophagus anteriorly as it did on the previous examination. 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 This collection could be further characterized with dedicated soft tissue neck imaging as clinically indicated.  Otherwise, multilevel canal and foraminal narrowing within the cervical spine has been discussed in detail above and is unchanged since the most recent examination.  These findings were discussed with Kyung Hamilton on 01/05/2020 at approximately 5:30 PM.  This report was finalized on 1/6/2020 11:57 AM by Dr. Mina Portillo M.D.      Recurrent postoperative cervical fluid collection  POD 18 lumbar drain placement and reclosure of cervical CSF leak.  Lumbar drain removed 12/25/2019  POD 31 C6, C7 anterior cervical corpectomy with cage/plate and CSF leak repair for cervical cord compression from spinal stenosis and cervical myelopathy, OPLL    Doing well in rehab  Dr. Noe has reviewed the MRI. He does not feel any further imaging needed other than cervical AP and lateral X-rays which have been ordered.   Moving toward placement of LP shunt given the recurrent CSF leak      TWO-VIEW CERVICAL SPINE  X-RAYS    FINDINGS: The patient has anterior fusion extending from C5 to T1 and  this includes anterior interbody grafts combined with anterior plate and  screws placed at C5 and T1. The alignment appears satisfactory. There is  no change from 12/17/2019.        1/06/2020 - 1650 - ADDENDUM:     Dr. Noe has viewed the cervical X-rays. No evidence of hardware malalignment. Patient will need placement of LP shunt. Plan to do this Saturday. Nurse was informed by phone. She will let patient know.

## 2020-01-06 NOTE — PROGRESS NOTES
Adult Nutrition  Assessment/PES    Patient Name:  Noemi LIU Dooley  YOB: 1960  MRN: [id number]  Admit Date:  12/27/2019    Assessment Date:  1/6/2020    Reason for Assessment     Row Name 01/06/20 0903          Reason for Assessment    Reason For Assessment  follow-up protocol         Nutrition/Diet History     Row Name 01/06/20 0903          Nutrition/Diet History    Typical Food/Fluid Intake  intake %, swallowing improved           Labs/Tests/Procedures/Meds     Row Name 01/06/20 0905          Labs/Procedures/Meds    Lab Results Reviewed  reviewed        Medications    Pertinent Medications Reviewed  reviewed         Physical Findings     Row Name 01/06/20 0905          Physical Findings    Skin  surgical incision           Nutrition Prescription Ordered     Row Name 01/06/20 0905          Nutrition Prescription PO    Current PO Diet  Regular     Fluid Consistency  Thin         Evaluation of Received Nutrient/Fluid Intake     Row Name 01/06/20 0907          PO Evaluation    % PO Intake             Problem/Interventions:      Intervention Goal     Row Name 01/06/20 0911          Intervention Goal    General  Maintain nutrition     PO  Maintain intake     PO Intake %  75 %     Weight  Maintain weight         Nutrition Intervention     Row Name 01/06/20 0912          Nutrition Intervention    RD/Tech Action  Menu provided;Encourage intake;Follow Tx progress           Education/Evaluation     Row Name 01/06/20 0912          Monitor/Evaluation    Monitor  Per protocol           Electronically signed by:  Trinh Gerard RD  01/06/20 11:27 AM

## 2020-01-06 NOTE — PROGRESS NOTES
Case Management  Inpatient Rehabilitation Plan of Care and Discharge Plan Note    Rehabilitation Diagnosis:  [place]  Date of Onset:  [place]    Medical Summary:  [place]  Past Medical History: [place]    Plan of Care  Updated Problems/Interventions  Field    Expected Intensity:  [place]  Interdisciplinary Team:  [place]  Estimated Length of Stay/Anticipated Discharge Date: [place]  Anticipated Discharge Destination:  Anticipated discharge destination from inpatient rehabilitation is community  discharge with assistance. Pt lives with her sister in a one story house with 3  steps to enter. Pt will have daily intermittent assistance at discharge.  Family conference scheduled for Tuesday, 1/7 at 2:30 p.m.      Based on the patient's medical and functional status, their prognosis and  expected level of functional improvement is:  [place]    Signed by: DIONE Gonsalez

## 2020-01-06 NOTE — PROGRESS NOTES
Inpatient Rehabilitation Plan of Care Note    Plan of Care  Care Plan Reviewed - No updates at this time.    Body Systems    [RN] Integumentary(Active)  Current Status(01/07/2020): Anterior neck cervical incision KITTY with  steristrips.  Weekly Goal(01/14/2020): No further skin breakdown  Discharge Goal: No further skin breakdown; incision  healed    Performed Intervention(s)  Wound Care as ordered. Skin assessment q shift and PRN.      Pain    [RN] Pain Management(Active)  Current Status(01/07/2020): Occasional headaches  Weekly Goal(01/14/2020): Pain will be managed  Discharge Goal: Pain will be managed    Performed Intervention(s)  Pain assessment q shift and PRN; Offer pain medication PRN      Psychosocial    [RN] Behavior(Active)  Current Status(01/07/2020): Patient pleasant and appears to be coping well with  current health status  Weekly Goal(01/14/2020): Patient will demonstrate appropriate coping strategies  Discharge Goal: Same with weekly    Performed Intervention(s)  Offer support  calm enviroment  allow time to verbalize      Safety    [RN] Potential for Injury(Active)  Current Status(01/07/2020): Risk for falls related to surgery and  Weekly Goal(01/14/2020): no falls  Discharge Goal: no falls    Performed Intervention(s)  safety rounds/call light within easy reach  bed alarm/chair alarm      Sphincter Control    [RN] Bladder Management(Active)  Current Status(01/07/2020): Continent 100%  Weekly Goal(01/14/2020): Will remain Continent 100%  Discharge Goal: Continent 100%    [RN] Bowel Management(Active)  Current Status(01/07/2020): Continent 100%; Patient states had 2 bm's 12/31.  Weekly Goal(01/14/2020): Will remain Continent 100%; Will have regular BMs  Discharge Goal: Continent 100% with regular BMs    Performed Intervention(s)  Monitor I&O    Signed by: Giselle Chung RN

## 2020-01-06 NOTE — PLAN OF CARE
Problem: Patient Care Overview  Goal: Coping Plan  Outcome: Ongoing (interventions implemented as appropriate)     Problem: Skin Injury Risk (Adult)  Goal: Skin Health and Integrity  Outcome: Ongoing (interventions implemented as appropriate)     Problem: Laminectomy/Foraminotomy/Discectomy (Adult)  Goal: Signs and Symptoms of Listed Potential Problems Will be Absent, Minimized or Managed (Laminectomy/Foraminotomy/Discectomy)  Outcome: Ongoing (interventions implemented as appropriate)     Problem: Fall Risk (Adult)  Goal: Absence of Fall  Outcome: Ongoing (interventions implemented as appropriate)     Patient A/Ox4, OOB x1 with RW. Pt reported headache pain 5/10 overnight; prn tylenol given. Anterior neck incision closed/intact, no drainage. The area under incision and surrounding is swollen but unchanged overnight. Pt denies SOB or difficulty swallowing (pt states swallowing is better than it has been). Placed call to Dr. Noe's office at [phone] to find out if any restrictions (with therapy or eating) should be in place this morning. Awaiting callback.

## 2020-01-06 NOTE — PLAN OF CARE
Problem: Patient Care Overview  Goal: Plan of Care Review  Outcome: Ongoing (interventions implemented as appropriate)  Flowsheets (Taken 1/6/2020 1313)  Outcome Summary: Pt did go for C-spine Xrays that Audrey Funez ordered. Tylenol for headache.

## 2020-01-06 NOTE — PROGRESS NOTES
Inpatient Rehabilitation Functional Measures Assessment and Plan of Care    Plan of Care  Updated Problems/Interventions  Mobility    [OT] Toilet Transfers(Active)  Current Status(01/06/2020): SBA RWX  Weekly Goal(01/10/2020): Mod Indep RWX  Discharge Goal: Mod indep RWX    [OT] Tub/Shower Transfers(Active)  Current Status(01/06/2020): SBA RWX  Weekly Goal(01/10/2020): Mod Indep  Discharge Goal:  Mod Indep        Self Care    [OT] Bathing(Active)  Current Status(01/06/2020): min  Weekly Goal(01/10/2020): SBA  Discharge Goal: Mod indep    [OT] Dressing (Lower)(Active)  Current Status(01/06/2020): Min  Weekly Goal(01/10/2020): Mod Indep  Discharge Goal: Mod indep    [OT] Dressing (Upper)(Active)  Current Status(01/06/2020): SBA  Weekly Goal(01/10/2020): Mod Indep  Discharge Goal: Mod indep    [OT] Eating(Active)  Current Status(01/06/2020): Mod Indep  Weekly Goal(01/10/2020): Mod Indep  Discharge Goal: mod indep    [OT] Grooming(Active)  Current Status(01/06/2020): Setup  Weekly Goal(01/10/2020): Mod Indep  Discharge Goal: Mod indep    [OT] Toileting(Active)  Current Status(01/06/2020): Min  Weekly Goal(01/10/2020): Mod indep  Discharge Goal: mod indep    Functional Measures  NORIS Eating:  Branch  NORIS Grooming: Branch  NORIS Bathing:  Branch  NORIS Upper Body Dressing:  Branch  NORIS Lower Body Dressing:  Branch  NORIS Toileting:  Branch    NORIS Bladder Management  Level of Assistance:  Branch  Frequency/Number of Accidents this Shift:  Branch    NORIS Bowel Management  Level of Assistance: Branch  Frequency/Number of Accidents this Shift: Branch    NORIS Bed/Chair/Wheelchair Transfer:  Branch  NORIS Toilet Transfer:  Branch  NORIS Tub/Shower Transfer:  Branch    Previously Documented Mode of Locomotion at Discharge: Field  NORIS Expected Mode of Locomotion at Discharge: Branch  NORIS Walk/Wheelchair:  Branch  NORIS Stairs:  Branch    NORIS Comprehension:  Branch  NORIS Expression:  Branch  NORIS Social Interaction:  Branch  NORIS Problem Solving:   Branch  NORIS Memory:  Branch    Therapy Mode Minutes  Occupational Therapy: Individual: 105 minutes.  Physical Therapy: Branch  Speech Language Pathology:  Branch    Signed by: BREONNA Flower/ALEXA

## 2020-01-07 PROCEDURE — 97535 SELF CARE MNGMENT TRAINING: CPT

## 2020-01-07 PROCEDURE — 97110 THERAPEUTIC EXERCISES: CPT

## 2020-01-07 PROCEDURE — 97112 NEUROMUSCULAR REEDUCATION: CPT

## 2020-01-07 RX ADMIN — HYDROCODONE BITARTRATE AND ACETAMINOPHEN 1 TABLET: 5; 325 TABLET ORAL at 03:00

## 2020-01-07 RX ADMIN — ACETAMINOPHEN 1000 MG: 500 TABLET, FILM COATED ORAL at 09:02

## 2020-01-07 RX ADMIN — GABAPENTIN 100 MG: 100 CAPSULE ORAL at 21:59

## 2020-01-07 RX ADMIN — VITAMIN D, TAB 1000IU (100/BT) 2000 UNITS: 25 TAB at 08:59

## 2020-01-07 RX ADMIN — CALCIUM 500 MG: 500 TABLET ORAL at 08:59

## 2020-01-07 RX ADMIN — ACETAMINOPHEN 1000 MG: 500 TABLET, FILM COATED ORAL at 21:59

## 2020-01-07 RX ADMIN — FAMOTIDINE 20 MG: 20 TABLET, FILM COATED ORAL at 16:39

## 2020-01-07 RX ADMIN — CALCIUM 500 MG: 500 TABLET ORAL at 21:59

## 2020-01-07 RX ADMIN — MAGNESIUM HYDROXIDE 10 ML: 2400 SUSPENSION ORAL at 08:59

## 2020-01-07 RX ADMIN — FAMOTIDINE 20 MG: 20 TABLET, FILM COATED ORAL at 05:41

## 2020-01-07 RX ADMIN — ACETAMINOPHEN 1000 MG: 500 TABLET, FILM COATED ORAL at 16:39

## 2020-01-07 RX ADMIN — GABAPENTIN 100 MG: 100 CAPSULE ORAL at 09:00

## 2020-01-07 RX ADMIN — ATORVASTATIN CALCIUM 20 MG: 20 TABLET, FILM COATED ORAL at 21:59

## 2020-01-07 RX ADMIN — ONDANSETRON HYDROCHLORIDE 4 MG: 4 TABLET, FILM COATED ORAL at 09:02

## 2020-01-07 RX ADMIN — DOCUSATE SODIUM 100 MG: 100 CAPSULE, LIQUID FILLED ORAL at 09:00

## 2020-01-07 RX ADMIN — DOCUSATE SODIUM 100 MG: 100 CAPSULE, LIQUID FILLED ORAL at 21:59

## 2020-01-07 NOTE — PROGRESS NOTES
Family conference held today with patient and sister. PT, OT, NSG, and SW present. Discussed progress and d/c recommendations.  PT reported patient can get into bed but still has difficulty coming from supine to sit. Needs assistance if comes up from left side as still doesn't have the strength in left arm to push up, but today was able to push up coming up from right side. Bed height at home is issues so discussed getting low profile box spring to make her bed lower. Patient usually uses a stool and sister showed picture of bed and stool to PT. PT has practiced with patient using stool but recommended lowering bed if possible. Patient walking with rolling walker with SBA. PT has walked with patient without device and needs CGA. Recommended patient continue to use rolling walker at home. Patient able to come from sit to stand with SBA and occasional verbal cues for hand placement. Patient able to transfer in/out of car with SBA and can go up/down 6 steps with rails and SBA.   OT discussed decreased strength in hands still both in  and pinch. Fine motor coordination is good. Patient can do bathing and dressing seated with SBA and can now pull pants up and down when toileting. However, patient still having difficulty with wiping. OT to try baby wipes with patient. Patient transfers to commode using rolling walker and SBA.   NSG reported incision is healing. Awaiting call back from neurosurgery regarding plan for LP shunt which currently is scheduled for Saturday. NSG reviewed medications and follow up appointments. Discussed watching for any signs and symptoms of infection.   Discussed equipment for home. A rolling walker and shower chair have been ordered.  Discussed option of home health vs outpatient PT and OT after d/c. PT would prefer home health so PT can work on bed mobility with patient at home. Will discuss with Dr. Abraham to see what his recommendation is. Patient would prefer to have [facility] follow her if has home health services or will come back here to rehab for outpatient.   D/C date set for Thursday, 1/9. Patient will d/c home with sister. Sister plans to work from home for at least first week patient is home.   Will assist with d/c plans.

## 2020-01-07 NOTE — PROGRESS NOTES
Inpatient Rehabilitation Plan of Care Note    Plan of Care  Care Plan Reviewed - No updates at this time.    Safety    Performed Intervention(s)  safety rounds/call light within easy reach  bed alarm/chair alarm      Pain    Performed Intervention(s)  Pain assessment q shift and PRN; Offer pain medication PRN      Psychosocial    Performed Intervention(s)  Offer support  calm enviroment  allow time to verbalize      Body Systems    Performed Intervention(s)  Wound Care as ordered. Skin assessment q shift and PRN.  Monitor swelling site      Sphincter Control    Performed Intervention(s)  Monitor I&O    Signed by: Leah Kent RN

## 2020-01-07 NOTE — PROGRESS NOTES
"   LOS: 11 days   Patient Care Team:  Mary Lou Carroll MD as PCP - General (Internal Medicine)    Chief Complaint:   Cervical myelopathy with upper extremity greater than lower extremity and left side greater than right side involvement  History of cervical stenosis C5 to T1 with ossification of the posterior longitudinal ligament (OPLL) and progresive myelopathy  Status post December 6, 2019- Anterior cervical corpectomy C6, C7 with PEEK cage and anterior Zevo cervical plate from C5 to T1  CSF leak repair.   Status post December 19, 2019-1. Placement of external lumbar drain; 2. Drainage of cervical CSF collection and reclosure of CSF leak.  Lumbar drain removed December 24, 2019       Subjective     History of Present Illness     Subjective    Patient with only occasional minimal headache.    She does not describe any change in the size of the fluid collection at the right anterior neck today compared to yesterday.    Continues stronger overall with some better strength in the arms is still weak in the hands.  Decreased pinch strength still.  Tolerating therapies.    History taken from: patient    Objective     Vital Signs  Temp:  [97 °F (36.1 °C)-98.2 °F (36.8 °C)] 97 °F (36.1 °C)  Heart Rate:  [71-96] 82  Resp:  [18] 18  BP: ()/(55-75) 99/55    Objective:  Vital signs: (most recent): Blood pressure 99/55, pulse 82, temperature 97 °F (36.1 °C), temperature source Oral, resp. rate 18, height 134.6 cm (53\"), SpO2 94 %, not currently breastfeeding.            Physical Exam  MENTAL STATUS -  AWAKE / ALERT  HEENT- NCAT,   SCLERA NON-ICTERIC, CONJUNCTIVA PINK, OP MOIST, neck incision-mild swelling.  No erythema or drainage.  LUNGS - CTA, NO WHEEZES, RALES OR RHONCHI  HEART- RRR, NO RUB, MURMUR, OR GALLOP  ABD - NORMOACTIVE BOWEL SOUNDS, SOFT, NT.  EXT - NO EDEMA OR CYANOSIS  NEURO -awake alert.  Oriented x4.  Able to oppose the thumb to the other 4 digits bilaterally.  MOTOR EXAM -bilateral upper extremity " weakness better strength for elbow extension and finger flexion.  Still weak with hand intrinsics..     Takes resistance with bilateral knee extension and ankle dorsiflexion     Results Review:     I reviewed the patient's new clinical results.        Invalid input(s): Delilah RIOS for DARYN PUGH (MRN [id number]) as of 12/18/2019 10:50   Ref. Range 12/2/2019 16:00 12/7/2019 05:51 12/9/2019 05:15 12/10/2019 05:17 12/18/2019 07:05   Sodium Latest Ref Range: 136 - 145 mmol/L 140 134 (L) 142 139 129 (L)     Results from last 7 days   Lab Units 01/06/20  0509   WBC 10*3/mm3 4.71   HEMOGLOBIN g/dL 10.2*   HEMATOCRIT % 30.6*   PLATELETS 10*3/mm3 458*       Medication Review: done  Scheduled Meds:    atorvastatin 20 mg Oral Nightly   calcium carbonate (oyster shell) 500 mg Oral BID   cholecalciferol 2,000 Units Oral Daily   docusate sodium 100 mg Oral BID   famotidine 20 mg Oral BID AC   gabapentin 100 mg Oral BID   magnesium hydroxide 10 mL Oral Daily   senna-docusate sodium 2 tablet Oral Nightly     Continuous Infusions:   PRN Meds:.•  acetaminophen  •  bisacodyl  •  cyclobenzaprine  •  fluticasone  •  HYDROcodone-acetaminophen  •  lidocaine  •  melatonin  •  ondansetron **OR** ondansetron  •  polyethylene glycol  •  senna-docusate sodium  •  sodium chloride      Assessment/Plan       Cervical myelopathy (CMS/HCC)      Assessment & Plan  Cervical myelopathy with upper extremity greater than  lower extremity and left side greater than right side involvement.     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  Spinal headache managed conservatively with improvement.    Dec 18 - she has had increase size of the fluid collection at  the anterior neck incision.  She is to go for placement of a lumbar drain on December 19 with admission to the acute care wing in the hospital at that time.  Dec 19 - lumbar drain today  Dec 26 -drain removed yesterday.Patient has some headache today.  She has not had any reaccumulation of fluid at the anterior neck incision.    Dec 27 - no recurrence of anterior cervical fluid collection. Return to rehab unit.   January 6-anterior cervical fluid collection appears larger on examination over the past weekend.  Neurosurgery evaluating for possible LP shunt placement on Saturday.     Postoperative cervical neuritis-steroids-Medrol Dosepak taper completed Dec 15.      GI prophylaxis-Pepcid     DVT prophylaxis-SCDs     Osteoporosis-h/o Prolia     Bladder-voiding on own with low PVR recorded     Bowel-constipation-on stool softeners.  Added scheduled suppository every other day after lunch to take advantage of gastrocolic reflex.  December 31- Moving her bowels better.  Using a suppository at home would be difficult with her weakness in the hands.  Discussed assessing her bowel pattern without the scheduled suppository.     Pain management-hydrocodone/Flexeril/gabapentin -Luis Alfredo report reviewed           Now admit to resume comprehensive acute inpatient rehabilitation  .  This would be an interdisciplinary program with physical therapy 1.5 hour,  occupational therapy 1.5 hour,  5 days a week.  Rehabilitation nursing for carryover, monitoring of neurologic   status, bowel and bladder, and skin  Ongoing physician follow-up.  Weekly team conferences.  Goals are to achieve a level of supervision with  mobility and self-care and improved strength and coordination.   Rehabilitation prognosis fair.  Medical prognosis deferred to neurosurgery.  Estimated length of stay is approximately 10-14  days, but is only an estimation.   The patient's functional status and clinical status is unchanged from preadmission assessment and the  patient continues appropriate for acute inpatient rehabilitation.  Goal is for home with outpatient   therapies.  Barrier to discharge: Impaired mobility/self-care- work on balance/gross and fine motor control/strengthening to overcome.      TEAM CONF - DEC 17 - INCREASED PAIN YESTERDAY WITH TRANSFERS . SWELLING AT NECK INCISION.   160 FEET CTG MIN- TRANSFERS CTG-MIN.    STRENGTH 1# RIGHT AND 0# LEFT.   UBD MIN. LBD MAX.  BATH MIN ASSIST. USING BUILT UP HAND ON UTENSILS.   ELOS - TWO WEEKS.     TEAM CONF - DEC 31- TRANSFERS CTG. 4 STAIRS CTG. GAIT 300 FEET CTG RW. TOILET TRANSFERS CTG. CONTINUED WEAK .  UBD MIN. LBD MOD ASSIST. GROOMING MIN ASSIST. BATH MIN ASSIST. SKIN INTACT. SOME SWELLING TO NECK INCISION. CONTINENT BOWEL AND BLADDER. PATIENT WOULD NOT BE ABLE TO DO SUPPOSITORY HERSELF FOR BOWEL PROGRAM  ELOS - FRIDAY ELIEL 10.    TEAM CONF - JAN 7 - TRANSFERS Min-mod to sit, sup to supine SUP.  Gait 240 feet RW SUP. UBD SBA. LBD MIN ASSIST. BATH MIN ASSIST. EATING MOD INDEPENDENT. GROOMING SET UP.   ELOS - Thursday JAN 9 FROM REHAB STANDPOINT.     Shiva Abraham MD  01/07/20  8:43 AM    Time:

## 2020-01-07 NOTE — PROGRESS NOTES
Case Management  Inpatient Rehabilitation Team Conference    Conference Date/Time: 1/7/2020 8:41:46 AM    Team Conference Attendees:  Yony Chen, Pharmacist  Danay Goodson, CASPERW  Audrey Valdivia, PTA  Melly Coe, PT  See Cuadra, OT  Danay Lopes, CTRS  Trinh Gerard RD, LD  Heath Whiting, RN  Leah Kent, RN  Chaplain Tony    Demographics            Age: 59Y            Gender: Female    Admission Date: 12/27/2019 4:57:18 PM  Rehabilitation Diagnosis:  Cervical spondylosis, meylopathy/CSF leak  Past Medical History: Past Medical History:  DiagnosisDate  ?Abnormal alkaline phosphatase test06/12/2015  ?Resolved  ?Acute srjvliksiv52/12/2015  ?Resolved  ?Allergicchild age  ?pencillin  ?Cervical spinal stenosis?  ?Diverticulosis?  ?H/O bpeqdzyfujcczvix15/13/2014  ?H/O kaberfhnu02/29/2014  ?Headacheoccassionally  ?High cholesterol?  ?History of EKG06/12/2015  ?Tmwwnynoiwdyo98/12/2015  ?Resolved, Full w/u done, Likely secondary to [place]  ?Left arm pain?  ?Low back painMarch 2017  ?Numbness in both hands?  ?Osteoporosis?  ?Nlqhknhpjgc59/12/2015  ?Resolved    ?  Surgical?History  Past Surgical History:  ProcedureLateralityDate  ?ANTERIOR CHANNEL VERTEBRECTOMY/CORPECTOMYN/A12/6/2019  ?Procedure: CERVICAL 6, CERVICAL 7 ANTERIOR CERVICAL CORPECTOMY WITH CAGE AND  PLATE;  Surgeon: Antonio Noe MD;  Location: [facility] MAIN OR;  Service:  Neurosurgery  ?BACK SURGERY?1999  ?COLONOSCOPY?05/20/2011  ?INCISION AND DRAINAGE OF WOUNDN/A12/19/2019  ?Procedure: followed by drainage of anterior cervical fluid collection;  Surgeon: Antonio Noe MD;  Location: [facility] MAIN OR;  Service:  Neurosurgery  ?LUMBAR DRAIN INSERTION EXTERNALN/A12/19/2019  ?Procedure: LUMBAR DRAIN INSERTION EXTERNAL;  Surgeon: Antonio Noe MD;  Location: [facility] MAIN OR;  Service: Neurosurgery  ?SPINE SURGERY?1999 back  ?dr abad quintero  ?TONSILLECTOMY?child age  ?TYMPANOPLASTY??    ?      Plan  "of Care  Anticipated Discharge Date/Estimated Length of Stay: ELOS: DC 1/10  Anticipated Discharge Destination: Community discharge with assistance  Discharge Plan : Pt lives with her sister in a one story house with 3 steps to  enter. Pt will have daily intermittent assistance at discharge.  Family conference scheduled for Tuesday, 1/7 at 2:30 p.m.  Medical Necessity Expected Level Rationale: Supervision with outpatient  therapies  Rehab prognosis: fair  Medical prognosis: defer to neurosurgery  Intensity and Duration: an average of 3 hours/5 days per week  Medical Supervision and 24 Hour Rehab Nursing: x  Physical Therapy: x  PT Intensity/Duration: 90 minutes/day, 5 days/week for approximately 7 - 10 days  Occupational Therapy: x  OT Intensity/Duration: 90 minutes/day, 5 days/week for approximately 7 - 10 days  Social Work: x  Therapeutic Recreation: x  Updated (if changes indicated)    Anticipated Discharge Date/Estimated Length of Stay:   ELOS: DC 1/9    Based on the patient's medical and functional status, their prognosis and  expected level of functional improvement is: Supervision with outpatient  therapies  Rehab prognosis: fair  Medical prognosis: defer to neurosurgery      Interdisciplinary Problem/Goals/Status    All Rehab Problems:  Body Systems    [RN] Integumentary(Active)  Current Status(01/07/2020): Anterior neck cervical incision KITTY glued and intact  no drainage. Swelling increased over the weekend. Neurosurgery aware and  following.  Weekly Goal(01/14/2020): Monitor swelling site and any changes  Discharge Goal: Swelling resolvec; incision healed        Mobility    [OT] Toilet Transfers(Active)  Current Status(01/06/2020): SBA RWX  Weekly Goal(01/10/2020): Mod Indep RWX  Discharge Goal: Mod indep RWX    [PT] Bed/Chair/Wheelchair(Active)  Current Status(01/06/2020): SBA, rwx. Tsf to 30\" high bed CGA w wx and platform  Weekly Goal(01/14/2020): mod Indep  Discharge Goal: mod Indep    [PT] Bed " "Mobility(Active)  Current Status(01/06/2020): Mod/Min to sit. Supervision to supine  Weekly Goal(01/14/2020): Mod Indep  Discharge Goal: Mod Indep    [PT] Car Transfers(Active)  Current Status(01/06/2020): SBA, rwx  Weekly Goal(01/14/2020): PT only  Discharge Goal: SBA, rwx    [PT] Walk(Active)  Current Status(01/06/2020): 240 ft, Rwx, supervision. Amb w/o ad cga 240 ft  Weekly Goal(01/14/2020): Amb on unit, Rwx, Supervision/Mod indep  Discharge Goal: 300 ft, Rwx, Supervision/Mod Indep    [PT] Stairs(Active)  Current Status(01/06/2020): 6 (4\") steps, rail,CGA  Weekly Goal(01/14/2020): PT only  Discharge Goal: 4, rails, CGA    [OT] Tub/Shower Transfers(Active)  Current Status(01/06/2020): SBA RWX  Weekly Goal(01/10/2020): Mod Indep  Discharge Goal:  Mod Indep        Pain    [RN] Pain Management(Active)  Current Status(01/07/2020): Occasional headaches  Weekly Goal(01/14/2020): Pain will be managed  Discharge Goal: Pain will be managed        Psychosocial    [RN] Behavior(Active)  Current Status(01/07/2020): Patient pleasant and appears to be coping well with  current health status  Weekly Goal(01/14/2020): Patient will demonstrate appropriate coping strategies  Discharge Goal: Same with weekly        Safety    [RN] Potential for Injury(Active)  Current Status(01/07/2020): Risk for falls related to surgery and  Weekly Goal(01/14/2020): no falls  Discharge Goal: no falls        Self Care    [OT] Bathing(Active)  Current Status(01/06/2020): min  Weekly Goal(01/10/2020): SBA  Discharge Goal: Mod indep    [OT] Dressing (Lower)(Active)  Current Status(01/06/2020): Min  Weekly Goal(01/10/2020): Mod Indep  Discharge Goal: Mod indep    [OT] Dressing (Upper)(Active)  Current Status(01/06/2020): SBA  Weekly Goal(01/10/2020): Mod Indep  Discharge Goal: Mod indep    [OT] Eating(Active)  Current Status(01/06/2020): Mod Indep  Weekly Goal(01/10/2020): Mod Indep  Discharge Goal: mod indep    [OT] Grooming(Active)  Current " Status(01/06/2020): Setup  Weekly Goal(01/10/2020): Mod Indep  Discharge Goal: Mod indep    [OT] Toileting(Active)  Current Status(01/06/2020): Min  Weekly Goal(01/10/2020): Mod indep  Discharge Goal: mod indep        Sphincter Control    [RN] Bladder Management(Active)  Current Status(01/07/2020): Continent 100%  Weekly Goal(01/14/2020): Will remain Continent 100%  Discharge Goal: Continent 100%    [RN] Bowel Management(Active)  Current Status(01/07/2020): Continent 100%  Weekly Goal(01/14/2020): Will remain Continent 100%; Will have regular BMs  Discharge Goal: Continent 100% with regular BMs        Comments: 12/31: CAR hands very weak, right more so than left;    1/7: some concerns for another CSF leak, shunt placement planned this Saturday;    Signed by: Heath Whiting RN    Physician CoSigned By: Shiva Abraham 01/07/2020 08:59:36

## 2020-01-07 NOTE — PROGRESS NOTES
"   LOS: 10 days   Patient Care Team:  Mary Lou Carroll MD as PCP - General (Internal Medicine)    Chief Complaint:   Cervical myelopathy with upper extremity greater than lower extremity and left side greater than right side involvement  History of cervical stenosis C5 to T1 with ossification of the posterior longitudinal ligament (OPLL) and progresive myelopathy  Status post December 6, 2019- Anterior cervical corpectomy C6, C7 with PEEK cage and anterior Zevo cervical plate from C5 to T1  CSF leak repair.   Status post December 19, 2019-1. Placement of external lumbar drain; 2. Drainage of cervical CSF collection and reclosure of CSF leak.  Lumbar drain removed December 24, 2019       Subjective     History of Present Illness     Subjective  Patient seen earlier today on rounds.  Patient reports occasional mild headache.  Not problematic.  No drainage from her neck incision.  Still  some slight swelling there.        History taken from: patient    Objective     Vital Signs  Temp:  [97.1 °F (36.2 °C)-98.2 °F (36.8 °C)] 97.1 °F (36.2 °C)  Heart Rate:  [70-96] 71  Resp:  [18] 18  BP: (108-122)/(68-75) 108/75    Objective:  Vital signs: (most recent): Blood pressure 108/75, pulse 71, temperature 97.1 °F (36.2 °C), temperature source Oral, resp. rate 18, height 134.6 cm (53\"), SpO2 97 %, not currently breastfeeding.            Physical Exam  MENTAL STATUS -  AWAKE / ALERT  HEENT- NCAT,   SCLERA NON-ICTERIC, CONJUNCTIVA PINK, OP MOIST, neck incision-mild swelling.  No erythema or drainage.  LUNGS - CTA, NO WHEEZES, RALES OR RHONCHI  HEART- RRR, NO RUB, MURMUR, OR GALLOP  ABD - NORMOACTIVE BOWEL SOUNDS, SOFT, NT.  EXT - NO EDEMA OR CYANOSIS  NEURO -awake alert.  Oriented x4.  Able to oppose the thumb to the other 4 digits bilaterally.  MOTOR EXAM -bilateral upper extremity weakness for elbow extension and finger flexion and hand intrinsics.     Takes resistance with bilateral knee extension and ankle dorsiflexion "     Results Review:     I reviewed the patient's new clinical results.        Invalid input(s): Delilah RIOS for DARYN PUGH (MRN [id number]) as of 12/18/2019 10:50   Ref. Range 12/2/2019 16:00 12/7/2019 05:51 12/9/2019 05:15 12/10/2019 05:17 12/18/2019 07:05   Sodium Latest Ref Range: 136 - 145 mmol/L 140 134 (L) 142 139 129 (L)     Results from last 7 days   Lab Units 01/06/20  0509   WBC 10*3/mm3 4.71   HEMOGLOBIN g/dL 10.2*   HEMATOCRIT % 30.6*   PLATELETS 10*3/mm3 458*       Medication Review: done  Scheduled Meds:    atorvastatin 20 mg Oral Nightly   calcium carbonate (oyster shell) 500 mg Oral BID   cholecalciferol 2,000 Units Oral Daily   docusate sodium 100 mg Oral BID   famotidine 20 mg Oral BID AC   gabapentin 100 mg Oral BID   magnesium hydroxide 10 mL Oral Daily   senna-docusate sodium 2 tablet Oral Nightly     Continuous Infusions:   PRN Meds:.•  acetaminophen  •  bisacodyl  •  cyclobenzaprine  •  fluticasone  •  HYDROcodone-acetaminophen  •  lidocaine  •  melatonin  •  ondansetron **OR** ondansetron  •  polyethylene glycol  •  senna-docusate sodium  •  sodium chloride      Assessment/Plan       Cervical myelopathy (CMS/HCC)      Assessment & Plan  Cervical myelopathy with upper extremity greater than  lower extremity and left side greater than right side involvement.     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  Spinal headache managed conservatively with improvement.    Dec 18 - she has had increase size of the fluid collection at the anterior neck incision.  She is to go for placement of a lumbar drain on December 19 with admission to the acute care wing in the hospital at that time.  Dec 19 - lumbar  drain today  Dec 26 -drain removed yesterday.Patient has some headache today.  She has not had any reaccumulation of fluid at the anterior neck incision.    Dec 27 - no recurrence of anterior cervical fluid collection. Return to rehab unit.   January 6-anterior cervical fluid collection appears larger on examination over the past weekend.  Neurosurgery evaluating for possible LP shunt placement on Saturday.     Postoperative cervical neuritis-steroids-Medrol Dosepak taper completed Dec 15.      GI prophylaxis-Pepcid     DVT prophylaxis-SCDs     Osteoporosis-h/o Prolia     Bladder-voiding on own with low PVR recorded     Bowel-constipation-on stool softeners.  Added scheduled suppository every other day after lunch to take advantage of gastrocolic reflex.  December 31- Moving her bowels better.  Using a suppository at home would be difficult with her weakness in the hands.  Discussed assessing her bowel pattern without the scheduled suppository.     Pain management-hydrocodone/Flexeril/gabapentin -Luis Alfredo report reviewed           Now admit to resume comprehensive acute inpatient rehabilitation  .  This would be an interdisciplinary program with physical therapy 1.5 hour,  occupational therapy 1.5 hour,  5 days a week.  Rehabilitation nursing for carryover, monitoring of neurologic   status, bowel and bladder, and skin  Ongoing physician follow-up.  Weekly team conferences.  Goals are to achieve a level of supervision with  mobility and self-care and improved strength and coordination.   Rehabilitation prognosis fair.  Medical prognosis deferred to neurosurgery.  Estimated length of stay is approximately 10-14  days, but is only an estimation.   The patient's functional status and clinical status is unchanged from preadmission assessment and the patient continues appropriate for acute inpatient rehabilitation.  Goal is for home with outpatient   therapies.  Barrier to discharge: Impaired mobility/self-care- work  on balance/gross and fine motor control/strengthening to overcome.      TEAM CONF - DEC 17 - INCREASED PAIN YESTERDAY WITH TRANSFERS . SWELLING AT NECK INCISION.   160 FEET CTG MIN- TRANSFERS CTG-MIN.    STRENGTH 1# RIGHT AND 0# LEFT.   UBD MIN. LBD MAX.  BATH MIN ASSIST. USING BUILT UP HAND ON UTENSILS.   ELOS - TWO WEEKS.     TEAM CONF - DEC 31- TRANSFERS CTG. 4 STAIRS CTG. GAIT 300 FEET CTG RW. TOILET TRANSFERS CTG. CONTINUED WEAK .  UBD MIN. LBD MOD ASSIST. GROOMING MIN ASSIST. BATH MIN ASSIST. SKIN INTACT. SOME SWELLING TO NECK INCISION. CONTINENT BOWEL AND BLADDER. PATIENT WOULD NOT BE ABLE TO DO SUPPOSITORY HERSELF FOR BOWEL PROGRAM  ELOS - FRIDAY ELIEL 10.     Shiva Abraham MD  01/06/20  7:30 PM    Time:

## 2020-01-07 NOTE — PLAN OF CARE
Problem: Patient Care Overview  Goal: Plan of Care Review  Outcome: Ongoing (interventions implemented as appropriate)  Flowsheets  Taken 1/7/2020 1651 by Yareli Calabrese, RN  Outcome Summary: Pt. A&Ox4. Continent of B&B. Visited with her sister and had a family conference today. Schedules for discharge on Thurs 1/9/20. Surgery scheduled for neck on 1/11/20. Slight bruising to L. hand. TREJO t/o the day and some nausea. Given PRN Tylenol and zofran as per request. Symptoms reduced when sitting upright. Reg. diet. Calm, cooperative, and pleasant.  Taken 1/7/2020 0134 by Jocelyn Richardson, RN  Progress, Functional Goals: demonstrating adequate progress  IRF Plan of Care Review: progress ongoing, continue  Taken 1/7/2020 1110 by Yareli Calabrese, RN  Plan of Care Reviewed With: patient

## 2020-01-07 NOTE — THERAPY TREATMENT NOTE
Inpatient Rehabilitation - Physical Therapy Treatment Note  [facility]     Patient Name: Noemi Bartholomew  : 1960  MRN: [id number]    Today's Date: 2020                 Admit Date: 2019      Visit Dx:    No diagnosis found.    Patient Active Problem List   Diagnosis   • Carpal tunnel syndrome   • Hyperlipidemia   • Osteoporosis   • DDD (degenerative disc disease), lumbar   • Chronic left lumbar radiculopathy   • Congenital spondylolysis, lumbosacral region   • Neuropathic pain, leg, left   • Ossification of spinal ligament   • Spinal stenosis in cervical region   • Cervical spondylosis with myelopathy   • Cervical stenosis of spine   • Postoperative anemia due to acute blood loss   • Steroid-induced hyperglycemia   • Cord compression (CMS/HCC)   • Leukocytosis (due to steroids)   • Postoperative CSF leak   • Cervical myelopathy (CMS/HCC)   • CSF leak       Therapy Treatment    IRF Treatment Summary     Row Name 20 0959             Evaluation/Treatment Time and Intent    Subjective Information  complains of;pain  -LB      Existing Precautions/Restrictions  fall;spinal;lifting  -LB      Document Type  therapy note (daily note)  -LB      Mode of Treatment  physical therapy  -LB      Patient/Family Observations  pnt seated in w/c. Nurse present  -LB      Recorded by [LB] Audrey Valdivia PTA      Row Name 20 0959             Bed Mobility Assessment/Treatment    Rolling Left [place] (Bed Mobility)  supervision;verbal cues log roll  -LB      Rolling Right [place] (Bed Mobility)  supervision;verbal cues log roll  -LB      Supine-Sit [place] (Bed Mobility)  moderate assist (50% patient effort);minimum assist (75% patient effort)  -LB      Sit-Supine [place] (Bed Mobility)  supervision  -LB      Comment (Bed Mobility)  assessed on txment mat. Practiced sit to supine and vv. Was able to come to sit from R w cga twice, modA 3rd time. To sit from L Min/ModA.  -LB       "Recorded by [LB] Audrey Valdivia, \[facility] [place] [facility]\""      Row Name 01/07/20 0959             Sit-Stand Transfer    Sit-Stand [place] (Transfers)  supervision;verbal cues  -LB      Assistive Device (Sit-Stand Transfers)  walker, front-wheeled  -LB      Recorded by [LB] Audrey Valdivia PTA      Row Name 01/07/20 0959             Stand-Sit Transfer    Stand-Sit [place] (Transfers)  supervision;verbal cues  -LB      Assistive Device (Stand-Sit Transfers)  walker, front-wheeled  -LB      Recorded by [LB] Audrey Valdivia, \[facility] [place] [facility]\""      Row Name 01/07/20 0959             Gait/Stairs Assessment/Training    [place] Level (Gait)  supervision  -LB      Assistive Device (Gait)  walker, front-wheeled  -LB      Distance in Feet (Gait)  320  -LB      Pattern (Gait)  step-through  -LB      Deviations/Abnormal Patterns (Gait)  ally decreased  -LB      Bilateral Gait Deviations  forward flexed posture  -LB      Left Sided Gait Deviations  heel strike decreased  -LB      Right Sided Gait Deviations  heel strike decreased  -LB      Handrail Location (Stairs)  left side (ascending) both hands on 1 rail  -LB      Number of Steps (Stairs)  6 (4\") steps.  -LB      Ascending Technique (Stairs)  step-to-step  -LB      Descending Technique (Stairs)  step-to-step  -LB      Comment (Gait/Stairs)  curb w rwx kip for wx placement  -LB      Recorded by [LB] Audrey Valdivia, \[facility] [place] [facility]\""      Row Name 01/07/20 0941             Pain Scale: Numbers Pre/Post-Treatment    Pain Scale: Numbers, Pretreatment  7/10  -LB      Pain Location  head  -LB      Pain Intervention(s)  Medication (See MAR)  -LB      Recorded by [LB] Audrey Valdivia, \[facility] [place] [facility]\""      Row Name 01/07/20 0999             Lower Extremity Seated Therapeutic Exercise    Performed, Seated Lower Extremity (Therapeutic Exercise)  LAQ (long arc quad), knee extension;ankle dorsiflexion/plantarflexion  -LB      Exercise Type, Seated Lower Extremity (Therapeutic Exercise)  AROM (active range of " motion)  -LB      Sets/Reps Detail, Seated Lower Extremity (Therapeutic Exercise)  1/20  -LB      Recorded by [MEHNAZ] Audrey Valdivia PTA      Row Name 01/07/20 0959             Lower Extremity Supine Therapeutic Exercise    Performed, Supine Lower Extremity (Therapeutic Exercise)  gluteal sets;quadriceps sets;hip abduction/adduction;heel slides  -LB      Exercise Type, Supine Lower Extremity (Therapeutic Exercise)  isometric contraction, static;AROM (active range of motion)  -LB      Sets/Reps Detail, Supine Lower Extremity (Therapeutic Exercise)  1/20  -LB      Recorded by [MEHNAZ] Audrey Valdivia PTA      Row Name 01/07/20 0959             Positioning and Restraints    Post Treatment Position  wheelchair  -LB      In Wheelchair  sitting;call light within reach;exit alarm on  -LB      Recorded by [MEHNAZ] Audrey Valdivia PTA        User Key  (r) = Recorded By, (t) = Taken By, (c) = Cosigned By    Initials Name Effective Dates    LB Audrey Valdivia PTA 03/07/18 -         Wound 12/19/19 1939 Right lateral throat Incision (Active)   Dressing Appearance open to air 1/7/2020 11:10 AM   Closure Approximated;Liquid skin adhesive;Adhesive closure strips 1/7/2020 11:10 AM   Base clean;dry 1/7/2020 11:10 AM   Periwound intact;dry;swelling 1/7/2020 11:10 AM   Periwound Temperature warm 1/7/2020 11:10 AM   Periwound Skin Turgor soft 1/7/2020 11:10 AM   Drainage Amount none 1/6/2020  8:00 PM   Dressing Care, Wound open to air 1/6/2020  8:00 PM           PT Recommendation and Plan                        Time Calculation:     PT Charges     Row Name 01/07/20 1509 01/07/20 0958          Time Calculation    Start Time  1230  -LB  0900  -LB     Stop Time  1300  -LB  1000  -LB     Time Calculation (min)  30 min  -LB  60 min  -LB       User Key  (r) = Recorded By, (t) = Taken By, (c) = Cosigned By    Initials Name Provider Type    Audrey Adams PTA Physical Therapy Assistant          Therapy Charges for Today     Code  Description Service Date Service Provider Modifiers Qty    46212838142 HC PT THER PROC EA 15 MIN 1/6/2020 Audrey Valdivia PTA GP 6    23474252487 HC PT THER PROC EA 15 MIN 1/7/2020 Audrey Valdivia PTA GP 6    99730476695 HC PT CARE PLAN EACH 15 MIN 1/7/2020 Audrey Valdivia PTA GP 5            PT G-Codes  Outcome Measure Options: Tinetti  Tinetti Total Score: 22      Audrey Valdivia PTA  1/7/2020

## 2020-01-07 NOTE — PROGRESS NOTES
Inpatient Rehabilitation Plan of Care Note    Plan of Care  Care Plan Reviewed - Updates as Follows    Body Systems    [RN] Integumentary(Active)  Current Status(01/07/2020): Anterior neck cervical incision KITTY glued and intact  no drainage. Swelling increased over the weekend. Neurosurgery aware and  following.  Weekly Goal(01/14/2020): Monitor swelling site and any changes  Discharge Goal: Swelling resolvec; incision healed        Pain    [RN] Pain Management(Active)  Current Status(01/07/2020): Occasional headaches  Weekly Goal(01/14/2020): Pain will be managed  Discharge Goal: Pain will be managed        Psychosocial    [RN] Behavior(Active)  Current Status(01/07/2020): Patient pleasant and appears to be coping well with  current health status  Weekly Goal(01/14/2020): Patient will demonstrate appropriate coping strategies  Discharge Goal: Same with weekly        Safety    [RN] Potential for Injury(Active)  Current Status(01/07/2020): Risk for falls related to surgery and  Weekly Goal(01/14/2020): no falls  Discharge Goal: no falls        Sphincter Control    [RN] Bowel Management(Active)  Current Status(01/07/2020): Continent 100%  Weekly Goal(01/14/2020): Will remain Continent 100%; Will have regular BMs  Discharge Goal: Continent 100% with regular BMs    Signed by: Jocelyn Richardson RN

## 2020-01-07 NOTE — THERAPY TREATMENT NOTE
Inpatient Rehabilitation - Occupational Therapy Treatment Note    [facility]     Patient Name: Noemi Bartholomew  : 1960  MRN: [id number]    Today's Date: 2020                 Admit Date: 2019      Visit Dx:  No diagnosis found.    Patient Active Problem List   Diagnosis   • Carpal tunnel syndrome   • Hyperlipidemia   • Osteoporosis   • DDD (degenerative disc disease), lumbar   • Chronic left lumbar radiculopathy   • Congenital spondylolysis, lumbosacral region   • Neuropathic pain, leg, left   • Ossification of spinal ligament   • Spinal stenosis in cervical region   • Cervical spondylosis with myelopathy   • Cervical stenosis of spine   • Postoperative anemia due to acute blood loss   • Steroid-induced hyperglycemia   • Cord compression (CMS/HCC)   • Leukocytosis (due to steroids)   • Postoperative CSF leak   • Cervical myelopathy (CMS/HCC)   • CSF leak         Therapy Treatment    IRF Treatment Summary     Row Name 20 1526 20 0959          Evaluation/Treatment Time and Intent    Subjective Information  no complaints  -DN  complains of;pain  -LB     Existing Precautions/Restrictions  fall;spinal  -DN  fall;spinal;lifting  -LB     Document Type  therapy note (daily note)  -DN  therapy note (daily note)  -LB     Mode of Treatment  occupational therapy  -DN  physical therapy  -LB     Patient/Family Observations  pt sitting in w/c  -DN  pnt seated in w/c. Nurse present  -LB     Recorded by [DN] See [facility] OT [LB] Audrey Valdivia PTA     Row Name 20 1526             Cognition/Psychosocial- PT/OT    Affect/Mental Status (Cognitive)  WFL  -DN      Recorded by [DN] See [facility] OT      Row Name 20 0959             Bed Mobility Assessment/Treatment    Rolling Left [place] (Bed Mobility)  supervision;verbal cues log roll  -LB      Rolling Right [place] (Bed Mobility)  supervision;verbal cues log roll  -LB      Supine-Sit [place] (Bed Mobility)  moderate  "assist (50% patient effort);minimum assist (75% patient effort)  -LB      Sit-Supine [place] (Bed Mobility)  supervision  -LB      Comment (Bed Mobility)  assessed on txment mat. Practiced sit to supine and vv. Was able to come to sit from R w cga twice, modA 3rd time. To sit from L Min/ModA.  -LB      Recorded by [LB] Audrey Valdivia PTA      Row Name 01/07/20 0959             Sit-Stand Transfer    Sit-Stand [place] (Transfers)  supervision;verbal cues  -LB      Assistive Device (Sit-Stand Transfers)  walker, front-wheeled  -LB      Recorded by [LB] Audrey Valdivia PTA      Row Name 01/07/20 0959             Stand-Sit Transfer    Stand-Sit [place] (Transfers)  supervision;verbal cues  -LB      Assistive Device (Stand-Sit Transfers)  walker, front-wheeled  -LB      Recorded by [LB] Audrey Valdivia PTA      Row Name 01/07/20 0959             Gait/Stairs Assessment/Training    [place] Level (Gait)  supervision  -LB      Assistive Device (Gait)  walker, front-wheeled  -LB      Distance in Feet (Gait)  320  -LB      Pattern (Gait)  step-through  -LB      Deviations/Abnormal Patterns (Gait)  ally decreased  -LB      Bilateral Gait Deviations  forward flexed posture  -LB      Left Sided Gait Deviations  heel strike decreased  -LB      Right Sided Gait Deviations  heel strike decreased  -LB      Handrail Location (Stairs)  left side (ascending) both hands on 1 rail  -LB      Number of Steps (Stairs)  6 (4\") steps.  -LB      Ascending Technique (Stairs)  step-to-step  -LB      Descending Technique (Stairs)  step-to-step  -LB      Comment (Gait/Stairs)  curb w rwx kip for wx placement  -LB      Recorded by [LB] Audrey Valdivia, EBENEZER      Row Name 01/07/20 1526             Bathing Assessment/Treatment    Comment (Bathing)  pt declined adls today   -DN      Recorded by [DN] See [facility] OT      Row Name 01/07/20 1526             Grooming Assessment/Treatment    Grooming [place] " Level  grooming skills;set up  -DN      Grooming Position  supported standing  -DN      Grooming Setup Assistance  open containers  -DN      Comment (Grooming)  needed assist with toothpaste tube squeezing  -DN      Recorded by [DN] See [facility] OT      Row Name 01/07/20 1526             Fine Motor Testing & Training    Comment, Fine Motor Coordination  velcro board, small irregular peg board, and theraputty exercises and peg removel with BUEs  -DN      Recorded by [DN] See [facility] OT      Row Name 01/07/20 0959             Pain Scale: Numbers Pre/Post-Treatment    Pain Scale: Numbers, Pretreatment  7/10  -LB      Pain Location  head  -LB      Pain Intervention(s)  Medication (See MAR)  -LB      Recorded by [LB] Audrey Valdivia PTA      Row Name 01/07/20 1526             Upper Extremity Seated Therapeutic Exercise    Performed, Seated Upper Extremity (Therapeutic Exercise)  shoulder flexion/extension;shoulder abduction/adduction;shoulder external/internal rotation;shoulder horizontal abduction/adduction;scapular stabilization;elbow flexion/extension;digit flexion/extension;wrist flexion/extension;forearm supination/pronation  -DN      Device, Seated Upper Extremity (Therapeutic Exercise)  free weights, barbell;free weights, cuff  -DN      Exercise Type, Seated Upper Extremity (Therapeutic Exercise)  AROM (active range of motion);resistive exercise  -DN      Expected Outcomes, Seated Upper Extremity (Therapeutic Exercise)  improve functional tolerance, self-care activity;improve performance, fine motor coordination skills;improve performance, reaching/manipulating objects  -DN      Restrictions, Seated Upper Extremity (Therapeutic Exercise)  no lift > 5#  -DN      Sets/Reps Detail, Seated Upper Extremity (Therapeutic Exercise)  2 sets of 10 reps   -DN      Transfers Skills, Training to Functional Activity, Seated Upper Extremity (Therapeutic Exercise)  transfers skills to functional activity most of the  time  -DN      Comment, Seated Upper Extremity (Therapeutic Exercise)  towel slides on incline table with 1.5 weighted cuff  -DN      Recorded by [GARRY] See [facility] OT      Row Name 01/07/20 0959             Lower Extremity Seated Therapeutic Exercise    Performed, Seated Lower Extremity (Therapeutic Exercise)  LAQ (long arc quad), knee extension;ankle dorsiflexion/plantarflexion  -LB      Exercise Type, Seated Lower Extremity (Therapeutic Exercise)  AROM (active range of motion)  -LB      Sets/Reps Detail, Seated Lower Extremity (Therapeutic Exercise)  1/20  -LB      Recorded by [LB] Audrey Valdivia PTA      Row Name 01/07/20 0959             Lower Extremity Supine Therapeutic Exercise    Performed, Supine Lower Extremity (Therapeutic Exercise)  gluteal sets;quadriceps sets;hip abduction/adduction;heel slides  -LB      Exercise Type, Supine Lower Extremity (Therapeutic Exercise)  isometric contraction, static;AROM (active range of motion)  -LB      Sets/Reps Detail, Supine Lower Extremity (Therapeutic Exercise)  1/20  -LB      Recorded by [LB] Audrey Valdivia PTA      Row Name 01/07/20 1526 01/07/20 0959          Positioning and Restraints    Pre-Treatment Position  sitting in chair/recliner  -DN  --     Post Treatment Position  wheelchair  -DN  wheelchair  -LB     In Bed  call light within reach;exit alarm on;encouraged to call for assist  -DN  --     In Wheelchair  --  sitting;call light within reach;exit alarm on  -LB     Recorded by [GARRY] See [facility] OT [LB] Audrey Valdivia PTA       User Key  (r) = Recorded By, (t) = Taken By, (c) = Cosigned By    Initials Name Effective Dates    See Tan OT 06/08/18 -     LB Audrey Valdivia PTA 03/07/18 -           Wound 12/19/19 1939 Right lateral throat Incision (Active)   Dressing Appearance open to air 1/7/2020 11:10 AM   Closure Approximated;Liquid skin adhesive;Adhesive closure strips 1/7/2020 11:10 AM   Base clean;dry 1/7/2020 11:10 AM    Periwound intact;dry;swelling 1/7/2020 11:10 AM   [place] Temperature warm 1/7/2020 11:10 AM   [place] Skin Turgor soft 1/7/2020 11:10 AM   Drainage Amount none 1/6/2020  8:00 PM   Dressing Care, Wound open to air 1/6/2020  8:00 PM         OT Recommendation and Plan    Anticipated Equipment Needs At Discharge (OT Eval): commode, 3-in-1, tub bench, shower chair  Planned Therapy Interventions (OT Eval): activity tolerance training, BADL retraining, neuromuscular control/coordination retraining, patient/caregiver education/training, strengthening exercise, transfer/mobility retraining            OT IRF GOALS     Row Name 01/03/20 1200 12/28/19 1144          Transfer Goal 1 (OT-IRF)    Activity/Assistive Device (Transfer Goal 1, OT-IRF)  shower chair;commode, bedside without drop arms;walker, rolling  -DN  shower chair;commode, bedside without drop arms;walker, rolling  -DN     [place] Level (Transfer Goal 1, OT-IRF)  conditional independence  -DN  supervision required  -DN     Time Frame (Transfer Goal 1, OT-IRF)  short term goal (STG)  -DN  short term goal (STG)  -DN     Progress/Outcomes (Transfer Goal 1, OT-IRF)  goal met;goal revised this date  -DN  continuing progress toward goal  -DN        Transfer Goal 2 (OT-IRF)    Activity/Assistive Device (Transfer Goal 2, OT-IRF)  toilet;shower chair  -DN  toilet;walker, rolling;shower chair  -DN     [place] Level (Transfer Goal 2, OT-IRF)  conditional independence  -DN  conditional independence  -DN     Time Frame (Transfer Goal 2, OT-IRF)  long term goal (LTG)  -DN  long term goal (LTG)  -DN     Progress/Outcomes (Transfer Goal 2, OT-IRF)  goal ongoing  -DN  continuing progress toward goal  -DN        Bathing FIM Goal (OT-IRF)    Progress/Outcomes (Bathing FIM Goal, OT-IRF)  --  goal no longer appropriate  -DN        Bathing Goal 1 (OT-IRF)    Activity/Device (Bathing Goal 1, OT-IRF)  bathing skills, all  -DN  bathing skills, all  -DN     [place]  Level (Bathing Goal 1, OT-IRF)  supervision required  -DN  supervision required  -DN     Time Frame (Bathing Goal 1, OT-IRF)  short term goal (STG)  -DN  short term goal (STG)  -DN     Progress/Outcomes (Bathing Goal 1, OT-IRF)  goal not met;goal ongoing  -DN  continuing progress toward goal  -DN        Bathing Goal 2 (OT-IRF)    Activity/Device (Bathing Goal 2, OT-IRF)  bathing skills, all  -DN  bathing skills, all  -DN     [place] Level (Bathing Goal 2, OT-IRF)  conditional independence  -DN  conditional independence  -DN     Time Frame (Bathing Goal 2, OT-IRF)  long term goal (LTG)  -DN  long term goal (LTG)  -DN     Progress/Outcomes (Bathing Goal 2, OT-IRF)  goal ongoing  -DN  continuing progress toward goal  -DN        UB Dressing FIM Goal (OT-IRF)    Progress/Outcomes (UB Dressing FIM Goal, OT-IRF)  --  goal no longer appropriate  -DN        UB Dressing Goal 1 (OT-IRF)    Activity/Device (UB Dressing Goal 1, OT-IRF)  upper body dressing  -DN  upper body dressing  -DN     [place] (UB Dress Goal 1, OT-IRF)  conditional independence  -DN  supervision required  -DN     Time Frame (UB Dressing Goal 1, OT-IRF)  short term goal (STG)  -DN  short term goal (STG)  -DN     Progress/Outcomes (UB Dressing Goal 1, OT-IRF)  goal met;goal revised this date  -DN  continuing progress toward goal  -DN        UB Dressing Goal 2 (OT-IRF)    Activity/Device (UB Dressing Goal 2, OT-IRF)  upper body dressing  -DN  upper body dressing  -DN     [place] (UB Dress Goal 2, OT-IRF)  conditional independence  -DN  conditional independence  -DN     Time Frame (UB Dressing Goal 2, OT-IRF)  long term goal (LTG)  -DN  long term goal (LTG)  -DN     Progress/Outcomes (UB Dressing Goal 2, OT-IRF)  continuing progress toward goal  -DN  continuing progress toward goal  -DN        LB Dressing FIM Goal (OT-IRF)    Progress/Outcomes (LB Dressing FIM Goal, OT-IRF)  --  goal no longer appropriate  -DN        LB Dressing Goal 1 (OT-IRF)     Activity/Device (LB Dressing Goal 1, OT-IRF)  lower body dressing  -DN  lower body dressing  -DN     [place] (LB Dressing Goal 1, OT-IRF)  supervision required  -DN  moderate assist (50-74% patient effort)  -DN     Time Frame (LB Dressing Goal 1, OT-IRF)  short term goal (STG)  -DN  short term goal (STG)  -DN     Progress/Outcomes (LB Dressing Goal 1, OT-IRF)  goal met;goal revised this date  -DN  continuing progress toward goal  -DN        LB Dressing Goal 2 (OT-IRF)    Activity/Device (LB Dressing Goal 2, OT-IRF)  lower body dressing  -DN  lower body dressing  -DN     [place] (LB Dressing Goal 2, OT-IRF)  conditional independence  -DN  supervision required  -DN     Time Frame (LB Dressing Goal 2, OT-IRF)  long term goal (LTG)  -DN  long term goal (LTG)  -DN     Progress/Outcomes (LB Dressing Goal 2, OT-IRF)  goal revised this date;goal ongoing  -DN  continuing progress toward goal  -DN       User Key  (r) = Recorded By, (t) = Taken By, (c) = Cosigned By    Initials Name Provider Type    See Tan OT Occupational Therapist                     Time Calculation:     Time Calculation- OT     Row Name 01/07/20 1534 01/07/20 1000          Time Calculation- OT    OT Start Time  1300  -DN  1000  -DN     OT Stop Time  1330  -DN  1100  -DN     OT Time Calculation (min)  30 min  -DN  60 min  -DN     OT Received On  01/07/20  -DN  01/07/20  -DN       User Key  (r) = Recorded By, (t) = Taken By, (c) = Cosigned By    Initials Name Provider Type    See Tan OT Occupational Therapist          Therapy Charges for Today     Code Description Service Date Service Provider Modifiers Qty    71831354655 HC OT NEUROMUSC RE EDUCATION EA 15 MIN 1/6/2020 See [facility] OT GO 3    27504920260 HC OT SELF CARE/MGMT/TRAIN EA 15 MIN 1/6/2020 See [facility] OT GO 4    24078930092 HC OT SELF CARE/MGMT/TRAIN EA 15 MIN 1/7/2020 See [facility]ra OT GO 1    74243668901 HC OT THER PROC EA 15 MIN 1/7/2020 See Cu[facility]ra  OT GO 3    [id number]  OT NEUROMUSC RE EDUCATION EA 15 MIN 1/7/2020 See [facility]ra OT GO 2                   See [facility] OT  1/7/2020

## 2020-01-07 NOTE — NURSING NOTE
Spoke with Audrey Funez who explained that Dr. Noe will admit patient in [facility] when we DC her on Thursday 1/9/20, piror to her surgery 1/11/20, notified Dr. Hernandez resident with Dr. Abraham notified and Sticky note left for Dr. Abraham,

## 2020-01-07 NOTE — PROGRESS NOTES
Occupational Therapy: Individual: 90 minutes.    Physical Therapy: Branch    Speech Language Pathology:  Branch    Signed by: BREONNA Flower/ALEXA

## 2020-01-07 NOTE — PLAN OF CARE
Problem: Patient Care Overview  Goal: Plan of Care Review  Outcome: Ongoing (interventions implemented as appropriate)  Flowsheets (Taken 1/7/2020 0134)  Outcome Summary: Patient sleeping well tonight. Occasional headaches, takes tylenol PRN. Ambulatory with RW. Monitoring Neck incision for increased swelling. Neurosurgery following.  Progress, Functional Goals: demonstrating adequate progress  Plan of Care Reviewed With: patient  IRF Plan of Care Review: progress ongoing, continue     Problem: Fall Risk (Adult)  Goal: Absence of Fall  Description  Patient will demonstrate the desired outcomes by discharge/transition of care.  Outcome: Ongoing (interventions implemented as appropriate)  Flowsheets (Taken 1/7/2020 0134)  Absence of Fall: making progress toward outcome     Problem: Skin Injury Risk (Adult)  Goal: Skin Health and Integrity  Description  Patient will demonstrate the desired outcomes by discharge/transition of care.  Outcome: Ongoing (interventions implemented as appropriate)  Flowsheets (Taken 1/7/2020 0134)  Skin Health and Integrity: making progress toward outcome

## 2020-01-08 LAB
ANION GAP SERPL CALCULATED.3IONS-SCNC: 13.5 MMOL/L (ref 5–15)
BUN BLD-MCNC: 11 MG/DL (ref 6–20)
BUN/CREAT SERPL: 13.6 (ref 7–25)
CALCIUM SPEC-SCNC: 9.4 MG/DL (ref 8.6–10.5)
CHLORIDE SERPL-SCNC: 102 MMOL/L (ref 98–107)
CO2 SERPL-SCNC: 26.5 MMOL/L (ref 22–29)
CREAT BLD-MCNC: 0.81 MG/DL (ref 0.57–1)
GFR SERPL CREATININE-BSD FRML MDRD: 72 ML/MIN/1.73
GLUCOSE BLD-MCNC: 106 MG/DL (ref 65–99)
POTASSIUM BLD-SCNC: 4.4 MMOL/L (ref 3.5–5.2)
SODIUM BLD-SCNC: 142 MMOL/L (ref 136–145)

## 2020-01-08 PROCEDURE — 97535 SELF CARE MNGMENT TRAINING: CPT

## 2020-01-08 PROCEDURE — 97110 THERAPEUTIC EXERCISES: CPT

## 2020-01-08 PROCEDURE — 80048 BASIC METABOLIC PNL TOTAL CA: CPT | Performed by: NURSE PRACTITIONER

## 2020-01-08 RX ORDER — FAMOTIDINE 20 MG/1
20 TABLET, FILM COATED ORAL
Status: DISCONTINUED | OUTPATIENT
Start: 2020-01-09 | End: 2020-01-09 | Stop reason: HOSPADM

## 2020-01-08 RX ADMIN — ONDANSETRON HYDROCHLORIDE 4 MG: 4 TABLET, FILM COATED ORAL at 03:52

## 2020-01-08 RX ADMIN — ONDANSETRON HYDROCHLORIDE 4 MG: 4 TABLET, FILM COATED ORAL at 20:00

## 2020-01-08 RX ADMIN — HYDROCODONE BITARTRATE AND ACETAMINOPHEN 1 TABLET: 5; 325 TABLET ORAL at 03:52

## 2020-01-08 RX ADMIN — FAMOTIDINE 20 MG: 20 TABLET, FILM COATED ORAL at 05:30

## 2020-01-08 RX ADMIN — GABAPENTIN 100 MG: 100 CAPSULE ORAL at 20:00

## 2020-01-08 RX ADMIN — CALCIUM 500 MG: 500 TABLET ORAL at 20:00

## 2020-01-08 RX ADMIN — GABAPENTIN 100 MG: 100 CAPSULE ORAL at 08:14

## 2020-01-08 RX ADMIN — ONDANSETRON HYDROCHLORIDE 4 MG: 4 TABLET, FILM COATED ORAL at 09:52

## 2020-01-08 RX ADMIN — VITAMIN D, TAB 1000IU (100/BT) 2000 UNITS: 25 TAB at 08:14

## 2020-01-08 RX ADMIN — MAGNESIUM HYDROXIDE 10 ML: 2400 SUSPENSION ORAL at 08:14

## 2020-01-08 RX ADMIN — CALCIUM 500 MG: 500 TABLET ORAL at 08:14

## 2020-01-08 RX ADMIN — HYDROCODONE BITARTRATE AND ACETAMINOPHEN 1 TABLET: 5; 325 TABLET ORAL at 20:00

## 2020-01-08 RX ADMIN — DOCUSATE SODIUM 100 MG: 100 CAPSULE, LIQUID FILLED ORAL at 08:15

## 2020-01-08 RX ADMIN — ATORVASTATIN CALCIUM 20 MG: 20 TABLET, FILM COATED ORAL at 20:00

## 2020-01-08 RX ADMIN — ACETAMINOPHEN 1000 MG: 500 TABLET, FILM COATED ORAL at 08:30

## 2020-01-08 NOTE — THERAPY TREATMENT NOTE
Inpatient Rehabilitation - Occupational Therapy Treatment Note    [facility]     Patient Name: Noemi Bartholomew  : 1960  MRN: [id number]    Today's Date: 2020                 Admit Date: 2019      Visit Dx:  No diagnosis found.    Patient Active Problem List   Diagnosis   • Carpal tunnel syndrome   • Hyperlipidemia   • Osteoporosis   • DDD (degenerative disc disease), lumbar   • Chronic left lumbar radiculopathy   • Congenital spondylolysis, lumbosacral region   • Neuropathic pain, leg, left   • Ossification of spinal ligament   • Spinal stenosis in cervical region   • Cervical spondylosis with myelopathy   • Cervical stenosis of spine   • Postoperative anemia due to acute blood loss   • Steroid-induced hyperglycemia   • Cord compression (CMS/HCC)   • Leukocytosis (due to steroids)   • Postoperative CSF leak   • Cervical myelopathy (CMS/HCC)   • CSF leak         Therapy Treatment    IRF Treatment Summary     Row Name 20 1243 20 0900          Evaluation/Treatment Time and Intent    Subjective Information  no complaints  -DN  complains of;pain  -LB     Existing Precautions/Restrictions  fall;spinal;lifting  -DN  fall;spinal  -LB     Document Type  therapy note (daily note)  -DN  therapy note (daily note)  -LB     Mode of Treatment  occupational therapy  -DN  physical therapy  -LB     Patient/Family Observations  pt seated in w/c  -DN  pnt seated in w/c. Nurse present  -LB     Recorded by [DN] See [facility] OT [LB] Audrey Valdivia PTA     Row Name 20 1243             Cognition/Psychosocial- PT/OT    Affect/Mental Status (Cognitive)  WFL  -DN      Recorded by [DN] See [facility] OT      Row Name 20 0900             Bed Mobility Assessment/Treatment    Rolling Left [place] (Bed Mobility)  independent log roll  -LB      Rolling Right [place] (Bed Mobility)  independent log roll  -LB      Supine-Sit [place] (Bed Mobility)  minimum assist (75% patient  effort);moderate assist (50% patient effort) from right  -LB      Sit-Supine [place] (Bed Mobility)  independent  -LB      Recorded by [LB] Audrey Valdivia, PTA      Row Name 01/08/20 0900             Bed-Chair Transfer    Bed-Chair [place] (Transfers)  conditional independence  -LB      Assistive Device (Bed-Chair Transfers)  walker, front-wheeled  -LB      Recorded by [LB] Audrey Valdivia, PTA      Row Name 01/08/20 0900             Chair-Bed Transfer    Chair-Bed [place] (Transfers)  conditional independence  -LB      Assistive Device (Chair-Bed Transfers)  walker, front-wheeled  -LB      Recorded by [LB] Audrey Valdivia, PTA      Row Name 01/08/20 0900             Sit-Stand Transfer    Sit-Stand [place] (Transfers)  conditional independence  -LB      Assistive Device (Sit-Stand Transfers)  walker, front-wheeled  -LB      Recorded by [LB] Audrey Valdivia, PTA      Row Name 01/08/20 0900             Stand-Sit Transfer    Stand-Sit [place] (Transfers)  conditional independence  -LB      Assistive Device (Stand-Sit Transfers)  walker, front-wheeled  -LB      Recorded by [LB] Audrey Valdivia, PTA      Row Name 01/08/20 1243             Shower Transfer    Type (Shower Transfer)  stand pivot/stand step  -DN      [place] Level (Shower Transfer)  supervision  -DN      Assistive Device (Shower Transfer)  walker, front-wheeled;grab bars/tub rail;shower chair  -DN      Recorded by [DN] See [facility] OT      Row Name 01/08/20 0900             Car Transfer    Type (Car Transfer)  sit-stand;stand-sit  -LB      [place] Level (Car Transfer)  supervision  -LB      Assistive Device (Car Transfer)  walker, front-wheeled into/out of back seat  -LB      Recorded by [LB] Audrey Valdivia, PTA      Row Name 01/08/20 0900             Gait/Stairs Assessment/Training    [place] Level (Gait)  supervision  -LB      Assistive Device (Gait)  walker, front-wheeled  -LB      Distance in  "Feet (Gait)  400  -LB      Pattern (Gait)  step-through  -LB      Deviations/Abnormal Patterns (Gait)  ally decreased  -LB      Bilateral Gait Deviations  forward flexed posture  -LB      Left Sided Gait Deviations  heel strike decreased  -LB      Right Sided Gait Deviations  heel strike decreased  -LB      [place] Level (Stairs)  stand by assist  -LB      Handrail Location (Stairs)  left side (ascending) both hands  -LB      Number of Steps (Stairs)  12 (4\") steps  -LB      Ascending Technique (Stairs)  step-to-step  -LB      Descending Technique (Stairs)  step-to-step  -LB      Comment (Gait/Stairs)  4\" curb w rwx w CGA, vc's  -LB      Recorded by [LB] Audrey Valdivia PTA      Row Name 01/08/20 1243             Bathing Assessment/Treatment    Bathing [place] Level  bathing skills;supervision;verbal cues  -DN      Assistive Device (Bathing)  grab bar/tub rail;hand held shower spray hose;shower chair  -DN      Bathing Position  supported sitting  -DN      Bathing Setup Assistance  obtain supplies  -DN      Recorded by [DN] See [facility] OT      Row Name 01/08/20 1243             Upper Body Dressing Assessment/Treatment    Upper Body Dressing Task  upper body dressing skills;doff;don;pull over garment;set up assistance  -DN      Upper Body Dressing Position  supported sitting  -DN      Set-up Assistance (Upper Body Dressing)  obtain clothing  -DN      Recorded by [GARRY] See [facility] OT      Row Name 01/08/20 1243             Lower Body Dressing Assessment/Treatment    Lower Body Dressing [place] Level  doff;don;pants/bottoms;shoes/slippers;socks;underwear;supervision;set up  -DN      Lower Body Dressing Position  supported sitting;supported standing  -DN      Lower Body Dressing Setup Assistance  obtain clothing  -DN      Comment (Lower Body Dressing)  pt able to tie shoes today  -DN      Recorded by [DN] See [facility] OT      Row Name 01/08/20 1243             Grooming Assessment/Treatment "    Grooming [place] Level  grooming skills;set up  -DN      Grooming Position  sink side;supported standing  -DN      Grooming Setup Assistance  open containers  -DN      Recorded by [DN] See [facility], CHOCO      Row Name 01/08/20 1243 01/08/20 0900          Pain Scale: Numbers Pre/Post-Treatment    Pain Scale: Numbers, Pretreatment  0/10 - no pain  -DN  5/10  -LB     Pain Scale: Numbers, Post-Treatment  0/10 - no pain  -DN  --     Pain Location  --  head  -LB     Pain Intervention(s)  --  Medication (See MAR)  -LB     Recorded by [DN] See [facility] OT [LB] Audrey Valdivia PTA     Row Name 01/08/20 1243             Upper Extremity Seated Therapeutic Exercise    Performed, Seated Upper Extremity (Therapeutic Exercise)  shoulder flexion/extension;shoulder abduction/adduction;shoulder external/internal rotation;shoulder horizontal abduction/adduction;scapular protraction/retraction;elbow flexion/extension;forearm supination/pronation;wrist flexion/extension;digit flexion/extension  -DN      Device, Seated Upper Extremity (Therapeutic Exercise)  free weights, cuff  -DN      Exercise Type, Seated Upper Extremity (Therapeutic Exercise)  AROM (active range of motion)  -DN      Expected Outcomes, Seated Upper Extremity (Therapeutic Exercise)  improve functional tolerance, self-care activity  -DN      Restrictions, Seated Upper Extremity (Therapeutic Exercise)  no lift > 5#  -DN      Sets/Reps Detail, Seated Upper Extremity (Therapeutic Exercise)  2/8 reps with AROM for shoulders, 1# dumbell for elbow, forearm and wrist, theraputty for hands  -DN      Transfers Skills, Training to Functional Activity, Seated Upper Extremity (Therapeutic Exercise)  able to transfer skills from training to functional activity  -DN      Comment, Seated Upper Extremity (Therapeutic Exercise)  pt issue HEP for BUEs for return home   -DN      Recorded by [DN] See [facility], OT      Row Name 01/08/20 0900             Lower Extremity  Seated Therapeutic Exercise    Performed, Seated Lower Extremity (Therapeutic Exercise)  LAQ (long arc quad), knee extension;ankle dorsiflexion/plantarflexion;hip flexion/extension  -LB      Exercise Type, Seated Lower Extremity (Therapeutic Exercise)  AROM (active range of motion)  -LB      Sets/Reps Detail, Seated Lower Extremity (Therapeutic Exercise)  1/20  -LB      Recorded by [LB] Audrey Valdivia PTA      Row Name 01/08/20 1243 01/08/20 0900          Positioning and Restraints    Pre-Treatment Position  sitting in chair/recliner  -DN  --     Post Treatment Position  wheelchair  -DN  wheelchair  -LB     In Bed  sitting;call light within reach;encouraged to call for assist;exit alarm on  -DN  --     In Wheelchair  --  sitting;exit alarm on;call light within reach  -LB     Recorded by [DN] See [facility] OT [LB] Audrey Valdivia PTA       User Key  (r) = Recorded By, (t) = Taken By, (c) = Cosigned By    Initials Name Effective Dates    See [facility] OT 06/08/18 -     LB Audrey Valdivia PTA 03/07/18 -           Wound 12/19/19 1939 Right lateral throat Incision (Active)   Dressing Appearance dry;open to air 1/7/2020  9:50 PM   Closure Approximated;Liquid skin adhesive 1/7/2020  9:50 PM   Base clean;dry 1/7/2020  9:50 PM   [place] dry;swelling 1/7/2020  9:50 PM   [place] Temperature warm 1/7/2020  9:50 PM   Drainage Amount none 1/7/2020  9:50 PM         OT Recommendation and Plan    Anticipated Equipment Needs At Discharge (OT Eval): commode, 3-in-1, tub bench, shower chair  Planned Therapy Interventions (OT Eval): activity tolerance training, BADL retraining, neuromuscular control/coordination retraining, patient/caregiver education/training, strengthening exercise, transfer/mobility retraining            OT IRF GOALS     Row Name 01/03/20 1200 12/28/19 1144          Transfer Goal 1 (OT-IRF)    Activity/Assistive Device (Transfer Goal 1, OT-IRF)  shower chair;commode, bedside without drop  arms;walker, rolling  -DN  shower chair;commode, bedside without drop arms;walker, rolling  -DN     [place] Level (Transfer Goal 1, OT-IRF)  conditional independence  -DN  supervision required  -DN     Time Frame (Transfer Goal 1, OT-IRF)  short term goal (STG)  -DN  short term goal (STG)  -DN     Progress/Outcomes (Transfer Goal 1, OT-IRF)  goal met;goal revised this date  -DN  continuing progress toward goal  -DN        Transfer Goal 2 (OT-IRF)    Activity/Assistive Device (Transfer Goal 2, OT-IRF)  toilet;shower chair  -DN  toilet;walker, rolling;shower chair  -DN     [place] Level (Transfer Goal 2, OT-IRF)  conditional independence  -DN  conditional independence  -DN     Time Frame (Transfer Goal 2, OT-IRF)  long term goal (LTG)  -DN  long term goal (LTG)  -DN     Progress/Outcomes (Transfer Goal 2, OT-IRF)  goal ongoing  -DN  continuing progress toward goal  -DN        Bathing FIM Goal (OT-IRF)    Progress/Outcomes (Bathing FIM Goal, OT-IRF)  --  goal no longer appropriate  -DN        Bathing Goal 1 (OT-IRF)    Activity/Device (Bathing Goal 1, OT-IRF)  bathing skills, all  -DN  bathing skills, all  -DN     [place] Level (Bathing Goal 1, OT-IRF)  supervision required  -DN  supervision required  -DN     Time Frame (Bathing Goal 1, OT-IRF)  short term goal (STG)  -DN  short term goal (STG)  -DN     Progress/Outcomes (Bathing Goal 1, OT-IRF)  goal not met;goal ongoing  -DN  continuing progress toward goal  -DN        Bathing Goal 2 (OT-IRF)    Activity/Device (Bathing Goal 2, OT-IRF)  bathing skills, all  -DN  bathing skills, all  -DN     [place] Level (Bathing Goal 2, OT-IRF)  conditional independence  -DN  conditional independence  -DN     Time Frame (Bathing Goal 2, OT-IRF)  long term goal (LTG)  -DN  long term goal (LTG)  -DN     Progress/Outcomes (Bathing Goal 2, OT-IRF)  goal ongoing  -DN  continuing progress toward goal  -DN        UB Dressing FIM Goal (OT-IRF)    Progress/Outcomes (UB  Dressing FIM Goal, OT-IRF)  --  goal no longer appropriate  -DN        UB Dressing Goal 1 (OT-IRF)    Activity/Device (UB Dressing Goal 1, OT-IRF)  upper body dressing  -DN  upper body dressing  -DN     [place] (UB Dress Goal 1, OT-IRF)  conditional independence  -DN  supervision required  -DN     Time Frame (UB Dressing Goal 1, OT-IRF)  short term goal (STG)  -DN  short term goal (STG)  -DN     Progress/Outcomes (UB Dressing Goal 1, OT-IRF)  goal met;goal revised this date  -DN  continuing progress toward goal  -DN        UB Dressing Goal 2 (OT-IRF)    Activity/Device (UB Dressing Goal 2, OT-IRF)  upper body dressing  -DN  upper body dressing  -DN     [place] (UB Dress Goal 2, OT-IRF)  conditional independence  -DN  conditional independence  -DN     Time Frame (UB Dressing Goal 2, OT-IRF)  long term goal (LTG)  -DN  long term goal (LTG)  -DN     Progress/Outcomes (UB Dressing Goal 2, OT-IRF)  continuing progress toward goal  -DN  continuing progress toward goal  -DN        LB Dressing FIM Goal (OT-IRF)    Progress/Outcomes (LB Dressing FIM Goal, OT-IRF)  --  goal no longer appropriate  -DN        LB Dressing Goal 1 (OT-IRF)    Activity/Device (LB Dressing Goal 1, OT-IRF)  lower body dressing  -DN  lower body dressing  -DN     [place] (LB Dressing Goal 1, OT-IRF)  supervision required  -DN  moderate assist (50-74% patient effort)  -DN     Time Frame (LB Dressing Goal 1, OT-IRF)  short term goal (STG)  -DN  short term goal (STG)  -DN     Progress/Outcomes (LB Dressing Goal 1, OT-IRF)  goal met;goal revised this date  -DN  continuing progress toward goal  -DN        LB Dressing Goal 2 (OT-IRF)    Activity/Device (LB Dressing Goal 2, OT-IRF)  lower body dressing  -DN  lower body dressing  -DN     [place] (LB Dressing Goal 2, OT-IRF)  conditional independence  -DN  supervision required  -DN     Time Frame (LB Dressing Goal 2, OT-IRF)  long term goal (LTG)  -DN  long term goal (LTG)  -DN      Progress/Outcomes (LB Dressing Goal 2, OT-IRF)  goal revised this date;goal ongoing  -DN  continuing progress toward goal  -DN       User Key  (r) = Recorded By, (t) = Taken By, (c) = Cosigned By    Initials Name Provider Type    See [facility] OT Occupational Therapist                     Time Calculation:     Time Calculation- OT     Row Name 01/08/20 1248             Time Calculation- OT    OT Start Time  1000  -DN      OT Stop Time  1130  -DN      OT Time Calculation (min)  90 min  -DN      OT Received On  01/08/20  -DN        User Key  (r) = Recorded By, (t) = Taken By, (c) = Cosigned By    Initials Name Provider Type    See Tan OT Occupational Therapist          Therapy Charges for Today     Code Description Service Date Service Provider Modifiers Qty    38859088280 HC OT SELF CARE/MGMT/TRAIN EA 15 MIN 1/7/2020 See [facility] OT GO 1    16749555991 HC OT THER PROC EA 15 MIN 1/7/2020 See [facility] OT GO 3    16443435865 HC OT NEUROMUSC RE EDUCATION EA 15 MIN 1/7/2020 See [facility] OT GO 2    60407638877 HC OT SELF CARE/MGMT/TRAIN EA 15 MIN 1/8/2020 See [facility] OT GO 3    42414372235 HC OT THER PROC EA 15 MIN 1/8/2020 See [facility] OT GO 3                   See [facility] OT  1/8/2020

## 2020-01-08 NOTE — THERAPY TREATMENT NOTE
Inpatient Rehabilitation - Physical Therapy Treatment Note  [facility]     Patient Name: Noemi Bartholomew  : 1960  MRN: [id number]    Today's Date: 2020                 Admit Date: 2019      Visit Dx:    No diagnosis found.    Patient Active Problem List   Diagnosis   • Carpal tunnel syndrome   • Hyperlipidemia   • Osteoporosis   • DDD (degenerative disc disease), lumbar   • Chronic left lumbar radiculopathy   • Congenital spondylolysis, lumbosacral region   • Neuropathic pain, leg, left   • Ossification of spinal ligament   • Spinal stenosis in cervical region   • Cervical spondylosis with myelopathy   • Cervical stenosis of spine   • Postoperative anemia due to acute blood loss   • Steroid-induced hyperglycemia   • Cord compression (CMS/HCC)   • Leukocytosis (due to steroids)   • Postoperative CSF leak   • Cervical myelopathy (CMS/HCC)   • CSF leak       Therapy Treatment    IRF Treatment Summary     Row Name 20 1243 20 0900          Evaluation/Treatment Time and Intent    Subjective Information  no complaints  -DN  complains of;pain  -LB     Existing Precautions/Restrictions  fall;spinal;lifting  -DN  fall;spinal  -LB     Document Type  therapy note (daily note)  -DN  therapy note (daily note)  -LB     Mode of Treatment  occupational therapy  -DN  physical therapy  -LB     Patient/Family Observations  pt seated in w/c  -DN  pnt seated in w/c. Nurse present  -LB     Recorded by [DN] See [facility] OT [LB] Audrey Valdivia PTA     Row Name 20 1243             Cognition/Psychosocial- PT/OT    Affect/Mental Status (Cognitive)  WFL  -DN      Recorded by [DN] See [facility] OT      Row Name 20 0900             Bed Mobility Assessment/Treatment    Rolling Left [place] (Bed Mobility)  independent log roll  -LB      Rolling Right [place] (Bed Mobility)  independent log roll  -LB      Supine-Sit [place] (Bed Mobility)  minimum assist (75% patient  effort);moderate assist (50% patient effort) from right in AM. PM; CGA from R and also from left  -LB      Sit-Supine [place] (Bed Mobility)  independent  -LB      Comment (Bed Mobility)  assessed on txment mat  -LB      Recorded by [LB] Audrey Valdivia PTA      Row Name 01/08/20 0900             Bed-Chair Transfer    Bed-Chair [place] (Transfers)  conditional independence  -LB      Assistive Device (Bed-Chair Transfers)  walker, front-wheeled  -LB      Recorded by [LB] Audrey Valdivia PTA      Row Name 01/08/20 0900             Chair-Bed Transfer    Chair-Bed [place] (Transfers)  conditional independence  -LB      Assistive Device (Chair-Bed Transfers)  walker, front-wheeled  -LB      Recorded by [LB] Audrey Valdivia PTA      Row Name 01/08/20 0900             Sit-Stand Transfer    Sit-Stand [place] (Transfers)  conditional independence  -LB      Assistive Device (Sit-Stand Transfers)  walker, front-wheeled  -LB      Recorded by [LB] Audrey Valdivia PTA      Row Name 01/08/20 0900             Stand-Sit Transfer    Stand-Sit [place] (Transfers)  conditional independence  -LB      Assistive Device (Stand-Sit Transfers)  walker, front-wheeled  -LB      Recorded by [LB] Audrey Valdivia PTA      Row Name 01/08/20 1243             Shower Transfer    Type (Shower Transfer)  stand pivot/stand step  -DN      [place] Level (Shower Transfer)  supervision  -DN      Assistive Device (Shower Transfer)  walker, front-wheeled;grab bars/tub rail;shower chair  -DN      Recorded by [DN] See [facility] OT      Row Name 01/08/20 0900             Car Transfer    Type (Car Transfer)  sit-stand;stand-sit  -LB      [place] Level (Car Transfer)  supervision  -LB      Assistive Device (Car Transfer)  walker, front-wheeled into/out of back seat  -LB      Recorded by [LB] Audrey Valdivia, EBENEZER      Row Name 01/08/20 0900             Gait/Stairs Assessment/Training    [place] Level  "(Gait)  supervision  -LB      Assistive Device (Gait)  walker, front-wheeled  -LB      Distance in Feet (Gait)  400  -LB      Pattern (Gait)  step-through  -LB      Deviations/Abnormal Patterns (Gait)  ally decreased  -LB      Bilateral Gait Deviations  forward flexed posture  -LB      Left Sided Gait Deviations  heel strike decreased  -LB      Right Sided Gait Deviations  heel strike decreased  -LB      [place] Level (Stairs)  stand by assist  -LB      Handrail Location (Stairs)  left side (ascending) both hands  -LB      Number of Steps (Stairs)  12 (4\") steps  -LB      Ascending Technique (Stairs)  step-to-step  -LB      Descending Technique (Stairs)  step-to-step  -LB      Negotiation (Ramp)  -- ramp w rwx w cga  -LB      Comment (Gait/Stairs)  4\" curb w rwx w CGA, vc's  -LB      Recorded by [LB] Audrey Valdivia PTA      Row Name 01/08/20 1243             Bathing Assessment/Treatment    Bathing [place] Level  bathing skills;supervision;verbal cues  -DN      Assistive Device (Bathing)  grab bar/tub rail;hand held shower spray hose;shower chair  -DN      Bathing Position  supported sitting  -DN      Bathing Setup Assistance  obtain supplies  -DN      Recorded by [DN] See [facility] OT      Row Name 01/08/20 1243             Upper Body Dressing Assessment/Treatment    Upper Body Dressing Task  upper body dressing skills;doff;don;pull over garment;set up assistance  -DN      Upper Body Dressing Position  supported sitting  -DN      Set-up Assistance (Upper Body Dressing)  obtain clothing  -DN      Recorded by [DN] See [facility] OT      Row Name 01/08/20 1243             Lower Body Dressing Assessment/Treatment    Lower Body Dressing [place] Level  doff;don;pants/bottoms;shoes/slippers;socks;underwear;supervision;set up  -DN      Lower Body Dressing Position  supported sitting;supported standing  -DN      Lower Body Dressing Setup Assistance  obtain clothing  -DN      Comment (Lower Body " Dressing)  pt able to tie shoes today  -DN      Recorded by [DN] See [facility] OT      Row Name 01/08/20 1243             Grooming Assessment/Treatment    Grooming [place] Level  grooming skills;set up  -DN      Grooming Position  sink side;supported standing  -DN      Grooming Setup Assistance  open containers  -DN      Recorded by [DN] See [facility] OT      Row Name 01/08/20 1243 01/08/20 0900          Pain Scale: Numbers Pre/Post-Treatment    Pain Scale: Numbers, Pretreatment  0/10 - no pain  -DN  5/10  -LB     Pain Scale: Numbers, Post-Treatment  0/10 - no pain  -DN  --     Pain Location  --  head  -LB     Pain Intervention(s)  --  Medication (See MAR)  -LB     Recorded by [DN] See [facility] OT [LB] Audrey Valdivia PTA     Row Name 01/08/20 1243             Upper Extremity Seated Therapeutic Exercise    Performed, Seated Upper Extremity (Therapeutic Exercise)  shoulder flexion/extension;shoulder abduction/adduction;shoulder external/internal rotation;shoulder horizontal abduction/adduction;scapular protraction/retraction;elbow flexion/extension;forearm supination/pronation;wrist flexion/extension;digit flexion/extension  -DN      Device, Seated Upper Extremity (Therapeutic Exercise)  free weights, cuff  -DN      Exercise Type, Seated Upper Extremity (Therapeutic Exercise)  AROM (active range of motion)  -DN      Expected Outcomes, Seated Upper Extremity (Therapeutic Exercise)  improve functional tolerance, self-care activity  -DN      Restrictions, Seated Upper Extremity (Therapeutic Exercise)  no lift > 5#  -DN      Sets/Reps Detail, Seated Upper Extremity (Therapeutic Exercise)  2/8 reps with AROM for shoulders, 1# dumbell for elbow, forearm and wrist, theraputty for hands  -DN      Transfers Skills, Training to Functional Activity, Seated Upper Extremity (Therapeutic Exercise)  able to transfer skills from training to functional activity  -DN      Comment, Seated Upper Extremity (Therapeutic  Exercise)  pt issue HEP for BUEs for return home   -DN      Recorded by [DN] See [facility], OT      Row Name 01/08/20 0900             Lower Extremity Seated Therapeutic Exercise    Performed, Seated Lower Extremity (Therapeutic Exercise)  LAQ (long arc quad), knee extension;ankle dorsiflexion/plantarflexion;hip flexion/extension  -LB      Exercise Type, Seated Lower Extremity (Therapeutic Exercise)  AROM (active range of motion)  -LB      Sets/Reps Detail, Seated Lower Extremity (Therapeutic Exercise)  1/20  -LB      Recorded by [LB] Audrey Valdivia PTA      Row Name 01/08/20 1243 01/08/20 0900          Positioning and Restraints    Pre-Treatment Position  sitting in chair/recliner  -DN  --     Post Treatment Position  wheelchair  -DN  wheelchair  -LB     In Bed  sitting;call light within reach;encouraged to call for assist;exit alarm on  -DN  --     In Wheelchair  --  sitting;exit alarm on;call light within reach  -LB     Recorded by [DN] See [facility], OT [LB] Audrey Valdivia PTA       User Key  (r) = Recorded By, (t) = Taken By, (c) = Cosigned By    Initials Name Effective Dates    See Tan OT 06/08/18 -     LB Audrey Valdivia PTA 03/07/18 -         Wound 12/19/19 1939 Right lateral throat Incision (Active)   Dressing Appearance dry;open to air 1/7/2020  9:50 PM   Closure Approximated;Liquid skin adhesive 1/7/2020  9:50 PM   Base clean;dry 1/7/2020  9:50 PM   Periwound dry;swelling 1/7/2020  9:50 PM   Periwound Temperature warm 1/7/2020  9:50 PM   Drainage Amount none 1/7/2020  9:50 PM           PT Recommendation and Plan                        Time Calculation:     PT Charges     Row Name 01/08/20 1314 01/08/20 0859          Time Calculation    Start Time  1230  -LB  0830  -LB     Stop Time  1300  -LB  0930  -LB     Time Calculation (min)  30 min  -LB  60 min  -LB       User Key  (r) = Recorded By, (t) = Taken By, (c) = Cosigned By    Initials Name Provider Type    Audrey Adams  PTA Physical Therapy Assistant          Therapy Charges for Today     Code Description Service Date Service Provider Modifiers Qty    15995408111 HC PT THER PROC EA 15 MIN 1/7/2020 Audrey Valdivia PTA GP 6    54427124575 HC PT CARE PLAN EACH 15 MIN 1/7/2020 Audrey Valdivia PTA GP 5    35099854154 HC PT THER PROC EA 15 MIN 1/8/2020 Audrey Valdivia PTA GP 6            PT G-Codes  Outcome Measure Options: Tinetti  Tinetti Total Score: 22      Audrey Valdivia PTA  1/8/2020

## 2020-01-08 NOTE — PLAN OF CARE
Problem: Fall Risk (Adult)  Goal: Absence of Fall  Outcome: Ongoing (interventions implemented as appropriate)  Flowsheets (Taken 1/7/2020 0134 by Jocelyn Richardson RN)  Absence of Fall: making progress toward outcome     Problem: Skin Injury Risk (Adult)  Goal: Skin Health and Integrity  Outcome: Ongoing (interventions implemented as appropriate)  Flowsheets (Taken 1/7/2020 0134 by Jocelyn Richardson RN)  Skin Health and Integrity: making progress toward outcome     Problem: Patient Care Overview  Goal: Plan of Care Review  Outcome: Ongoing (interventions implemented as appropriate)  Flowsheets  Taken 1/8/2020 1535 by Yareli Calabrese RN  Outcome Summary: Pt. anxious about up and coming surgery. Does not want to be d/c home to come back to hospital for surgery on Sat. Pt. has experienced HA pain controlled by Tylenol. Had an upset stomach W/ nausea controlled by zofran. Participated in therapy. Talked on the phone to family and friends. A&Ox4. Continent of B&B. New orders from dr. pepcid before BF. Cooperative, cheerful, and pleasant.  Taken 1/7/2020 0134 by Jocelyn Richardson RN  Progress, Functional Goals: demonstrating adequate progress  IRF Plan of Care Review: progress ongoing, continue  Taken 1/8/2020 0910 by Yareli Calabrese RN  Plan of Care Reviewed With: patient

## 2020-01-08 NOTE — PROGRESS NOTES
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   Status post December 19, 2019-1. Placement of external lumbar drain; 2. Drainage of cervical CSF collection and reclosure of CSF leak.  Lumbar drain removed December 24, 2019       Subjective     History of Present Illness     Subjective  Patient had increased headache yesterday evening with associated nausea.  Rated the headache 9/10.  More intense than she is had before.  She had the headache with nausea given around 4 AM but not as bad.  Hydrocodone helped.  Zofran did not help.  Nursing reviewed with the neurosurgical service and they recommended keeping the head of the bed elevated.  Plans for the patient to be admitted to the acute care wing in the hospital tomorrow January 9 in preparation for lumbar shunt.    The patient feels that her strength in the upper extremities continues improved.  Her mobility also continues improved.    History taken from: patient    Objective     Vital Signs  Temp:  [97.2 °F (36.2 °C)-98 °F (36.7 °C)] 97.2 °F (36.2 °C)  Heart Rate:  [69-79] 71  Resp:  [18] 18  BP: (104-136)/(61-68) 104/68    Objective:  Vital signs: (most recent): Blood pressure 104/68, pulse 71, temperature 97.2 °F (36.2 °C), temperature source Oral, resp. rate 18, height 134.6 cm (53\"), SpO2 94 %, not currently breastfeeding.            Physical Exam  MENTAL STATUS -  AWAKE / ALERT  HEENT- NCAT,   SCLERA NON-ICTERIC, CONJUNCTIVA PINK, OP MOIST, neck incision localized swelling.  No erythema or " drainage.  LUNGS - CTA, NO WHEEZES, RALES OR RHONCHI  HEART- RRR, NO RUB, MURMUR, OR GALLOP  ABD - NORMOACTIVE BOWEL SOUNDS, SOFT, NT.  EXT - NO EDEMA OR CYANOSIS  NEURO -awake alert.  Oriented x4.  Able to oppose the thumb to the other 4 digits bilaterally.  MOTOR EXAM -bilateral upper extremity  better strength for elbow extension and finger flexion.  Still weak with hand intrinsics..     Takes resistance with bilateral knee extension and ankle dorsiflexion     Results Review:     I reviewed the patient's new clinical results.  Results from last 7 days   Lab Units 01/08/20  0900   SODIUM mmol/L 142   POTASSIUM mmol/L 4.4   CHLORIDE mmol/L 102   CO2 mmol/L 26.5   BUN mg/dL 11   CREATININE mg/dL 0.81   CALCIUM mg/dL 9.4   GLUCOSE mg/dL 106*   Results for DARYN PUGH (MRN [id number]) as of 12/18/2019 10:50   Ref. Range 12/2/2019 16:00 12/7/2019 05:51 12/9/2019 05:15 12/10/2019 05:17 12/18/2019 07:05   Sodium Latest Ref Range: 136 - 145 mmol/L 140 134 (L) 142 139 129 (L)     Results from last 7 days   Lab Units 01/06/20  0509   WBC 10*3/mm3 4.71   HEMOGLOBIN g/dL 10.2*   HEMATOCRIT % 30.6*   PLATELETS 10*3/mm3 458*       Medication Review: done  Scheduled Meds:    atorvastatin 20 mg Oral Nightly   calcium carbonate (oyster shell) 500 mg Oral BID   cholecalciferol 2,000 Units Oral Daily   docusate sodium 100 mg Oral BID   [START ON 1/9/2020] famotidine 20 mg Oral QAM AC   gabapentin 100 mg Oral BID   magnesium hydroxide 10 mL Oral Daily   senna-docusate sodium 2 tablet Oral Nightly     Continuous Infusions:   PRN Meds:.•  acetaminophen  •  bisacodyl  •  cyclobenzaprine  •  fluticasone  •  HYDROcodone-acetaminophen  •  lidocaine  •  melatonin  •  ondansetron **OR** ondansetron  •  polyethylene glycol  •  senna-docusate sodium  •  sodium chloride       Assessment/Plan       Cervical myelopathy (CMS/HCC)      Assessment & Plan  Cervical myelopathy with upper extremity greater than  lower extremity and left side greater  than right side involvement.     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  Spinal headache managed conservatively with improvement.    Dec 18 - she has had increase size of the fluid collection at the anterior neck incision.  She is to go for placement of a lumbar drain on December 19 with admission to the acute care wing in the hospital at that time.  Dec 19 - lumbar drain today  Dec 26 -drain removed yesterday.Patient has some headache today.  She has not had any reaccumulation of fluid at the anterior neck incision.    Dec 27 - no recurrence of anterior cervical fluid collection. Return to rehab unit.   January 6-anterior cervical fluid collection appears larger on examination over the past weekend.  Neurosurgery evaluating for possible LP shunt placement on Saturday.  January 8-Patient had increased headache yesterday evening with associated nausea.  Rated the headache 9/10.  More intense than she is had before.  She had the headache with nausea given around 4 AM but not as bad.  Hydrocodone helped.  Zofran did not help.  Nursing reviewed with the neurosurgical service and they recommended keeping the head of the bed elevated.  Plans for the patient to be admitted to the acute care wing in the hospital tomorrow January 9 in preparation for lumbar shunt.     Postoperative cervical neuritis-steroids-Medrol Dosepak taper completed Dec 15.      GI prophylaxis-Pepcid     DVT prophylaxis-SCDs     Osteoporosis-h/o Prolia     Bladder-voiding on own with low PVR recorded     Bowel-constipation-on stool softeners.  Added scheduled suppository every other day after lunch to take advantage of gastrocolic reflex.  December 31- Moving her  bowels better.  Using a suppository at home would be difficult with her weakness in the hands.  Discussed assessing her bowel pattern without the scheduled suppository.  January 8-has some mild constipation but having small bowel movements without suppository.     Pain management-hydrocodone/Flexeril/gabapentin -Luis Alfredo report reviewed           Now admit to resume comprehensive acute inpatient rehabilitation  .  This would be an interdisciplinary program with physical therapy 1.5 hour,  occupational therapy 1.5 hour,  5 days a week.  Rehabilitation nursing for carryover, monitoring of neurologic   status, bowel and bladder, and skin  Ongoing physician follow-up.  Weekly team conferences.  Goals are to achieve a level of supervision with  mobility and self-care and improved strength and coordination.   Rehabilitation prognosis fair.  Medical prognosis deferred to neurosurgery.  Estimated length of stay is approximately 10-14  days, but is only an estimation.   The patient's functional status and clinical status is unchanged from preadmission assessment and the patient continues appropriate for acute inpatient rehabilitation.  Goal is for home with outpatient   therapies.  Barrier to discharge: Impaired mobility/self-care- work on balance/gross and fine motor control/strengthening to overcome.      TEAM CONF - DEC 17 - INCREASED PAIN YESTERDAY WITH TRANSFERS . SWELLING AT NECK INCISION.   160 FEET CTG MIN- TRANSFERS CTG-MIN.    STRENGTH 1# RIGHT AND 0# LEFT.   UBD MIN. LBD MAX.  BATH MIN ASSIST. USING BUILT UP HAND ON UTENSILS.   [facility] - TWO WEEKS.     TEAM CONF - DEC 31- TRANSFERS CTG. 4 STAIRS CTG. GAIT 300 FEET CTG RW. TOILET TRANSFERS CTG. CONTINUED WEAK .  UBD MIN. LBD MOD ASSIST. GROOMING MIN ASSIST. BATH MIN ASSIST. SKIN INTACT. SOME SWELLING TO NECK INCISION. CONTINENT BOWEL AND BLADDER. PATIENT WOULD NOT BE ABLE TO DO SUPPOSITORY HERSELF FOR BOWEL PROGRAM  [facility] - FRIDAY EILEL 10.    TEAM CONF - JAN 7 -  TRANSFERS Min-mod to sit, sup to supine SUP.  Gait 240 feet RW SUP. UBD SBA. LBD MIN ASSIST. BATH MIN ASSIST. EATING MOD INDEPENDENT. GROOMING SET UP.   [facility] - Thursday JAN 9 FROM REHAB STANDPOINT.     Shiva Abraham MD  01/08/20  10:58 AM    Time:

## 2020-01-08 NOTE — NURSING NOTE
Spoke with Audrey WITT re: patient's recent worsened headaches accompanied by nausea. No neurological change noted, no SOB reported, no difficulty swallowing, no change to incision noted overnight. BMP ordered. No changes to medication at this point. Will advise pt to elevate HOB.

## 2020-01-08 NOTE — PLAN OF CARE
Problem: Laminectomy/Foraminotomy/Discectomy (Adult)  Goal: Signs and Symptoms of Listed Potential Problems Will be Absent, Minimized or Managed (Laminectomy/Foraminotomy/Discectomy)  Outcome: Ongoing (interventions implemented as appropriate)     Problem: Skin Injury Risk (Adult)  Goal: Skin Health and Integrity  Outcome: Ongoing (interventions implemented as appropriate)     Problem: Patient Care Overview  Goal: Coping Plan  Outcome: Ongoing (interventions implemented as appropriate)       Patient A/Ox4, OOB x1 w RW. Anterior neck incision unchanged; closed with swelling. Pt denies SOB or difficulty swallowing. Pt reported headache pain 8/10 this morning accompanied by nausea. She stated that it was the same type of h/a as earlier in the day, 1/7. V/s checked and stable. Prn Norco and zofran given. Pt reported pain 7/10 upon reassessment and little relief of nausea (but able to tolerate). Will place phone call to Dr. Noe's office to notify of worsening headaches over last day, now accompanied by nausea.

## 2020-01-08 NOTE — PROGRESS NOTES
Patient's insurance update was sent in on Monday, 1/6 for extension of stay. Admission nurse received notice this morning that insurance was denying patient's extension for stay. Dr. Abraham to do peer to peer appeal/review with insurance MD. Plan is for patient to move to acute care tomorrow. Discussed with patient and left message for sister. Discussed having [facility] follow patient for home PT, OT, and possibly nursing. Patient is agreeable to this plan so will contact home health liaison to follow. Will assist with plans.

## 2020-01-08 NOTE — PROGRESS NOTES
Inpatient Rehabilitation Plan of Care Note    Plan of Care  Care Plan Reviewed - No updates at this time.    Safety    Performed Intervention(s)  safety rounds/call light within easy reach  bed alarm/chair alarm      Pain    Performed Intervention(s)  Pain assessment q shift and PRN; Offer pain medication PRN      Psychosocial    Performed Intervention(s)  Offer support  calm enviroment  allow time to verbalize      Body Systems    Performed Intervention(s)  Wound Care as ordered. Skin assessment q shift and PRN.  Monitor swelling site      Sphincter Control    Performed Intervention(s)  Monitor I&O    Signed by: Bren Curry RN

## 2020-01-08 NOTE — PROGRESS NOTES
Inpatient Rehabilitation Plan of Care Note    Plan of Care  Care Plan Reviewed - Updates as Follows    Body Systems    [RN] Integumentary(Active)  Current Status(01/08/2020): Anterior neck cervical incision KITTY glued and intact  no drainage. Swelling increased over the weekend. Neurosurgery aware and  following.  Weekly Goal(01/15/2020): Monitor swelling site and any changes  Discharge Goal: Swelling resolvec; incision healed    Performed Intervention(s)  Wound Care as ordered. Skin assessment q shift and PRN.  Monitor swelling site      Pain    [RN] Pain Management(Active)  Current Status(01/08/2020): Occasional headaches  Weekly Goal(01/15/2020): Pain will be managed  Discharge Goal: Pain will be managed    Performed Intervention(s)  Pain assessment q shift and PRN; Offer pain medication PRN      Psychosocial    [RN] Behavior(Active)  Current Status(01/08/2020): Patient pleasant and appears to be coping well with  current health status  Weekly Goal(01/15/2020): Patient will demonstrate appropriate coping strategies  Discharge Goal: Same with weekly    Performed Intervention(s)  Offer support  calm enviroment  allow time to verbalize      Safety    [RN] Potential for Injury(Active)  Current Status(01/08/2020): Risk for falls related to surgery and  Weekly Goal(01/15/2020): no falls  Discharge Goal: no falls    Performed Intervention(s)  safety rounds/call light within easy reach  bed alarm/chair alarm      Sphincter Control    [RN] Bladder Management(Active)  Current Status(01/08/2020): Continent 100%  Weekly Goal(01/15/2020): Will remain Continent 100%  Discharge Goal: Continent 100%    [RN] Bowel Management(Active)  Current Status(01/08/2020): Continent 100%  Weekly Goal(01/15/2020): Will remain Continent 100%; Will have regular BMs  Discharge Goal: Continent 100% with regular BMs    Performed Intervention(s)  Monitor I&O    Signed by: Yareli Calabrese RN

## 2020-01-08 NOTE — THERAPY TREATMENT NOTE
Inpatient Rehabilitation - Occupational Therapy Treatment Note    [facility]     Patient Name: Noemi Bartholomew  : 1960  MRN: [id number]    Today's Date: 2020                 Admit Date: 2019      Visit Dx:  No diagnosis found.    Patient Active Problem List   Diagnosis   • Carpal tunnel syndrome   • Hyperlipidemia   • Osteoporosis   • DDD (degenerative disc disease), lumbar   • Chronic left lumbar radiculopathy   • Congenital spondylolysis, lumbosacral region   • Neuropathic pain, leg, left   • Ossification of spinal ligament   • Spinal stenosis in cervical region   • Cervical spondylosis with myelopathy   • Cervical stenosis of spine   • Postoperative anemia due to acute blood loss   • Steroid-induced hyperglycemia   • Cord compression (CMS/HCC)   • Leukocytosis (due to steroids)   • Postoperative CSF leak   • Cervical myelopathy (CMS/HCC)   • CSF leak         Therapy Treatment    IRF Treatment Summary     Row Name 20 1526 20 1243 20 0900       Evaluation/Treatment Time and Intent    Subjective Information  no complaints  -DN  no complaints  -DN  complains of;pain  -LB    Existing Precautions/Restrictions  fall  -DN  fall;spinal;lifting  -DN  fall;spinal  -LB    Document Type  therapy note (daily note)  -DN  therapy note (daily note)  -DN  therapy note (daily note)  -LB    Mode of Treatment  occupational therapy  -DN  occupational therapy  -DN  physical therapy  -LB    Patient/Family Observations  pt seated in room  -DN  pt seated in w/c  -DN  pnt seated in w/c. Nurse present  -LB    Recorded by [DN] See [facility] OT [DN] See [facility] OT [LB] Audrey Valdivia PTA    Row Name 20 1243             Cognition/Psychosocial- PT/OT    Affect/Mental Status (Cognitive)  WFL  -DN      Recorded by [DN] See Cuadra OT      Row Name 20 0900             Bed Mobility Assessment/Treatment    Rolling Left [place] (Bed Mobility)  independent log roll  -LB      Rolling  Right [place] (Bed Mobility)  independent log roll  -LB      Supine-Sit [place] (Bed Mobility)  minimum assist (75% patient effort);moderate assist (50% patient effort) from right in AM. PM; CGA from R and also from left  -LB      Sit-Supine [place] (Bed Mobility)  independent  -LB      Comment (Bed Mobility)  assessed on txment mat  -LB      Recorded by [LB] Audrey Valdivia, EBENEZER      Row Name 01/08/20 0900             Bed-Chair Transfer    Bed-Chair [place] (Transfers)  conditional independence  -LB      Assistive Device (Bed-Chair Transfers)  walker, front-wheeled  -LB      Recorded by [LB] Audrey Valdivia, EBENEZER      Row Name 01/08/20 0900             Chair-Bed Transfer    Chair-Bed [place] (Transfers)  conditional independence  -LB      Assistive Device (Chair-Bed Transfers)  walker, front-wheeled  -LB      Recorded by [LB] Audrey Valdivia PTA      Row Name 01/08/20 0900             Sit-Stand Transfer    Sit-Stand [place] (Transfers)  conditional independence  -LB      Assistive Device (Sit-Stand Transfers)  walker, front-wheeled  -LB      Recorded by [LB] Audrey Valdivia, EBENEZER      Row Name 01/08/20 0900             Stand-Sit Transfer    Stand-Sit [place] (Transfers)  conditional independence  -LB      Assistive Device (Stand-Sit Transfers)  walker, front-wheeled  -LB      Recorded by [LB] Audrey Valdivia, EBENEZER      Row Name 01/08/20 1243             Shower Transfer    Type (Shower Transfer)  stand pivot/stand step  -DN      [place] Level (Shower Transfer)  supervision  -DN      Assistive Device (Shower Transfer)  walker, front-wheeled;grab bars/tub rail;shower chair  -DN      Recorded by [DN] See [facility] OT      Row Name 01/08/20 0900             Car Transfer    Type (Car Transfer)  sit-stand;stand-sit  -LB      [place] Level (Car Transfer)  supervision  -LB      Assistive Device (Car Transfer)  walker, front-wheeled into/out of back seat  -LB       "Recorded by [LB] Audrey Valdivia PTA      Row Name 01/08/20 0900             Gait/Stairs Assessment/Training    [place] Level (Gait)  supervision  -LB      Assistive Device (Gait)  walker, front-wheeled  -LB      Distance in Feet (Gait)  400  -LB      Pattern (Gait)  step-through  -LB      Deviations/Abnormal Patterns (Gait)  ally decreased  -LB      Bilateral Gait Deviations  forward flexed posture  -LB      Left Sided Gait Deviations  heel strike decreased  -LB      Right Sided Gait Deviations  heel strike decreased  -LB      [place] Level (Stairs)  stand by assist  -LB      Handrail Location (Stairs)  left side (ascending) both hands  -LB      Number of Steps (Stairs)  12 (4\") steps  -LB      Ascending Technique (Stairs)  step-to-step  -LB      Descending Technique (Stairs)  step-to-step  -LB      Negotiation (Ramp)  -- ramp w rwx w cga  -LB      Comment (Gait/Stairs)  4\" curb w rwx w CGA, vc's  -LB      Recorded by [LB] Audrey Valdivia PTA      Row Name 01/08/20 1243             Bathing Assessment/Treatment    Bathing [place] Level  bathing skills;supervision;verbal cues  -DN      Assistive Device (Bathing)  grab bar/tub rail;hand held shower spray hose;shower chair  -DN      Bathing Position  supported sitting  -DN      Bathing Setup Assistance  obtain supplies  -DN      Recorded by [DN] See [facility]ra OT      Row Name 01/08/20 1243             Upper Body Dressing Assessment/Treatment    Upper Body Dressing Task  upper body dressing skills;doff;don;pull over garment;set up assistance  -DN      Upper Body Dressing Position  supported sitting  -DN      Set-up Assistance (Upper Body Dressing)  obtain clothing  -DN      Recorded by [DN] See [facility]ra OT      Row Name 01/08/20 1243             Lower Body Dressing Assessment/Treatment    Lower Body Dressing [place] Level  doff;don;pants/bottoms;shoes/slippers;socks;underwear;supervision;set up  -DN      Lower Body Dressing Position  " supported sitting;supported standing  -DN      Lower Body Dressing Setup Assistance  obtain clothing  -DN      Comment (Lower Body Dressing)  pt able to tie shoes today  -DN      Recorded by [DN] See Cuadra OT      Row Name 01/08/20 1243             Grooming Assessment/Treatment    Grooming [place] Level  grooming skills;set up  -DN      Grooming Position  sink side;supported standing  -DN      Grooming Setup Assistance  open containers  -DN      Recorded by [DN] See [facility]ra OT      Row Name 01/08/20 1526             Hand  Strength Testing    Right Hand, Setting 1 (Dynamometer Testing)  1  -DN      Left Hand, Setting 1 (Dynamometer Testing)  1  -DN      Right Hand: Lateral (Key) Pinch Strength (Pinch Dynamometer Testing)  1  -DN      Left Hand: Lateral (Key) Pinch Strength (Pinch Dynamometer Testing)  2  -DN      Recorded by [DN] See [facility]ra OT      Row Name 01/08/20 1526             Fine Motor Testing & Training    Results, 9 Hole Peg Test of Fine Motor Coordination-Right  23  -DN      Results, 9 Hole Peg Test of Fine Motor Coordination-Left  31  -DN      Results, Box N Block Test-Right  51  -DN      Results, Box N Block Test-Left  42  -DN      Recorded by [DN] See [facility]ra OT      Row Name 01/08/20 1243 01/08/20 0900          Pain Scale: Numbers Pre/Post-Treatment    Pain Scale: Numbers, Pretreatment  0/10 - no pain  -DN  5/10  -LB     Pain Scale: Numbers, Post-Treatment  0/10 - no pain  -DN  --     Pain Location  --  head  -LB     Pain Intervention(s)  --  Medication (See MAR)  -LB     Recorded by [DN] See [facility] OT [LB] Audrey Valdivia PTA     Row Name 01/08/20 1526 01/08/20 1243          Upper Extremity Seated Therapeutic Exercise    Performed, Seated Upper Extremity (Therapeutic Exercise)  wrist flexion/extension;forearm supination/pronation;elbow flexion/extension  -DN  shoulder flexion/extension;shoulder abduction/adduction;shoulder external/internal rotation;shoulder  horizontal abduction/adduction;scapular protraction/retraction;elbow flexion/extension;forearm supination/pronation;wrist flexion/extension;digit flexion/extension  -DN     Device, Seated Upper Extremity (Therapeutic Exercise)  free weights, barbell  -DN  free weights, cuff  -DN     Exercise Type, Seated Upper Extremity (Therapeutic Exercise)  AROM (active range of motion)  -DN  AROM (active range of motion)  -DN     Expected Outcomes, Seated Upper Extremity (Therapeutic Exercise)  improve functional tolerance, self-care activity  -DN  improve functional tolerance, self-care activity  -DN     Restrictions, Seated Upper Extremity (Therapeutic Exercise)  no lift >5#, no overhead activity  -DN  no lift > 5#  -DN     Sets/Reps Detail, Seated Upper Extremity (Therapeutic Exercise)  3/10  -DN  2/8 reps with AROM for shoulders, 1# dumbell for elbow, forearm and wrist, theraputty for hands  -DN     Transfers Skills, Training to Functional Activity, Seated Upper Extremity (Therapeutic Exercise)  --  able to transfer skills from training to functional activity  -DN     Comment, Seated Upper Extremity (Therapeutic Exercise)  --  pt issue HEP for BUEs for return home   -DN     Recorded by [DN] See [facility]ra OT [DN] See [facility] OT     Row Name 01/08/20 0900             Lower Extremity Seated Therapeutic Exercise    Performed, Seated Lower Extremity (Therapeutic Exercise)  LAQ (long arc quad), knee extension;ankle dorsiflexion/plantarflexion;hip flexion/extension  -LB      Exercise Type, Seated Lower Extremity (Therapeutic Exercise)  AROM (active range of motion)  -LB      Sets/Reps Detail, Seated Lower Extremity (Therapeutic Exercise)  1/20  -LB      Recorded by [LB] Audrey Valdivia PTA      Row Name 01/08/20 1526 01/08/20 1243 01/08/20 0900       Positioning and Restraints    Pre-Treatment Position  sitting in chair/recliner  -DN  sitting in chair/recliner  -DN  --    Post Treatment Position  wheelchair  -DN   wheelchair  -DN  wheelchair  -LB    In Bed  sitting;call light within reach;encouraged to call for assist;exit alarm on  -DN  sitting;call light within reach;encouraged to call for assist;exit alarm on  -DN  --    In Wheelchair  --  --  sitting;exit alarm on;call light within reach  -LB    Recorded by [DN] See [facility] OT [DN] See [facility], OT [LB] Audrey Valdivia PTA      User Key  (r) = Recorded By, (t) = Taken By, (c) = Cosigned By    Initials Name Effective Dates    See Tan OT 06/08/18 -     LB Audrey Valdivia PTA 03/07/18 -           Wound 12/19/19 1939 Right lateral throat Incision (Active)   Dressing Appearance open to air 1/8/2020  9:10 AM   Closure Approximated 1/8/2020  9:10 AM   Base clean;dry 1/8/2020  9:10 AM   Periwound intact;dry;swelling 1/8/2020  9:10 AM   [place]iwound Temperature warm 1/8/2020  9:10 AM   [place]iwound Skin Turgor soft 1/8/2020  9:10 AM   Drainage Amount none 1/8/2020  9:10 AM         OT Recommendation and Plan    Anticipated Equipment Needs At Discharge (OT Eval): commode, 3-in-1, tub bench, shower chair  Planned Therapy Interventions (OT Eval): activity tolerance training, BADL retraining, neuromuscular control/coordination retraining, patient/caregiver education/training, strengthening exercise, transfer/mobility retraining            OT IRF GOALS     Row Name 01/03/20 1200 12/28/19 1144          Transfer Goal 1 (OT-IRF)    Activity/Assistive Device (Transfer Goal 1, OT-IRF)  shower chair;commode, bedside without drop arms;walker, rolling  -DN  shower chair;commode, bedside without drop arms;walker, rolling  -DN     [place] Level (Transfer Goal 1, OT-IRF)  conditional independence  -DN  supervision required  -DN     Time Frame (Transfer Goal 1, OT-IRF)  short term goal (STG)  -DN  short term goal (STG)  -DN     Progress/Outcomes (Transfer Goal 1, OT-IRF)  goal met;goal revised this date  -DN  continuing progress toward goal  -DN        Transfer Goal 2  (OT-IRF)    Activity/Assistive Device (Transfer Goal 2, OT-IRF)  toilet;shower chair  -DN  toilet;walker, rolling;shower chair  -DN     [place] Level (Transfer Goal 2, OT-IRF)  conditional independence  -DN  conditional independence  -DN     Time Frame (Transfer Goal 2, OT-IRF)  long term goal (LTG)  -DN  long term goal (LTG)  -DN     Progress/Outcomes (Transfer Goal 2, OT-IRF)  goal ongoing  -DN  continuing progress toward goal  -DN        Bathing FIM Goal (OT-IRF)    Progress/Outcomes (Bathing FIM Goal, OT-IRF)  --  goal no longer appropriate  -DN        Bathing Goal 1 (OT-IRF)    Activity/Device (Bathing Goal 1, OT-IRF)  bathing skills, all  -DN  bathing skills, all  -DN     [place] Level (Bathing Goal 1, OT-IRF)  supervision required  -DN  supervision required  -DN     Time Frame (Bathing Goal 1, OT-IRF)  short term goal (STG)  -DN  short term goal (STG)  -DN     Progress/Outcomes (Bathing Goal 1, OT-IRF)  goal not met;goal ongoing  -DN  continuing progress toward goal  -DN        Bathing Goal 2 (OT-IRF)    Activity/Device (Bathing Goal 2, OT-IRF)  bathing skills, all  -DN  bathing skills, all  -DN     [place] Level (Bathing Goal 2, OT-IRF)  conditional independence  -DN  conditional independence  -DN     Time Frame (Bathing Goal 2, OT-IRF)  long term goal (LTG)  -DN  long term goal (LTG)  -DN     Progress/Outcomes (Bathing Goal 2, OT-IRF)  goal ongoing  -DN  continuing progress toward goal  -DN        UB Dressing FIM Goal (OT-IRF)    Progress/Outcomes (UB Dressing FIM Goal, OT-IRF)  --  goal no longer appropriate  -DN        UB Dressing Goal 1 (OT-IRF)    Activity/Device (UB Dressing Goal 1, OT-IRF)  upper body dressing  -DN  upper body dressing  -DN     [place] (UB Dress Goal 1, OT-IRF)  conditional independence  -DN  supervision required  -DN     Time Frame (UB Dressing Goal 1, OT-IRF)  short term goal (STG)  -DN  short term goal (STG)  -DN     Progress/Outcomes (UB Dressing Goal 1,  OT-IRF)  goal met;goal revised this date  -DN  continuing progress toward goal  -DN        UB Dressing Goal 2 (OT-IRF)    Activity/Device (UB Dressing Goal 2, OT-IRF)  upper body dressing  -DN  upper body dressing  -DN     [place] (UB Dress Goal 2, OT-IRF)  conditional independence  -DN  conditional independence  -DN     Time Frame (UB Dressing Goal 2, OT-IRF)  long term goal (LTG)  -DN  long term goal (LTG)  -DN     Progress/Outcomes (UB Dressing Goal 2, OT-IRF)  continuing progress toward goal  -DN  continuing progress toward goal  -DN        LB Dressing FIM Goal (OT-IRF)    Progress/Outcomes (LB Dressing FIM Goal, OT-IRF)  --  goal no longer appropriate  -DN        LB Dressing Goal 1 (OT-IRF)    Activity/Device (LB Dressing Goal 1, OT-IRF)  lower body dressing  -DN  lower body dressing  -DN     [place] (LB Dressing Goal 1, OT-IRF)  supervision required  -DN  moderate assist (50-74% patient effort)  -DN     Time Frame (LB Dressing Goal 1, OT-IRF)  short term goal (STG)  -DN  short term goal (STG)  -DN     Progress/Outcomes (LB Dressing Goal 1, OT-IRF)  goal met;goal revised this date  -DN  continuing progress toward goal  -DN        LB Dressing Goal 2 (OT-IRF)    Activity/Device (LB Dressing Goal 2, OT-IRF)  lower body dressing  -DN  lower body dressing  -DN     [place] (LB Dressing Goal 2, OT-IRF)  conditional independence  -DN  supervision required  -DN     Time Frame (LB Dressing Goal 2, OT-IRF)  long term goal (LTG)  -DN  long term goal (LTG)  -DN     Progress/Outcomes (LB Dressing Goal 2, OT-IRF)  goal revised this date;goal ongoing  -DN  continuing progress toward goal  -DN       User Key  (r) = Recorded By, (t) = Taken By, (c) = Cosigned By    Initials Name Provider Type    See Tan OT Occupational Therapist                     Time Calculation:     Time Calculation- OT     Row Name 01/08/20 1529 01/08/20 1248          Time Calculation- OT    OT Start Time  1430  -DN  1000  -DN      OT Stop Time  1500  -DN  1130  -DN     OT Time Calculation (min)  30 min  -DN  90 min  -DN     OT Received On  01/08/20  -DN  01/08/20  -DN       User Key  (r) = Recorded By, (t) = Taken By, (c) = Cosigned By    Initials Name Provider Type    See Tan OT Occupational Therapist          Therapy Charges for Today     Code Description Service Date Service Provider Modifiers Qty    93661237945 HC OT SELF CARE/MGMT/TRAIN EA 15 MIN 1/7/2020 See Cu[facility]ra OT GO 1    48819304385 HC OT THER PROC EA 15 MIN 1/7/2020 See [facility]ra OT GO 3    68622586620 HC OT NEUROMUSC RE EDUCATION EA 15 MIN 1/7/2020 See Cuadra, OT GO 2    15594359773 HC OT SELF CARE/MGMT/TRAIN EA 15 MIN 1/8/2020 See Cuadra OT GO 3    07636738479 HC OT THER PROC EA 15 MIN 1/8/2020 See Cu[facility]ra OT GO 3    55953956662 HC OT THER PROC EA 15 MIN 1/8/2020 See [facility]ra OT GO 2                   See [facility]ra OT  1/8/2020

## 2020-01-09 ENCOUNTER — HOSPITAL ENCOUNTER (INPATIENT)
Facility: HOSPITAL | Age: 60
LOS: 3 days | Discharge: HOME | End: 2020-01-12
Attending: PHYSICAL MEDICINE & REHABILITATION | Admitting: NEUROLOGICAL SURGERY

## 2020-01-09 VITALS
SYSTOLIC BLOOD PRESSURE: 109 MMHG | OXYGEN SATURATION: 94 % | BODY MASS INDEX: 22.03 KG/M2 | DIASTOLIC BLOOD PRESSURE: 70 MMHG | RESPIRATION RATE: 18 BRPM | HEART RATE: 107 BPM | TEMPERATURE: 97.7 F | HEIGHT: 55 IN

## 2020-01-09 DIAGNOSIS — G97.82 POSTOPERATIVE CEREBROSPINAL FLUID LEAK: Primary | ICD-10-CM

## 2020-01-09 DIAGNOSIS — G96.00 POSTOPERATIVE CEREBROSPINAL FLUID LEAK: Primary | ICD-10-CM

## 2020-01-09 PROCEDURE — 97535 SELF CARE MNGMENT TRAINING: CPT

## 2020-01-09 PROCEDURE — 97110 THERAPEUTIC EXERCISES: CPT

## 2020-01-09 PROCEDURE — 001U0J6 BYPASS SPINAL CANAL TO PERITONEAL CAVITY WITH SYNTHETIC SUBSTITUTE, OPEN APPROACH: ICD-10-PCS | Performed by: NEUROLOGICAL SURGERY

## 2020-01-09 RX ORDER — HYDROCODONE BITARTRATE AND ACETAMINOPHEN 5; 325 MG/1; MG/1
1 TABLET ORAL EVERY 6 HOURS PRN
Status: DISCONTINUED | OUTPATIENT
Start: 2020-01-09 | End: 2020-01-10

## 2020-01-09 RX ORDER — BISACODYL 10 MG
10 SUPPOSITORY, RECTAL RECTAL DAILY PRN
Status: DISCONTINUED | OUTPATIENT
Start: 2020-01-09 | End: 2020-01-12 | Stop reason: HOSPADM

## 2020-01-09 RX ORDER — DOCUSATE SODIUM 100 MG/1
100 CAPSULE, LIQUID FILLED ORAL 2 TIMES DAILY
Status: CANCELLED | OUTPATIENT
Start: 2020-01-09

## 2020-01-09 RX ORDER — GABAPENTIN 100 MG/1
100 CAPSULE ORAL 2 TIMES DAILY
Status: CANCELLED | OUTPATIENT
Start: 2020-01-09

## 2020-01-09 RX ORDER — SODIUM CHLORIDE 0.9 % (FLUSH) 0.9 %
10 SYRINGE (ML) INJECTION AS NEEDED
Status: DISCONTINUED | OUTPATIENT
Start: 2020-01-09 | End: 2020-01-12 | Stop reason: HOSPADM

## 2020-01-09 RX ORDER — MELATONIN
2000 DAILY
Status: CANCELLED | OUTPATIENT
Start: 2020-01-09

## 2020-01-09 RX ORDER — SENNA AND DOCUSATE SODIUM 50; 8.6 MG/1; MG/1
2 TABLET, FILM COATED ORAL NIGHTLY
Status: CANCELLED | OUTPATIENT
Start: 2020-01-09

## 2020-01-09 RX ORDER — BISACODYL 10 MG
10 SUPPOSITORY, RECTAL RECTAL DAILY PRN
Status: CANCELLED | OUTPATIENT
Start: 2020-01-09

## 2020-01-09 RX ORDER — HYDROCODONE BITARTRATE AND ACETAMINOPHEN 5; 325 MG/1; MG/1
1 TABLET ORAL EVERY 6 HOURS PRN
Status: CANCELLED | OUTPATIENT
Start: 2020-01-09 | End: 2020-01-15

## 2020-01-09 RX ORDER — MELATONIN
2000 DAILY
Status: DISCONTINUED | OUTPATIENT
Start: 2020-01-10 | End: 2020-01-12 | Stop reason: HOSPADM

## 2020-01-09 RX ORDER — GABAPENTIN 100 MG/1
100 CAPSULE ORAL 2 TIMES DAILY
Status: DISCONTINUED | OUTPATIENT
Start: 2020-01-09 | End: 2020-01-12 | Stop reason: HOSPADM

## 2020-01-09 RX ORDER — DOCUSATE SODIUM 100 MG/1
100 CAPSULE, LIQUID FILLED ORAL 2 TIMES DAILY
Status: DISCONTINUED | OUTPATIENT
Start: 2020-01-09 | End: 2020-01-12 | Stop reason: HOSPADM

## 2020-01-09 RX ORDER — CALCIUM CARBONATE 500(1250)
500 TABLET ORAL 2 TIMES DAILY
Status: DISCONTINUED | OUTPATIENT
Start: 2020-01-09 | End: 2020-01-12 | Stop reason: HOSPADM

## 2020-01-09 RX ORDER — FAMOTIDINE 20 MG/1
20 TABLET, FILM COATED ORAL
Status: DISCONTINUED | OUTPATIENT
Start: 2020-01-10 | End: 2020-01-12 | Stop reason: HOSPADM

## 2020-01-09 RX ORDER — POLYETHYLENE GLYCOL 3350 17 G/17G
17 POWDER, FOR SOLUTION ORAL DAILY PRN
Status: DISCONTINUED | OUTPATIENT
Start: 2020-01-09 | End: 2020-01-12 | Stop reason: HOSPADM

## 2020-01-09 RX ORDER — ATORVASTATIN CALCIUM 20 MG/1
20 TABLET, FILM COATED ORAL NIGHTLY
Status: DISCONTINUED | OUTPATIENT
Start: 2020-01-09 | End: 2020-01-12 | Stop reason: HOSPADM

## 2020-01-09 RX ORDER — ACETAMINOPHEN 500 MG
1000 TABLET ORAL EVERY 6 HOURS PRN
Status: DISCONTINUED | OUTPATIENT
Start: 2020-01-09 | End: 2020-01-12 | Stop reason: HOSPADM

## 2020-01-09 RX ORDER — UREA 10 %
3 LOTION (ML) TOPICAL NIGHTLY PRN
Status: DISCONTINUED | OUTPATIENT
Start: 2020-01-09 | End: 2020-01-12 | Stop reason: HOSPADM

## 2020-01-09 RX ORDER — ONDANSETRON 4 MG/1
4 TABLET, FILM COATED ORAL EVERY 6 HOURS PRN
Status: DISCONTINUED | OUTPATIENT
Start: 2020-01-09 | End: 2020-01-12 | Stop reason: HOSPADM

## 2020-01-09 RX ORDER — FAMOTIDINE 20 MG/1
20 TABLET, FILM COATED ORAL
Status: CANCELLED | OUTPATIENT
Start: 2020-01-10

## 2020-01-09 RX ORDER — SENNA AND DOCUSATE SODIUM 50; 8.6 MG/1; MG/1
2 TABLET, FILM COATED ORAL NIGHTLY
Status: DISCONTINUED | OUTPATIENT
Start: 2020-01-09 | End: 2020-01-12 | Stop reason: HOSPADM

## 2020-01-09 RX ORDER — ACETAMINOPHEN 500 MG
1000 TABLET ORAL EVERY 6 HOURS PRN
Status: CANCELLED | OUTPATIENT
Start: 2020-01-09

## 2020-01-09 RX ORDER — CALCIUM CARBONATE 500(1250)
500 TABLET ORAL 2 TIMES DAILY
Status: CANCELLED | OUTPATIENT
Start: 2020-01-09

## 2020-01-09 RX ORDER — SODIUM CHLORIDE 0.9 % (FLUSH) 0.9 %
10 SYRINGE (ML) INJECTION EVERY 12 HOURS SCHEDULED
Status: DISCONTINUED | OUTPATIENT
Start: 2020-01-09 | End: 2020-01-12 | Stop reason: HOSPADM

## 2020-01-09 RX ORDER — ATORVASTATIN CALCIUM 20 MG/1
20 TABLET, FILM COATED ORAL NIGHTLY
Status: CANCELLED | OUTPATIENT
Start: 2020-01-09

## 2020-01-09 RX ORDER — UREA 10 %
3 LOTION (ML) TOPICAL NIGHTLY PRN
Status: CANCELLED | OUTPATIENT
Start: 2020-01-09

## 2020-01-09 RX ORDER — POLYETHYLENE GLYCOL 3350 17 G/17G
17 POWDER, FOR SOLUTION ORAL DAILY PRN
Status: CANCELLED | OUTPATIENT
Start: 2020-01-09

## 2020-01-09 RX ORDER — ONDANSETRON 4 MG/1
4 TABLET, FILM COATED ORAL EVERY 6 HOURS PRN
Status: CANCELLED | OUTPATIENT
Start: 2020-01-09

## 2020-01-09 RX ADMIN — FAMOTIDINE 20 MG: 20 TABLET, FILM COATED ORAL at 05:50

## 2020-01-09 RX ADMIN — VITAMIN D, TAB 1000IU (100/BT) 2000 UNITS: 25 TAB at 10:01

## 2020-01-09 RX ADMIN — GABAPENTIN 100 MG: 100 CAPSULE ORAL at 22:26

## 2020-01-09 RX ADMIN — CALCIUM 500 MG: 500 TABLET ORAL at 10:01

## 2020-01-09 RX ADMIN — ACETAMINOPHEN 1000 MG: 500 TABLET, FILM COATED ORAL at 10:06

## 2020-01-09 RX ADMIN — CALCIUM 500 MG: 500 TABLET ORAL at 22:26

## 2020-01-09 RX ADMIN — SODIUM CHLORIDE, PRESERVATIVE FREE 10 ML: 5 INJECTION INTRAVENOUS at 22:26

## 2020-01-09 RX ADMIN — GABAPENTIN 100 MG: 100 CAPSULE ORAL at 10:01

## 2020-01-09 RX ADMIN — ATORVASTATIN CALCIUM 20 MG: 20 TABLET, FILM COATED ORAL at 22:26

## 2020-01-09 RX ADMIN — HYDROCODONE BITARTRATE AND ACETAMINOPHEN 1 TABLET: 5; 325 TABLET ORAL at 01:52

## 2020-01-09 RX ADMIN — ONDANSETRON HYDROCHLORIDE 4 MG: 4 TABLET, FILM COATED ORAL at 01:52

## 2020-01-09 NOTE — PROGRESS NOTES
Occupational Therapy: Individual: 30 minutes.    Physical Therapy: Branch    Speech Language Pathology:  Branch    Signed by: BREONNA Flower/AELXA

## 2020-01-09 NOTE — H&P
Patient Care Team:  Mary Lou Carroll MD as PCP - General (Internal Medicine)  Antonio Noe MD - Admitting - Neurosurgery      Chief complaint     Persistent CSF leak    Subjective     History of Present Illness     This is a very pleasant 59-year-old female initially evaluated by Dr. oNe in his office for complaints of difficulty with balance, hand function and strength.  Work-up revealed severe cord compression of the cervical spine mainly at C6-7.  This was related to a combination of cervical degenerative disease as well as ossification of the posterior longitudinal ligament.  This was most pronounced at C6-7.  There is also evidence of cord signal changes at C6-7.  Because of the progressing cervical myelopathy and concern for irreversible damage to the cervical cord, the patient was brought to the operating room for cervical corpectomy anteriorly at C6, C7 with peek cage and anterior cervical plate from C5-T1.  The surgery was performed by Dr. Noe on December 6, 2019.  Dr. Noe encountered a thickened ligament that was adherent to the dura.  In his attempt to separate it, a durotomy was created because the dura inadvertently pulled off while trying to remove bone.  Once the cord was adequately decompressed, Dr. Noe attempted to close the leak as best as he could with Tisseel glue and FloSeal.  A ALONDRA drain was placed and the patient was taken to the [facility] floor from PACU for postoperative management.   The patient did well but for the first couple of days after surgery she had significant amount of arm pain and weakness in her forearms and hands.  The symptoms have gotten better over time.  She covered quite well.  She was subsequently transferred to [facility] acute rehab for further therapies to improve her strength and endurance.  While in rehab she developed a fluid collection of cerebrospinal fluid beneath the cervical incision.  The patient was therefore taken back  to the operating room December 19, 2019 for drainage of the CSF fluid collection, reclosure of the CSF leak and placement of an external lumbar drain.  The patient was monitored postoperatively in the hospital.  She was healing quite well.  The lumbar drain was clamped after several days and the patient was mobilized.  There was no sign of any cerebrospinal fluid leak at the site of the incision and therefore the patient was transferred back to subacute rehab to finish out her therapies.  She had been doing quite well.  She did notice significant improvement in the arm pain and was regaining some strength in her arms and hands.  She had been able to feed herself and has done quite well in acute rehab.  We were notified by the rehab nurses guarding some evidence of fluid collecting beneath the incision again.  This is by far much smaller than it had been prior to her last surgery but the feeling was that an intervention would be necessary to prevent further collection beneath the cervical incision.  The patient has completed her acute rehab therefore she will be admitted to the hospital under the care of Dr. Noe.  She will undergo surgery tomorrow for drainage of the cervical cyst and placement of a permanent lumboperitoneal shunt.    Review of Systems   Musculoskeletal: Positive for gait problem and neck stiffness.   Skin: Positive for wound.   Neurological: Positive for weakness and numbness.   Psychiatric/Behavioral: The patient is nervous/anxious.    All other systems reviewed and are negative.           Past Medical History:   Diagnosis Date   • Abnormal alkaline phosphatase test 06/12/2015    Resolved   • Acute bronchitis 06/12/2015    Resolved   • Allergic child age    pencillin   • Cervical spinal stenosis    • Diverticulosis    • H/O electromyography 10/13/2014   • H/O mammogram 07/29/2014   • Headache occassionally   • High cholesterol    • History of EKG 06/12/2015   • Hypercalcemia 06/12/2015     Resolved, Full w/u done, Likely secondary to [place]   • Left arm pain    • Low back pain March 2017   • Numbness in both hands    • Osteoporosis    • Paresthesia 06/12/2015    Resolved     Past Surgical History:   Procedure Laterality Date   • ANTERIOR CHANNEL VERTEBRECTOMY/CORPECTOMY N/A 12/6/2019    Procedure: CERVICAL 6, CERVICAL 7 ANTERIOR CERVICAL CORPECTOMY WITH CAGE AND PLATE;  Surgeon: Antonio Noe MD;  Location: [facility] MAIN OR;  Service: Neurosurgery   • BACK SURGERY  1999   • COLONOSCOPY  05/20/2011   • INCISION AND DRAINAGE OF WOUND N/A 12/19/2019    Procedure: followed by drainage of anterior cervical fluid collection;  Surgeon: Antonio Noe MD;  Location: [facility] MAIN OR;  Service: Neurosurgery   • LUMBAR DRAIN INSERTION EXTERNAL N/A 12/19/2019    Procedure: LUMBAR DRAIN INSERTION EXTERNAL;  Surgeon: Antonio Noe MD;  Location: [facility] OR;  Service: Neurosurgery   • SPINE SURGERY  1999 back    dr abad quintero   • TONSILLECTOMY  child age   • TYMPANOPLASTY       Family History   Problem Relation Age of Onset   • Breast cancer Mother    • Stroke Mother         Cerebrovascular Accident   • Colon cancer Mother    • Diabetes Father         Borderline Diabetes Mellitus   • Heart disease Father         Cardiac Disorder   • Heart failure Father    • Anxiety disorder Sister    • Nephrolithiasis Sister         Kidney Stones   • Malig Hyperthermia Neg Hx      Social History     Tobacco Use   • Smoking status: Never Smoker   • Smokeless tobacco: Never Used   Substance Use Topics   • Alcohol use: No   • Drug use: No     Medications Prior to Admission   Medication Sig Dispense Refill Last Dose   • acetaminophen (TYLENOL) 500 MG tablet Take 1,000 mg by mouth Every 6 (Six) Hours As Needed for Mild Pain .      • atorvastatin (LIPITOR) 20 MG tablet Take 20 mg by mouth Every Night.      • Calcium Carb-Cholecalciferol (CALCIUM 1000 + D PO) Take  by mouth Daily. 2 caps      • cholecalciferol  (VITAMIN D3) 10 MCG (400 UNIT) tablet Take 2,000 Units by mouth Daily.      • gabapentin (NEURONTIN) 100 MG capsule Take 100 mg by mouth 2 (Two) Times a Day.        Allergies:  Penicillins and Topamax [topiramate]    Objective      Vital Signs  Temp:  [96.7 °F (35.9 °C)-97.9 °F (36.6 °C)] 97.7 °F (36.5 °C)  Heart Rate:  [] 107  Resp:  [18] 18  BP: (102-118)/(67-73) 109/70    Physical Exam   Constitutional: She is oriented to person, place, and time. She appears well-developed and well-nourished. She is cooperative. No distress.   HENT:   Head: Normocephalic and atraumatic.   Eyes: Conjunctivae are normal. Right eye exhibits no discharge. Left eye exhibits no discharge.   Neck: Normal range of motion. Neck supple. No tracheal deviation present.   Cardiovascular: Normal rate and intact distal pulses.   Pulmonary/Chest: Effort normal. No respiratory distress.   Abdominal: Soft. She exhibits no distension. There is no tenderness.   Musculoskeletal: Normal range of motion. She exhibits tenderness (mild tenderness to palpation of cervical incision and surrounding tissue. ). She exhibits no edema.   Neurological: She is alert and oriented to person, place, and time. A sensory deficit (continued numbness in fingers of both hands) is present. She exhibits normal muscle tone. GCS eye subscore is 4. GCS verbal subscore is 5. GCS motor subscore is 6.   No motor or sensory deficits. Able to feed self, cut her own food with modifications. Negative Rivers's; negative clonus.    Skin: Skin is warm and dry. She is not diaphoretic. No erythema.   Psychiatric: She has a normal mood and affect. Thought content normal.   Vitals reviewed.      Results Review:   I reviewed the patient's new clinical results.  I reviewed the patient's new imaging results and agree with the interpretation.  Discussed with Dr. Noe.         MRI OF THE CERVICAL SPINE WITH AND WITHOUT CONTRAST    Previous corpectomy procedure noted C6 and C7.   Graft material intact.  Large fluid collection extends from C4-5 down to the inferior body of T2.  The collection measures up to 6.1 cm.  There is some evidence of tracheal deviation anteriorly.  It also abuts the medial margin of the right common carotid.  So a more localized area of signal abnormality along the cord.  This had also been visualized on a prior study and probably seems more consistent with a area of myelomalacia.  In comparison to a previous cervical MRI dated 12/17/2019, there has been no change.           Assessment/Plan       Cervical myelopathy (CMS/[facility])      Assessment & Plan     6 weeks status post anterior cervical corpectomy C6, C7 with cage and plate placement from C5-T1 for cervical myelopathy, cervical stenosis C5-T1 and ossification of posterior longitudinal ligament.  CSF leak repair was also performed.    3 weeks status post drainage of cervical CSF collection, reclosure of CSF leak and placement of external lumbar drain    Reaccumulation of CSF leak      The patient will be discharged from acute rehab today and admitted to the main hospital under the care of Dr. Noe for surgery tomorrow as described above.    I discussed the patients findings and my recommendations with patient    Audrey Funez, APRN  01/09/20  1:24 PM

## 2020-01-09 NOTE — PROGRESS NOTES
SECTION GG    Eating Performance Discharge: [place] sets up or cleans up; patient completes  activity. [place] assists only prior to or following the activity.    Signed by: Jayleen Moya RN

## 2020-01-09 NOTE — PLAN OF CARE
Problem: Patient Care Overview  Goal: Coping Plan  Outcome: Ongoing (interventions implemented as appropriate)     Problem: Skin Injury Risk (Adult)  Goal: Skin Health and Integrity  Outcome: Ongoing (interventions implemented as appropriate)     Problem: Laminectomy/Foraminotomy/Discectomy (Adult)  Goal: Signs and Symptoms of Listed Potential Problems Will be Absent, Minimized or Managed (Laminectomy/Foraminotomy/Discectomy)  Outcome: Ongoing (interventions implemented as appropriate)     Pt A/Ox4, OOB x1 w RW. Pt reported continued intermittent headaches with nausea. Prn zofran and pain med given. Anterior neck incision intact with swelling at site. Difficult to tell if any/slight increase in swelling. No neuro changes, pt denies SOB, no difficulty swallowing. Pt continent of urine overnight. Bowel meds held d/t recent loose stool.

## 2020-01-09 NOTE — PROGRESS NOTES
SECTION GG      Self Care Performance Discharge:   Oral Hygiene: Patient completed the activities by him/herself with no  assistance from a helper.   Toileting Hygiene: : Patient completed the activities by him/herself with no  assistance from a helper.   Shower/Bathe Self: [place] provides verbal cues and/or touching/steadying and/or  contact guard assistance as patient completes activity.   Upper Body Dressing: [place] sets up or cleans up; patient completes activity.  [place] assists only prior to or following the activity.   Lower Body Dressing: [place] sets up or cleans up; patient completes activity.  [place] assists only prior to or following the activity.   Putting On/Taking Off Footwear: [place] sets up or cleans up; patient completes  activity. [place] assists only prior to or following the activity.    Mobility Toilet Transfer Discharge: Patient completed the activities by  him/herself with no assistance from a helper.    Signed by: See Cuadra OTR/ALEXA

## 2020-01-09 NOTE — PROGRESS NOTES
Discussed plan for transfer back to acute care and surgery now scheduled for tomorrow with patient. Plan is for patient to d/c home with sister when ready to d/c from hospital. Discussed [facility] to follow and provide home PT, OT, NSG after d/c. Patient is agreeable to this plan. She did receive rolling walker and shower chair today. Sister to take equipment home.

## 2020-01-09 NOTE — PROGRESS NOTES
SECTION GG      Mobility Performance Discharge:     Roll Left and Right: Patient completed the activities by him/herself with no  assistance from a helper.   Sit to Lying: Patient completed the activities by him/herself with no  assistance from a helper.   Lying to Sitting on Side of Bed: Patient completed the activities by  him/herself with no assistance from a helper.   Sit to Stand: Patient completed the activities by him/herself with no  assistance from a helper.   Chair/Bed to Chair Transfer: Patient completed the activities by him/herself  with no assistance from a helper.   Car Transfer: [place] provides verbal cues and/or touching/steadying and/or  contact guard assistance as patient completes activity. Assistance may be  provided throughout the activity or intermittently.   Walk 10 Feet:   Patient completed the activities by him/herself with no  assistance from a helper.  Walk 50 Feet with 2 Turns:   Patient completed the activities by him/herself  with no assistance from a helper.  Walk 150 Feet:   Patient completed the activities by him/herself with no  assistance from a helper.  Walking 10 Feet on Uneven Surfaces:   [place] provides verbal cues and/or  touching/steadying and/or contact guard assistance as patient completes  activity. Assistance may be provided throughout the activity or intermittently.  1 Step Over Curb or Up/Down Stair:   [place] does less than half the effort.  [place] lifts, holds or supports trunk or limbs but provides less than half the  effort.  4 Steps Up and Down, With/Without Rail:   [place] provides verbal cues and/or  touching/steadying and/or contact guard assistance as patient completes  activity. Assistance may be provided throughout the activity or intermittently.  12 Steps Up and Down, With/Without Rail:   [place] provides verbal cues and/or  touching/steadying and/or contact guard assistance as patient completes  activity. Assistance may be provided throughout the activity or  intermittently.  Picking up an Object:   Not attempted due to medical or safety concerns. Uses  Wheelchair and/or Scooter: No    Signed by: Audrey Valdivia PTA

## 2020-01-09 NOTE — THERAPY DISCHARGE NOTE
Inpatient Rehabilitation - Physical Therapy Treatment Note/Discharge  [facility]     Patient Name: Noemi Bartholomew  : 1960  MRN: [id number]  Today's Date: 2020             Admit Date: 2019    Visit Dx:  No diagnosis found.  Patient Active Problem List   Diagnosis   • Carpal tunnel syndrome   • Hyperlipidemia   • Osteoporosis   • DDD (degenerative disc disease), lumbar   • Chronic left lumbar radiculopathy   • Congenital spondylolysis, lumbosacral region   • Neuropathic pain, leg, left   • Ossification of spinal ligament   • Spinal stenosis in cervical region   • Cervical spondylosis with myelopathy   • Cervical stenosis of spine   • Postoperative anemia due to acute blood loss   • Steroid-induced hyperglycemia   • Cord compression (CMS/HCC)   • Leukocytosis (due to steroids)   • Postoperative CSF leak   • Cervical myelopathy (CMS/HCC)   • CSF leak         PT IRF GOALS     Row Name 20 0900             Bed Mobility Goal 1 (PT-IRF)    Progress/Outcomes (Bed Mobility Goal 1, PT-IRF)  goal met  -LB         Transfer Goal 1 (PT-IRF)    Progress/Outcomes (Transfer Goal 1, PT-IRF)  goal met  -LB         Transfer Goal 2 (PT-IRF)    Progress/Outcomes (Transfer Goal 2, PT-IRF)  goal met  -LB         Gait/Walking Locomotion Goal 1 (PT-IRF)    Progress/Outcomes (Gait/Walking Locomotion Goal 1, PT-IRF)  goal met  -LB         Stairs Goal 1 (PT-IRF)    Progress/Outcomes (Stairs Goal 1, PT-IRF)  goal met  -LB        User Key  (r) = Recorded By, (t) = Taken By, (c) = Cosigned By    Initials Name Provider Type    Audrey Adams PTA Physical Therapy Assistant          Therapy Treatment    IRF Treatment Summary     Row Name 20 1200 20 0905          Evaluation/Treatment Time and Intent    Subjective Information  no complaints  -DN  complains of;pain  -LB     Existing Precautions/Restrictions  fall;lifting;spinal no lift >5#, no overhead activity, no bend at waist  -DN  fall;spinal  -LB      "Document Type  discharge evaluation/summary;discharge treatment  -DN  discharge treatment  -LB     Mode of Treatment  occupational therapy  -DN  physical therapy  -LB     Patient/Family Observations  pt seated in w/c  -DN  Pnt seated in w/c. Pleasant and cooperative  -LB     Recorded by [GARRY] See [facility] OT [LB] Audrey Valdivia PTA     Row Name 01/09/20 1200             Cognition/Psychosocial- PT/OT    Affect/Mental Status (Cognitive)  WFL  -DN      Recorded by [DN] See [facility] OT      Row Name 01/09/20 0905             Bed Mobility Assessment/Treatment    Supine-Sit [place] (Bed Mobility)  independent from L and from right  -LB      Sit-Supine [place] (Bed Mobility)  independent  -LB      Comment (Bed Mobility)  assessed on txment mat  -LB      Recorded by [LB] Audrey Valdivia PTA      Row Name 01/09/20 0905             Sit-Stand Transfer    Sit-Stand [place] (Transfers)  conditional independence to stand on platform from 30\" high bed, SBA  -LB      Assistive Device (Sit-Stand Transfers)  walker, front-wheeled  -LB      Recorded by [LB] Audrey Valdivia PTA      Row Name 01/09/20 0905             Stand-Sit Transfer    Stand-Sit [place] (Transfers)  conditional independence;other (see comments) onto 30\" high bed, using platform, SBA  -LB      Assistive Device (Stand-Sit Transfers)  walker, front-wheeled  -LB      Recorded by [LB] Audrey Valdivia PTA      Row Name 01/09/20 1200             Toilet Transfer    Type (Toilet Transfer)  stand pivot/stand step  -DN      [place] Level (Toilet Transfer)  conditional independence  -DN      Assistive Device (Toilet Transfer)  walker, front-wheeled  -DN      Recorded by [DN] See [facility] OT      Row Name 01/09/20 1200             Shower Transfer    Type (Shower Transfer)  stand pivot/stand step  -DN      [place] Level (Shower Transfer)  supervision  -DN      Assistive Device (Shower Transfer)  walker, front-wheeled  -DN   " "   Recorded by [DN] See [facility], OT      Row Name 01/09/20 0905             Gait/Stairs Assessment/Training    [place] Level (Gait)  conditional independence  -LB      Assistive Device (Gait)  walker, front-wheeled  -LB      Distance in Feet (Gait)  400  -LB      Pattern (Gait)  step-through  -LB      Deviations/Abnormal Patterns (Gait)  ally decreased  -LB      Bilateral Gait Deviations  heel strike decreased  -LB      Left Sided Gait Deviations  heel strike decreased  -LB      Handrail Location (Stairs)  left side (ascending) both hands on 1 rail  -LB      Number of Steps (Stairs)  12 (4\") steps  -LB      Ascending Technique (Stairs)  step-to-step  -LB      Descending Technique (Stairs)  step-to-step  -LB      Comment (Gait/Stairs)  up platform backward, down forward , w rwx (to get into and OOB). SBA  -LB      Recorded by [LB] Audrey Valdivia PTA      Row Name 01/09/20 1200             Bathing Assessment/Treatment    Bathing [place] Level  supervision  -DN      Recorded by [DN] See [facility], OT      Row Name 01/09/20 1200             Upper Body Dressing Assessment/Treatment    Upper Body Dressing Task  set up assistance  -DN      Recorded by [DN] See [facility], OT      Row Name 01/09/20 1200             Lower Body Dressing Assessment/Treatment    Lower Body Dressing [place] Level  supervision  -DN      Recorded by [DN] See [facility], OT      Row Name 01/09/20 1200             Grooming Assessment/Treatment    Grooming [place] Level  conditional independence  -DN      Recorded by [DN] See [facility], OT      Row Name 01/09/20 1200             Toileting Assessment/Treatment    Toileting [place] Level  conditional independence  -DN      Recorded by [DN] See [facility], OT      Row Name 01/09/20 1200             Self-Feeding Assessment/Treatment    [place] Level (Self-Feeding)  conditional independence  -DN      Recorded by [DN] See [facility] OT      Row Name 01/09/20 1200 " "            General ROM    GENERAL ROM COMMENTS  AROM WFL for adls within precaution range  -DN      Recorded by [DN] See [facility], OT      Row Name 01/09/20 1200             Left Upper Ext    Lt Elbow Supination AROM  3/4  -DN      Lt Elbow Supination PROM  full  -DN      Lt Wrist Extension AROM  1/2  -DN      Lt Wrist Extension PROM  1/2  -DN      Recorded by [GARRY] See [facility] OT      Row Name 01/09/20 1200             MMT Left Upper Ext    Lt Shoulder External Rotation MMT, Gross Movement  (3-/5) fair minus  -DN      Lt Elbow Flexion MMT, Gross Movement  (3+/5) fair plus  -DN      Lt Forearm Supination MMT, Gross Movement  (3+/5) fair plus  -DN      Lt Upper Extremity Comments   pt has no issues with holding on to washrag at d/c  -DN      Recorded by [GARRY] See [facility] OT      Row Name 01/09/20 1200             Hand  Strength Testing    Right Hand, Setting 1 (Dynamometer Testing)  1  -DN      Left Hand, Setting 1 (Dynamometer Testing)  1  -DN      Right Hand: Lateral (Key) Pinch Strength (Pinch Dynamometer Testing)  1  -DN      Left Hand: Lateral (Key) Pinch Strength (Pinch Dynamometer Testing)  2  -DN      Recorded by [DN] See [facility], OT      Row Name 01/09/20 1200             Fine Motor Testing & Training    Results, 9 Hole Peg Test of Fine Motor Coordination-Right  23  -DN      Results, 9 Hole Peg Test of Fine Motor Coordination-Left  31  -DN      Results, Box N Block Test-Right  51  -DN      Results, Box N Block Test-Left  42  -DN      Recorded by [GARRY] See [facility], OT      Row Name 01/09/20 1200 01/09/20 0905          Pain Scale: Numbers Pre/Post-Treatment    Pain Scale: Numbers, Pretreatment  0/10 - no pain  -DN  5/10 \"5.5\"  -LB     Pain Scale: Numbers, Post-Treatment  0/10 - no pain  -DN  --     Pain Location  --  head  -LB     Pain Intervention(s)  --  Ambulation/increased activity;Distraction Declined for me to request pain meds  -LB     Recorded by [GARRY] See [facility], OT [LB] " Audrey Valdivia PTA     Row Name 01/09/20 1200             Upper Extremity Seated Therapeutic Exercise    Performed, Seated Upper Extremity (Therapeutic Exercise)  shoulder flexion/extension;shoulder abduction/adduction;shoulder external/internal rotation;shoulder horizontal abduction/adduction;wrist flexion/extension;forearm supination/pronation;elbow flexion/extension  -DN      Device, Seated Upper Extremity (Therapeutic Exercise)  free weights, barbell  -DN      Exercise Type, Seated Upper Extremity (Therapeutic Exercise)  AROM (active range of motion)  -DN      Expected Outcomes, Seated Upper Extremity (Therapeutic Exercise)  improve functional tolerance, self-care activity  -DN      Sets/Reps Detail, Seated Upper Extremity (Therapeutic Exercise)  3/8 with 1# dumbell with elbow, forearm and wrist, AROM for shoulders  -DN      Recorded by [DN] See [facility] OT      Row Name 01/09/20 1200 01/09/20 0905          Positioning and Restraints    Pre-Treatment Position  sitting in chair/recliner  -DN  --     Post Treatment Position  wheelchair  -DN  wheelchair  -LB     In Bed  sitting;call light within reach;encouraged to call for assist;exit alarm on  -DN  --     In Wheelchair  --  sitting;call light within reach;exit alarm on  -LB     Recorded by [DN] See [facility] OT [LB] Audrey Valdivia PTA       User Key  (r) = Recorded By, (t) = Taken By, (c) = Cosigned By    Initials Name Effective Dates    See Tan OT 06/08/18 -     LB Audrey Valdivia PTA 03/07/18 -           PT Recommendation and Plan  Planned Therapy Interventions (PT Eval): balance training, gait training, bed mobility training            Time Calculation:   PT Charges     Row Name 01/09/20 0937             Time Calculation    Start Time  0900  -LB      Stop Time  0930  -LB      Time Calculation (min)  30 min  -LB        User Key  (r) = Recorded By, (t) = Taken By, (c) = Cosigned By    Initials Name Provider Type    MEHNAZ Valdivia  Audrey LOUIE PTA Physical Therapy Assistant          Therapy Charges for Today     Code Description Service Date Service Provider Modifiers Qty    00806578120 HC PT THER PROC EA 15 MIN 1/8/2020 Audrey Valdivia PTA GP 6    88401572848 HC PT THER PROC EA 15 MIN 1/9/2020 Audrey Valdivia PTA GP 2    12634559250 HC PT CARE PLAN EACH 15 MIN 1/9/2020 Audrey Valdivia PTA GP 1          PT G-Codes  Outcome Measure Options: Tinetti  Tinetti Total Score: 22    PT Discharge Summary  Reason for Discharge: Discharge from facility  Outcomes Achieved: Able to achieve all goals within established timeline  Discharge Destination: other (comment)(Acute care at [facility])    Adurey Valdivia PTA  1/9/2020

## 2020-01-09 NOTE — THERAPY DISCHARGE NOTE
Inpatient Rehabilitation - Occupational Therapy Treatment Note/Discharge  [facility]     Patient Name: Noemi Bartholomew  : 1960  MRN: [id number]  Today's Date: 2020               Admit Date: 2019    Visit Dx:   No diagnosis found.  Patient Active Problem List   Diagnosis   • Carpal tunnel syndrome   • Hyperlipidemia   • Osteoporosis   • DDD (degenerative disc disease), lumbar   • Chronic left lumbar radiculopathy   • Congenital spondylolysis, lumbosacral region   • Neuropathic pain, leg, left   • Ossification of spinal ligament   • Spinal stenosis in cervical region   • Cervical spondylosis with myelopathy   • Cervical stenosis of spine   • Postoperative anemia due to acute blood loss   • Steroid-induced hyperglycemia   • Cord compression (CMS/HCC)   • Leukocytosis (due to steroids)   • Postoperative CSF leak   • Cervical myelopathy (CMS/HCC)   • CSF leak       Therapy Treatment  IRF Treatment Summary     Row Name 20          Evaluation/Treatment Time and Intent    Subjective Information  no complaints  -DN  complains of;pain  -LB     Existing Precautions/Restrictions  fall;lifting;spinal no lift >5#, no overhead activity, no bend at waist  -DN  fall;spinal  -LB     Document Type  discharge evaluation/summary;discharge treatment  -DN  discharge treatment  -LB     Mode of Treatment  occupational therapy  -DN  physical therapy  -LB     Patient/Family Observations  pt seated in w/c  -DN  Pnt seated in w/c. Pleasant and cooperative  -LB     Recorded by [DN] See [facility] OT [LB] Audrey Valdivia PTA     Row Name 20 1200             Cognition/Psychosocial- PT/OT    Affect/Mental Status (Cognitive)  WFL  -DN      Recorded by [DN] See [facility] OT      Row Name 20 09             Bed Mobility Assessment/Treatment    Supine-Sit [place] (Bed Mobility)  independent from L and from right  -LB      Sit-Supine [place] (Bed Mobility)  independent  -LB       "Comment (Bed Mobility)  assessed on txment mat  -LB      Recorded by [LB] Audrey Valdivia, PTA      Row Name 01/09/20 0905             Sit-Stand Transfer    Sit-Stand [place] (Transfers)  conditional independence to stand on platform from 30\" high bed, SBA  -LB      Assistive Device (Sit-Stand Transfers)  walker, front-wheeled  -LB      Recorded by [LB] Audrey Valdivia PTA      Row Name 01/09/20 0905             Stand-Sit Transfer    Stand-Sit [place] (Transfers)  conditional independence;other (see comments) onto 30\" high bed, using platform, SBA  -LB      Assistive Device (Stand-Sit Transfers)  walker, front-wheeled  -LB      Recorded by [LB] Audrey Valdivia, EBENEZER      Row Name 01/09/20 1200             Toilet Transfer    Type (Toilet Transfer)  stand pivot/stand step  -DN      [place] Level (Toilet Transfer)  conditional independence  -DN      Assistive Device (Toilet Transfer)  walker, front-wheeled  -DN      Recorded by [DN] See [facility], OT      Row Name 01/09/20 1200             Shower Transfer    Type (Shower Transfer)  stand pivot/stand step  -DN      [place] Level (Shower Transfer)  supervision  -DN      Assistive Device (Shower Transfer)  walker, front-wheeled  -DN      Recorded by [DN] See [facility], OT      Row Name 01/09/20 0905             Gait/Stairs Assessment/Training    [place] Level (Gait)  conditional independence  -LB      Assistive Device (Gait)  walker, front-wheeled  -LB      Distance in Feet (Gait)  400  -LB      Pattern (Gait)  step-through  -LB      Deviations/Abnormal Patterns (Gait)  ally decreased  -LB      Bilateral Gait Deviations  heel strike decreased  -LB      Left Sided Gait Deviations  heel strike decreased  -LB      Handrail Location (Stairs)  left side (ascending) both hands on 1 rail  -LB      Number of Steps (Stairs)  12 (4\") steps  -LB      Ascending Technique (Stairs)  step-to-step  -LB      Descending Technique (Stairs)  " step-to-step  -LB      Comment (Gait/Stairs)  up platform backward, down forward , w rwx (to get into and OOB). SBA  -LB      Recorded by [LB] Audrey Valdivia PTA      Row Name 01/09/20 1200             Bathing Assessment/Treatment    Bathing [place] Level  supervision  -DN      Recorded by [DN] See [facility], OT      Row Name 01/09/20 1200             Upper Body Dressing Assessment/Treatment    Upper Body Dressing Task  set up assistance  -DN      Recorded by [DN] See [facility], OT      Row Name 01/09/20 1200             Lower Body Dressing Assessment/Treatment    Lower Body Dressing [place] Level  supervision  -DN      Recorded by [DN] See [facility], OT      Row Name 01/09/20 1200             Grooming Assessment/Treatment    Grooming [place] Level  conditional independence  -DN      Recorded by [DN] See [facility], OT      Row Name 01/09/20 1200             Toileting Assessment/Treatment    Toileting [place] Level  conditional independence  -DN      Recorded by [DN] See [facility], OT      Row Name 01/09/20 1200             Self-Feeding Assessment/Treatment    [place] Level (Self-Feeding)  conditional independence  -DN      Recorded by [DN] See [facility], OT      Row Name 01/09/20 1200             General ROM    GENERAL ROM COMMENTS  AROM WFL for adls within precaution range  -DN      Recorded by [GARRY] See [facility], OT      Row Name 01/09/20 1200             Left Upper Ext    Lt Elbow Supination AROM  3/4  -DN      Lt Elbow Supination PROM  full  -DN      Lt Wrist Extension AROM  1/2  -DN      Lt Wrist Extension PROM  1/2  -DN      Recorded by [DN] See [facility], OT      Row Name 01/09/20 1200             MMT Left Upper Ext    Lt Shoulder External Rotation MMT, Gross Movement  (3-/5) fair minus  -DN      Lt Elbow Flexion MMT, Gross Movement  (3+/5) fair plus  -DN      Lt Forearm Supination MMT, Gross Movement  (3+/5) fair plus  -DN      Lt Upper Extremity Comments   pt has no  "issues with holding on to washrag at d/c  -DN      Recorded by [DN] See [facility] OT      Row Name 01/09/20 1200             Hand  Strength Testing    Right Hand, Setting 1 (Dynamometer Testing)  1  -DN      Left Hand, Setting 1 (Dynamometer Testing)  1  -DN      Right Hand: Lateral (Key) Pinch Strength (Pinch Dynamometer Testing)  1  -DN      Left Hand: Lateral (Key) Pinch Strength (Pinch Dynamometer Testing)  2  -DN      Recorded by [DN] See [facility] OT      Row Name 01/09/20 1200             Fine Motor Testing & Training    Results, 9 Hole Peg Test of Fine Motor Coordination-Right  23  -DN      Results, 9 Hole Peg Test of Fine Motor Coordination-Left  31  -DN      Results, Box N Block Test-Right  51  -DN      Results, Box N Block Test-Left  42  -DN      Recorded by [DN] See [facility] OT      Row Name 01/09/20 1200 01/09/20 0905          Pain Scale: Numbers Pre/Post-Treatment    Pain Scale: Numbers, Pretreatment  0/10 - no pain  -DN  5/10 \"5.5\"  -LB     Pain Scale: Numbers, Post-Treatment  0/10 - no pain  -DN  --     Pain Location  --  head  -LB     Pain Intervention(s)  --  Ambulation/increased activity;Distraction Declined for me to request pain meds  -LB     Recorded by [DN] See [facility] OT [LB] Audrey Valdivia PTA     Row Name 01/09/20 1200             Upper Extremity Seated Therapeutic Exercise    Performed, Seated Upper Extremity (Therapeutic Exercise)  shoulder flexion/extension;shoulder abduction/adduction;shoulder external/internal rotation;shoulder horizontal abduction/adduction;wrist flexion/extension;forearm supination/pronation;elbow flexion/extension  -DN      Device, Seated Upper Extremity (Therapeutic Exercise)  free weights, barbell  -DN      Exercise Type, Seated Upper Extremity (Therapeutic Exercise)  AROM (active range of motion)  -DN      Expected Outcomes, Seated Upper Extremity (Therapeutic Exercise)  improve functional tolerance, self-care activity  -DN      Sets/Reps " Detail, Seated Upper Extremity (Therapeutic Exercise)  3/8 with 1# dumbell with elbow, forearm and wrist, AROM for shoulders  -DN      Recorded by [DN] See [facility] OT      Row Name 01/09/20 1200 01/09/20 0905          Positioning and Restraints    Pre-Treatment Position  sitting in chair/recliner  -DN  --     Post Treatment Position  wheelchair  -DN  wheelchair  -LB     In Bed  sitting;call light within reach;encouraged to call for assist;exit alarm on  -DN  --     In Wheelchair  --  sitting;call light within reach;exit alarm on  -LB     Recorded by [DN] See [facility] OT [LB] Audrey Valdivia PTA       User Key  (r) = Recorded By, (t) = Taken By, (c) = Cosigned By    Initials Name Effective Dates    See [facility] OT 06/08/18 -     LB Audrey Valdivia PTA 03/07/18 -           Wound 12/19/19 1939 Right lateral throat Incision (Active)   Dressing Appearance dry;open to air 1/9/2020 10:06 AM   Closure Approximated;Liquid skin adhesive 1/9/2020 10:06 AM   Base clean;dry 1/9/2020 10:06 AM   Periwound dry;swelling 1/9/2020 10:06 AM   Periwound Temperature warm 1/9/2020 10:06 AM   [place]ound Skin Turgor soft 1/9/2020 10:06 AM   Drainage Amount none 1/9/2020 10:06 AM   Dressing Care, Wound open to air 1/8/2020  4:00 PM         OT IRF GOALS     Row Name 01/09/20 1200 01/03/20 1200 12/28/19 1144       Transfer Goal 1 (OT-IRF)    Activity/Assistive Device (Transfer Goal 1, OT-IRF)  shower chair;walk-in shower  -DN  shower chair;commode, bedside without drop arms;walker, rolling  -DN  shower chair;commode, bedside without drop arms;walker, rolling  -DN    [place] Level (Transfer Goal 1, OT-IRF)  --  conditional independence  -DN  supervision required  -DN    Time Frame (Transfer Goal 1, OT-IRF)  --  short term goal (STG)  -DN  short term goal (STG)  -DN    Progress/Outcomes (Transfer Goal 1, OT-IRF)  goal not met  -DN  goal met;goal revised this date  -DN  continuing progress toward goal  -DN       Transfer  Goal 2 (OT-IRF)    Activity/Assistive Device (Transfer Goal 2, OT-IRF)  toilet;shower chair  -DN  toilet;shower chair  -DN  toilet;walker, rolling;shower chair  -DN    [place] Level (Transfer Goal 2, OT-IRF)  --  conditional independence  -DN  conditional independence  -DN    Time Frame (Transfer Goal 2, OT-IRF)  --  long term goal (LTG)  -DN  long term goal (LTG)  -DN    Progress/Outcomes (Transfer Goal 2, OT-IRF)  goal partially met  -DN  goal ongoing  -DN  continuing progress toward goal  -DN       Bathing FIM Goal (OT-IRF)    Progress/Outcomes (Bathing FIM Goal, OT-IRF)  --  --  goal no longer appropriate  -DN       Bathing Goal 1 (OT-IRF)    Activity/Device (Bathing Goal 1, OT-IRF)  bathing skills, all  -DN  bathing skills, all  -DN  bathing skills, all  -DN    [place] Level (Bathing Goal 1, OT-IRF)  --  supervision required  -DN  supervision required  -DN    Time Frame (Bathing Goal 1, OT-IRF)  --  short term goal (STG)  -DN  short term goal (STG)  -DN    Progress/Outcomes (Bathing Goal 1, OT-IRF)  goal met  -DN  goal not met;goal ongoing  -DN  continuing progress toward goal  -DN       Bathing Goal 2 (OT-IRF)    Activity/Device (Bathing Goal 2, OT-IRF)  bathing skills, all  -DN  bathing skills, all  -DN  bathing skills, all  -DN    [place] Level (Bathing Goal 2, OT-IRF)  --  conditional independence  -DN  conditional independence  -DN    Time Frame (Bathing Goal 2, OT-IRF)  --  long term goal (LTG)  -DN  long term goal (LTG)  -DN    Progress/Outcomes (Bathing Goal 2, OT-IRF)  goal not met  -DN  goal ongoing  -DN  continuing progress toward goal  -DN       UB Dressing FIM Goal (OT-IRF)    Progress/Outcomes (UB Dressing FIM Goal, OT-IRF)  --  --  goal no longer appropriate  -DN       UB Dressing Goal 1 (OT-IRF)    Activity/Device (UB Dressing Goal 1, OT-IRF)  upper body dressing  -DN  upper body dressing  -DN  upper body dressing  -DN    [place] (UB Dress Goal 1, OT-IRF)  --  conditional  independence  -DN  supervision required  -DN    Time Frame (UB Dressing Goal 1, OT-IRF)  --  short term goal (STG)  -DN  short term goal (STG)  -DN    Progress/Outcomes (UB Dressing Goal 1, OT-IRF)  goal not met  -DN  goal met;goal revised this date  -DN  continuing progress toward goal  -DN       UB Dressing Goal 2 (OT-IRF)    Activity/Device (UB Dressing Goal 2, OT-IRF)  upper body dressing  -DN  upper body dressing  -DN  upper body dressing  -DN    [place] (UB Dress Goal 2, OT-IRF)  --  conditional independence  -DN  conditional independence  -DN    Time Frame (UB Dressing Goal 2, OT-IRF)  --  long term goal (LTG)  -DN  long term goal (LTG)  -DN    Progress/Outcomes (UB Dressing Goal 2, OT-IRF)  goal not met  -DN  continuing progress toward goal  -DN  continuing progress toward goal  -DN       LB Dressing FIM Goal (OT-IRF)    Progress/Outcomes (LB Dressing FIM Goal, OT-IRF)  --  --  goal no longer appropriate  -DN       LB Dressing Goal 1 (OT-IRF)    Activity/Device (LB Dressing Goal 1, OT-IRF)  lower body dressing  -DN  lower body dressing  -DN  lower body dressing  -DN    [place] (LB Dressing Goal 1, OT-IRF)  --  supervision required  -DN  moderate assist (50-74% patient effort)  -DN    Time Frame (LB Dressing Goal 1, OT-IRF)  --  short term goal (STG)  -DN  short term goal (STG)  -DN    Progress/Outcomes (LB Dressing Goal 1, OT-IRF)  goal met  -DN  goal met;goal revised this date  -DN  continuing progress toward goal  -DN       LB Dressing Goal 2 (OT-IRF)    Activity/Device (LB Dressing Goal 2, OT-IRF)  lower body dressing  -DN  lower body dressing  -DN  lower body dressing  -DN    [place] (LB Dressing Goal 2, OT-IRF)  --  conditional independence  -DN  supervision required  -DN    Time Frame (LB Dressing Goal 2, OT-IRF)  --  long term goal (LTG)  -DN  long term goal (LTG)  -DN    Progress/Outcomes (LB Dressing Goal 2, OT-IRF)  goal not met  -DN  goal revised this date;goal ongoing  -DN   continuing progress toward goal  -DN      User Key  (r) = Recorded By, (t) = Taken By, (c) = Cosigned By    Initials Name Provider Type    See Tan OT Occupational Therapist                OT Recommendation and Plan  Anticipated Equipment Needs At Discharge (OT Eval): commode, 3-in-1, tub bench, shower chair  Anticipated Discharge Disposition (OT): (back to acute care for procedure on saterday)  Planned Therapy Interventions (OT Eval): activity tolerance training, BADL retraining, neuromuscular control/coordination retraining, patient/caregiver education/training, strengthening exercise, transfer/mobility retraining               Time Calculation:    Time Calculation- OT     Row Name 01/09/20 1234             Time Calculation- OT    OT Start Time  1100  -DN      OT Stop Time  1130  -DN      OT Time Calculation (min)  30 min  -DN      OT Received On  01/09/20  -DN        User Key  (r) = Recorded By, (t) = Taken By, (c) = Cosigned By    Initials Name Provider Type    See Tan OT Occupational Therapist          Therapy Charges for Today     Code Description Service Date Service Provider Modifiers Qty    29846530623 HC OT SELF CARE/MGMT/TRAIN EA 15 MIN 1/8/2020 See [facility] OT GO 3    45046189744 HC OT THER PROC EA 15 MIN 1/8/2020 See [facility] OT GO 3    54893789788 HC OT THER PROC EA 15 MIN 1/8/2020 See [facility] OT GO 2    57761959962 HC OT THER PROC EA 15 MIN 1/9/2020 See [facility] OT GO 1    29639317453 HC OT SELF CARE/MGMT/TRAIN EA 15 MIN 1/9/2020 See [facility] OT GO 1               OT Discharge Summary  Anticipated Discharge Disposition (OT): (back to acute care for procedure on saterday)  Reason for Discharge: Discharge from facility  Outcomes Achieved: Patient able to partially acheive established goals  Discharge Destination: (back to acute care)    See [facility] OT  1/9/2020

## 2020-01-09 NOTE — PROGRESS NOTES
Occupational Therapy: Branch    Physical Therapy: Individual: 30 minutes.    Speech Language Pathology:  Branch    Signed by: Audrey Valdivia PTA

## 2020-01-10 ENCOUNTER — ANESTHESIA EVENT (OUTPATIENT)
Dept: PERIOP | Facility: HOSPITAL | Age: 60
End: 2020-01-10

## 2020-01-10 ENCOUNTER — ANESTHESIA (OUTPATIENT)
Dept: PERIOP | Facility: HOSPITAL | Age: 60
End: 2020-01-10

## 2020-01-10 PROCEDURE — 63741 INSTALL SPINAL SHUNT: CPT | Performed by: NEUROLOGICAL SURGERY

## 2020-01-10 PROCEDURE — 25010000002 PROPOFOL 10 MG/ML EMULSION: Performed by: NURSE ANESTHETIST, CERTIFIED REGISTERED

## 2020-01-10 PROCEDURE — 25010000002 HYDROMORPHONE PER 4 MG: Performed by: NURSE ANESTHETIST, CERTIFIED REGISTERED

## 2020-01-10 PROCEDURE — 25010000002 MIDAZOLAM PER 1 MG: Performed by: ANESTHESIOLOGY

## 2020-01-10 PROCEDURE — 25010000002 NEOSTIGMINE PER 0.5 MG: Performed by: ANESTHESIOLOGY

## 2020-01-10 PROCEDURE — 25010000002 FENTANYL CITRATE (PF) 100 MCG/2ML SOLUTION: Performed by: NURSE ANESTHETIST, CERTIFIED REGISTERED

## 2020-01-10 PROCEDURE — 25010000002 ONDANSETRON PER 1 MG: Performed by: ANESTHESIOLOGY

## 2020-01-10 PROCEDURE — 25010000002 FENTANYL CITRATE (PF) 100 MCG/2ML SOLUTION: Performed by: ANESTHESIOLOGY

## 2020-01-10 PROCEDURE — 63741 INSTALL SPINAL SHUNT: CPT | Performed by: SPECIALIST/TECHNOLOGIST, OTHER

## 2020-01-10 PROCEDURE — 25010000002 DEXAMETHASONE PER 1 MG: Performed by: NURSE ANESTHETIST, CERTIFIED REGISTERED

## 2020-01-10 PROCEDURE — 25010000002 VANCOMYCIN 1 G RECONSTITUTED SOLUTION 1 EACH VIAL: Performed by: NEUROLOGICAL SURGERY

## 2020-01-10 PROCEDURE — 25010000002 SUCCINYLCHOLINE PER 20 MG: Performed by: NURSE ANESTHETIST, CERTIFIED REGISTERED

## 2020-01-10 DEVICE — SEALANT WND FIBRIN TISSEEL PREFIL/SYR/PRIMAFZ 10ML: Type: IMPLANTABLE DEVICE | Site: SPINE CERVICAL | Status: FUNCTIONAL

## 2020-01-10 DEVICE — IMPLANTABLE DEVICE: Type: IMPLANTABLE DEVICE | Site: SPINE LUMBAR | Status: FUNCTIONAL

## 2020-01-10 RX ORDER — PROMETHAZINE HYDROCHLORIDE 25 MG/1
25 TABLET ORAL ONCE AS NEEDED
Status: DISCONTINUED | OUTPATIENT
Start: 2020-01-10 | End: 2020-01-10 | Stop reason: HOSPADM

## 2020-01-10 RX ORDER — ALBUTEROL SULFATE 2.5 MG/3ML
2.5 SOLUTION RESPIRATORY (INHALATION) ONCE AS NEEDED
Status: DISCONTINUED | OUTPATIENT
Start: 2020-01-10 | End: 2020-01-10 | Stop reason: HOSPADM

## 2020-01-10 RX ORDER — DEXAMETHASONE SODIUM PHOSPHATE 10 MG/ML
INJECTION INTRAMUSCULAR; INTRAVENOUS AS NEEDED
Status: DISCONTINUED | OUTPATIENT
Start: 2020-01-10 | End: 2020-01-10 | Stop reason: SURG

## 2020-01-10 RX ORDER — LIDOCAINE HYDROCHLORIDE 10 MG/ML
0.5 INJECTION, SOLUTION EPIDURAL; INFILTRATION; INTRACAUDAL; PERINEURAL ONCE AS NEEDED
Status: DISCONTINUED | OUTPATIENT
Start: 2020-01-10 | End: 2020-01-10 | Stop reason: HOSPADM

## 2020-01-10 RX ORDER — ROCURONIUM BROMIDE 10 MG/ML
INJECTION, SOLUTION INTRAVENOUS AS NEEDED
Status: DISCONTINUED | OUTPATIENT
Start: 2020-01-10 | End: 2020-01-10 | Stop reason: SURG

## 2020-01-10 RX ORDER — PROMETHAZINE HYDROCHLORIDE 25 MG/1
25 SUPPOSITORY RECTAL ONCE AS NEEDED
Status: DISCONTINUED | OUTPATIENT
Start: 2020-01-10 | End: 2020-01-10 | Stop reason: HOSPADM

## 2020-01-10 RX ORDER — HYDROCODONE BITARTRATE AND ACETAMINOPHEN 7.5; 325 MG/1; MG/1
1 TABLET ORAL ONCE AS NEEDED
Status: DISCONTINUED | OUTPATIENT
Start: 2020-01-10 | End: 2020-01-10 | Stop reason: HOSPADM

## 2020-01-10 RX ORDER — HYDROMORPHONE HYDROCHLORIDE 1 MG/ML
0.5 INJECTION, SOLUTION INTRAMUSCULAR; INTRAVENOUS; SUBCUTANEOUS EVERY 4 HOURS PRN
Status: DISCONTINUED | OUTPATIENT
Start: 2020-01-10 | End: 2020-01-12 | Stop reason: HOSPADM

## 2020-01-10 RX ORDER — DIPHENHYDRAMINE HYDROCHLORIDE 50 MG/ML
12.5 INJECTION INTRAMUSCULAR; INTRAVENOUS
Status: DISCONTINUED | OUTPATIENT
Start: 2020-01-10 | End: 2020-01-10 | Stop reason: HOSPADM

## 2020-01-10 RX ORDER — FENTANYL CITRATE 50 UG/ML
50 INJECTION, SOLUTION INTRAMUSCULAR; INTRAVENOUS
Status: DISCONTINUED | OUTPATIENT
Start: 2020-01-10 | End: 2020-01-10 | Stop reason: HOSPADM

## 2020-01-10 RX ORDER — NALOXONE HCL 0.4 MG/ML
0.2 VIAL (ML) INJECTION AS NEEDED
Status: DISCONTINUED | OUTPATIENT
Start: 2020-01-10 | End: 2020-01-10 | Stop reason: HOSPADM

## 2020-01-10 RX ORDER — HYDRALAZINE HYDROCHLORIDE 20 MG/ML
5 INJECTION INTRAMUSCULAR; INTRAVENOUS
Status: DISCONTINUED | OUTPATIENT
Start: 2020-01-10 | End: 2020-01-10 | Stop reason: HOSPADM

## 2020-01-10 RX ORDER — DIPHENHYDRAMINE HCL 25 MG
25 CAPSULE ORAL
Status: DISCONTINUED | OUTPATIENT
Start: 2020-01-10 | End: 2020-01-10 | Stop reason: HOSPADM

## 2020-01-10 RX ORDER — GLYCOPYRROLATE 0.2 MG/ML
INJECTION INTRAMUSCULAR; INTRAVENOUS AS NEEDED
Status: DISCONTINUED | OUTPATIENT
Start: 2020-01-10 | End: 2020-01-10 | Stop reason: SURG

## 2020-01-10 RX ORDER — ONDANSETRON 2 MG/ML
4 INJECTION INTRAMUSCULAR; INTRAVENOUS ONCE AS NEEDED
Status: DISCONTINUED | OUTPATIENT
Start: 2020-01-10 | End: 2020-01-10 | Stop reason: HOSPADM

## 2020-01-10 RX ORDER — LIDOCAINE HYDROCHLORIDE 20 MG/ML
INJECTION, SOLUTION INFILTRATION; PERINEURAL AS NEEDED
Status: DISCONTINUED | OUTPATIENT
Start: 2020-01-10 | End: 2020-01-10 | Stop reason: SURG

## 2020-01-10 RX ORDER — PROMETHAZINE HYDROCHLORIDE 25 MG/ML
6.25 INJECTION, SOLUTION INTRAMUSCULAR; INTRAVENOUS
Status: DISCONTINUED | OUTPATIENT
Start: 2020-01-10 | End: 2020-01-10 | Stop reason: HOSPADM

## 2020-01-10 RX ORDER — EPHEDRINE SULFATE 50 MG/ML
5 INJECTION, SOLUTION INTRAVENOUS ONCE AS NEEDED
Status: DISCONTINUED | OUTPATIENT
Start: 2020-01-10 | End: 2020-01-10 | Stop reason: HOSPADM

## 2020-01-10 RX ORDER — WOUND DRESSING ADHESIVE - LIQUID
LIQUID MISCELLANEOUS AS NEEDED
Status: DISCONTINUED | OUTPATIENT
Start: 2020-01-10 | End: 2020-01-10 | Stop reason: HOSPADM

## 2020-01-10 RX ORDER — SODIUM CHLORIDE 0.9 % (FLUSH) 0.9 %
3 SYRINGE (ML) INJECTION EVERY 12 HOURS SCHEDULED
Status: DISCONTINUED | OUTPATIENT
Start: 2020-01-10 | End: 2020-01-10 | Stop reason: HOSPADM

## 2020-01-10 RX ORDER — ACETAMINOPHEN 325 MG/1
650 TABLET ORAL ONCE AS NEEDED
Status: DISCONTINUED | OUTPATIENT
Start: 2020-01-10 | End: 2020-01-10 | Stop reason: HOSPADM

## 2020-01-10 RX ORDER — BUPIVACAINE HYDROCHLORIDE AND EPINEPHRINE 2.5; 5 MG/ML; UG/ML
INJECTION, SOLUTION INFILTRATION; PERINEURAL AS NEEDED
Status: DISCONTINUED | OUTPATIENT
Start: 2020-01-10 | End: 2020-01-10 | Stop reason: HOSPADM

## 2020-01-10 RX ORDER — OXYCODONE AND ACETAMINOPHEN 7.5; 325 MG/1; MG/1
1 TABLET ORAL ONCE AS NEEDED
Status: DISCONTINUED | OUTPATIENT
Start: 2020-01-10 | End: 2020-01-10 | Stop reason: HOSPADM

## 2020-01-10 RX ORDER — HYDROCODONE BITARTRATE AND ACETAMINOPHEN 5; 325 MG/1; MG/1
1 TABLET ORAL EVERY 4 HOURS PRN
Status: DISCONTINUED | OUTPATIENT
Start: 2020-01-10 | End: 2020-01-12 | Stop reason: HOSPADM

## 2020-01-10 RX ORDER — MIDAZOLAM HYDROCHLORIDE 1 MG/ML
1 INJECTION INTRAMUSCULAR; INTRAVENOUS
Status: DISCONTINUED | OUTPATIENT
Start: 2020-01-10 | End: 2020-01-10 | Stop reason: HOSPADM

## 2020-01-10 RX ORDER — ONDANSETRON 2 MG/ML
INJECTION INTRAMUSCULAR; INTRAVENOUS AS NEEDED
Status: DISCONTINUED | OUTPATIENT
Start: 2020-01-10 | End: 2020-01-10 | Stop reason: SURG

## 2020-01-10 RX ORDER — PROPOFOL 10 MG/ML
VIAL (ML) INTRAVENOUS AS NEEDED
Status: DISCONTINUED | OUTPATIENT
Start: 2020-01-10 | End: 2020-01-10 | Stop reason: SURG

## 2020-01-10 RX ORDER — SODIUM CHLORIDE 0.9 % (FLUSH) 0.9 %
3-10 SYRINGE (ML) INJECTION AS NEEDED
Status: DISCONTINUED | OUTPATIENT
Start: 2020-01-10 | End: 2020-01-10 | Stop reason: HOSPADM

## 2020-01-10 RX ORDER — FENTANYL CITRATE 50 UG/ML
25 INJECTION, SOLUTION INTRAMUSCULAR; INTRAVENOUS
Status: DISCONTINUED | OUTPATIENT
Start: 2020-01-10 | End: 2020-01-10 | Stop reason: HOSPADM

## 2020-01-10 RX ORDER — FAMOTIDINE 10 MG/ML
20 INJECTION, SOLUTION INTRAVENOUS ONCE
Status: COMPLETED | OUTPATIENT
Start: 2020-01-10 | End: 2020-01-10

## 2020-01-10 RX ORDER — SUCCINYLCHOLINE CHLORIDE 20 MG/ML
INJECTION INTRAMUSCULAR; INTRAVENOUS AS NEEDED
Status: DISCONTINUED | OUTPATIENT
Start: 2020-01-10 | End: 2020-01-10 | Stop reason: SURG

## 2020-01-10 RX ORDER — SODIUM CHLORIDE, SODIUM LACTATE, POTASSIUM CHLORIDE, CALCIUM CHLORIDE 600; 310; 30; 20 MG/100ML; MG/100ML; MG/100ML; MG/100ML
9 INJECTION, SOLUTION INTRAVENOUS CONTINUOUS
Status: DISCONTINUED | OUTPATIENT
Start: 2020-01-10 | End: 2020-01-10

## 2020-01-10 RX ORDER — FENTANYL CITRATE 50 UG/ML
INJECTION, SOLUTION INTRAMUSCULAR; INTRAVENOUS AS NEEDED
Status: DISCONTINUED | OUTPATIENT
Start: 2020-01-10 | End: 2020-01-10 | Stop reason: SURG

## 2020-01-10 RX ORDER — HYDROMORPHONE HYDROCHLORIDE 1 MG/ML
0.25 INJECTION, SOLUTION INTRAMUSCULAR; INTRAVENOUS; SUBCUTANEOUS
Status: DISCONTINUED | OUTPATIENT
Start: 2020-01-10 | End: 2020-01-10 | Stop reason: HOSPADM

## 2020-01-10 RX ORDER — MIDAZOLAM HYDROCHLORIDE 1 MG/ML
2 INJECTION INTRAMUSCULAR; INTRAVENOUS
Status: DISCONTINUED | OUTPATIENT
Start: 2020-01-10 | End: 2020-01-10 | Stop reason: HOSPADM

## 2020-01-10 RX ORDER — FLUMAZENIL 0.1 MG/ML
0.2 INJECTION INTRAVENOUS AS NEEDED
Status: DISCONTINUED | OUTPATIENT
Start: 2020-01-10 | End: 2020-01-10 | Stop reason: HOSPADM

## 2020-01-10 RX ORDER — PROMETHAZINE HYDROCHLORIDE 25 MG/ML
12.5 INJECTION, SOLUTION INTRAMUSCULAR; INTRAVENOUS ONCE AS NEEDED
Status: DISCONTINUED | OUTPATIENT
Start: 2020-01-10 | End: 2020-01-10 | Stop reason: HOSPADM

## 2020-01-10 RX ADMIN — NEOSTIGMINE METHYLSULFATE 2.5 MG: 1 INJECTION INTRAMUSCULAR; INTRAVENOUS; SUBCUTANEOUS at 16:30

## 2020-01-10 RX ADMIN — FENTANYL CITRATE 50 MCG: 50 INJECTION INTRAMUSCULAR; INTRAVENOUS at 14:33

## 2020-01-10 RX ADMIN — FENTANYL CITRATE 50 MCG: 50 INJECTION, SOLUTION INTRAMUSCULAR; INTRAVENOUS at 12:13

## 2020-01-10 RX ADMIN — PROPOFOL 100 MG: 10 INJECTION, EMULSION INTRAVENOUS at 14:24

## 2020-01-10 RX ADMIN — HYDROMORPHONE HYDROCHLORIDE 0.25 MG: 1 INJECTION, SOLUTION INTRAMUSCULAR; INTRAVENOUS; SUBCUTANEOUS at 17:59

## 2020-01-10 RX ADMIN — MIDAZOLAM 1 MG: 1 INJECTION INTRAMUSCULAR; INTRAVENOUS at 12:13

## 2020-01-10 RX ADMIN — CALCIUM 500 MG: 500 TABLET ORAL at 20:31

## 2020-01-10 RX ADMIN — HYDROMORPHONE HYDROCHLORIDE 0.25 MG: 1 INJECTION, SOLUTION INTRAMUSCULAR; INTRAVENOUS; SUBCUTANEOUS at 17:35

## 2020-01-10 RX ADMIN — LIDOCAINE HYDROCHLORIDE 60 MG: 20 INJECTION, SOLUTION INFILTRATION; PERINEURAL at 14:24

## 2020-01-10 RX ADMIN — SODIUM CHLORIDE, POTASSIUM CHLORIDE, SODIUM LACTATE AND CALCIUM CHLORIDE 9 ML/HR: 600; 310; 30; 20 INJECTION, SOLUTION INTRAVENOUS at 10:38

## 2020-01-10 RX ADMIN — ROCURONIUM BROMIDE 25 MG: 10 INJECTION INTRAVENOUS at 14:33

## 2020-01-10 RX ADMIN — GABAPENTIN 100 MG: 100 CAPSULE ORAL at 20:32

## 2020-01-10 RX ADMIN — FAMOTIDINE 20 MG: 10 INJECTION INTRAVENOUS at 10:42

## 2020-01-10 RX ADMIN — SUCCINYLCHOLINE CHLORIDE 80 MG: 20 INJECTION, SOLUTION INTRAMUSCULAR; INTRAVENOUS; PARENTERAL at 14:24

## 2020-01-10 RX ADMIN — HYDROCODONE BITARTRATE AND ACETAMINOPHEN 1 TABLET: 5; 325 TABLET ORAL at 21:34

## 2020-01-10 RX ADMIN — DEXAMETHASONE SODIUM PHOSPHATE 4 MG: 10 INJECTION INTRAMUSCULAR; INTRAVENOUS at 15:11

## 2020-01-10 RX ADMIN — ROCURONIUM BROMIDE 5 MG: 10 INJECTION INTRAVENOUS at 14:24

## 2020-01-10 RX ADMIN — SODIUM CHLORIDE 500 MG: 900 INJECTION, SOLUTION INTRAVENOUS at 12:22

## 2020-01-10 RX ADMIN — ROCURONIUM BROMIDE 10 MG: 10 INJECTION INTRAVENOUS at 15:14

## 2020-01-10 RX ADMIN — DOCUSATE SODIUM 100 MG: 100 CAPSULE ORAL at 20:32

## 2020-01-10 RX ADMIN — SODIUM CHLORIDE, PRESERVATIVE FREE 10 ML: 5 INJECTION INTRAVENOUS at 20:32

## 2020-01-10 RX ADMIN — FENTANYL CITRATE 50 MCG: 50 INJECTION INTRAMUSCULAR; INTRAVENOUS at 14:22

## 2020-01-10 RX ADMIN — GLYCOPYRROLATE 0.5 MG: 0.2 INJECTION INTRAMUSCULAR; INTRAVENOUS at 16:30

## 2020-01-10 RX ADMIN — HYDROMORPHONE HYDROCHLORIDE 0.25 MG: 1 INJECTION, SOLUTION INTRAMUSCULAR; INTRAVENOUS; SUBCUTANEOUS at 17:42

## 2020-01-10 RX ADMIN — ONDANSETRON HYDROCHLORIDE 4 MG: 2 SOLUTION INTRAMUSCULAR; INTRAVENOUS at 16:20

## 2020-01-10 RX ADMIN — PROPOFOL 80 MG: 10 INJECTION, EMULSION INTRAVENOUS at 14:31

## 2020-01-10 RX ADMIN — HYDROMORPHONE HYDROCHLORIDE 0.25 MG: 1 INJECTION, SOLUTION INTRAMUSCULAR; INTRAVENOUS; SUBCUTANEOUS at 17:53

## 2020-01-10 RX ADMIN — ATORVASTATIN CALCIUM 20 MG: 20 TABLET, FILM COATED ORAL at 20:31

## 2020-01-10 RX ADMIN — GLYCOPYRROLATE 0.2 MG: 0.2 INJECTION INTRAMUSCULAR; INTRAVENOUS at 14:22

## 2020-01-10 NOTE — BRIEF OP NOTE
LUMBAR PERITONEAL SHUNT PLACEMENT  Progress Note    Noemi Bartholomew  1/10/2020    Pre-op Diagnosis:   Postoperative CSF leak [G97.82]       Post-Op Diagnosis Codes:     * Postoperative CSF leak [G97.82]    Procedure/CPT® Codes:      Procedure(s):  LUMBAR PERITONEAL SHUNT PLACEMENT    Surgeon(s):  Antonio Noe MD    Anesthesia: General    Staff:   Circulator: Harriet Mcknight RN; Spurling, Shannon, RN  Radiology Technologist: Sharron Vanegas, MIKE  Scrub Person: Pasha Mcnally Kimberly  Assistant: Diya Ortega CSA    Estimated Blood Loss: 25 cc    Urine Voided: none    Specimens:              none          Drains:   [REMOVED] Closed/Suction Drain Midline Neck Bulb 10 Fr. (Removed)   Site Description Healing;Reddened 12/9/2019  8:40 PM   Dressing Status Clean;Dry;Intact;Removed 12/9/2019  8:40 PM   Drainage Appearance None 12/9/2019  8:40 PM   Status To bulb suction 12/8/2019 10:41 PM   Output (mL) 0 mL 12/8/2019  8:40 AM       [REMOVED] Urethral Catheter Silicone 12 Fr. (Removed)   Daily Indications Required activity restriction from trauma, surgery, (e.g. unstable spine, fracture, hemodynamics) 12/9/2019  8:40 PM   Site Assessment Clean 12/9/2019  5:27 PM   Collection Container Standard drainage bag 12/9/2019  8:40 PM   Securement Method Securing device 12/9/2019  8:40 PM   Catheter care complete Yes 12/9/2019  8:00 PM   Output (mL) 650 mL 12/10/2019  6:24 AM       [REMOVED] Urethral Catheter Double-lumen;Straight-tip;Silicone 16 Fr. (Removed)   Daily Indications Required activity restriction from trauma, surgery, (e.g. unstable spine, fracture, hemodynamics) 12/25/2019  8:06 AM   Site Assessment Clean;Skin intact 12/25/2019  8:06 AM   Collection Container Standard drainage bag 12/25/2019  8:06 AM   Securement Method Securing device 12/25/2019  8:06 AM   Catheter care complete Yes 12/24/2019  8:11 PM   Irrigation/Instillation Indication patency maintenance 12/25/2019  8:06 AM    Output (mL) 250 mL 12/25/2019  5:03 AM       [REMOVED] Lumbar Drain (Removed)   Drain Status Clamped 12/25/2019  8:06 AM   Drain Level (cm) 5 cm 12/25/2019  8:06 AM   CSF Color Clear 12/25/2019  8:06 AM   Site Description Unable to view 12/25/2019  8:06 AM   Dressing Status Clean;Dry;Intact 12/25/2019  8:06 AM   CSF Output (mL) 10 mL 12/23/2019 11:00 AM       Findings: persistent cervical csf leak    Complications: none      Antonio Noe MD     Date: 1/10/2020  Time: 4:27 PM

## 2020-01-10 NOTE — ANESTHESIA PROCEDURE NOTES
Airway  Urgency: elective    Date/Time: 1/10/2020 2:38 PM  End Time:1/10/2020 2:39 PM  Difficult airway    General Information and Staff    Patient location during procedure: OR  Anesthesiologist: Audrey Minor MD    Indications and Patient Condition  Indications for airway management: airway protection    Preoxygenated: yes  MILS maintained throughout  Mask difficulty assessment: 1 - vent by mask    Final Airway Details  Final airway type: endotracheal airway      Successful airway: ETT    Successful intubation technique: fiberoptic  Endotracheal tube insertion site: oral  ETT size (mm): 6.0  Cormack-Lehane Classification: grade IV - neither glottis nor epiglottis seen  Placement verified by: chest auscultation and capnometry   Measured from: lips  ETT/EBT  to lips (cm): 19  Number of attempts at approach: 3 or more

## 2020-01-10 NOTE — OP NOTE
Preoperative diagnosis: Persistent cervical CSF leak    Postoperative diagnosis: Same as above    Procedures performed: Placement of a lumboperitoneal shunt    Surgeon: Hasmukh    First Assistant: Diya Ortega (She greatly assisted in the exposure, visualization of neural structures, control of bleeding, retraction, and closure of the incision.)     Anesthesia: GET    EBL:  25 cc    Complications: none    Specimen sent: none    Drains: none    Findings: persistent CSF leak    Postoperative condition: good    Indications for the operation: Several weeks ago this patient had undergone anterior cervical corpectomy at C6 and C7 with a cage and plate for myelopathy.  She had ossification of the posterior longitudinal ligament and when the spinal canal was decompressed to the dura was stuck to the ligament and the dura was opened up.  There was no way to get a watertight closure so I sealed it with DuraGen and Tisseel.  She had an initial CSF leak with a cystic fluid collection in her anterior neck and about 2 weeks ago I placed a lumbar drain and drained the cyst in the neck she seemed to be doing pretty well.  Unfortunately the leak has recurred in the neck not nearly as bad as before but because of the recurrence of the leak I brought her back to place a lumboperitoneal shunt.    Informed consent: She and her sister understood the goal of surgery is diversion of the cerebrospinal fluid and into the peritoneum to lower CSF pressure to and to encourage healing and closure of the leak.  The risks include, but are not limited to, infection, hemorrhage requiring transfusion or reoperation, CSF leak requiring reoperation, incomplete relief of symptoms, potential need for additional surgeries in the future, stroke, paralysis, coma, and death.  She agreed to proceed.    Details of the operation:The patient was placed on the operating room table in the supine position.  After induction of endotracheal intubation, she  got 2 g of Kefzol as per the SCIP protocol.  We placed her in the lateral position with the right side up.  A pillow was placed between the knees and the knees were flexed.  All pressure points were padded.  An axillary roll was used.  The lumbar region and the right side of the abdomen were then prepped and draped in usual sterile fashion.  We did a surgical timeout.  I made a periumbilical incision on the right with a #10 skin knife.  Hemostasis was obtained with monopolar cautery. Dissection was taken all the way down to the anterior rectus sheath which was opened up with monopolar cautery and the self-retaining retractor was placed.  I identified the posterior rectus sheath and picked it up with 2 Mosquitoes and made a small incision with a #15 blade.  I identified the peritoneum and put 3-0 silk pursestring suture around the peritoneal opening and the rectus sheath.      I went to the lumbar side of the field and made an incision at about L3-L4 with a #10 skin knife.  Hemostasis was obtained with monopolar cautery.  Self-retaining retractors were used.  Using a 14-gauge Tuohy needle, I cannulated the lumbar subarachnoid space at about the L3-L4 level and got clear CSF.  I threaded a Spetzler lumboperitoneal shunt to a depth of about 20 cm and withdrew the needle from the subarachnoid space and got clear CSF coming out of the distal end of the shunt.  I then anchored the catheter to the lumbar fascia with a plastic anchor and secured it with 3-0 silk on a needle.               The distal end was then placed through a tunneler which had been placed from the abdominal incision and passed all the way to the lumbar region. I passed the catheter through and got clear CSF out the other side and placed it in the distal peritoneal space and closed with purse-string suture. The fascia on the abdominal side was closed with 2-0 Vicryl. The subcutaneous layer was closed with 2-0 Vicryl interrupted suture. The skin was  closed with a running 4-0 Vicryl subcuticular. We put a little bit of redundant coiling of the catheter in the lumbar region and then closed the lumbar subcutaneous space with 2-0 Vicryl and the skin with 4-0 Vicryl subcuticular. Steri-Strips and a clean, dry dressing were placed. We placed a large Ace wrap around her abdomen and low back to act like an abdominal binder. She is so small that an abdominal binder would not have been found that fit her. She was rolled over in the supine position, extubated and taken to the recovery room in satisfactory condition. Because of the cyst in the neck was so small comparatively speaking to the last time, I decided not to drain it and I thought that placement of the shunt itself would lead to resolution of the cystic collection.

## 2020-01-10 NOTE — SIGNIFICANT NOTE
01/10/20 1133   Rehab Time/Intention   Evaluation Not Performed patient unavailable for evaluation  (pt in OR.  Will follow up next service date for OT eval needs.)   Rehab Treatment   Discipline occupational therapist   Recommendation   OT - Next Appointment 01/11/20

## 2020-01-10 NOTE — DISCHARGE SUMMARY
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   Status post December 19, 2019-1. Placement of external lumbar drain; 2. Drainage of cervical CSF collection and reclosure of CSF leak.  Lumbar drain removed December 24, 2019 January 9, 2020-transfer to [facility] for anticipated placement of a lumbar peritoneal shunt by neurosurgery for management of reaccumulation of CSF leak beneath the cervical incision    History of Present Illness   The patient is a 59-year-old female with history of cervical myelopathy with gait ataxia, numbness and weakness.  On December 6, 2019 she underwent anterior cervical corpectomy C6, C7 with PEEK cage and anterior Zevo cervical plate from C5 to T1, repair of CSF leak.  Postoperative cervical neuritis for which he was treated with a course of steroids.  She was transferred to the rehab unit on December 13 for ongoing therapies.  Patient was medicated progress with both her upper extremity function and mobility.  She had development of a large anterior cervical fluid collection.  She was therefore readmitted to the hospital on December 19, 2019 .  That day, she went back to the OR for placement of an external lumbar drain as well as drainage of the cervical CSF collection and reclosure of the CSF leak by Dr. Noe.  After a period of bedrest, she resumed out of bed activities and the drain was clamped and then subsequently discontinued.  She has not had any  recollection of the anterior cervical fluid collection.  She has had some intermittent headache complaint but not position related.  She continues with weakness in her upper extremities greater than the lower extremities.  She does feel that the strength in the legs has improved further.  Also showing some improvement in her strength in her hands.  She still weak with elbow extensors and left wrist extension.  She is having bowel movements and is on a bowel program with suppository every other day.  She is voiding on her own.  Using Flexeril and Lidoderm patch for pain control as well as hydrocodone.  Her transfer orders indicate that she is to try to keep the head of the bed at 45 degrees as tolerated but otherwise is participating fully in all therapies.  Avoid any lifting overhead.  Avoid any lifting of 5 pounds or greater.  Okay to raise arms above head.  No bending below the waist. Cervical collar-soft-for comfort only.  Her functional impairments and comorbidities she is now admitted for acute inpatient rehabilitation.     She reports minimal dysphagia.  She feels that her strength is showing improvement.  Continues weak in the hands and arms.  Still with some weakness in the legs left more than right but improving.  She is minimal assist with the bed mobility.  Transfers standby assist.  Ambulated 220 feet contact-guard/standby assist.  Continues with significant weakness in her hands however.    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  Spinal  headache managed conservatively with improvement.    Dec 18 - she has had increase size of the fluid collection at the anterior neck incision.  She is to go for placement of a lumbar drain on December 19 with admission to the acute care wing in the hospital at that time.  Dec 19 - lumbar drain today  Dec 26 -drain removed yesterday.Patient has some headache today.  She has not had any reaccumulation of fluid at the anterior neck incision.    Dec 27 - no recurrence of anterior cervical fluid collection. Return to rehab unit.   January 6-anterior cervical fluid collection appears larger on examination over the past weekend.  Neurosurgery evaluating for possible LP shunt placement on Saturday.  January 8-Patient had increased headache yesterday evening with associated nausea.  Rated the headache 9/10.  More intense than she is had before.  She had the headache with nausea given around 4 AM but not as bad.  Hydrocodone helped.  Zofran did not help.  Nursing reviewed with the neurosurgical service and they recommended keeping the head of the bed elevated.  Plans for the patient to be admitted to the acute care wing in the hospital tomorrow January 9 in preparation for lumbar shunt.     Postoperative cervical neuritis-steroids-Medrol Dosepak taper completed Dec 15.      GI prophylaxis-Pepcid     DVT prophylaxis-SCDs     Osteoporosis-h/o Prolia     Bladder-voiding on own with low PVR recorded     Bowel-constipation-on stool softeners.  Added scheduled suppository every other day after lunch to take advantage of gastrocolic reflex.  December 31- Moving her bowels better.  Using a suppository at home would be difficult with her weakness in the hands.  Discussed assessing her bowel pattern without the scheduled suppository.  January 8-has some mild constipation but having small bowel movements without suppository.     Pain management-hydrocodone/Flexeril/gabapentin -Luis Alfredo report reviewed           Now admit to  resume comprehensive acute inpatient rehabilitation  .  This would be an interdisciplinary program with physical therapy 1.5 hour,  occupational therapy 1.5 hour,  5 days a week.  Rehabilitation nursing for carryover, monitoring of neurologic   status, bowel and bladder, and skin  Ongoing physician follow-up.  Weekly team conferences.  Goals are to achieve a level of supervision with  mobility and self-care and improved strength and coordination.   Rehabilitation prognosis fair.  Medical prognosis deferred to neurosurgery.  Estimated length of stay is approximately 10-14  days, but is only an estimation.   The patient's functional status and clinical status is unchanged from preadmission assessment and the patient continues appropriate for acute inpatient rehabilitation.  Goal is for home with outpatient   therapies.  Barrier to discharge: Impaired mobility/self-care- work on balance/gross and fine motor control/strengthening to overcome.      TEAM [facility] - DEC 17 - INCREASED PAIN YESTERDAY WITH TRANSFERS . SWELLING AT NECK INCISION.   160 FEET CTG MIN- TRANSFERS CTG-MIN.    STRENGTH 1# RIGHT AND 0# LEFT.   UBD MIN. LBD MAX.  BATH MIN ASSIST. USING BUILT UP HAND ON UTENSILS.   [facility] - TWO WEEKS.      TEAM [facility] - DEC 31- TRANSFERS CTG. 4 STAIRS CTG. GAIT 300 FEET CTG RW. TOILET TRANSFERS CTG. CONTINUED WEAK .  UBD MIN. LBD MOD ASSIST. GROOMING MIN ASSIST. BATH MIN ASSIST. SKIN INTACT. SOME SWELLING TO NECK INCISION. CONTINENT BOWEL AND BLADDER. PATIENT WOULD NOT BE ABLE TO DO SUPPOSITORY HERSELF FOR BOWEL PROGRAM  [facility] - FRIDAY ELIEL 10.     TEAM [facility] - JAN 7 - TRANSFERS Min-mod to sit, sup to supine SUP.  Gait 240 feet RW SUP. UBD SBA. LBD MIN ASSIST. BATH MIN ASSIST. EATING MOD INDEPENDENT. GROOMING SET UP.   [facility] - Thursday JAN 9 FROM REHAB STANDPOINT.     January 9, 2020-transfer to [facility] for anticipated placement of a lumbar peritoneal shunt by neurosurgery for management of  reaccumulation of CSF leak beneath the cervical incision    Physical Exam  MENTAL STATUS -  AWAKE / ALERT  HEENT- NCAT,   SCLERA NON-ICTERIC, CONJUNCTIVA PINK, OP MOIST, neck incision localized swelling.  No erythema or drainage.  LUNGS - CTA, NO WHEEZES, RALES OR RHONCHI  HEART- RRR, NO RUB, MURMUR, OR GALLOP  ABD - NORMOACTIVE BOWEL SOUNDS, SOFT, NT.  EXT - NO EDEMA OR CYANOSIS  NEURO -awake alert.  Oriented x4.  Able to oppose the thumb to the other 4 digits bilaterally.  MOTOR EXAM -bilateral upper extremity  better strength for elbow extension and finger flexion.  Still weak with hand intrinsics..     Takes resistance with bilateral knee extension and ankle dorsiflexion     Results from last 7 days   Lab Units 01/06/20  0509   WBC 10*3/mm3 4.71   HEMOGLOBIN g/dL 10.2*   HEMATOCRIT % 30.6*   PLATELETS 10*3/mm3 458*     Results from last 7 days   Lab Units 01/08/20  0900   SODIUM mmol/L 142   POTASSIUM mmol/L 4.4   CHLORIDE mmol/L 102   CO2 mmol/L 26.5   BUN mg/dL 11   CREATININE mg/dL 0.81   CALCIUM mg/dL 9.4   GLUCOSE mg/dL 106*     Inter-facility transfer medications (From admission, onward)             docusate sodium (COLACE) capsule 100 mg  2 Times Daily,   Status:  Canceled          ondansetron (ZOFRAN) tablet 4 mg  Every 6 Hours PRN,   Status:  Canceled          acetaminophen (TYLENOL) tablet 1,000 mg  Every 6 Hours PRN,   Status:  Canceled          atorvastatin (LIPITOR) tablet 20 mg  Nightly,   Status:  Canceled          calcium carbonate (oyster shell) tablet 500 mg  2 Times Daily,   Status:  Canceled          cholecalciferol (VITAMIN D3) tablet 2,000 Units  Daily,   Status:  Canceled          gabapentin (NEURONTIN) capsule 100 mg  2 Times Daily,   Status:  Canceled          magnesium hydroxide (MILK OF MAGNESIA) suspension 2400 mg/10mL 10 mL  Daily,   Status:  Canceled          melatonin tablet 3 mg  Nightly PRN,   Status:  Canceled          polyethylene glycol (MIRALAX) powder 17 g  Daily PRN,    Status:  Canceled          senna-docusate sodium (SENOKOT-S) 8.6-50 MG tablet 2 tablet  Nightly,   Status:  Canceled          HYDROcodone-acetaminophen (NORCO) 5-325 MG per tablet 1 tablet  Every 6 Hours PRN,   Status:  Canceled          bisacodyl (DULCOLAX) suppository 10 mg  Daily PRN,   Status:  Canceled          famotidine (PEPCID) tablet 20 mg  Every Morning Before Breakfast,   Status:  Canceled                       Would continue with physical therapy and outpatient therapy on the acute care wing in the hospital.  Plan is for eventual home health therapy at the time of discharge from the acute care wing in the hospital.    >30 on discharge

## 2020-01-10 NOTE — PAYOR COMM NOTE
"UR CONTACT P: [phone]  F: [phone]    INITIAL CLINICAL     REF #[id number]    **STILL IN SURGERY AT THIS TIME.  I WILL FAX MORE CLINICAL AS IT BECOMES AVAILABLE.          Noemi Bartholomew (59 y.o. Female)     Date of Birth Social Security Number Address Home Phone MRN    1960  [address] [address] [phone] [id number]    Jewish Marital Status          [facility] Single       Admission Date Admission Type Admitting Provider Attending Provider Department, Room/Bed    1/9/20 Urgent Timmy Mendoza MD Villanueva, Wayne, MD [facility] MAIN OR, EDWIGE Main OR/MAIN OR    Discharge Date Discharge Disposition Discharge Destination                       Attending Provider:  Antonio Noe MD    Allergies:  Penicillins, Topamax [Topiramate]    Isolation:  None   Infection:  None   Code Status:  CPR    Ht:  134.6 cm (53\")   Wt:  39.9 kg (88 lb)    Admission Cmt:  None   Principal Problem:  Postoperative CSF leak [G97.82] More...                 Active Insurance as of 1/9/2020     Primary Coverage     Payor Plan Insurance Group Employer/Plan Group    ANTHEM BLUE CROSS ANTHEM BLUE CROSS BLUE SHIELD PPO [id number]     Payor Plan Address Payor Plan Phone Number Payor Plan Fax Number Effective Dates    [address] [phone]  1/1/2019 - None Entered    [place] [address]       Subscriber Name Subscriber Birth Date Member ID       NOEMI BARTHOLOMEW 1960 [id number]                 Emergency Contacts      (Rel.) Home Phone Work Phone Mobile Phone    Donita Bartholomew (Sister) [phone] -- [phone]            History & Physical      Timmy Mendoza MD   Physician   Neurosurgery   H&P   Signed   Date of Service:  01/09/20 1324   Creation Time:  01/09/20 1324       Related encounter: Admission (Discharged) from 12/27/2019 in [facility] REHABILITATION with Shiva Abraham MD         Signed        Expand All Collapse All      Patient " Care Team:  Mary Lou Carroll MD as PCP - General (Internal Medicine)  Antonio Noe MD - Admitting - Neurosurgery        Chief complaint      Persistent CSF leak        Subjective         History of Present Illness      This is a very pleasant 59-year-old female initially evaluated by Dr. Noe in his office for complaints of difficulty with balance, hand function and strength.  Work-up revealed severe cord compression of the cervical spine mainly at C6-7.  This was related to a combination of cervical degenerative disease as well as ossification of the posterior longitudinal ligament.  This was most pronounced at C6-7.  There is also evidence of cord signal changes at C6-7.  Because of the progressing cervical myelopathy and concern for irreversible damage to the cervical cord, the patient was brought to the operating room for cervical corpectomy anteriorly at C6, C7 with peek cage and anterior cervical plate from C5-T1.  The surgery was performed by Dr. Noe on December 6, 2019.  Dr. Noe encountered a thickened ligament that was adherent to the dura.  In his attempt to separate it, a durotomy was created because the dura inadvertently pulled off while trying to remove bone.  Once the cord was adequately decompressed, Dr. Noe attempted to close the leak as best as he could with Tisseel glue and FloSeal.  A ALONDRA drain was placed and the patient was taken to the [facility] floor from PACU for postoperative management.   The patient did well but for the first couple of days after surgery she had significant amount of arm pain and weakness in her forearms and hands.  The symptoms have gotten better over time.  She covered quite well.  She was subsequently transferred to [facility] acute rehab for further therapies to improve her strength and endurance.  While in rehab she developed a fluid collection of cerebrospinal fluid beneath the cervical incision.  The patient was therefore taken  back to the operating room December 19, 2019 for drainage of the CSF fluid collection, reclosure of the CSF leak and placement of an external lumbar drain.  The patient was monitored postoperatively in the hospital.  She was healing quite well.  The lumbar drain was clamped after several days and the patient was mobilized.  There was no sign of any cerebrospinal fluid leak at the site of the incision and therefore the patient was transferred back to subacute rehab to finish out her therapies.  She had been doing quite well.  She did notice significant improvement in the arm pain and was regaining some strength in her arms and hands.  She had been able to feed herself and has done quite well in acute rehab.  We were notified by the rehab nurses guarding some evidence of fluid collecting beneath the incision again.  This is by far much smaller than it had been prior to her last surgery but the feeling was that an intervention would be necessary to prevent further collection beneath the cervical incision.  The patient has completed her acute rehab therefore she will be admitted to the hospital under the care of Dr. Noe.  She will undergo surgery tomorrow for drainage of the cervical cyst and placement of a permanent lumboperitoneal shunt.     Review of Systems   Musculoskeletal: Positive for gait problem and neck stiffness.   Skin: Positive for wound.   Neurological: Positive for weakness and numbness.   Psychiatric/Behavioral: The patient is nervous/anxious.    All other systems reviewed and are negative.              Medical History        Past Medical History:   Diagnosis Date   • Abnormal alkaline phosphatase test 06/12/2015     Resolved   • Acute bronchitis 06/12/2015     Resolved   • Allergic child age     pencillin   • Cervical spinal stenosis     • Diverticulosis     • H/O electromyography 10/13/2014   • H/O mammogram 07/29/2014   • Headache occassionally   • High cholesterol     • History of EKG  06/12/2015   • Hypercalcemia 06/12/2015     Resolved, Full w/u done, Likely secondary to Forteo   • Left arm pain     • Low back pain March 2017   • Numbness in both hands     • Osteoporosis     • Paresthesia 06/12/2015     Resolved         Surgical History         Past Surgical History:   Procedure Laterality Date   • ANTERIOR CHANNEL VERTEBRECTOMY/CORPECTOMY N/A 12/6/2019     Procedure: CERVICAL 6, CERVICAL 7 ANTERIOR CERVICAL CORPECTOMY WITH CAGE AND PLATE;  Surgeon: Antonio Noe MD;  Location: [facility];  Service: Neurosurgery   • BACK SURGERY   1999   • COLONOSCOPY   05/20/2011   • INCISION AND DRAINAGE OF WOUND N/A 12/19/2019     Procedure: followed by drainage of anterior cervical fluid collection;  Surgeon: Antonio Noe MD;  Location: [facility] OR;  Service: Neurosurgery   • LUMBAR DRAIN INSERTION EXTERNAL N/A 12/19/2019     Procedure: LUMBAR DRAIN INSERTION EXTERNAL;  Surgeon: Antonio Noe MD;  Location: [facility];  Service: Neurosurgery   • SPINE SURGERY   1999 back     dr abad quintero   • TONSILLECTOMY   child age   • TYMPANOPLASTY                   Family History   Problem Relation Age of Onset   • Breast cancer Mother     • Stroke Mother           Cerebrovascular Accident   • Colon cancer Mother     • Diabetes Father           Borderline Diabetes Mellitus   • Heart disease Father           Cardiac Disorder   • Heart failure Father     • Anxiety disorder Sister     • Nephrolithiasis Sister           Kidney Stones   • Malig Hyperthermia Neg Hx        Social History           Tobacco Use   • Smoking status: Never Smoker   • Smokeless tobacco: Never Used   Substance Use Topics   • Alcohol use: No   • Drug use: No      Prescriptions Prior to Admission           Medications Prior to Admission   Medication Sig Dispense Refill Last Dose   • acetaminophen (TYLENOL) 500 MG tablet Take 1,000 mg by mouth Every 6 (Six) Hours As Needed for Mild Pain .         • atorvastatin  (LIPITOR) 20 MG tablet Take 20 mg by mouth Every Night.         • Calcium Carb-Cholecalciferol (CALCIUM 1000 + D PO) Take  by mouth Daily. 2 caps         • cholecalciferol (VITAMIN D3) 10 MCG (400 UNIT) tablet Take 2,000 Units by mouth Daily.         • gabapentin (NEURONTIN) 100 MG capsule Take 100 mg by mouth 2 (Two) Times a Day.               Allergies:  Penicillins and Topamax [topiramate]        Objective          Vital Signs  Temp:  [96.7 °F (35.9 °C)-97.9 °F (36.6 °C)] 97.7 °F (36.5 °C)  Heart Rate:  [] 107  Resp:  [18] 18  BP: (102-118)/(67-73) 109/70     Physical Exam   Constitutional: She is oriented to person, place, and time. She appears well-developed and well-nourished. She is cooperative. No distress.   HENT:   Head: Normocephalic and atraumatic.   Eyes: Conjunctivae are normal. Right eye exhibits no discharge. Left eye exhibits no discharge.   Neck: Normal range of motion. Neck supple. No tracheal deviation present.   Cardiovascular: Normal rate and intact distal pulses.   Pulmonary/Chest: Effort normal. No respiratory distress.   Abdominal: Soft. She exhibits no distension. There is no tenderness.   Musculoskeletal: Normal range of motion. She exhibits tenderness (mild tenderness to palpation of cervical incision and surrounding tissue. ). She exhibits no edema.   Neurological: She is alert and oriented to person, place, and time. A sensory deficit (continued numbness in fingers of both hands) is present. She exhibits normal muscle tone. GCS eye subscore is 4. GCS verbal subscore is 5. GCS motor subscore is 6.   No motor or sensory deficits. Able to feed self, cut her own food with modifications. Negative Rivers's; negative clonus.    Skin: Skin is warm and dry. She is not diaphoretic. No erythema.   Psychiatric: She has a normal mood and affect. Thought content normal.   Vitals reviewed.        Results Review:              I reviewed the patient's new clinical results.  I reviewed the  patient's new imaging results and agree with the interpretation.  Discussed with Dr. Noe.            MRI OF THE CERVICAL SPINE WITH AND WITHOUT CONTRAST     Previous corpectomy procedure noted C6 and C7.  Graft material intact.  Large fluid collection extends from C4-5 down to the inferior body of T2.  The collection measures up to 6.1 cm.  There is some evidence of tracheal deviation anteriorly.  It also abuts the medial margin of the right common carotid.  So a more localized area of signal abnormality along the cord.  This had also been visualized on a prior study and probably seems more consistent with a area of myelomalacia.  In comparison to a previous cervical MRI dated 12/17/2019, there has been no change.                  Assessment/Plan           Cervical myelopathy (CMS/[facility])        Assessment & Plan      6 weeks status post anterior cervical corpectomy C6, C7 with cage and plate placement from C5-T1 for cervical myelopathy, cervical stenosis C5-T1 and ossification of posterior longitudinal ligament.  CSF leak repair was also performed.     3 weeks status post drainage of cervical CSF collection, reclosure of CSF leak and placement of external lumbar drain     Reaccumulation of CSF leak        The patient will be discharged from acute rehab today and admitted to the [facility] hospital under the care of Dr. Noe for surgery tomorrow as described above.     I discussed the patients findings and my recommendations with patient     Audrey REECE Funez, APRN  01/09/20  1:24 PM                          Lissett Castro CSW      Case Management   Progress Notes   Signed   Date of Service:  01/10/20 1222   Creation Time:  01/10/20 1222            Signed             Discharge Planning Assessment  [facility]     Patient Name: Noemi Bartholomew                     MRN: [id number]  Today's Date: 1/10/2020                     Admit Date: 1/9/2020                 Discharge Plan      Row Name 01/10/20 1204              Plan     Plan  Plan is home with assistance from her sister and [facility].      Plan Comments  Met with the patient and her sister Donita Bartholomew [phone] at bedside.  The patient's sister remained present with the patient's permission.  Explained role of CCP, verified facesheet and discussed discharge planning needs.  The patient completed rehab at [facility] acute rehab and plans to d/c home with [facility].  Dior with [facility] confirmed that they are continuing to follow the patient for HH orders.  The patient resides at home with her sister who can assist her at home upon d/c. The patient was provided with a rolling walker and shower chair upon her d/c from [facility] acute rehab.  The single story home has 2 steps with no handrails to enter from the garage and 2 steps to enter the home from the front.  The patient's PCP is Mary Lou Carroll, pharmacy is [facility] and the patient is agreeable to using [facility].  The patient denies any HH/SNF history, her sister was provided with a HH/SNF list, CCP telephone contact number and HH/nursing home compare list from Medicare.gov.  The patient's sister is listed as her Healthcare Surrogate on her living will in [facility].  The patient's sister will transport the patient home upon d/c and the patient's sister transports the patient to her doctor appointments.  The patient currently denies any d/c needs and CCP will follow for HH orders to assist with the patient's d/c home with [facility].  HOWIE Cifuentes

## 2020-01-10 NOTE — ANESTHESIA PREPROCEDURE EVALUATION
Anesthesia Evaluation     NPO Solid Status: > 8 hours             Airway   Mallampati: II  Dental          Pulmonary - normal exam   (-) not a smoker  Cardiovascular - normal exam    (+) hyperlipidemia,       Neuro/Psych  GI/Hepatic/Renal/Endo      Musculoskeletal     Abdominal    Substance History      OB/GYN          Other                        Anesthesia Plan    ASA 2     general       Anesthetic plan, all risks, benefits, and alternatives have been provided, discussed and informed consent has been obtained with: patient.

## 2020-01-10 NOTE — PROGRESS NOTES
Discharge Planning Assessment  [facility]     Patient Name: Noemi Bartholomew  MRN: [id number]  Today's Date: 1/10/2020    Admit Date: 1/9/2020    Discharge Needs Assessment     Row Name 01/10/20 1211       Living Environment    Lives With  sibling(s)    Name(s) of Who Lives With Patient  sister Donita Bartholomew [phone]     Current Living Arrangements  home/apartment/condo    Primary Care Provided by  self;other (see comments) Sibling- sister Donita Bartholomew [phone]     Provides Primary Care For  no one, unable/limited ability to care for self    Family Caregiver if Needed  sibling(s)    Family Caregiver Names  sister Donita Bartholomew [phone]       Quality of Family Relationships  helpful;involved;supportive    Able to Return to Prior Arrangements  yes       Resource/Environmental Concerns    Resource/Environmental Concerns  home accessibility    Home Accessibility Concerns  stairs to enter home The single story home has 2 steps with no handrails to enter from the garage and 2 steps to enter the home from the front.        Transition Planning    Patient/Family Anticipates Transition to  home with family;home with help/services    Patient/Family Anticipated Services at Transition  home health care    Transportation Anticipated  family or friend will provide       Discharge Needs Assessment    Concerns to be Addressed  discharge planning    Equipment Currently Used at Home  walker, rolling    Anticipated Changes Related to Illness  none    Equipment Needed After Discharge  none    Outpatient/Agency/Support Group Needs  homecare agency    Discharge Facility/Level of Care Needs  home with home health    Provided post acute provider list?  Yes    Post Acute Provider Lists  Home Health;Nursing Home    Post Acuite Provider List  Delivered    Delivered To  Patient    Method of Delivery  In person    Patient's Choice of Community Agency(s)  The patient was provided with a HH/SNF list, CCP telephone contact number and  HH/nursing home compare list from Medicare.gov and confirmed that she wanted to use [facility] for her HH needs upon d/c.      Current Discharge Risk  physical impairment        Discharge Plan     Row Name 01/10/20 1204       Plan    Plan  Plan is home with assistance from her sister and [facility].     Plan Comments  Met with the patient and her sister Donita Bartholomew [phone] at bedside.  The patient's sister remained present with the patient's permission.  Explained role of CCP, verified facesheet and discussed discharge planning needs.  The patient completed rehab at [facility] acute rehab and plans to d/c home with [facility].  Dior with [facility] confirmed that they are continuing to follow the patient for HH orders.  The patient resides at home with her sister who can assist her at home upon d/c. The patient was provided with a rolling walker and shower chair upon her d/c from [facility] acute rehab.  The single story home has 2 steps with no handrails to enter from the garage and 2 steps to enter the home from the front.  The patient's PCP is Mary Lou Carroll, pharmacy is [facility] and the patient is agreeable to using [facility].  The patient denies any HH/SNF history, her sister was provided with a HH/SNF list, CCP telephone contact number and HH/nursing home compare list from Medicare.gov.  The patient's sister is listed as her Healthcare Surrogate on her living will in [facility].  The patient's sister will transport the patient home upon d/c and the patient's sister transports the patient to her doctor appointments.  The patient currently denies any d/c needs and CCP will follow for HH orders to assist with the patient's d/c home with [facility].  HOWIE Cifuentes        Destination      Coordination has not been started for this encounter.      Durable Medical Equipment      Coordination has not been started for this encounter.      Dialysis/Infusion      Coordination has not been started for this  encounter.      Home Medical Care      Service Provider Request Status Selected Services Address Phone Number Fax Number    [facility] Pending - Request Sent N/A [address] [place] [id number] [phone]      Therapy      Coordination has not been started for this encounter.      Community Resources      Coordination has not been started for this encounter.          Demographic Summary     Row Name 01/10/20 1206       General Information    Admission Type  inpatient    Arrived From  short stay unit    Referral Source  admission list    Reason for Consult  discharge planning    Preferred Language  English     Used During This Interaction  no        Functional Status     Row Name 01/10/20 1211       Functional Status    Usual Activity Tolerance  moderate    Current Activity Tolerance  fair       Functional Status, IADL    Medications  assistive equipment    Meal Preparation  assistive equipment    Housekeeping  assistive equipment    Laundry  assistive equipment    Shopping  assistive equipment       Mental Status    General Appearance WDL  WDL       Mental Status Summary    Recent Changes in Mental Status/Cognitive Functioning  no changes        Psychosocial    No documentation.       Abuse/Neglect    No documentation.       Legal    No documentation.       Substance Abuse    No documentation.       Patient Forms     Row Name 01/10/20 1206       Patient Forms    Provider Choice List  Delivered    Delivered to  Patient    Method of delivery  In person            HOWIE Escoto

## 2020-01-10 NOTE — DISCHARGE PLACEMENT REQUEST
"Noemi Pugh (59 y.o. Female)     Date of Birth Social Security Number Address Home Phone MRN    1960  [id number] [address] [phone] [id number]    Anglican Marital Status          [facility] Single       Admission Date Admission Type Admitting Provider Attending Provider Department, Room/Bed    1/9/20 Urgent Timmy Mendoza MD Villanueva, Wayne, MD [facility] MAIN OR, EDWIG[facility] Main OR/MAIN OR    Discharge Date Discharge Disposition Discharge Destination                       Attending Provider:  Antonio Noe MD    Allergies:  Penicillins, Topamax [Topiramate]    Isolation:  None   Infection:  None   Code Status:  CPR    Ht:  134.6 cm (53\")   Wt:  39.9 kg (88 lb)    Admission Cmt:  None   Principal Problem:  Postoperative CSF leak [G97.82] More...                 Active Insurance as of 1/9/2020     Primary Coverage     Payor Plan Insurance Group Employer/Plan Group    [facility] [id number]     Payor Plan Address Payor Plan Phone Number Payor Plan Fax Number Effective Dates    [address] [phone]  1/1/2019 - None Entered    [facility] [id number]       Subscriber Name Subscriber Birth Date Member ID       NOEMI PUGH 1960 [id number]                 Emergency Contacts      (Rel.) Home Phone Work Phone Mobile Phone    Donita Pugh (Sister) [phone] -- [phone]              "

## 2020-01-11 LAB
BILIRUB UR QL STRIP: NEGATIVE
CLARITY UR: CLEAR
COLOR UR: YELLOW
GLUCOSE UR STRIP-MCNC: NEGATIVE MG/DL
HGB UR QL STRIP.AUTO: NEGATIVE
KETONES UR QL STRIP: ABNORMAL
LEUKOCYTE ESTERASE UR QL STRIP.AUTO: NEGATIVE
NITRITE UR QL STRIP: NEGATIVE
PH UR STRIP.AUTO: 7 [PH] (ref 5–8)
PROT UR QL STRIP: NEGATIVE
SP GR UR STRIP: 1.02 (ref 1–1.03)
UROBILINOGEN UR QL STRIP: ABNORMAL

## 2020-01-11 PROCEDURE — 81003 URINALYSIS AUTO W/O SCOPE: CPT | Performed by: NEUROLOGICAL SURGERY

## 2020-01-11 PROCEDURE — 99024 POSTOP FOLLOW-UP VISIT: CPT | Performed by: NURSE PRACTITIONER

## 2020-01-11 PROCEDURE — 97162 PT EVAL MOD COMPLEX 30 MIN: CPT

## 2020-01-11 RX ADMIN — SODIUM CHLORIDE, PRESERVATIVE FREE 10 ML: 5 INJECTION INTRAVENOUS at 20:57

## 2020-01-11 RX ADMIN — DOCUSATE SODIUM 100 MG: 100 CAPSULE ORAL at 08:16

## 2020-01-11 RX ADMIN — VITAMIN D, TAB 1000IU (100/BT) 2000 UNITS: 25 TAB at 08:16

## 2020-01-11 RX ADMIN — HYDROCODONE BITARTRATE AND ACETAMINOPHEN 1 TABLET: 5; 325 TABLET ORAL at 01:25

## 2020-01-11 RX ADMIN — DOCUSATE SODIUM 100 MG: 100 CAPSULE ORAL at 20:55

## 2020-01-11 RX ADMIN — GABAPENTIN 100 MG: 100 CAPSULE ORAL at 08:16

## 2020-01-11 RX ADMIN — ONDANSETRON HYDROCHLORIDE 4 MG: 4 TABLET, FILM COATED ORAL at 00:42

## 2020-01-11 RX ADMIN — HYDROCODONE BITARTRATE AND ACETAMINOPHEN 1 TABLET: 5; 325 TABLET ORAL at 06:15

## 2020-01-11 RX ADMIN — ATORVASTATIN CALCIUM 20 MG: 20 TABLET, FILM COATED ORAL at 20:56

## 2020-01-11 RX ADMIN — POLYETHYLENE GLYCOL 3350 17 G: 17 POWDER, FOR SOLUTION ORAL at 20:55

## 2020-01-11 RX ADMIN — SENNOSIDES AND DOCUSATE SODIUM 1 TABLET: 8.6; 5 TABLET ORAL at 20:55

## 2020-01-11 RX ADMIN — FAMOTIDINE 20 MG: 20 TABLET, FILM COATED ORAL at 06:16

## 2020-01-11 RX ADMIN — GABAPENTIN 100 MG: 100 CAPSULE ORAL at 20:55

## 2020-01-11 RX ADMIN — HYDROCODONE BITARTRATE AND ACETAMINOPHEN 1 TABLET: 5; 325 TABLET ORAL at 20:56

## 2020-01-11 RX ADMIN — CALCIUM 500 MG: 500 TABLET ORAL at 20:56

## 2020-01-11 RX ADMIN — CALCIUM 500 MG: 500 TABLET ORAL at 08:16

## 2020-01-11 RX ADMIN — HYDROCODONE BITARTRATE AND ACETAMINOPHEN 1 TABLET: 5; 325 TABLET ORAL at 14:06

## 2020-01-11 NOTE — PROGRESS NOTES
"NEUROSURGERY POST OP VISIT      Sitting up in chair. Having expected pain at the abdominal and lumbar surgical sites.      Blood pressure 106/64, pulse 74, temperature 97.6 °F (36.4 °C), temperature source Oral, resp. rate 18, height 134.6 cm (52.99\"), weight 39.9 kg (88 lb), SpO2 96 %, not currently breastfeeding.        AA&O x 3  R lateral abdominal and right lumbar incisions are well approximated.  Steri-Strips intact.  Ace wrap intact over the incisions  No calf swelling or tenderness to palpation          POD 1 Placement of a lumboperitoneal shunt for persistent cervical CSF leak  POD 29 Placement of external lumbar drain, drainage of cervical CSF collection and reclosure of CSF leak  POD 36 Anterior cervical corpectomy C6, C7 with cage and plate from C5 to T1 for cervical stenosis, ossification of posterior longitudinal ligament (OPLL), progressive cervical myelopathy    Continue with pain control  Mobilize  If doing better with the postoperative pain and mobilizing well, DC home tomorrow.        "

## 2020-01-11 NOTE — PLAN OF CARE
Problem: Patient Care Overview  Goal: Plan of Care Review  Outcome: Ongoing (interventions implemented as appropriate)  Flowsheets (Taken 1/11/2020 1641)  Progress: improving  Plan of Care Reviewed With: patient; sibling  Outcome Summary: MIR CRUZx4, 58 yo F had a lumbar peritoneal shunt placed yesterday for a persistent CSF leak after an ACDF(C6-C7) in early December. Scant bloody drainage on anterior and posterior dressings. Norco for pain is working well. Some nausea last night, zofran helped. Bilateral weakness/numbess in her hands. Assist x1 with walker. Sister at bedside, will cont to monitor.

## 2020-01-11 NOTE — THERAPY EVALUATION
Patient Name: Noemi Bartholomew  : 1960    MRN: [id number]                              Today's Date: 2020       Admit Date: 2020    Visit Dx: No diagnosis found.  Patient Active Problem List   Diagnosis   • Carpal tunnel syndrome   • Hyperlipidemia   • Osteoporosis   • DDD (degenerative disc disease), lumbar   • Chronic left lumbar radiculopathy   • Congenital spondylolysis, lumbosacral region   • Neuropathic pain, leg, left   • Ossification of spinal ligament   • Spinal stenosis in cervical region   • Cervical spondylosis with myelopathy   • Cervical stenosis of spine   • Postoperative anemia due to acute blood loss   • Steroid-induced hyperglycemia   • Cord compression (CMS/HCC)   • Leukocytosis (due to steroids)   • Postoperative CSF leak   • Cervical myelopathy (CMS/HCC)   • CSF leak   • Postoperative cerebrospinal fluid leak     Past Medical History:   Diagnosis Date   • Abnormal alkaline phosphatase test 2015    Resolved   • Acute bronchitis 2015    Resolved   • Allergic child age    pencillin   • Cervical spinal stenosis    • Diverticulosis    • H/O electromyography 10/13/2014   • H/O mammogram 2014   • Headache occassionally   • High cholesterol    • History of EKG 2015   • Hypercalcemia 2015    Resolved, Full w/u done, Likely secondary to Forteo   • Left arm pain    • Low back pain 2017   • Numbness in both hands    • Osteoporosis    • Paresthesia 2015    Resolved     Past Surgical History:   Procedure Laterality Date   • ANTERIOR CHANNEL VERTEBRECTOMY/CORPECTOMY N/A 2019    Procedure: CERVICAL 6, CERVICAL 7 ANTERIOR CERVICAL CORPECTOMY WITH CAGE AND PLATE;  Surgeon: Antonio Noe MD;  Location: [facility];  Service: Neurosurgery   • BACK SURGERY     • COLONOSCOPY  2011   • INCISION AND DRAINAGE OF WOUND N/A 2019    Procedure: followed by drainage of anterior cervical fluid collection;  Surgeon: Antonio Noe MD;   Location: [facility] MAIN OR;  Service: Neurosurgery   • LUMBAR DRAIN INSERTION EXTERNAL N/A 12/19/2019    Procedure: LUMBAR DRAIN INSERTION EXTERNAL;  Surgeon: Antonio Noe MD;  Location: [facility] MAIN OR;  Service: Neurosurgery   • SPINE SURGERY  1999 back    dr abad quintero   • TONSILLECTOMY  child age   • TYMPANOPLASTY       General Information     Row Name 01/11/20 1042 01/11/20 1029       PT Evaluation Time/Intention    Document Type  re-evaluation  -  discharge evaluation/summary  -    Mode of Treatment  --  physical therapy  -    Row Name 01/11/20 1029          General Information    Patient Profile Reviewed?  yes  -     Prior Level of Function  -- inep with stair and RWx in rehab  -     Existing Precautions/Restrictions  fall;lifting;spinal  -     Row Name 01/11/20 1029          Relationship/Environment    Lives With  sibling(s)  -     Row Name 01/11/20 1029          Resource/Environmental Concerns    Current Living Arrangements  home/apartment/condo  -     Row Name 01/11/20 1029          Home Main Entrance    Number of Stairs, Main Entrance  three  -     Stair Railings, Main Entrance  railings on both sides of stairs  -     Row Name 01/11/20 1029          Stairs Within Home, Primary    Number of Stairs, Within Home, Primary  none  -     Row Name 01/11/20 1029          Cognitive Assessment/Intervention- PT/OT    Orientation Status (Cognition)  oriented x 4  -     Cognitive Assessment/Intervention Comment  Pt agreeable to PT. sister present for session  -       User Key  (r) = Recorded By, (t) = Taken By, (c) = Cosigned By    Initials Name Provider Type     Caity Puente, PT Physical Therapist        Mobility     Row Name 01/11/20 1031          Bed Mobility Assessment/Treatment    Comment (Bed Mobility)  up in chair  -     Row Name 01/11/20 1031          Sit-Stand Transfer    Sit-Stand [place] (Transfers)  conditional independence  -     Assistive Device (Sit-Stand  Transfers)  walker, front-wheeled  -LC     Row Name 01/11/20 1031          Gait/Stairs Assessment/Training    Gait/Stairs Assessment/Training  gait/ambulation independence;gait/ambulation assistive device  -LC     [place] Level (Gait)  conditional independence  -     Assistive Device (Gait)  walker, front-wheeled  -     Distance in Feet (Gait)  500'  -LC     Pattern (Gait)  step-through  -LC     Deviations/Abnormal Patterns (Gait)  ally decreased  -LC     Bilateral Gait Deviations  heel strike decreased  -LC     Comment (Gait/Stairs)  did not perform stairs per pt request but she states that she was doing them in rehab indeply and feels good about it  -LC       User Key  (r) = Recorded By, (t) = Taken By, (c) = Cosigned By    Initials Name Provider Type    Caity Chen, PT Physical Therapist        Obj/Interventions     Row Name 01/11/20 1032          General ROM    GENERAL ROM COMMENTS  WFL w/in precautions  -LC     Row Name 01/11/20 1032          MMT (Manual Muscle Testing)    General MMT Comments  WLE WFL  -LC     Row Name 01/11/20 1032          Static Sitting Balance    Level of [place] (Unsupported Sitting, Static Balance)  independent  -LC     Sitting Position (Unsupported Sitting, Static Balance)  sitting in chair  -LC     Row Name 01/11/20 1032          Dynamic Sitting Balance    Level of [place], Reaches Outside Midline (Sitting, Dynamic Balance)  independent  -LC     Sitting Position, Reaches Outside Midline (Sitting, Dynamic Balance)  sitting in chair  -LC     Row Name 01/11/20 1032          Static Standing Balance    Level of [place] (Supported Standing, Static Balance)  independent  -     Row Name 01/11/20 1032          Dynamic Standing Balance    Level of [place], Reaches Outside Midline (Standing, Dynamic Balance)  conditional independence  -     Assistive Device Utilized (Supported Standing, Dynamic Balance)  walker, rolling  -LC     Row Name 01/11/20 1032           Sensory Assessment/Intervention    Sensory General Assessment  -- states BUE's are still numb and the bottoms of both feet arm numb but that is normal for her  -       User Key  (r) = Recorded By, (t) = Taken By, (c) = Cosigned By    Initials Name Provider Type    Caity Chen, PT Physical Therapist        Goals/Plan     Row Name 01/11/20 1043          Stairs Goal 1 (PT)    Activity/Assistive Device (Stairs Goal 1, PT)  stairs, all skills;step-over step;using handrail, right;using handrail, left  -     [place] Level/Cues Needed (Stairs Goal 1, PT)  conditional independence  -     Number of Stairs (Stairs Goal 1, PT)  3  -     Time Frame (Stairs Goal 1, PT)  by discharge  -       User Key  (r) = Recorded By, (t) = Taken By, (c) = Cosigned By    Initials Name Provider Type    Caity Chen, PT Physical Therapist        Clinical Impression     Row Name 01/11/20 1033          Pain Scale: Numbers Pre/Post-Treatment    Pain Scale: Numbers, Pretreatment  0/10 - no pain  -     Pain Scale: Numbers, Post-Treatment  0/10 - no pain  -     Row Name 01/11/20 1033          Plan of Care Review    Plan of Care Reviewed With  patient;sibling  -     Progress  improving  -     Outcome Summary  Pt is a 60 yo female who is post lumbar peritoneal shunt placement. She had a persistent CSF leak after an ACDF (C6-C7) in early December and was in inpatient rehab prior to returning. She walked 500' with RWx and conditional independence. She has all of her equipment at home and is set up for  therapies. She is safe to return home.  -     Row Name 01/11/20 1033          Physical Therapy Clinical Impression    Patient/Family Goals Statement (PT Clinical Impression)  home with   -     Criteria for Skilled Interventions Met (PT Clinical Impression)  yes;treatment indicated  -     Rehab Potential (PT Clinical Summary)  good, to achieve stated therapy goals  -     Row Name 01/11/20 1033          Positioning  and Restraints    Pre-Treatment Position  sitting in chair/recliner  -LC     Post Treatment Position  chair  -LC     In Chair  notified nsg;sitting;call light within reach;encouraged to call for assist;with family/caregiver  -       User Key  (r) = Recorded By, (t) = Taken By, (c) = Cosigned By    Initials Name Provider Type    Caity Chen, PT Physical Therapist        Outcome Measures     Row Name 01/11/20 1040          How much help from another person do you currently need...    Turning from your back to your side while in flat bed without using bedrails?  4  -LC     Moving from lying on back to sitting on the side of a flat bed without bedrails?  4  -LC     Moving to and from a bed to a chair (including a wheelchair)?  4  -LC     Standing up from a chair using your arms (e.g., wheelchair, bedside chair)?  4  -LC     Climbing 3-5 steps with a railing?  4  -LC     To walk in hospital room?  4  -LC     AM-PAC 6 Clicks Score (PT)  24  -       User Key  (r) = Recorded By, (t) = Taken By, (c) = Cosigned By    Initials Name Provider Type    Caity Chen, PT Physical Therapist          PT Recommendation and Plan     Outcome Summary/Treatment Plan (PT)  Anticipated Discharge Disposition (PT): home with home health, home with assist  Plan of Care Reviewed With: patient, sibling  Progress: improving  Outcome Summary: Pt is a 60 yo female who is post lumbar peritoneal shunt placement. She had a persistent CSF leak after an ACDF (C6-C7) in early December and was in inpatient rehab prior to returning. She walked 500' with RWx and conditional independence. She has all of her equipment at home and is set up for  therapies. She is safe to return home.     Time Calculation:   PT Charges     Row Name 01/11/20 1041             Time Calculation    Start Time  1002  -      Stop Time  1021  -      Time Calculation (min)  19 min  -LC      PT Received On  01/11/20  -        User Key  (r) = Recorded By, (t) = Taken By,  (c) = Cosigned By    Initials Name Provider Type    LC Caity Puente, PT Physical Therapist        Therapy Charges for Today     Code Description Service Date Service Provider Modifiers Qty    26411452241 HC PT EVAL MOD COMPLEXITY 2 1/11/2020 Caity Puente, PT GP 1          PT G-Codes  AM-PAC 6 Clicks Score (PT): 24    Caity Puente, PT  1/11/2020

## 2020-01-11 NOTE — ANESTHESIA POSTPROCEDURE EVALUATION
Patient: Noemi LIU [place]    Procedure Summary     Date:  01/10/20 Room / Location:  [facility] OR [address] [place] [address] MAIN OR    Anesthesia Start:  1411 Anesthesia Stop:  1704    Procedure:  LUMBAR PERITONEAL SHUNT PLACEMENT (N/A ) Diagnosis:       Postoperative CSF leak      (Postoperative CSF leak [G97.82])    Surgeon:  Antonio Noe MD Provider:  Audrey Minor MD    Anesthesia Type:  general ASA Status:  2          Anesthesia Type: general    Vitals  Vitals Value Taken Time   /63 1/10/2020  6:15 PM   Temp 36.7 °C (98 °F) 1/10/2020  6:15 PM   Pulse 83 1/10/2020  6:18 PM   Resp 14 1/10/2020  6:15 PM   SpO2 94 % 1/10/2020  6:20 PM   Vitals shown include unvalidated device data.        Post Anesthesia Care and Evaluation    Anesthetic complications: No anesthetic complications

## 2020-01-11 NOTE — SIGNIFICANT NOTE
01/11/20 1001   Rehab Time/Intention   Evaluation Not Performed   (Noted xfer from acute rehab for shunt placement due to CSF leak.  OT order has same comment from 12/20/2019 order.  No orders from [facility] for therapy or restrictions. Discuss with RN. OT to sign off at this time. If OT reordered please advise restrictions.)   Rehab Treatment   Discipline occupational therapist

## 2020-01-11 NOTE — PLAN OF CARE
Problem: Patient Care Overview  Goal: Plan of Care Review  Outcome: Outcome(s) achieved  Flowsheets (Taken 1/11/2020 1033)  Progress: improving  Plan of Care Reviewed With: patient;sibling  Outcome Summary: Pt is a 60 yo female who is post lumbar peritoneal shunt placement. She had a persistent CSF leak after an ACDF (C6-C7) in early December and was in inpatient rehab prior to returning. She walked 500' with RWx and conditional independence. She has all of her equipment at home and is set up for HH therapies. She is safe to return home.

## 2020-01-12 VITALS
TEMPERATURE: 97.9 F | DIASTOLIC BLOOD PRESSURE: 64 MMHG | SYSTOLIC BLOOD PRESSURE: 105 MMHG | HEART RATE: 76 BPM | WEIGHT: 88 LBS | RESPIRATION RATE: 18 BRPM | BODY MASS INDEX: 20.37 KG/M2 | HEIGHT: 55 IN | OXYGEN SATURATION: 96 %

## 2020-01-12 RX ORDER — DEXTROSE AND SODIUM CHLORIDE 5; .45 G/100ML; G/100ML
125 INJECTION, SOLUTION INTRAVENOUS CONTINUOUS
Status: ACTIVE | OUTPATIENT
Start: 2020-01-12 | End: 2020-01-12

## 2020-01-12 RX ORDER — HYDROCODONE BITARTRATE AND ACETAMINOPHEN 5; 325 MG/1; MG/1
1 TABLET ORAL EVERY 8 HOURS PRN
Qty: 30 TABLET | Refills: 0 | Status: SHIPPED | OUTPATIENT
Start: 2020-01-12 | End: 2020-01-20

## 2020-01-12 RX ORDER — GABAPENTIN 100 MG/1
100 CAPSULE ORAL NIGHTLY
Qty: 30 CAPSULE | Refills: 0 | Status: SHIPPED | OUTPATIENT
Start: 2020-01-12 | End: 2020-02-21 | Stop reason: CLARIF

## 2020-01-12 RX ADMIN — MAGNESIUM HYDROXIDE 10 ML: 2400 SUSPENSION ORAL at 08:30

## 2020-01-12 RX ADMIN — ACETAMINOPHEN 1000 MG: 500 TABLET, FILM COATED ORAL at 08:30

## 2020-01-12 RX ADMIN — DEXTROSE AND SODIUM CHLORIDE 125 ML/HR: 5; 450 INJECTION, SOLUTION INTRAVENOUS at 01:40

## 2020-01-12 RX ADMIN — SODIUM CHLORIDE, PRESERVATIVE FREE 10 ML: 5 INJECTION INTRAVENOUS at 08:32

## 2020-01-12 RX ADMIN — VITAMIN D, TAB 1000IU (100/BT) 2000 UNITS: 25 TAB at 08:30

## 2020-01-12 RX ADMIN — DOCUSATE SODIUM 100 MG: 100 CAPSULE ORAL at 08:30

## 2020-01-12 RX ADMIN — CALCIUM 500 MG: 500 TABLET ORAL at 08:30

## 2020-01-12 RX ADMIN — FAMOTIDINE 20 MG: 20 TABLET, FILM COATED ORAL at 08:30

## 2020-01-12 RX ADMIN — GABAPENTIN 100 MG: 100 CAPSULE ORAL at 08:30

## 2020-01-12 NOTE — DISCHARGE SUMMARY
Noemi Bartholomew  1960    Patient Care Team:  Mary Lou Carroll MD as PCP - General (Internal Medicine)    Date of Admit: 1/9/2020    Date of Discharge:  1/12/2020    Discharge Diagnosis:  Postoperative CSF leak    Postoperative cerebrospinal fluid leak      Procedures Performed  Procedure(s):  LUMBAR PERITONEAL SHUNT PLACEMENT       Complications: none    Consultants:   Consults     Date and Time Order Name Status Description    12/9/2019 0030 Inpatient Physical Medicine Rehab Consult Completed     12/8/2019 0947 Inpatient Internal Medicine Consult Completed           Condition on Discharge: stable    Discharge disposition: home        Brief HPI: This is a very pleasant 59-year-old female with a history of cervical myelopathy, gait ataxia and upper extremity weakness.  She underwent anterior cervical corpectomy C6 and C7 with cervical plating by Dr. Noe for cervical stenosis and myelopathy.  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  The patient was then transferred to [facility] acute rehab for further rehabilitation and gait strengthening.  Unfortunately the repair failed and the patient began to develop swelling beneath the cervical incision.  She was readmitted to the hospital and taken back to the OR by Dr. Noe on December 19, 2019 for reclosure of the CSF leak and placement of a lumbar drain.  The lumbar drain was left in for several days and then clamped for 2 days while the patient mobilized.  There was no evidence of reaccumulation of spinal fluid underneath the cervical incision after the 2 days. Therefore the lumbar drain was removed and the patient was sent back to [facility] acute rehab on December 27.  Dr. Noe was contacted again by the rehab nurse who had noticed some development of mild swelling beneath the cervical incision.  The patient completed her acute rehab  therapy and was then transferred back to the hospital under the care of Dr. Noe for the purpose of placing a lumboperitoneal shunt.  Please see admission H&P for further details.    Hospital Course: The anterior cervical incision was well approximated.  The swelling beneath it was more soft as opposed to fluidlike.  Given that there was no significant fluctuance, and no sign of infection, Dr. Noe elected to leave alone.  He proceeded with placement of the lumbar drain.  On the first postoperative day, the anterior cervical incision is completely flat.  The posterior lumbar and anterior abdominal incisions were well approximated.  Steri-Strips are intact.  There is no redness swelling or drainage.  Her incisions continue to look that way today.  He has been wearing an Ace wrap around her abdomen just as a comfort measure.  She was given instructions that she can continue to do this at home for comfort.  The patient is walking independently with a rolling walker and tolerating it well.  She was evaluated by physical therapy.  Their note states that she walked 500 feet with a rolling walker and conditional independence.  She was cleared by their standpoint for discharge.  She is voiding and tolerating a diet without difficulty.  She has been using Norco for pain symptoms and she has achieved good relief in  postsurgical pain.  She had some postoperative neuritis in both of her upper arms after her first surgery.  It has been treated with gabapentin 100 mg twice daily.  Since her symptoms are so much better, we will try to wean this slowly.  She has been given a prescription for both the Norco 5/325 mg and gabapentin 100 mg.  Instead of taking the gabapentin twice a day like she had been here in the hospital, we will reduce it to 100 mg at bedtime only. Both calf muscles are soft to palpation.  There is no tenderness or warmth detected.  The patient lives with her sister who will be caring for her at home.   The patient feels ready for discharge home.  Dr. Mendoza, who is covering for Dr. Noe is in agreement with that plan.      Temp:  [96.8 °F (36 °C)-98.5 °F (36.9 °C)] 97.7 °F (36.5 °C)  Heart Rate:  [] 92  Resp:  [16-18] 16  BP: ()/(57-75) 97/67    Current labs:  Lab Results (last 24 hours)     ** No results found for the last 24 hours. **          Discharge Medications  Luis Alfredo has been reviewed and narcotic consent is on file in the patient's chart.     Your medication list      START taking these medications      Instructions Last Dose Given Next Dose Due   HYDROcodone-acetaminophen 5-325 MG per tablet  Commonly known as:  NORCO      Take 1 tablet by mouth Every 8 (Eight) Hours As Needed for Moderate Pain .          CHANGE how you take these medications      Instructions Last Dose Given Next Dose Due   gabapentin 100 MG capsule  Commonly known as:  NEURONTIN  What changed:  when to take this      Take 1 capsule by mouth Every Night.          CONTINUE taking these medications      Instructions Last Dose Given Next Dose Due   atorvastatin 20 MG tablet  Commonly known as:  LIPITOR      Take 20 mg by mouth Every Night.       CALCIUM 1000 + D PO      Take  by mouth Daily. 2 caps       cholecalciferol 10 MCG (400 UNIT) tablet  Commonly known as:  VITAMIN D3      Take 2,000 Units by mouth Daily.          STOP taking these medications    acetaminophen 500 MG tablet  Commonly known as:  TYLENOL              Where to Get Your Medications      These medications were sent to [facility] [address] [place] [address] - [address] AT [facility] [phone] [facility] [phone]   [address], [place] [id number]    Phone:  [phone]   · gabapentin 100 MG capsule  · HYDROcodone-acetaminophen 5-325 MG per tablet         Discharge Diet:   Diet Instructions     Diet: Regular      Discharge Diet:  Regular        Diet Order   Procedures   • Diet Regular       Activity at Discharge:  "Activity and postoperative instructions are listed and attached in the AVS for the patient to follow at home.      Call for: questions or concerns    Follow-up Appointments  Future Appointments   Date Time Provider Department Center   1/24/2020  9:45 AM Antonio Noe MD [facility] None   6/24/2020  8:20 AM [facility] None   6/29/2020  7:45 AM Mary Lou Carroll MD [facility] None     Follow-up Information     Mary Lou Carroll MD .    Specialty:  Internal Medicine  Contact information:  [address]  [place] [address]  [phone]                 Additional Instructions for the Follow-ups that You Need to Schedule     Call MD With Problems / Concerns   As directed      Instructions: Call Dr. Noe's office for temperature greater than 100.5 degrees, increased pain, or for any incisional redness, swelling, or drainage from incisions.  Please see postoperative instructions attached in after visit summary.    Order Comments:  Instructions: Call Dr. Noe's office for temperature greater than 100.5 degrees, increased pain, or for any incisional redness, swelling, or drainage from incisions.  Please see postoperative instructions attached in after visit summary.          Discharge Follow-up with Specialty: Neurosurgery with Dr. Noe   As directed      Specialty:  Neurosurgery with Dr. Noe    Follow Up Details:  January 24, 2020 at 9:45 AM               Test Results Pending at Discharge     None    I discussed the discharge instructions with patient and family    Audrey Funez, APRN  01/12/20  12:43 PM    \"Dictated utilizing Dragon dictation\".    "

## 2020-01-12 NOTE — PLAN OF CARE
Pain is well controlled. Scant bloody drainage on dressings. Dr. Mendoza notified of her hypotension at 0130. See new order. Plan is to DC home when vitals and pain level remain stable.

## 2020-01-13 NOTE — PROGRESS NOTES
Case Management Discharge Note      Final Note: Discharged home    Provided post acute provider list?: Yes  Post Acute Provider Lists: Home Health, Nursing Home  Post Acuite Provider List: Delivered  Delivered To: Patient  Method of Delivery: In person            Final Discharge Disposition Code: 01 - home or self-care

## 2020-01-13 NOTE — PAYOR COMM NOTE
"UR PHONE:  [phone]   F: [phone]    REF #[id number]    INITIAL CLINICAL - FIRST FAX FAILED    Noemi Bartholomew (59 y.o. Female)     Date of Birth Social Security Number Address Home Phone MRN    1960  [address] [facility] [address] [phone] [id number]    [place] Marital Status          [facility] Single       Admission Date Admission Type Admitting Provider Attending Provider Department, Room/Bed    1/9/20 Urgent Timmy Mendoza MD  [facility], P584/1    Discharge Date Discharge Disposition Discharge Destination        1/12/2020 Home or Self Care              Attending Provider:  (none)   Allergies:  Penicillins, Topamax [Topiramate]    Isolation:  None   Infection:  None   Code Status:  Prior    Ht:  134.6 cm (52.99\")   Wt:  39.9 kg (88 lb)    Admission Cmt:  None   Principal Problem:  Postoperative CSF leak [G97.82] More...                 Active Insurance as of 1/9/2020     Primary Coverage     Payor Plan Insurance Group Employer/Plan Group    ANTHEM BLUE CROSS ANTHEM BLUE CROSS BLUE SHIELD PPO [id number]     Payor Plan Address Payor Plan Phone Number Payor Plan Fax Number Effective Dates    [address] [phone]  1/1/2019 - None Entered    [place] [address]       Subscriber Name Subscriber Birth Date Member ID       NOEMI BARTHOLOMEW 1960 [id number]                 Emergency Contacts      (Rel.) Home Phone Work Phone Mobile Phone    Donita Bartholomew (Sister) [phone] -- [phone]            History & Physical      Timmy Mendoza MD   Physician   Neurosurgery   H&P   Signed   Date of Service:  01/09/20 1324   Creation Time:  01/09/20 1324       Related encounter: Admission (Discharged) from 12/27/2019 in [facility] with Shiva Abraham MD         Signed        Expand All Collapse All      Patient Care Team:  Mary Lou Carroll MD as PCP - General (Internal Medicine)  Antonio Noe MD - " Admitting - Neurosurgery        Chief complaint      Persistent CSF leak        Subjective         History of Present Illness      This is a very pleasant 59-year-old female initially evaluated by Dr. Noe in his office for complaints of difficulty with balance, hand function and strength.  Work-up revealed severe cord compression of the cervical spine mainly at C6-7.  This was related to a combination of cervical degenerative disease as well as ossification of the posterior longitudinal ligament.  This was most pronounced at C6-7.  There is also evidence of cord signal changes at C6-7.  Because of the progressing cervical myelopathy and concern for irreversible damage to the cervical cord, the patient was brought to the operating room for cervical corpectomy anteriorly at C6, C7 with peek cage and anterior cervical plate from C5-T1.  The surgery was performed by Dr. Noe on December 6, 2019.  Dr. Noe encountered a thickened ligament that was adherent to the dura.  In his attempt to separate it, a durotomy was created because the dura inadvertently pulled off while trying to remove bone.  Once the cord was adequately decompressed, Dr. Noe attempted to close the leak as best as he could with Tisseel glue and FloSeal.  A ALONDRA drain was placed and the patient was taken to the [facility] floor from PACU for postoperative management.   The patient did well but for the first couple of days after surgery she had significant amount of arm pain and weakness in her forearms and hands.  The symptoms have gotten better over time.  She covered quite well.  She was subsequently transferred to [facility] acute rehab for further therapies to improve her strength and endurance.  While in rehab she developed a fluid collection of cerebrospinal fluid beneath the cervical incision.  The patient was therefore taken back to the operating room December 19, 2019 for drainage of the CSF fluid collection, reclosure  of the CSF leak and placement of an external lumbar drain.  The patient was monitored postoperatively in the hospital.  She was healing quite well.  The lumbar drain was clamped after several days and the patient was mobilized.  There was no sign of any cerebrospinal fluid leak at the site of the incision and therefore the patient was transferred back to subacute rehab to finish out her therapies.  She had been doing quite well.  She did notice significant improvement in the arm pain and was regaining some strength in her arms and hands.  She had been able to feed herself and has done quite well in acute rehab.  We were notified by the rehab nurses guarding some evidence of fluid collecting beneath the incision again.  This is by far much smaller than it had been prior to her last surgery but the feeling was that an intervention would be necessary to prevent further collection beneath the cervical incision.  The patient has completed her acute rehab therefore she will be admitted to the hospital under the care of Dr. Noe.  She will undergo surgery tomorrow for drainage of the cervical cyst and placement of a permanent lumboperitoneal shunt.     Review of Systems   Musculoskeletal: Positive for gait problem and neck stiffness.   Skin: Positive for wound.   Neurological: Positive for weakness and numbness.   Psychiatric/Behavioral: The patient is nervous/anxious.    All other systems reviewed and are negative.              Medical History        Past Medical History:   Diagnosis Date   • Abnormal alkaline phosphatase test 06/12/2015     Resolved   • Acute bronchitis 06/12/2015     Resolved   • Allergic child age     pencillin   • Cervical spinal stenosis     • Diverticulosis     • H/O electromyography 10/13/2014   • H/O mammogram 07/29/2014   • Headache occassionally   • High cholesterol     • History of EKG 06/12/2015   • Hypercalcemia 06/12/2015     Resolved, Full w/u done, Likely secondary to Forteo   • Left  arm pain     • Low back pain March 2017   • Numbness in both hands     • Osteoporosis     • Paresthesia 06/12/2015     Resolved         Surgical History         Past Surgical History:   Procedure Laterality Date   • ANTERIOR CHANNEL VERTEBRECTOMY/CORPECTOMY N/A 12/6/2019     Procedure: CERVICAL 6, CERVICAL 7 ANTERIOR CERVICAL CORPECTOMY WITH CAGE AND PLATE;  Surgeon: Antonio Noe MD;  Location: [facility] OR;  Service: Neurosurgery   • BACK SURGERY   1999   • COLONOSCOPY   05/20/2011   • INCISION AND DRAINAGE OF WOUND N/A 12/19/2019     Procedure: followed by drainage of anterior cervical fluid collection;  Surgeon: Antonio Noe MD;  Location: [facility] OR;  Service: Neurosurgery   • LUMBAR DRAIN INSERTION EXTERNAL N/A 12/19/2019     Procedure: LUMBAR DRAIN INSERTION EXTERNAL;  Surgeon: Antonio Noe MD;  Location: [facility] OR;  Service: Neurosurgery   • SPINE SURGERY   1999 back     dr abad quintero   • TONSILLECTOMY   child age   • TYMPANOPLASTY                   Family History   Problem Relation Age of Onset   • Breast cancer Mother     • Stroke Mother           Cerebrovascular Accident   • Colon cancer Mother     • Diabetes Father           Borderline Diabetes Mellitus   • Heart disease Father           Cardiac Disorder   • Heart failure Father     • Anxiety disorder Sister     • Nephrolithiasis Sister           Kidney Stones   • Malig Hyperthermia Neg Hx        Social History           Tobacco Use   • Smoking status: Never Smoker   • Smokeless tobacco: Never Used   Substance Use Topics   • Alcohol use: No   • Drug use: No      Prescriptions Prior to Admission           Medications Prior to Admission   Medication Sig Dispense Refill Last Dose   • acetaminophen (TYLENOL) 500 MG tablet Take 1,000 mg by mouth Every 6 (Six) Hours As Needed for Mild Pain .         • atorvastatin (LIPITOR) 20 MG tablet Take 20 mg by mouth Every Night.         • Calcium Carb-Cholecalciferol (CALCIUM 1000 + D  PO) Take  by mouth Daily. 2 caps         • cholecalciferol (VITAMIN D3) 10 MCG (400 UNIT) tablet Take 2,000 Units by mouth Daily.         • gabapentin (NEURONTIN) 100 MG capsule Take 100 mg by mouth 2 (Two) Times a Day.               Allergies:  Penicillins and Topamax [topiramate]        Objective          Vital Signs  Temp:  [96.7 °F (35.9 °C)-97.9 °F (36.6 °C)] 97.7 °F (36.5 °C)  Heart Rate:  [] 107  Resp:  [18] 18  BP: (102-118)/(67-73) 109/70     Physical Exam   Constitutional: She is oriented to person, place, and time. She appears well-developed and well-nourished. She is cooperative. No distress.   HENT:   Head: Normocephalic and atraumatic.   Eyes: Conjunctivae are normal. Right eye exhibits no discharge. Left eye exhibits no discharge.   Neck: Normal range of motion. Neck supple. No tracheal deviation present.   Cardiovascular: Normal rate and intact distal pulses.   Pulmonary/Chest: Effort normal. No respiratory distress.   Abdominal: Soft. She exhibits no distension. There is no tenderness.   Musculoskeletal: Normal range of motion. She exhibits tenderness (mild tenderness to palpation of cervical incision and surrounding tissue. ). She exhibits no edema.   Neurological: She is alert and oriented to person, place, and time. A sensory deficit (continued numbness in fingers of both hands) is present. She exhibits normal muscle tone. GCS eye subscore is 4. GCS verbal subscore is 5. GCS motor subscore is 6.   No motor or sensory deficits. Able to feed self, cut her own food with modifications. Negative Rivers's; negative clonus.    Skin: Skin is warm and dry. She is not diaphoretic. No erythema.   Psychiatric: She has a normal mood and affect. Thought content normal.   Vitals reviewed.        Results Review:              I reviewed the patient's new clinical results.  I reviewed the patient's new imaging results and agree with the interpretation.  Discussed with Dr. Noe.            MRI OF THE  CERVICAL SPINE WITH AND WITHOUT CONTRAST     Previous corpectomy procedure noted C6 and C7.  Graft material intact.  Large fluid collection extends from C4-5 down to the inferior body of T2.  The collection measures up to 6.1 cm.  There is some evidence of tracheal deviation anteriorly.  It also abuts the medial margin of the right common carotid.  So a more localized area of signal abnormality along the cord.  This had also been visualized on a prior study and probably seems more consistent with a area of myelomalacia.  In comparison to a previous cervical MRI dated 12/17/2019, there has been no change.                  Assessment/Plan           Cervical myelopathy (CMS/[facility])        Assessment & Plan      6 weeks status post anterior cervical corpectomy C6, C7 with cage and plate placement from C5-T1 for cervical myelopathy, cervical stenosis C5-T1 and ossification of posterior longitudinal ligament.  CSF leak repair was also performed.     3 weeks status post drainage of cervical CSF collection, reclosure of CSF leak and placement of external lumbar drain     Reaccumulation of CSF leak        The patient will be discharged from acute rehab today and admitted to the main hospital under the care of Dr. Noe for surgery tomorrow as described above.     I discussed the patients findings and my recommendations with patient     Audrey REECE Funez, APRN  01/09/20  1:24 PM

## 2020-01-13 NOTE — PAYOR COMM NOTE
"  PHONE: [phone]  F: [phone]    REF #[id number]  FIRST FAX FAILED.  JUST NOTED THIS MORNING.    CONTINUED STAY REVIEW AND DC SUMMARY         Noemi Pugh (59 y.o. Female)     Date of Birth Social Security Number Address Home Phone MRN    1960  [address] [address] [phone] [id number]    [employer]          [facility] Single       Admission Date Admission Type Admitting Provider Attending Provider Department, Room/Bed    1/9/20 Urgent Timmy Mendoza MD  [facility] [address] [place], P584/1    Discharge Date Discharge Disposition Discharge Destination        1/12/2020 Home or Self Care              Attending Provider:  (none)   Allergies:  Penicillins, Topamax [Topiramate]    Isolation:  None   Infection:  None   Code Status:  Prior    Ht:  134.6 cm (52.99\")   Wt:  39.9 kg (88 lb)    Admission Cmt:  None   Principal Problem:  Postoperative CSF leak [G97.82] More...                 Active Insurance as of 1/9/2020     Primary Coverage     Payor Plan Insurance Group Employer/Plan Group    ANTHEM BLUE CROSS ANTHEM BLUE CROSS BLUE SHIELD PPO [id number]     Payor Plan Address Payor Plan Phone Number Payor Plan Fax Number Effective Dates     [address] [phone]  1/1/2019 - None Entered    [address] [address]       Subscriber Name Subscriber Birth Date Member ID       NOEMI PUGH 1960 [id number]                 Emergency Contacts      (Rel.) Home Phone Work Phone Mobile Phone    Donita Pugh (Sister) [phone] -- [phone]               Operative/Procedure Notes       Antonio Noe MD at 01/10/20 1501  Version 1 of 1         LUMBAR PERITONEAL SHUNT PLACEMENT  Progress Note    Noemi Pugh  1/10/2020    Pre-op Diagnosis:   Postoperative CSF leak [G97.82]       Post-Op Diagnosis Codes:     * Postoperative CSF leak [G97.82]    Procedure/CPT® Codes:      Procedure(s):  LUMBAR PERITONEAL SHUNT " PLACEMENT    Surgeon(s):  Antonio Noe MD    Anesthesia: General    Staff:   Circulator: Harriet Mcknight RN; Spurling, Shannon, RN  Radiology Technologist: Sharron Vanegas, RRT  Scrub Person: Pasha Mcnally; Delia Hassan  Assistant: Diya Ortega CSA    Estimated Blood Loss: 25 cc    Urine Voided: none    Specimens:              none          Drains:   [REMOVED] Closed/Suction Drain Midline Neck Bulb 10 Fr. (Removed)   Site Description Healing;Reddened 12/9/2019  8:40 PM   Dressing Status Clean;Dry;Intact;Removed 12/9/2019  8:40 PM   Drainage Appearance None 12/9/2019  8:40 PM   Status To bulb suction 12/8/2019 10:41 PM   Output (mL) 0 mL 12/8/2019  8:40 AM       [REMOVED] Urethral Catheter Silicone 12 Fr. (Removed)   Daily Indications Required activity restriction from trauma, surgery, (e.g. unstable spine, fracture, hemodynamics) 12/9/2019  8:40 PM   Site Assessment Clean 12/9/2019  5:27 PM   Collection Container Standard drainage bag 12/9/2019  8:40 PM   Securement Method Securing device 12/9/2019  8:40 PM   Catheter care complete Yes 12/9/2019  8:00 PM   Output (mL) 650 mL 12/10/2019  6:24 AM       [REMOVED] Urethral Catheter Double-lumen;Straight-tip;Silicone 16 Fr. (Removed)   Daily Indications Required activity restriction from trauma, surgery, (e.g. unstable spine, fracture, hemodynamics) 12/25/2019  8:06 AM   Site Assessment Clean;Skin intact 12/25/2019  8:06 AM   Collection Container Standard drainage bag 12/25/2019  8:06 AM   Securement Method Securing device 12/25/2019  8:06 AM   Catheter care complete Yes 12/24/2019  8:11 PM   Irrigation/Instillation Indication patency maintenance 12/25/2019  8:06 AM   Output (mL) 250 mL 12/25/2019  5:03 AM       [REMOVED] Lumbar Drain (Removed)   Drain Status Clamped 12/25/2019  8:06 AM   Drain Level (cm) 5 cm 12/25/2019  8:06 AM   CSF Color Clear 12/25/2019  8:06 AM   Site Description Unable to view 12/25/2019  8:06 AM   Dressing  Status Clean;Dry;Intact 12/25/2019  8:06 AM   CSF Output (mL) 10 mL 12/23/2019 11:00 AM       Findings: persistent cervical csf leak    Complications: none      Antonio Noe MD     Date: 1/10/2020  Time: 4:27 PM        Electronically signed by Antonio Noe MD at 01/10/20 5140     Antonio Noe MD at 01/10/20 3655  Version 2 of 2       Preoperative diagnosis: Persistent cervical CSF leak    Postoperative diagnosis: Same as above    Procedures performed: Placement of a lumboperitoneal shunt    Surgeon: Hasmukh    First Assistant: Diya Ortega (She greatly assisted in the exposure, visualization of neural structures, control of bleeding, retraction, and closure of the incision.)     Anesthesia: GET    EBL:  25 cc    Complications: none    Specimen sent: none    Drains: none    Findings: persistent CSF leak    Postoperative condition: good    Indications for the operation: Several weeks ago this patient had undergone anterior cervical corpectomy at C6 and C7 with a cage and plate for myelopathy.  She had ossification of the posterior longitudinal ligament and when the spinal canal was decompressed to the dura was stuck to the ligament and the dura was opened up.  There was no way to get a watertight closure so I sealed it with DuraGen and Tisseel.  She had an initial CSF leak with a cystic fluid collection in her anterior neck and about 2 weeks ago I placed a lumbar drain and drained the cyst in the neck she seemed to be doing pretty well.  Unfortunately the leak has recurred in the neck not nearly as bad as before but because of the recurrence of the leak I brought her back to place a lumboperitoneal shunt.    Informed consent: She and her sister understood the goal of surgery is diversion of the cerebrospinal fluid and into the peritoneum to lower CSF pressure to and to encourage healing and closure of the leak.  The risks include, but are not limited to, infection, hemorrhage requiring  transfusion or reoperation, CSF leak requiring reoperation, incomplete relief of symptoms, potential need for additional surgeries in the future, stroke, paralysis, coma, and death.  She agreed to proceed.    Details of the operation:The patient was placed on the operating room table in the supine position.  After induction of endotracheal intubation, she got 2 g of Kefzol as per the SCIP protocol.  We placed her in the lateral position with the right side up.  A pillow was placed between the knees and the knees were flexed.  All pressure points were padded.  An axillary roll was used.  The lumbar region and the right side of the abdomen were then prepped and draped in usual sterile fashion.  We did a surgical timeout.  I made a periumbilical incision on the right with a #10 skin knife.  Hemostasis was obtained with monopolar cautery. Dissection was taken all the way down to the anterior rectus sheath which was opened up with monopolar cautery and the self-retaining retractor was placed.  I identified the posterior rectus sheath and picked it up with 2 Mosquitoes and made a small incision with a #15 blade.  I identified the peritoneum and put 3-0 silk pursestring suture around the peritoneal opening and the rectus sheath.      I went to the lumbar side of the field and made an incision at about L3-L4 with a #10 skin knife.  Hemostasis was obtained with monopolar cautery.  Self-retaining retractors were used.  Using a 14-gauge Tuohy needle, I cannulated the lumbar subarachnoid space at about the L3-L4 level and got clear CSF.  I threaded a Spetzler lumboperitoneal shunt to a depth of about 20 cm and withdrew the needle from the subarachnoid space and got clear CSF coming out of the distal end of the shunt.  I then anchored the catheter to the lumbar fascia with a plastic anchor and secured it with 3-0 silk on a needle.               The distal end was then placed through a tunneler which had been placed from the  "abdominal incision and passed all the way to the lumbar region. I passed the catheter through and got clear CSF out the other side and placed it in the distal peritoneal space and closed with purse-string suture. The fascia on the abdominal side was closed with 2-0 Vicryl. The subcutaneous layer was closed with 2-0 Vicryl interrupted suture. The skin was closed with a running 4-0 Vicryl subcuticular. We put a little bit of redundant coiling of the catheter in the lumbar region and then closed the lumbar subcutaneous space with 2-0 Vicryl and the skin with 4-0 Vicryl subcuticular. Steri-Strips and a clean, dry dressing were placed. We placed a large Ace wrap around her abdomen and low back to act like an abdominal binder. She is so small that an abdominal binder would not have been found that fit her. She was rolled over in the supine position, extubated and taken to the recovery room in satisfactory condition. Because of the cyst in the neck was so small comparatively speaking to the last time, I decided not to drain it and I thought that placement of the shunt itself would lead to resolution of the cystic collection.          Electronically signed by Antonio Noe MD at 01/10/20 1944          Physician Progress Notes       Audrey Funez APRN at 01/11/20 0882     Attestation signed by Timmy Menodza MD at 01/11/20 0744    I have reviewed the documentation above and agree.                  NEUROSURGERY POST OP VISIT      Sitting up in chair. Having expected pain at the abdominal and lumbar surgical sites.      Blood pressure 106/64, pulse 74, temperature 97.6 °F (36.4 °C), temperature source Oral, resp. rate 18, height 134.6 cm (52.99\"), weight 39.9 kg (88 lb), SpO2 96 %, not currently breastfeeding.        AA&O x 3  R lateral abdominal and right lumbar incisions are well approximated.  Steri-Strips intact.  Ace wrap intact over the incisions  No calf swelling or tenderness to palpation          POD 1 " Placement of a lumboperitoneal shunt for persistent cervical CSF leak  POD 29 Placement of external lumbar drain, drainage of cervical CSF collection and reclosure of CSF leak  POD 36 Anterior cervical corpectomy C6, C7 with cage and plate from C5 to T1 for cervical stenosis, ossification of posterior longitudinal ligament (OPLL), progressive cervical myelopathy    Continue with pain control  Mobilize  If doing better with the postoperative pain and mobilizing well, DC home tomorrow.          Electronically signed by Timmy Mendoza MD at 01/11/20 1849              Discharge Summary      Audrey Funez, MIRYAM at 01/12/20 1243          Noemi Bartholomew  1960    Patient Care Team:  Mary Lou Carroll MD as PCP - General (Internal Medicine)    Date of Admit: 1/9/2020    Date of Discharge:  1/12/2020    Discharge Diagnosis:  Postoperative CSF leak    Postoperative cerebrospinal fluid leak      Procedures Performed  Procedure(s):  LUMBAR PERITONEAL SHUNT PLACEMENT       Complications: none    Consultants:   Consults     Date and Time Order Name Status Description    12/9/2019 0030 Inpatient Physical Medicine Rehab Consult Completed     12/8/2019 0947 Inpatient Internal Medicine Consult Completed           Condition on Discharge: stable    Discharge disposition: home        Brief HPI: This is a very pleasant 59-year-old female with a history of cervical myelopathy, gait ataxia and upper extremity weakness.  She underwent anterior cervical corpectomy C6 and C7 with cervical plating by Dr. Noe for cervical stenosis and myelopathy.  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  The patient was then transferred to [facility] acute rehab for further rehabilitation and gait strengthening.  Unfortunately the repair failed and the patient began to develop swelling beneath the cervical incision.  She was readmitted to the hospital  and taken back to the OR by Dr. Noe on December 19, 2019 for reclosure of the CSF leak and placement of a lumbar drain.  The lumbar drain was left in for several days and then clamped for 2 days while the patient mobilized.  There was no evidence of reaccumulation of spinal fluid underneath the cervical incision after the 2 days. Therefore the lumbar drain was removed and the patient was sent back to [facility] acute rehab on December 27.  Dr. Noe was contacted again by the rehab nurse who had noticed some development of mild swelling beneath the cervical incision.  The patient completed her acute rehab therapy and was then transferred back to the hospital under the care of Dr. Noe for the purpose of placing a lumboperitoneal shunt.  Please see admission H&P for further details.    Hospital Course: The anterior cervical incision was well approximated.  The swelling beneath it was more soft as opposed to fluidlike.  Given that there was no significant fluctuance, and no sign of infection, Dr. Noe elected to leave alone.  He proceeded with placement of the lumbar drain.  On the first postoperative day, the anterior cervical incision is completely flat.  The posterior lumbar and anterior abdominal incisions were well approximated.  Steri-Strips are intact.  There is no redness swelling or drainage.  Her incisions continue to look that way today.  He has been wearing an Ace wrap around her abdomen just as a comfort measure.  She was given instructions that she can continue to do this at home for comfort.  The patient is walking independently with a rolling walker and tolerating it well.  She was evaluated by physical therapy.  Their note states that she walked 500 feet with a rolling walker and conditional independence.  She was cleared by their standpoint for discharge.  She is voiding and tolerating a diet without difficulty.  She has been using Norco for pain symptoms and  she has achieved good relief in  postsurgical pain.  She had some postoperative neuritis in both of her upper arms after her first surgery.  It has been treated with gabapentin 100 mg twice daily.  Since her symptoms are so much better, we will try to wean this slowly.  She has been given a prescription for both the Norco 5/325 mg and gabapentin 100 mg.  Instead of taking the gabapentin twice a day like she had been here in the hospital, we will reduce it to 100 mg at bedtime only. Both calf muscles are soft to palpation.  There is no tenderness or warmth detected.  The patient lives with her sister who will be caring for her at home.  The patient feels ready for discharge home.  Dr. Mendoza, who is covering for Dr. Noe is in agreement with that plan.      Temp:  [96.8 °F (36 °C)-98.5 °F (36.9 °C)] 97.7 °F (36.5 °C)  Heart Rate:  [] 92  Resp:  [16-18] 16  BP: ()/(57-75) 97/67    Current labs:  Lab Results (last 24 hours)     ** No results found for the last 24 hours. **          Discharge Medications  Luis Alfredo has been reviewed and narcotic consent is on file in the patient's chart.     Your medication list      START taking these medications      Instructions Last Dose Given Next Dose Due   HYDROcodone-acetaminophen 5-325 MG per tablet  Commonly known as:  NORCO      Take 1 tablet by mouth Every 8 (Eight) Hours As Needed for Moderate Pain .          CHANGE how you take these medications      Instructions Last Dose Given Next Dose Due   gabapentin 100 MG capsule  Commonly known as:  NEURONTIN  What changed:  when to take this      Take 1 capsule by mouth Every Night.          CONTINUE taking these medications      Instructions Last Dose Given Next Dose Due   atorvastatin 20 MG tablet  Commonly known as:  LIPITOR      Take 20 mg by mouth Every Night.       CALCIUM 1000 + D PO      Take  by mouth Daily. 2 caps       cholecalciferol 10 MCG (400 UNIT) tablet  Commonly known as:  VITAMIN D3      Take 2,000  Units by mouth Daily.          STOP taking these medications    acetaminophen 500 MG tablet  Commonly known as:  TYLENOL              Where to Get Your Medications      [facility] [address] [place] [address] [place] [address] [facility] [phone]  - [phone] FX  [address] [facility] [place] [address]    Phone:  [phone]   · gabapentin 100 MG capsule  · HYDROcodone-acetaminophen 5-325 MG per tablet         Discharge Diet:   Diet Instructions     Diet: Regular      Discharge Diet:  Regular        Diet Order   Procedures   • Diet Regular       Activity at Discharge: Activity and postoperative instructions are listed and attached in the AVS for the patient to follow at home.      Call for: questions or concerns    Follow-up Appointments  Future Appointments   Date Time Provider Department Center   1/24/2020  9:45 AM Antonio Noe MD [facility] None   6/24/2020  8:20 AM [facility] None   6/29/2020  7:45 AM Mary Lou Carroll MD [facility] None     Follow-up Information     Mary Lou Carroll MD .    Specialty:  Internal Medicine  Contact information:  Brunilda BYRNE  [facility] [address] [id number] [phone]                 Additional Instructions for the Follow-ups that You Need to Schedule     Call MD With Problems / Concerns   As directed      Instructions: Call Dr. Noe's office for temperature greater than 100.5 degrees, increased pain, or for any incisional redness, swelling, or drainage from incisions.  Please see postoperative instructions attached in after visit summary.    Order Comments:  Instructions: Call Dr. Noe's office for temperature greater than 100.5 degrees, increased pain, or for any incisional redness, swelling, or drainage from incisions.  Please see postoperative instructions attached in after visit summary.          Discharge Follow-up with Specialty: Neurosurgery with Dr. Noe    "As directed      Specialty:  Neurosurgery with Dr. Noe    Follow Up Details:  January 24, 2020 at 9:45 AM               Test Results Pending at Discharge     None    I discussed the discharge instructions with patient and family    Audrey Funez, APRN  01/12/20  12:43 PM    \"Dictated utilizing Dragon dictation\".      Electronically signed by Timmy Mendoza MD at 01/12/20 1908       "

## 2020-01-20 ENCOUNTER — OFFICE VISIT (OUTPATIENT)
Dept: INTERNAL MEDICINE | Facility: CLINIC | Age: 60
End: 2020-01-20

## 2020-01-20 VITALS
BODY MASS INDEX: 18.88 KG/M2 | HEIGHT: 55 IN | HEART RATE: 88 BPM | SYSTOLIC BLOOD PRESSURE: 120 MMHG | DIASTOLIC BLOOD PRESSURE: 74 MMHG | WEIGHT: 81.6 LBS | OXYGEN SATURATION: 97 %

## 2020-01-20 DIAGNOSIS — M47.12 CERVICAL SPONDYLOSIS WITH MYELOPATHY: ICD-10-CM

## 2020-01-20 DIAGNOSIS — G97.82 POSTOPERATIVE CSF LEAK: ICD-10-CM

## 2020-01-20 DIAGNOSIS — G96.00 POSTOPERATIVE CSF LEAK: ICD-10-CM

## 2020-01-20 DIAGNOSIS — D64.9 ANEMIA, UNSPECIFIED TYPE: Primary | ICD-10-CM

## 2020-01-20 PROBLEM — T38.0X5A STEROID-INDUCED HYPERGLYCEMIA: Status: RESOLVED | Noted: 2019-12-09 | Resolved: 2020-01-20

## 2020-01-20 PROBLEM — M48.02 SPINAL STENOSIS IN CERVICAL REGION: Status: RESOLVED | Noted: 2019-11-22 | Resolved: 2020-01-20

## 2020-01-20 PROBLEM — D72.829 LEUKOCYTOSIS: Status: RESOLVED | Noted: 2019-12-09 | Resolved: 2020-01-20

## 2020-01-20 PROBLEM — R73.9 STEROID-INDUCED HYPERGLYCEMIA: Status: RESOLVED | Noted: 2019-12-09 | Resolved: 2020-01-20

## 2020-01-20 PROBLEM — D62 POSTOPERATIVE ANEMIA DUE TO ACUTE BLOOD LOSS: Status: RESOLVED | Noted: 2019-12-09 | Resolved: 2020-01-20

## 2020-01-20 LAB
ALBUMIN SERPL-MCNC: 4.6 G/DL (ref 3.5–5.2)
ALBUMIN/GLOB SERPL: 2 G/DL
ALP SERPL-CCNC: 86 U/L (ref 39–117)
ALT SERPL-CCNC: 8 U/L (ref 1–33)
AST SERPL-CCNC: 16 U/L (ref 1–32)
BASOPHILS # BLD AUTO: 0.04 10*3/MM3 (ref 0–0.2)
BASOPHILS NFR BLD AUTO: 0.6 % (ref 0–1.5)
BILIRUB SERPL-MCNC: 0.3 MG/DL (ref 0.2–1.2)
BUN SERPL-MCNC: 10 MG/DL (ref 6–20)
BUN/CREAT SERPL: 20 (ref 7–25)
CALCIUM SERPL-MCNC: 9.9 MG/DL (ref 8.6–10.5)
CHLORIDE SERPL-SCNC: 102 MMOL/L (ref 98–107)
CO2 SERPL-SCNC: 26.7 MMOL/L (ref 22–29)
CREAT SERPL-MCNC: 0.5 MG/DL (ref 0.57–1)
EOSINOPHIL # BLD AUTO: 0.1 10*3/MM3 (ref 0–0.4)
EOSINOPHIL NFR BLD AUTO: 1.6 % (ref 0.3–6.2)
ERYTHROCYTE [DISTWIDTH] IN BLOOD BY AUTOMATED COUNT: 13.5 % (ref 12.3–15.4)
GLOBULIN SER CALC-MCNC: 2.3 GM/DL
GLUCOSE SERPL-MCNC: 105 MG/DL (ref 65–99)
HCT VFR BLD AUTO: 35.2 % (ref 34–46.6)
HGB BLD-MCNC: 11.5 G/DL (ref 12–15.9)
IMM GRANULOCYTES # BLD AUTO: 0.02 10*3/MM3 (ref 0–0.05)
IMM GRANULOCYTES NFR BLD AUTO: 0.3 % (ref 0–0.5)
LYMPHOCYTES # BLD AUTO: 1.35 10*3/MM3 (ref 0.7–3.1)
LYMPHOCYTES NFR BLD AUTO: 21 % (ref 19.6–45.3)
MCH RBC QN AUTO: 30.7 PG (ref 26.6–33)
MCHC RBC AUTO-ENTMCNC: 32.7 G/DL (ref 31.5–35.7)
MCV RBC AUTO: 93.9 FL (ref 79–97)
MONOCYTES # BLD AUTO: 0.4 10*3/MM3 (ref 0.1–0.9)
MONOCYTES NFR BLD AUTO: 6.2 % (ref 5–12)
NEUTROPHILS # BLD AUTO: 4.51 10*3/MM3 (ref 1.7–7)
NEUTROPHILS NFR BLD AUTO: 70.3 % (ref 42.7–76)
NRBC BLD AUTO-RTO: 0 /100 WBC (ref 0–0.2)
PLATELET # BLD AUTO: 297 10*3/MM3 (ref 140–450)
POTASSIUM SERPL-SCNC: 4 MMOL/L (ref 3.5–5.2)
PROT SERPL-MCNC: 6.9 G/DL (ref 6–8.5)
RBC # BLD AUTO: 3.75 10*6/MM3 (ref 3.77–5.28)
SODIUM SERPL-SCNC: 142 MMOL/L (ref 136–145)
WBC # BLD AUTO: 6.42 10*3/MM3 (ref 3.4–10.8)

## 2020-01-20 PROCEDURE — 99213 OFFICE O/P EST LOW 20 MIN: CPT | Performed by: INTERNAL MEDICINE

## 2020-01-20 RX ORDER — KETOCONAZOLE 20 MG/ML
SHAMPOO TOPICAL
COMMUNITY
Start: 2019-11-05 | End: 2020-02-25

## 2020-01-20 RX ORDER — VENLAFAXINE HYDROCHLORIDE 150 MG/1
TABLET, EXTENDED RELEASE ORAL
COMMUNITY
Start: 2019-11-05 | End: 2020-02-25

## 2020-01-20 RX ORDER — LEVOTHYROXINE SODIUM 25 MCG
TABLET ORAL
COMMUNITY
Start: 2019-11-06 | End: 2020-02-25

## 2020-01-20 NOTE — PROGRESS NOTES
Subjective     Noemi Bartholomew is a 59 y.o. female who presents with   Chief Complaint   Patient presents with   • Neck Surgery     follow up       History of Present Illness     Three surgeries in the last two months.  Status post December 6, 2019- Anterior cervical corpectomy C6, C7 with PEEK cage and anterior Zevo cervical plate from C5 to T1.    Status post December 19, 2019  1.  Placement of external lumbar drain; 2. Drainage of cervical CSF collection and reclosure of CSF leak.  Lumbar drain removed December 24, 2019.    LUMBAR PERITONEAL SHUNT PLACEMENT on 1/10/2020.    She went to rehab between each and home finally on 1/12/2020.  Overall doing well but she is weak.      Review of Systems   Constitutional: Positive for fatigue.   Respiratory: Negative.    Cardiovascular: Negative.    Musculoskeletal: Positive for gait problem.   Neurological: Positive for numbness.       The following portions of the patient's history were reviewed and updated as appropriate: allergies, current medications and problem list.    Patient Active Problem List    Diagnosis Date Noted   • Postoperative cerebrospinal fluid leak 01/09/2020   • Cervical myelopathy (CMS/[facility]) 12/13/2019   • Cord compression (CMS/[facility]) 12/09/2019   • Cervical stenosis of spine 12/06/2019   • Ossification of spinal ligament 11/22/2019     Note Last Updated: 11/22/2019     Added automatically from request for surgery [id number]     • Cervical spondylosis with myelopathy 11/22/2019     Note Last Updated: 11/22/2019     Added automatically from request for surgery [id number]     • Postoperative CSF leak 11/22/2019     Note Last Updated: 12/11/2019     Added automatically from request for surgery [id number]     • Neuropathic pain, leg, left 06/25/2018   • DDD (degenerative disc disease), lumbar 05/02/2018   • Chronic left lumbar radiculopathy 05/02/2018   • Congenital spondylolysis, lumbosacral region 05/02/2018   • Carpal tunnel syndrome 06/08/2016   • Hyperlipidemia  "06/08/2016   • Osteoporosis 06/08/2016     Note Last Updated: 6/8/2016     Description: Boniva for a number of years.  Forteo for a year.  Managed by Gyn. .  I do recommend 1200mg calcium and 1000 IUs of vitamin D in supplements/diet as well as weight bearing exercise to prevent further decline in BMD.         Current Outpatient Medications on File Prior to Visit   Medication Sig Dispense Refill   • atorvastatin (LIPITOR) 20 MG tablet Take 20 mg by mouth Every Night.     • Calcium Carb-Cholecalciferol (CALCIUM 1000 + D PO) Take  by mouth Daily. 2 caps     • cholecalciferol (VITAMIN D3) 10 MCG (400 UNIT) tablet Take 2,000 Units by mouth Daily.     • gabapentin (NEURONTIN) 100 MG capsule Take 1 capsule by mouth Every Night. 30 capsule 0   • ketoconazole (NIZORAL) 2 % shampoo      • SYNTHROID 25 MCG tablet      • venlafaxine (EFFEXOR) 150 MG tablet sustained-release 24 hour 24 hr tablet      • [DISCONTINUED] HYDROcodone-acetaminophen (NORCO) 5-325 MG per tablet Take 1 tablet by mouth Every 8 (Eight) Hours As Needed for Moderate Pain . 30 tablet 0     No current facility-administered medications on file prior to visit.        Objective     /74   Pulse 88   Ht 134.6 cm (52.99\")   Wt 37 kg (81 lb 9.6 oz)   SpO2 97%   BMI 20.43 kg/m²     Physical Exam   Constitutional: She is oriented to person, place, and time. She appears well-developed and well-nourished.   HENT:   Head: Normocephalic and atraumatic.   Pulmonary/Chest: Effort normal.   Neurological: She is alert and oriented to person, place, and time.   Psychiatric: She has a normal mood and affect. Her behavior is normal.       Assessment/Plan   Noemi was seen today for neck surgery.    Diagnoses and all orders for this visit:    Anemia, unspecified type  -     CBC & Differential  -     Comprehensive Metabolic Panel    Cervical spondylosis with myelopathy    Postoperative CSF leak        Discussion    Patient is seen today from above three surgeries.  She is " overall doing well and has neurosurgical follow up this week.  Check labs to f/u on post op anemia.  Continue with therapy.  10/15 minutes was spent in counseling of the following topics:, impressions      Current outpatient and discharge medications have been reconciled for the patient.  Reviewed by: Mary Lou Carroll MD         Future Appointments   Date Time Provider Department Center   1/24/2020  9:45 AM Antonio Noe MD [facility] None   6/24/2020  8:20 AM [facility] None   6/29/2020  7:45 AM Mary Lou Carroll MD [facility] None

## 2020-01-24 ENCOUNTER — OFFICE VISIT (OUTPATIENT)
Dept: NEUROSURGERY | Facility: CLINIC | Age: 60
End: 2020-01-24

## 2020-01-24 VITALS
WEIGHT: 81 LBS | BODY MASS INDEX: 18.74 KG/M2 | DIASTOLIC BLOOD PRESSURE: 80 MMHG | HEART RATE: 85 BPM | HEIGHT: 55 IN | SYSTOLIC BLOOD PRESSURE: 118 MMHG

## 2020-01-24 DIAGNOSIS — Z09 SURGICAL FOLLOWUP VISIT: Primary | ICD-10-CM

## 2020-01-24 PROCEDURE — 99024 POSTOP FOLLOW-UP VISIT: CPT | Performed by: NURSE PRACTITIONER

## 2020-01-24 NOTE — PROGRESS NOTES
PPS CMG Coordinator  Inpatient Rehabilitation Discharge    Mode of Locomotion: Walking.    Discharge Against Medical Advice:  No.  Discharge Information  Patient Discharged Alive:  Yes  Discharge Destination/Living Setting: Short-term [facility]  Diagnosis for Interruption/Death: ICD    Impairment Group: 08.9 Other Orthopedic    Comorbidities: ICD    Complications: ICD    QUALITY INDICATORS  Section J Health Conditions: Fall(s) Since Admission:  No    Section M. Skin Conditions Discharge:  Unhealed Pressure Ulcer(s) at Stage 1 or  Higher:  No    . Current Number of Unhealed Pressure Ulcers  Branch    Section N. Medication:  Medication Intervention: N/A - There were no potential clinically significant  medication issues identified since admission or patient is not taking any  medications.    Signed by: Heath Whiting RN

## 2020-01-24 NOTE — PROGRESS NOTES
Per staff report.    Section B. Hearing, Speech, Vision: Expression of Ideas and Wants: Expresses  complex messages without difficulty and with speech that is clear and easy to  understand.  Understanding Verbal and Non-Verbal Content: Understands: Clear comprehension  without cues or repetitions.    Section C. Cognitive Patterns: Branch    Signed by: Heath Whiting RN

## 2020-01-24 NOTE — PROGRESS NOTES
Subjective   Patient ID: Noemi Bartholomew is a 59 y.o. female is here today for follow-up.  She is 2 weeks out from lumbar peritoneal shunt placement 1/10/20 and 8 weeks out from a ACDFC C6-C7 corpectomy  by Dr. Noe. She states she is fatigued and has no stamina.  She complains of constant slight non-positional dull TREJO. Ms. Bartholomew denies any incision problem. She takes otc Tylenol prn for pain.     Ms. Bartholomew returns the office today for her first postoperative evaluation.  She had a very long, difficult postoperative recovery.  She developed a CSF leak after the initial surgery which continue to progress slowly over time.  It became much more noticeable after she had been discharged to [facility] acute rehab.  He had developed a sizable fluid collection underneath the anterior cervical incision.  There was no associated pain no fever.  She was subsequently taken back to the operating room for cleanout of the cervical wound, attempted repair the CSF leak and placement of a lumbar drain.  She recovered well and went back to acute rehab.  We were contacted 1 to 2 weeks later and informed that there was evidence of some return swelling beneath the cervical incision, however the collection was not nearly as severe as what she had previously.  She went back to the operating room this time for placement of a lumboperitoneal shunt.  Prior to that surgery she had already made tremendous improvements in her walking and hand strength.  She did suffer from some radiculitis immediately after the first surgery.  She was on gabapentin 100 mg twice daily.  It was helping.  Due to some persistent episodes of drowsiness, the dosage was reduced to 100 mg nightly at time of discharge.  She is now home and doing well however she is beginning to feel some more discomfort in her hands as well as some associated stiffness.  She is walking.  She is here today without the walker.  She is still getting home PT and OT.    Extremity Weakness     The pain is present in the left hand and right hand. The current episode started more than 1 month ago. The problem occurs daily. The problem has been gradually improving. The quality of the pain is described as aching and burning. The pain is at a severity of 3/10. The pain is mild. Associated symptoms include numbness, stiffness and tingling. Pertinent negatives include no fever or inability to bear weight. The treatment provided mild relief.   Hand Pain    The incident occurred more than 1 week ago. There was no injury mechanism. The pain is present in the right hand and left hand. The quality of the pain is described as burning and cramping. The pain is at a severity of 3/10. Associated symptoms include muscle weakness, numbness and tingling. Treatments tried: Home occupational therapy and physical therapy. The treatment provided mild relief.       Review of Systems   Constitutional: Positive for fatigue. Negative for fever.   Musculoskeletal: Positive for extremity weakness and stiffness.   Neurological: Positive for tingling, weakness and numbness.   All other systems reviewed and are negative.    Objective   Physical Exam   Constitutional: She appears well-developed. No distress.   Skin: Skin is warm and dry.   Anterior cervical incision is well approximated. No redness, swelling or drainage.    Psychiatric: She has a normal mood and affect.   Vitals reviewed.    Neurologic Exam    Assessment/Plan   Independent Review of Radiographic Studies:      No new radiographic images to review.    Medical Decision Making:      Ms. Bartholomew returns the office today for postoperative evaluation.  She is here today with her sister.  She is making some progress but given the returning and hand pain and discomfort, I have changed the gabapentin dose back to 100 mg twice daily.    The patient was strongly encouraged to undergo outpatient PT and OT when she finishes with home therapies.  She is an  and would like to  return to work at some point although she is not ready to do that now.    Patient will follow-up in the office in 4 weeks for reevaluation.    Noemi was seen today for post-op.    Diagnoses and all orders for this visit:    Surgical followup visit  -     Ambulatory Referral to Physical Therapy Evaluate and treat; ROM, Strengthening; (work on gait training, build stamina and endurance to prepare for return to work)  -     Ambulatory Referral to Occupational Therapy      Return in about 1 month (around 2/24/2020).                 Answers for HPI/ROS submitted by the patient on 1/24/2020   How long have you been having these symptoms?: 1-4 weeks ago

## 2020-01-24 NOTE — PROGRESS NOTES
Week 1                       Week 2  Occupational Therapy  Individual           420                          -    Signed by: Heath Whiting RN

## 2020-01-31 ENCOUNTER — TREATMENT (OUTPATIENT)
Dept: PHYSICAL THERAPY | Facility: CLINIC | Age: 60
End: 2020-01-31

## 2020-01-31 DIAGNOSIS — R29.898 WEAKNESS OF BOTH HANDS: ICD-10-CM

## 2020-01-31 DIAGNOSIS — R26.81 GAIT INSTABILITY: ICD-10-CM

## 2020-01-31 DIAGNOSIS — R53.1 WEAKNESS GENERALIZED: Primary | ICD-10-CM

## 2020-01-31 DIAGNOSIS — R26.89 IMBALANCE: ICD-10-CM

## 2020-01-31 PROCEDURE — 97161 PT EVAL LOW COMPLEX 20 MIN: CPT | Performed by: PHYSICAL THERAPIST

## 2020-01-31 PROCEDURE — 97110 THERAPEUTIC EXERCISES: CPT | Performed by: PHYSICAL THERAPIST

## 2020-02-03 NOTE — PATIENT INSTRUCTIONS
See exercise flow sheet for HEP issued today.   Already has several strengthening exercises for UE with bands from . She is walking for exercise daily.

## 2020-02-04 ENCOUNTER — TREATMENT (OUTPATIENT)
Dept: PHYSICAL THERAPY | Facility: CLINIC | Age: 60
End: 2020-02-04

## 2020-02-04 DIAGNOSIS — R29.898 WEAKNESS OF BOTH HANDS: ICD-10-CM

## 2020-02-04 DIAGNOSIS — R26.81 GAIT INSTABILITY: ICD-10-CM

## 2020-02-04 DIAGNOSIS — R53.1 WEAKNESS GENERALIZED: Primary | ICD-10-CM

## 2020-02-04 DIAGNOSIS — R26.89 IMBALANCE: ICD-10-CM

## 2020-02-04 PROCEDURE — 97110 THERAPEUTIC EXERCISES: CPT | Performed by: PHYSICAL THERAPIST

## 2020-02-04 PROCEDURE — 97530 THERAPEUTIC ACTIVITIES: CPT | Performed by: PHYSICAL THERAPIST

## 2020-02-04 NOTE — PROGRESS NOTES
Physical Therapy Daily Progress Note        Noemi Bartholomew reports: that she feels weak in general from hospital/rehab stay for cervical surgery.  Feels that her balance is off as well and doesn't feel safe with steps, etc.   Hands bothersome as well.    Subjective     Objective   See Exercise, Manual, and Modality Logs for complete treatment.   Exercise rationale/ pain free exercise performance  Anatomy and structure of affected musculature  Posture/Postural awareness  Lumbar support  Sleeping positions with pillows  Alternate exercise positions      Added: step up with knee , NuStep x 5 min Lv 3, sink squats, standing heel/toe raises, standing weight shift      Assessment/Plan  Benefits from re instruction of previous session weightbearing exercises to ensure proper performance/technique, etc.  Able to progress/perform additional weightbearing exercises/activities without increased symptoms/discomfort only early fatigue of isolated mm groups.      Progress strengthening /stabilization /functional activity to include SLS, balance board, clamshells and bridges           Manual Therapy:         mins  01357;  Therapeutic Exercise:  24       mins  72938;     Neuromuscular Jared:        mins  72440;    Therapeutic Activity:     18     mins  35224;     Gait Training:           mins  39280;     Ultrasound:          mins  69726;    Electrical Stimulation:         mins  66844 ( );      Timed Treatment: 42     mins   Total Treatment:   42     mins    Chris Vidales PTA    Physical Therapist Assistant [place] [id number]

## 2020-02-05 ENCOUNTER — TREATMENT (OUTPATIENT)
Dept: PHYSICAL THERAPY | Facility: CLINIC | Age: 60
End: 2020-02-05

## 2020-02-05 DIAGNOSIS — R53.1 WEAKNESS GENERALIZED: Primary | ICD-10-CM

## 2020-02-05 DIAGNOSIS — R29.898 WEAKNESS OF BOTH HANDS: ICD-10-CM

## 2020-02-05 DIAGNOSIS — R26.81 GAIT INSTABILITY: ICD-10-CM

## 2020-02-05 DIAGNOSIS — R26.89 IMBALANCE: ICD-10-CM

## 2020-02-05 PROCEDURE — 97110 THERAPEUTIC EXERCISES: CPT | Performed by: PHYSICAL THERAPIST

## 2020-02-05 PROCEDURE — 97530 THERAPEUTIC ACTIVITIES: CPT | Performed by: PHYSICAL THERAPIST

## 2020-02-05 PROCEDURE — 97112 NEUROMUSCULAR REEDUCATION: CPT | Performed by: PHYSICAL THERAPIST

## 2020-02-05 NOTE — PROGRESS NOTES
Physical Therapy Daily Progress Note        Noemi [place] reports: did ok with exercises/actiivties yesterday.      Subjective reports some right side lower back tightness today.    Objective   See Exercise, Manual, and Modality Logs for complete treatment.   -sleeping positions with pillows  -strategies to get comfortable with couch/chair sitting.  Log roll technique, brides for bed mobility, abdominal awareness with activity.    Added:  Balance board fwd/bwd and side/side x 2 min each, 3 way SLS on blue foam disc each LE x 3 each direction,  Bridges with ppt 2 x 10, supine clamshells with yellow t band x 10, sktc, LTR    Increased Nu Step time and time/reps with Step up/knee      Assessment/Plan Compliant/Cooperative with rehab efforts.  Subjectively reports no increased symptoms or discomfort with therapeutic exercise today.  Able to perform increased exercise/functional activity without increased LBP. Benefits from frequent rest breaks due to fatigue..  Should continue to improve with continued PT intervention.      Progress strengthening /stabilization /functional activity for affected musculature.  Will be seen next visit for hands/UE then LE on 2/12/20             Manual Therapy:         mins  59879;  Therapeutic Exercise:    26     mins  41436;     Neuromuscular Jared:    10    mins  04277;    Therapeutic Activity:     10     mins  02589;     Gait Training:           mins  12630;     Ultrasound:          mins  98894;    Electrical Stimulation:         mins  92050 ( );      Timed Treatment: 46     mins   Total Treatment:     46   mins    Chris Vidales PTA    Physical Therapist Assistant [place] [id number]

## 2020-02-06 NOTE — PROGRESS NOTES
Subjective   Patient ID: Noemi Bartholomew is a 59 y.o. female is here today for a p/o 2 for a lumbar peritoneal shunt/ C6/7 ACDF on 1/10/20.  Last seen on 1/24/20 and pt reported upper extremity weakness and bilateral hand pain.  No new imaging. Pt has been [facility]/PT. Today pt reported that her upper extremity weakness and hand pain has not resolved.  Pt feels that PT/OT is beneficial.    Ms. Bartholomew returns for post operative evaluation. She is doing better. Her walking is better. She can ascend her stairs at home without any problems. She denies any new swelling of the anterior cervical incision. She is healing well from lumbar shunt placement. She is still having issues with strength in her upper extremities especially her hands. She is still having burning pain in the hands but the gabapentin is helping. We had lowered her dose at her last office visit but she contacted us due the hand pain. Her gabapentin dose was increased from 100 to 200 mg at night. She reports that she has been seen by Dr. Lipscomb in the past for carpal tunnel syndrome.       The following portions of the patient's history were reviewed and updated as appropriate: allergies, current medications, past family history, past medical history, past social history, past surgical history and problem list.    Review of Systems   Constitutional: Positive for activity change.   Musculoskeletal: Negative for neck pain.   Neurological: Positive for weakness (bilateral arms/hands) and numbness (bilateral hands).   Psychiatric/Behavioral: Negative for sleep disturbance.   All other systems reviewed and are negative.      Objective   Physical Exam   Constitutional: She is oriented to person, place, and time. She appears well-developed. No distress.   Neurological: She is alert and oriented to person, place, and time.   Ms. Bartholomew is having issues with her right fifth finger.  When she makes a fist her finger stays outstretched.  She is  having some stiffness in the fifth finger and a little bit of pain at the base of the finger.      Continues to have weakness in the upper extremities as follows: Bilateral deltoid, bicep 5/5; right tricep 4-/5, left tricep 4+/5; right wrist extensor 4/5, left wrist extensor 3-/5; bilateral intrinsics 3/5; bilateral  strength 4+/5.    Slight muscle atrophy noted of the left adductor pollicus muscle at the base of the left thumb. Stiff R 5th finger, no swelling or warmth noted.     + Tinel's and Phalen's bilateral wrists.    Skin: Skin is warm and dry.   Anterior cervical incision is healed and the surrounding tissue is flat and soft.  No tenderness palpation.  The right-sided lumbar and right abdominal incisions are also healed.      Psychiatric: She has a normal mood and affect.   Vitals reviewed.    Neurologic Exam     Mental Status   Oriented to person, place, and time.        Assessment/Plan     Medical Decision Making:      Ms. Bartholomew returns to the office for postoperative evaluation.  She has started outpatient physical therapy at [facility].  They are working on gait strengthening as well as arm and hand strengthening exercises.  She has not noticed much improvement in her arm and hand strength.  She still continues to have some burning sensations in the hand.  In reviewing the notes, the patient was evaluated previously by Dr. Lipscomb with hand surgery.  According to his note the patient does have carpal tunnel syndrome.  It is possible that the hand weakness and pain issues are coming from that.  I will have Ms. Bartholomew see Dr. Lipscomb again for reevaluation of the hands.    Ms. Bartholomew is an .  She has remained off work.  Due to the weakness in her hands, I do not see any way that she could perform her job duties at this time.  I have given her an off work note.  The timing for her return to work is still uncertain at this point.    Ms. Bartholomew will continue with her outpatient physical  therapy.  She will return to the office for reevaluation with Dr. Noe in 3 to 4 weeks with new cervical spine x-rays.     Noemi was seen today for post-op.    Diagnoses and all orders for this visit:    Surgical followup visit  -     Ambulatory Referral to Hand Surgery  -     XR spine cervical ap and lat w flex and ext; Future    Weakness of both hands  -     Ambulatory Referral to Hand Surgery  -     XR spine cervical ap and lat w flex and ext; Future    Bilateral carpal tunnel syndrome  -     Ambulatory Referral to Hand Surgery  -     XR spine cervical ap and lat w flex and ext; Future    Weakness of both arms  -     Ambulatory Referral to Hand Surgery  -     XR spine cervical ap and lat w flex and ext; Future      Return in about 1 month (around 3/20/2020) for Dr. Hasmukh bartholomew new xrays.

## 2020-02-10 ENCOUNTER — TREATMENT (OUTPATIENT)
Dept: PHYSICAL THERAPY | Facility: CLINIC | Age: 60
End: 2020-02-10

## 2020-02-10 DIAGNOSIS — R53.1 WEAKNESS GENERALIZED: Primary | ICD-10-CM

## 2020-02-10 DIAGNOSIS — R26.89 IMBALANCE: ICD-10-CM

## 2020-02-10 DIAGNOSIS — R29.898 WEAKNESS OF BOTH HANDS: ICD-10-CM

## 2020-02-10 DIAGNOSIS — R26.81 GAIT INSTABILITY: ICD-10-CM

## 2020-02-10 PROCEDURE — 97110 THERAPEUTIC EXERCISES: CPT | Performed by: PHYSICAL THERAPIST

## 2020-02-10 PROCEDURE — 97140 MANUAL THERAPY 1/> REGIONS: CPT | Performed by: PHYSICAL THERAPIST

## 2020-02-10 NOTE — PROGRESS NOTES
Physical Therapy Daily Progress Note    Visit #: 4  Noemi [place] reports: no problems after the last 2 treatments. I cannot do much with my hands. It is really hard to cut food and open anything because they are both so weak.   Pain Scale (0-10):     Subjective       Objective       Tests     Right Wrist/Hand   Positive extrinsic extensor tightness (SF is most evident).      See Exercise, Manual, and Modality Logs for complete treatment.     PROCEDURES AND MODALITIES:  Paraffin:   pre-rx  Moist Heat:    Ice:   post-rx  E-Stim:    Ultrasound:    Ionto:   Traction:    Dry Needling:         Manual PT 12090 8 minutes and Therapy Exercise [id number] 31 minutes     Timed Code Treatment: 39 Minutes  Total Treatment Time: 45 Minutes    Assessment/Plan  She has hypomobility in her DIP joints. Increased mobility post manual treatment today. Tolerated strengthening well. Encouraged comp flexion stretching of SF frequently.     Progress strengthening /stabilization /functional activity      Nancy Diego PT, DPT, CHT  Physical Therapist  [place] License # [id number]

## 2020-02-12 ENCOUNTER — TREATMENT (OUTPATIENT)
Dept: PHYSICAL THERAPY | Facility: CLINIC | Age: 60
End: 2020-02-12

## 2020-02-12 DIAGNOSIS — R53.1 WEAKNESS GENERALIZED: Primary | ICD-10-CM

## 2020-02-12 DIAGNOSIS — R26.81 GAIT INSTABILITY: ICD-10-CM

## 2020-02-12 DIAGNOSIS — R26.89 IMBALANCE: ICD-10-CM

## 2020-02-12 PROCEDURE — 97110 THERAPEUTIC EXERCISES: CPT | Performed by: PHYSICAL THERAPIST

## 2020-02-12 PROCEDURE — 97112 NEUROMUSCULAR REEDUCATION: CPT | Performed by: PHYSICAL THERAPIST

## 2020-02-14 ENCOUNTER — TREATMENT (OUTPATIENT)
Dept: PHYSICAL THERAPY | Facility: CLINIC | Age: 60
End: 2020-02-14

## 2020-02-14 DIAGNOSIS — R42 DIZZINESS: Primary | ICD-10-CM

## 2020-02-14 PROCEDURE — 95992 CANALITH REPOSITIONING PROC: CPT | Performed by: PHYSICAL THERAPIST

## 2020-02-14 PROCEDURE — 97161 PT EVAL LOW COMPLEX 20 MIN: CPT | Performed by: PHYSICAL THERAPIST

## 2020-02-14 NOTE — PROGRESS NOTES
Physical Therapy Initial Evaluation-  Vestibular Therapy    Patient Name: Noemi LIU [place]       Patient MRN: [id number]  : 1960  Physician: self refer  Date: 2020  Encounter Diagnoses   Name Primary?   • Dizziness Yes     Objective Testing:  Positional Testing  Positional Testing: With infrared goggles  Vertebrobasilar Artery Screen - Right: Negative  Vertebrobasilar Artery Screen - Left: Negative  Cocoa-Hallpike Right: Downbeat, right rotatory nystagmus  Pa-Hallpike Right Onset Time : 5 sec  Pa-Hallpike Right Duration Time : > 1 min  Cocoa-Hallpike Left: Upbeat, left rotatory nystagmus  Pa-Hallpike Left Onset Time : immediate  Cocoa-Hallpike Left Duration Time : 20 sec  Horizontal Roll Test Right: (NA)  Horizontal Roll Test Left: (NA)   DHI: 30/100, minimal perception of handicap    THERAPY ASSESSMENT: Patient is a 59 y.o. female. Patient presents to physical therapy due to complaints of episodes of dizziness that started about 8 months ago. She reports the dizziness has worsened since having surgery on her neck. She describes episodes of vertigo that occur when she gets into and out of bed lasting seconds. Signs and symptoms are consistent with L PC canalithiasis and R AC cupulolithiasis BPPV.  L side was more symptomatic thus it was treated with CRP today. Tolerated well and given as HEP. Deferred testing of LCs today. Patient is a good candidate for physical therapy to address the following:  Functional Limitations: Walking, Difficulty moving, Decreased ability to perform ADL's, Decreased ability to perform critical demands of job   Length of Therapy: 1 month   PT Frequency: PT 1x week   PT Interventions: Home Exercise Program, CRP   Patient Agrees with Plan of Care: Yes    REHAB POTENTIAL: excellent            SHORT TERM GOALS: To be met in 2 weeks:  1. Patient is independent with HEP.  2. Patient will report at least 30% improvement in overall condition.  LONG TERM GOALS:To be met in 4 weeks:  1.  Patient will report decreased disequilibrium/dizziness by at least 90%.  2. Patient will report no loss of balance with ADLs to demonstrate improved functional balance.  3. Patient denies dizziness with daily activity.  4. DHI score is less than 10.     Other Procedure CPT 85439 minutes 0    Timed Code Treatment: 0   Minutes     Total Treatment Time: 60      Minutes      PT SIGNATURE: Nancy Diego PT, DPT, CHT   [place] license #[id number]  DATE TREATMENT INITIATED: 2/17/2020     Initial Certification  Certification Period: 5/17/2020  I certify that the therapy services are furnished while this patient is under my care.  The services outlined above are required by this patient, and will be reviewed every 90 days.     PHYSICIAN:    DATE:     Please sign and return via fax to [phone].. Thank you, [facility].

## 2020-02-17 ENCOUNTER — TREATMENT (OUTPATIENT)
Dept: PHYSICAL THERAPY | Facility: CLINIC | Age: 60
End: 2020-02-17

## 2020-02-17 DIAGNOSIS — R53.1 WEAKNESS GENERALIZED: Primary | ICD-10-CM

## 2020-02-17 DIAGNOSIS — R26.89 IMBALANCE: ICD-10-CM

## 2020-02-17 DIAGNOSIS — R29.898 WEAKNESS OF BOTH HANDS: ICD-10-CM

## 2020-02-17 DIAGNOSIS — R26.81 GAIT INSTABILITY: ICD-10-CM

## 2020-02-17 PROCEDURE — 97110 THERAPEUTIC EXERCISES: CPT | Performed by: PHYSICAL THERAPIST

## 2020-02-17 NOTE — PROGRESS NOTES
Physical Therapy Daily Progress Note    Visit #: 6  Noemi [place] reports: No matter how much I work on my SF I still cannot get it to bend. I have CTS in both hands. I had an injection in the R hand before I had neck surgery. I coud not tell that it did anything because I was having so much tingling from my neck. My L hand hurts all the time.   Pain Scale (0-10):     Subjective       Objective   See Exercise, Manual, and Modality Logs for complete treatment.     PROCEDURES AND MODALITIES:  Paraffin:   pre-rx  Moist Heat:    Ice:   post-rx  E-Stim:    Ultrasound:    Ionto:   Traction:    Dry Needling:         Manual PT 46644 6 minutes and Therapy Exercise [id number] 35 minutes     Timed Code Treatment: 41 Minutes  Total Treatment Time: 41 Minutes    Assessment/Plan  Patient has hx of CTS that maybe contributing to pain in hands. I suspect she has radicular symptoms from neck affecting the RF and SF. She demonstrates active flexion of DIP joint indicating FDP is attached. She had surgery at levels C6,7, therefore it seems as though the ulnar nerve may not have been disturbed. It is possible she has compression at another area such as cubital tunnel. She may need to follow up with hand specialist if no further improvement.   Progress per Plan of Care      Nancy Diego, PT, DPT, CHT  Physical Therapist  [place] License # [id number]

## 2020-02-19 ENCOUNTER — TREATMENT (OUTPATIENT)
Dept: PHYSICAL THERAPY | Facility: CLINIC | Age: 60
End: 2020-02-19

## 2020-02-19 DIAGNOSIS — R26.81 GAIT INSTABILITY: ICD-10-CM

## 2020-02-19 DIAGNOSIS — R26.89 IMBALANCE: ICD-10-CM

## 2020-02-19 DIAGNOSIS — R53.1 WEAKNESS GENERALIZED: Primary | ICD-10-CM

## 2020-02-19 PROCEDURE — 97110 THERAPEUTIC EXERCISES: CPT | Performed by: PHYSICAL THERAPIST

## 2020-02-19 PROCEDURE — 97112 NEUROMUSCULAR REEDUCATION: CPT | Performed by: PHYSICAL THERAPIST

## 2020-02-20 ENCOUNTER — OFFICE VISIT (OUTPATIENT)
Dept: NEUROSURGERY | Facility: CLINIC | Age: 60
End: 2020-02-20

## 2020-02-20 VITALS
WEIGHT: 83 LBS | BODY MASS INDEX: 19.21 KG/M2 | HEART RATE: 80 BPM | SYSTOLIC BLOOD PRESSURE: 110 MMHG | HEIGHT: 55 IN | DIASTOLIC BLOOD PRESSURE: 70 MMHG

## 2020-02-20 DIAGNOSIS — Z09 SURGICAL FOLLOWUP VISIT: Primary | ICD-10-CM

## 2020-02-20 DIAGNOSIS — G56.03 BILATERAL CARPAL TUNNEL SYNDROME: ICD-10-CM

## 2020-02-20 DIAGNOSIS — R29.898 WEAKNESS OF BOTH HANDS: ICD-10-CM

## 2020-02-20 DIAGNOSIS — R29.898 WEAKNESS OF BOTH ARMS: ICD-10-CM

## 2020-02-20 PROCEDURE — 99024 POSTOP FOLLOW-UP VISIT: CPT | Performed by: NURSE PRACTITIONER

## 2020-02-21 RX ORDER — GABAPENTIN 100 MG/1
100 CAPSULE ORAL 2 TIMES DAILY
Qty: 60 CAPSULE | Refills: 3 | Status: SHIPPED | OUTPATIENT
Start: 2020-02-21 | End: 2020-04-13 | Stop reason: CLARIF

## 2020-02-21 NOTE — TELEPHONE ENCOUNTER
Patient saw Audrey yesterday and she was supposed to have sent a script for Gabapentin 100 mg BID to her pharmacy and has not    Please sign off on it

## 2020-02-24 ENCOUNTER — TREATMENT (OUTPATIENT)
Dept: PHYSICAL THERAPY | Facility: CLINIC | Age: 60
End: 2020-02-24

## 2020-02-24 DIAGNOSIS — R26.89 IMBALANCE: ICD-10-CM

## 2020-02-24 DIAGNOSIS — R26.81 GAIT INSTABILITY: ICD-10-CM

## 2020-02-24 DIAGNOSIS — R53.1 WEAKNESS GENERALIZED: Primary | ICD-10-CM

## 2020-02-24 PROCEDURE — 97110 THERAPEUTIC EXERCISES: CPT | Performed by: PHYSICAL THERAPIST

## 2020-02-24 PROCEDURE — 97112 NEUROMUSCULAR REEDUCATION: CPT | Performed by: PHYSICAL THERAPIST

## 2020-02-25 ENCOUNTER — TELEPHONE (OUTPATIENT)
Dept: NEUROSURGERY | Facility: CLINIC | Age: 60
End: 2020-02-25

## 2020-02-25 NOTE — TELEPHONE ENCOUNTER
I called and spoke with the patient and let her know that I have faxed the records and they will be calling her to schedule.  The patient mentioned they have already called to schedule.

## 2020-02-25 NOTE — TELEPHONE ENCOUNTER
I called and told the patient Dr. Noe has the forms to fill out his portion and then we will be able to get them sent.

## 2020-03-04 ENCOUNTER — TREATMENT (OUTPATIENT)
Dept: PHYSICAL THERAPY | Facility: CLINIC | Age: 60
End: 2020-03-04

## 2020-03-04 DIAGNOSIS — R26.81 GAIT INSTABILITY: ICD-10-CM

## 2020-03-04 DIAGNOSIS — R53.1 WEAKNESS GENERALIZED: Primary | ICD-10-CM

## 2020-03-04 DIAGNOSIS — R29.898 WEAKNESS OF BOTH HANDS: ICD-10-CM

## 2020-03-04 DIAGNOSIS — R26.89 IMBALANCE: ICD-10-CM

## 2020-03-04 PROCEDURE — 97110 THERAPEUTIC EXERCISES: CPT | Performed by: PHYSICAL THERAPIST

## 2020-03-04 NOTE — PROGRESS NOTES
Re-Assessment / Re-Certification  Patient: Noemi Bartholomew   : 1960  Diagnosis/ICD-10 Code:  Weakness generalized [R53.1]  Referring practitioner: MIRYAM Honeycutt  Date of Initial Visit: Episode Type: THERAPY  Noted: 2020  Today's Date: 3/4/2020  Visit #: 9  Subjective:   Noemi Bartholomew reports: My walking and balance is better. I am more confident walking now. My stamina is a little better. I am frustrated with my hands. They still hurt all the time. I don't feel like they are stronger. I don't feel like I could go back to work yet. I am still not driving because it is too hard to turn the steering wheel. My R SF and RF are still not working right. I am seeing the hand doctor on Friday.   Subjective Questionnaire: ABC: 77% balance confidence (Improved from 67%)  Clinical Progress: improved  Home Program Compliance: Yes  Treatment has included: therapeutic exercise, neuromuscular re-education and therapeutic activity    Subjective Evaluation    Pain  At worst pain ratin (in B hands, L shoulder)  Quality: dull ache (in B hands)         Objective       Static Posture     Comments  Static balance: tandem firm surface: EO: 30 sec, EC: 25 sec      Strength/Myotome Testing     Left Shoulder     Planes of Motion   Flexion: 4   Abduction: 4-     Isolated Muscles   Biceps: 4   Triceps: 3+     Right Shoulder     Planes of Motion   Flexion: 4   Abduction: 4     Isolated Muscles   Biceps: 4   Triceps: 4-     Left Wrist/Hand   Wrist extension: 4-  Wrist flexion: 4-     (2nd hand position)   lbs: 11    Thumb Strength  Palmar/Three-Point Pinch     Trial 1: 4 lbs    Right Wrist/Hand   Wrist extension: 4  Wrist flexion: 4     (2nd hand position)   lbs: 14    Thumb Strength   Palmar/Three-Point Pinch     Trial 1: 4 lbs    Left Hip   Planes of Motion   Flexion: 4    Right Hip   Planes of Motion   Flexion: 4    Left Knee   Flexion: 4  Extension: 4+    Right Knee   Flexion: 4  Extension: 4+    Left Ankle/Foot    Dorsiflexion: 4-    Right Ankle/Foot   Dorsiflexion: 4    Ambulation   Weight-Bearing Status   Assistive device used: none    Ambulation: Level Surfaces   Ambulation without assistive device: independent    Ambulation: Stairs   Ascend stairs: independent  Pattern: reciprocal  Railings: without rails  Descend stairs: independent  Pattern: reciprocal  Railings: without rails  Curbs: independent     Assessment & Plan     Assessment  Impairments: abnormal or restricted ROM, activity intolerance, impaired balance and impaired physical strength  Assessment details: Patient has gained stamina, strength, and demonstrates improved balance. Primary problem at this time is UE weakness. I is only able to lift and carry 5 lbs using both hands. Her  is still very weak bilaterally which is significantly limiting her functional ability, driving and return to work status. She has visible atrophy in B hands, pain, and dysfunction of the R RF and SF which suggests cervical involvement. She had a EMG in Aug 2019 that ruled out ulnar nerve compression but did show mod CTS bilaterally. This patient would benefit from continued PT to address lack of strength and stamina, pain and reduced functional ability so that she can drive and return to work.   Functional Limitations: carrying objects, lifting, pulling, pushing, uncomfortable because of pain, reaching behind back and reaching overhead  Plan  Therapy options: will be seen for skilled physical therapy services  Planned therapy interventions: strengthening, stretching, therapeutic activities, home exercise program, neuromuscular re-education and gait training  Frequency: 2x week  Duration in weeks: 4  Treatment plan discussed with: patient      Progress toward previous goals: Partially Met    STG: To be met in 4-5 weeks:  1. Patient is I with HEP to maintain gains made in PT. MET  2. Patient has at least 12 lbs of  B  strength to improve ability to , push, pull. MET on R  hand, progressing on L  3. Patient can safely walk out to get mail. UNMET  4. Patient can lift and carry 10 lbs at least 50 ft. UNMET (can carry 5 lbs safely)    LTG: To be met in 8-10 weeks:   1. Patient can safely ambulate community distances. MET  2. Patient can ambulate safely up and down a flight of steps.  MET  3. Patient can lift and carry 15 lbs at least 50 ft. UNMET  4. Patient is able to RTW. UNMET  5. Patient is able to return to driving. UNMET  Recommendations: Continue with recommendations focus on UE strength and endurance    PT Signature: Nancy Diego, PT, DPT, CHT  [place] License # [id number]    Based upon review of the patient's progress and continued therapy plan, it is my medical opinion that Noemi Bartholomew should continue physical therapy treatment at [facility]  [address] [place] [id number] [phone].    Signature: __________________________________  Audrey Funez, MIRYAM    See Exercise, Manual, and Modality Logs for complete treatment.     PROCEDURES AND MODALITIES:  Paraffin:   pre-rx  Moist Heat:    Ice:   post-rx  E-Stim:    Ultrasound:    Ionto:   Traction:    Dry Needling:         Therapy Exercise [id number] 45 minutes     Timed Code Treatment: 45 Minutes  Total Treatment Time: 45 Minutes

## 2020-03-06 ENCOUNTER — HOSPITAL ENCOUNTER (OUTPATIENT)
Dept: GENERAL RADIOLOGY | Facility: HOSPITAL | Age: 60
Discharge: HOME | End: 2020-03-06
Admitting: NURSE PRACTITIONER

## 2020-03-06 DIAGNOSIS — R29.898 WEAKNESS OF BOTH ARMS: ICD-10-CM

## 2020-03-06 DIAGNOSIS — Z09 SURGICAL FOLLOWUP VISIT: ICD-10-CM

## 2020-03-06 DIAGNOSIS — G56.03 BILATERAL CARPAL TUNNEL SYNDROME: ICD-10-CM

## 2020-03-06 DIAGNOSIS — R29.898 WEAKNESS OF BOTH HANDS: ICD-10-CM

## 2020-03-06 PROCEDURE — 72050 X-RAY EXAM NECK SPINE 4/5VWS: CPT

## 2020-03-11 ENCOUNTER — TREATMENT (OUTPATIENT)
Dept: PHYSICAL THERAPY | Facility: CLINIC | Age: 60
End: 2020-03-11

## 2020-03-11 DIAGNOSIS — R42 DIZZINESS: Primary | ICD-10-CM

## 2020-03-11 PROCEDURE — 95992 CANALITH REPOSITIONING PROC: CPT | Performed by: PHYSICAL THERAPIST

## 2020-03-11 NOTE — PROGRESS NOTES
Vestibular Daily Progress Note    Visit#:2  Subjective   I am about 50% better.   Objective         Positional Testing  Positional Testing: With infrared goggles  [place] Right: Downbeat, right rotatory nystagmus  [place] Right Onset Time : 5 sec  Pa-Hallpike Right Duration Time : (50 sec)  [place] Left: Downbeat Nystagmus  Horizontal Roll Test Right: (NA)  Horizontal Roll Test Right Onset Time : (NA)          PROCEDURES AND MODALITIES:  Paraffin:    Moist Heat:    Ice:    E-Stim:    Ultrasound:    Ionto:   Traction:    See Exercise, Manual, and Modality Logs for complete treatment.   Other Procedure CPT 69426 minutes 0       Timed Code Treatment: 0 Minutes  Total Treatment Time: 30 Minutes    Assessment/Plan   L PC BPPV is improved. Pt presents today with R AC canalithiasis BPPV. Treated with CRP and tolerated well.    Progress per Plan of Care    Nancy Diego PT, DPT, CHT  Physical Therapist

## 2020-03-13 ENCOUNTER — TREATMENT (OUTPATIENT)
Dept: PHYSICAL THERAPY | Facility: CLINIC | Age: 60
End: 2020-03-13

## 2020-03-13 DIAGNOSIS — R42 DIZZINESS: Primary | ICD-10-CM

## 2020-03-13 PROCEDURE — 95992 CANALITH REPOSITIONING PROC: CPT | Performed by: PHYSICAL THERAPIST

## 2020-03-13 NOTE — PROGRESS NOTES
Vestibular Daily Progress Note    Visit#:3  Subjective   I am feeling a little better.   Objective         Positional Testing  Positional Testing: With infrared goggles  [place] Right: Upbeat, right rotatory nystagmus  Pa-[place]pike Right Onset Time : 5 sec  [place]pike Right Duration Time : 20 sec  [place] Left: (NA)  Horizontal Roll Test Right: (NA)  Horizontal Roll Test Right Onset Time : NA          PROCEDURES AND MODALITIES:  Paraffin:    Moist Heat:    Ice:    E-Stim:    Ultrasound:    Ionto:   Traction:    See Exercise, Manual, and Modality Logs for complete treatment.   Other Procedure CPT 09775 minutes 0       Timed Code Treatment: 0 Minutes  Total Treatment Time: 30 Minutes    Assessment/Plan   R AC cleared. Treated R PC canalithiasis BPPV. Tolerated well. Negative retest. Progressing well and reporting improvement.    Progress per Plan of Care    Nancy Diego, PT, DPT, CHT  Physical Therapist

## 2020-03-17 ENCOUNTER — TELEPHONE (OUTPATIENT)
Dept: NEUROSURGERY | Facility: CLINIC | Age: 60
End: 2020-03-17

## 2020-03-17 NOTE — TELEPHONE ENCOUNTER
Spoke with patient and informed her of Dr LABOY's response.  She said she will call back to make a 2 month follow up appt,  regarding her hand, she said it is about the same, she had an EMG/NCS today and is to follow up with Dr Lipscomb on 3.23.20

## 2020-03-17 NOTE — TELEPHONE ENCOUNTER
[facility]  [address]  [facility] [address]  Phone: [phone]  Fax: [phone]                  Marta OrantesNaga   2017 1:30 PM   Office Visit    Description:  Female : 1953   Provider:  Asmita Pichardo NP   Department:  [facility]           Reason for Visit     Follow-up           Diagnoses this Visit        Comments    Gastroesophageal reflux disease without esophagitis    -  Primary     Retained food in stomach                To Do List           Future Appointments        Provider Department Dept Phone    2/3/2017 8:15 AM [facility] NM2 INFINIA 400LB LIMIT [facility] [phone]    2/15/2017 9:30 AM Elda Lowery NP [facility] [phone]      Goals (5 Years of Data)     None      [facility] On Call     [facility] On Call Nurse Care Line -  Assistance  Registered nurses in the [facility] provide clinical advisement, health education, appointment booking, and other advisory services.  Call for this free service at [phone].             Medications           Message regarding Medications     Verify the changes and/or additions to your medication regime listed below are the same as discussed with your clinician today.  If any of these changes or additions are incorrect, please notify your healthcare provider.        STOP taking these medications     diflunisal (DOLOBID) 500 mg Tab Take 500 mg by mouth every 12 (twelve) hours.           Verify that the below list of medications is an accurate representation of the medications you are currently taking.  If none reported, the list may be blank. If incorrect, please contact your healthcare provider. Carry this list with you in case of emergency.           Current Medications     atorvastatin (LIPITOR) 40 MG tablet Take 40 mg by mouth once daily.    benzonatate (TESSALON) 200 MG capsule Take 200 mg by mouth 3 (three) times daily as needed for  Dr LABOY, please advise.  She had cervical spine plain films at [facility] 3.6.20 and also saw Dr Lipscomb that day as well, his office note is scanned into her chart    She was last seen by Audrey MULTANI 2.20.20   "Cough.    clotrimazole (LOTRIMIN) 1 % cream Apply topically 2 (two) times daily.    conj estrogens-bazedoxifene (DUAVEE) 0.45-20 mg Tab Take by mouth once daily.    diazePAM (VALIUM) 5 MG tablet Take 5 mg by mouth every 6 (six) hours as needed for Anxiety.    losartan-hydrochlorothiazide 100-12.5 mg (HYZAAR) 100-12.5 mg Tab Take 1 tablet by mouth once daily.    metoprolol tartrate (LOPRESSOR) 100 MG tablet Take 100 mg by mouth 2 (two) times daily.    omeprazole (PRILOSEC) 40 MG capsule Take 1 capsule (40 mg total) by mouth every morning. 30-45 min before breakfast.    vitamin D 1000 units Tab Take 185 mg by mouth once daily.    oxybutynin (DITROPAN-XL) 10 MG 24 hr tablet Take 1 tablet (10 mg total) by mouth once daily.           Clinical Reference Information           Vital Signs - Last Recorded  Most recent update: 1/23/2017  1:39 PM by Kinsey Ma RN    BP Pulse Resp Ht Wt BMI    115/79 80 18 5' 6" (1.676 m) 81.7 kg (180 lb 1.9 oz) 29.07 kg/m2      Blood Pressure          Most Recent Value    BP  115/79      Allergies as of 1/23/2017     Penicillins      Immunizations Administered on Date of Encounter - 1/23/2017     None      Orders Placed During Today's Visit     Future Labs/Procedures Expected by Expires    NM Gastric Emptying  1/23/2017 1/23/2018      [facility] Sign-Up     Activating your [facility] account is as easy as 1-2-3!     1) Visit [email or web address], select Sign Up Now, enter this activation code and your date of birth, then select Next.  [email or web address]  Expires: 2/11/2017  9:32 AM      2) Create a username and password to use when you visit [facility] in the future and select a security question in case you lose your password and select Next.    3) Enter your e-mail address and click Sign Up!    Additional Information  If you have questions, please e-mail [email or web address] or call [phone] to talk to our [facility] staff. Remember, [facility] is NOT to be used for urgent needs. For medical " "emergencies, dial 911.         Instructions         Probiotics      For IBS and bloating, we typically recommend Align, VSL#3, or QuikCycle Colon Health, which can typically be found without a prescription at the pharmacy. Give this at least  a month to work, but can take up to 3 months to repopulate your intestines, so be patient!     VSL#3 is a potent probiotic which can be found in pill form (try [employer] for best price, or VSL3.com). $52 for a 2 month supply. The sachets are for ulcerative colitis and require a prescription.    If you go on the internet, a reputable/powerful probiotic appears to be Super Shield from [employer] at: http://www.bluerockholistics.Cyprotex/product/pross.asp  You can also choose PB 8 which can be found at [employer] or online.    For diarrhea from travel or antibiotics, we typically recommend Culturelle or Florastor (especially for diarrhea from C diff infection).         General information:  Pick one that has at least seven strains, and five billion CFU (colony forming units).    Products containing probiotics have flooded the market in recent years. As more people seek natural or non-drug ways to maintain their health, manufacturers have responded by offering probiotics in everything from yogurt to chocolate and granola bars to powders and capsules.    Although probiotics have been around for generations - think of the "live active cultures"in several brands of yogurt - the sheer number of products with probiotics now available may overwhelm even the most conscientious of shoppers. In some respects, the industry has grown faster than the research and scientists and doctors are calling for more studies to help determine which probiotics are beneficial and which might be a waste of money.    What Are Probiotics?  Probiotics are living microscopic organisms, or micro-organisms, that scientific research has shown to benefit your health. Most often they are bacteria, but they may also " be other organisms such as yeasts. In some cases they are similar, or the same, as the good bacteria already in your body, particularly those in your gut.    The most common probiotic bacteria come from two groups, Lactobacillus or Bifidobacterium, although it is important to remember that there are many other types of bacteria that are also classified as probiotics. Each group of bacteria has different species and each species has different strains. This is important to remember because different strains have different benefits for different parts of your body. For example, Lactobacillus casei Shirota has been shown to support the immune system and to help food move through the gut, but Lactobacillus bulgaricus may help relieve symptoms of lactose intolerance, a condition in which people cannot digest the lactose found in most milk and dairy products. In general, not all probiotics are the same, and they dont all work the same way.    Scientists are still sorting out exactly how probiotics work. They may:       Boost your immune system by producing antibodies for certain viruses.  Produce substances that prevent infection.  Prevent harmful bacteria from attaching to the gut wall and growing there.  Send signals to your cells to strengthen the mucus in your intestine and help it act as a barrier against infection.  Inhibit or destroy toxins released by certain bad bacteria that can make you sick.  Produce B vitamins necessary for metabolizing the food you eat, warding off anemia caused by deficiencies in B-6 and B-12, and maintaining healthy skin and a healthy nervous system.      Common Uses  Probiotics are most often used to promote digestive health. Because there are different kinds of probiotics, it is important to find the right one for the specific health benefit you seek. Researchers are still studying which probiotic should be used for which health or disease state. Nevertheless, probiotics have been  shown to help regulate the movement of food through the intestine. They also may help treat digestive disease, something of much interest to gastroenterologists. Some of the most common uses for probiotics include the treatment of the following:    Irritable Bowel Syndrome    Irritable bowel syndrome (IBS) is a disorder of movement in the gut. People who have IBS may have diarrhea, constipation or alternating bouts of both. IBS is not caused by injury or illness. Often the only way doctors can diagnose it is to rule out other conditions through testing.    Probiotics, particularly Bifidobacterium infantis, Sacchromyces boulardii, Lactobacillus plantarum and combination probiotics may help regulate how often people with IBS have bowel movements. Probiotics may also help relieve bloating from gas. Bifidobacterium infantis 06693, Lactobacillus plantarum 299V or Bifidobacterium bifidum [id number] have been shown to help regulate bowel movements and relieve bloating, pain and gas.    Inflammatory Bowel Disease    Though some of the symptoms are the same, inflammatory bowel disease (IBD) is different from IBS because in IBD, the intestines become inflamed. Unlike IBS, IBD is a disorder of the immune system. Symptoms include abdominal cramps, pain, diarrhea, weight loss and blood in your stools. In Crohns disease, ulcers may develop anywhere in your intestine including both the large and small bowels. In ulcerative colitis, inflammation only involves the large intestine. Bouts of inflammation may come and go, but in some cases, prescription medication is needed to keep inflammation in check.        Some studies suggest that probiotics may help decrease inflammation and delay the next bout of disease. Ulcerative colitis seems to respond better to probiotics than Crohns disease does. So far, E. coli Nissle, and a mixture of several strains of Lactobacillus, Bifidobacterium and Streptococcus may be most beneficial. Research is  continuing to determine which probiotics are best to treat IBD.    Infectious Diarrhea    Infectious diarrhea is caused by bacteria, viruses or parasites. There is evidence that probiotics such as Lactobacillus rhamnosus and Lactobacillus casei may be particularly helpful in treating diarrhea caused by rotavirus, which often affects babies and small children. Several strains of Lactobacillus and a strain of the yeast Saccharomyces boulardii may help treat and shorten the course of infectious diarrhea.    Antibiotic-Related Diarrhea  Take Lactobacillus rhamnosus GG and/or Saccharomyces boulardii (Culturelle and/or Florastor) six hours after each dose of antibiotics. Increase the dose to 10 billion CFUs per day and continue for one to two weeks after you stop taking the antibiotic.    Sometimes taking an antibiotic can cause infectious diarrhea by reducing the number of good microorganisms in your gut. Then bacteria that normally do not give you any trouble can grow out of control. One such bacterium is Clostridium difficile, which is a major cause of diarrhea in hospitalized patients and people in long-term care facilities like nursing homes. The trouble with Clostridium difficile is that it tends to come back, but there is evidence that taking probiotics such as Saccharomyces boulardii may help prevent this. There is also evidence that taking probiotics when you first start taking an antibiotic may help prevent antibiotic-related diarrhea in the first place.    Travelers Diarrhea    Its possible to get infectious diarrhea when you travel and your body is exposed to new, normally harmless bacteria (travelers diarrhea). Most studies show that probiotics are not very effective in preventing or treating travelers diarrhea in adults. Taking Saccharomyces boulardii weeks before your trip may help prevent travelers diarrhea, which usually comes from ingesting food or water thats been contaminated with  bacteria.    Other Uses    Other potential uses for probiotics include maintaining a healthy mouth, preventing and treating certain skin conditions like eczema, promoting health in the urinary tract and vagina, and preventing allergies (especially in children). There is not as much research about these uses as there is about the benefits of probiotics for your digestive system, and studies have had mixed results. If you have eczema ?Lactobacillus rhamnosus  and Lactobacillus fermentum VRI-003 PCC have been shown to help treat those itchy, scaly skin rashes -- especially in children.If you have a cold ?Some research suggests that Bifidobacterium animalis lactis Bi-07 and Lactobacillus acidophilus NCFM can help reduce the duration and severity of the common cold and flu by enhancing the bodys production of antibodies. If you have a vaginal infection ?Lactobacillus acidophilus, Lactobacillus rhamnosus GR-1 and Lactobacillus reuteri RC-14 have been shown to help prevent and clear up bacterial vaginosis and urinary tract infections in some individuals. Researchers point to Lactobacillus reuteri RC-14 and Lactobacillus rhamnosus GR-1 as the most effective stains to protect against yeast infections as theyre especially adept at colonizing the vaginal environment and fighting off unwelcome bacteria and fungi. If you have bad breath, gingivitis or periodontitis ?A probiotic lozenge or mouthwash might be your best bet. Lactobacillus reuteri LR-1 or LR-2 promote oral health by binding to teeth and gums, preventing plaque formation in the mouth. Research has demonstrated the ability of Weissella cibaria to freshen breath by inhibiting the production of sulfur compounds in the mouth.    Are Probiotics Safe?  It is generally thought that most probiotics are safe, although it is not yet known if they are safe for people with severely impaired immune systems. They may be taken by people without a diagnosed digestive  problem. Their safety is evident since they have a long history of use in dairy foods like yogurt, cheese and milk.    However, you should talk to your doctor before adding these or any other probiotics to your diet. Probiotics might not be appropriate for seniors. Some probiotics may interfere with or interact with medications. Your doctor will be able to help you determine if probiotics are right for you based on your medical history.    Research about the use of probiotics in children has grown in recent years. Although studies have shown that probiotics may help to treat infectious diarrhea in babies and small children, researchers are unsure whether probiotics are particularly helpful for children with Crohns disease or other types of inflammatory bowel disease. Ask your childs pediatrician about probiotics before giving them to your child.    The exception here is breastfeeding. Breast milk stimulates the growth of normal gut organisms that are important for a babys digestive health and developing immune system. That is one reason why doctors strongly encourage mothers to breastfeed their babies.    Overall, scientists agree that more research is necessary before they can make blanket statements about the safety of probiotics in general or about individual groups and strains. Future studies will show whether probiotics can be used to treat diseases, are safe to use for a long time, and if it is possible to take too many probiotics or mix them in inappropriate ways. These studies will also guide us as to which probiotics to use for different disorders.    Keep in mind that probiotics are considered dietary supplements and are not FDA-regulated like drugs. They are not standardized, meaning they are made in different ways by different companies and have different additives. How well a probiotic works may differ from brand to brand and even from batch to batch within the same brand. Probiotics also vary  tremendously in their cost, and cost does not necessarily reflect higher quality.    Side effects may vary, too. The most common are gas and bloating. These are usually mild and temporary. More serious side effects include allergic reactions, either to the probiotics themselves or to other ingredients in the food or supplement.    Choosing a Probiotic  Probiotics are available in yogurt and other dairy products, chocolate and granola bars, juices, powders, and capsules. You can purchase them at your local supermarket or Electronic Payment and Services (EPS) food store as well as on the Internet. Here are some tips to help you choose.    Check the label. The more information there is on the label, the better. Ideally, the label will tell you the probiotics group, species and strain, and how many of the microorganisms will still be alive on the use-by date. Although some products guarantee how many organisms were present at the time it was manufactured, often it is less clear how many organisms are present when these products are actually consumed.    Call the . Unfortunately, many labels dont say exactly which strain is in the product; many list only the group and the species, such as Lactobacillus acidophilus or Bifidobacterium lactis. If youre planning to take a probiotic for a specific condition, call the company and find out exactly which strains its products contain and what research they have done to support their health claims. You may be able to find this information on their Web site, as well.    Beware of the Internet. If you order products from the Internet, make sure you know the company from which you are ordering. There are plenty of scammers out there who are willing to send you fake products labeled as probiotics. At best, the ingredients could be harmless, like garlic powder. At worst, they could be laced with powerful herbs, prescription medications or illegal drugs. Some companies may simply take your money and  disappear.    Stick to well-established companies and companies you know. The longer a company has been around, the more likely its products have been tested and studied repeatedly and the bigger the reputation the company has to protect. Some manufacturers that have been making products with probiotics for a while are [employer], "[employer], [employer], [employer], [employer], and [employer].    Storage    One final note: Remember to store your probiotic according to package instructions and make sure the product has a sell-by or expiration date. Probiotics are living organisms. Even if they are dried and dormant, like in a powder or capsule, they must be stored properly or they will die. Some require refrigeration whereas others do not. They also have a shelf-life, so make sure you use them before the expiration date on the package.      NSAIDS are Non Steroidal Anti Inflammatory Drugs taken for pain.   You should stay away from these medications (unless they are advised due to cardiac health issues) as they may cause irritation and/or ulceration in the lining of your stomach.  Ask your primary care physician for an alternative medication.    Powders - BC Powder, Goody powder, Stanback    Ibuprofen which is also known as : Advil, Advil Childrens, Advil Parveen Strength, Advil Liquigel, Advil Migraine, Advil Pediatric, Childrens Ibuprofen Berry, Genpril, IBU, Midol IB, Midol Maximum Strength Cramp Formula, Dolgesic, Motrin Childrens, Motrin IB, Motrin Infant Drops, Motrin Parveen Strength, Motrin Migraine Pain, Nuprin, Migraine Liqui-gels, Ibu-Tab 200, Cap-Profen, Tab-Profen, Profen, Ibuprohm, Childrens Elixsure, IB Pro, Vicoprofen, Combunox, A-G Profen, Actiprofen, Addaprin, Advil Infants Concentrated Drops, Caldolor, Haltran, Q-Profen, Ibifon 600, Ibren, Menadol, Midol Cramps & Bodyaches, Rufen, Saleto-200, Benton, Ultraprin, Uni-Pro, Wal-Profen., Famotidine and Ibuprofen can be found as :  Duexis.    Aspirin which has the brand name : Arthritis Pain, Aspergum, Aspir-Low, Aspirin Lite Coat, Samy Aspirin, Bufferin, Easprin, Ecotrin, Empirin, Fasprin, Genacote, Halfprin, Stevens Aspirin, Falls Aspirin, Stanback Analgesic, Tri-Buffered Aspirin, YSP Aspirin, Zorprin.    Ketoprofen which is  known as : Orudis KT, Oruvail, Actron.    Sulindac which is sold as : Clinoril.    Naproxen is one of the most known drug from NSAIDs list, and is sold as : Aleve, Naprosyn, EC-Naprosyn, Naprelan, Anaprox, All Day Pain Relief, Aflaxen, All Day Relief, Anaprox-DS, Comfort Pac  With Naproxen,  Aleve Caplet, Aleve Easy Open Arthritis, Aleve Gelcap,  Anaprox-DS, EC-Naprosyn, Leader Naproxen Sodium, Midol Extended Relief, Naprelan 375?, Naprelan 500?, Naprelan 750?, Prevacid NapraPac 375,  Prevacid NapraPac, Naprapac, Vimovo.    Etodolac is sold under the brand name : Lodine XL, Lodine.  Fenoprofen which can be found on the market as : Nalfon, Nalfon 200.  Diclofenac which is sold as : Arthrotec, Cataflam, Voltaren, Cambia, Voltaren-XR, Zipsor.  Flurbiprofen sold as : Asaid.  Ketorolac is sold under the brand name : Sprix, Toradol, Toradol IM, Toradol IV/IM.  Piroxicam is found on the market as : Feldene.  Indomethacin which is known as : Indocin SR, Indocin, Indo-Kelsey, Indomethegan, Indocin IV.  Mefenamic Acid sold as : Ponstel.  Meloxicam is sold under the brand name : Mobic.  Nabumetone which is sold as : Relafen.  Oxaprozin which has the brand name : Daypro.  Ketoprofen which has the brand name : Actron, Orudis KT, Orudis, Oruvail.  Meclofenamate sold as : Meclomen.  Tolmetin which can be found on the marked as : Tolectin, Tolectin 600, Tolectin DS.  Salsalate which is sold as : Disalcid.

## 2020-03-17 NOTE — TELEPHONE ENCOUNTER
Tell her the x-rays look fine.  Tell her to come back to see me specifically in about 2 months.  Ask her how the hands are doing.

## 2020-03-17 NOTE — TELEPHONE ENCOUNTER
PT CALLED TO RESCHEDULE HER POST OP APPT DUE TO THE COVID-19 VIRUS.  I TRIED TO WARM TRANSFER AND NO ONE ANSWERED.  PLS ADVISE.    [phone]

## 2020-03-20 ENCOUNTER — TELEPHONE (OUTPATIENT)
Dept: NEUROSURGERY | Facility: CLINIC | Age: 60
End: 2020-03-20

## 2020-03-20 NOTE — TELEPHONE ENCOUNTER
PT CALLED AND SAID SHE CALLED AND CANCELLED HER ORIGINAL POST OP APPT THAT WAS FOR 3/23/20 AND SHE IS CURRENTLY RESCHEDULED FOR 6/8/20. SHE WOULD LIKE TO SEE IF SHE CAN BE SEEN NEXT WEEK. SHE SAYS SHE CAN NOT WAIT TILL June. PLEASE ADVISE!    CONTACT# [phone]  BEST TIME TO CALL: ANYTIME SHE SAID LEAVE A MESSAGE IF SHE DOES NOT ANSWER

## 2020-03-20 NOTE — TELEPHONE ENCOUNTER
Dr LABOY, she was on your schedule for Monday and she is one that you indicated to r/s which we did and she was fine with that.    She called back today to say that she saw Dr Lipscomb today regarding the N/T in her hands and he has ruled out carpal tunnel.    She wants to know if she should come in next week anyway or if you want to call her to discuss    Please advise

## 2020-03-20 NOTE — PROGRESS NOTES
Subjective   Patient ID: Noemi Bartholomew is a 59 y.o. female is here today for post op appt.  Patient had surgery, lumbar peritoneal shunt/ C6/7 ACDF on 1/10/20.  Patient is following up with cervical plain films and after evaluation with Dr Lipscomb who she saw on 3.20.20 and was told he did not think her symptoms were from carpal tunnel, that her residual symptoms are coming from her neck.  She is not currently going to PT, but is following HEP    Patient states that her balance and gait are improving.  She has constant bilateral arm and hand weakness and numbness and tingling.  She denies neck or arm pain.  She has left shoulder pain and her voice is raspy, but no problems with swallowing.  Patient states that she feels the same she did prior to surgery.  She denies urinary incontinence.    She is taking Gabapentin 100 mg BID    Patient was last seen 2.20.20 for post op follow up for residual bilateral arm/hand weakness and N/T.      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 She had a CSF leak that required placement of a lumbar drain and eventually a lumboperitoneal shunt. She is back for another visit. There has been no recurrence of the CSF leak. All incisions in the neck, abdomen, and low back have healed. She does not really have much pain, although her left shoulder bothers her a bit which I think is probably some arthritis in the shoulder. The problem is the hands and they are very slow to get better. It is mainly a weakness issue. She went to see Dr. Lipscomb and an EMG and nerved conduction study was done which did not show much ulnar or median neuropathy. This seems to be mainly from the neck which is what I thought from the outset. She feels that the hands are weaker compared to just before the surgery and I think again that is due to the adherence of the ligament to the dural and the  irrigation of the nerve roots and the spinal cord that was experienced due to the removal itself. The ligament, of course, was densely adherent to the dura and the dura could not really be saved. That led to the CSF leak which is now better. Mainly I gave her encouragement. I think this will get better with time. We filled out all of the disability paperwork. She desperately wants to go back to work as an . She can use her single digits to use the computer. That may improve over time but I told her it will be slow and I would not consider work return until at least 6 months from the suture, which is beginning of June. I told her to take a video of her using the computer so I can look at it next time and we can . I will see her in 6 weeks. She can continue the gabapentin at 100 mg b.i.d..      Difficulty Walking   The problem occurs constantly. The problem has been unchanged. Associated symptoms include numbness and weakness. Pertinent negatives include no arthralgias, joint swelling, myalgias or neck pain.   Extremity Weakness    The pain is present in the right arm, left arm, left hand and right hand. The problem occurs constantly. The problem has been unchanged. The pain is at a severity of 7/10. The pain is moderate. Associated symptoms include numbness.       The following portions of the patient's history were reviewed and updated as appropriate: allergies, current medications, past family history, past medical history, past social history, past surgical history and problem list.    Review of Systems   HENT: Negative.    Eyes: Negative.    Respiratory: Negative.    Cardiovascular: Negative.    Gastrointestinal: Negative.    Genitourinary: Negative for urgency.   Musculoskeletal: Positive for extremity weakness and gait problem. Negative for arthralgias, back pain, joint swelling, myalgias, neck pain and neck stiffness.   Allergic/Immunologic: Negative.    Neurological: Positive for weakness and  numbness.   Hematological: Negative.    Psychiatric/Behavioral: Negative.        Objective   Physical Exam   Constitutional: She is oriented to person, place, and time. She appears well-developed and well-nourished.   HENT:   Head: Normocephalic and atraumatic.   Eyes: Pupils are equal, round, and reactive to light. Conjunctivae and EOM are normal.   Fundoscopic exam:       The right eye shows no papilledema. The right eye shows venous pulsations.        The left eye shows no papilledema. The left eye shows venous pulsations.   Neck: Carotid bruit is not present.   Neurological: She is oriented to person, place, and time. She has a normal Finger-Nose-Finger Test and a normal Heel to Shin Test. Gait normal.   Reflex Scores:       Tricep reflexes are 2+ on the right side and 2+ on the left side.       Bicep reflexes are 2+ on the right side and 2+ on the left side.       Brachioradialis reflexes are 2+ on the right side and 2+ on the left side.       Patellar reflexes are 2+ on the right side and 2+ on the left side.       Achilles reflexes are 2+ on the right side and 2+ on the left side.  Psychiatric: Her speech is normal.     Neurologic Exam     Mental Status   Oriented to person, place, and time.   Registration of memory: Good recent and remote memory.   Attention: normal. Concentration: normal.   Speech: speech is normal   Level of consciousness: alert  Knowledge: consistent with education.     Cranial Nerves     CN II   Visual fields full to confrontation.   Visual acuity: normal    CN III, IV, VI   Pupils are equal, round, and reactive to light.  Extraocular motions are normal.     CN V   Facial sensation intact.   Right corneal reflex: normal  Left corneal reflex: normal    CN VII   Facial expression full, symmetric.   Right facial weakness: none  Left facial weakness: none    CN VIII   Hearing: intact    CN IX, X   Palate: symmetric    CN XI   Right sternocleidomastoid strength: normal  Left  sternocleidomastoid strength: normal    CN XII   Tongue: not atrophic  Tongue deviation: none    Motor Exam   Muscle bulk: normal  Right arm tone: normal  Left arm tone: normal  Right leg tone: normal  Left leg tone: normal    Strength   Strength 5/5 except as noted.   Right deltoid: 4/5  Left deltoid: 4/5  Right biceps: 4/5  Left biceps: 4/5  Right triceps: 4/5  Left triceps: 4/5  Right wrist flexion: 2/5  Left wrist flexion: 2/5  Right wrist extension: 2/5  Left wrist extension: 2/5  Right interossei: 1/5  Left interossei: 1/5    Sensory Exam   Light touch normal.     Gait, Coordination, and Reflexes     Gait  Gait: normal    Coordination   Finger to nose coordination: normal  Heel to shin coordination: normal    Reflexes   Right brachioradialis: 2+  Left brachioradialis: 2+  Right biceps: 2+  Left biceps: 2+  Right triceps: 2+  Left triceps: 2+  Right patellar: 2+  Left patellar: 2+  Right achilles: 2+  Left achilles: 2+  Right : 2+  Left : 2+      Assessment/Plan   Independent Review of Radiographic Studies:    Her x-rays done on 3/6/2020 showed good position of the cage and the plate with a corpectomy being at C6 and C7.  Agree with the report.      Medical Decision Making:    Again encouragement was given.  She will continue exercises on her own and continue her gabapentin.  I told her she can use anti-inflammatories for her shoulder discomfort.  I will see her in 6 weeks.  We will tentatively plan on trying to have her go back to work in the beginning of June.      Noemi was seen today for post-op, difficulty walking, extremity weakness and numbness.    Diagnoses and all orders for this visit:    Weakness of both hands    Ossification of spinal ligament    Cervical spondylosis with myelopathy      Return in about 6 weeks (around 5/4/2020).

## 2020-03-20 NOTE — TELEPHONE ENCOUNTER
GREGORY for patient letting her know that Dr LABOY does want to see her Monday and she is scheduled for 3.23.20 at 9 am

## 2020-03-23 ENCOUNTER — TELEPHONE (OUTPATIENT)
Dept: NEUROSURGERY | Facility: CLINIC | Age: 60
End: 2020-03-23

## 2020-03-23 ENCOUNTER — OFFICE VISIT (OUTPATIENT)
Dept: NEUROSURGERY | Facility: CLINIC | Age: 60
End: 2020-03-23

## 2020-03-23 VITALS
DIASTOLIC BLOOD PRESSURE: 84 MMHG | HEART RATE: 79 BPM | SYSTOLIC BLOOD PRESSURE: 131 MMHG | TEMPERATURE: 98.7 F | BODY MASS INDEX: 23.37 KG/M2 | WEIGHT: 101 LBS | HEIGHT: 55 IN

## 2020-03-23 DIAGNOSIS — R29.898 WEAKNESS OF BOTH HANDS: Primary | ICD-10-CM

## 2020-03-23 DIAGNOSIS — M47.12 CERVICAL SPONDYLOSIS WITH MYELOPATHY: ICD-10-CM

## 2020-03-23 DIAGNOSIS — M24.28: ICD-10-CM

## 2020-03-23 PROCEDURE — 99213 OFFICE O/P EST LOW 20 MIN: CPT | Performed by: NEUROLOGICAL SURGERY

## 2020-03-23 NOTE — TELEPHONE ENCOUNTER
I have left MsManuela [place] a message with her next appointment, which I have made for May 4, 2020 at 9:15a.

## 2020-03-30 ENCOUNTER — TELEPHONE (OUTPATIENT)
Dept: NEUROSURGERY | Facility: CLINIC | Age: 60
End: 2020-03-30

## 2020-03-30 NOTE — TELEPHONE ENCOUNTER
I received a call from Pedrito Llamas from [facility]. He has not been able to reach this pt to get her stim delivered. They have even sent a letter. The order he has is now outdated. If she still needs stim they will need a new order.

## 2020-04-13 ENCOUNTER — TELEPHONE (OUTPATIENT)
Dept: NEUROSURGERY | Facility: CLINIC | Age: 60
End: 2020-04-13

## 2020-04-13 NOTE — TELEPHONE ENCOUNTER
She can take Tylenol or ibuprofen for pain. Tell her to increase her gabapentin to 200 mg po bid. Take 100/200 x 1week and then 200 bid. Probably needs a new rx.

## 2020-04-13 NOTE — TELEPHONE ENCOUNTER
Pt says her hands are still hurting. She is getting frustrated.  She is doing the exercises. The exercises are not helping, she does not see any improvement.        She is taking Gabapentin 100 mg  1 pill 2 x day.       Pt wants to know if she can take tylenol or ibuprofen for the pain.  Is there anything else she can do for the pain.   [facility]  [phone]

## 2020-04-22 RX ORDER — GABAPENTIN 100 MG/1
CAPSULE ORAL
Qty: 120 CAPSULE | Refills: 3 | Status: SHIPPED | OUTPATIENT
Start: 2020-04-22 | End: 2020-07-23 | Stop reason: SDUPTHER

## 2020-04-22 NOTE — TELEPHONE ENCOUNTER
PATIENT/CAREGIVER CALLED REQUESTING CLARIFICATION ON THE FOLLOWING MEDICATION(S).  Provider:   Caller: PATIENT  Phone Number: [phone]  Type of Med: GABAPENTIN 200 MG    PATIENT STATED SHE WAS UNDER THE IMPRESSION THAT PRESCRIPTION WAS GOING TO BE SENT TO PHARMACY BUT DO NOT RECEIVE IT. PLEASE CONTACT PATIENT FOR ADDITIONAL INSTRUCTIONS ABOUT PREVIOUSLY DISCUSSED MED AND WHETHER OR NOT A PRESCRIPTION WILL BE SENT.

## 2020-05-15 ENCOUNTER — TELEPHONE (OUTPATIENT)
Dept: NEUROLOGY | Facility: OTHER | Age: 60
End: 2020-05-15

## 2020-05-15 DIAGNOSIS — R29.898 WEAKNESS OF BOTH HANDS: Primary | ICD-10-CM

## 2020-05-15 DIAGNOSIS — G95.9 CERVICAL MYELOPATHY (HCC): ICD-10-CM

## 2020-05-15 NOTE — TELEPHONE ENCOUNTER
Pt is calling because she wants a new occupational therapy order.  She wants to go to [facility] for this.      She is having trouble with her hands. Her hands are very weak and numb.

## 2020-05-22 ENCOUNTER — TELEPHONE (OUTPATIENT)
Dept: NEUROSURGERY | Facility: CLINIC | Age: 60
End: 2020-05-22

## 2020-05-22 DIAGNOSIS — M48.02 CERVICAL STENOSIS OF SPINE: Primary | ICD-10-CM

## 2020-05-22 DIAGNOSIS — G95.9 CERVICAL MYELOPATHY (HCC): ICD-10-CM

## 2020-05-22 DIAGNOSIS — G95.20 CORD COMPRESSION (HCC): ICD-10-CM

## 2020-05-22 NOTE — TELEPHONE ENCOUNTER
Lissett Bragg with [employer] [phone] called and said she needs an order for occupational therapy not physical therapy.  Call with any questions.

## 2020-06-02 ENCOUNTER — HOSPITAL ENCOUNTER (OUTPATIENT)
Dept: OCCUPATIONAL THERAPY | Facility: HOSPITAL | Age: 60
Setting detail: THERAPIES SERIES
Discharge: HOME | End: 2020-06-02

## 2020-06-02 DIAGNOSIS — G95.9 CERVICAL MYELOPATHY (HCC): Primary | ICD-10-CM

## 2020-06-02 DIAGNOSIS — R29.898 UPPER EXTREMITY WEAKNESS: ICD-10-CM

## 2020-06-02 DIAGNOSIS — G95.20 CORD COMPRESSION (HCC): ICD-10-CM

## 2020-06-02 DIAGNOSIS — M48.02 CERVICAL STENOSIS OF SPINE: ICD-10-CM

## 2020-06-02 PROCEDURE — 97166 OT EVAL MOD COMPLEX 45 MIN: CPT

## 2020-06-02 NOTE — THERAPY EVALUATION
Outpatient Occupational Therapy Neuro Initial Evaluation  [facility]     Patient Name: Noemi Bartholomew  : 1960  MRN: [id number]  Today's Date: 2020      Visit Date: 2020    Patient Active Problem List   Diagnosis   • Carpal tunnel syndrome   • Hyperlipidemia   • Osteoporosis   • DDD (degenerative disc disease), lumbar   • Chronic left lumbar radiculopathy   • Congenital spondylolysis, lumbosacral region   • Neuropathic pain, leg, left   • Ossification of spinal ligament   • Cervical spondylosis with myelopathy   • Cervical stenosis of spine   • Cord compression (CMS/HCC)   • Postoperative CSF leak   • Cervical myelopathy (CMS/HCC)   • Postoperative cerebrospinal fluid leak   • Weakness of both hands        Past Medical History:   Diagnosis Date   • Abnormal alkaline phosphatase test 2015    Resolved   • Acute bronchitis 2015    Resolved   • Allergic child age    pencillin   • Cervical spinal stenosis    • Diverticulosis    • H/O electromyography 10/13/2014   • H/O mammogram 2014   • Headache occassionally   • High cholesterol    • History of EKG 2015   • Hypercalcemia 2015    Resolved, Full w/u done, Likely secondary to Forteo   • Left arm pain    • Low back pain 2017   • Numbness in both hands    • Osteopenia several years   • Osteoporosis    • Paresthesia 2015    Resolved        Past Surgical History:   Procedure Laterality Date   • ANTERIOR CHANNEL VERTEBRECTOMY/CORPECTOMY N/A 2019    Procedure: CERVICAL 6, CERVICAL 7 ANTERIOR CERVICAL CORPECTOMY WITH CAGE AND PLATE;  Surgeon: Antonio Noe MD;  Location: [facility];  Service: Neurosurgery   • BACK SURGERY     • COLONOSCOPY  2011   • INCISION AND DRAINAGE OF WOUND N/A 2019    Procedure: followed by drainage of anterior cervical fluid collection;  Surgeon: Antonio Noe MD;  Location: [facility];  Service: Neurosurgery   • LUMBAR DRAIN INSERTION EXTERNAL N/A  12/19/2019    Procedure: LUMBAR DRAIN INSERTION EXTERNAL;  Surgeon: Antonio Noe MD;  Location: [facility] MAIN OR;  Service: Neurosurgery   • LUMBAR PERITONEAL SHUNT N/A 1/10/2020    Procedure: LUMBAR PERITONEAL SHUNT PLACEMENT;  Surgeon: Antonio Noe MD;  Location: [facility] MAIN OR;  Service: Neurosurgery   • SPINE SURGERY  1999 back    dr abad quintero   • TONSILLECTOMY  child age   • TYMPANOPLASTY           Visit Dx:      ICD-10-CM ICD-9-CM   1. Cervical myelopathy (CMS/[facility]) G95.9 721.1   2. Cervical stenosis of spine M48.02 723.0   3. Cord compression (CMS/[facility]) G95.20 336.9   4. Upper extremity weakness R29.898 729.89       Patient History     Row Name 06/02/20 1500             History    Chief Complaint  Muscle weakness;Pain;Tinglings;Tightness;Difficulty with daily activities;Joint stiffness difficulty using bilateral hands  -MR      Type of Pain  Hand pain  -MR      Date Current Problem(s) Began  10/01/19 pt reports symptoms began in October 2019  -MR      Brief Description of Current Complaint  Pt is a 60 y.o. female referred to outpatient OT with the following diagnoses- cervical myelopathy, cervical stenosis of spine, cord compression.  Pt reports she had symptoms of UE pain, numbness and tingling which began in October 2019. On December 6, 2019 she underwent anterior cervical corpectomy C6, C7 with PEEK cage and anterior Zevo cervical plate from C5 to T1, repair of CSF leak, pt was then transferred to inpatient rehab at [facility]. She had development of a large anterior cervical fluid collection and was therefore readmitted to the hospital on December 19, 2019 .  That day she went back to the OR for placement of an external lumbar drain as well as drainage of the cervical CSF collection and reclosure of the CSF leak. She was transferred back to inpatient rehab on 12/27/19. Pt underwent surgery for placement of lumbar periotoneal shunt on 1/10/20, was discharged home with assist from sister two  days later. Pt received home health and outpatient therapy, reports ceasing outpatient therapy due to COVID 19 restrictions. Pt continues to report difficulty with use of hands and currently presents for outpatient OT evaluation.  -MR      Previous treatment for THIS PROBLEM  Rehabilitation;Surgery  -MR      Surgery Date:  -- 12/6/19, 12/19/20, 1/10/20  -MR      Patient/Caregiver Goals  Improve strength;Know what to do to help the symptoms;Other (comment) improve use of hands  -MR      Hand Dominance  right-handed  -MR      Occupation/sports/leisure activities  pt is an   -MR         Pain     Pain Location  Hand bilateral  -MR      Pain at Present  7  -MR         Services    Prior Rehab/Home Health Experiences  Yes  -MR      When was the prior experience with Rehab/Home Health  Dec 2019-Jan 2020  -MR      Where was the prior experience with Rehab/Home Health  [facility] acute inpatient rehab  -MR      Are you currently receiving Home Health services  No  -MR      Do you plan to receive Home Health services in the near future  No  -MR         Daily Activities    Primary Language  English  -MR      Are you able to read  Yes  -MR      Are you able to write  Yes  -MR      Teaching needs identified  Home Exercise Program;Management of Condition  -MR      Patient is concerned about/has problems with  Grasping objects lifting;Performing home management (household chores, shopping, care of dependents);Performing job responsibilities/community activities (work, school,;Performing sports, recreation, and play activities;Reaching over head;Repetitive movements of the hand, arm, shoulder;Writing/grasping items with hand(s)  -MR      Does patient have problems with the following?  None  -MR      Pt Participated in POC and Goals  Yes  -MR         Safety    Are you being hurt, hit, or frightened by anyone at home or in your life?  No  -MR      Are you being neglected by a caregiver  No  -MR        User Key   "(r) = Recorded By, (t) = Taken By, (c) = Cosigned By    Initials Name Provider Type    Lissett Parish, OT Occupational Therapist          OT Neuro     Row Name 06/02/20 1600             Subjective Comments    Subjective Comments  \"I'm frustrated, I want to go back to work.\" \"I don't feel like I have a lot of stamina.\" \"If something is over 5 lbs I can't lift it.\"  -MR         Home Living    Living Environment Comment  pt resides with her sister  -MR         Cognitive Assessment/Intervention    Current Cognitive/Communication Assessment  functional  -MR         Coordination    Coordination Tests  9-Hole Peg  -MR      9-Hole Peg Left  45 sec  -MR      9-Hole Peg Right  32 sec  -MR         General ROM    RT Upper Ext  Comments  -MR      LT Upper Ext  Comment;Lt Shoulder ABduction;Lt Shoulder Flexion  -MR         Right Upper Ext    Rt Upper Extremity Comments   RUE shoulder, elbow, forearm, wrist WFL. Pt demos 7/8 range with digit flexion and extension pt reports stiffness with bilateral hands  -MR         Left Upper Ext    Lt Shoulder Abduction AROM  1/2 range  -MR      Lt Shoulder Abduction PROM  3/4 range  -MR      Lt Shoulder Flexion AROM  1/2 range  -MR      Lt Shoulder Flexion PROM  3/4 range  -MR      Lt Upper Extremity Comments   L elbow, forearm, wrist WFL. L digit flexion/extension 3/4 range. Pt noted with swelling and stiffness at MCPs. Pt appears to have flexion contracture of PIP of ring finger  -MR         MMT (Manual Muscle Testing)    General MMT Comments  Bilateral shoulder and elbows 3+ to 4-/5  -MR         ADL Assessment/Intervention    ADL's Assessed?  Upper Body Bathing;Upper Body Dressing;Lower Body Dressing;Toileting;Grooming  -MR      Comment, IADL Assessment/Training  pt requires assist as needed to open containers and cut food  -MR      Additional Documentation  Comment, IADL Assessment/Training (Row)  -MR         Bathing Assessment/Intervention    Bathing [place] Level  conditional " independence;supervision  -MR         Upper Body Dressing Assessment/Training    Upper Body Dressing [place] Level  conditional independence  -MR         Lower Body Dressing Assessment/Training    Lower Body Dressing [place] Level  minimum assist (75% patient effort) assist to tie shoelaces  -MR         Toileting Assessment/Training    Christian Level (Toileting)  conditional independence  -MR         Grooming Assessment/Training    Christian Level (Grooming)  conditional independence  -MR        User Key  (r) = Recorded By, (t) = Taken By, (c) = Cosigned By    Initials Name Provider Type    Lissett Parish, OT Occupational Therapist             Hand Therapy (last 24 hours)      Hand Eval     Row Name 06/02/20 1600             Hand  Strength     Strength Affected Side  Bilateral pt noted with atrophy of bilateral hands  -MR          Strength Right    # Reps  3  -MR      Right Rung  2  -MR      Right  Test 1  7  -MR      Right  Test 2  6  -MR      Right  Test 3  6  -MR       Strength Average Right  6.33  -MR          Strength Left    # Reps  3  -MR      Left Rung  2  -MR      Left  Test 1  1  -MR      Left  Test 2  2  -MR      Left  Test 3  2  -MR       Strength Average Left  1.67  -MR         Pinch Strength    Affected Side  Bilateral  -MR         Right Hand Strength - Pinch (lbs)    Lateral  2 lbs  -MR         Left Hand Strength - Pinch (lbs)    Lateral  3 lbs  -MR         Therapy Education    Education Details  results of OT eval; OT POC  -MR      Program  New  -MR      How Provided  Verbal  -MR      Provided to  Patient  -MR      Level of Understanding  Verbalized  -MR        User Key  (r) = Recorded By, (t) = Taken By, (c) = Cosigned By    Initials Name Provider Type    Lissett Parish, OT Occupational Therapist              Therapy Education  Education Details: results of OT eval; OT POC  Program: New  How Provided: Verbal  Provided to:  Patient  Level of Understanding: Verbalized    OT Goals     Row Name 06/02/20 1600          OT Short Term Goals    STG Date to Achieve  07/17/20  -MR     STG 1  Pt to be independent with UE ROM HEP.  -MR     STG 2  Pt to be independent with UE strengthening HEP.  -MR     STG 3  Pt to increase BUE  strength > or = 5 lbs for improved functional use of upper extremities for ADL/IADLs.  -MR        Long Term Goals    LTG Date to Achieve  08/01/20  -MR     LTG 1  Pt to increase BUE  strength > or = 10 lbs for improved functional use of upper extremities for ADL/IADLs.  -MR     LTG 2  Pt to increase bilateral shoulder strength to > or = 4/5 for improved use of upper extremities for daily functional tasks.  -MR     LTG 3  Pt to increase bilateral elbow strength to > or = 4/5 for improved use of upper extremities for daily functional tasks.  -MR     LTG 4  Pt to manage shoelaces with MOD I for improved independence with self care tasks.  -MR     LTG 5  Pt to cut food with MOD I using adaptive strategies as needed for improved independence with ADLs.  -MR     LTG 6  Pt to open containers with MOD I using adaptive strategies as needed for improved participation with daily functional tasks.  -MR        Time Calculation    OT Goal Re-Cert Due Date  07/02/20  -MR       User Key  (r) = Recorded By, (t) = Taken By, (c) = Cosigned By    Initials Name Provider Type    MR Danielson Lissett Rose, OT Occupational Therapist                 OT Assessment/Plan     Row Name 06/02/20 1615          OT Assessment    Functional Limitations  Limitation in home management;Limitations in community activities;Limitations in functional capacity and performance;Performance in leisure activities;Performance in self-care ADL;Performance in work activities  -MR     Impairments  Dexterity;Muscle strength;Sensation;Range of motion  -MR     Assessment Comments  Pt is a 60 y.o. female referred to outpatient OT with the following diagnoses- cervical  myelopathy, cervical stenosis of spine, cord compression.  Pt reports she had symptoms of UE pain, numbness and tingling which began in October 2019. On December 6, 2019 she underwent anterior cervical corpectomy C6, C7 with PEEK cage and anterior Zevo cervical plate from C5 to T1, repair of CSF leak, pt was then transferred to inpatient rehab at [facility]. She had development of a large anterior cervical fluid collection and was therefore readmitted to the hospital on December 19, 2019 .  That day she went back to the OR for placement of an external lumbar drain as well as drainage of the cervical CSF collection and reclosure of the CSF leak. She was transferred back to inpatient rehab on 12/27/19. Pt underwent surgery for placement of lumbar periotoneal shunt on 1/10/20, was discharged home with assist from sister two days later. Pt received home health and outpatient therapy, reports ceasing outpatient therapy due to COVID 19 restrictions. Pt continues to report difficulty with use of hands and currently presents for outpatient OT evaluation. Upon assessment pt noted with impaired R shoulder and bilateral hand ROM, impaired BUE strength and dexterity. Pt also reports severe numbness and tingling with bilateral hands. Pt's current deficits limit her ability to perform ADL/IADLs and work related tasks. Recommend outpatient OT services to address impairments for improved performance of daily functional tasks.  -MR     Please refer to paper survey for additional self-reported information  Yes  -MR     OT Diagnosis  impaired functional use of BUEs  -MR     OT Rehab Potential  Fair  -MR     Patient/caregiver participated in establishment of treatment plan and goals  Yes  -MR     Patient would benefit from skilled therapy intervention  Yes  -MR        OT Plan    OT Frequency  Other (comment) 1-2x/week  -MR     Predicted Duration of Therapy Intervention (Therapy Eval)  8 weeks  -MR     Planned CPT's?  OT EVAL MOD  COMPLEXITY: 53206;OT THER ACT EA 15 MIN: 81650RH;OT THER PROC EA 15 MIN: 87112MN;OT NEUROMUSC RE EDUCATION EA 15 MIN: 95002;OT SELF CARE/MGMT/TRAIN 15 MIN: 47493;OT HOT/COLD PACK  -MR     Planned Therapy Interventions (Optional Details)  home exercise program;manual therapy techniques;patient/family education;ROM (Range of Motion);strengthening;stretching  -MR       User Key  (r) = Recorded By, (t) = Taken By, (c) = Cosigned By    Initials Name Provider Type    MR Danielson Lissett Rose, OT Occupational Therapist              Outcome Measure Options: Disabilities of the Arm, Shoulder, and Hand (DASH)  9 Hole Peg  9-Hole Peg Left: 45 sec  9-Hole Peg Right: 32 sec  DASH  Open a tight or new jar.: Severe Difficulty  Write: Mild Difficulty  Turn a key: Mild Difficulty  Prepare a meal: Mild Difficulty  Push open a heavy door: Severe Difficulty  Place an object on a shelf above your head: Severe Difficulty  Do heavy household chores (e.g., wash walls, wash floors): Severe Difficulty  Garden or do yard work: Severe Difficulty  Make a bed: Moderate Difficulty  Carry a shopping bag or briefcase: Severe Difficulty  Carry a heavy object (over 10 lbs): Unable  Change a lightbulb overhead: Mild Difficulty  Wash or blow dry your hair: Mild Difficulty  Wash your back: Mild Difficulty  Put on a pullover sweater: Mild Difficulty  Use a knife to cut food: Moderate Difficulty  Recreational activities in which require little effort (e.g., cardplaying, knitting, etc.): Mild Difficulty  Recreational activities in which you take some force or impact through your arm, should or hand (e.g. golf, hammering, tennis, etc.): Moderate Difficulty  Recreational Activities in which you move your arm freely (e.g., frisbee, badminton, etc.): Moderate Difficulty  Manage transportation needs (getting from one place to another): Moderate Difficulty  During the past week, to what extent has your arm, shoulder, or hand problem interfered with your normal  social activites with family, friends, neighbors or groups?: Quite a bit  During the past week, were you limited in your work or other regular daily activities as a result of your arm, shoulder or hand problem?: Very limited  Arm, Shoulder, or hand pain: Severe  Arm, shoulder or hand pain when you performed any specific activity: Moderate  Tingling (pins and needles) in your arm, shoulder, or hand: Extreme  Weakness in your arm, shoulder or hand: Extreme  Stiffness in your arm, shoulder or hand: Extreme  During the past week, how much difficulty have you had sleeping because of the pain in your arm, shoulder or hand?: Moderate Difficiculty  I feel less capable, less confident or less useful because of my arm, shoulder or hand problem: Strongly Agree  DASH Sum : 98  Number of Questions Answered: 29  DASH Score: 59.48         Time Calculation:   OT Start Time: 1431  OT Stop Time: 1528  OT Time Calculation (min): 57 min     Therapy Charges for Today     Code Description Service Date Service Provider Modifiers Qty    72182722435 HC OT EVAL MOD COMPLEXITY 4 6/2/2020 Lissett Danielson OT GO 1                     Lissett Danielson OT  6/2/2020

## 2020-06-04 ENCOUNTER — TELEPHONE (OUTPATIENT)
Dept: NEUROSURGERY | Facility: CLINIC | Age: 60
End: 2020-06-04

## 2020-06-05 ENCOUNTER — OFFICE VISIT (OUTPATIENT)
Dept: NEUROSURGERY | Facility: CLINIC | Age: 60
End: 2020-06-05

## 2020-06-05 VITALS
BODY MASS INDEX: 26.25 KG/M2 | HEIGHT: 55 IN | SYSTOLIC BLOOD PRESSURE: 124 MMHG | TEMPERATURE: 98.2 F | DIASTOLIC BLOOD PRESSURE: 74 MMHG | HEART RATE: 74 BPM

## 2020-06-05 DIAGNOSIS — G95.9 CERVICAL MYELOPATHY (HCC): ICD-10-CM

## 2020-06-05 DIAGNOSIS — R29.898 WEAKNESS OF BOTH ARMS: ICD-10-CM

## 2020-06-05 DIAGNOSIS — R29.898 WEAKNESS OF BOTH HANDS: Primary | ICD-10-CM

## 2020-06-05 PROCEDURE — 99213 OFFICE O/P EST LOW 20 MIN: CPT | Performed by: NEUROLOGICAL SURGERY

## 2020-06-09 ENCOUNTER — HOSPITAL ENCOUNTER (OUTPATIENT)
Dept: OCCUPATIONAL THERAPY | Facility: HOSPITAL | Age: 60
Setting detail: THERAPIES SERIES
Discharge: HOME | End: 2020-06-09

## 2020-06-09 ENCOUNTER — APPOINTMENT (OUTPATIENT)
Dept: WOMENS IMAGING | Facility: HOSPITAL | Age: 60
End: 2020-06-09

## 2020-06-09 DIAGNOSIS — R29.898 UPPER EXTREMITY WEAKNESS: ICD-10-CM

## 2020-06-09 DIAGNOSIS — M48.02 CERVICAL STENOSIS OF SPINE: ICD-10-CM

## 2020-06-09 DIAGNOSIS — G95.20 CORD COMPRESSION (HCC): ICD-10-CM

## 2020-06-09 DIAGNOSIS — G95.9 CERVICAL MYELOPATHY (HCC): Primary | ICD-10-CM

## 2020-06-09 PROCEDURE — 77063 BREAST TOMOSYNTHESIS BI: CPT | Performed by: RADIOLOGY

## 2020-06-09 PROCEDURE — 77067 SCR MAMMO BI INCL CAD: CPT | Performed by: RADIOLOGY

## 2020-06-09 PROCEDURE — 97110 THERAPEUTIC EXERCISES: CPT | Performed by: OCCUPATIONAL THERAPIST

## 2020-06-09 NOTE — THERAPY TREATMENT NOTE
Outpatient Occupational Therapy Neuro Treatment Note  [facility]     Patient Name: Noemi Bartholomew  : 1960  MRN: [id number]  Today's Date: 2020       Visit Date: 2020    Patient Active Problem List   Diagnosis   • Carpal tunnel syndrome   • Hyperlipidemia   • Osteoporosis   • DDD (degenerative disc disease), lumbar   • Chronic left lumbar radiculopathy   • Congenital spondylolysis, lumbosacral region   • Neuropathic pain, leg, left   • Ossification of spinal ligament   • Cervical spondylosis with myelopathy   • Cervical stenosis of spine   • Cord compression (CMS/HCC)   • Postoperative CSF leak   • Cervical myelopathy (CMS/HCC)   • Postoperative cerebrospinal fluid leak   • Weakness of both hands   • Weakness of both arms        Past Medical History:   Diagnosis Date   • Abnormal alkaline phosphatase test 2015    Resolved   • Acute bronchitis 2015    Resolved   • Allergic child age    pencillin   • Cervical spinal stenosis    • Diverticulosis    • H/O electromyography 10/13/2014   • H/O mammogram 2014   • Headache occassionally   • High cholesterol    • History of EKG 2015   • Hypercalcemia 2015    Resolved, Full w/u done, Likely secondary to Forteo   • Left arm pain    • Low back pain 2017   • Numbness in both hands    • Osteopenia several years   • Osteoporosis    • Paresthesia 2015    Resolved        Past Surgical History:   Procedure Laterality Date   • ANTERIOR CHANNEL VERTEBRECTOMY/CORPECTOMY N/A 2019    Procedure: CERVICAL 6, CERVICAL 7 ANTERIOR CERVICAL CORPECTOMY WITH CAGE AND PLATE;  Surgeon: Antonio Noe MD;  Location: [facility];  Service: Neurosurgery   • BACK SURGERY     • COLONOSCOPY  2011   • INCISION AND DRAINAGE OF WOUND N/A 2019    Procedure: followed by drainage of anterior cervical fluid collection;  Surgeon: Antonio Noe MD;  Location: [facility];  Service: Neurosurgery   • LUMBAR DRAIN  INSERTION EXTERNAL N/A 12/19/2019    Procedure: LUMBAR DRAIN INSERTION EXTERNAL;  Surgeon: Antonio Noe MD;  Location:  [facility];  Service: Neurosurgery   • LUMBAR PERITONEAL SHUNT N/A 1/10/2020    Procedure: LUMBAR PERITONEAL SHUNT PLACEMENT;  Surgeon: Antonio Noe MD;  Location:  [facility];  Service: Neurosurgery   • SPINE SURGERY  1999 back    dr abad quintero   • TONSILLECTOMY  child age   • TYMPANOPLASTY           Visit Dx:    ICD-10-CM ICD-9-CM   1. Cervical myelopathy (CMS/[facility]) G95.9 721.1   2. Cervical stenosis of spine M48.02 723.0   3. Cord compression (CMS/[facility]) G95.20 336.9   4. Upper extremity weakness R29.898 729.89                   OT Assessment/Plan     Row Name 06/09/20 1556          OT Assessment    Assessment Comments  Pt seen for first tx following eval; pt worked on bilateral wrist stretches, tendon glides and AAROM. Pt tolerates fair, encouraged to push herself but to watch pain symptoms.  -SG       User Key  (r) = Recorded By, (t) = Taken By, (c) = Cosigned By    Initials Name Provider Type    Roshni Sainz OTR Occupational Therapist                     Therapy Education  Education Details: Wrist flexor/extensor stretch, tendon glides with AAROM, heat bilateral hands, encouraged purchase of  strengthener for home use, discussed proper positioning at rest  Given: HEP, Symptoms/condition management, Posture/body mechanics  Program: New  How Provided: Verbal, Demonstration, Written  Provided to: Patient  Level of Understanding: Demonstrated, Verbalized    Modalities     Row Name 06/09/20 1549             Moist Heat    MH Applied  Yes  -SG      Location  Bilateral hands  -SG      Rx Minutes  12 mins  -SG      MH Prior to Rx  Yes  -SG      MH S/P Rx  No  -SG        User Key  (r) = Recorded By, (t) = Taken By, (c) = Cosigned By    Initials Name Provider Type    Roshni Sainz OTR Occupational Therapist        OT Exercises     Row Name 06/09/20 1544           "   Precautions    Existing Precautions/Restrictions  fall  -SG         Subjective Comments    Subjective Comments  Pt concerned about hand progress. \"I drop things all the time.\"  -SG         Exercise 1    Exercise Name 1  Proximal tendon glides bilaterally. Tolerates fair, but L elbow does not extend fully.  -SG      Cueing 1  Verbal  -SG      Sets 1  1  -SG      Reps 1  5  -SG         Exercise 2    Exercise Name 2  Wrist flexors stretch straight arm pronated, stretch wrist extensors straight arm (bilaterally)  -SG      Cueing 2  Verbal  -SG      Sets 2  2  -SG      Time (Seconds) 2  20  -SG         Exercise 3    Exercise Name 3  Tendon glides bilaterally. Vcing for hand placement for assisted ROM.  -SG      Cueing 3  Verbal;Demo  -SG        User Key  (r) = Recorded By, (t) = Taken By, (c) = Cosigned By    Initials Name Provider Type    Roshni Sainz OTR Occupational Therapist                      Time Calculation:   OT Start Time: 1430  OT Stop Time: 1528  OT Time Calculation (min): 58 min     Therapy Charges for Today     Code Description Service Date Service Provider Modifiers Qty    [id number]  OT THER PROC EA 15 MIN 6/9/2020 Roshni Morel OTR GO 4                    BREONNA Hinton  6/9/2020  "

## 2020-06-16 ENCOUNTER — HOSPITAL ENCOUNTER (OUTPATIENT)
Dept: OCCUPATIONAL THERAPY | Facility: HOSPITAL | Age: 60
Setting detail: THERAPIES SERIES
Discharge: HOME | End: 2020-06-16

## 2020-06-16 DIAGNOSIS — G95.9 CERVICAL MYELOPATHY (HCC): Primary | ICD-10-CM

## 2020-06-16 DIAGNOSIS — M48.02 CERVICAL STENOSIS OF SPINE: ICD-10-CM

## 2020-06-16 DIAGNOSIS — R29.898 UPPER EXTREMITY WEAKNESS: ICD-10-CM

## 2020-06-16 DIAGNOSIS — G95.20 CORD COMPRESSION (HCC): ICD-10-CM

## 2020-06-16 PROCEDURE — 97110 THERAPEUTIC EXERCISES: CPT | Performed by: OCCUPATIONAL THERAPIST

## 2020-06-22 ENCOUNTER — OFFICE (OUTPATIENT)
Dept: URBAN - METROPOLITAN AREA CLINIC 66 | Facility: CLINIC | Age: 60
End: 2020-06-22

## 2020-06-22 VITALS
HEIGHT: 55 IN | TEMPERATURE: 98.6 F | HEART RATE: 97 BPM | DIASTOLIC BLOOD PRESSURE: 83 MMHG | SYSTOLIC BLOOD PRESSURE: 130 MMHG | WEIGHT: 141 LBS

## 2020-06-22 DIAGNOSIS — Z80.0 FAMILY HISTORY OF MALIGNANT NEOPLASM OF DIGESTIVE ORGANS: ICD-10-CM

## 2020-06-22 DIAGNOSIS — K21.9 GASTRO-ESOPHAGEAL REFLUX DISEASE WITHOUT ESOPHAGITIS: ICD-10-CM

## 2020-06-22 PROCEDURE — 99213 OFFICE O/P EST LOW 20 MIN: CPT | Performed by: INTERNAL MEDICINE

## 2020-06-23 ENCOUNTER — HOSPITAL ENCOUNTER (OUTPATIENT)
Dept: OCCUPATIONAL THERAPY | Facility: HOSPITAL | Age: 60
Setting detail: THERAPIES SERIES
Discharge: HOME | End: 2020-06-23

## 2020-06-23 DIAGNOSIS — M81.0 OSTEOPOROSIS, UNSPECIFIED OSTEOPOROSIS TYPE, UNSPECIFIED PATHOLOGICAL FRACTURE PRESENCE: ICD-10-CM

## 2020-06-23 DIAGNOSIS — R29.898 UPPER EXTREMITY WEAKNESS: ICD-10-CM

## 2020-06-23 DIAGNOSIS — Z00.00 ROUTINE HEALTH MAINTENANCE: Primary | ICD-10-CM

## 2020-06-23 DIAGNOSIS — G95.20 CORD COMPRESSION (HCC): ICD-10-CM

## 2020-06-23 DIAGNOSIS — M48.02 CERVICAL STENOSIS OF SPINE: ICD-10-CM

## 2020-06-23 DIAGNOSIS — G95.9 CERVICAL MYELOPATHY (HCC): Primary | ICD-10-CM

## 2020-06-23 PROCEDURE — 97112 NEUROMUSCULAR REEDUCATION: CPT | Performed by: OCCUPATIONAL THERAPIST

## 2020-06-23 NOTE — THERAPY TREATMENT NOTE
Outpatient Occupational Therapy Neuro Treatment Note  [facility]     Patient Name: Noemi Bartholomew  : 1960  MRN: [id number]  Today's Date: 2020       Visit Date: 2020    Patient Active Problem List   Diagnosis   • Carpal tunnel syndrome   • Hyperlipidemia   • Osteoporosis   • DDD (degenerative disc disease), lumbar   • Chronic left lumbar radiculopathy   • Congenital spondylolysis, lumbosacral region   • Neuropathic pain, leg, left   • Ossification of spinal ligament   • Cervical spondylosis with myelopathy   • Cervical stenosis of spine   • Cord compression (CMS/HCC)   • Postoperative CSF leak   • Cervical myelopathy (CMS/HCC)   • Postoperative cerebrospinal fluid leak   • Weakness of both hands   • Weakness of both arms        Past Medical History:   Diagnosis Date   • Abnormal alkaline phosphatase test 2015    Resolved   • Acute bronchitis 2015    Resolved   • Allergic child age    pencillin   • Cervical spinal stenosis    • Diverticulosis    • H/O electromyography 10/13/2014   • H/O mammogram 2014   • Headache occassionally   • High cholesterol    • History of EKG 2015   • Hypercalcemia 2015    Resolved, Full w/u done, Likely secondary to Forteo   • Left arm pain    • Low back pain 2017   • Numbness in both hands    • Osteopenia several years   • Osteoporosis    • Paresthesia 2015    Resolved        Past Surgical History:   Procedure Laterality Date   • ANTERIOR CHANNEL VERTEBRECTOMY/CORPECTOMY N/A 2019    Procedure: CERVICAL 6, CERVICAL 7 ANTERIOR CERVICAL CORPECTOMY WITH CAGE AND PLATE;  Surgeon: Antonio Noe MD;  Location: [facility];  Service: Neurosurgery   • BACK SURGERY     • COLONOSCOPY  2011   • INCISION AND DRAINAGE OF WOUND N/A 2019    Procedure: followed by drainage of anterior cervical fluid collection;  Surgeon: Antonio Noe MD;  Location: [facility];  Service: Neurosurgery   • LUMBAR DRAIN  INSERTION EXTERNAL N/A 12/19/2019    Procedure: LUMBAR DRAIN INSERTION EXTERNAL;  Surgeon: Antonio Noe MD;  Location: [facility] MAIN OR;  Service: Neurosurgery   • LUMBAR PERITONEAL SHUNT N/A 1/10/2020    Procedure: LUMBAR PERITONEAL SHUNT PLACEMENT;  Surgeon: Antonio Noe MD;  Location: [facility] MAIN OR;  Service: Neurosurgery   • SPINE SURGERY  1999 back    dr abad quintero   • TONSILLECTOMY  child age   • TYMPANOPLASTY           Visit Dx:    ICD-10-CM ICD-9-CM   1. Cervical myelopathy (CMS/[facility]) G95.9 721.1   2. Cervical stenosis of spine M48.02 723.0   3. Upper extremity weakness R29.898 729.89   4. Cord compression (CMS/[facility]) G95.20 336.9              Hand Therapy (last 24 hours)      Hand Eval     Row Name 06/23/20 1500             Hand ROM Tested?    Hand ROM Tested?  Left Flexion- AROM;Right Flexion- AROM  -SG         Left Flexion AROM    II- MP AROM  50  -SG      II- PIP AROM  98  -SG      II- DIP AROM  83  -SG      II- SOLIZ Left Flexion AROM  231  -SG      III- MP AROM  55  -SG      III- PIP AROM  106  -SG      III- DIP AROM  80  -SG      III- SOLIZ Left Flexion AROM  241  -SG      IV- MP AROM  40  -SG      IV- PIP AROM  100  -SG      IV- DIP AROM  79  -SG      IV- SOLIZ Left Flexion AROM  219  -SG      V- MP AROM  27  -SG      V- PIP AROM  105  -SG      V- DIP AROM  65  -SG      V- SOLIZ Left Flexion AROM  197  -SG         Right Flexion AROM    II- MP AROM  90  -SG      II- PIP AROM  105  -SG      II- DIP AROM  110  -SG      II- SOLIZ Right Flexion AROM  305  -SG      III- MP AROM  94  -SG      III- PIP AROM  63  -SG      III- DIP AROM  70  -SG      III- SOLIZ Right Flexion AROM  227  -SG      IV- MP AROM  96  -SG      IV- PIP AROM  110  -SG      IV- DIP AROM  17  -SG      IV- SOLIZ Right Flexion AROM  223  -SG      V- MP AROM  100  -SG      V- PIP AROM  60  -SG      V- DIP AROM  0  -SG      V- SOLIZ Right Flexion AROM  160  -SG          Strength Right    # Reps  1  -SG      Right Rung  2  -SG      Right   Test 1  10  -SG       Strength Average Right  10  -SG          Strength Left    # Reps  1  -SG      Left Rung  2  -SG      Left  Test 1  4  -SG       Strength Average Left  4  -SG        User Key  (r) = Recorded By, (t) = Taken By, (c) = Cosigned By    Initials Name Provider Type    Roshni Sainz OTR Occupational Therapist              OT Assessment/Plan     Row Name 06/23/20 1540          OT Assessment    Assessment Comments  Pt does well with joint blocking and AAROM exercises today, hands seem to be slightly improving. Contacted [employer] as a resource for flexion/extension of individual joints.   -SG       User Key  (r) = Recorded By, (t) = Taken By, (c) = Cosigned By    Initials Name Provider Type    Roshni Sainz OTR Occupational Therapist                     Therapy Education  Education Details: FMC activity, amita trapping with bilateral hands. Continue hand strengthening.  Given: HEP  Program: Reinforced, New  How Provided: Verbal  Provided to: Patient  Level of Understanding: Verbalized    Modalities     Row Name 06/23/20 1541             Moist Heat    MH Applied  Yes  -SG      Location  Bilateral hands  -SG      Rx Minutes  10 mins  -SG      MH Prior to Rx  Yes  -SG      MH S/P Rx  No  -SG        User Key  (r) = Recorded By, (t) = Taken By, (c) = Cosigned By    Initials Name Provider Type    Roshni Sainz OTR Occupational Therapist        OT Exercises     Row Name 06/23/20 1549             Precautions    Existing Precautions/Restrictions  fall  -SG         Exercise 1    Exercise Name 1  MH to bilateral hands; pt tolerates PROM and AAROM and joint blocking for MCP, PIP, and DIP.   -SG      Cueing 1  Verbal  -SG         Exercise 2    Exercise Name 2  Pt performs FMC task, manipulating coins in hands, working on 2pt pinch and amita trapping. Pt does fair with R hand but has increased difficulty with L hand.  -SG      Cueing 2  Verbal;Demo  -SG        User Key   (r) = Recorded By, (t) = Taken By, (c) = Cosigned By    Initials Name Provider Type     Roshni Morel OTR Occupational Therapist                      Time Calculation:   OT Start Time: 1434  OT Stop Time: 1516  OT Time Calculation (min): 42 min     Therapy Charges for Today     Code Description Service Date Service Provider Modifiers Qty    30869455247 HC OT NEUROMUSC RE EDUCATION EA 15 MIN 6/23/2020 Roshni Morel OTR GO 3                    BREONNA Hinton  6/23/2020

## 2020-06-24 LAB
25(OH)D3+25(OH)D2 SERPL-MCNC: 58.2 NG/ML (ref 30–100)
ALBUMIN SERPL-MCNC: 5.1 G/DL (ref 3.5–5.2)
ALBUMIN/GLOB SERPL: 2 G/DL
ALP SERPL-CCNC: 106 U/L (ref 39–117)
ALT SERPL-CCNC: 18 U/L (ref 1–33)
APPEARANCE UR: CLEAR
AST SERPL-CCNC: 20 U/L (ref 1–32)
BACTERIA #/AREA URNS HPF: NORMAL /HPF
BASOPHILS # BLD AUTO: 0.05 10*3/MM3 (ref 0–0.2)
BASOPHILS NFR BLD AUTO: 0.5 % (ref 0–1.5)
BILIRUB SERPL-MCNC: 0.7 MG/DL (ref 0.2–1.2)
BILIRUB UR QL STRIP: NEGATIVE
BUN SERPL-MCNC: 14 MG/DL (ref 8–23)
BUN/CREAT SERPL: 18.9 (ref 7–25)
CALCIUM SERPL-MCNC: 9.8 MG/DL (ref 8.6–10.5)
CASTS URNS MICRO: NORMAL
CHLORIDE SERPL-SCNC: 100 MMOL/L (ref 98–107)
CHOLEST SERPL-MCNC: 158 MG/DL (ref 0–200)
CO2 SERPL-SCNC: 27.4 MMOL/L (ref 22–29)
COLOR UR: YELLOW
CREAT SERPL-MCNC: 0.74 MG/DL (ref 0.57–1)
EOSINOPHIL # BLD AUTO: 0.1 10*3/MM3 (ref 0–0.4)
EOSINOPHIL NFR BLD AUTO: 1 % (ref 0.3–6.2)
EPI CELLS #/AREA URNS HPF: NORMAL /HPF
ERYTHROCYTE [DISTWIDTH] IN BLOOD BY AUTOMATED COUNT: 13.3 % (ref 12.3–15.4)
GLOBULIN SER CALC-MCNC: 2.6 GM/DL
GLUCOSE SERPL-MCNC: 95 MG/DL (ref 65–99)
GLUCOSE UR QL: NEGATIVE
HCT VFR BLD AUTO: 40.9 % (ref 34–46.6)
HDLC SERPL-MCNC: 82 MG/DL (ref 40–60)
HGB BLD-MCNC: 13.4 G/DL (ref 12–15.9)
HGB UR QL STRIP: NEGATIVE
IMM GRANULOCYTES # BLD AUTO: 0.03 10*3/MM3 (ref 0–0.05)
IMM GRANULOCYTES NFR BLD AUTO: 0.3 % (ref 0–0.5)
KETONES UR QL STRIP: NEGATIVE
LDLC SERPL CALC-MCNC: 61 MG/DL (ref 0–100)
LDLC/HDLC SERPL: 0.74 {RATIO}
LEUKOCYTE ESTERASE UR QL STRIP: NEGATIVE
LYMPHOCYTES # BLD AUTO: 1.72 10*3/MM3 (ref 0.7–3.1)
LYMPHOCYTES NFR BLD AUTO: 17.8 % (ref 19.6–45.3)
MCH RBC QN AUTO: 29.8 PG (ref 26.6–33)
MCHC RBC AUTO-ENTMCNC: 32.8 G/DL (ref 31.5–35.7)
MCV RBC AUTO: 90.9 FL (ref 79–97)
MONOCYTES # BLD AUTO: 0.46 10*3/MM3 (ref 0.1–0.9)
MONOCYTES NFR BLD AUTO: 4.8 % (ref 5–12)
NEUTROPHILS # BLD AUTO: 7.29 10*3/MM3 (ref 1.7–7)
NEUTROPHILS NFR BLD AUTO: 75.6 % (ref 42.7–76)
NITRITE UR QL STRIP: NEGATIVE
NRBC BLD AUTO-RTO: 0 /100 WBC (ref 0–0.2)
PH UR STRIP: 5.5 [PH] (ref 5–8)
PLATELET # BLD AUTO: 274 10*3/MM3 (ref 140–450)
POTASSIUM SERPL-SCNC: 4.5 MMOL/L (ref 3.5–5.2)
PROT SERPL-MCNC: 7.7 G/DL (ref 6–8.5)
PROT UR QL STRIP: ABNORMAL
RBC # BLD AUTO: 4.5 10*6/MM3 (ref 3.77–5.28)
RBC #/AREA URNS HPF: NORMAL /HPF
SODIUM SERPL-SCNC: 138 MMOL/L (ref 136–145)
SP GR UR: ABNORMAL (ref 1–1.03)
TRIGL SERPL-MCNC: 75 MG/DL (ref 0–150)
TSH SERPL DL<=0.005 MIU/L-ACNC: 3.41 UIU/ML (ref 0.27–4.2)
UROBILINOGEN UR STRIP-MCNC: ABNORMAL MG/DL
VLDLC SERPL CALC-MCNC: 15 MG/DL
WBC # BLD AUTO: 9.65 10*3/MM3 (ref 3.4–10.8)
WBC #/AREA URNS HPF: NORMAL /HPF

## 2020-06-29 ENCOUNTER — OFFICE VISIT (OUTPATIENT)
Dept: INTERNAL MEDICINE | Facility: CLINIC | Age: 60
End: 2020-06-29

## 2020-06-29 VITALS
WEIGHT: 90 LBS | OXYGEN SATURATION: 96 % | SYSTOLIC BLOOD PRESSURE: 120 MMHG | TEMPERATURE: 97.7 F | HEIGHT: 55 IN | HEART RATE: 77 BPM | BODY MASS INDEX: 20.83 KG/M2 | DIASTOLIC BLOOD PRESSURE: 82 MMHG

## 2020-06-29 DIAGNOSIS — Z00.00 WELL ADULT EXAM: Primary | ICD-10-CM

## 2020-06-29 PROCEDURE — 99396 PREV VISIT EST AGE 40-64: CPT | Performed by: INTERNAL MEDICINE

## 2020-06-29 RX ORDER — LANOLIN ALCOHOL/MO/W.PET/CERES
CREAM (GRAM) TOPICAL
COMMUNITY

## 2020-06-29 RX ORDER — CETIRIZINE HYDROCHLORIDE 10 MG/1
10 TABLET ORAL DAILY
COMMUNITY

## 2020-06-29 RX ORDER — ATORVASTATIN CALCIUM 20 MG/1
20 TABLET, FILM COATED ORAL NIGHTLY
Qty: 90 TABLET | Refills: 3 | Status: SHIPPED | OUTPATIENT
Start: 2020-06-29 | End: 2021-06-25

## 2020-06-29 RX ORDER — DOCUSATE SODIUM 100 MG/1
100 CAPSULE, LIQUID FILLED ORAL 2 TIMES DAILY
COMMUNITY

## 2020-06-29 RX ORDER — FLUTICASONE PROPIONATE 50 MCG
2 SPRAY, SUSPENSION (ML) NASAL DAILY PRN
COMMUNITY
End: 2021-06-30

## 2020-06-29 NOTE — PROGRESS NOTES
Eduardo Bartholomew is a 60 y.o. female who presents for a complete physical exam.      History of Present Illness     HLD.  Cholesterol is excellent.    OP.  She is managed by gynecology.  She is starting Prolia.      Review of Systems   HENT: Negative.    Eyes: Negative.    Respiratory: Negative.    Cardiovascular: Negative.    Gastrointestinal: Negative.    Endocrine: Negative.    Genitourinary: Negative.    Musculoskeletal: Negative.    Skin: Negative.    Allergic/Immunologic: Negative.    Neurological: Positive for weakness and numbness.   Hematological: Negative.    Psychiatric/Behavioral: Negative.        The following portions of the patient's history were reviewed and updated as appropriate: allergies, current medications, past family history, past medical history, past social history, past surgical history and problem list.  Health maintenance tab was reviewed and updated with the patient.       Patient Active Problem List    Diagnosis Date Noted   • Weakness of both arms 06/05/2020   • Weakness of both hands 03/23/2020   • Postoperative cerebrospinal fluid leak 01/09/2020   • Cervical myelopathy (CMS/[facility]) 12/13/2019   • Cord compression (CMS/[facility]) 12/09/2019   • Cervical stenosis of spine 12/06/2019   • Ossification of spinal ligament 11/22/2019     Note Last Updated: 11/22/2019     Added automatically from request for surgery [id number]     • Cervical spondylosis with myelopathy 11/22/2019     Note Last Updated: 11/22/2019     Added automatically from request for surgery [id number]     • Postoperative CSF leak 11/22/2019     Note Last Updated: 12/11/2019     Added automatically from request for surgery [id number]     • Neuropathic pain, leg, left 06/25/2018   • DDD (degenerative disc disease), lumbar 05/02/2018   • Chronic left lumbar radiculopathy 05/02/2018   • Congenital spondylolysis, lumbosacral region 05/02/2018   • Carpal tunnel syndrome 06/08/2016   • Hyperlipidemia 06/08/2016   • Osteoporosis  06/08/2016     Note Last Updated: 6/29/2020     Description: Boniva for a number of years.  Forteo for a year.  Managed by Gyn. .  I do recommend 1200mg calcium and 1000 IUs of vitamin D in supplements/diet as well as weight bearing exercise to prevent further decline in BMD.  Prolia started 2020.         Past Medical History:   Diagnosis Date   • Abnormal alkaline phosphatase test 06/12/2015    Resolved   • Acute bronchitis 06/12/2015    Resolved   • Allergic child age    pencillin   • Cervical spinal stenosis    • Diverticulosis    • H/O electromyography 10/13/2014   • H/O mammogram 07/29/2014   • Headache occassionally   • High cholesterol    • History of EKG 06/12/2015   • Hypercalcemia 06/12/2015    Resolved, Full w/u done, Likely secondary to Forteo   • Left arm pain    • Low back pain March 2017   • Numbness in both hands    • Osteopenia several years   • Osteoporosis    • Paresthesia 06/12/2015    Resolved       Past Surgical History:   Procedure Laterality Date   • ANTERIOR CHANNEL VERTEBRECTOMY/CORPECTOMY N/A 12/6/2019    Procedure: CERVICAL 6, CERVICAL 7 ANTERIOR CERVICAL CORPECTOMY WITH CAGE AND PLATE;  Surgeon: Antonio Noe MD;  Location: [facility] OR;  Service: Neurosurgery   • BACK SURGERY  1999   • COLONOSCOPY  05/20/2011   • INCISION AND DRAINAGE OF WOUND N/A 12/19/2019    Procedure: followed by drainage of anterior cervical fluid collection;  Surgeon: Antonio Noe MD;  Location: [facility] OR;  Service: Neurosurgery   • LUMBAR DRAIN INSERTION EXTERNAL N/A 12/19/2019    Procedure: LUMBAR DRAIN INSERTION EXTERNAL;  Surgeon: Antonio Noe MD;  Location[facility] MAIN OR;  Service: Neurosurgery   • LUMBAR PERITONEAL SHUNT N/A 1/10/2020    Procedure: LUMBAR PERITONEAL SHUNT PLACEMENT;  Surgeon: Antonio Noe MD;  Location: [facility] OR;  Service: Neurosurgery   • SPINE SURGERY  1999 back    dr abad quintero   • TONSILLECTOMY  child age   • TYMPANOPLASTY         Family  History   Problem Relation Age of Onset   • Breast cancer Mother    • Stroke Mother         Cerebrovascular Accident   • Colon cancer Mother    • Diabetes Father         Borderline Diabetes Mellitus   • Heart disease Father         Cardiac Disorder   • Heart failure Father    • Anxiety disorder Sister    • Nephrolithiasis Sister         Kidney Stones   • Malig Hyperthermia Neg Hx        Social History     Socioeconomic History   • Marital status: Single     Spouse name: Not on file   • Number of children: 0   • Years of education: college   • Highest education level: Not on file   Occupational History   • Occupation:    Tobacco Use   • Smoking status: Never Smoker   • Smokeless tobacco: Never Used   Substance and Sexual Activity   • Alcohol use: No   • Drug use: No   • Sexual activity: Never       Current Outpatient Medications on File Prior to Visit   Medication Sig Dispense Refill   • Calcium Carb-Cholecalciferol (CALCIUM 1000 + D PO) Take  by mouth Daily. 2 caps     • cetirizine (zyrTEC) 10 MG tablet Take 10 mg by mouth Daily.     • cholecalciferol (VITAMIN D3) 10 MCG (400 UNIT) tablet Take 2,000 Units by mouth Daily.     • docusate sodium (COLACE) 100 MG capsule Take 100 mg by mouth 2 (Two) Times a Day.     • fluticasone (FLONASE) 50 MCG/ACT nasal spray 2 sprays into the nostril(s) as directed by provider Daily.     • gabapentin (NEURONTIN) 100 MG capsule Take 2 capsules by mouth twice a day 120 capsule 3   • melatonin 3 MG tablet Take  by mouth.     • [DISCONTINUED] atorvastatin (LIPITOR) 20 MG tablet Take 20 mg by mouth Every Night.       No current facility-administered medications on file prior to visit.        Allergies   Allergen Reactions   • Penicillins Rash   • Topamax [Topiramate] Rash       Immunization History   Administered Date(s) Administered   • Hepatitis A 05/22/2018   • Shingrix 05/22/2018, 11/24/2018   • Tdap 09/10/2009, 11/04/2016       Objective     /82   Pulse 77   Temp  "97.7 °F (36.5 °C)   Ht 132.1 cm (52\")   Wt 40.8 kg (90 lb)   SpO2 96%   BMI 23.40 kg/m²     Physical Exam   Constitutional: She is oriented to person, place, and time. She appears well-developed and well-nourished.   HENT:   Head: Normocephalic and atraumatic.   Right Ear: Hearing, tympanic membrane and external ear normal.   Left Ear: Hearing, tympanic membrane and external ear normal.   Nose: Nose normal.   Mouth/Throat: Oropharynx is clear and moist.   Neck: Neck supple. No thyromegaly present.   Cardiovascular: Normal rate, regular rhythm and normal heart sounds.   No murmur heard.  Pulmonary/Chest: Effort normal and breath sounds normal. Right breast exhibits no mass. Left breast exhibits no mass. No breast tenderness.   Abdominal: Soft. She exhibits no distension. There is no hepatosplenomegaly. There is no tenderness.   Genitourinary: No breast tenderness.   Lymphadenopathy:     She has no cervical adenopathy.   Neurological: She is alert and oriented to person, place, and time.   Skin: Skin is warm and dry.   Psychiatric: She has a normal mood and affect. Her speech is normal and behavior is normal. Judgment and thought content normal. Cognition and memory are normal.       Assessment/Plan   Noemi was seen today for annual exam.    Diagnoses and all orders for this visit:    Well adult exam    Other orders  -     atorvastatin (LIPITOR) 20 MG tablet; Take 1 tablet by mouth Every Night.        Discussion    Patient presents today for a CPE.      Patient follows a healthy diet.   Patient follows an adequate exercise regimen. Patient will schedule a mammogram. Colon cancer screening:  Colonoscopy is scheduled.   Pap smears are performed by the patient's gynecologist.   Immunizations are up to date.   DEXA is up to date.          Health Maintenance   Topic Date Due   • COLONOSCOPY  05/16/2019   • ANNUAL PHYSICAL  06/29/2020   • INFLUENZA VACCINE  08/01/2020   • DXA SCAN  08/01/2020   • PAP SMEAR  08/16/2020 "   • LIPID PANEL  06/24/2021   • MAMMOGRAM  08/01/2021   • TDAP/TD VACCINES (3 - Td) 11/04/2026   • ZOSTER VACCINE  Completed   • HEPATITIS C SCREENING  Addressed            Future Appointments   Date Time Provider Department Center   6/30/2020  2:30 PM Gunataliea, Roshni, OTR [facility]   7/7/2020  3:30 PM REFERRED INJECTION CHAIR EP [facility]   7/14/2020  2:30 PM Gunataliea Roshni, OTR [facility]   7/21/2020  2:30 PM Guelda Roshni, OTR [facility]   7/28/2020  2:30 PM Guelda Roshni, OTR [facility]   8/4/2020  2:30 PM Guelda, Roshni, OTR [facility]   9/9/2020  3:00 PM Antonio Noe MD [facility]   12/23/2020  8:20 AM [facility] None   6/24/2021  8:10 AM [facility] None   6/30/2021  8:15 AM Mary Lou Carroll MD [facility] None

## 2020-06-30 ENCOUNTER — HOSPITAL ENCOUNTER (OUTPATIENT)
Dept: OCCUPATIONAL THERAPY | Facility: HOSPITAL | Age: 60
Setting detail: THERAPIES SERIES
Discharge: HOME | End: 2020-06-30

## 2020-06-30 DIAGNOSIS — G95.9 CERVICAL MYELOPATHY (HCC): Primary | ICD-10-CM

## 2020-06-30 DIAGNOSIS — R29.898 UPPER EXTREMITY WEAKNESS: ICD-10-CM

## 2020-06-30 DIAGNOSIS — M48.02 CERVICAL STENOSIS OF SPINE: ICD-10-CM

## 2020-06-30 DIAGNOSIS — G95.20 CORD COMPRESSION (HCC): ICD-10-CM

## 2020-06-30 PROCEDURE — 97112 NEUROMUSCULAR REEDUCATION: CPT | Performed by: OCCUPATIONAL THERAPIST

## 2020-07-06 ENCOUNTER — APPOINTMENT (OUTPATIENT)
Dept: GENERAL RADIOLOGY | Facility: HOSPITAL | Age: 60
End: 2020-07-06

## 2020-07-06 PROCEDURE — 96375 TX/PRO/DX INJ NEW DRUG ADDON: CPT

## 2020-07-06 PROCEDURE — 99284 EMERGENCY DEPT VISIT MOD MDM: CPT

## 2020-07-06 PROCEDURE — 72050 X-RAY EXAM NECK SPINE 4/5VWS: CPT

## 2020-07-06 PROCEDURE — 96374 THER/PROPH/DIAG INJ IV PUSH: CPT

## 2020-07-06 NOTE — ED NOTES
Pt presents to ED via POV w/sister from home w/cc neck pain x1 day  Pt states she woke this AM w/increasing neck pain.  Pt took tylenol w/minimal relief.  Pt states she had spinal surgery in December and is experiencing pain in similar region.  Pt states chronic numbness and tingling, increased today.  Pt denies any change in bowel or urinary habits, no changes in mobility.  Pt masked at triage  Triage completed with appropriate PPE     Katy Tarango, RN  07/06/20 2001

## 2020-07-07 ENCOUNTER — HOSPITAL ENCOUNTER (OUTPATIENT)
Facility: HOSPITAL | Age: 60
Setting detail: OBSERVATION
Discharge: HOME | End: 2020-07-08
Attending: EMERGENCY MEDICINE | Admitting: HOSPITALIST

## 2020-07-07 ENCOUNTER — APPOINTMENT (OUTPATIENT)
Dept: CT IMAGING | Facility: HOSPITAL | Age: 60
End: 2020-07-07

## 2020-07-07 ENCOUNTER — APPOINTMENT (OUTPATIENT)
Dept: ONCOLOGY | Facility: HOSPITAL | Age: 60
End: 2020-07-07

## 2020-07-07 ENCOUNTER — APPOINTMENT (OUTPATIENT)
Dept: OCCUPATIONAL THERAPY | Facility: HOSPITAL | Age: 60
End: 2020-07-07

## 2020-07-07 ENCOUNTER — APPOINTMENT (OUTPATIENT)
Dept: MRI IMAGING | Facility: HOSPITAL | Age: 60
End: 2020-07-07

## 2020-07-07 DIAGNOSIS — R52 INTRACTABLE PAIN: ICD-10-CM

## 2020-07-07 DIAGNOSIS — M54.2 NECK PAIN: Primary | ICD-10-CM

## 2020-07-07 PROBLEM — D63.8 ANEMIA, CHRONIC DISEASE: Status: ACTIVE | Noted: 2020-07-07

## 2020-07-07 LAB
ALBUMIN SERPL-MCNC: 4.4 G/DL (ref 3.5–5.2)
ALBUMIN/GLOB SERPL: 1.8 G/DL
ALP SERPL-CCNC: 93 U/L (ref 39–117)
ALT SERPL W P-5'-P-CCNC: 15 U/L (ref 1–33)
ANION GAP SERPL CALCULATED.3IONS-SCNC: 6.6 MMOL/L (ref 5–15)
ANION GAP SERPL CALCULATED.3IONS-SCNC: 7.8 MMOL/L (ref 5–15)
AST SERPL-CCNC: 18 U/L (ref 1–32)
BASOPHILS # BLD AUTO: 0.05 10*3/MM3 (ref 0–0.2)
BASOPHILS NFR BLD AUTO: 0.7 % (ref 0–1.5)
BILIRUB SERPL-MCNC: 0.3 MG/DL (ref 0–1.2)
BUN SERPL-MCNC: 8 MG/DL (ref 8–23)
BUN SERPL-MCNC: 8 MG/DL (ref 8–23)
BUN/CREAT SERPL: 13.1 (ref 7–25)
BUN/CREAT SERPL: 15.4 (ref 7–25)
CALCIUM SPEC-SCNC: 8.8 MG/DL (ref 8.6–10.5)
CALCIUM SPEC-SCNC: 9.1 MG/DL (ref 8.6–10.5)
CHLORIDE SERPL-SCNC: 101 MMOL/L (ref 98–107)
CHLORIDE SERPL-SCNC: 101 MMOL/L (ref 98–107)
CO2 SERPL-SCNC: 27.4 MMOL/L (ref 22–29)
CO2 SERPL-SCNC: 28.2 MMOL/L (ref 22–29)
CREAT SERPL-MCNC: 0.52 MG/DL (ref 0.57–1)
CREAT SERPL-MCNC: 0.61 MG/DL (ref 0.57–1)
DEPRECATED RDW RBC AUTO: 47.8 FL (ref 37–54)
DEPRECATED RDW RBC AUTO: 49.7 FL (ref 37–54)
EOSINOPHIL # BLD AUTO: 0.04 10*3/MM3 (ref 0–0.4)
EOSINOPHIL NFR BLD AUTO: 0.6 % (ref 0.3–6.2)
ERYTHROCYTE [DISTWIDTH] IN BLOOD BY AUTOMATED COUNT: 13.9 % (ref 12.3–15.4)
ERYTHROCYTE [DISTWIDTH] IN BLOOD BY AUTOMATED COUNT: 14.1 % (ref 12.3–15.4)
GFR SERPL CREATININE-BSD FRML MDRD: 100 ML/MIN/1.73
GFR SERPL CREATININE-BSD FRML MDRD: 120 ML/MIN/1.73
GLOBULIN UR ELPH-MCNC: 2.4 GM/DL
GLUCOSE SERPL-MCNC: 100 MG/DL (ref 65–99)
GLUCOSE SERPL-MCNC: 117 MG/DL (ref 65–99)
HCT VFR BLD AUTO: 37.3 % (ref 34–46.6)
HCT VFR BLD AUTO: 39.3 % (ref 34–46.6)
HGB BLD-MCNC: 11.9 G/DL (ref 12–15.9)
HGB BLD-MCNC: 12.5 G/DL (ref 12–15.9)
IMM GRANULOCYTES # BLD AUTO: 0.02 10*3/MM3 (ref 0–0.05)
IMM GRANULOCYTES NFR BLD AUTO: 0.3 % (ref 0–0.5)
LYMPHOCYTES # BLD AUTO: 1.41 10*3/MM3 (ref 0.7–3.1)
LYMPHOCYTES NFR BLD AUTO: 19.6 % (ref 19.6–45.3)
MCH RBC QN AUTO: 29.7 PG (ref 26.6–33)
MCH RBC QN AUTO: 30 PG (ref 26.6–33)
MCHC RBC AUTO-ENTMCNC: 31.8 G/DL (ref 31.5–35.7)
MCHC RBC AUTO-ENTMCNC: 31.9 G/DL (ref 31.5–35.7)
MCV RBC AUTO: 93 FL (ref 79–97)
MCV RBC AUTO: 94.5 FL (ref 79–97)
MONOCYTES # BLD AUTO: 0.36 10*3/MM3 (ref 0.1–0.9)
MONOCYTES NFR BLD AUTO: 5 % (ref 5–12)
NEUTROPHILS NFR BLD AUTO: 5.31 10*3/MM3 (ref 1.7–7)
NEUTROPHILS NFR BLD AUTO: 73.8 % (ref 42.7–76)
NRBC BLD AUTO-RTO: 0 /100 WBC (ref 0–0.2)
PLATELET # BLD AUTO: 240 10*3/MM3 (ref 140–450)
PLATELET # BLD AUTO: 255 10*3/MM3 (ref 140–450)
PMV BLD AUTO: 9 FL (ref 6–12)
PMV BLD AUTO: 9.2 FL (ref 6–12)
POTASSIUM SERPL-SCNC: 4.4 MMOL/L (ref 3.5–5.2)
POTASSIUM SERPL-SCNC: 4.6 MMOL/L (ref 3.5–5.2)
PROT SERPL-MCNC: 6.8 G/DL (ref 6–8.5)
RBC # BLD AUTO: 4.01 10*6/MM3 (ref 3.77–5.28)
RBC # BLD AUTO: 4.16 10*6/MM3 (ref 3.77–5.28)
SODIUM SERPL-SCNC: 135 MMOL/L (ref 136–145)
SODIUM SERPL-SCNC: 137 MMOL/L (ref 136–145)
TSH SERPL DL<=0.05 MIU/L-ACNC: 8.22 UIU/ML (ref 0.27–4.2)
WBC # BLD AUTO: 6.9 10*3/MM3 (ref 3.4–10.8)
WBC # BLD AUTO: 7.19 10*3/MM3 (ref 3.4–10.8)

## 2020-07-07 PROCEDURE — 93005 ELECTROCARDIOGRAM TRACING: CPT | Performed by: EMERGENCY MEDICINE

## 2020-07-07 PROCEDURE — 25010000002 IOPAMIDOL 61 % SOLUTION: Performed by: HOSPITALIST

## 2020-07-07 PROCEDURE — 25010000002 HYDROMORPHONE PER 4 MG: Performed by: EMERGENCY MEDICINE

## 2020-07-07 PROCEDURE — A9577 INJ MULTIHANCE: HCPCS | Performed by: HOSPITALIST

## 2020-07-07 PROCEDURE — G0378 HOSPITAL OBSERVATION PER HR: HCPCS

## 2020-07-07 PROCEDURE — 80053 COMPREHEN METABOLIC PANEL: CPT | Performed by: PHYSICIAN ASSISTANT

## 2020-07-07 PROCEDURE — 25010000002 ONDANSETRON PER 1 MG: Performed by: NURSE PRACTITIONER

## 2020-07-07 PROCEDURE — 70491 CT SOFT TISSUE NECK W/DYE: CPT

## 2020-07-07 PROCEDURE — 84443 ASSAY THYROID STIM HORMONE: CPT | Performed by: NURSE PRACTITIONER

## 2020-07-07 PROCEDURE — 0 GADOBENATE DIMEGLUMINE 529 MG/ML SOLUTION: Performed by: HOSPITALIST

## 2020-07-07 PROCEDURE — 96376 TX/PRO/DX INJ SAME DRUG ADON: CPT

## 2020-07-07 PROCEDURE — 99214 OFFICE O/P EST MOD 30 MIN: CPT | Performed by: NURSE PRACTITIONER

## 2020-07-07 PROCEDURE — 93010 ELECTROCARDIOGRAM REPORT: CPT | Performed by: INTERNAL MEDICINE

## 2020-07-07 PROCEDURE — 96374 THER/PROPH/DIAG INJ IV PUSH: CPT

## 2020-07-07 PROCEDURE — 80048 BASIC METABOLIC PNL TOTAL CA: CPT | Performed by: NURSE PRACTITIONER

## 2020-07-07 PROCEDURE — 63710000001 ONDANSETRON PER 8 MG: Performed by: HOSPITALIST

## 2020-07-07 PROCEDURE — 72156 MRI NECK SPINE W/O & W/DYE: CPT

## 2020-07-07 PROCEDURE — 25010000002 ONDANSETRON PER 1 MG: Performed by: EMERGENCY MEDICINE

## 2020-07-07 PROCEDURE — 96375 TX/PRO/DX INJ NEW DRUG ADDON: CPT

## 2020-07-07 PROCEDURE — 85027 COMPLETE CBC AUTOMATED: CPT | Performed by: NURSE PRACTITIONER

## 2020-07-07 PROCEDURE — 85025 COMPLETE CBC W/AUTO DIFF WBC: CPT | Performed by: PHYSICIAN ASSISTANT

## 2020-07-07 PROCEDURE — 25010000002 MORPHINE PER 10 MG: Performed by: INTERNAL MEDICINE

## 2020-07-07 PROCEDURE — 63710000001 ONDANSETRON ODT 4 MG TABLET DISPERSIBLE: Performed by: EMERGENCY MEDICINE

## 2020-07-07 RX ORDER — HYDROCODONE BITARTRATE AND ACETAMINOPHEN 7.5; 325 MG/1; MG/1
1 TABLET ORAL EVERY 6 HOURS PRN
Status: DISCONTINUED | OUTPATIENT
Start: 2020-07-07 | End: 2020-07-08 | Stop reason: HOSPADM

## 2020-07-07 RX ORDER — MELATONIN
2000 DAILY
Status: DISCONTINUED | OUTPATIENT
Start: 2020-07-07 | End: 2020-07-08 | Stop reason: HOSPADM

## 2020-07-07 RX ORDER — CETIRIZINE HYDROCHLORIDE 10 MG/1
10 TABLET ORAL DAILY
Status: DISCONTINUED | OUTPATIENT
Start: 2020-07-07 | End: 2020-07-08 | Stop reason: HOSPADM

## 2020-07-07 RX ORDER — ACETAMINOPHEN 650 MG/1
650 SUPPOSITORY RECTAL EVERY 4 HOURS PRN
Status: DISCONTINUED | OUTPATIENT
Start: 2020-07-07 | End: 2020-07-08 | Stop reason: HOSPADM

## 2020-07-07 RX ORDER — HYDROCODONE BITARTRATE AND ACETAMINOPHEN 5; 325 MG/1; MG/1
1 TABLET ORAL EVERY 6 HOURS PRN
Status: CANCELLED | OUTPATIENT
Start: 2020-07-07 | End: 2020-07-17

## 2020-07-07 RX ORDER — ONDANSETRON 4 MG/1
4 TABLET, ORALLY DISINTEGRATING ORAL ONCE
Status: COMPLETED | OUTPATIENT
Start: 2020-07-07 | End: 2020-07-07

## 2020-07-07 RX ORDER — ATORVASTATIN CALCIUM 20 MG/1
20 TABLET, FILM COATED ORAL NIGHTLY
Status: DISCONTINUED | OUTPATIENT
Start: 2020-07-07 | End: 2020-07-08 | Stop reason: HOSPADM

## 2020-07-07 RX ORDER — ONDANSETRON 2 MG/ML
4 INJECTION INTRAMUSCULAR; INTRAVENOUS EVERY 6 HOURS PRN
Status: DISCONTINUED | OUTPATIENT
Start: 2020-07-07 | End: 2020-07-08 | Stop reason: HOSPADM

## 2020-07-07 RX ORDER — UREA 10 %
1 LOTION (ML) TOPICAL NIGHTLY PRN
Status: DISCONTINUED | OUTPATIENT
Start: 2020-07-07 | End: 2020-07-08 | Stop reason: HOSPADM

## 2020-07-07 RX ORDER — ACETAMINOPHEN 160 MG/5ML
650 SOLUTION ORAL EVERY 4 HOURS PRN
Status: DISCONTINUED | OUTPATIENT
Start: 2020-07-07 | End: 2020-07-08 | Stop reason: HOSPADM

## 2020-07-07 RX ORDER — FLUTICASONE PROPIONATE 50 MCG
2 SPRAY, SUSPENSION (ML) NASAL DAILY
Status: DISCONTINUED | OUTPATIENT
Start: 2020-07-07 | End: 2020-07-08 | Stop reason: HOSPADM

## 2020-07-07 RX ORDER — CALCIUM CARBONATE 200(500)MG
2 TABLET,CHEWABLE ORAL 2 TIMES DAILY PRN
Status: DISCONTINUED | OUTPATIENT
Start: 2020-07-07 | End: 2020-07-08 | Stop reason: HOSPADM

## 2020-07-07 RX ORDER — ACETAMINOPHEN 325 MG/1
650 TABLET ORAL EVERY 4 HOURS PRN
Status: DISCONTINUED | OUTPATIENT
Start: 2020-07-07 | End: 2020-07-08 | Stop reason: HOSPADM

## 2020-07-07 RX ORDER — MORPHINE SULFATE 2 MG/ML
2 INJECTION, SOLUTION INTRAMUSCULAR; INTRAVENOUS
Status: DISCONTINUED | OUTPATIENT
Start: 2020-07-07 | End: 2020-07-08 | Stop reason: HOSPADM

## 2020-07-07 RX ORDER — HYDROMORPHONE HYDROCHLORIDE 1 MG/ML
0.5 INJECTION, SOLUTION INTRAMUSCULAR; INTRAVENOUS; SUBCUTANEOUS ONCE
Status: COMPLETED | OUTPATIENT
Start: 2020-07-07 | End: 2020-07-07

## 2020-07-07 RX ORDER — METHOCARBAMOL 500 MG/1
500 TABLET, FILM COATED ORAL EVERY 8 HOURS SCHEDULED
Status: DISCONTINUED | OUTPATIENT
Start: 2020-07-07 | End: 2020-07-08 | Stop reason: HOSPADM

## 2020-07-07 RX ORDER — BISACODYL 5 MG/1
5 TABLET, DELAYED RELEASE ORAL DAILY PRN
Status: DISCONTINUED | OUTPATIENT
Start: 2020-07-07 | End: 2020-07-08 | Stop reason: HOSPADM

## 2020-07-07 RX ORDER — ONDANSETRON 4 MG/1
4 TABLET, FILM COATED ORAL EVERY 6 HOURS PRN
Status: DISCONTINUED | OUTPATIENT
Start: 2020-07-07 | End: 2020-07-07

## 2020-07-07 RX ORDER — ONDANSETRON 2 MG/ML
4 INJECTION INTRAMUSCULAR; INTRAVENOUS EVERY 6 HOURS PRN
Status: DISCONTINUED | OUTPATIENT
Start: 2020-07-07 | End: 2020-07-07

## 2020-07-07 RX ORDER — OXYCODONE HYDROCHLORIDE AND ACETAMINOPHEN 5; 325 MG/1; MG/1
1 TABLET ORAL ONCE
Status: COMPLETED | OUTPATIENT
Start: 2020-07-07 | End: 2020-07-07

## 2020-07-07 RX ORDER — GABAPENTIN 100 MG/1
200 CAPSULE ORAL EVERY 12 HOURS SCHEDULED
Status: DISCONTINUED | OUTPATIENT
Start: 2020-07-07 | End: 2020-07-08 | Stop reason: HOSPADM

## 2020-07-07 RX ORDER — SODIUM CHLORIDE 0.9 % (FLUSH) 0.9 %
10 SYRINGE (ML) INJECTION AS NEEDED
Status: DISCONTINUED | OUTPATIENT
Start: 2020-07-07 | End: 2020-07-08 | Stop reason: HOSPADM

## 2020-07-07 RX ORDER — ONDANSETRON 4 MG/1
4 TABLET, FILM COATED ORAL EVERY 4 HOURS PRN
Status: DISCONTINUED | OUTPATIENT
Start: 2020-07-07 | End: 2020-07-08 | Stop reason: HOSPADM

## 2020-07-07 RX ORDER — SODIUM CHLORIDE 0.9 % (FLUSH) 0.9 %
10 SYRINGE (ML) INJECTION EVERY 12 HOURS SCHEDULED
Status: DISCONTINUED | OUTPATIENT
Start: 2020-07-07 | End: 2020-07-08 | Stop reason: HOSPADM

## 2020-07-07 RX ORDER — DOCUSATE SODIUM 100 MG/1
100 CAPSULE, LIQUID FILLED ORAL 2 TIMES DAILY
Status: DISCONTINUED | OUTPATIENT
Start: 2020-07-07 | End: 2020-07-08 | Stop reason: HOSPADM

## 2020-07-07 RX ORDER — ONDANSETRON 2 MG/ML
4 INJECTION INTRAMUSCULAR; INTRAVENOUS ONCE
Status: COMPLETED | OUTPATIENT
Start: 2020-07-07 | End: 2020-07-07

## 2020-07-07 RX ORDER — SODIUM CHLORIDE 9 MG/ML
100 INJECTION, SOLUTION INTRAVENOUS CONTINUOUS
Status: DISCONTINUED | OUTPATIENT
Start: 2020-07-07 | End: 2020-07-08 | Stop reason: HOSPADM

## 2020-07-07 RX ADMIN — IOPAMIDOL 75 ML: 612 INJECTION, SOLUTION INTRAVENOUS at 14:19

## 2020-07-07 RX ADMIN — ONDANSETRON 4 MG: 2 INJECTION INTRAMUSCULAR; INTRAVENOUS at 05:51

## 2020-07-07 RX ADMIN — OXYCODONE HYDROCHLORIDE AND ACETAMINOPHEN 1 TABLET: 5; 325 TABLET ORAL at 01:55

## 2020-07-07 RX ADMIN — GABAPENTIN 100 MG: 100 CAPSULE ORAL at 11:30

## 2020-07-07 RX ADMIN — ACETAMINOPHEN 650 MG: 325 TABLET, FILM COATED ORAL at 21:49

## 2020-07-07 RX ADMIN — GABAPENTIN 200 MG: 100 CAPSULE ORAL at 21:32

## 2020-07-07 RX ADMIN — SODIUM CHLORIDE, PRESERVATIVE FREE 10 ML: 5 INJECTION INTRAVENOUS at 21:37

## 2020-07-07 RX ADMIN — DOCUSATE SODIUM 100 MG: 100 CAPSULE, LIQUID FILLED ORAL at 21:38

## 2020-07-07 RX ADMIN — ONDANSETRON HYDROCHLORIDE 4 MG: 4 TABLET, FILM COATED ORAL at 16:12

## 2020-07-07 RX ADMIN — ATORVASTATIN CALCIUM 20 MG: 20 TABLET, FILM COATED ORAL at 21:32

## 2020-07-07 RX ADMIN — MORPHINE SULFATE 2 MG: 2 INJECTION, SOLUTION INTRAMUSCULAR; INTRAVENOUS at 15:31

## 2020-07-07 RX ADMIN — MORPHINE SULFATE 2 MG: 2 INJECTION, SOLUTION INTRAMUSCULAR; INTRAVENOUS at 09:27

## 2020-07-07 RX ADMIN — ONDANSETRON 4 MG: 4 TABLET, ORALLY DISINTEGRATING ORAL at 01:03

## 2020-07-07 RX ADMIN — GADOBENATE DIMEGLUMINE 8 ML: 529 INJECTION, SOLUTION INTRAVENOUS at 20:17

## 2020-07-07 RX ADMIN — SODIUM CHLORIDE, PRESERVATIVE FREE 10 ML: 5 INJECTION INTRAVENOUS at 11:31

## 2020-07-07 RX ADMIN — HYDROCODONE BITARTRATE AND ACETAMINOPHEN 1 TABLET: 7.5; 325 TABLET ORAL at 11:29

## 2020-07-07 RX ADMIN — HYDROMORPHONE HYDROCHLORIDE 0.5 MG: 1 INJECTION, SOLUTION INTRAMUSCULAR; INTRAVENOUS; SUBCUTANEOUS at 05:51

## 2020-07-07 RX ADMIN — METHOCARBAMOL TABLETS 500 MG: 500 TABLET, COATED ORAL at 21:32

## 2020-07-07 RX ADMIN — ONDANSETRON HYDROCHLORIDE 4 MG: 4 TABLET, FILM COATED ORAL at 21:49

## 2020-07-07 RX ADMIN — ONDANSETRON 4 MG: 2 INJECTION INTRAMUSCULAR; INTRAVENOUS at 12:04

## 2020-07-07 RX ADMIN — HYDROCODONE BITARTRATE AND ACETAMINOPHEN 1 TABLET: 7.5; 325 TABLET ORAL at 01:03

## 2020-07-07 RX ADMIN — CALCIUM CARBONATE-VITAMIN D TAB 500 MG-200 UNIT 2 TABLET: 500-200 TAB at 22:00

## 2020-07-07 NOTE — H&P
"    Patient Name:  Noeim Bartholomew  YOB: 1960  MRN:  [id number]  Admit Date:  7/7/2020  Patient Care Team:  Mary Lou Carroll MD as PCP - General (Internal Medicine)      Subjective   History Present Illness     Chief Complaint   Patient presents with   • Neck Pain       Ms. Bartholomew is a 60 y.o. non-smoker with a history of small stature, cervical spinal stenosis status post C6 & C7 corpectomy procedure decompressing the spinal canal with subsequent incisional hematoma and postoperative cerebrospinal fluid leak who reportedly doing well with weekly PT states last PT on 6/30/2020 awoke yesterday morning with acute right neck pain, cyndi-lateral aspect described as \"constant\" with associated nausea, vague description of dizziness / lightheadedness and denies gait disturbance, dysphagia, chest pain, shortness of breath, fever, chills, vomiting, abdominal pain, diarrhea, or urinary complaints; therefore, went to [facility] for further evaluation.    Patient reportedly has limited follow-up appointments with neurosurgery secondary to COVID-19 pandemic.    In ER, AVSS on room air, labs unremarkable with exception of mild anemia, x-ray C-spine noted prior surgical discectomy and fusion at C5-C6 and C6-C7 levels without concerns for misalignment.  No evidence of recent or old fracture.    Further recommendations per hospital course.  See additional details below in assessment/plan.    History of Present Illness  Review of Systems   Constitutional: Negative for chills and fever.   HENT: Positive for rhinorrhea (reports 'chronic' \"runny nose\"; clear drainage). Negative for congestion.    Eyes: Negative for photophobia and visual disturbance.   Respiratory: Negative for cough and shortness of breath.    Cardiovascular: Negative for chest pain and leg swelling.   Gastrointestinal: Positive for nausea. Negative for abdominal pain, constipation, diarrhea and vomiting.   Endocrine: Negative for polydipsia, polyphagia " and polyuria.   Genitourinary: Negative for difficulty urinating and dysuria.   Musculoskeletal: Positive for neck pain (right anterior aspect). Negative for back pain and gait problem.   Skin: Negative for rash and wound.   Neurological: Positive for dizziness (with standing), light-headedness, numbness (reports chronic BUE; unchanged) and headaches (frontal aspect of cranium). Negative for seizures and speech difficulty.   Psychiatric/Behavioral: Negative for confusion and hallucinations.        Personal History     Past Medical History:   Diagnosis Date   • Abnormal alkaline phosphatase test 06/12/2015    Resolved   • Acute bronchitis 06/12/2015    Resolved   • Allergic child age    pencillin   • Cervical spinal stenosis    • Diverticulosis    • H/O electromyography 10/13/2014   • H/O mammogram 07/29/2014   • Headache occassionally   • High cholesterol    • History of EKG 06/12/2015   • Hypercalcemia 06/12/2015    Resolved, Full w/u done, Likely secondary to [place]   • Left arm pain    • Low back pain March 2017   • Numbness in both hands    • Osteopenia several years   • Osteoporosis    • Paresthesia 06/12/2015    Resolved     Past Surgical History:   Procedure Laterality Date   • ANTERIOR CHANNEL VERTEBRECTOMY/CORPECTOMY N/A 12/6/2019    Procedure: CERVICAL 6, CERVICAL 7 ANTERIOR CERVICAL CORPECTOMY WITH CAGE AND PLATE;  Surgeon: Antonio Noe MD;  Location: [facility];  Service: Neurosurgery   • BACK SURGERY  1999   • COLONOSCOPY  05/20/2011   • INCISION AND DRAINAGE OF WOUND N/A 12/19/2019    Procedure: followed by drainage of anterior cervical fluid collection;  Surgeon: Antonio Noe MD;  Location: [facility] OR;  Service: Neurosurgery   • LUMBAR DRAIN INSERTION EXTERNAL N/A 12/19/2019    Procedure: LUMBAR DRAIN INSERTION EXTERNAL;  Surgeon: Antonio Noe MD;  Location: [facility];  Service: Neurosurgery   • LUMBAR PERITONEAL SHUNT N/A 1/10/2020    Procedure: LUMBAR PERITONEAL SHUNT  PLACEMENT;  Surgeon: Antonio Noe MD;  Location: [facility] OR;  Service: Neurosurgery   • SPINE SURGERY  1999 back    dr abad quintero   • TONSILLECTOMY  child age   • TYMPANOPLASTY       Family History   Problem Relation Age of Onset   • Breast cancer Mother    • Stroke Mother         Cerebrovascular Accident   • Colon cancer Mother    • Diabetes Father         Borderline Diabetes Mellitus   • Heart disease Father         Cardiac Disorder   • Heart failure Father    • Anxiety disorder Sister    • Nephrolithiasis Sister         Kidney Stones   • Malig Hyperthermia Neg Hx      Social History     Tobacco Use   • Smoking status: Never Smoker   • Smokeless tobacco: Never Used   Substance Use Topics   • Alcohol use: No   • Drug use: No     No current facility-administered medications on file prior to encounter.      Current Outpatient Medications on File Prior to Encounter   Medication Sig Dispense Refill   • Calcium Carb-Cholecalciferol (CALCIUM 1000 + D PO) Take  by mouth Daily. 2 caps     • cetirizine (zyrTEC) 10 MG tablet Take 10 mg by mouth Daily.     • cholecalciferol (VITAMIN D3) 10 MCG (400 UNIT) tablet Take 2,000 Units by mouth Daily.     • docusate sodium (COLACE) 100 MG capsule Take 100 mg by mouth 2 (Two) Times a Day.     • fluticasone (FLONASE) 50 MCG/ACT nasal spray 2 sprays into the nostril(s) as directed by provider Daily As Needed.     • gabapentin (NEURONTIN) 100 MG capsule Take 2 capsules by mouth twice a day (Patient taking differently: Take 100 mg by mouth. Take 2 capsules by mouth twice a day) 120 capsule 3   • atorvastatin (LIPITOR) 20 MG tablet Take 1 tablet by mouth Every Night. 90 tablet 3   • melatonin 3 MG tablet Take  by mouth.       Allergies   Allergen Reactions   • Penicillins Rash   • Topamax [Topiramate] Rash       Objective    Objective     Vital Signs  Temp:  [97.9 °F (36.6 °C)-98.4 °F (36.9 °C)] 97.9 °F (36.6 °C)  Heart Rate:  [] 73  Resp:  [16-18] 18  BP:  (108-158)/(11-79) 131/76  SpO2:  [91 %-97 %] 94 %  on   ;   Device (Oxygen Therapy): room air  Body mass index is 22.93 kg/m².    Physical Exam   Constitutional: She is oriented to person, place, and time. No distress.   Small stature & non-toxic appearance   HENT:   Head: Normocephalic and atraumatic.   Eyes: Pupils are equal, round, and reactive to light. EOM are normal.   Neck: Normal range of motion. Neck supple.   Cardiovascular: Normal rate and normal heart sounds.   Pulmonary/Chest: Effort normal and breath sounds normal. No respiratory distress.   Abdominal: Soft. Bowel sounds are normal. She exhibits no distension.   Musculoskeletal: She exhibits no edema or deformity.   Neurological: She is alert and oriented to person, place, and time. No cranial nerve deficit or sensory deficit. She exhibits normal muscle tone. Coordination normal.   Motor strength 4/5 BUE R>L   Skin: Skin is warm and dry. She is not diaphoretic.   Psychiatric: She has a normal mood and affect. Her behavior is normal. Judgment and thought content normal.       Results Review:  I reviewed the patient's new clinical results.  I reviewed the patient's new imaging results and agree with the interpretation.  I reviewed the patient's other test results and agree with the interpretation  I personally viewed and interpreted the patient's EKG/Telemetry data  Discussed with ED provider.    Lab Results (last 24 hours)     Procedure Component Value Units Date/Time    CBC & Differential [[id number]] Collected:  07/07/20 0304    Specimen:  Blood Updated:  07/07/20 0323    Narrative:       The following orders were created for panel order CBC & Differential.  Procedure                               Abnormality         Status                     ---------                               -----------         ------                     CBC Auto Differential[[id number]]        Normal              Final result                 Please view results for these tests  on the individual orders.    Comprehensive Metabolic Panel [[id number]]  (Abnormal) Collected:  07/07/20 0304    Specimen:  Blood Updated:  07/07/20 0338     Glucose 117 mg/dL      BUN 8 mg/dL      Creatinine 0.61 mg/dL      Sodium 137 mmol/L      Potassium 4.6 mmol/L      Chloride 101 mmol/L      CO2 28.2 mmol/L      Calcium 9.1 mg/dL      Total Protein 6.8 g/dL      Albumin 4.40 g/dL      ALT (SGPT) 15 U/L      AST (SGOT) 18 U/L      Alkaline Phosphatase 93 U/L      Total Bilirubin 0.3 mg/dL      eGFR Non African Amer 100 mL/min/1.73      Globulin 2.4 gm/dL      A/G Ratio 1.8 g/dL      BUN/Creatinine Ratio 13.1     Anion Gap 7.8 mmol/L     Narrative:       GFR Normal >60  Chronic Kidney Disease <60  Kidney Failure <15      CBC Auto Differential [[id number]]  (Normal) Collected:  07/07/20 0304    Specimen:  Blood Updated:  07/07/20 0323     WBC 7.19 10*3/mm3      RBC 4.16 10*6/mm3      Hemoglobin 12.5 g/dL      Hematocrit 39.3 %      MCV 94.5 fL      MCH 30.0 pg      MCHC 31.8 g/dL      RDW 14.1 %      RDW-SD 49.7 fl      MPV 9.0 fL      Platelets 255 10*3/mm3      Neutrophil % 73.8 %      Lymphocyte % 19.6 %      Monocyte % 5.0 %      Eosinophil % 0.6 %      Basophil % 0.7 %      Immature Grans % 0.3 %      Neutrophils, Absolute 5.31 10*3/mm3      Lymphocytes, Absolute 1.41 10*3/mm3      Monocytes, Absolute 0.36 10*3/mm3      Eosinophils, Absolute 0.04 10*3/mm3      Basophils, Absolute 0.05 10*3/mm3      Immature Grans, Absolute 0.02 10*3/mm3      nRBC 0.0 /100 WBC     Basic Metabolic Panel [[id number]]  (Abnormal) Collected:  07/07/20 0547    Specimen:  Blood Updated:  07/07/20 0647     Glucose 100 mg/dL      BUN 8 mg/dL      Creatinine 0.52 mg/dL      Sodium 135 mmol/L      Potassium 4.4 mmol/L      Comment: Specimen hemolyzed.  Results may be affected.        Chloride 101 mmol/L      CO2 27.4 mmol/L      Calcium 8.8 mg/dL      eGFR Non African Amer 120 mL/min/1.73      BUN/Creatinine Ratio 15.4     Anion Gap 6.6  mmol/L     Narrative:       GFR Normal >60  Chronic Kidney Disease <60  Kidney Failure <15      CBC (No Diff) [[id number]]  (Abnormal) Collected:  07/07/20 0547    Specimen:  Blood Updated:  07/07/20 0624     WBC 6.90 10*3/mm3      RBC 4.01 10*6/mm3      Hemoglobin 11.9 g/dL      Hematocrit 37.3 %      MCV 93.0 fL      MCH 29.7 pg      MCHC 31.9 g/dL      RDW 13.9 %      RDW-SD 47.8 fl      MPV 9.2 fL      Platelets 240 10*3/mm3           Imaging Results (Last 24 Hours)     Procedure Component Value Units Date/Time    XR Spine Cervical Complete 4 or 5 View [[id number]] Collected:  07/07/20 0054     Updated:  07/07/20 0101    Narrative:       CERVICAL SPINE SERIES     CLINICAL HISTORY: Neck pain.     Compared to the previous cervical spine x-rays dated 03/06/2020.     A total 6 views were obtained. The patient is status post anterior  discectomy and fusion at the C5-C6 and C6-C7 levels. A plate and screws  are in place anteriorly and graft material is present at corpectomy  sites. There is satisfactory alignment. Mild disc space that is present  at C2-C3 and C4-C5. Oblique views demonstrate moderate narrowing of the  neural foramina on the right at C3-C4 and C4-C5 and C5-6 mild neural  foraminal narrowing is also present on the left at the same levels.  There is no evidence of recent or old fracture.     This report was finalized on 7/7/2020 12:58 AM by Dr. See Alvarez M.D.                  ECG 12 Lead   Preliminary Result   HEART RATE= 68  bpm   RR Interval= 880  ms   PA Interval= 141  ms   P Horizontal Axis=   deg   P Front Axis= 55  deg   QRSD Interval= 82  ms   QT Interval= 401  ms   QRS Axis= 2  deg   T Wave Axis= 16  deg   - NORMAL ECG -   Sinus rhythm   Electronically Signed By:    Date and Time of Study: 2020-07-07 01:06:52           Assessment/Plan     Active Hospital Problems    Diagnosis  POA   • **Neck pain [M54.2]  Yes   • Anemia, chronic disease [D63.8]  Yes   • Weakness of both arms [R29.898]  Yes    • Weakness of both hands [R29.898]  Yes   • Congenital spondylolysis, lumbosacral region [Q76.2]  Not Applicable   • Hyperlipidemia [E78.5]  Yes      Resolved Hospital Problems   No resolved problems to display.       Ms. Bartholomew is a 60 y.o. non-smoker with a history of cervical spinal stenosis status post C6 & C7 corpectomy procedure decompressing the spinal canal with subsequent incisional hematoma and postoperative cerebrospinal fluid leak who presented to [facility] with CC neck pain & admitted for cervicalgia.       Neck pain / weakness of both hands/ weakness of both arms /   Congenital spondylolysis, lumbosacral region  -consult [facility].  MRI cervical spine w/wo pending.  CT soft tissues of neck pending.  Obed-lateral right neck pain ? D/t thyroid d/o.  Ordering TSH.  Analgesic PRN, continuous pulse oximetry.  Gabapentin    Hyperlipidemia  -Statin    Anemia, chronic disease  -Monitor labs for changes.  Mild anemia.  No obvious signs of active bleeding.    · I discussed the patient's findings and my recommendations with Dr. Bojorquez.     VTE Prophylaxis - SCD  Code Status - CPR       MIRYAM Jones  [facility]  07/07/20  10:31

## 2020-07-07 NOTE — ED PROVIDER NOTES
The MANDI and I have discussed this patients history, physical exam, and treatment plan. I have reviewed the documentation and personally had a face to face interaction with the patient. I affirm the documentation and agree with the treatment and plan.  The following note describes my personal findings    This patient is a 60-year-old female presenting today with right-sided neck pain for 1 day duration.  She is unsure maybe if she slept wrong but does not recall any known trauma.  She states the pain is isolated to the right side of her neck and does not radiate down her arm nor is there any paresthesias present.    Exam: This patient is resting comfortably and in no distress, without gross neurological deficit.  Neck exam shows a supple neck without meningeal signs.  There is mild right-sided paraspinous tenderness without midline tenderness or deformity present.    Plan: X-ray of the neck was obtained which does not show any acute abnormalities and her surgical hardware is intact.  We will treat her pain appropriately as this pain is likely musculoskeletal in origin.  We will reassess and the patient will be discharged.      The patient was wearing a facemask upon entrance into the room and remained in such throughout their visit.  I was wearing PPE including a facemask as well as gloves at any point entering the room and throughout the visit     Blas Vincent MD  07/12/20 5877

## 2020-07-07 NOTE — PROGRESS NOTES
Discharge Planning Assessment  [facility]     Patient Name: Noemi Bartholomew  MRN: [id number]  Today's Date: 7/7/2020    Admit Date: 7/7/2020    Discharge Needs Assessment     Row Name 07/07/20 1635       Living Environment    Lives With  sibling(s)    Name(s) of Who Lives With Patient  Doniat Bartholomew [phone]     Current Living Arrangements  home/apartment/condo    Potentially Unsafe Housing Conditions  other (see comments) no concerns     Primary Care Provided by  self    Provides Primary Care For  no one    Family Caregiver if Needed  sibling(s)    Quality of Family Relationships  supportive;helpful;involved    Able to Return to Prior Arrangements  yes       Resource/Environmental Concerns    Resource/Environmental Concerns  none    Transportation Concerns  car, none       Transition Planning    Patient/Family Anticipates Transition to  home    Patient/Family Anticipated Services at Transition  outpatient care    Transportation Anticipated  family or friend will provide       Discharge Needs Assessment    Readmission Within the Last 30 Days  no previous admission in last 30 days    Concerns to be Addressed  no discharge needs identified;denies needs/concerns at this time    Equipment Currently Used at Home  shower chair;walker, rolling    Anticipated Changes Related to Illness  none    Equipment Needed After Discharge  none    Outpatient/Agency/Support Group Needs  outpatient therapy    Discharge Facility/Level of Care Needs  outpatient therapy        Discharge Plan     Row Name 07/07/20 1630       Plan    Plan  Home with sister and outpatient PT at [facility]     Patient/Family in Agreement with Plan  yes    Plan Comments  CCP met with patient and patient's sister, Donita Bartholomew [phone]. Patient gave CCP permission to discuss discharge planning with patient's sister in the room. Face sheet verified and patient's PCP is Dr. Carroll. Patient with her sister, with two steps to the entrance of her home. Patient's  bedroom and bathroom are on the main level. Patient has a shower chair present in her bathroom. Patient has access to a walker but does not typically use it. Patient has been to [facility] in the past and has used [facility]. Patient states she is currently attending outpatient PT at [facility] and would like to resume outpatient PT at discharge. Patient does not anticipate needing HH/SNF. Patient's preferred pharmacy is [facility] and denies having trouble obtaining her medications. CCP will follow for any needs to arise. Ronda Palomares CSW          Destination      Coordination has not been started for this encounter.      Durable Medical Equipment      Coordination has not been started for this encounter.      Dialysis/Infusion      Coordination has not been started for this encounter.      Home Medical Care      Coordination has not been started for this encounter.      Therapy      Coordination has not been started for this encounter.      Community Resources      Coordination has not been started for this encounter.          Demographic Summary     Row Name 07/07/20 9988       General Information    Admission Type  observation    Arrived From  emergency department    Referral Source  admission list    Reason for Consult  discharge planning    Preferred Language  English     Used During This Interaction  no        Functional Status     Row Name 07/07/20 1638       Functional Status    Usual Activity Tolerance  good    Current Activity Tolerance  good       Functional Status, IADL    Medications  independent    Meal Preparation  independent    Housekeeping  independent    Laundry  independent    Shopping  independent       Mental Status    General Appearance WDL  WDL       Mental Status Summary    Recent Changes in Mental Status/Cognitive Functioning  no changes        Psychosocial    No documentation.       Abuse/Neglect    No documentation.       Legal    No documentation.       Substance  Abuse    No documentation.       Patient Forms    No documentation.           HOWIE Olvera

## 2020-07-07 NOTE — CONSULTS
Inpatient Neurosurgery Consult  Consult performed by: Audrey Funez APRN  Consult ordered by: Diya Ma APRN  Reason for consult: Worsening R sided neck pain          Patient Care Team:  Mary Lou Carroll MD as PCP - General (Internal Medicine)    Chief complaint: worsening R sided neck pain; hx of cervical spine surgery    Subjective     History of Present Illness     Ms. Bartholomew is a 60-year-old female who is well-known to Dr. Antonio Noe.  She saw  late 2019 for complaints of worsening gait and balance as well as worsening hand function.  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  There are also some signs of cord signal changes.  Due to the progressive cervical myelopathy, the patient was taken to the operating room December 6, 2019 by Dr. Noe for anterior cervical corpectomy C6, C7 with cage and plate from C5-T1.  Due to the extreme thickening of the ligament that was adherent to the dura, there was a CSF leak.  This required repair with DuraGen and Tisseel glue as well as maintaining bed rest for a period of 3 days.  As expected, she still had issues with strength in her upper extremities and her hands.  Occupational Therapy was consulted.  There is also some associated pain which required steroids and subsequent initiation of therapy with gabapentin.  She was subsequently discharged to [facility] acute rehab December 13, 2019.  We were subsequently called back to evaluate the incision which had developed significant swelling.    She was taken back to the operating room on December 18, 2019 by Dr. Noe for I&D of the fluid collection and placement of a lumbar drain with durotomy repair.  There was also significant improvement in the swelling at the cervical incision site.  Her lumbar drain was removed December 25, 2019 after has been clamped for a total of 48 hours with no evidence of leakage  or headache.  She was discharged back to [facility] on December 27, 2019 and had remained headache free with no signs of drainage from the lumbar drain site.  We were contacted again while in rehab on January 9 for signs of recurrent swelling at the anterior cervical incision site.  This was not as severe as it had been but given this finding, Dr. Noe took her back to the operating room January 10, 2020 for placement of a lumbar shunt.  She did well from that surgery and at that point discharged home as she had reached all of her therapy goals in acute rehab.  There have been no further swelling, drainage or problems at the site of the healed cervical incision.  There has been no complaints of positional headache.  I saw the patient myself both in January and February 2020.  He was doing some home physical therapy and was subsequently transferred to outpatient physical therapy.  She had been using a rolling walker.  But the patient states that she is now walking without any assistive devices and has been doing so since early March.she had at least 2 subsequent follow-ups in the office and at both of those appointments she was doing quite well.    Due to the continued weakness in her hands and previous suspicion that she may have carpal tunnel syndrome, I referred the patient back to Dr. Lipscomb. She was seen on March 9th.  EMG was ordered and revealed no evidence of peripheral neuropathy at the site of the carpal or cubital tunnel therefore no surgery was recommended. Dr. Lipscomb did give her injections in both wrists on June 12, 2020 and is to follow up with him in 2 months.  The patient states that she did achieve some relief with those injections.   The patient states that she has remained off work on long-term disability.  She has been going to outpatient occupational therapy at [facility] once a week to work on further hand strengthening.  The patient states that she has been able to  practice some typing at home and has essentially tolerated it pretty well.  She has resumed driving but only lives in the neighborhood once a week just to keep up her skills.  She had not been having any significant pain symptoms or any issues with neck or arm pain.  Her only issue is with the discomfort and weakness in her hands for which she takes gabapentin.    Ms. Bartholomew returned to the hospital emergency room this morning after experiencing a severe amount of pain in the right side of her neck yesterday. The pain is mainly in the lower aspect of the neck and radiates down into the clavicular region.  There is no radiating arm pain or scapular pain.  She tried taking Advil and even took an extra gabapentin pill and did not get any relief.  She was evaluated by the hospitalists and admitted for further work-up and evaluation.  The hospitalist have ordered an MRI of the cervical spine as well as a CT scan of the soft tissues.  Both of those studies are currently pending.    Neurosurgery has been asked to evaluate the patient and make further recommendations.      Review of Systems   Constitutional: Positive for activity change.   Gastrointestinal: Positive for diarrhea.   Musculoskeletal: Positive for arthralgias and neck pain.   Neurological: Positive for weakness.   Psychiatric/Behavioral: Positive for sleep disturbance.        Past Medical History:   Diagnosis Date   • Abnormal alkaline phosphatase test 06/12/2015    Resolved   • Acute bronchitis 06/12/2015    Resolved   • Allergic child age    pencillin   • Cervical spinal stenosis    • Diverticulosis    • H/O electromyography 10/13/2014   • H/O mammogram 07/29/2014   • Headache occassionally   • High cholesterol    • History of EKG 06/12/2015   • Hypercalcemia 06/12/2015    Resolved, Full w/u done, Likely secondary to Forteo   • Left arm pain    • Low back pain March 2017   • Numbness in both hands    • Osteopenia several years   • Osteoporosis    •  Paresthesia 06/12/2015    Resolved   ,   Past Surgical History:   Procedure Laterality Date   • ANTERIOR CHANNEL VERTEBRECTOMY/CORPECTOMY N/A 12/6/2019    Procedure: CERVICAL 6, CERVICAL 7 ANTERIOR CERVICAL CORPECTOMY WITH CAGE AND PLATE;  Surgeon: Antonio Noe MD;  Location: [facility] MAIN OR;  Service: Neurosurgery   • BACK SURGERY  1999   • COLONOSCOPY  05/20/2011   • INCISION AND DRAINAGE OF WOUND N/A 12/19/2019    Procedure: followed by drainage of anterior cervical fluid collection;  Surgeon: Antonio Noe MD;  Location: [facility] MAIN OR;  Service: Neurosurgery   • LUMBAR DRAIN INSERTION EXTERNAL N/A 12/19/2019    Procedure: LUMBAR DRAIN INSERTION EXTERNAL;  Surgeon: Antonio Noe MD;  Location: [facility] MAIN OR;  Service: Neurosurgery   • LUMBAR PERITONEAL SHUNT N/A 1/10/2020    Procedure: LUMBAR PERITONEAL SHUNT PLACEMENT;  Surgeon: Antonio Noe MD;  Location: [facility] MAIN OR;  Service: Neurosurgery   • SPINE SURGERY  1999 back    dr abad quintero   • TONSILLECTOMY  child age   • TYMPANOPLASTY     ,   Family History   Problem Relation Age of Onset   • Breast cancer Mother    • Stroke Mother         Cerebrovascular Accident   • Colon cancer Mother    • Diabetes Father         Borderline Diabetes Mellitus   • Heart disease Father         Cardiac Disorder   • Heart failure Father    • Anxiety disorder Sister    • Nephrolithiasis Sister         Kidney Stones   • Malig Hyperthermia Neg Hx    ,   Social History     Tobacco Use   • Smoking status: Never Smoker   • Smokeless tobacco: Never Used   Substance Use Topics   • Alcohol use: No   • Drug use: No   ,   Medications Prior to Admission   Medication Sig Dispense Refill Last Dose   • Calcium Carb-Cholecalciferol (CALCIUM 1000 + D PO) Take  by mouth Daily. 2 caps   7/6/2020 at Unknown time   • cetirizine (zyrTEC) 10 MG tablet Take 10 mg by mouth Daily.   7/6/2020 at Unknown time   • cholecalciferol (VITAMIN D3) 10 MCG (400 UNIT) tablet Take 2,000  Units by mouth Daily.   7/6/2020 at Unknown time   • docusate sodium (COLACE) 100 MG capsule Take 100 mg by mouth 2 (Two) Times a Day.   7/6/2020 at Unknown time   • fluticasone (FLONASE) 50 MCG/ACT nasal spray 2 sprays into the nostril(s) as directed by provider Daily As Needed.   Patient Taking Differently at Uas needed   • gabapentin (NEURONTIN) 100 MG capsule Take 2 capsules by mouth twice a day (Patient taking differently: Take 100 mg by mouth. Take 2 capsules by mouth twice a day) 120 capsule 3 Patient Taking Differently at Unknown time   • atorvastatin (LIPITOR) 20 MG tablet Take 1 tablet by mouth Every Night. 90 tablet 3    • melatonin 3 MG tablet Take  by mouth.   More than a month at Unknown time   , Scheduled Meds:    atorvastatin 20 mg Oral Nightly   calcium 500 mg vitamin D 5 mcg (200 UT) 2 tablet Oral BID   cetirizine 10 mg Oral Daily   cholecalciferol 2,000 Units Oral Daily   docusate sodium 100 mg Oral BID   fluticasone 2 spray Each Nare Daily   gabapentin 200 mg Oral Q12H   methocarbamol 500 mg Oral Q8H   sodium chloride 10 mL Intravenous Q12H   , Continuous Infusions:    sodium chloride 100 mL/hr   , PRN Meds:  •  acetaminophen **OR** acetaminophen **OR** acetaminophen  •  bisacodyl  •  calcium carbonate  •  HYDROcodone-acetaminophen  •  melatonin  •  Morphine  •  ondansetron **OR** ondansetron  •  [COMPLETED] Insert peripheral IV **AND** sodium chloride  •  sodium chloride and Allergies:  Penicillins and Topamax [topiramate]    Objective      Vital Signs  Temp:  [97.9 °F (36.6 °C)-98.4 °F (36.9 °C)] 97.9 °F (36.6 °C)  Heart Rate:  [] 73  Resp:  [16-18] 18  BP: (108-158)/(11-79) 131/76    Physical Exam   Constitutional: She is oriented to person, place, and time. She appears well-developed and well-nourished. She is cooperative. No distress.   HENT:   Head: Normocephalic and atraumatic.   Eyes: Conjunctivae are normal. Right eye exhibits no discharge. Left eye exhibits no discharge.   Neck:  Normal range of motion. Neck supple. No tracheal deviation present.   Cardiovascular: Normal rate and intact distal pulses.   Pulmonary/Chest: Effort normal. No respiratory distress.   Abdominal: Soft. She exhibits no distension. There is no tenderness.   Musculoskeletal: Normal range of motion. She exhibits no edema or tenderness.   No significant tenderness to palpation of both R and L sides of neck. No tenderness to palpation of the posterior occipital cervical region or the cervical spine.   Neurological: She is alert and oriented to person, place, and time. She has normal reflexes. She displays normal reflexes. No sensory deficit. She exhibits normal muscle tone. Coordination normal. GCS eye subscore is 4. GCS verbal subscore is 5. GCS motor subscore is 6.   The patient has weakness in the left tricep which is 4-/5 and unchanged.  She has  weakness 4-/5 on the left and 5-/5 on the right.  Intrinsic strength is lessened as well and is more significant on the left than on the right.  No motor or sensory deficits. DTR's normal. Negative Rivers's; negative clonus.  Mildly positive Spurling's bilaterally.       Skin: Skin is warm and dry. She is not diaphoretic. No erythema.   Psychiatric: She has a normal mood and affect. Thought content normal.   Vitals reviewed.       Results Review:    I reviewed the patient's new clinical results.     .  Results from last 7 days   Lab Units 07/07/20  0547 07/07/20  0304   WBC 10*3/mm3 6.90 7.19   HEMOGLOBIN g/dL 11.9* 12.5   HEMATOCRIT % 37.3 39.3   PLATELETS 10*3/mm3 240 255     .  Results from last 7 days   Lab Units 07/07/20  0547 07/07/20  0304   SODIUM mmol/L 135* 137   POTASSIUM mmol/L 4.4 4.6   CHLORIDE mmol/L 101 101   CO2 mmol/L 27.4 28.2   BUN mg/dL 8 8   CREATININE mg/dL 0.52* 0.61   GLUCOSE mg/dL 100* 117*   CALCIUM mg/dL 8.8 9.1       CERVICAL SPINE SERIES 7/06/2020    The cervical x-ray images were performed in the emergency room and showed previous anterior  discectomy and fusion at C5-6 and C6-7.  Plate and screws are in place.  Graft material also noted at the previous corpectomy sites.  There is moderate narrowing of the neural foramen on the right at C3-4 and C4-5.  There is mild neural foraminal narrowing on the right at C5-6.  No evidence of fracture.        Assessment/Plan       Neck pain    Hyperlipidemia    Congenital spondylolysis, lumbosacral region    Weakness of both hands    Weakness of both arms    Anemia, chronic disease    History of cervical myelopathy with C6, C7 corpectomy with cage and plate 6 months ago    Assessment & Plan     There are orders for a cervical MRI as well as CT images of the cervical soft tissues.  We will await those studies.  For now, I recommended the patient try some muscle relaxers.  I prescribed Robaxin 500 mg which she can take every 8 hours as needed.  She will also continue her home dose gabapentin while she is here.  Further recommendations pending radiographic results above.    I discussed the patients findings and my recommendations with patient    MIRYAM Honeycutt  07/07/20  11:53

## 2020-07-07 NOTE — PLAN OF CARE
Problem: Patient Care Overview  Goal: Plan of Care Review  7/7/2020 1755 by Qing Puentes RN  Outcome: Ongoing (interventions implemented as appropriate)  Flowsheets (Taken 7/7/2020 1755)  Progress: no change  Plan of Care Reviewed With: patient; sibling  Outcome Summary: PT was transferred to [facility] this morning and was oriented to room and unit. Pt c/o pain of 10/10 in neck and nausea. Pt treated with pain medication x 3 doses this shift and Nausea medicine x 2 doses with good results. Continue IVF and on regular diet but not eating much d/t nausea. Pt had CT scan done and is awaiting MRI w&w/o contrast. VSS. WCTM and treat per POC and MD orders.

## 2020-07-08 VITALS
RESPIRATION RATE: 16 BRPM | HEART RATE: 64 BPM | TEMPERATURE: 97.3 F | WEIGHT: 91.6 LBS | BODY MASS INDEX: 21.2 KG/M2 | SYSTOLIC BLOOD PRESSURE: 117 MMHG | DIASTOLIC BLOOD PRESSURE: 73 MMHG | OXYGEN SATURATION: 95 % | HEIGHT: 55 IN

## 2020-07-08 LAB
ANION GAP SERPL CALCULATED.3IONS-SCNC: 8 MMOL/L (ref 5–15)
BUN SERPL-MCNC: 9 MG/DL (ref 8–23)
BUN/CREAT SERPL: 15.3 (ref 7–25)
CALCIUM SPEC-SCNC: 9 MG/DL (ref 8.6–10.5)
CHLORIDE SERPL-SCNC: 107 MMOL/L (ref 98–107)
CO2 SERPL-SCNC: 24 MMOL/L (ref 22–29)
CREAT SERPL-MCNC: 0.59 MG/DL (ref 0.57–1)
DEPRECATED RDW RBC AUTO: 47.6 FL (ref 37–54)
ERYTHROCYTE [DISTWIDTH] IN BLOOD BY AUTOMATED COUNT: 13.9 % (ref 12.3–15.4)
GFR SERPL CREATININE-BSD FRML MDRD: 104 ML/MIN/1.73
GLUCOSE SERPL-MCNC: 87 MG/DL (ref 65–99)
HCT VFR BLD AUTO: 34.8 % (ref 34–46.6)
HGB BLD-MCNC: 11.5 G/DL (ref 12–15.9)
MCH RBC QN AUTO: 30.4 PG (ref 26.6–33)
MCHC RBC AUTO-ENTMCNC: 33 G/DL (ref 31.5–35.7)
MCV RBC AUTO: 92.1 FL (ref 79–97)
PLATELET # BLD AUTO: 228 10*3/MM3 (ref 140–450)
PMV BLD AUTO: 8.8 FL (ref 6–12)
POTASSIUM SERPL-SCNC: 4.6 MMOL/L (ref 3.5–5.2)
RBC # BLD AUTO: 3.78 10*6/MM3 (ref 3.77–5.28)
SODIUM SERPL-SCNC: 139 MMOL/L (ref 136–145)
WBC # BLD AUTO: 6.51 10*3/MM3 (ref 3.4–10.8)

## 2020-07-08 PROCEDURE — 25010000002 DEXAMETHASONE SODIUM PHOSPHATE 100 MG/10ML SOLUTION 10 ML VIAL: Performed by: NURSE PRACTITIONER

## 2020-07-08 PROCEDURE — 97110 THERAPEUTIC EXERCISES: CPT | Performed by: OCCUPATIONAL THERAPIST

## 2020-07-08 PROCEDURE — 80048 BASIC METABOLIC PNL TOTAL CA: CPT | Performed by: NURSE PRACTITIONER

## 2020-07-08 PROCEDURE — 99214 OFFICE O/P EST MOD 30 MIN: CPT | Performed by: NURSE PRACTITIONER

## 2020-07-08 PROCEDURE — 96376 TX/PRO/DX INJ SAME DRUG ADON: CPT

## 2020-07-08 PROCEDURE — G0378 HOSPITAL OBSERVATION PER HR: HCPCS

## 2020-07-08 PROCEDURE — 96361 HYDRATE IV INFUSION ADD-ON: CPT

## 2020-07-08 PROCEDURE — 85027 COMPLETE CBC AUTOMATED: CPT | Performed by: NURSE PRACTITIONER

## 2020-07-08 PROCEDURE — 97165 OT EVAL LOW COMPLEX 30 MIN: CPT | Performed by: OCCUPATIONAL THERAPIST

## 2020-07-08 RX ORDER — BUTALBITAL, ACETAMINOPHEN AND CAFFEINE 50; 325; 40 MG/1; MG/1; MG/1
1 TABLET ORAL EVERY 4 HOURS PRN
Status: DISCONTINUED | OUTPATIENT
Start: 2020-07-08 | End: 2020-07-08 | Stop reason: HOSPADM

## 2020-07-08 RX ORDER — METHYLPREDNISOLONE 4 MG/1
TABLET ORAL
Qty: 1 EACH | Refills: 0 | Status: SHIPPED | OUTPATIENT
Start: 2020-07-08 | End: 2020-07-15

## 2020-07-08 RX ORDER — GUAIFENESIN 600 MG/1
1200 TABLET, EXTENDED RELEASE ORAL 2 TIMES DAILY
Qty: 30 TABLET | Refills: 0 | Status: SHIPPED | OUTPATIENT
Start: 2020-07-08 | End: 2020-07-15

## 2020-07-08 RX ORDER — DEXAMETHASONE SODIUM PHOSPHATE 4 MG/ML
4 INJECTION, SOLUTION INTRA-ARTICULAR; INTRALESIONAL; INTRAMUSCULAR; INTRAVENOUS; SOFT TISSUE EVERY 6 HOURS
Status: DISCONTINUED | OUTPATIENT
Start: 2020-07-08 | End: 2020-07-08 | Stop reason: HOSPADM

## 2020-07-08 RX ORDER — METHOCARBAMOL 500 MG/1
500 TABLET, FILM COATED ORAL 3 TIMES DAILY PRN
Qty: 9 TABLET | Refills: 0 | Status: SHIPPED | OUTPATIENT
Start: 2020-07-08 | End: 2020-07-09 | Stop reason: SDUPTHER

## 2020-07-08 RX ADMIN — DEXAMETHASONE SODIUM PHOSPHATE 10 MG: 10 INJECTION, SOLUTION INTRAMUSCULAR; INTRAVENOUS at 11:53

## 2020-07-08 RX ADMIN — GABAPENTIN 100 MG: 100 CAPSULE ORAL at 10:14

## 2020-07-08 RX ADMIN — ACETAMINOPHEN 650 MG: 325 TABLET, FILM COATED ORAL at 10:13

## 2020-07-08 RX ADMIN — METHOCARBAMOL TABLETS 500 MG: 500 TABLET, COATED ORAL at 15:38

## 2020-07-08 RX ADMIN — SODIUM CHLORIDE, PRESERVATIVE FREE 10 ML: 5 INJECTION INTRAVENOUS at 11:56

## 2020-07-08 RX ADMIN — ACETAMINOPHEN 650 MG: 325 TABLET, FILM COATED ORAL at 06:01

## 2020-07-08 RX ADMIN — BUTALBITAL, ACETAMINOPHEN, AND CAFFEINE 1 TABLET: 50; 325; 40 TABLET ORAL at 17:08

## 2020-07-08 RX ADMIN — CALCIUM CARBONATE-VITAMIN D TAB 500 MG-200 UNIT 2 TABLET: 500-200 TAB at 10:13

## 2020-07-08 RX ADMIN — SODIUM CHLORIDE 100 ML/HR: 9 INJECTION, SOLUTION INTRAVENOUS at 07:36

## 2020-07-08 RX ADMIN — METHOCARBAMOL TABLETS 500 MG: 500 TABLET, COATED ORAL at 06:01

## 2020-07-08 RX ADMIN — VITAMIN D, TAB 1000IU (100/BT) 2000 UNITS: 25 TAB at 10:13

## 2020-07-08 NOTE — PLAN OF CARE
Problem: Patient Care Overview  Goal: Plan of Care Review  Flowsheets (Taken 7/8/2020 0212)  Progress: no change  Plan of Care Reviewed With: patient  Outcome Summary: pt evaluated for OT. Pt has good tsf w. mod I to toilet, BR, and grooming skills. Pt able to complete LBD w. socks. Pt completed oppostion, hand ex, UE AROM elbow, wrist, forearm. pt to continue out patient OT at dc and may return next week as Friday was her next session. Pt understands and will call out patient tomorrow to confirm cancel vs coming in.

## 2020-07-08 NOTE — PROGRESS NOTES
LOS: 0 days   Patient Care Team:  Mary Lou Carroll MD as PCP - General (Internal Medicine)    Chief Complaint: Neck pain    Subjective     Pain somewhat better this morning.  She says she did have some pain while sitting up in the chair eating breakfast    Interval History:     History taken from: patient chart    Objective      Alert and oriented  Right hand grasp 4/5, left hand grasp 3+/5   No Rivers's, no clonus  Sensation intact with bilateral subjective dysesthesia in her hands  Normal muscle tone    Vital Signs  Temp:  [97 °F (36.1 °C)-97.5 °F (36.4 °C)] 97.1 °F (36.2 °C)  Heart Rate:  [60-81] 81  Resp:  [16-18] 16  BP: (116-123)/(72-79) 123/75       Results Review:     I reviewed the patient's new clinical results.  I reviewed the patient's new imaging results and agree with the interpretation.    Results from last 7 days   Lab Units 07/08/20  0508 07/07/20  0547 07/07/20  0304   WBC 10*3/mm3 6.51 6.90 7.19   HEMOGLOBIN g/dL 11.5* 11.9* 12.5   HEMATOCRIT % 34.8 37.3 39.3   PLATELETS 10*3/mm3 228 240 255     Ct Soft Tissue Neck With Contrast    Result Date: 7/7/2020  No evidence of hematoma, mass or pathologic adenopathy.    Radiation dose reduction techniques were utilized, including automated exposure control and exposure modulation based on body size.       Mri Cervical Spine With & Without Contrast    Result Date: 7/7/2020  1. Since prior MRI of the cervical spine 01/05/2020 there has been resolution of the fluid collection within the prevertebral soft tissues from C4 down to T2. Otherwise there has been little to no significant interval change when compared to prior MRI 01/05/2020. 2. This patient has extensive prior cervical spine surgery with corpectomies at C6 and C7 and a graft extending from the inferior endplate of C5 to the central to anterior superior body of T1 anterior plate and screw fixation from C5 to T1. There has been shaving of the right posterior endplate spurs and ossification  posterior longitudinal ligament on the right side of the endplates at C5-6 and C6-7. There remains left paracentral posterior lateral endplate spurs with adjacent thick ossification posterior longitudinal ligament along the left paracentral posterior lateral disc margin of C5-6 along the left posterior body C6 the left paracentral disc margin C6-7 down to left paracentral disc margin T1 and ossified posterior longitudinal ligament abuts and flattens the left ventral surface of the cord and deviates it posteriorly within the spinal canal from C5-C6 down to C7-T1 with moderate severe narrowing of the left side of the canal from C5-6 down to C7-T1. The spinal cord is deformed with flattening of the left ventral cord from C5-6 down to C7-T1. The left side of the cord is deviated posteriorly and the right side of the cord is deviated anteriorly. There has been decompression of the right side of the canal. There remains mild-to-moderate left and moderate-to-severe right bony foraminal narrowing at C5-6. Mild left and moderate right bony foraminal narrowing at C6-7 and some left foraminal narrowing at C7-T1. 3. At C2-3 eccentric right posterior lateral endplate spurs abut and flatten the right ventral surface cord and moderately narrowing the right side of the canal and there is uncovertebral joint spurring with severe right bony foraminal narrowing that could affect the exiting right C3 nerve and this is unchanged. 4. At C3-4 there is posterior disc osteophyte complex abuts and flattens the ventral cord and moderately narrowing the canal and uncovertebral joint spurs result in severe bilateral bony foraminal narrowing that could affect the exiting C4 nerve roots bilaterally. 5. At C4-5 posterior disc osteophyte complex abuts and flattens the ventral surface of the cord resulting in moderate to severe canal narrowing. There is bilateral uncovertebral joint spurring with severe bilateral bony foraminal narrowing that could  affect the exiting C5 nerve roots bilaterally. There is some T2 high signal in the central to posterior aspect of the right and left sides of the cord at the level of the C4-5 interspace and superior body C5 likely some focal myelomalacia in the cord.        Assessment/Plan       Neck pain    Hyperlipidemia    Congenital spondylolysis, lumbosacral region    Weakness of both hands    Weakness of both arms    Anemia, chronic disease      Cervical MRI and CT soft tissues of the neck were reviewed with Dr. Noe and there are no worrisome fluid collections or concern for infection.  There are no surgically significant findings on MRI.  Had a lengthy discussion with her regarding neck pain.  The extent of her surgery she is going experience good days and bad days with her neck.  Explained that she can expect to have a sort of two-step forward one step back recovery over the next year or more with the extent of her surgery especially given how myelopathic she was prior to surgery.  We will try her on a loading dose of Decadron and 24 hours of steroid in hopes of getting her comfortable enough to be discharged home tomorrow on a steroid Dosepak.  Discussed this plan with her and she agrees.      Kyung Hamilton, MIRYAM  07/08/20  10:24

## 2020-07-08 NOTE — PLAN OF CARE
Problem: Patient Care Overview  Goal: Plan of Care Review  Outcome: Ongoing (interventions implemented as appropriate)  Flowsheets (Taken 7/8/2020 0105)  Progress: no change  Outcome Summary: VSS, Tylenol x1 for headache, no c/o neck pain, Zofran x1, scheduled Robaxin given, neuro checks = right hand weaker & baseline numbness in bilat hands & toes, up with standby assist, rested well, MRI completed  Goal: Individualization and Mutuality  Outcome: Ongoing (interventions implemented as appropriate)  Goal: Discharge Needs Assessment  Outcome: Ongoing (interventions implemented as appropriate)  Goal: Interprofessional Rounds/Family Conf  Outcome: Ongoing (interventions implemented as appropriate)     Problem: Pain, Acute (Adult)  Goal: Identify Related Risk Factors and Signs and Symptoms  Outcome: Ongoing (interventions implemented as appropriate)  Goal: Acceptable Pain Control/Comfort Level  Outcome: Ongoing (interventions implemented as appropriate)     Problem: Pain, Chronic (Adult)  Goal: Identify Related Risk Factors and Signs and Symptoms  Outcome: Ongoing (interventions implemented as appropriate)  Goal: Acceptable Pain/Comfort Level and Functional Ability  Outcome: Ongoing (interventions implemented as appropriate)     Problem: Fall Risk (Adult)  Goal: Identify Related Risk Factors and Signs and Symptoms  Outcome: Ongoing (interventions implemented as appropriate)  Goal: Absence of Fall  Outcome: Ongoing (interventions implemented as appropriate)

## 2020-07-08 NOTE — DISCHARGE SUMMARY
Patient Name: Noemi Bartholomew  : 1960  MRN: [id number]    Date of Admission: 2020  Date of Discharge:  2020  Primary Care Physician: Mary Lou Carroll MD      Chief Complaint:   Neck Pain      Discharge Diagnoses     Active Hospital Problems    Diagnosis  POA   • **Neck pain [M54.2]  Yes   • Anemia, chronic disease [D63.8]  Yes   • Weakness of both arms [R29.898]  Yes   • Weakness of both hands [R29.898]  Yes   • Congenital spondylolysis, lumbosacral region [Q76.2]  Not Applicable   • Hyperlipidemia [E78.5]  Yes      Resolved Hospital Problems   No resolved problems to display.        Hospital Course     Ms. Bartholomew is a 60 y.o. female with a history of small stature, cervical spinal stenosis status post C6 and C7 corpectomy procedure to decompress the spinal canal with subsequent incisional hematoma and postoperative cerebrospinal fluid leak who presented to [facility] initially complaining of neck pain.  Please see the admitting history and physical for further details.  She was found to have cervicalgia and was admitted to the hospital for further evaluation and treatment.      Neurosurgery consulted and evaluated cervical MRI and CT soft tissues of neck noting no worrisome fluid collections or concerns for infection.  Also noted no surgical significant findings on MRI.  Surgery service had lengthy discussion with patient regarding neck pain and based on extent of her previous surgery the patient is going to experience good days and bad days with her neck for approximately 1 year especially given how myelopathic she was prior to surgery.    Patient received IV dose of Decadron and steroid dosepak on date of discharge    Day of Discharge     Patient appears comfortable no apparent distress.  Tolerating p.o.  & agrees with discharge home today.    Physical Exam:  Temp:  [97 °F (36.1 °C)-97.5 °F (36.4 °C)] 97.3 °F (36.3 °C)  Heart Rate:  [60-81] 64  Resp:  [16-18] 16  BP:  (116-123)/(72-79) 117/73  Body mass index is 22.93 kg/m².     Physical Exam   Constitutional: She is oriented to person, place, and time. No distress.   Small stature & nontoxic appearance   HENT:   Head: Normocephalic and atraumatic.   Eyes: Pupils are equal, round, and reactive to light. EOM are normal.   Neck: Normal range of motion. Neck supple.   Cardiovascular: Normal rate and normal heart sounds.   Pulmonary/Chest: Effort normal and breath sounds normal. No respiratory distress.   Abdominal: Soft. Bowel sounds are normal.   Musculoskeletal: She exhibits no edema or deformity.   Neurological: She is alert and oriented to person, place, and time. No cranial nerve deficit or sensory deficit. She exhibits normal muscle tone. Coordination normal.   Skin: Skin is warm and dry. She is not diaphoretic.   Psychiatric: She has a normal mood and affect. Her behavior is normal. Judgment and thought content normal.       Consultants     Consult Orders (all) (From admission, onward)     Start     Ordered    07/07/20 1008  Inpatient Case Management  Consult  Once     Provider:  (Not yet assigned)    07/07/20 1008    07/07/20 1008  Inpatient Spiritual Care Consult  Once     Provider:  (Not yet assigned)    07/07/20 1008    07/07/20 0501  Inpatient Neurosurgery Consult  Once     Specialty:  Neurosurgery  Provider:  Antonio Noe MD    07/07/20 0502    07/07/20 0441  [facility] (on-call MD unless specified) Details  Once     Specialty:  Hospitalist  Provider:  Shin Garcia MD    07/07/20 0440              Procedures     Imaging Results (All)     Procedure Component Value Units Date/Time    MRI Cervical Spine With & Without Contrast [[id number]] Collected:  07/07/20 2117     Updated:  07/08/20 1117    Narrative:       MRI OF THE CERVICAL SPINE WITH AND WITHOUT CONTRAST 07/07/2020     CLINICAL HISTORY: Neck pain for 2 days right greater than left and 2  prior cervical spine surgeries.     TECHNIQUE: Sagittal T1,  proton density and gradient echo T2 and fast  spin echo T2 and STIR and postcontrast sagittal T1-weighted images were  obtained of the cervical spine. In addition axial T1 and gradient echo  T2 and fast spin echo T2 and postcontrast axial T1-weighted images were  obtained from the skull base down to the T3 thoracic level. This is  correlated to prior neck CT earlier today 07/07/2020 at 2:15 PM as well  as cervical spine MRI with and without contrast 01/05/2020. There are  arthritic changes at the atlantodental interval. Otherwise the C1-2  level is normal in appearance.     At C2-3 there is disc space narrowing and degenerative endplate change  and diffuse posterior disc osteophyte complex eccentric right  posterolaterally where it abuts and deforms the right ventral surface of  the cord. Mild to moderately narrowing the right side of the canal.  There is mild narrowing of the central left side of the canal. There is  right-sided uncovertebral joint spurring with moderate to severe right  bony foraminal narrowing unchanged since prior cervical spine MRI  01/05/2020.     At C3-4 there is some disc space narrowing and mild degenerative  endplate changes, mild diffuse posterior disc osteophyte complex abuts  and flattens the ventral surface of cord, a moderate degree of canal  narrowing. There is uncovertebral joint hypertrophy. The facets are  normal and there is severe bilateral bony foraminal narrowing.     At C4-5 there is disc space narrowing and degenerative endplate changes.  Diffuse posterior disc osteophyte complex abuts and flattens the ventral  aspect of the cord resulting in moderate to severe canal narrowing. The  facets are normal. There is bilateral uncovertebral joint hypertrophy  with severe bilateral bony foraminal narrowing that could affect the  exiting C5 nerve roots bilaterally. There is some T2 hyperintensity in  the right and left central to posterior cord just to the right and left  of midline.  It is present previously likely some mild the cord.     Patient has had extensive cervical spine surgery with discectomy C5-C6,  C6-7, C7-T1 and corpectomies at C6 and C7 and the graft extending from  the inferior endplate of C5 superior body T1 anterior plate and screw  fixation from C5 to T1 fixated by screws to C5 and T1 vertebrae. At C5-6  there has been shaving of the posterior central and right paracentral  spurs. There remains left posterior lateral endplate spurring and  adjacent thick ossification posterior longitudinal ligament along the  left posterior disc margin at C5-6 and along the back of the C6  vertebrae and across the left posterior disc margin at C6-7 and along  the back of the C7 vertebrae down to C7-T1 interspace. It is the  ossified posterior longitudinal ligament abuts and flattens the left  ventral surface of the cord from the level of the C5-6 interspace down  to the C6-7 interspace moderate to severely narrowing the left side of  the canal at C5-6 and C6-7. Moderately narrows left side of the canal at  C7-T1 and the right side of the spinal cord protrudes anteriorly. The  right side of the canal is adequately decompressed. There remains  mild-to-moderate left and moderate to severe right bony foraminal  narrowing at C5-6. Mild left and moderate right bony foraminal narrowing  at C6-7. These findings appear strikingly similar to prior MRI of the  cervical spine on 01/05/2020.       Impression:       1. Since prior MRI of the cervical spine 01/05/2020 there has been  resolution of the fluid collection within the prevertebral soft tissues  from C4 down to T2. Otherwise there has been little to no significant  interval change when compared to prior MRI 01/05/2020.  2. This patient has extensive prior cervical spine surgery with  corpectomies at C6 and C7 and a graft extending from the inferior  endplate of C5 to the central to anterior superior body of T1 anterior  plate and screw fixation  from C5 to T1. There has been shaving of the  right posterior endplate spurs and ossification posterior longitudinal  ligament on the right side of the endplates at C5-6 and C6-7. There  remains left paracentral to posterior lateral endplate spurs with  adjacent thick ossification posterior longitudinal ligament along the  left paracentral to posterior lateral disc margin of C5-6 along the left  posterior body of C6 and the left paracentral disc margin C6-7 down to  the left paracentral disc margin at C7-T1 and ossified posterior  longitudinal ligament abuts and flattens the left ventral surface of the  cord and deviates it posteriorly within the spinal canal from C5-C6 down  to C7-T1 with moderate severe narrowing of the left side of the canal  from C5-6 down to C7-T1. The spinal cord is deformed with flattening of  the left ventral cord from C5-6 down to C7-T1. The left side of the cord  is deviated posteriorly and the right side of the cord is deviated  anteriorly. There has been decompression of the right side of the canal.  There remains mild-to-moderate left and moderate-to-severe right bony  foraminal narrowing at C5-6 and mild left and moderate right bony  foraminal narrowing at C6-7 and some left foraminal narrowing at C7-T1.  3. At C2-3 eccentric right posterior lateral endplate spurs abut and  flatten the right ventral surface cord and moderately narrowing the  right side of the canal and there is uncovertebral joint spurring with  severe right bony foraminal narrowing that could affect the exiting  right C3 nerve and this is unchanged.   4. At C3-4 there is posterior disc osteophyte complex abuts and flattens  the ventral cord and moderately narrowing the canal and uncovertebral  joint spurs result in severe bilateral bony foraminal narrowing that  could affect the exiting C4 nerve roots bilaterally.  5. At C4-5 posterior disc osteophyte complex abuts and flattens the  ventral surface of the cord  resulting in moderate to severe canal  narrowing. There is bilateral uncovertebral joint spurring with severe  bilateral bony foraminal narrowing that could affect the exiting C5  nerve roots bilaterally. There is some T2 high signal in the central to  posterior aspect of the right and left sides of the cord at the level of  the C4-5 interspace and superior body C5 likely some focal myelomalacia  in the cord.     This report was finalized on 7/8/2020 11:13 AM by Dr. Nico White M.D.       CT Soft Tissue Neck With Contrast [[id number]] Collected:  07/07/20 1636     Updated:  07/07/20 1636    Narrative:       CT EXAMINATION NECK WITH CONTRAST     HISTORY: Neck pain, tenderness, on right.     COMPARISON: MRI cervical spine 12/17/2019.     FINDINGS: The patient is status post anterior cervical fusion from C5 to  T1 with corpectomies at C6 and C7. There is mild reversal of the normal  cervical lordosis.     The parotid, submandibular and thyroid glands appear unremarkable. There  is no evidence of hematoma, mass or pathologic adenopathy. The cords are  symmetrical. The visualized lung apices are clear.       Impression:       No evidence of hematoma, mass or pathologic adenopathy.           Radiation dose reduction techniques were utilized, including automated  exposure control and exposure modulation based on body size.          XR Spine Cervical Complete 4 or 5 View [[id number]] Collected:  07/07/20 0054     Updated:  07/07/20 0101    Narrative:       CERVICAL SPINE SERIES     CLINICAL HISTORY: Neck pain.     Compared to the previous cervical spine x-rays dated 03/06/2020.     A total 6 views were obtained. The patient is status post anterior  discectomy and fusion at the C5-C6 and C6-C7 levels. A plate and screws  are in place anteriorly and graft material is present at corpectomy  sites. There is satisfactory alignment. Mild disc space that is present  at C2-C3 and C4-C5. Oblique views demonstrate moderate narrowing  of the  neural foramina on the right at C3-C4 and C4-C5 and C5-6 mild neural  foraminal narrowing is also present on the left at the same levels.  There is no evidence of recent or old fracture.     This report was finalized on 7/7/2020 12:58 AM by Dr. See Alvarez M.D.             Pertinent Labs     Results from last 7 days   Lab Units 07/08/20  0508 07/07/20  0547 07/07/20  0304   WBC 10*3/mm3 6.51 6.90 7.19   HEMOGLOBIN g/dL 11.5* 11.9* 12.5   PLATELETS 10*3/mm3 228 240 255     Results from last 7 days   Lab Units 07/08/20  0508 07/07/20  0547 07/07/20  0304   SODIUM mmol/L 139 135* 137   POTASSIUM mmol/L 4.6 4.4 4.6   CHLORIDE mmol/L 107 101 101   CO2 mmol/L 24.0 27.4 28.2   BUN mg/dL 9 8 8   CREATININE mg/dL 0.59 0.52* 0.61   GLUCOSE mg/dL 87 100* 117*   Estimated Creatinine Clearance: 66.4 mL/min (by C-G formula based on SCr of 0.59 mg/dL).  Results from last 7 days   Lab Units 07/07/20  0304   ALBUMIN g/dL 4.40   BILIRUBIN mg/dL 0.3   ALK PHOS U/L 93   AST (SGOT) U/L 18   ALT (SGPT) U/L 15     Results from last 7 days   Lab Units 07/08/20  0508 07/07/20  0547 07/07/20  0304   CALCIUM mg/dL 9.0 8.8 9.1   ALBUMIN g/dL  --   --  4.40               Invalid input(s): LDLCALC        Test Results Pending at Discharge       Discharge Details        Discharge Medications      New Medications      Instructions Start Date   guaiFENesin 600 MG 12 hr tablet  Commonly known as:  Mucinex   1,200 mg, Oral, 2 Times Daily      methocarbamol 500 MG tablet  Commonly known as:  ROBAXIN   500 mg, Oral, 3 Times Daily PRN      methylPREDNISolone 4 MG tablet  Commonly known as:  MEDROL (JESUS)   Take as directed on package instructions.         Changes to Medications      Instructions Start Date   gabapentin 100 MG capsule  Commonly known as:  NEURONTIN  What changed:    · how much to take  · how to take this   Take 2 capsules by mouth twice a day         Continue These Medications      Instructions Start Date   atorvastatin 20 MG  tablet  Commonly known as:  LIPITOR   20 mg, Oral, Nightly      CALCIUM 1000 + D PO   Oral, Daily, 2 caps       cetirizine 10 MG tablet  Commonly known as:  zyrTEC   10 mg, Oral, Daily      cholecalciferol 10 MCG (400 UNIT) tablet  Commonly known as:  VITAMIN D3   2,000 Units, Oral, Daily      docusate sodium 100 MG capsule  Commonly known as:  COLACE   100 mg, Oral, 2 Times Daily      fluticasone 50 MCG/ACT nasal spray  Commonly known as:  FLONASE   2 sprays, Nasal, Daily PRN      melatonin 3 MG tablet   Oral             Allergies   Allergen Reactions   • Penicillins Rash   • Topamax [Topiramate] Rash         Discharge Disposition:  Home or Self Care    Discharge Diet:  Diet Order   Procedures   • Diet Regular       Discharge Activity:       CODE STATUS:    Code Status and Medical Interventions:   Ordered at: 07/07/20 0502     Code Status:    CPR     Medical Interventions (Level of Support Prior to Arrest):    Full       Future Appointments   Date Time Provider Department Center   7/10/2020  2:30 PM Guelda, Roshni, OTR [facility]   7/14/2020  2:30 PM Guelda, Roshni, OTR [facility]   7/21/2020  2:30 PM Guelda, Roshni, OTR [facility]   7/28/2020  2:30 PM Guelda, Roshni, OTR [facility]   8/4/2020  2:30 PM Guelda, Roshni, OTR [facility]   9/9/2020  3:00 PM Antonio Noe MD [facility]   12/23/2020  8:20 AM [facility] None   6/24/2021  8:10 AM [facility] None   6/30/2021  8:15 AM Mary Lou Carroll MD [facility] None     Additional Instructions for the Follow-ups that You Need to Schedule     Discharge Follow-up with PCP   As directed       Currently Documented PCP:    Mary Lou Carroll MD    PCP Phone Number:    [phone]     Follow Up Details:  Please call to schedule a 1 week follow-up appointment with PCP; muscle spasms         Discharge Follow-up with Specialty: Neurosurgery   As directed      Specialty:  Neurosurgery    Follow  Up Details:  Please call to schedule follow-up appointment with neurosurgery as previously discussed           Follow-up Information     Mary Lou Carroll MD .    Specialty:  Internal Medicine  Why:  Please call to schedule a 1 week follow-up appointment with PCP; muscle spasms  Contact information:  [address]  [place] [address]  [phone]                   Additional Instructions for the Follow-ups that You Need to Schedule     Discharge Follow-up with PCP   As directed       Currently Documented PCP:    Mary Lou Carroll MD    PCP Phone Number:    [phone]     Follow Up Details:  Please call to schedule a 1 week follow-up appointment with PCP; muscle spasms         Discharge Follow-up with Specialty: Neurosurgery   As directed      Specialty:  Neurosurgery    Follow Up Details:  Please call to schedule follow-up appointment with neurosurgery as previously discussed           Time Spent on Discharge:  Greater than 30 minutes      MIRYAM Jones  [facility]  07/08/20  1:53 PM               yes

## 2020-07-08 NOTE — THERAPY DISCHARGE NOTE
Acute Care - Occupational Therapy Initial Eval/Discharge  [facility]     Patient Name: Noemi Bartholomew  : 1960  MRN: [id number]  Today's Date: 2020  Onset of Illness/Injury or Date of Surgery: 20            Admit Date: 2020       ICD-10-CM ICD-9-CM   1. Neck pain M54.2 723.1   2. Intractable pain R52 780.96     Patient Active Problem List   Diagnosis   • Carpal tunnel syndrome   • Hyperlipidemia   • Osteoporosis   • DDD (degenerative disc disease), lumbar   • Chronic left lumbar radiculopathy   • Congenital spondylolysis, lumbosacral region   • Neuropathic pain, leg, left   • Ossification of spinal ligament   • Cervical spondylosis with myelopathy   • Cervical stenosis of spine   • Cord compression (CMS/HCC)   • Postoperative CSF leak   • Cervical myelopathy (CMS/HCC)   • Postoperative cerebrospinal fluid leak   • Weakness of both hands   • Weakness of both arms   • Neck pain   • Anemia, chronic disease     Past Medical History:   Diagnosis Date   • Abnormal alkaline phosphatase test 2015    Resolved   • Acute bronchitis 2015    Resolved   • Allergic child age    pencillin   • Cervical spinal stenosis    • Diverticulosis    • H/O electromyography 10/13/2014   • H/O mammogram 2014   • Headache occassionally   • High cholesterol    • History of EKG 2015   • Hypercalcemia 2015    Resolved, Full w/u done, Likely secondary to [place]   • Left arm pain    • Low back pain 2017   • Numbness in both hands    • Osteopenia several years   • Osteoporosis    • Paresthesia 2015    Resolved     Past Surgical History:   Procedure Laterality Date   • ANTERIOR CHANNEL VERTEBRECTOMY/CORPECTOMY N/A 2019    Procedure: CERVICAL 6, CERVICAL 7 ANTERIOR CERVICAL CORPECTOMY WITH CAGE AND PLATE;  Surgeon: Antonio Noe MD;  Location: [facility] MAIN OR;  Service: Neurosurgery   • BACK SURGERY     • COLONOSCOPY  2011   • INCISION AND DRAINAGE OF WOUND N/A  12/19/2019    Procedure: followed by drainage of anterior cervical fluid collection;  Surgeon: Antonio Noe MD;  [facility];  Service: Neurosurgery   • LUMBAR DRAIN INSERTION EXTERNAL N/A 12/19/2019    Procedure: LUMBAR DRAIN INSERTION EXTERNAL;  Surgeon: Antonio Noe MD;  Location:  [facility] MAIN OR;  Service: Neurosurgery   • LUMBAR PERITONEAL SHUNT N/A 1/10/2020    Procedure: LUMBAR PERITONEAL SHUNT PLACEMENT;  Surgeon: Antonio Noe MD;  Location: [facility] MAIN OR;  Service: Neurosurgery   • SPINE SURGERY  1999 back    dr abad quintero   • TONSILLECTOMY  child age   • TYMPANOPLASTY            OT ASSESSMENT FLOWSHEET (last 12 hours)      Occupational Therapy Evaluation     Row Name 07/08/20 1537                   OT Evaluation Time/Intention    Subjective Information  no complaints  -        Document Type  evaluation;therapy note (daily note);discharge evaluation/summary  -        Mode of Treatment  individual therapy;occupational therapy  -        Patient Effort  good  -        Symptoms Noted During/After Treatment  none  -           General Information    Patient Profile Reviewed?  yes  -        Onset of Illness/Injury or Date of Surgery  07/07/20  -        Patient Observations  alert;cooperative;agree to therapy  -        Patient/Family Observations  pt supine in bed  -        Pertinent History of Current Functional Problem  admitted with incr pain in neck  -        Existing Precautions/Restrictions  fall  -        Benefits Reviewed  patient:;improve function;increase balance;increase independence;increase strength  -           Cognitive Assessment/Intervention- PT/OT    Orientation Status (Cognition)  oriented x 4  -        Follows Commands (Cognition)  WFL  -           Bed Mobility Assessment/Treatment    Bed Mobility Assessment/Treatment  supine-sit;sit-supine  -        Supine-Sit [place] (Bed Mobility)  independent  -        Sit-Supine  [place] (Bed Mobility)  independent  -           Functional Mobility    Functional Mobility- Ind. Level  conditional independence  -        Functional Mobility- Comment  in room, to BR, toilet, sink, bed  -           Transfer Assessment/Treatment    Transfer Assessment/Treatment  sit-stand transfer;stand-sit transfer;toilet transfer  -           Sit-Stand Transfer    Sit-Stand [place] (Transfers)  conditional independence;supervision  -           Stand-Sit Transfer    Stand-Sit [place] (Transfers)  conditional independence;supervision  -           Toilet Transfer    Type (Toilet Transfer)  sit-stand;stand-sit  -        [place] Level (Toilet Transfer)  set up;conditional independence  -        Assistive Device (Toilet Transfer)  grab bars/safety frame  -           ADL Assessment/Intervention    BADL Assessment/Intervention  grooming;toileting  -           Grooming Assessment/Training    [place] Level (Grooming)  grooming skills;conditional independence  -        Grooming Position  sink side  -           Toileting Assessment/Training    [place] Level (Toileting)  toileting skills;conditional independence  -        Assistive Devices (Toileting)  grab bar/safety frame  -        Toileting Position  supported sitting;supported standing  -           General ROM    GENERAL ROM COMMENTS  B UE 8/8  -KP           MMT (Manual Muscle Testing)    General MMT Comments  B sh NT, B elbow 3+/5  -KP           Motor Assessment/Interventions    Additional Documentation  Balance (Group);Balance Interventions (Group);Fine Motor Testing & Training (Group);Therapeutic Exercise (Group);Therapeutic Exercise Interventions (Group)  [facility]           Therapeutic Exercise    Upper Extremity (Therapeutic Exercise)  bicep curl, left;bicep curl, right  -        Upper Extremity Range of Motion (Therapeutic Exercise)  wrist flexion/extension, left;wrist flexion/extension, right;forearm  supination/pronation, right;forearm supination/pronation, left  -KP        Hand (Therapeutic Exercise)  hand , left;hand , right  -KP        Exercise Type (Therapeutic Exercise)  AROM (active range of motion)  -KP        Position (Therapeutic Exercise)  seated  -        Sets/Reps (Therapeutic Exercise)  10x2  -KP        Expected Outcome (Therapeutic Exercise)  improve neuromuscular control;improve motor control;improve performance, BADLs;improve performance, transfer skills;improve functional tolerance, self-care activity  -           Balance    Balance  static sitting balance;static standing balance  -KP           Static Sitting Balance    Level of [place] (Unsupported Sitting, Static Balance)  conditional independence  -        Sitting Position (Unsupported Sitting, Static Balance)  sitting on edge of bed  -        Time Able to Maintain Position (Unsupported Sitting, Static Balance)  more than 5 minutes  -           Static Standing Balance    Level of [place] (Supported Standing, Static Balance)  conditional independence;supervision  -        Time Able to Maintain Position (Supported Standing, Static Balance)  2 to 3 minutes  -           Fine Motor Testing & Training    Comment, Fine Motor Coordination  opposition in tact  -           Sensory Assessment/Intervention    Sensory General Assessment  other (see comments) numbness tingling  -KP           Positioning and Restraints    Pre-Treatment Position  in bed  -KP        Post Treatment Position  bed  -KP        In Bed  supine;call light within reach;encouraged to call for assist;exit alarm on  -KP           Pain Assessment    Additional Documentation  Pain Scale: Numbers Pre/Post-Treatment (Group)  -           Pain Scale: Numbers Pre/Post-Treatment    Pain Scale: Numbers, Pretreatment  0/10 - no pain  -KP        Pain Scale: Numbers, Post-Treatment  0/10 - no pain  -           Plan of Care Review    Plan of Care Reviewed With   patient  -KP        Progress  no change  -KP        Outcome Summary  pt evaluated for OT. Pt has good tsf w. mod I to toilet, BR, and grooming skills. Pt able to complete LBD w. socks. Pt completed oppostion, hand ex, UE AROM elbow, wrist, forearm. pt to continue out patient OT at [facility] and may return next week as Friday was her next session. Pt understands and will call out patient tomorrow to confirm cancel vs coming in.   -KP           Clinical Impression (OT)    Criteria for Skilled Therapeutic Interventions Met (OT Eval)  other (see comments) pt to dc home tomorrow. cont out pt OT  -KP        Anticipated Discharge Disposition (OT)  home;home with OP services  -KP           Patient Education Goal (OT)    Activity (Patient Education Goal, OT)  UE ex w. hands, wrist, elbow. pt demo  -KP        [place]/Cues/Accuracy (Memory Goal 2, OT)  demonstrates adequately;verbalizes understanding  -KP        Time Frame (Patient Education Goal, OT)  short term goal (STG);1 day  -KP        Progress/Outcome (Patient Education Goal, OT)  goal met  -KP           Discharge Summary (Occupational Therapy)    Additional Documentation  Discharge Summary, OT Eval (Group)  -KP           Discharge Summary, OT Eval    Reason for Discharge (OT Discharge Summary)  patient met all goals and outcomes  -KP        Outcomes Achieved Upon Discharge (OT Discharge Summary)  able to achieve all goals within established timeline  -        Transfer to Another Level of Care or Facility (OT Discharge Summary)  patient to receive therapy via outpatient clinic cont OT at [facility], pt to dc tomorrow  -          User Key  (r) = Recorded By, (t) = Taken By, (c) = Cosigned By    Initials Name Effective Dates    Aster Bush, OTR 06/08/18 -           Occupational Therapy Education                 Title: PT OT SLP Therapies (Resolved)     Topic: Occupational Therapy (Resolved)     Point: ADL training (Resolved)     Description:   Instruct learner(s)  on proper safety adaptation and remediation techniques during self care or transfers.   Instruct in proper use of assistive devices.              Learning Progress Summary           Patient Acceptance, TB,E, VU,DU by  at 7/8/2020 1546    Comment:  ed pt on POC and cont out pt OT. ed on ROM ex, hand wrist, elbow. opposition ex while in hospital. noted pt to dc tomorrow                   Point: Precautions (Resolved)     Description:   Instruct learner(s) on prescribed precautions during self-care and functional transfers.              Learning Progress Summary           Patient Acceptance, TB,E, VU,DU by  at 7/8/2020 1546    Comment:  ed pt on POC and cont out pt OT. ed on ROM ex, hand wrist, elbow. opposition ex while in hospital. noted pt to dc tomorrow                   Point: Body mechanics (Resolved)     Description:   Instruct learner(s) on proper positioning and spine alignment during self-care, functional mobility activities and/or exercises.              Learning Progress Summary           Patient Acceptance, TB,E, VU,DU by  at 7/8/2020 1546    Comment:  ed pt on POC and cont out pt OT. ed on ROM ex, hand wrist, elbow. opposition ex while in hospital. noted pt to dc tomorrow                               User Key     Initials Effective Dates Name Provider Type Discipline     06/08/18 -  Aster Ziegler, OTR Occupational Therapist OT                OT Recommendation and Plan  Outcome Summary/Treatment Plan (OT)  Anticipated Discharge Disposition (OT): home, home with OP services  Reason for Discharge (OT Discharge Summary): patient met all goals and outcomes  Plan of Care Review  Plan of Care Reviewed With: patient  Plan of Care Reviewed With: patient  Outcome Summary: pt evaluated for OT. Pt has good tsf w. mod I to toilet, BR, and grooming skills. Pt able to complete LBD w. socks. Pt completed oppostion, hand ex, UE AROM elbow, wrist, forearm. pt to continue out patient OT at [facility] and may  return next week as Friday was her next session. Pt understands and will call out patient tomorrow to confirm cancel vs coming in.      Rehab Goal Summary     Row Name 07/08/20 1537             Patient Education Goal (OT)    Activity (Patient Education Goal, OT)  UE ex w. hands, wrist, elbow. pt demo  -KP      [place]/Cues/Accuracy (Memory Goal 2, OT)  demonstrates adequately;verbalizes understanding  -KP      Time Frame (Patient Education Goal, OT)  short term goal (STG);1 day  -KP      Progress/Outcome (Patient Education Goal, OT)  goal met  -KP        User Key  (r) = Recorded By, (t) = Taken By, (c) = Cosigned By    Initials Name Provider Type Discipline    Aster Bush OTR Occupational Therapist OT          Outcome Measures     Row Name 07/08/20 1547             How much help from another is currently needed...    Putting on and taking off regular lower body clothing?  4  -KP      Bathing (including washing, rinsing, and drying)  4  -KP      Toileting (which includes using toilet bed pan or urinal)  4  -KP      Putting on and taking off regular upper body clothing  4  -KP      Taking care of personal grooming (such as brushing teeth)  4  -KP      Eating meals  4  -KP      AM-PAC 6 Clicks Score (OT)  24  -KP         Functional Assessment    Outcome Measure Options  AM-PAC 6 Clicks Daily Activity (OT)  -        User Key  (r) = Recorded By, (t) = Taken By, (c) = Cosigned By    Initials Name Provider Type    Aster Bush OTR Occupational Therapist          Time Calculation:   Time Calculation- OT     Row Name 07/08/20 1547             Time Calculation- OT    OT Start Time  1319  -      OT Stop Time  1339  -      OT Time Calculation (min)  20 min  -      Total Timed Code Minutes- OT  10 minute(s)  -      OT Received On  07/08/20  -        User Key  (r) = Recorded By, (t) = Taken By, (c) = Cosigned By    Initials Name Provider Type    Aster Bush OTR  Occupational Therapist        Therapy Suggested Charges     Code   Minutes Charges    None           Therapy Charges for Today     Code Description Service Date Service Provider Modifiers Qty    [id number]  OT THER PROC EA 15 MIN 7/8/2020 Aster Ziegler OTR GO 1    [id number]  OT EVAL LOW COMPLEXITY 2 7/8/2020 Aster Ziegler, BREONNA GO 1               OT Discharge Summary  Anticipated Discharge Disposition (OT): home, home with OP services    BREONNA Jimenez  7/8/2020

## 2020-07-09 ENCOUNTER — TRANSITIONAL CARE MANAGEMENT TELEPHONE ENCOUNTER (OUTPATIENT)
Dept: CALL CENTER | Facility: HOSPITAL | Age: 60
End: 2020-07-09

## 2020-07-09 ENCOUNTER — TELEPHONE (OUTPATIENT)
Dept: NEUROSURGERY | Facility: CLINIC | Age: 60
End: 2020-07-09

## 2020-07-09 ENCOUNTER — READMISSION MANAGEMENT (OUTPATIENT)
Dept: CALL CENTER | Facility: HOSPITAL | Age: 60
End: 2020-07-09

## 2020-07-09 RX ORDER — METHOCARBAMOL 500 MG/1
500 TABLET, FILM COATED ORAL 3 TIMES DAILY PRN
Qty: 90 TABLET | Refills: 1 | Status: SHIPPED | OUTPATIENT
Start: 2020-07-09 | End: 2021-06-30

## 2020-07-09 NOTE — TELEPHONE ENCOUNTER
Are you prescribing Fioricet for her?  If so, I need directions    As far as the Robaxin she was only prescribed 9 pills on 7.8.20

## 2020-07-09 NOTE — OUTREACH NOTE
Call Center TCM Note      Responses   [facility] patient discharged from?  [place]   COVID-19 Test Status  Not tested   Does the patient have one of the following disease processes/diagnoses(primary or secondary)?  Other   TCM attempt successful?  No   Unsuccessful attempts  Attempt 2          Junior Murphy RN    7/9/2020, 17:22

## 2020-07-09 NOTE — TELEPHONE ENCOUNTER
Patient called. She is requesting a refill of Robaxin 500mg and Fioricet -40mg. She would like medication sent to her [facility] pharmacy listed in her chart. She was discharged from the hospital yesterday.

## 2020-07-09 NOTE — OUTREACH NOTE
Prep Survey      Responses   [facility] patient discharged from?  [place]   Is LACE score < 7 ?  Yes   Eligibility  [facility]   Date of Admission  07/07/20   Date of Discharge  07/08/20   Discharge Disposition  Home or Self Care   Discharge diagnosis  neck pain,  sp C6 & C7 corpectomy procedure w/ complications of cerebrospinal fluid leak adn incisional hematoma   COVID-19 Test Status  Not tested   Does the patient have one of the following disease processes/diagnoses(primary or secondary)?  Other   Does the patient have Home health ordered?  No   Is there a DME ordered?  No   Prep survey completed?  Yes          Jannette Lu RN

## 2020-07-09 NOTE — TELEPHONE ENCOUNTER
I refilled the Robaxin.  Ask your who gave her the Fioricet in the past.  I do not think that we did.  It really should come from the person who previously prescribed it.

## 2020-07-09 NOTE — PROGRESS NOTES
Case Management Discharge Note      Final Note: Discharged to home on 7/8/2020. TIN Briscoe RN, CCP.          Destination      No service has been selected for the patient.      Durable Medical Equipment      No service has been selected for the patient.      Dialysis/Infusion      No service has been selected for the patient.      Home Medical Care      No service has been selected for the patient.      Therapy      No service has been selected for the patient.      Community Resources      No service has been selected for the patient.        Transportation Services  Private: Car    Final Discharge Disposition Code: 01 - home or self-care

## 2020-07-09 NOTE — OUTREACH NOTE
Call Center TCM Note      Responses   [facility] patient discharged from?  [place]   COVID-19 Test Status  Not tested   Does the patient have one of the following disease processes/diagnoses(primary or secondary)?  Other   TCM attempt successful?  No   Unsuccessful attempts  Attempt 1          Junior Murphy RN    7/9/2020, 17:19

## 2020-07-09 NOTE — TELEPHONE ENCOUNTER
Informed patient that Dr LABOY has refilled her Robaxin and she will contact the original prescriber for the Fioricet which she thinks is her PCP

## 2020-07-10 ENCOUNTER — HOSPITAL ENCOUNTER (OUTPATIENT)
Dept: OCCUPATIONAL THERAPY | Facility: HOSPITAL | Age: 60
Setting detail: THERAPIES SERIES
Discharge: HOME | End: 2020-07-10

## 2020-07-10 ENCOUNTER — TRANSITIONAL CARE MANAGEMENT TELEPHONE ENCOUNTER (OUTPATIENT)
Dept: CALL CENTER | Facility: HOSPITAL | Age: 60
End: 2020-07-10

## 2020-07-10 DIAGNOSIS — G95.9 CERVICAL MYELOPATHY (HCC): Primary | ICD-10-CM

## 2020-07-10 DIAGNOSIS — R29.898 UPPER EXTREMITY WEAKNESS: ICD-10-CM

## 2020-07-10 DIAGNOSIS — G95.20 CORD COMPRESSION (HCC): ICD-10-CM

## 2020-07-10 DIAGNOSIS — M48.02 CERVICAL STENOSIS OF SPINE: ICD-10-CM

## 2020-07-10 PROCEDURE — 97110 THERAPEUTIC EXERCISES: CPT | Performed by: OCCUPATIONAL THERAPIST

## 2020-07-10 PROCEDURE — 97112 NEUROMUSCULAR REEDUCATION: CPT | Performed by: OCCUPATIONAL THERAPIST

## 2020-07-10 NOTE — OUTREACH NOTE
Call Center TCM Note      Responses   [facility] patient discharged from?  [place]   COVID-19 Test Status  Not tested   Does the patient have one of the following disease processes/diagnoses(primary or secondary)?  Other   TCM attempt successful?  No   Unsuccessful attempts  Attempt 3          Yoli Marquez RN    7/10/2020, 13:07

## 2020-07-10 NOTE — THERAPY PROGRESS REPORT/RE-CERT
Outpatient Occupational Therapy Neuro Re-Evaluation  [facility]     Patient Name: Noemi Bartholomew  : 1960  MRN: [id number]  Today's Date: 7/10/2020      Visit Date: 07/10/2020    Patient Active Problem List   Diagnosis   • Carpal tunnel syndrome   • Hyperlipidemia   • Osteoporosis   • DDD (degenerative disc disease), lumbar   • Chronic left lumbar radiculopathy   • Congenital spondylolysis, lumbosacral region   • Neuropathic pain, leg, left   • Ossification of spinal ligament   • Cervical spondylosis with myelopathy   • Cervical stenosis of spine   • Cord compression (CMS/HCC)   • Postoperative CSF leak   • Cervical myelopathy (CMS/HCC)   • Postoperative cerebrospinal fluid leak   • Weakness of both hands   • Weakness of both arms   • Neck pain   • Anemia, chronic disease        Past Medical History:   Diagnosis Date   • Abnormal alkaline phosphatase test 2015    Resolved   • Acute bronchitis 2015    Resolved   • Allergic child age    pencillin   • Cervical spinal stenosis    • Diverticulosis    • H/O electromyography 10/13/2014   • H/O mammogram 2014   • Headache occassionally   • High cholesterol    • History of EKG 2015   • Hypercalcemia 2015    Resolved, Full w/u done, Likely secondary to Forteo   • Left arm pain    • Low back pain 2017   • Numbness in both hands    • Osteopenia several years   • Osteoporosis    • Paresthesia 2015    Resolved        Past Surgical History:   Procedure Laterality Date   • ANTERIOR CHANNEL VERTEBRECTOMY/CORPECTOMY N/A 2019    Procedure: CERVICAL 6, CERVICAL 7 ANTERIOR CERVICAL CORPECTOMY WITH CAGE AND PLATE;  Surgeon: Antonio Noe MD;  Location: [facility];  Service: Neurosurgery   • BACK SURGERY     • COLONOSCOPY  2011   • INCISION AND DRAINAGE OF WOUND N/A 2019    Procedure: followed by drainage of anterior cervical fluid collection;  Surgeon: Antonio Noe MD;  Location: [facility];   Service: Neurosurgery   • LUMBAR DRAIN INSERTION EXTERNAL N/A 12/19/2019    Procedure: LUMBAR DRAIN INSERTION EXTERNAL;  Surgeon: Antonio Noe MD;  Location: [facility] MAIN OR;  Service: Neurosurgery   • LUMBAR PERITONEAL SHUNT N/A 1/10/2020    Procedure: LUMBAR PERITONEAL SHUNT PLACEMENT;  Surgeon: Antonio Noe MD;  Location: [facility] MAIN OR;  Service: Neurosurgery   • SPINE SURGERY  1999 back    dr abad quintero   • TONSILLECTOMY  child age   • TYMPANOPLASTY           Visit Dx:      ICD-10-CM ICD-9-CM   1. Cervical myelopathy (CMS/[facility]) G95.9 721.1   2. Cervical stenosis of spine M48.02 723.0   3. Upper extremity weakness R29.898 729.89   4. Cord compression (CMS/[facility]) G95.20 336.9                  Hand Therapy (last 24 hours)      Hand Eval     Row Name 07/10/20 1400             Left Flexion AROM    II- MP AROM  57  -SG      II- PIP AROM  105  -SG      II- DIP AROM  83  -SG      II- SOLIZ Left Flexion AROM  245  -SG      III- MP AROM  75  -SG      III- PIP AROM  107  -SG      III- DIP AROM  81  -SG      III- SOLIZ Left Flexion AROM  263  -SG      IV- MP AROM  49  -SG      IV- PIP AROM  110  -SG      IV- DIP AROM  80  -SG      IV- SOLIZ Left Flexion AROM  239  -SG      V- MP AROM  45  -SG      V- PIP AROM  110  -SG      V- DIP AROM  80  -SG      V- SOLIZ Left Flexion AROM  235  -SG         Right Flexion AROM    II- MP AROM  90  -SG      II- PIP AROM  100  -SG      II- DIP AROM  65  -SG      II- SOLIZ Right Flexion AROM  255  -SG      III- MP AROM  81  -SG      III- PIP AROM  95  -SG      III- DIP AROM  60  -SG      III- SOLIZ Right Flexion AROM  236  -SG      IV- MP AROM  90  -SG      IV- PIP AROM  110  -SG      IV- DIP AROM  20  -SG      IV- SOLIZ Right Flexion AROM  220  -SG      V- MP AROM  80  -SG      V- PIP AROM  85  -SG      V- DIP AROM  0  -SG      V- SOLIZ Right Flexion AROM  165  -SG          Strength Right    # Reps  3  -SG      Right Rung  2  -SG      Right  Test 1  20  -SG      Right  Test 2  15   -SG      Right  Test 3  13  -SG       Strength Average Right  16  -SG          Strength Left    Left  Test 1  10  -SG      Left  Test 2  8  -SG      Left  Test 3  5  -SG       Strength Average Left  7.67  -SG         Right Hand Strength - Pinch (lbs)    Lateral  3 lbs  -SG      Tip (3 point)  2 lbs  -SG         Left Hand Strength - Pinch (lbs)    Lateral  4 lbs  -SG      Tip (3 point)  3 lbs  -SG        User Key  (r) = Recorded By, (t) = Taken By, (c) = Cosigned By    Initials Name Provider Type    Roshni Sainz OTHUMBLE Occupational Therapist              Therapy Education  Education Details: Encouraged pt to contact her hand MD to see if changes are normal or if she has trigger finger or something else on top of cervical issues with the hands. Will await next session until she gets her splints at home to try.  Given: Symptoms/condition management  Program: New  How Provided: Verbal  Provided to: Patient  Level of Understanding: Verbalized    OT Goals     Row Name 07/10/20 1500          OT Short Term Goals    STG Date to Achieve  08/07/20  -     STG 1  Pt to be independent with UE ROM HEP.  -     STG 1 Progress  Ongoing  -SG     STG 2  Pt to be independent with UE strengthening HEP.  -     STG 2 Progress  Ongoing  -     STG 3  Pt to increase LUE  strength > or = 8 lbs for improved functional use of upper extremities for ADL/IADLs.  -     STG 3 Progress  Met;Goal Revised  -        Long Term Goals    LTG Date to Achieve  08/07/20  -     LTG 1  Pt to increase RUE  strength > or = 18 lbs for improved functional use of upper extremities for ADL/IADLs.  -SG     LTG 2  Pt to increase bilateral shoulder strength to > or = 4/5 for improved use of upper extremities for daily functional tasks.  -SG     LTG 2 Progress  Ongoing  -     LTG 3  Pt to increase bilateral elbow strength to > or = 4/5 for improved use of upper extremities for daily functional tasks.  -     LTG 3  Progress  Ongoing  -SG     LTG 4  Pt to manage shoelaces with MOD I for improved independence with self care tasks.  -SG     LTG 4 Progress  Ongoing  -SG     LTG 5  Pt to cut food with MOD I using adaptive strategies as needed for improved independence with ADLs.  -SG     LTG 5 Progress  Ongoing  -SG     LTG 6  Pt to open containers with MOD I using adaptive strategies as needed for improved participation with daily functional tasks.  -SG     LTG 6 Progress  Ongoing  -SG        Time Calculation    OT Goal Re-Cert Due Date  08/07/20  -SG       User Key  (r) = Recorded By, (t) = Taken By, (c) = Cosigned By    Initials Name Provider Type    Roshni Sainz, OTR Occupational Therapist                 OT Assessment/Plan     Row Name 07/10/20 1543          OT Assessment    Functional Limitations  Limitation in home management;Limitations in community activities;Limitations in functional capacity and performance;Performance in leisure activities;Performance in self-care ADL;Performance in work activities  -SG     Impairments  Dexterity;Muscle strength;Sensation;Range of motion  -SG     Assessment Comments  Pt seen today for OT re-evaluation; pt has made improvements throughout OP OT, especially in her  strength and in her ROM measurements as seen in the note above. She has also significantly improved her FMC scores which shows a functional improvement in ROM during a fine motor task. She is fairly pleased with progress but we are working on getting her some ROM dynamic finger splint to help at home before she comes to OT next visit to see if this improves her ROM scores. She could continue to benefit from coming to OP OT.  -SG     Please refer to paper survey for additional self-reported information  Yes  -SG     OT Rehab Potential  Good  -SG     Patient/caregiver participated in establishment of treatment plan and goals  Yes  -SG     Patient would benefit from skilled therapy intervention  Yes  -SG        OT Plan     OT Frequency  1x/week  -SG     Predicted Duration of Therapy Intervention (Therapy Eval)  1x/wk for 4 weeks  -SG     Planned CPT's?  OT EVAL MOD COMPLEXITY: 75481;OT THER ACT EA 15 MIN: [id number];OT THER PROC EA 15 MIN: 76429VR;OT NEUROMUSC RE EDUCATION EA 15 MIN: 85855;OT SELF CARE/MGMT/TRAIN 15 MIN: 25974;OT HOT/COLD PACK  -SG     Planned Therapy Interventions (Optional Details)  home exercise program;manual therapy techniques;patient/family education;ROM (Range of Motion);strengthening;stretching  -SG       User Key  (r) = Recorded By, (t) = Taken By, (c) = Cosigned By    Initials Name Provider Type    Roshni Sainz OTR Occupational Therapist        OT Exercises     Row Name 07/10/20 1500             Subjective Comments    Subjective Comments  Pt was admitted to the hospital this week for several days due to increased neck pain. Pt thinks that she may have slept on her neck different or was doing typing drills with turning her head too much. It has resolved.  -SG         Subjective Pain    Able to rate subjective pain?  yes  -SG      Subjective Pain Comment  Pt does not report pain today.  -SG         Exercise 1    Exercise Name 1  Pt concerned that bed mobility may be causing a strain on her neck; worked on getting into/out of bed on both sides and some adaptive tech for reducing strain on her neck.  -SG      Cueing 1  Verbal  -SG        User Key  (r) = Recorded By, (t) = Taken By, (c) = Cosigned By    Initials Name Provider Type    Roshni Sainz OTR Occupational Therapist            9 Hole Peg  9-Hole Peg Right: 26  DASH  Open a tight or new jar.: Unable  Write: Mild Difficulty  Turn a key: Moderate Difficulty  Prepare a meal: Moderate Difficulty  Push open a heavy door: Moderate Difficulty  Place an object on a shelf above your head: Moderate Difficulty  Do heavy household chores (e.g., wash walls, wash floors): Severe Difficulty  Garden or do yard work: Moderate Difficulty  Make a bed: Mild  Difficulty  Carry a shopping bag or briefcase: Mild Difficulty  Carry a heavy object (over 10 lbs): Severe Difficulty  Change a lightbulb overhead: Moderate Difficulty  Wash or blow dry your hair: Moderate Difficulty  Wash your back: Moderate Difficulty  Put on a pullover sweater: Moderate Difficulty  Use a knife to cut food: Moderate Difficulty  Recreational activities in which require little effort (e.g., cardplaying, knitting, etc.): Moderate Difficulty  Recreational activities in which you take some force or impact through your arm, should or hand (e.g. golf, hammering, tennis, etc.): Severe Difficulty  Recreational Activities in which you move your arm freely (e.g., frisbee, badminton, etc.): Severe Difficulty  Manage transportation needs (getting from one place to another): Severe Difficulty  During the past week, to what extent has your arm, shoulder, or hand problem interfered with your normal social activites with family, friends, neighbors or groups?: Moderately  During the past week, were you limited in your work or other regular daily activities as a result of your arm, shoulder or hand problem?: Moderately Limited  Arm, Shoulder, or hand pain: Moderate  Arm, shoulder or hand pain when you performed any specific activity: Moderate  Tingling (pins and needles) in your arm, shoulder, or hand: Severe  Weakness in your arm, shoulder or hand: Severe  Stiffness in your arm, shoulder or hand: Severe  During the past week, how much difficulty have you had sleeping because of the pain in your arm, shoulder or hand?: Moderate Difficiculty  I feel less capable, less confident or less useful because of my arm, shoulder or hand problem: Agree  DASH Sum : 95  Number of Questions Answered: 29  DASH Score: 56.9         Time Calculation:   OT Start Time: 1430  OT Stop Time: 1515  OT Time Calculation (min): 45 min     Therapy Charges for Today     Code Description Service Date Service Provider Modifiers Qty    20485646912  HC OT NEUROMUSC RE EDUCATION EA 15 MIN 7/10/2020 Roshni Morel, BREONNA GO 1    [id number] HC OT THER PROC EA 15 MIN 7/10/2020 Roshni Morel OTR GO 2                     BREONNA Hinton  7/10/2020

## 2020-07-14 ENCOUNTER — APPOINTMENT (OUTPATIENT)
Dept: OCCUPATIONAL THERAPY | Facility: HOSPITAL | Age: 60
End: 2020-07-14

## 2020-07-15 ENCOUNTER — OFFICE VISIT (OUTPATIENT)
Dept: INTERNAL MEDICINE | Facility: CLINIC | Age: 60
End: 2020-07-15

## 2020-07-15 VITALS
WEIGHT: 90 LBS | DIASTOLIC BLOOD PRESSURE: 80 MMHG | HEART RATE: 100 BPM | SYSTOLIC BLOOD PRESSURE: 104 MMHG | BODY MASS INDEX: 20.83 KG/M2 | HEIGHT: 55 IN | OXYGEN SATURATION: 96 %

## 2020-07-15 DIAGNOSIS — M54.2 NECK PAIN: Primary | ICD-10-CM

## 2020-07-15 DIAGNOSIS — R51.9 CHRONIC NONINTRACTABLE HEADACHE, UNSPECIFIED HEADACHE TYPE: ICD-10-CM

## 2020-07-15 DIAGNOSIS — G89.29 CHRONIC NONINTRACTABLE HEADACHE, UNSPECIFIED HEADACHE TYPE: ICD-10-CM

## 2020-07-15 PROCEDURE — 99496 TRANSJ CARE MGMT HIGH F2F 7D: CPT | Performed by: INTERNAL MEDICINE

## 2020-07-15 RX ORDER — BUTALBITAL, ACETAMINOPHEN AND CAFFEINE 50; 325; 40 MG/1; MG/1; MG/1
1 TABLET ORAL EVERY 4 HOURS PRN
Qty: 30 TABLET | Refills: 0 | Status: SHIPPED | OUTPATIENT
Start: 2020-07-15 | End: 2021-06-30

## 2020-07-15 NOTE — ED PROVIDER NOTES
EMERGENCY DEPARTMENT ENCOUNTER    Room Number:  P489/1  Date of encounter:  7/14/2020  PCP: Mary Lou Carroll MD  Historian: Patient      HPI:  Chief Complaint: Neck pain  A complete HPI/ROS/PMH/PSH/SH/FH are unobtainable due to: Nothing    Context: Noemi Bartholomew is a 60 y.o. female who presents to the ED c/o severe neck pain for day and a half in duration.  Patient states she woke up yesterday morning with this pain and it has progressively worsened as the day progressed.  She describes the pain as a sharp 9, out of 10, nonradiating worse with slight movement.  She denies fever, headache, chills, chest pain associated with his neck pain.  She states she has had 3 C-spine surgeries in the past performed by Dr. Noe.  She describes the pain today as being worse since her been.  She is here for further evaluation and pain management.  She is not currently on anticoagulant therapy.      PAST MEDICAL HISTORY  Active Ambulatory Problems     Diagnosis Date Noted   • Carpal tunnel syndrome 06/08/2016   • Hyperlipidemia 06/08/2016   • Osteoporosis 06/08/2016   • DDD (degenerative disc disease), lumbar 05/02/2018   • Chronic left lumbar radiculopathy 05/02/2018   • Congenital spondylolysis, lumbosacral region 05/02/2018   • Neuropathic pain, leg, left 06/25/2018   • Ossification of spinal ligament 11/22/2019   • Cervical spondylosis with myelopathy 11/22/2019   • Cervical stenosis of spine 12/06/2019   • Cord compression (CMS/[facility]) 12/09/2019   • Postoperative CSF leak 11/22/2019   • Cervical myelopathy (CMS/[facility]) 12/13/2019   • Postoperative cerebrospinal fluid leak 01/09/2020   • Weakness of both hands 03/23/2020   • Weakness of both arms 06/05/2020     Resolved Ambulatory Problems     Diagnosis Date Noted   • Spinal stenosis in cervical region 11/22/2019   • Postoperative anemia due to acute blood loss 12/09/2019   • Steroid-induced hyperglycemia 12/09/2019   • Leukocytosis (due to steroids) 12/09/2019   • CSF leak  12/19/2019     Past Medical History:   Diagnosis Date   • Abnormal alkaline phosphatase test 06/12/2015   • Acute bronchitis 06/12/2015   • Allergic child age   • Cervical spinal stenosis    • Diverticulosis    • H/O electromyography 10/13/2014   • H/O mammogram 07/29/2014   • Headache occassionally   • High cholesterol    • History of EKG 06/12/2015   • Hypercalcemia 06/12/2015   • Left arm pain    • Low back pain March 2017   • Numbness in both hands    • Osteopenia several years   • Paresthesia 06/12/2015         PAST SURGICAL HISTORY  Past Surgical History:   Procedure Laterality Date   • ANTERIOR CHANNEL VERTEBRECTOMY/CORPECTOMY N/A 12/6/2019    Procedure: CERVICAL 6, CERVICAL 7 ANTERIOR CERVICAL CORPECTOMY WITH CAGE AND PLATE;  Surgeon: Antonio Noe MD;  Location: [facility] OR;  Service: Neurosurgery   • BACK SURGERY  1999   • COLONOSCOPY  05/20/2011   • INCISION AND DRAINAGE OF WOUND N/A 12/19/2019    Procedure: followed by drainage of anterior cervical fluid collection;  Surgeon: Antonio Noe MD;  Location: [facility] OR;  Service: Neurosurgery   • LUMBAR DRAIN INSERTION EXTERNAL N/A 12/19/2019    Procedure: LUMBAR DRAIN INSERTION EXTERNAL;  Surgeon: Antonio Noe MD;  Location: [facility] OR;  Service: Neurosurgery   • LUMBAR PERITONEAL SHUNT N/A 1/10/2020    Procedure: LUMBAR PERITONEAL SHUNT PLACEMENT;  Surgeon: Antonio Noe MD;  Location: [facility] OR;  Service: Neurosurgery   • SPINE SURGERY  1999 back    dr abad quintero   • TONSILLECTOMY  child age   • TYMPANOPLASTY           FAMILY HISTORY  Family History   Problem Relation Age of Onset   • Breast cancer Mother    • Stroke Mother         Cerebrovascular Accident   • Colon cancer Mother    • Diabetes Father         Borderline Diabetes Mellitus   • Heart disease Father         Cardiac Disorder   • Heart failure Father    • Anxiety disorder Sister    • Nephrolithiasis Sister         Kidney Stones   • Malig Hyperthermia  Neg Hx          SOCIAL HISTORY  Social History     Socioeconomic History   • Marital status: Single     Spouse name: Not on file   • Number of children: 0   • Years of education: college   • Highest education level: Not on file   Occupational History   • Occupation:    Tobacco Use   • Smoking status: Never Smoker   • Smokeless tobacco: Never Used   Substance and Sexual Activity   • Alcohol use: No   • Drug use: No   • Sexual activity: Never         ALLERGIES  Penicillins and Topamax [topiramate]        REVIEW OF SYSTEMS  Review of Systems     All systems reviewed and negative except for those discussed in HPI.       PHYSICAL EXAM    I have reviewed the triage vital signs and nursing notes.    ED Triage Vitals   Temp Heart Rate Resp BP SpO2   07/06/20 2001 07/06/20 2001 07/06/20 2001 07/07/20 0059 07/06/20 2001   98.4 °F (36.9 °C) 110 16 (!) 158/11 94 %      Temp src Heart Rate Source Patient Position BP Location FiO2 (%)   07/06/20 2001 07/06/20 2001 07/07/20 0059 07/07/20 0059 --   Tympanic Monitor Lying Right arm        Physical Exam  GENERAL: Appears uncomfortable but nontoxic in appearance  HENT: nares patent, mucous membranes moist, NCAT  Neck: TTP C5-C6 and surrounding soft tissue, bilateral trapezius muscle spasm  EYES: no scleral icterus, conjunctive  CV: regular rhythm, regular rate, peripheral pulses +2 bilaterally.  RESPIRATORY: normal effort, lungs CTA B  ABDOMEN: soft, nontender  MUSCULOSKELETAL: no deformity  NEURO: alert, moves all extremities, follows commands, face symmetrical, speech normal, no focal neuro deficits.  SKIN: warm, dry, no obvious skin rash        LAB RESULTS  No results found for this or any previous visit (from the past 24 hour(s)).    Ordered the above labs and independently reviewed the results.        RADIOLOGY  No Radiology Exams Resulted Within Past 24 Hours    I ordered the above noted radiological studies. Reviewed by me and discussed with radiologist.  See  dictation for official radiology interpretation.      PROCEDURES    Procedures      MEDICATIONS GIVEN IN ER    Medications   ondansetron ODT (ZOFRAN-ODT) disintegrating tablet 4 mg (4 mg Oral Given 7/7/20 0103)   oxyCODONE-acetaminophen (PERCOCET) 5-325 MG per tablet 1 tablet (1 tablet Oral Given 7/7/20 0155)   HYDROmorphone (DILAUDID) injection 0.5 mg (0.5 mg Intravenous Given 7/7/20 0551)   ondansetron (ZOFRAN) injection 4 mg (4 mg Intravenous Given 7/7/20 0551)   iopamidol (ISOVUE-300) 61 % injection 100 mL (75 mL Intravenous Given 7/7/20 1419)   gadobenate dimeglumine (MULTIHANCE) injection 8 mL (8 mL Intravenous Given 7/7/20 2017)   dexamethasone (DECADRON) 10 mg/20ml NS IV syringe (10 mg Intravenous Given 7/8/20 1153)         PROGRESS, DATA ANALYSIS, CONSULTS, AND MEDICAL DECISION MAKING    All labs have been independently reviewed by me.  All radiology studies have been reviewed by me and discussed with radiologist dictating the report.   EKG's independently viewed and interpreted by me.  Discussion below represents my analysis of pertinent findings related to patient's condition, differential diagnosis, treatment plan and final disposition.    DDX: Acute on chronic C-spine DJD/pain, ACS meningitis, muscle spasm, HNP.  History physical exam are most consistent with acute on chronic neck pain however cannot rule out HNP and cannot get patient comfortable.  We have maxed out Percocet and given IV Dilaudid with minimal to no pain relief.  C-spine x-ray shows no acute fracture.  I have discussed patient's case with Dr. Vincent my supervising physician.  Given that we cannot get the patient's pain under control will call the hospitalist to discuss observation and perhaps further work-up with an MRI.    ED Course as of Jul 14 2002 Tue Jul 07, 2020   6132 Discussed patient case with MIRYAM Andrew with [facility].  Place patient in observation-[facility] bed under Dr. Machuca's care.    [RC]      ED Course User  Index  [RC] Ralf Gates III, PA           Prior to seeing patient I performed extensive hand washing, saw to it that the patient was wearing a face mask.  Before entering into the room I wore gloves, glasses, and a face mask.  Prior to leaving the room I doffed my gear and performed handwashing.    AS OF 20:02 VITALS:    BP - 117/73  HR - 64  TEMP - 97.3 °F (36.3 °C) (Oral)  O2 SATS - 95%        DIAGNOSIS  Final diagnoses:   Neck pain   Intractable pain         DISPOSITION  ADMISSION    Discussed treatment plan and reason for admission with pt/family and admitting physician.  Pt/family voiced understanding of the plan for admission for further testing/treatment as needed.              Ralf Gates III, PA  07/14/20 2021

## 2020-07-15 NOTE — PROGRESS NOTES
Transitional Care Follow Up Visit  Subjective     Noemi Bartholomew is a 60 y.o. female who presents for a transitional care management visit.    Within 48 business hours after discharge our office contacted her via telephone to coordinate her care and needs.      I reviewed and discussed the details of that call along with the discharge summary, hospital problems, inpatient lab results, inpatient diagnostic studies, and consultation reports with Noemi.     Current outpatient and discharge medications have been reconciled for the patient.  Reviewed by: Mar yLou Carroll MD      Date of TCM Phone Call 7/9/2020   [facility]   Date of Admission 7/7/2020   Date of Discharge 7/8/2020   Discharge Disposition Home or Self Care     Risk for Readmission (LACE) Score: 1 (7/8/2020  6:00 AM)      History of Present Illness   Course During Hospital Stay:  Patient presents in hospital f/u.  She was admitted for severe neck pain.  I have reviewed discharge summary, labs, scans, cardiovascular studies and medication changes.         The following portions of the patient's history were reviewed and updated as appropriate: allergies, current medications, past family history, past medical history, past social history, past surgical history and problem list.    Review of Systems   Respiratory: Negative.    Cardiovascular: Negative.        Objective   Physical Exam   Constitutional: She is oriented to person, place, and time. She appears well-developed and well-nourished.   HENT:   Head: Normocephalic and atraumatic.   Pulmonary/Chest: Effort normal.   Neurological: She is alert and oriented to person, place, and time.   Psychiatric: She has a normal mood and affect. Her behavior is normal.       Assessment/Plan   Noemi was seen today for neck pain.    Diagnoses and all orders for this visit:    Neck pain    Chronic nonintractable headache, unspecified headache type  -     butalbital-acetaminophen-caffeine (Esgic)  -40 MG per tablet; Take 1 tablet by mouth Every 4 (Four) Hours As Needed for Headache.      Patient presents in f/u of acute neck pain.  I reviewed scans and hospital stay.  Continue to manage conservatively with prn muscle relaxer.    Intermittent headaches.  She requests refill of Fioricet for intermittent HAs.  Contract is updated today.

## 2020-07-21 ENCOUNTER — TELEPHONE (OUTPATIENT)
Dept: INTERNAL MEDICINE | Facility: CLINIC | Age: 60
End: 2020-07-21

## 2020-07-21 ENCOUNTER — APPOINTMENT (OUTPATIENT)
Dept: OCCUPATIONAL THERAPY | Facility: HOSPITAL | Age: 60
End: 2020-07-21

## 2020-07-21 NOTE — TELEPHONE ENCOUNTER
Christos called and stated that she is still having headaches. She said she is taking the medication you prescribed but it is not helping. Was wondering if there is something else she can take to help relieve her headaches.

## 2020-07-22 ENCOUNTER — INFUSION (OUTPATIENT)
Dept: ONCOLOGY | Facility: HOSPITAL | Age: 60
End: 2020-07-22

## 2020-07-22 ENCOUNTER — TELEPHONE (OUTPATIENT)
Dept: NEUROSURGERY | Facility: CLINIC | Age: 60
End: 2020-07-22

## 2020-07-22 VITALS
TEMPERATURE: 99.1 F | OXYGEN SATURATION: 91 % | SYSTOLIC BLOOD PRESSURE: 119 MMHG | RESPIRATION RATE: 20 BRPM | DIASTOLIC BLOOD PRESSURE: 79 MMHG | HEART RATE: 93 BPM | BODY MASS INDEX: 22.88 KG/M2 | WEIGHT: 91.4 LBS

## 2020-07-22 DIAGNOSIS — M81.0 OSTEOPOROSIS, UNSPECIFIED OSTEOPOROSIS TYPE, UNSPECIFIED PATHOLOGICAL FRACTURE PRESENCE: Primary | ICD-10-CM

## 2020-07-22 PROCEDURE — 96372 THER/PROPH/DIAG INJ SC/IM: CPT

## 2020-07-22 PROCEDURE — 25010000002 DENOSUMAB 60 MG/ML SOLUTION PREFILLED SYRINGE: Performed by: OBSTETRICS & GYNECOLOGY

## 2020-07-22 RX ADMIN — DENOSUMAB 60 MG: 60 INJECTION SUBCUTANEOUS at 14:52

## 2020-07-22 NOTE — NURSING NOTE
Arrived  ambulatory for prolia injection. Indication and side effects reviewed. Denies recent dental work. Labs and medications verified. Prolia administered in right  arm without incidence. Instructed to call prescribing MD for any concerns or questions and instructed on how to schedule future appts.  Pt vu and discharged ambulatory.

## 2020-07-22 NOTE — TELEPHONE ENCOUNTER
Pt called on 6/23/20 came into the office and gave an envelope with 1 form for Dr. LABOY to sign and it hasn't been sent back. She says she is very disappointed that we have not taken care of it. I did not see form in media either.     She would like a call back as soon as you can.

## 2020-07-22 NOTE — TELEPHONE ENCOUNTER
LM for patient letting her know that as per Dr LABOY he has her form, he has not yet had time to complete it , but will try and get it completed today.

## 2020-07-23 DIAGNOSIS — R51.9 INTRACTABLE HEADACHE, UNSPECIFIED CHRONICITY PATTERN, UNSPECIFIED HEADACHE TYPE: Primary | ICD-10-CM

## 2020-07-23 RX ORDER — GABAPENTIN 300 MG/1
300 CAPSULE ORAL 3 TIMES DAILY
Qty: 90 CAPSULE | Refills: 5 | Status: SHIPPED | OUTPATIENT
Start: 2020-07-23 | End: 2021-01-25

## 2020-07-23 NOTE — TELEPHONE ENCOUNTER
Just have her to come in to see me for a face-to-face visit before I decide if she needs a brain MRI.  A lot of this may just simply be coming from her neck.

## 2020-07-23 NOTE — TELEPHONE ENCOUNTER
GREGORY for patient letting her know that her form was faxed this morning and the original was mailed to her home

## 2020-07-23 NOTE — TELEPHONE ENCOUNTER
I d/w patient.  She continues to have intractable headaches in the occipital area.  I am going to check MRI of the brain.  I am going to increase gabapentin to 300 TID.  I will be out of office next week but encouraged her to make follow up appointment with me the week after.  CARLIE to Dr. LABOY to keep him of informed.

## 2020-07-23 NOTE — TELEPHONE ENCOUNTER
Patient returning you call said she would really like to speak to you or know if she can change her medication. She is still having headaches and is miserable. Please advise.

## 2020-07-28 ENCOUNTER — HOSPITAL ENCOUNTER (OUTPATIENT)
Dept: OCCUPATIONAL THERAPY | Facility: HOSPITAL | Age: 60
Setting detail: THERAPIES SERIES
Discharge: HOME | End: 2020-07-28

## 2020-07-28 DIAGNOSIS — M48.02 CERVICAL STENOSIS OF SPINE: ICD-10-CM

## 2020-07-28 DIAGNOSIS — G95.20 CORD COMPRESSION (HCC): ICD-10-CM

## 2020-07-28 DIAGNOSIS — G95.9 CERVICAL MYELOPATHY (HCC): Primary | ICD-10-CM

## 2020-07-28 DIAGNOSIS — R29.898 UPPER EXTREMITY WEAKNESS: ICD-10-CM

## 2020-07-28 PROCEDURE — 97110 THERAPEUTIC EXERCISES: CPT | Performed by: OCCUPATIONAL THERAPIST

## 2020-07-28 PROCEDURE — 97112 NEUROMUSCULAR REEDUCATION: CPT | Performed by: OCCUPATIONAL THERAPIST

## 2020-07-28 NOTE — THERAPY TREATMENT NOTE
Outpatient Occupational Therapy Neuro Treatment Note  [facility]     Patient Name: Noemi Bartholomew  : 1960  MRN: [id number]  Today's Date: 2020       Visit Date: 2020    Patient Active Problem List   Diagnosis   • Carpal tunnel syndrome   • Hyperlipidemia   • Osteoporosis   • DDD (degenerative disc disease), lumbar   • Chronic left lumbar radiculopathy   • Congenital spondylolysis, lumbosacral region   • Neuropathic pain, leg, left   • Ossification of spinal ligament   • Cervical spondylosis with myelopathy   • Cervical stenosis of spine   • Cord compression (CMS/HCC)   • Postoperative CSF leak   • Cervical myelopathy (CMS/HCC)   • Postoperative cerebrospinal fluid leak   • Weakness of both hands   • Weakness of both arms   • Neck pain   • Anemia, chronic disease        Past Medical History:   Diagnosis Date   • Abnormal alkaline phosphatase test 2015    Resolved   • Acute bronchitis 2015    Resolved   • Allergic child age    pencillin   • Cervical spinal stenosis    • Diverticulosis    • H/O electromyography 10/13/2014   • H/O mammogram 2014   • Headache occassionally   • High cholesterol    • History of EKG 2015   • Hypercalcemia 2015    Resolved, Full w/u done, Likely secondary to Forteo   • Left arm pain    • Low back pain 2017   • Numbness in both hands    • Osteopenia several years   • Osteoporosis    • Paresthesia 2015    Resolved        Past Surgical History:   Procedure Laterality Date   • ANTERIOR CHANNEL VERTEBRECTOMY/CORPECTOMY N/A 2019    Procedure: CERVICAL 6, CERVICAL 7 ANTERIOR CERVICAL CORPECTOMY WITH CAGE AND PLATE;  Surgeon: Antonio Noe MD;  Location: [facility];  Service: Neurosurgery   • BACK SURGERY     • COLONOSCOPY  2011   • INCISION AND DRAINAGE OF WOUND N/A 2019    Procedure: followed by drainage of anterior cervical fluid collection;  Surgeon: Antonio Noe MD;  Location: [facility];   Service: Neurosurgery   • LUMBAR DRAIN INSERTION EXTERNAL N/A 12/19/2019    Procedure: LUMBAR DRAIN INSERTION EXTERNAL;  Surgeon: Antonio Noe MD;  Location: [facility] MAIN OR;  Service: Neurosurgery   • LUMBAR PERITONEAL SHUNT N/A 1/10/2020    Procedure: LUMBAR PERITONEAL SHUNT PLACEMENT;  Surgeon: Antonio Noe MD;  Location: [facility] MAIN OR;  Service: Neurosurgery   • SPINE SURGERY  1999 back    dr abad quintero   • TONSILLECTOMY  child age   • TYMPANOPLASTY           Visit Dx:    ICD-10-CM ICD-9-CM   1. Cervical myelopathy (CMS/[facility]) G95.9 721.1   2. Cervical stenosis of spine M48.02 723.0   3. Upper extremity weakness R29.898 729.89   4. Cord compression (CMS/[facility]) G95.20 336.9              Hand Therapy (last 24 hours)      Hand Eval     Row Name 07/28/20 1400             Left Flexion AROM    II- MP AROM  60  -SG      II- PIP AROM  103  -SG      II- DIP AROM  90  -SG      II- SOLIZ Left Flexion AROM  253  -SG      III- MP AROM  77  -SG      III- PIP AROM  107  -SG      III- DIP AROM  93  -SG      III- SOLIZ Left Flexion AROM  277  -SG      IV- MP AROM  60  -SG      IV- PIP AROM  110  -SG      IV- DIP AROM  83  -SG      IV- OSLIZ Left Flexion AROM  253  -SG      V- MP AROM  55  -SG      V- PIP AROM  110  -SG      V- DIP AROM  85  -SG      V- SOLIZ Left Flexion AROM  250  -SG         Right Flexion AROM    II- MP AROM  99  -SG      II- PIP AROM  97  -SG      II- DIP AROM  56  -SG      II- SOLIZ Right Flexion AROM  252  -SG      III- MP AROM  105  -SG      III- PIP AROM  95  -SG      III- DIP AROM  60  -SG      III- SOLIZ Right Flexion AROM  260  -SG      IV- MP AROM  99  -SG      IV- PIP AROM  110  -SG      IV- DIP AROM  30  -SG      IV- SOLIZ Right Flexion AROM  239  -SG      V- MP AROM  105  -SG      V- PIP AROM  85  -SG      V- DIP AROM  0  -SG      V- SOLIZ Right Flexion AROM  190  -SG        User Key  (r) = Recorded By, (t) = Taken By, (c) = Cosigned By    Initials Name Provider Type    Roshni Sainz OTR  Occupational Therapist              OT Assessment/Plan     Row Name 07/28/20 1609          OT Assessment    Assessment Comments  Pt has improved her digit ROM in the past 2 weeks while wearing her dynasplints. We will spread her sessions out to follow the progress of her flexion/extension of the digits. She is pleased with her progress and looks much better in the clinic while even holding her purse etc.  -SG       User Key  (r) = Recorded By, (t) = Taken By, (c) = Cosigned By    Initials Name Provider Type    Roshni Sainz OTR Occupational Therapist                     Therapy Education  Education Details: Pt issued lengthy [facility] [place], encouraged to work on tasks daily to improve functional progress of digits. She is going to see a hand MD about tendon tighness in bilateral palms.      OT Exercises     Row Name 07/28/20 1606             Precautions    Existing Precautions/Restrictions  fall  -SG         Subjective Comments    Subjective Comments  Pt has been wearing her MCP, PIP, and DIP flexion and extension dynasplints.   -SG         Subjective Pain    Able to rate subjective pain?  yes  -SG      Pre-Treatment Pain Level  0  -SG      Post-Treatment Pain Level  0  -SG         Exercise 1    Cueing 1  Verbal  -SG      Equipment 1  Hand Gripper  -SG      Sets 1  2  -SG      Reps 1  10  -SG        User Key  (r) = Recorded By, (t) = Taken By, (c) = Cosigned By    Initials Name Provider Type    Roshni Sainz OTR Occupational Therapist                      Time Calculation:   OT Start Time: 1430  OT Stop Time: 1515  OT Time Calculation (min): 45 min     Therapy Charges for Today     Code Description Service Date Service Provider Modifiers Qty    93358303827  OT NEUROMUSC RE EDUCATION EA 15 MIN 7/28/2020 Roshni Morel OTR GO 2    [id number]  OT THER PROC EA 15 MIN 7/28/2020 Roshni Morel OTR GO 1                    BREONNA Hinton  7/28/2020

## 2020-07-31 ENCOUNTER — OFFICE VISIT (OUTPATIENT)
Dept: NEUROSURGERY | Facility: CLINIC | Age: 60
End: 2020-07-31

## 2020-07-31 VITALS
HEIGHT: 55 IN | BODY MASS INDEX: 22.88 KG/M2 | DIASTOLIC BLOOD PRESSURE: 74 MMHG | TEMPERATURE: 97.2 F | SYSTOLIC BLOOD PRESSURE: 140 MMHG | HEART RATE: 74 BPM

## 2020-07-31 DIAGNOSIS — G95.9 CERVICAL MYELOPATHY (HCC): ICD-10-CM

## 2020-07-31 DIAGNOSIS — M54.81 OCCIPITAL NEURALGIA OF RIGHT SIDE: ICD-10-CM

## 2020-07-31 DIAGNOSIS — R29.898 WEAKNESS OF BOTH HANDS: Primary | ICD-10-CM

## 2020-07-31 PROCEDURE — 99214 OFFICE O/P EST MOD 30 MIN: CPT | Performed by: NEUROLOGICAL SURGERY

## 2020-08-01 ENCOUNTER — APPOINTMENT (OUTPATIENT)
Dept: MRI IMAGING | Facility: HOSPITAL | Age: 60
End: 2020-08-01

## 2020-08-04 ENCOUNTER — APPOINTMENT (OUTPATIENT)
Dept: OCCUPATIONAL THERAPY | Facility: HOSPITAL | Age: 60
End: 2020-08-04

## 2020-08-08 ENCOUNTER — HOSPITAL ENCOUNTER (OUTPATIENT)
Dept: MRI IMAGING | Facility: HOSPITAL | Age: 60
End: 2020-08-08

## 2020-08-09 ENCOUNTER — HOSPITAL ENCOUNTER (OUTPATIENT)
Dept: MRI IMAGING | Facility: HOSPITAL | Age: 60
Discharge: HOME | End: 2020-08-09
Admitting: INTERNAL MEDICINE

## 2020-08-09 DIAGNOSIS — R51.9 INTRACTABLE HEADACHE, UNSPECIFIED CHRONICITY PATTERN, UNSPECIFIED HEADACHE TYPE: ICD-10-CM

## 2020-08-09 LAB — CREAT BLDA-MCNC: 0.6 MG/DL (ref 0.6–1.3)

## 2020-08-09 PROCEDURE — 82565 ASSAY OF CREATININE: CPT

## 2020-08-09 PROCEDURE — 0 GADOBENATE DIMEGLUMINE 529 MG/ML SOLUTION: Performed by: INTERNAL MEDICINE

## 2020-08-09 PROCEDURE — 70553 MRI BRAIN STEM W/O & W/DYE: CPT

## 2020-08-09 PROCEDURE — A9577 INJ MULTIHANCE: HCPCS | Performed by: INTERNAL MEDICINE

## 2020-08-09 RX ADMIN — GADOBENATE DIMEGLUMINE 8 ML: 529 INJECTION, SOLUTION INTRAVENOUS at 13:48

## 2020-08-11 ENCOUNTER — HOSPITAL ENCOUNTER (OUTPATIENT)
Dept: OCCUPATIONAL THERAPY | Facility: HOSPITAL | Age: 60
Setting detail: THERAPIES SERIES
Discharge: HOME | End: 2020-08-11

## 2020-08-11 DIAGNOSIS — M48.02 CERVICAL STENOSIS OF SPINE: ICD-10-CM

## 2020-08-11 DIAGNOSIS — G95.20 CORD COMPRESSION (HCC): ICD-10-CM

## 2020-08-11 DIAGNOSIS — R29.898 UPPER EXTREMITY WEAKNESS: ICD-10-CM

## 2020-08-11 DIAGNOSIS — G95.9 CERVICAL MYELOPATHY (HCC): Primary | ICD-10-CM

## 2020-08-11 PROCEDURE — 97112 NEUROMUSCULAR REEDUCATION: CPT | Performed by: OCCUPATIONAL THERAPIST

## 2020-08-11 NOTE — THERAPY DISCHARGE NOTE
Outpatient Occupational Therapy Neuro Treatment Note/Discharge Summary  [facility]     Patient Name: Noemi Bartholomew  : 1960  MRN: [id number]  Today's Date: 2020      Visit Date: 2020    Patient Active Problem List   Diagnosis   • Carpal tunnel syndrome   • Hyperlipidemia   • Osteoporosis   • DDD (degenerative disc disease), lumbar   • Chronic left lumbar radiculopathy   • Congenital spondylolysis, lumbosacral region   • Neuropathic pain, leg, left   • Ossification of spinal ligament   • Cervical spondylosis with myelopathy   • Cervical stenosis of spine   • Cord compression (CMS/HCC)   • Postoperative CSF leak   • Cervical myelopathy (CMS/HCC)   • Postoperative cerebrospinal fluid leak   • Weakness of both hands   • Weakness of both arms   • Neck pain   • Anemia, chronic disease   • Occipital neuralgia of right side        Past Medical History:   Diagnosis Date   • Abnormal alkaline phosphatase test 2015    Resolved   • Acute bronchitis 2015    Resolved   • Allergic child age    pencillin   • Cervical spinal stenosis    • Diverticulosis    • H/O electromyography 10/13/2014   • H/O mammogram 2014   • Headache occassionally   • High cholesterol    • History of EKG 2015   • Hypercalcemia 2015    Resolved, Full w/u done, Likely secondary to Forteo   • Left arm pain    • Low back pain 2017   • Numbness in both hands    • Osteopenia several years   • Osteoporosis    • Paresthesia 2015    Resolved        Past Surgical History:   Procedure Laterality Date   • ANTERIOR CHANNEL VERTEBRECTOMY/CORPECTOMY N/A 2019    Procedure: CERVICAL 6, CERVICAL 7 ANTERIOR CERVICAL CORPECTOMY WITH CAGE AND PLATE;  Surgeon: Antonio Noe MD;  Location: [facility] OR;  Service: Neurosurgery   • BACK SURGERY     • COLONOSCOPY  2011   • INCISION AND DRAINAGE OF WOUND N/A 2019    Procedure: followed by drainage of anterior cervical fluid collection;  Surgeon:  Antonio Noe MD;  Location:  [facility] OR;  Service: Neurosurgery   • LUMBAR DRAIN INSERTION EXTERNAL N/A 12/19/2019    Procedure: LUMBAR DRAIN INSERTION EXTERNAL;  Surgeon: Antonio Noe MD;  Location:  [facility]IN OR;  Service: Neurosurgery   • LUMBAR PERITONEAL SHUNT N/A 1/10/2020    Procedure: LUMBAR PERITONEAL SHUNT PLACEMENT;  Surgeon: Antonio Noe MD;  Location:  [facility] MAIN OR;  Service: Neurosurgery   • SPINE SURGERY  1999 back    dr abad quintero   • TONSILLECTOMY  child age   • TYMPANOPLASTY           Visit Dx:    ICD-10-CM ICD-9-CM   1. Cervical myelopathy (CMS/[facility]) G95.9 721.1   2. Cervical stenosis of spine M48.02 723.0   3. Upper extremity weakness R29.898 729.89   4. Cord compression (CMS/[facility]) G95.20 336.9       OT Neuro     Row Name 08/11/20 1400             Subjective Comments    Subjective Comments  Pt went to see neuro surgeon and he said that they would talk next january about going back to work. Pt went to see hand MD and he said that it may take over a year for her hands to get stronger and improve.   -SG         Subjective Pain    Able to rate subjective pain?  yes  -SG      Pre-Treatment Pain Level  0  -SG      Post-Treatment Pain Level  0  -SG        User Key  (r) = Recorded By, (t) = Taken By, (c) = Cosigned By    Initials Name Provider Type     Roshni Morel, OTR Occupational Therapist             Hand Therapy (last 24 hours)      Hand Eval     Row Name 08/11/20 1500 08/11/20 1400          Left Flexion AROM    II- MP AROM  --  71  -SG     II- PIP AROM  --  107  -SG     II- DIP AROM  --  85  -SG     II- SOLIZ Left Flexion AROM  --  263  -SG     III- MP AROM  --  77  -SG     III- PIP AROM  --  107  -SG     III- DIP AROM  --  85  -SG     III- SOLIZ Left Flexion AROM  --  269  -SG     IV- MP AROM  --  67  -SG     IV- PIP AROM  --  116  -SG     IV- DIP AROM  --  85  -SG     IV- SOLIZ Left Flexion AROM  --  268  -SG     V- MP AROM  --  50  -SG     V- PIP AROM  --  112  -SG      V- DIP AROM  --  86  -SG     V- SOLIZ Left Flexion AROM  --  248  -SG        Right Flexion AROM    II- MP AROM  --  100  -SG     II- PIP AROM  --  105  -SG     II- DIP AROM  --  70  -SG     II- SOLIZ Right Flexion AROM  --  275  -SG     III- MP AROM  --  98  -SG     III- PIP AROM  --  95  -SG     III- DIP AROM  --  65  -SG     III- SOLIZ Right Flexion AROM  --  258  -SG     IV- MP AROM  --  99  -SG     IV- PIP AROM  --  120  -SG     IV- DIP AROM  --  30  -SG     IV- SOLIZ Right Flexion AROM  --  249  -SG     V- MP AROM  --  108  -SG     V- PIP AROM  --  95  -SG     V- DIP AROM  --  0  -SG     V- SOLIZ Right Flexion AROM  --  203  -SG         Strength Right    Right  Test 1  18  -SG  --     Right  Test 2  15  -SG  --     Right  Test 3  11  -SG  --      Strength Average Right  14.67  -SG  --         Strength Left    Left  Test 1  8  -SG  --     Left  Test 2  8  -SG  --     Left  Test 3  6  -SG  --      Strength Average Left  7.33  -SG  --       User Key  (r) = Recorded By, (t) = Taken By, (c) = Cosigned By    Initials Name Provider Type    Roshni Sainz, OTR Occupational Therapist                   OT Goals     Row Name 08/11/20 1500          OT Short Term Goals    STG 1  Pt to be independent with UE ROM HEP.  -     STG 1 Progress  Met  -     STG 2  Pt to be independent with UE strengthening HEP.  -     STG 2 Progress  Met  -     STG 3  Pt to increase LUE  strength > or = 8 lbs for improved functional use of upper extremities for ADL/IADLs.  -     STG 3 Progress  Not Met  -        Long Term Goals    LTG 1  Pt to increase RUE  strength > or = 18 lbs for improved functional use of upper extremities for ADL/IADLs.  -     LTG 1 Progress  Not Met  -     LTG 2  Pt to increase bilateral shoulder strength to > or = 4/5 for improved use of upper extremities for daily functional tasks.  -     LTG 2 Progress  Not Met  -     LTG 3  Pt to increase bilateral elbow  strength to > or = 4/5 for improved use of upper extremities for daily functional tasks.  -SG     LTG 3 Progress  Met  -SG     LTG 4  Pt to manage shoelaces with MOD I for improved independence with self care tasks.  -SG     LTG 4 Progress  Met  -SG     LTG 5  Pt to cut food with MOD I using adaptive strategies as needed for improved independence with ADLs.  -SG     LTG 5 Progress  Met  -SG     LTG 6  Pt to open containers with MOD I using adaptive strategies as needed for improved participation with daily functional tasks.  -SG     LTG 6 Progress  Met  -SG       User Key  (r) = Recorded By, (t) = Taken By, (c) = Cosigned By    Initials Name Provider Type    Roshni Sainz OTR Occupational Therapist                           9 Hole Peg  9-Hole Peg Left: 29  9-Hole Peg Right: 22         Time Calculation:   OT Start Time: 1435  OT Stop Time: 1515  OT Time Calculation (min): 40 min     Therapy Charges for Today     Code Description Service Date Service Provider Modifiers Qty    82198254284 HC OT NEUROMUSC RE EDUCATION EA 15 MIN 8/11/2020 Roshni Morel OTR GO 3                OP OT Discharge Summary  Date of Discharge: 08/11/20  Reason for Discharge: Maximum functional potential achieved  Outcomes Achieved: Patient able to partially acheive established goals  Discharge Destination: Home with home program  Discharge Instructions: Pt was remeasured today for her digit flexion after wearing her dynasplints for the past month. She is WFL for SOLIZ for 6/10 digits and is close on 2 of those 4 to 260'. Pt will continue to make progress at home with her strengthening exercises, her coordination HEP and wearing the dynasplitns. We discussed, as she has already with neuro sx that it may take several more years for her to regain full AROM if she even does. Pt understands and is agreeable with this POC and agrees to d/c today. She has improved overall and has been given exercises to continue working on at  home.        Roshni Morel, OTR  8/11/2020

## 2020-08-12 ENCOUNTER — OFFICE VISIT (OUTPATIENT)
Dept: INTERNAL MEDICINE | Facility: CLINIC | Age: 60
End: 2020-08-12

## 2020-08-12 ENCOUNTER — TELEPHONE (OUTPATIENT)
Dept: NEUROSURGERY | Facility: CLINIC | Age: 60
End: 2020-08-12

## 2020-08-12 VITALS
BODY MASS INDEX: 21.98 KG/M2 | OXYGEN SATURATION: 96 % | HEART RATE: 66 BPM | DIASTOLIC BLOOD PRESSURE: 76 MMHG | SYSTOLIC BLOOD PRESSURE: 112 MMHG | HEIGHT: 55 IN | WEIGHT: 95 LBS

## 2020-08-12 DIAGNOSIS — M54.81 OCCIPITAL NEURALGIA OF RIGHT SIDE: Primary | ICD-10-CM

## 2020-08-12 PROCEDURE — 99213 OFFICE O/P EST LOW 20 MIN: CPT | Performed by: INTERNAL MEDICINE

## 2020-08-12 NOTE — TELEPHONE ENCOUNTER
She called with an FYI,    Wanted you to review MRI that was done 8/9/20.  She said you do not need to call her back but would like you to review it.

## 2020-08-12 NOTE — PROGRESS NOTES
Subjective  Answers for HPI/ROS submitted by the patient on 8/5/2020   What is the primary reason for your visit?: Other  Please describe your symptoms.: Neck pain and Head pain  Have you had these symptoms before?: No  How long have you been having these symptoms?: Greater than 2 weeks  Please list any medications you are currently taking for this condition.: Muscle Relaxer, Gabapentin, Iboprofen  Please describe any probable cause for these symptoms. : Probably from recent neck surgeries in Dec 2019.      Noemi Bartholomew is a 60 y.o. female who presents with   Chief Complaint   Patient presents with   • occipital neuralgia       History of Present Illness     Patient feels gabapentin has helped the pain in the back of the neck.  She is having no increased side effects from the gabapentin.  Reviewed MRI findings.     Review of Systems   Constitutional: Negative for fever.   Respiratory: Negative.    Cardiovascular: Negative.        The following portions of the patient's history were reviewed and updated as appropriate: allergies, current medications and problem list.    Patient Active Problem List    Diagnosis Date Noted   • Occipital neuralgia of right side 07/31/2020   • Neck pain 07/07/2020   • Anemia, chronic disease 07/07/2020   • Weakness of both arms 06/05/2020   • Weakness of both hands 03/23/2020   • Postoperative cerebrospinal fluid leak 01/09/2020   • Cervical myelopathy (CMS/HCC) 12/13/2019   • Cord compression (CMS/HCC) 12/09/2019   • Cervical stenosis of spine 12/06/2019   • Ossification of spinal ligament 11/22/2019     Note Last Updated: 11/22/2019     Added automatically from request for surgery [id number]     • Cervical spondylosis with myelopathy 11/22/2019     Note Last Updated: 11/22/2019     Added automatically from request for surgery [id number]     • Postoperative CSF leak 11/22/2019     Note Last Updated: 12/11/2019     Added automatically from request for surgery [id number]     • Neuropathic pain,  "leg, left 06/25/2018   • DDD (degenerative disc disease), lumbar 05/02/2018   • Chronic left lumbar radiculopathy 05/02/2018   • Congenital spondylolysis, lumbosacral region 05/02/2018   • Carpal tunnel syndrome 06/08/2016   • Hyperlipidemia 06/08/2016   • Osteoporosis 06/08/2016     Note Last Updated: 6/29/2020     Description: Boniva for a number of years.  Forteo for a year.  Managed by Gyn. .  I do recommend 1200mg calcium and 1000 IUs of vitamin D in supplements/diet as well as weight bearing exercise to prevent further decline in BMD.  Prolia started 2020.         Current Outpatient Medications on File Prior to Visit   Medication Sig Dispense Refill   • atorvastatin (LIPITOR) 20 MG tablet Take 1 tablet by mouth Every Night. 90 tablet 3   • butalbital-acetaminophen-caffeine (Esgic) -40 MG per tablet Take 1 tablet by mouth Every 4 (Four) Hours As Needed for Headache. 30 tablet 0   • Calcium Carb-Cholecalciferol (CALCIUM 1000 + D PO) Take  by mouth Daily. 2 caps     • cetirizine (zyrTEC) 10 MG tablet Take 10 mg by mouth Daily.     • cholecalciferol (VITAMIN D3) 10 MCG (400 UNIT) tablet Take 2,000 Units by mouth Daily.     • docusate sodium (COLACE) 100 MG capsule Take 100 mg by mouth 2 (Two) Times a Day.     • fluticasone (FLONASE) 50 MCG/ACT nasal spray 2 sprays into the nostril(s) as directed by provider Daily As Needed.     • gabapentin (NEURONTIN) 300 MG capsule Take 1 capsule by mouth 3 (Three) Times a Day. 90 capsule 5   • melatonin 3 MG tablet Take  by mouth.     • methocarbamol (ROBAXIN) 500 MG tablet Take 1 tablet by mouth 3 (Three) Times a Day As Needed for Muscle Spasms. 90 tablet 1     No current facility-administered medications on file prior to visit.        Objective     /76   Pulse 66   Ht 135.6 cm (53.39\")   Wt 43.1 kg (95 lb)   SpO2 96%   BMI 23.44 kg/m²     Physical Exam   Constitutional: She is oriented to person, place, and time. She appears well-developed and well-nourished. "   HENT:   Head: Normocephalic and atraumatic.   Pulmonary/Chest: Effort normal.   Neurological: She is alert and oriented to person, place, and time.   Psychiatric: She has a normal mood and affect. Her behavior is normal.       Assessment/Plan   Noemi was seen today for occipital neuralgia.    Diagnoses and all orders for this visit:    Occipital neuralgia of right side        Discussion    Patient presents in f/u of headache secondary to occipital neuralgia.  Continue gabapentin at the same dose for now.  10/15 minutes was spent in counseling of the following topics:, test results, treatment options         Future Appointments   Date Time Provider Department Center   11/2/2020  2:15 PM Antonio Noe MD [facility]   12/23/2020  8:20 AM [facility] None   1/25/2021  3:30 PM REFERRED INJECTION CHAIR [facility]   6/24/2021  8:10 AM [facility] None   6/30/2021  8:15 AM Mary Lou Carroll MD [facility] None   7/26/2021  3:00 PM REFERRED INJECTION CHAIR [facility]

## 2020-08-13 NOTE — TELEPHONE ENCOUNTER
Patient returned call & was relayed prev. Msg. Patient voiced understanding & did not have any follow up questions for provider at time of nini.    [phone]

## 2020-08-18 ENCOUNTER — APPOINTMENT (OUTPATIENT)
Dept: OCCUPATIONAL THERAPY | Facility: HOSPITAL | Age: 60
End: 2020-08-18

## 2020-08-25 ENCOUNTER — APPOINTMENT (OUTPATIENT)
Dept: OCCUPATIONAL THERAPY | Facility: HOSPITAL | Age: 60
End: 2020-08-25

## 2020-08-28 ENCOUNTER — APPOINTMENT (OUTPATIENT)
Dept: WOMENS IMAGING | Facility: HOSPITAL | Age: 60
End: 2020-08-28

## 2020-08-28 PROCEDURE — 77063 BREAST TOMOSYNTHESIS BI: CPT | Performed by: RADIOLOGY

## 2020-08-28 PROCEDURE — 77067 SCR MAMMO BI INCL CAD: CPT | Performed by: RADIOLOGY

## 2020-09-01 ENCOUNTER — APPOINTMENT (OUTPATIENT)
Dept: OCCUPATIONAL THERAPY | Facility: HOSPITAL | Age: 60
End: 2020-09-01

## 2020-09-08 ENCOUNTER — APPOINTMENT (OUTPATIENT)
Dept: OCCUPATIONAL THERAPY | Facility: HOSPITAL | Age: 60
End: 2020-09-08

## 2020-09-14 ENCOUNTER — HOSPITAL ENCOUNTER (OUTPATIENT)
Facility: HOSPITAL | Age: 60
Setting detail: HOSPITAL OUTPATIENT SURGERY
End: 2020-09-14
Attending: OTOLARYNGOLOGY | Admitting: OTOLARYNGOLOGY

## 2020-09-17 ENCOUNTER — TRANSCRIBE ORDERS (OUTPATIENT)
Dept: ADMINISTRATIVE | Facility: HOSPITAL | Age: 60
End: 2020-09-17

## 2020-09-17 DIAGNOSIS — Z01.818 OTHER SPECIFIED PRE-OPERATIVE EXAMINATION: Primary | ICD-10-CM

## 2020-09-24 ENCOUNTER — APPOINTMENT (OUTPATIENT)
Dept: PREADMISSION TESTING | Facility: HOSPITAL | Age: 60
End: 2020-09-24

## 2020-09-26 ENCOUNTER — APPOINTMENT (OUTPATIENT)
Dept: LAB | Facility: HOSPITAL | Age: 60
End: 2020-09-26

## 2020-11-02 ENCOUNTER — OFFICE VISIT (OUTPATIENT)
Dept: NEUROSURGERY | Facility: CLINIC | Age: 60
End: 2020-11-02

## 2020-11-02 VITALS
BODY MASS INDEX: 21.98 KG/M2 | DIASTOLIC BLOOD PRESSURE: 79 MMHG | HEART RATE: 69 BPM | TEMPERATURE: 97.7 F | WEIGHT: 95 LBS | HEIGHT: 55 IN | SYSTOLIC BLOOD PRESSURE: 124 MMHG

## 2020-11-02 DIAGNOSIS — Z98.1 S/P CERVICAL SPINAL FUSION: ICD-10-CM

## 2020-11-02 DIAGNOSIS — R29.898 WEAKNESS OF BOTH HANDS: Primary | ICD-10-CM

## 2020-11-02 DIAGNOSIS — G95.9 CERVICAL MYELOPATHY (HCC): ICD-10-CM

## 2020-11-02 PROCEDURE — 99213 OFFICE O/P EST LOW 20 MIN: CPT | Performed by: NEUROLOGICAL SURGERY

## 2020-11-02 NOTE — PROGRESS NOTES
Subjective   History of Present Illness: Noemi Bartholomew is a 60 y.o. female is here today for follow-up.    Patient was last seen 07/31/2020 for constant headaches, and neck pain.    Today that patient states that her hands are numb and stiff. She states that she is not having any neck pain or headaches today.     Patient states that her hands have been numb and tingling since her surgery but she says that they are getting better.     It is been 10 months since her cervical corpectomy and the reoperation for CSF leak.  As well as placement of a lumboperitoneal shunt.  She thinks her ability to use her hands and use the computer has improved.  She works on her exercises.  She is completed all the Occupational Therapy.  Her balance is actually much better.  No evidence of pseudomeningocele.  I will see her in 2 months with x-rays.  That will be the year ben from the last surgery.  She thinks that she is going to be able to try to go back to work as an  working out of home.  I am willing to letter given to try.  We will discuss this in 2 months after the x-rays are done.    Neck Pain   The pain is at a severity of 0/10. The patient is experiencing no pain. Associated symptoms include numbness and tingling. Pertinent negatives include no fever, headaches, leg pain, pain with swallowing, trouble swallowing or weakness. Associated symptoms comments: N/T bilateral hands. The treatment provided moderate relief.   Hand Pain   The pain is present in the right hand and left hand. The pain is at a severity of 10/10. The pain has been constant since the incident. Associated symptoms include numbness and tingling. Nothing aggravates the symptoms. The treatment provided mild relief.       The following portions of the patient's history were reviewed and updated as appropriate: allergies, current medications, past family history, past medical history, past social history, past surgical history and problem list.    Review  "of Systems   Constitutional: Negative for chills and fever.   HENT: Negative for congestion and trouble swallowing.    Musculoskeletal: Positive for neck pain. Negative for back pain, gait problem, joint swelling and neck stiffness.   Neurological: Positive for tingling and numbness. Negative for weakness and headaches.        Bilateral hand N/T   All other systems reviewed and are negative.          Objective     Vitals:    11/02/20 1555   BP: 124/79   Cuff Size: Small Adult   Pulse: 69   Temp: 97.7 °F (36.5 °C)   Weight: 43.1 kg (95 lb)   Height: 135.6 cm (53.39\")     Body mass index is 23.43 kg/m².      Physical Exam  Constitutional:       Appearance: She is well-developed.   HENT:      Head: Normocephalic and atraumatic.   Eyes:      Extraocular Movements: EOM normal.      Conjunctiva/sclera: Conjunctivae normal.      Pupils: Pupils are equal, round, and reactive to light.      Funduscopic exam:     Right eye: No papilledema.         Left eye: No papilledema.   Neck:      Vascular: No carotid bruit.   Neurological:      Mental Status: She is oriented to person, place, and time.      Coordination: Finger-Nose-Finger Test and Heel to Shin Test normal.      Gait: Gait is intact.      Deep Tendon Reflexes:      Reflex Scores:       Tricep reflexes are 2+ on the right side and 2+ on the left side.       Bicep reflexes are 2+ on the right side and 2+ on the left side.       Brachioradialis reflexes are 2+ on the right side and 2+ on the left side.       Patellar reflexes are 2+ on the right side and 2+ on the left side.       Achilles reflexes are 2+ on the right side and 2+ on the left side.  Psychiatric:         Speech: Speech normal.       Neurologic Exam     Mental Status   Oriented to person, place, and time.   Registration of memory: Good recent and remote memory.   Attention: normal. Concentration: normal.   Speech: speech is normal   Level of consciousness: alert  Knowledge: consistent with education. "     Cranial Nerves     CN II   Visual fields full to confrontation.   Visual acuity: normal    CN III, IV, VI   Pupils are equal, round, and reactive to light.  Extraocular motions are normal.     CN V   Facial sensation intact.   Right corneal reflex: normal  Left corneal reflex: normal    CN VII   Facial expression full, symmetric.   Right facial weakness: none  Left facial weakness: none    CN VIII   Hearing: intact    CN IX, X   Palate: symmetric    CN XI   Right sternocleidomastoid strength: normal  Left sternocleidomastoid strength: normal    CN XII   Tongue: not atrophic  Tongue deviation: none    Motor Exam   Muscle bulk: normal  Right arm tone: normal  Left arm tone: normal  Right leg tone: normal  Left leg tone: normal    Strength   Strength 5/5 except as noted.   Right wrist flexion: 4/5  Left wrist flexion: 4/5  Right wrist extension: 4/5  Left wrist extension: 4/5  Right interossei: 4/5  Left interossei: 4/5    Sensory Exam   Light touch normal.     Gait, Coordination, and Reflexes     Gait  Gait: normal    Coordination   Finger to nose coordination: normal  Heel to shin coordination: normal    Reflexes   Right brachioradialis: 2+  Left brachioradialis: 2+  Right biceps: 2+  Left biceps: 2+  Right triceps: 2+  Left triceps: 2+  Right patellar: 2+  Left patellar: 2+  Right achilles: 2+  Left achilles: 2+  Right : 2+  Left : 2+          Assessment/Plan   Independent Review of Radiographic Studies:      I personally reviewed the images from the following studies.    Her x-rays done on 3/6/2020 showed good position of the cage and the plate with a corpectomy being at C6 and C7.  Agree with the report.    Medical Decision Making:      I think she is showing signs of improvement in the hands.  I suspect she will be able to go back to work at the year ben but will discuss this next time in 2 months after x-rays.  That will be the year ben from surgery.      Diagnoses and all orders for this  visit:    1. Weakness of both hands (Primary)  -     XR spine cervical ap and lat w flex and ext; Future    2. Cervical myelopathy (CMS/HCC)  -     XR spine cervical ap and lat w flex and ext; Future    3. S/P cervical spinal fusion  -     XR spine cervical ap and lat w flex and ext; Future      Return in about 2 months (around 1/2/2021) for Face-to-face.

## 2020-12-28 DIAGNOSIS — D63.8 ANEMIA, CHRONIC DISEASE: ICD-10-CM

## 2020-12-28 DIAGNOSIS — E78.5 HYPERLIPIDEMIA, UNSPECIFIED HYPERLIPIDEMIA TYPE: Primary | ICD-10-CM

## 2020-12-28 LAB
ALBUMIN SERPL-MCNC: 4.3 G/DL (ref 3.5–5.2)
ALBUMIN/GLOB SERPL: 1.9 G/DL
ALP SERPL-CCNC: 90 U/L (ref 39–117)
ALT SERPL-CCNC: 23 U/L (ref 1–33)
AST SERPL-CCNC: 28 U/L (ref 1–32)
BASOPHILS # BLD AUTO: 0.07 10*3/MM3 (ref 0–0.2)
BASOPHILS NFR BLD AUTO: 1.3 % (ref 0–1.5)
BILIRUB SERPL-MCNC: 0.3 MG/DL (ref 0–1.2)
BUN SERPL-MCNC: 11 MG/DL (ref 8–23)
BUN/CREAT SERPL: 16.9 (ref 7–25)
CALCIUM SERPL-MCNC: 9.3 MG/DL (ref 8.6–10.5)
CHLORIDE SERPL-SCNC: 103 MMOL/L (ref 98–107)
CHOLEST SERPL-MCNC: 166 MG/DL (ref 0–200)
CO2 SERPL-SCNC: 29.8 MMOL/L (ref 22–29)
CREAT SERPL-MCNC: 0.65 MG/DL (ref 0.57–1)
EOSINOPHIL # BLD AUTO: 0.41 10*3/MM3 (ref 0–0.4)
EOSINOPHIL NFR BLD AUTO: 7.7 % (ref 0.3–6.2)
ERYTHROCYTE [DISTWIDTH] IN BLOOD BY AUTOMATED COUNT: 13 % (ref 12.3–15.4)
GLOBULIN SER CALC-MCNC: 2.3 GM/DL
GLUCOSE SERPL-MCNC: 87 MG/DL (ref 65–99)
HCT VFR BLD AUTO: 39.5 % (ref 34–46.6)
HDLC SERPL-MCNC: 62 MG/DL (ref 40–60)
HGB BLD-MCNC: 12.5 G/DL (ref 12–15.9)
IMM GRANULOCYTES # BLD AUTO: 0 10*3/MM3 (ref 0–0.05)
IMM GRANULOCYTES NFR BLD AUTO: 0 % (ref 0–0.5)
LDLC SERPL CALC-MCNC: 71 MG/DL (ref 0–100)
LYMPHOCYTES # BLD AUTO: 1.33 10*3/MM3 (ref 0.7–3.1)
LYMPHOCYTES NFR BLD AUTO: 24.9 % (ref 19.6–45.3)
MCH RBC QN AUTO: 29.7 PG (ref 26.6–33)
MCHC RBC AUTO-ENTMCNC: 31.6 G/DL (ref 31.5–35.7)
MCV RBC AUTO: 93.8 FL (ref 79–97)
MONOCYTES # BLD AUTO: 0.49 10*3/MM3 (ref 0.1–0.9)
MONOCYTES NFR BLD AUTO: 9.2 % (ref 5–12)
NEUTROPHILS # BLD AUTO: 3.05 10*3/MM3 (ref 1.7–7)
NEUTROPHILS NFR BLD AUTO: 56.9 % (ref 42.7–76)
NRBC BLD AUTO-RTO: 0 /100 WBC (ref 0–0.2)
PLATELET # BLD AUTO: 249 10*3/MM3 (ref 140–450)
POTASSIUM SERPL-SCNC: 4.9 MMOL/L (ref 3.5–5.2)
PROT SERPL-MCNC: 6.6 G/DL (ref 6–8.5)
RBC # BLD AUTO: 4.21 10*6/MM3 (ref 3.77–5.28)
SODIUM SERPL-SCNC: 141 MMOL/L (ref 136–145)
TRIGL SERPL-MCNC: 201 MG/DL (ref 0–150)
VLDLC SERPL CALC-MCNC: 33 MG/DL (ref 5–40)
WBC # BLD AUTO: 5.35 10*3/MM3 (ref 3.4–10.8)

## 2020-12-31 ENCOUNTER — HOSPITAL ENCOUNTER (OUTPATIENT)
Dept: GENERAL RADIOLOGY | Facility: HOSPITAL | Age: 60
Discharge: HOME | End: 2020-12-31
Admitting: NEUROLOGICAL SURGERY

## 2020-12-31 DIAGNOSIS — Z98.1 S/P CERVICAL SPINAL FUSION: ICD-10-CM

## 2020-12-31 DIAGNOSIS — G95.9 CERVICAL MYELOPATHY (HCC): ICD-10-CM

## 2020-12-31 DIAGNOSIS — R29.898 WEAKNESS OF BOTH HANDS: ICD-10-CM

## 2020-12-31 PROCEDURE — 72050 X-RAY EXAM NECK SPINE 4/5VWS: CPT

## 2021-01-07 ENCOUNTER — TELEPHONE (OUTPATIENT)
Dept: NEUROSURGERY | Facility: CLINIC | Age: 61
End: 2021-01-07

## 2021-01-07 NOTE — TELEPHONE ENCOUNTER
Caller: Noemi Bartholomew    Relationship to patient: Self    Best call back number: [phone]    Chief complaint: WEAKNESS OF BOTH HANDS, CERVICAL MYELOPATHY    Type of visit: FOLLOW UP    Requested date: EARLIER TIME    If rescheduling, when is the original appointment: N/A    Additional notes: PATIENT/CAREGIVER RETURNED CALL FOR [facility] BUT UNABLE TO ASSIST AT TIME OF CALL SINCE NO REC OF A PREV CALL MADE IN CHART WITH INSTRUCTIONS AS TO WHAT & WHY PATIENT WAS CONTACTED. PATIENT STATES THAT OFFICE WAS OFFERING A SOONER TIME FOR THAT SAME DAY. THE EARLIEST TIME PATIENT CAN DO IS AROUND 11AM.    PATIENT/CAREGIVER CAN BE CONTACTED WITH FURTHER ASSISTANCE

## 2021-01-11 ENCOUNTER — TELEPHONE (OUTPATIENT)
Dept: NEUROSURGERY | Facility: CLINIC | Age: 61
End: 2021-01-11

## 2021-01-11 ENCOUNTER — OFFICE VISIT (OUTPATIENT)
Dept: NEUROSURGERY | Facility: CLINIC | Age: 61
End: 2021-01-11

## 2021-01-11 DIAGNOSIS — R29.898 WEAKNESS OF BOTH HANDS: ICD-10-CM

## 2021-01-11 DIAGNOSIS — G95.9 CERVICAL MYELOPATHY (HCC): Primary | ICD-10-CM

## 2021-01-11 DIAGNOSIS — Z98.1 S/P CERVICAL SPINAL FUSION: ICD-10-CM

## 2021-01-11 PROCEDURE — 99442 PR PHYS/QHP TELEPHONE EVALUATION 11-20 MIN: CPT | Performed by: NEUROLOGICAL SURGERY

## 2021-01-11 NOTE — TELEPHONE ENCOUNTER
Pt was scheduled to bee seen in office today. Per MA she had a tempeture. Her visit was changed from in-person to tele-visit.     I called pt to get consent to do televisit.

## 2021-01-11 NOTE — PROGRESS NOTES
Subjective   Patient ID: Noemi Bartholomew is a 60 y.o. female is here today for follow-up tele-visit with new cervical XR.     She was last seen in office 11/2/21 for stiffness and numbness in hands. She is 13 months out from a cervical corpectomy and reoperation of csf leak.     You have chosen to receive care through a telephone visit. Do you consent to use a telephone visit for your medical care today? Yes    Patient reports today because her hands are numb.  Her forearms are numb also.  She states she also has numbness in her sides.     She is taking Aleve and Gabapentin for any type of pain she may have. She takes Robaxin prn for pain.       This was a televisit from my office lasting 11 minutes.  The patient was at home.  There was some issue today about the temperature we took in the office.  In any event this visit was converted to a televisit.  She is about a year out from the last surgery.  The x-rays were discussed which look fine.  No problems with the hardware.  She still is numb in her hands and describes her self is being very fatigued.  The fatigue could be related to the gabapentin that we have given her for the pain that she had in her hands and her headaches.  She is taking 300 mg 3 times daily.  Her primary care physician is now prescribing it.  I suggested perhaps she tried it twice daily to see if it helps the sleepiness and fatigue.  She does not feel she is ready to return to work but wants to revisit this issue in 6 months which is certainly fine with me.  Her long-term disability provides benefits for another year.  We will do a televisit in 3 months to see how the reduction in the gabapentin is suiting her and then we will see her again 3 months after that.      Neck Pain   Associated symptoms include numbness (hands ) and weakness (hands ). Pertinent negatives include no fever.       The following portions of the patient's history were reviewed and updated as appropriate: allergies, current  medications, past family history, past medical history, past social history, past surgical history and problem list.    Review of Systems   Constitutional: Negative for fever.   Musculoskeletal: Positive for neck pain.   Neurological: Positive for weakness (hands ) and numbness (hands ).   All other systems reviewed and are negative.          Objective     There were no vitals filed for this visit.  There is no height or weight on file to calculate BMI.      Physical exam  Deferred  Neurological exam  Deferred      Assessment/Plan   Independent Review of Radiographic Studies:      I personally reviewed the images from the following studies.    I reviewed her plain x-rays done on 12/31/2020 which show good position of the cage at the C6 and C7 vertebral body defect and a plate from C5-T1    Medical Decision Making:      I told her to reduce her gabapentin to 300 mg twice daily.  We will do a televisit in 3 months.  Her primary care physician is prescribing the gabapentin.  We will plan on revisiting the timeframe to return to work 6 months from now.      Diagnoses and all orders for this visit:    1. Cervical myelopathy (CMS/[facility]) (Primary)    2. S/P cervical spinal fusion    3. Weakness of both hands      Return in about 3 months (around 4/11/2021) for as a televisit.

## 2021-01-12 ENCOUNTER — TELEMEDICINE (OUTPATIENT)
Dept: INTERNAL MEDICINE | Facility: CLINIC | Age: 61
End: 2021-01-12

## 2021-01-12 DIAGNOSIS — J02.9 SORE THROAT: ICD-10-CM

## 2021-01-12 DIAGNOSIS — R50.9 FEVER, UNSPECIFIED FEVER CAUSE: Primary | ICD-10-CM

## 2021-01-12 DIAGNOSIS — R05.9 COUGH: ICD-10-CM

## 2021-01-12 PROCEDURE — 99213 OFFICE O/P EST LOW 20 MIN: CPT | Performed by: INTERNAL MEDICINE

## 2021-01-12 NOTE — PROGRESS NOTES
Eduardo Bartholomew is a 60 y.o. female who presents with No chief complaint on file.      History of Present Illness     You have chosen to receive care through a telehealth visit.  Do you consent to use a video/audio connection for your medical care today? Yes.    Temperature yesterday at Dr. Blair was 100.2.  It was taken 4 times and one time it was elevated.  She is going to get a thermometer.  She is a minimal sore throat and drainage.  Minimal cough.  She is a little achy.        Review of Systems   Constitutional: Positive for fever.   HENT: Positive for sore throat.    Respiratory: Positive for cough.        The following portions of the patient's history were reviewed and updated as appropriate: allergies, current medications and problem list.    Patient Active Problem List    Diagnosis Date Noted   • S/P cervical spinal fusion 11/02/2020   • Occipital neuralgia of right side 07/31/2020   • Neck pain 07/07/2020   • Anemia, chronic disease 07/07/2020   • Weakness of both arms 06/05/2020   • Weakness of both hands 03/23/2020   • Postoperative cerebrospinal fluid leak 01/09/2020   • Cervical myelopathy (CMS/[facility]) 12/13/2019   • Cord compression (CMS/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     Note Last Updated: 11/22/2019     Added automatically from request for surgery [id number]     • Cervical spondylosis with myelopathy 11/22/2019     Note Last Updated: 11/22/2019     Added automatically from request for surgery [id number]     • Postoperative CSF leak 11/22/2019     Note Last Updated: 12/11/2019     Added automatically from request for surgery [id number]     • Neuropathic pain, leg, left 06/25/2018   • DDD (degenerative disc disease), lumbar 05/02/2018   • Chronic left lumbar radiculopathy 05/02/2018   • Congenital spondylolysis, lumbosacral region 05/02/2018   • Carpal tunnel syndrome 06/08/2016   • Hyperlipidemia 06/08/2016   •  Osteoporosis 06/08/2016     Note Last Updated: 6/29/2020     Description: [employer]iva for a number of years.  Forteo for a year.  Managed by Gyn. .  I do recommend 1200mg calcium and 1000 IUs of vitamin D in supplements/diet as well as weight bearing exercise to prevent further decline in BMD.  Prolia started 2020.         Current Outpatient Medications on File Prior to Visit   Medication Sig Dispense Refill   • atorvastatin (LIPITOR) 20 MG tablet Take 1 tablet by mouth Every Night. 90 tablet 3   • butalbital-acetaminophen-caffeine (Esgic) -40 MG per tablet Take 1 tablet by mouth Every 4 (Four) Hours As Needed for Headache. 30 tablet 0   • Calcium Carb-Cholecalciferol (CALCIUM 1000 + D PO) Take  by mouth Daily. 2 caps     • cetirizine (zyrTEC) 10 MG tablet Take 10 mg by mouth Daily.     • cholecalciferol (VITAMIN D3) 10 MCG (400 UNIT) tablet Take 2,000 Units by mouth Daily.     • docusate sodium (COLACE) 100 MG capsule Take 100 mg by mouth 2 (Two) Times a Day.     • fluticasone (FLONASE) 50 MCG/ACT nasal spray 2 sprays into the nostril(s) as directed by provider Daily As Needed.     • gabapentin (NEURONTIN) 300 MG capsule Take 1 capsule by mouth 3 (Three) Times a Day. 90 capsule 5   • melatonin 3 MG tablet Take  by mouth.     • methocarbamol (ROBAXIN) 500 MG tablet Take 1 tablet by mouth 3 (Three) Times a Day As Needed for Muscle Spasms. 90 tablet 1     No current facility-administered medications on file prior to visit.        Objective     There were no vitals taken for this visit.    Physical Exam  Constitutional:       Appearance: She is well-developed.   HENT:      Head: Normocephalic and atraumatic.   Pulmonary:      Effort: Pulmonary effort is normal.   Neurological:      Mental Status: She is alert and oriented to person, place, and time.   Psychiatric:         Behavior: Behavior normal.         Assessment/Plan   Diagnoses and all orders for this visit:    1. Fever, unspecified fever cause (Primary)  -      COVID-19,[facility] ROUTINE, NP/OP SWAB IN TRANSPORT MEDIA OR ESWAB 72 HR TAT - Swab, Nasopharynx    2. Cough    3. Sore throat        Discussion    Patient presents with symptoms of COVID-19.  We are going to do COVID-19 testing.  She is unable to come until tomorrow.  She will quarantine until then.         Future Appointments   Date Time Provider Department Center   1/25/2021  3:30 PM REFERRED INJECTION CHAIR [facility]   4/19/2021  1:30 PM Antonio Noe MD [facility]   6/24/2021  8:10 AM [facility]   6/30/2021  8:15 AM Mary Lou Carroll MD [facility]   7/26/2021  3:00 PM REFERRED INJECTION CHAIR [facility]

## 2021-01-14 ENCOUNTER — TELEPHONE (OUTPATIENT)
Dept: INTERNAL MEDICINE | Facility: CLINIC | Age: 61
End: 2021-01-14

## 2021-01-14 LAB — SARS-COV-2 RNA RESP QL NAA+PROBE: NOT DETECTED

## 2021-01-14 NOTE — TELEPHONE ENCOUNTER
Caller: Noemi Bartholomew    Relationship: Self    Best call back number: [phone]    Caller requesting test results: Noemi Bartholomew    What test was performed: Covid anti-body test  When was the test performed: 12/28/2020    Where was the test performed: In Dr. Carroll's office  Additional notes:Patient stated she was told the results would come in the mail, but she has not received them. Would like to know the results. Please advise.

## 2021-01-14 NOTE — TELEPHONE ENCOUNTER
Called patient and left detailed voicemail. [facility] behind on mailing the results, she can go to [facility] web site and create an account to review her result sooner. We will no get them here.

## 2021-01-25 ENCOUNTER — LAB (OUTPATIENT)
Dept: OTHER | Facility: HOSPITAL | Age: 61
End: 2021-01-25

## 2021-01-25 ENCOUNTER — INFUSION (OUTPATIENT)
Dept: ONCOLOGY | Facility: HOSPITAL | Age: 61
End: 2021-01-25

## 2021-01-25 VITALS — RESPIRATION RATE: 18 BRPM | TEMPERATURE: 97.8 F | HEIGHT: 55 IN | BODY MASS INDEX: 24.33 KG/M2

## 2021-01-25 DIAGNOSIS — M81.0 OSTEOPOROSIS, UNSPECIFIED OSTEOPOROSIS TYPE, UNSPECIFIED PATHOLOGICAL FRACTURE PRESENCE: Primary | ICD-10-CM

## 2021-01-25 DIAGNOSIS — R51.9 INTRACTABLE HEADACHE, UNSPECIFIED CHRONICITY PATTERN, UNSPECIFIED HEADACHE TYPE: ICD-10-CM

## 2021-01-25 PROCEDURE — 80053 COMPREHEN METABOLIC PANEL: CPT | Performed by: OBSTETRICS & GYNECOLOGY

## 2021-01-25 PROCEDURE — 84100 ASSAY OF PHOSPHORUS: CPT | Performed by: OBSTETRICS & GYNECOLOGY

## 2021-01-25 PROCEDURE — 25010000002 DENOSUMAB 60 MG/ML SOLUTION PREFILLED SYRINGE: Performed by: OBSTETRICS & GYNECOLOGY

## 2021-01-25 PROCEDURE — 83735 ASSAY OF MAGNESIUM: CPT | Performed by: OBSTETRICS & GYNECOLOGY

## 2021-01-25 PROCEDURE — 96372 THER/PROPH/DIAG INJ SC/IM: CPT

## 2021-01-25 RX ORDER — GABAPENTIN 300 MG/1
CAPSULE ORAL
Qty: 90 CAPSULE | Refills: 4 | Status: SHIPPED | OUTPATIENT
Start: 2021-01-25 | End: 2021-06-25

## 2021-01-25 RX ORDER — NAPROXEN SODIUM 220 MG
220 TABLET ORAL 2 TIMES DAILY WITH MEALS
COMMUNITY

## 2021-01-25 RX ADMIN — DENOSUMAB 60 MG: 60 INJECTION SUBCUTANEOUS at 15:31

## 2021-03-04 ENCOUNTER — TELEPHONE (OUTPATIENT)
Dept: NEUROSURGERY | Facility: CLINIC | Age: 61
End: 2021-03-04

## 2021-03-04 NOTE — TELEPHONE ENCOUNTER
Caller: PT    Relationship to patient: SELF    Best call back number: [phone]    Chief complaint:     Type of visit: TELEPHONE APPT    Requested date:      If rescheduling, when is the original appointment: 04/19/21    Additional notes:PT WOULD LIKE TO RESCHEDULE HER APPT FOR ANY OTHER DAY THAT WEEK AS A TELEPHONE APPT.    PLEASE ADVISE  THANK YOU

## 2021-03-16 ENCOUNTER — BULK ORDERING (OUTPATIENT)
Dept: CASE MANAGEMENT | Facility: OTHER | Age: 61
End: 2021-03-16

## 2021-03-16 DIAGNOSIS — Z23 IMMUNIZATION DUE: ICD-10-CM

## 2021-04-14 ENCOUNTER — LAB REQUISITION (OUTPATIENT)
Dept: LAB | Facility: HOSPITAL | Age: 61
End: 2021-04-14

## 2021-04-14 DIAGNOSIS — Z00.00 ENCOUNTER FOR GENERAL ADULT MEDICAL EXAMINATION WITHOUT ABNORMAL FINDINGS: ICD-10-CM

## 2021-04-14 LAB — SARS-COV-2 ORF1AB RESP QL NAA+PROBE: NOT DETECTED

## 2021-04-14 PROCEDURE — U0004 COV-19 TEST NON-CDC HGH THRU: HCPCS | Performed by: INTERNAL MEDICINE

## 2021-04-15 NOTE — PROGRESS NOTES
Subjective   Patient ID: Noemi Bartholomew is a 60 y.o. female is here today for follow-up. Patient was last seen 1/11/21 for cervical myelopathy and weakness of both hands.    You have chosen to receive care through a telephone visit. Do you consent to use a telephone visit for your medical care today? Yes  Patient reports today neck pain and weakness of both hands.  Patient states her hands are numb since her surgery.    This was a televisit from my office lasting 11 minutes.  The patient was at home.  Her hands continue to be numb and weak.  She really does not think that she can return to work which frankly I do not think is unreasonable.  She had a devastating spinal cord problem and myelopathy with residual weakness in her hands with numbness.  She is getting some commercial disability relief from now but that expires essentially next year.  She asked me if thought she was able to work and I frankly do not think she can be gainfully employed with her myelopathy and we can numb hands.  She said that she will work with an  about getting Social Security/disability.  I think that is reasonable.  I will see her in 4 months and a face-to-face visit.        Neck Pain   The pain is present in the right side. The quality of the pain is described as aching. The pain is at a severity of 7/10. The symptoms are aggravated by bending and twisting. The pain is worse during the night. Associated symptoms include headaches, numbness, syncope and tingling. Pertinent negatives include no fever or trouble swallowing. She has tried acetaminophen and home exercises for the symptoms. The treatment provided mild relief.       The following portions of the patient's history were reviewed and updated as appropriate: allergies, current medications, past family history, past medical history, past social history, past surgical history and problem list.    Review of Systems   Constitutional: Positive for activity change. Negative for  chills and fever.   HENT: Negative for congestion and trouble swallowing.    Cardiovascular: Positive for syncope.   Musculoskeletal: Positive for neck pain.   Neurological: Positive for tingling, numbness and headaches.           Objective     There were no vitals filed for this visit.  There is no height or weight on file to calculate BMI.      Physical Exam   Deferred  Neurologic Exam   Deferred        Assessment/Plan   Independent Review of Radiographic Studies:      I personally reviewed the images from the following studies.    I reviewed her plain x-rays done on 12/31/2020 which show good position of the cage at the C6 and C7 vertebral body defect and a plate from C5-T1    Medical Decision Making:      I will see her in 4 months as a face-to-face visit.  She will in the meantime start working with an  about getting Social Security/disability.      Diagnoses and all orders for this visit:    1. Cervical myelopathy (CMS/HCC) (Primary)    2. Weakness of both hands      Return in about 4 months (around 8/21/2021) for To face visit.

## 2021-04-19 ENCOUNTER — AMBULATORY SURGICAL CENTER (OUTPATIENT)
Dept: URBAN - METROPOLITAN AREA SURGERY 20 | Facility: SURGERY | Age: 61
End: 2021-04-19
Payer: COMMERCIAL

## 2021-04-19 DIAGNOSIS — K57.30 DIVERTICULOSIS OF LARGE INTESTINE WITHOUT PERFORATION OR ABS: ICD-10-CM

## 2021-04-19 DIAGNOSIS — Z12.11 ENCOUNTER FOR SCREENING FOR MALIGNANT NEOPLASM OF COLON: ICD-10-CM

## 2021-04-19 DIAGNOSIS — Z80.9 FAMILY HISTORY OF MALIGNANT NEOPLASM, UNSPECIFIED: ICD-10-CM

## 2021-04-19 PROCEDURE — 45378 DIAGNOSTIC COLONOSCOPY: CPT | Mod: 33 | Performed by: INTERNAL MEDICINE

## 2021-04-21 ENCOUNTER — OFFICE VISIT (OUTPATIENT)
Dept: NEUROSURGERY | Facility: CLINIC | Age: 61
End: 2021-04-21

## 2021-04-21 DIAGNOSIS — R29.898 WEAKNESS OF BOTH HANDS: ICD-10-CM

## 2021-04-21 DIAGNOSIS — G95.9 CERVICAL MYELOPATHY (HCC): Primary | ICD-10-CM

## 2021-04-21 PROCEDURE — 99442 PR PHYS/QHP TELEPHONE EVALUATION 11-20 MIN: CPT | Performed by: NEUROLOGICAL SURGERY

## 2021-04-22 ENCOUNTER — TELEPHONE (OUTPATIENT)
Dept: NEUROSURGERY | Facility: CLINIC | Age: 61
End: 2021-04-22

## 2021-04-22 NOTE — TELEPHONE ENCOUNTER
Caller: DARYN PUGH    Relationship to patient: PT/SELF    Best call back number: [phone]    Chief complaint: PT IS TO BE SCHEDULED FOR A FACE TO FACE 4M FOLLOW UP APPT. [facility] OFFERED PT NEXT AVAILABLE ON 08/23/2021. PT IS REQUESTING APPT TO BE SCHEDULED BEFORE CURRENT INSURANCE EXPIRES ON 08/21/2021.    Type of visit: FOLLOW UP    Requested date: PRIOR TO 08/21/2021    If rescheduling, when is the original appointment:     Additional notes: PLEASE CONTACT PT REGARDING THIS MATTER, QUESTIONS OR CONCERNS.     THANK YOU!

## 2021-04-23 ENCOUNTER — TELEPHONE (OUTPATIENT)
Dept: NEUROSURGERY | Facility: CLINIC | Age: 61
End: 2021-04-23

## 2021-04-23 NOTE — TELEPHONE ENCOUNTER
Spoke to patient regarding her questions about disability. She has decided to forgo the  and try to apply by herself.

## 2021-04-23 NOTE — TELEPHONE ENCOUNTER
Provider: DEX  Caller: DARYN PUGH  Relationship to Patient: SELF    Phone Number: [phone]  Reason for Call: PATIENT CALLED WOULD LIKE TO SPEAK WITH DR TO ASK A QUESTION ABOUT HER APPLYING FOR DISABILITY AND SS.  SHE SAID SHE WOULD BE OK WITH HIM CALLING  AT THE END OF THE DAY BUT STATED SHE WOULD LIKE TO SPEAK WITH HIM AS SOON AS POSSIBLE.

## 2021-06-10 ENCOUNTER — APPOINTMENT (OUTPATIENT)
Dept: WOMENS IMAGING | Facility: HOSPITAL | Age: 61
End: 2021-06-10

## 2021-06-10 PROCEDURE — 77063 BREAST TOMOSYNTHESIS BI: CPT | Performed by: RADIOLOGY

## 2021-06-10 PROCEDURE — 77067 SCR MAMMO BI INCL CAD: CPT | Performed by: RADIOLOGY

## 2021-06-24 DIAGNOSIS — M81.0 OSTEOPOROSIS, UNSPECIFIED OSTEOPOROSIS TYPE, UNSPECIFIED PATHOLOGICAL FRACTURE PRESENCE: ICD-10-CM

## 2021-06-24 DIAGNOSIS — Z00.00 HEALTH MAINTENANCE EXAMINATION: Primary | ICD-10-CM

## 2021-06-24 LAB
25(OH)D3+25(OH)D2 SERPL-MCNC: 72.5 NG/ML (ref 30–100)
ALBUMIN SERPL-MCNC: 4.8 G/DL (ref 3.5–5.2)
ALBUMIN/GLOB SERPL: 2.7 G/DL
ALP SERPL-CCNC: 65 U/L (ref 39–117)
ALT SERPL-CCNC: 14 U/L (ref 1–33)
APPEARANCE UR: CLEAR
AST SERPL-CCNC: 21 U/L (ref 1–32)
BACTERIA #/AREA URNS HPF: NORMAL /HPF
BASOPHILS # BLD AUTO: 0.04 10*3/MM3 (ref 0–0.2)
BASOPHILS NFR BLD AUTO: 0.9 % (ref 0–1.5)
BILIRUB SERPL-MCNC: 0.3 MG/DL (ref 0–1.2)
BILIRUB UR QL STRIP: NEGATIVE
BUN SERPL-MCNC: 20 MG/DL (ref 8–23)
BUN/CREAT SERPL: 29.9 (ref 7–25)
CALCIUM SERPL-MCNC: 9.8 MG/DL (ref 8.6–10.5)
CASTS URNS MICRO: NORMAL
CHLORIDE SERPL-SCNC: 103 MMOL/L (ref 98–107)
CHOLEST SERPL-MCNC: 138 MG/DL (ref 0–200)
CO2 SERPL-SCNC: 29.4 MMOL/L (ref 22–29)
COLOR UR: YELLOW
CREAT SERPL-MCNC: 0.67 MG/DL (ref 0.57–1)
EOSINOPHIL # BLD AUTO: 0.15 10*3/MM3 (ref 0–0.4)
EOSINOPHIL NFR BLD AUTO: 3.4 % (ref 0.3–6.2)
EPI CELLS #/AREA URNS HPF: NORMAL /HPF
ERYTHROCYTE [DISTWIDTH] IN BLOOD BY AUTOMATED COUNT: 13.1 % (ref 12.3–15.4)
GLOBULIN SER CALC-MCNC: 1.8 GM/DL
GLUCOSE SERPL-MCNC: 75 MG/DL (ref 65–99)
GLUCOSE UR QL: NEGATIVE
HCT VFR BLD AUTO: 39.8 % (ref 34–46.6)
HDLC SERPL-MCNC: 64 MG/DL (ref 40–60)
HGB BLD-MCNC: 12.8 G/DL (ref 12–15.9)
HGB UR QL STRIP: NEGATIVE
IMM GRANULOCYTES # BLD AUTO: 0 10*3/MM3 (ref 0–0.05)
IMM GRANULOCYTES NFR BLD AUTO: 0 % (ref 0–0.5)
KETONES UR QL STRIP: NEGATIVE
LDLC SERPL CALC-MCNC: 62 MG/DL (ref 0–100)
LEUKOCYTE ESTERASE UR QL STRIP: NEGATIVE
LYMPHOCYTES # BLD AUTO: 1.43 10*3/MM3 (ref 0.7–3.1)
LYMPHOCYTES NFR BLD AUTO: 32.6 % (ref 19.6–45.3)
MCH RBC QN AUTO: 30.3 PG (ref 26.6–33)
MCHC RBC AUTO-ENTMCNC: 32.2 G/DL (ref 31.5–35.7)
MCV RBC AUTO: 94.1 FL (ref 79–97)
MONOCYTES # BLD AUTO: 0.39 10*3/MM3 (ref 0.1–0.9)
MONOCYTES NFR BLD AUTO: 8.9 % (ref 5–12)
NEUTROPHILS # BLD AUTO: 2.38 10*3/MM3 (ref 1.7–7)
NEUTROPHILS NFR BLD AUTO: 54.2 % (ref 42.7–76)
NITRITE UR QL STRIP: NEGATIVE
NRBC BLD AUTO-RTO: 0 /100 WBC (ref 0–0.2)
PH UR STRIP: 6 [PH] (ref 5–8)
PLATELET # BLD AUTO: 249 10*3/MM3 (ref 140–450)
POTASSIUM SERPL-SCNC: 4.9 MMOL/L (ref 3.5–5.2)
PROT SERPL-MCNC: 6.6 G/DL (ref 6–8.5)
PROT UR QL STRIP: NEGATIVE
RBC # BLD AUTO: 4.23 10*6/MM3 (ref 3.77–5.28)
RBC #/AREA URNS HPF: NORMAL /HPF
SODIUM SERPL-SCNC: 140 MMOL/L (ref 136–145)
SP GR UR: 1.03 (ref 1–1.03)
TRIGL SERPL-MCNC: 56 MG/DL (ref 0–150)
TSH SERPL DL<=0.005 MIU/L-ACNC: 2.61 UIU/ML (ref 0.27–4.2)
UROBILINOGEN UR STRIP-MCNC: NORMAL MG/DL
VLDLC SERPL CALC-MCNC: 12 MG/DL (ref 5–40)
WBC # BLD AUTO: 4.39 10*3/MM3 (ref 3.4–10.8)
WBC #/AREA URNS HPF: NORMAL /HPF

## 2021-06-25 DIAGNOSIS — R51.9 INTRACTABLE HEADACHE, UNSPECIFIED CHRONICITY PATTERN, UNSPECIFIED HEADACHE TYPE: ICD-10-CM

## 2021-06-25 RX ORDER — GABAPENTIN 300 MG/1
CAPSULE ORAL
Qty: 90 CAPSULE | Refills: 3 | Status: SHIPPED | OUTPATIENT
Start: 2021-06-25 | End: 2021-12-16

## 2021-06-25 RX ORDER — ATORVASTATIN CALCIUM 20 MG/1
TABLET, FILM COATED ORAL
Qty: 90 TABLET | Refills: 2 | Status: SHIPPED | OUTPATIENT
Start: 2021-06-25 | End: 2022-04-14

## 2021-06-30 ENCOUNTER — OFFICE VISIT (OUTPATIENT)
Dept: INTERNAL MEDICINE | Facility: CLINIC | Age: 61
End: 2021-06-30

## 2021-06-30 VITALS
OXYGEN SATURATION: 98 % | HEIGHT: 55 IN | DIASTOLIC BLOOD PRESSURE: 74 MMHG | BODY MASS INDEX: 21.89 KG/M2 | HEART RATE: 63 BPM | SYSTOLIC BLOOD PRESSURE: 110 MMHG | WEIGHT: 94.6 LBS

## 2021-06-30 DIAGNOSIS — Z00.00 WELL ADULT EXAM: Primary | ICD-10-CM

## 2021-06-30 PROBLEM — M54.2 NECK PAIN: Status: RESOLVED | Noted: 2020-07-07 | Resolved: 2021-06-30

## 2021-06-30 PROBLEM — R29.898 WEAKNESS OF BOTH HANDS: Status: RESOLVED | Noted: 2020-03-23 | Resolved: 2021-06-30

## 2021-06-30 PROBLEM — R29.898 WEAKNESS OF BOTH ARMS: Status: RESOLVED | Noted: 2020-06-05 | Resolved: 2021-06-30

## 2021-06-30 PROBLEM — G97.82 POSTOPERATIVE CSF LEAK: Status: RESOLVED | Noted: 2019-11-22 | Resolved: 2021-06-30

## 2021-06-30 PROBLEM — G95.20 CORD COMPRESSION (HCC): Status: RESOLVED | Noted: 2019-12-09 | Resolved: 2021-06-30

## 2021-06-30 PROBLEM — G96.00 POSTOPERATIVE CSF LEAK: Status: RESOLVED | Noted: 2019-11-22 | Resolved: 2021-06-30

## 2021-06-30 PROBLEM — G96.00 POSTOPERATIVE CEREBROSPINAL FLUID LEAK: Status: RESOLVED | Noted: 2020-01-09 | Resolved: 2021-06-30

## 2021-06-30 PROBLEM — D63.8 ANEMIA, CHRONIC DISEASE: Status: RESOLVED | Noted: 2020-07-07 | Resolved: 2021-06-30

## 2021-06-30 PROBLEM — G97.82 POSTOPERATIVE CEREBROSPINAL FLUID LEAK: Status: RESOLVED | Noted: 2020-01-09 | Resolved: 2021-06-30

## 2021-06-30 PROCEDURE — 99396 PREV VISIT EST AGE 40-64: CPT | Performed by: INTERNAL MEDICINE

## 2021-06-30 RX ORDER — AZELASTINE 1 MG/ML
2 SPRAY, METERED NASAL 2 TIMES DAILY
Qty: 30 ML | Refills: 12 | Status: SHIPPED | OUTPATIENT
Start: 2021-06-30 | End: 2022-07-12

## 2021-06-30 NOTE — PROGRESS NOTES
Eduardo Bartholomew is a 61 y.o. female who presents for a complete physical exam.      History of Present Illness     The following data was reviewed by: Mary Lou Carroll MD on 06/30/2021:  Common labs    Common Labsle 12/28/20 12/28/20 12/28/20 1/25/21 6/24/21 6/24/21 6/24/21    0803 0803 0803  0812 0812 0812   Glucose    102 (A)      Glucose  87    75    BUN  11  21  20    Creatinine  0.65  0.69  0.67    eGFR Non  Am  93  87  89    eGFR  Am  113    108    Sodium  141  142  140    Potassium  4.9  4.1  4.9    Chloride  103  105  103    Calcium  9.3  10.2  9.8    Total Protein  6.6    6.6    Albumin  4.30  4.20  4.80    Total Bilirubin  0.3  0.2  0.3    Alkaline Phosphatase  90  100  65    AST (SGOT)  28  21  21    ALT (SGPT)  23  16  14    WBC 5.35    4.39     Hemoglobin 12.5    12.8     Hematocrit 39.5    39.8     Platelets 249    249     Total Cholesterol   166    138   Triglycerides   201 (A)    56   HDL Cholesterol   62 (A)    64 (A)   LDL Cholesterol    71    62   (A) Abnormal value       Comments are available for some flowsheets but are not being displayed.           HLD.  Control is good on Lipitor.  OP.  She is maintained on Prolia.      She continues to have numbness in her feet and hands resulting from spinal stenosis.  Her chronic neck pain is managed by gabapentin and aleve.      Persistent mucous in throat.  Zyrtec helps.  Flonase little help.  Saw ENT.  She has a deviated septum.  Discussed trying Astelin.     Review of Systems   Constitutional: Negative.    HENT: Positive for congestion.    Eyes: Negative.    Respiratory: Negative.    Cardiovascular: Negative.    Gastrointestinal: Negative.    Endocrine: Negative.    Genitourinary: Negative.    Musculoskeletal: Positive for gait problem and neck pain.   Skin: Negative.    Allergic/Immunologic: Negative.    Neurological: Positive for numbness and headaches.   Hematological: Negative.    Psychiatric/Behavioral: Negative.         The following portions of the patient's history were reviewed and updated as appropriate: allergies, current medications, past family history, past medical history, past social history, past surgical history and problem list.  Health maintenance tab was reviewed and updated with the patient.       Patient Active Problem List    Diagnosis Date Noted   • S/P cervical spinal fusion 11/02/2020   • Occipital neuralgia of right side 07/31/2020   • Cervical myelopathy (CMS/HCC) 12/13/2019   • Cervical stenosis of spine 12/06/2019   • Ossification of spinal ligament 11/22/2019     Note Last Updated: 11/22/2019     Added automatically from request for surgery [id number]     • Cervical spondylosis with myelopathy 11/22/2019     Note Last Updated: 11/22/2019     Added automatically from request for surgery [id number]     • Neuropathic pain, leg, left 06/25/2018   • DDD (degenerative disc disease), lumbar 05/02/2018   • Chronic left-sided lumbar radiculopathy 05/02/2018     Note Last Updated: 1/21/2021 Jan2021 Non-Regulatory IMO with HCC/CMS designations     • Congenital spondylolysis, lumbosacral region 05/02/2018   • Carpal tunnel syndrome 06/08/2016   • Hyperlipidemia 06/08/2016   • Osteoporosis 06/08/2016     Note Last Updated: 6/29/2020     Description: Boniva for a number of years.  Forteo for a year.  Managed by Gyn. .  I do recommend 1200mg calcium and 1000 IUs of vitamin D in supplements/diet as well as weight bearing exercise to prevent further decline in BMD.  Prolia started 2020.         Past Medical History:   Diagnosis Date   • Abnormal alkaline phosphatase test 06/12/2015    Resolved   • Acute bronchitis 06/12/2015    Resolved   • Allergic child age    pencillin   • Cervical spinal stenosis    • Diverticulosis    • H/O electromyography 10/13/2014   • H/O mammogram 07/29/2014   • Headache occassionally   • High cholesterol    • History of EKG 06/12/2015   • Hypercalcemia 06/12/2015    Resolved, Full w/u done,  Likely secondary to Forteo   • Left arm pain    • Low back pain March 2017   • Numbness in both hands    • Osteopenia several years   • Osteoporosis    • Paresthesia 06/12/2015    Resolved       Past Surgical History:   Procedure Laterality Date   • ANTERIOR CHANNEL VERTEBRECTOMY/CORPECTOMY N/A 12/6/2019    Procedure: CERVICAL 6, CERVICAL 7 ANTERIOR CERVICAL CORPECTOMY WITH CAGE AND PLATE;  Surgeon: Antonio Noe MD;  Location: [facility] MAIN OR;  Service: Neurosurgery   • BACK SURGERY  1999   • COLONOSCOPY  05/20/2011   • INCISION AND DRAINAGE OF WOUND N/A 12/19/2019    Procedure: followed by drainage of anterior cervical fluid collection;  Surgeon: Antonio Noe MD;  Location: [facility] MAIN OR;  Service: Neurosurgery   • LUMBAR DRAIN INSERTION EXTERNAL N/A 12/19/2019    Procedure: LUMBAR DRAIN INSERTION EXTERNAL;  Surgeon: Antonio Noe MD;  Location: [facility] MAIN OR;  Service: Neurosurgery   • LUMBAR PERITONEAL SHUNT N/A 1/10/2020    Procedure: LUMBAR PERITONEAL SHUNT PLACEMENT;  Surgeon: Antonio Noe MD;  Location: [facility] MAIN OR;  Service: Neurosurgery   • SPINE SURGERY  1999 back    dr abad quintero   • TONSILLECTOMY  child age   • TYMPANOPLASTY         Family History   Problem Relation Age of Onset   • Breast cancer Mother    • Stroke Mother         Cerebrovascular Accident   • Colon cancer Mother    • Diabetes Father         Borderline Diabetes Mellitus   • Heart disease Father         Cardiac Disorder   • Heart failure Father    • Anxiety disorder Sister    • Nephrolithiasis Sister         Kidney Stones   • Malig Hyperthermia Neg Hx        Social History     Socioeconomic History   • Marital status: Single     Spouse name: Not on file   • Number of children: 0   • Years of education: college   • Highest education level: Not on file   Tobacco Use   • Smoking status: Never Smoker   • Smokeless tobacco: Never Used   Substance and Sexual Activity   • Alcohol use: No   • Drug use: No   •  "Sexual activity: Never       Current Outpatient Medications on File Prior to Visit   Medication Sig Dispense Refill   • atorvastatin (LIPITOR) 20 MG tablet TAKE ONE TABLET BY MOUTH ONCE NIGHTLY 90 tablet 2   • Calcium Carb-Cholecalciferol (CALCIUM 1000 + D PO) Take  by mouth Daily. 2 caps     • cetirizine (zyrTEC) 10 MG tablet Take 10 mg by mouth Daily.     • cholecalciferol (VITAMIN D3) 10 MCG (400 UNIT) tablet Take 2,000 Units by mouth Daily.     • denosumab (PROLIA) 60 MG/ML solution prefilled syringe syringe      • docusate sodium (COLACE) 100 MG capsule Take 100 mg by mouth 2 (Two) Times a Day.     • gabapentin (NEURONTIN) 300 MG capsule TAKE ONE CAPSULE BY MOUTH THREE TIMES A DAY 90 capsule 3   • melatonin 3 MG tablet Take  by mouth.     • naproxen sodium (ALEVE) 220 MG tablet Take 220 mg by mouth 2 (Two) Times a Day With Meals.     • [DISCONTINUED] butalbital-acetaminophen-caffeine (Esgic) -40 MG per tablet Take 1 tablet by mouth Every 4 (Four) Hours As Needed for Headache. 30 tablet 0   • [DISCONTINUED] fluticasone (FLONASE) 50 MCG/ACT nasal spray 2 sprays into the nostril(s) as directed by provider Daily As Needed.     • [DISCONTINUED] methocarbamol (ROBAXIN) 500 MG tablet Take 1 tablet by mouth 3 (Three) Times a Day As Needed for Muscle Spasms. 90 tablet 1     No current facility-administered medications on file prior to visit.       Allergies   Allergen Reactions   • Penicillins Rash   • Topamax [Topiramate] Rash       Immunization History   Administered Date(s) Administered   • COVID-19 (PFIZER) 03/14/2021, 04/05/2021   • Flu Vaccine Quad PF >36MO 09/18/2017, 10/05/2018   • Flublock Quad =>18yrs 09/19/2019   • Flulaval/Fluarix/Fluzone Quad 09/20/2019, 09/08/2020   • Hepatitis A 05/22/2018   • Shingrix 05/22/2018, 10/23/2018, 11/22/2018   • Tdap 09/10/2009, 11/04/2016       Objective     /74   Pulse 63   Ht 134.6 cm (52.99\")   Wt 42.9 kg (94 lb 9.6 oz)   SpO2 98%   BMI 23.68 kg/m² "     Physical Exam  Constitutional:       Appearance: She is well-developed.   HENT:      Head: Normocephalic and atraumatic.      Right Ear: Hearing, tympanic membrane and external ear normal.      Left Ear: Hearing, tympanic membrane and external ear normal.      Nose: Nose normal.   Neck:      Thyroid: No thyromegaly.   Cardiovascular:      Rate and Rhythm: Normal rate and regular rhythm.      Heart sounds: Normal heart sounds. No murmur heard.     Pulmonary:      Effort: Pulmonary effort is normal.      Breath sounds: Normal breath sounds.   Chest:      Breasts:         Right: No mass.         Left: No mass.   Abdominal:      General: There is no distension.      Palpations: Abdomen is soft.      Tenderness: There is no abdominal tenderness.   Musculoskeletal:      Cervical back: Neck supple.   Lymphadenopathy:      Cervical: No cervical adenopathy.   Skin:     General: Skin is warm and dry.   Neurological:      Mental Status: She is alert and oriented to person, place, and time.   Psychiatric:         Speech: Speech normal.         Behavior: Behavior normal.         Thought Content: Thought content normal.         Judgment: Judgment normal.         Assessment/Plan   Diagnoses and all orders for this visit:    1. Well adult exam (Primary)    Other orders  -     azelastine (ASTELIN) 0.1 % nasal spray; 2 sprays into the nostril(s) as directed by provider 2 (Two) Times a Day. Use in each nostril as directed  Dispense: 30 mL; Refill: 12        Discussion    Patient presents today for a CPE.      Patient follows a healthy diet.   Patient follows an adequate exercise regimen. Mammogram is up to date.   Colon cancer screening is up to date.   Pap smears are performed by the patient's gynecologist.   DEXA is up to date.    HLD.  Control is good.  The patient is advised to continue current dosage of atorvastatin.  OP.  I recommend to get 1200 mg of calcium and 1000 IUs of vitamin D through diet and supplements and to  participate in a weight based exercise to prevent loss of bone mineral density. Bone mineral will be monitored every two years.  Continue Prolia.    Congestion.  Trial of Astelin nasal spray.          Health Maintenance   Topic Date Due   • DXA SCAN  08/01/2020   • PAP SMEAR  08/16/2020   • ANNUAL PHYSICAL  06/30/2021   • INFLUENZA VACCINE  08/01/2021   • MAMMOGRAM  08/01/2021   • LIPID PANEL  06/24/2022   • COLORECTAL CANCER SCREENING  04/19/2026   • TDAP/TD VACCINES (3 - Td or Tdap) 11/04/2026   • COVID-19 Vaccine  Completed   • ZOSTER VACCINE  Completed   • HEPATITIS C SCREENING  Addressed   • Pneumococcal Vaccine 0-64  Aged Out            Future Appointments   Date Time Provider Department Center   7/26/2021  3:00 PM REFERRED INJECTION CHAIR [facility]   8/16/2021 12:30 PM Antonio Noe MD [facility]   1/5/2022  9:30 AM [facility]   7/5/2022  8:20 AM [facility]   7/12/2022  7:45 AM Mray Lou Carroll MD [facility]

## 2021-07-26 ENCOUNTER — INFUSION (OUTPATIENT)
Dept: ONCOLOGY | Facility: HOSPITAL | Age: 61
End: 2021-07-26

## 2021-07-26 NOTE — NURSING NOTE
Pt arrived for Prolia injection. Pt stated she had had a root canal Tuesday, July 20. Pt will reach out to Dr. Hernandez's office and plan on rescheduling appointment for around Aug 30 pending Dr. Hernandez's advice. Pt does have phone number for scheduling to make appointment.

## 2021-08-09 ENCOUNTER — OFFICE VISIT (OUTPATIENT)
Dept: INTERNAL MEDICINE | Facility: CLINIC | Age: 61
End: 2021-08-09

## 2021-08-09 VITALS
WEIGHT: 94 LBS | HEIGHT: 55 IN | HEART RATE: 61 BPM | DIASTOLIC BLOOD PRESSURE: 68 MMHG | OXYGEN SATURATION: 98 % | SYSTOLIC BLOOD PRESSURE: 108 MMHG | BODY MASS INDEX: 21.76 KG/M2

## 2021-08-09 DIAGNOSIS — R06.89 TROUBLE BREATHING: Primary | ICD-10-CM

## 2021-08-09 PROCEDURE — 71046 X-RAY EXAM CHEST 2 VIEWS: CPT | Performed by: INTERNAL MEDICINE

## 2021-08-09 PROCEDURE — 93000 ELECTROCARDIOGRAM COMPLETE: CPT | Performed by: INTERNAL MEDICINE

## 2021-08-09 PROCEDURE — 99213 OFFICE O/P EST LOW 20 MIN: CPT | Performed by: INTERNAL MEDICINE

## 2021-08-09 RX ORDER — LEVOTHYROXINE SODIUM 25 MCG
TABLET ORAL
COMMUNITY
Start: 2021-08-02 | End: 2021-08-09

## 2021-08-09 NOTE — PROGRESS NOTES
"Eduardo Bartholomew is a 61 y.o. female who presents with   Chief Complaint   Patient presents with   • Shortness of Breath       History of Present Illness     Episode of difficulty breathing that last twenty seconds.  She felt like like her throat was swollen.  Only lasted a total of 20-30 seconds then abruptly stopped without any intervention.    She feels like issue is in her throat.  This is the second time it has happened.  Some chest pressure with it.  Aburptly stopped after 20-30 seconds.  Difficult to take a deep breath.  \"Breathing does not feel natural\".  She feels she cannot get a full breath.  Not with exertion.      Review of Systems   Constitutional: Negative for fever.   HENT: Positive for rhinorrhea and sore throat. Negative for ear pain.    Respiratory: Positive for shortness of breath. Negative for wheezing.    Cardiovascular: Negative for chest pain and leg swelling.   Gastrointestinal: Negative for abdominal pain and vomiting.   Musculoskeletal: Negative for neck pain.   Skin: Negative for rash.   Neurological: Positive for dizziness and light-headedness. Negative for headaches.       The following portions of the patient's history were reviewed and updated as appropriate: allergies, current medications and problem list.    Patient Active Problem List    Diagnosis Date Noted   • S/P cervical spinal fusion 11/02/2020   • Occipital neuralgia of right side 07/31/2020   • Cervical myelopathy (CMS/HCC) 12/13/2019   • Cervical stenosis of spine 12/06/2019   • Ossification of spinal ligament 11/22/2019     Note Last Updated: 11/22/2019     Added automatically from request for surgery [id number]     • Cervical spondylosis with myelopathy 11/22/2019     Note Last Updated: 11/22/2019     Added automatically from request for surgery [id number]     • Neuropathic pain, leg, left 06/25/2018   • DDD (degenerative disc disease), lumbar 05/02/2018   • Chronic left-sided lumbar radiculopathy 05/02/2018     " "Note Last Updated: 1/21/2021 Jan2021 Non-Regulatory IMO with [facility]/CMS designations     • Congenital spondylolysis, lumbosacral region 05/02/2018   • Carpal tunnel syndrome 06/08/2016   • Hyperlipidemia 06/08/2016   • Osteoporosis 06/08/2016     Note Last Updated: 6/29/2020     Description: Boniva for a number of years.  Forteo for a year.  Managed by Gyn. .  I do recommend 1200mg calcium and 1000 IUs of vitamin D in supplements/diet as well as weight bearing exercise to prevent further decline in BMD.  Prolia started 2020.         Current Outpatient Medications on File Prior to Visit   Medication Sig Dispense Refill   • atorvastatin (LIPITOR) 20 MG tablet TAKE ONE TABLET BY MOUTH ONCE NIGHTLY 90 tablet 2   • azelastine (ASTELIN) 0.1 % nasal spray 2 sprays into the nostril(s) as directed by provider 2 (Two) Times a Day. Use in each nostril as directed 30 mL 12   • Calcium Carb-Cholecalciferol (CALCIUM 1000 + D PO) Take  by mouth Daily. 2 caps     • cetirizine (zyrTEC) 10 MG tablet Take 10 mg by mouth Daily.     • cholecalciferol (VITAMIN D3) 10 MCG (400 UNIT) tablet Take 2,000 Units by mouth Daily.     • denosumab (PROLIA) 60 MG/ML solution prefilled syringe syringe      • docusate sodium (COLACE) 100 MG capsule Take 100 mg by mouth 2 (Two) Times a Day.     • gabapentin (NEURONTIN) 300 MG capsule TAKE ONE CAPSULE BY MOUTH THREE TIMES A DAY 90 capsule 3   • melatonin 3 MG tablet Take  by mouth.     • naproxen sodium (ALEVE) 220 MG tablet Take 220 mg by mouth 2 (Two) Times a Day With Meals.     • [DISCONTINUED] Synthroid 25 MCG tablet        No current facility-administered medications on file prior to visit.       Objective     /68   Pulse 61   Ht 134.6 cm (52.99\")   Wt 42.6 kg (94 lb)   SpO2 98%   BMI 23.53 kg/m²     Physical Exam  Constitutional:       Appearance: She is well-developed.   HENT:      Head: Normocephalic and atraumatic.      Right Ear: Hearing and tympanic membrane normal.      Left Ear: " Hearing and tympanic membrane normal.      Mouth/Throat:      Pharynx: No oropharyngeal exudate or posterior oropharyngeal erythema.   Cardiovascular:      Rate and Rhythm: Normal rate and regular rhythm.      Heart sounds: Normal heart sounds.   Pulmonary:      Effort: Pulmonary effort is normal.      Breath sounds: Normal breath sounds.   Skin:     General: Skin is warm and dry.   Neurological:      Mental Status: She is alert and oriented to person, place, and time.   Psychiatric:         Behavior: Behavior normal.              ECG 12 Lead    Date/Time: 8/9/2021 10:53 AM  Performed by: Mary Lou Carroll MD  Authorized by: Mary Lou Carroll MD   Comparison: compared with previous ECG from 7/7/2020  Similar to previous ECG  Rhythm: sinus rhythm  Rate: normal  Conduction: conduction normal  ST Segments: ST segments normal  T Waves: T waves normal  QRS axis: normal    Clinical impression: normal ECG        X-rays of the chest  performed today for following indication:   Difficulty breathing.  X-ray reveal nad.  There is no available x-ray for comparison.  X-ray sent to radiology for official interpretation and findings.        Assessment/Plan   Diagnoses and all orders for this visit:    1. Trouble breathing (Primary)  -     ECG 12 Lead  -     XR Chest PA & Lateral  -     Adult Transthoracic Echo Complete W/ Cont if Necessary Per Protocol; Future        Discussion    Patient presents with very transient difficulty breathing that lasts seconds.  EKG and CXR look fine.  Recent labs were normal.  I would like to check an echo.  I also would like her to see her ENT to visualize her vocal cords and rule out vocal cord dysfunction.         Future Appointments   Date Time Provider Department Center   8/16/2021 12:30 PM Antonio Noe MD [facility]   8/30/2021  3:00 PM REFERRED INJECTION CHAIR [facility]   1/5/2022  9:30 AM [facility]   7/5/2022  8:20 AM [facility]   7/12/2022  7:45 AM Mary Lou Carroll MD [facility]

## 2021-08-16 ENCOUNTER — OFFICE VISIT (OUTPATIENT)
Dept: NEUROSURGERY | Facility: CLINIC | Age: 61
End: 2021-08-16

## 2021-08-16 VITALS
WEIGHT: 94.6 LBS | DIASTOLIC BLOOD PRESSURE: 72 MMHG | TEMPERATURE: 98.2 F | BODY MASS INDEX: 21.89 KG/M2 | SYSTOLIC BLOOD PRESSURE: 128 MMHG | HEIGHT: 55 IN

## 2021-08-16 DIAGNOSIS — R25.2 SPASTICITY: ICD-10-CM

## 2021-08-16 DIAGNOSIS — R29.898 WEAKNESS OF BOTH HANDS: ICD-10-CM

## 2021-08-16 DIAGNOSIS — G95.9 CERVICAL MYELOPATHY (HCC): Primary | ICD-10-CM

## 2021-08-16 PROCEDURE — 99214 OFFICE O/P EST MOD 30 MIN: CPT | Performed by: NEUROLOGICAL SURGERY

## 2021-08-16 NOTE — PROGRESS NOTES
Subjective   Patient ID: Noemi Bartholomew is a 61 y.o. female is here today for follow-up.  She was last seen 4/21/21 for Cervical myelopathy and weakness to both hands.  This was a tele visit.    Today patient reports neck pain.  She has issues with her hands being numb.    Its been about 4 months since I have seen her.  She applied for disability on her own and got rejected.  She is working with a free service for [facility].  But I told her it may come down to her hiring her own .  Personally I think she is disabled from gainful employment.  She used to be an  but she cannot really use the computer effectively because of the loss of dexterity in her hands.  She is also ataxic and spastic and I do not think she can ever walk completely normally.  Were getting to the point where I would have to say that these are permanent things.  She will be 2 years out from surgery in 5 months.  I do not expect any drastic turnaround.  She will think again about hiring her own  to be her advocate in getting Social Security disability.  She still has numbness in her hands.  She is has a little bit of residual dysphonia.  Even a little bit of dysphagia.  She recently saw Dr. Scout Hong about all this and she is going to speech therapy which I think is a good idea.  All of the symptoms mentioned above are from the long-term compression of her spinal cord from her cervical stenosis and OPLL.  We will see her at the 5-month ben.  She will keep me informed about the disability process.    History of Present Illness    The following portions of the patient's history were reviewed and updated as appropriate: allergies, current medications, past family history, past medical history, past social history, past surgical history and problem list.    Review of Systems   Constitutional: Negative for fever.   All other systems reviewed and are negative.          Objective     Vitals:    08/16/21 1355   BP: 128/72  "  Temp: 98.2 °F (36.8 °C)   Weight: 42.9 kg (94 lb 9.6 oz)   Height: 134.6 cm (52.99\")     Body mass index is 23.69 kg/m².      Physical Exam  Constitutional:       Appearance: She is well-developed.   HENT:      Head: Normocephalic and atraumatic.   Eyes:      Conjunctiva/sclera: Conjunctivae normal.      Pupils: Pupils are equal, round, and reactive to light.   Neck:      Vascular: No carotid bruit.   Neurological:      Deep Tendon Reflexes:      Reflex Scores:       Tricep reflexes are 3+ on the right side and 3+ on the left side.       Bicep reflexes are 3+ on the right side and 3+ on the left side.       Brachioradialis reflexes are 3+ on the right side and 3+ on the left side.       Patellar reflexes are 4+ on the right side and 4+ on the left side.       Achilles reflexes are 3+ on the right side and 3+ on the left side.      Neurologic Exam     Cranial Nerves     CN III, IV, VI   Pupils are equal, round, and reactive to light.    Motor Exam     Strength   Right deltoid: 4/5  Left deltoid: 4/5  Right biceps: 4/5  Left biceps: 4/5  Right triceps: 4/5  Left triceps: 4/5  Right wrist flexion: 4/5  Left wrist flexion: 4/5  Right wrist extension: 4/5  Left wrist extension: 4/5  Right interossei: 4/5  Left interossei: 4/5  Right abdominals: 4/5  Left abdominals: 4/5  Right iliopsoas: 4/5  Left iliopsoas: 4/5  Right quadriceps: 4/5  Left quadriceps: 4/5  Right hamstrin/5  Left hamstrin/5  Right glutei: 4/5  Left glutei: 4/5  Right anterior tibial: 4/5  Left anterior tibial: 4/5  Right posterior tibial: 4/5  Left posterior tibial: 4/5  Right peroneal: 4/5  Left peroneal: 4/5  Right gastroc: 4/5  Left gastroc: 4/5    Gait, Coordination, and Reflexes     Reflexes   Right brachioradialis: 3+  Left brachioradialis: 3+  Right biceps: 3+  Left biceps: 3+  Right triceps: 3+  Left triceps: 3+  Right patellar: 4+  Left patellar: 4+  Right achilles: 3+  Left achilles: 3+Ataxic, spastic gait           Assessment/Plan "   Independent Review of Radiographic Studies:      I personally reviewed the images from the following studies.    I reviewed her plain x-rays done on 12/31/2020 which show good position of the cage at the C6 and C7 vertebral body defect and a plate from C5-T1    Medical Decision Making:      We will hold off on baclofen since she is taking gabapentin.  We will see her in 5 months which is the 2-year ben since her second surgery with x-rays.  She will look into working with her own  about her disability application.  As I said above, I do not think she is physically capable of being gainfully employed giving her long-term chronic effects on the spinal cord due to a long-term compression consisting of spasticity, weakness, loss of dexterity and balance problems.      Diagnoses and all orders for this visit:    1. Cervical myelopathy (CMS/HCC) (Primary)  -     XR spine cervical ap and lat w flex and ext; Future    2. Weakness of both hands  -     XR spine cervical ap and lat w flex and ext; Future    3. Spasticity  -     XR spine cervical ap and lat w flex and ext; Future      Return in about 5 months (around 1/16/2022) for Face-to-face.

## 2021-08-18 ENCOUNTER — TELEPHONE (OUTPATIENT)
Dept: NEUROSURGERY | Facility: CLINIC | Age: 61
End: 2021-08-18

## 2021-08-18 NOTE — TELEPHONE ENCOUNTER
"Caller: Noemi Bartholomew    Relationship: Self    Best call back number: [phone]    What was the call regarding:     PATIENT CALLED AS SHE WOULD LIKE TO TRY THE BACLOFEN THAT  MENTIONED IN HIS LAST OFFICE NOTE.     SHE STATED THAT SHE DECLINED THEN BUT HAS SINCE THOUGHT IT OVER AND WOULD LIKE TO TRY THE MUSCLE RELAXER.     LAST OFFICE NOTE STATES \"We will hold off on baclofen since she is taking gabapentin.\"      Do you require a callback:   PLEASE CALL PATIENT BACK [phone]      "

## 2021-08-20 RX ORDER — BACLOFEN 10 MG/1
10 TABLET ORAL 2 TIMES DAILY
Qty: 60 TABLET | Refills: 3 | Status: SHIPPED | OUTPATIENT
Start: 2021-08-20 | End: 2022-07-12

## 2021-08-24 ENCOUNTER — TELEPHONE (OUTPATIENT)
Dept: NEUROSURGERY | Facility: CLINIC | Age: 61
End: 2021-08-24

## 2021-08-30 ENCOUNTER — INFUSION (OUTPATIENT)
Dept: ONCOLOGY | Facility: HOSPITAL | Age: 61
End: 2021-08-30

## 2021-08-30 ENCOUNTER — LAB (OUTPATIENT)
Dept: OTHER | Facility: HOSPITAL | Age: 61
End: 2021-08-30

## 2021-08-30 VITALS — BODY MASS INDEX: 23.68 KG/M2 | HEIGHT: 55 IN | RESPIRATION RATE: 18 BRPM | TEMPERATURE: 97.1 F

## 2021-08-30 DIAGNOSIS — M81.0 OSTEOPOROSIS, UNSPECIFIED OSTEOPOROSIS TYPE, UNSPECIFIED PATHOLOGICAL FRACTURE PRESENCE: ICD-10-CM

## 2021-08-30 DIAGNOSIS — M81.0 OSTEOPOROSIS, UNSPECIFIED OSTEOPOROSIS TYPE, UNSPECIFIED PATHOLOGICAL FRACTURE PRESENCE: Primary | ICD-10-CM

## 2021-08-30 LAB
CALCIUM SPEC-SCNC: 10 MG/DL (ref 8.6–10.5)
MAGNESIUM SERPL-MCNC: 1.9 MG/DL (ref 1.6–2.4)
PHOSPHATE SERPL-MCNC: 4.3 MG/DL (ref 2.5–4.5)

## 2021-08-30 PROCEDURE — 84100 ASSAY OF PHOSPHORUS: CPT | Performed by: OBSTETRICS & GYNECOLOGY

## 2021-08-30 PROCEDURE — 25010000002 DENOSUMAB 60 MG/ML SOLUTION PREFILLED SYRINGE: Performed by: OBSTETRICS & GYNECOLOGY

## 2021-08-30 PROCEDURE — 83735 ASSAY OF MAGNESIUM: CPT | Performed by: OBSTETRICS & GYNECOLOGY

## 2021-08-30 PROCEDURE — 82310 ASSAY OF CALCIUM: CPT | Performed by: OBSTETRICS & GYNECOLOGY

## 2021-08-30 PROCEDURE — 96372 THER/PROPH/DIAG INJ SC/IM: CPT

## 2021-08-30 RX ADMIN — DENOSUMAB 60 MG: 60 INJECTION SUBCUTANEOUS at 14:48

## 2021-08-30 NOTE — NURSING NOTE
Arrived  for prolia injection. Indication and side effects reviewed. Denies recent dental work. Had root canal approximately 6 weeks ago which has completely healed. Labs and medications verified. Prolia administered in L arm without incidence. Instructed to call prescribing MD for any concerns or questions and instructed on how to schedule future appts.  Pt vu and discharged ambulatory.

## 2021-08-31 ENCOUNTER — APPOINTMENT (OUTPATIENT)
Dept: WOMENS IMAGING | Facility: HOSPITAL | Age: 61
End: 2021-08-31

## 2021-08-31 PROCEDURE — 77067 SCR MAMMO BI INCL CAD: CPT | Performed by: RADIOLOGY

## 2021-08-31 PROCEDURE — 77063 BREAST TOMOSYNTHESIS BI: CPT | Performed by: RADIOLOGY

## 2021-09-27 ENCOUNTER — TELEPHONE (OUTPATIENT)
Dept: NEUROSURGERY | Facility: CLINIC | Age: 61
End: 2021-09-27

## 2021-09-27 NOTE — TELEPHONE ENCOUNTER
Patient would like to schedule an appointment with Dr. Noe to discuss some issues currently having    Please make it a telephone visit     Thank you

## 2021-09-27 NOTE — TELEPHONE ENCOUNTER
Patient called to set up telephone visit with Dr. Noe .  Patient will be calling insurance to make sure it is a covered  Visit.    Patient has new insurance [facility]

## 2021-09-30 NOTE — TELEPHONE ENCOUNTER
Called Noemi Bartholomew and assured her I am looking for a place to schedule her a televisit and I did ask her if her insurance paid for tle visits.

## 2021-12-16 DIAGNOSIS — R51.9 INTRACTABLE HEADACHE, UNSPECIFIED CHRONICITY PATTERN, UNSPECIFIED HEADACHE TYPE: ICD-10-CM

## 2021-12-16 RX ORDER — GABAPENTIN 300 MG/1
CAPSULE ORAL
Qty: 90 CAPSULE | Refills: 5 | Status: SHIPPED | OUTPATIENT
Start: 2021-12-16 | End: 2022-05-13 | Stop reason: SDUPTHER

## 2022-01-05 ENCOUNTER — HOSPITAL ENCOUNTER (OUTPATIENT)
Dept: GENERAL RADIOLOGY | Facility: HOSPITAL | Age: 62
Discharge: HOME | End: 2022-01-05
Admitting: NEUROLOGICAL SURGERY

## 2022-01-05 DIAGNOSIS — R25.2 SPASTICITY: ICD-10-CM

## 2022-01-05 DIAGNOSIS — R29.898 WEAKNESS OF BOTH HANDS: ICD-10-CM

## 2022-01-05 DIAGNOSIS — G95.9 CERVICAL MYELOPATHY: ICD-10-CM

## 2022-01-05 PROCEDURE — 72050 X-RAY EXAM NECK SPINE 4/5VWS: CPT

## 2022-01-17 NOTE — PROGRESS NOTES
Subjective   Patient ID: Noemi Bartholomew is a 61 y.o. female is here today for a 6 month follow-up for cervical myelopathy.   Pt last seen on 8/16/21 and reported neck pain with hand numbness as well as dysphonia and dysphagia due to long term compression of her spinal cord.  Pt is taking Gabapentin 300mg tid. Xray cervical spine done on 1/5/22.    Patient reports  today to discuss x-ray    She is 2 years out from the surgery for her cervical myelopathy in the CSF leak.  She is still in the process of applying for disability.  She has a hearing in April but it .  She is asked me to fill out some paperwork which I will do as well as to write a letter summarizing my medical findings to the .  She is about the same.  Her weakness is documented below as well as her ataxia, hyperreflexia and spasticity.  She takes her gabapentin at 300 mg twice daily most days since 3 times daily makes her feel like a zombie as did the baclofen.  As I said in my previous note in August I think she is not capable of gainful employment I will reiterate this in the letter to the .  She was made aware of the risk of adjacent level disease at C4-C5 and that is why she needs to be watched.  I will see her in 9 months.    History of Present Illness    The following portions of the patient's history were reviewed and updated as appropriate: allergies, current medications, past family history, past medical history, past social history, past surgical history and problem list.    Review of Systems   Constitutional: Negative for fever.   All other systems reviewed and are negative.      Objective   Physical Exam  Constitutional:       Appearance: She is well-developed.   HENT:      Head: Normocephalic and atraumatic.   Eyes:      Extraocular Movements: EOM normal.      Conjunctiva/sclera: Conjunctivae normal.      Pupils: Pupils are equal, round, and reactive to light.   Neck:       Vascular: No carotid bruit.   Neurological:      Mental Status: She is oriented to person, place, and time.      Coordination: Finger-Nose-Finger Test and Heel to Shin Test normal.      Gait: Gait is intact.      Deep Tendon Reflexes:      Reflex Scores:       Tricep reflexes are 3+ on the right side and 3+ on the left side.       Bicep reflexes are 3+ on the right side and 3+ on the left side.       Brachioradialis reflexes are 3+ on the right side and 3+ on the left side.       Patellar reflexes are 3+ on the right side and 3+ on the left side.       Achilles reflexes are 3+ on the right side and 3+ on the left side.  Psychiatric:         Speech: Speech normal.       Neurologic Exam     Mental Status   Oriented to person, place, and time.   Registration of memory: Good recent and remote memory.   Attention: normal. Concentration: normal.   Speech: speech is normal   Level of consciousness: alert  Knowledge: consistent with education.     Cranial Nerves     CN II   Visual fields full to confrontation.   Visual acuity: normal    CN III, IV, VI   Pupils are equal, round, and reactive to light.  Extraocular motions are normal.     CN V   Facial sensation intact.   Right corneal reflex: normal  Left corneal reflex: normal    CN VII   Facial expression full, symmetric.   Right facial weakness: none  Left facial weakness: none    CN VIII   Hearing: intact    CN IX, X   Palate: symmetric    CN XI   Right sternocleidomastoid strength: normal  Left sternocleidomastoid strength: normal    CN XII   Tongue: not atrophic  Tongue deviation: none    Motor Exam   Muscle bulk: normal  Right arm tone: normal  Left arm tone: normal  Right leg tone: normal  Left leg tone: normal    Strength   Strength 5/5 except as noted.   Right neck flexion: 4/5  Left neck flexion: 4/5  Right neck extension: 4/5  Left neck extension: 4/5  Right deltoid: 4/5  Left deltoid: 4/5  Right biceps: 4/5  Left biceps: 4/5  Right triceps: 4/5  Left triceps:  4/5  Right wrist flexion: 4/5  Left wrist flexion: 4/5  Right wrist extension: 4/5  Left wrist extension: 4/5  Right interossei: 4/5  Left interossei: 4/5  Right abdominals: 4/5  Left abdominals: 4/5  Right iliopsoas: 4/5  Left iliopsoas: 4/5  Right quadriceps: 4/5  Left quadriceps: 4/5  Right hamstrin/5  Left hamstrin/5  Right glutei: 4/5  Left glutei: 4/5  Right anterior tibial: 4/5  Left anterior tibial: 4/5  Right posterior tibial: 4/5  Left posterior tibial: 4/5  Right peroneal: 4/5  Left peroneal: 4/5  Right gastroc: 4/5  Left gastroc: 4/5    Sensory Exam   Light touch normal.     Gait, Coordination, and Reflexes     Gait  Gait: normal    Coordination   Finger to nose coordination: normal  Heel to shin coordination: normal    Reflexes   Right brachioradialis: 3+  Left brachioradialis: 3+  Right biceps: 3+  Left biceps: 3+  Right triceps: 3+  Left triceps: 3+  Right patellar: 3+  Left patellar: 3+  Right achilles: 3+  Left achilles: 3+  Right : 3+  Left : 3+Ataxic spastic gait       Assessment/Plan   Independent Review of Radiographic Studies:      The current x-rays done on 2022 shows stable position of the cage at the C6 and C7 vertebral segment as well as the plate and screws from C5-T1.  Agree with the report.      Medical Decision Making:      I will fill out the paperwork and write the letter to the  as the patient has requested.  We will see her in 9 months.  The purpose of follow-up visits is to be on the look out for adjacent level stenosis particularly above the level of the fusion at C4-C5.      Diagnoses and all orders for this visit:    1. Cervical myelopathy (HCC) (Primary)    2. Weakness of both hands    3. Spasticity      Return in about 9 months (around 10/19/2022) for Face to face.

## 2022-01-19 ENCOUNTER — OFFICE VISIT (OUTPATIENT)
Dept: NEUROSURGERY | Facility: CLINIC | Age: 62
End: 2022-01-19

## 2022-01-19 VITALS
DIASTOLIC BLOOD PRESSURE: 66 MMHG | HEART RATE: 121 BPM | TEMPERATURE: 97.7 F | WEIGHT: 99.4 LBS | BODY MASS INDEX: 23.01 KG/M2 | HEIGHT: 55 IN | OXYGEN SATURATION: 97 % | SYSTOLIC BLOOD PRESSURE: 108 MMHG

## 2022-01-19 DIAGNOSIS — R25.2 SPASTICITY: ICD-10-CM

## 2022-01-19 DIAGNOSIS — R29.898 WEAKNESS OF BOTH HANDS: ICD-10-CM

## 2022-01-19 DIAGNOSIS — G95.9 CERVICAL MYELOPATHY: Primary | ICD-10-CM

## 2022-01-19 PROCEDURE — 99213 OFFICE O/P EST LOW 20 MIN: CPT | Performed by: NEUROLOGICAL SURGERY

## 2022-02-14 ENCOUNTER — TELEPHONE (OUTPATIENT)
Dept: NEUROSURGERY | Facility: CLINIC | Age: 62
End: 2022-02-14

## 2022-02-14 NOTE — TELEPHONE ENCOUNTER
Patient called looking for paperwork from 3 places.  Per Lisa no paperwork.  Looked in DR. LABOY folder no paperwork.  Cant find in MA's office.    Patient will have company, [employer] , and speak with DR. LABOY concerning letter of support.      Thank You

## 2022-02-23 ENCOUNTER — TELEPHONE (OUTPATIENT)
Dept: NEUROSURGERY | Facility: CLINIC | Age: 62
End: 2022-02-23

## 2022-02-23 NOTE — TELEPHONE ENCOUNTER
Patient is needing the paper work  That is in Dr. Vigil's folder before trial 4-5-22.  Per Bernadette, send her a message.  Patient also states that she needs a letter of support.    Thank you

## 2022-03-01 ENCOUNTER — TELEPHONE (OUTPATIENT)
Dept: NEUROSURGERY | Facility: CLINIC | Age: 62
End: 2022-03-01

## 2022-03-03 ENCOUNTER — TELEPHONE (OUTPATIENT)
Dept: ONCOLOGY | Facility: HOSPITAL | Age: 62
End: 2022-03-03

## 2022-03-03 ENCOUNTER — TELEPHONE (OUTPATIENT)
Dept: NEUROSURGERY | Facility: CLINIC | Age: 62
End: 2022-03-03

## 2022-03-03 DIAGNOSIS — M81.0 OSTEOPOROSIS, UNSPECIFIED OSTEOPOROSIS TYPE, UNSPECIFIED PATHOLOGICAL FRACTURE PRESENCE: Primary | ICD-10-CM

## 2022-03-03 NOTE — TELEPHONE ENCOUNTER
Spoke with JOCY at Dr. Hernandez's office with t/o for calcium and Vitamin D to be drawn prior to prolia.  Orders placed and JJ to fax request.

## 2022-03-04 ENCOUNTER — APPOINTMENT (OUTPATIENT)
Dept: OTHER | Facility: HOSPITAL | Age: 62
End: 2022-03-04

## 2022-03-04 ENCOUNTER — INFUSION (OUTPATIENT)
Dept: ONCOLOGY | Facility: HOSPITAL | Age: 62
End: 2022-03-04

## 2022-03-11 ENCOUNTER — INFUSION (OUTPATIENT)
Dept: ONCOLOGY | Facility: HOSPITAL | Age: 62
End: 2022-03-11

## 2022-03-11 ENCOUNTER — LAB (OUTPATIENT)
Dept: OTHER | Facility: HOSPITAL | Age: 62
End: 2022-03-11

## 2022-03-11 VITALS — HEIGHT: 55 IN | TEMPERATURE: 96.8 F | BODY MASS INDEX: 24.88 KG/M2 | RESPIRATION RATE: 18 BRPM

## 2022-03-11 DIAGNOSIS — M81.0 OSTEOPOROSIS, UNSPECIFIED OSTEOPOROSIS TYPE, UNSPECIFIED PATHOLOGICAL FRACTURE PRESENCE: ICD-10-CM

## 2022-03-11 DIAGNOSIS — M81.0 OSTEOPOROSIS, UNSPECIFIED OSTEOPOROSIS TYPE, UNSPECIFIED PATHOLOGICAL FRACTURE PRESENCE: Primary | ICD-10-CM

## 2022-03-11 LAB
25(OH)D3 SERPL-MCNC: 33.6 NG/ML (ref 30–100)
CALCIUM SPEC-SCNC: 10.4 MG/DL (ref 8.6–10.5)

## 2022-03-11 PROCEDURE — 82310 ASSAY OF CALCIUM: CPT | Performed by: OBSTETRICS & GYNECOLOGY

## 2022-03-11 PROCEDURE — 82306 VITAMIN D 25 HYDROXY: CPT | Performed by: OBSTETRICS & GYNECOLOGY

## 2022-03-11 PROCEDURE — 25010000002 DENOSUMAB 60 MG/ML SOLUTION PREFILLED SYRINGE: Performed by: OBSTETRICS & GYNECOLOGY

## 2022-03-11 PROCEDURE — 96372 THER/PROPH/DIAG INJ SC/IM: CPT

## 2022-03-11 RX ADMIN — DENOSUMAB 60 MG: 60 INJECTION SUBCUTANEOUS at 16:13

## 2022-04-14 RX ORDER — ATORVASTATIN CALCIUM 20 MG/1
TABLET, FILM COATED ORAL
Qty: 30 TABLET | Refills: 1 | Status: SHIPPED | OUTPATIENT
Start: 2022-04-14 | End: 2022-05-12 | Stop reason: SDUPTHER

## 2022-04-26 ENCOUNTER — TELEPHONE (OUTPATIENT)
Dept: NEUROSURGERY | Facility: CLINIC | Age: 62
End: 2022-04-26

## 2022-04-26 RX ORDER — METHYLPREDNISOLONE 4 MG/1
TABLET ORAL
Qty: 1 EACH | Refills: 0 | Status: SHIPPED | OUTPATIENT
Start: 2022-04-26 | End: 2022-06-19 | Stop reason: SDUPTHER

## 2022-04-26 NOTE — TELEPHONE ENCOUNTER
Pt called c/o a piercing headache that starts in her neck, radiates to her R ear, jaw and shoulder x one week.  She has been taking Gabpentin 300 mg tid along with Aleve with no resolution.  Please advise.  [phone]

## 2022-04-26 NOTE — TELEPHONE ENCOUNTER
MDP sent to [facility] and [facility] for pt that she would be put on Dr LABOY's schedule in 2 weeks for a tele-visit. (per Dr LABOY)

## 2022-04-29 NOTE — TELEPHONE ENCOUNTER
I geremias and MARYLIN for patient to call the office. I have her scheduled for a telephone visit at 4:00 on 05-13-22

## 2022-05-03 NOTE — PROGRESS NOTES
"Subjective   Patient ID: Noemi Bartholomew is a 62 y.o. female is here today for follow-up after MDP for neck pain that radiates to her R ear, jaw and shoulder.   Pt taking Gabapentin 300 mg tid as well as Aleve with no resolution.    Today Ms. Bartholomew reports that she is still having some neck pain. She reports it is not as much \"piercing\" pain but has improved. She reports she has finished with the steroid and it definetly helped. She reports numbness and tingling in her hands, arms, neck and right side of her face and jaw and shoulder. She reports she has \"crackling\" in her right ear. She reports she is taking gabapentin and aleve for pain.     You have chosen to receive care through a telephone visit. Do you consent to use a telephone visit for your medical care today? Yes    Unable to complete visit using a video connection to the patient. A phone visit was used to complete this visits. Total time of discussion was 11 minutes.    Was calling from the hospital.  She was at home.  She did mention to me that she got her disability which was a big relief for her.  She was complaining of some right-sided neck pain and the radiating occipital neuralgia that she has had in the past.  She is on gabapentin at 300 mg 3 times daily for this.  The steroid Dosepak that we gave her did help but she is not quite back to herself.  I suggested that maybe we consider raising the gabapentin to 400 mg 3 times daily but she wants to wait for now which is reasonable.  We will do a televisit in 6 weeks to see how she is doing.  Hopefully by that time they will have settled down to its more stable baseline.  She has a follow-up visit with me later on this year.      Neck Pain   This is a new problem. The current episode started 1 to 4 weeks ago. The problem occurs constantly. The problem has been gradually improving. The pain is present in the right side. The quality of the pain is described as stabbing and aching. The pain is at a " severity of 9/10. The pain is severe. The symptoms are aggravated by position. Associated symptoms include numbness and tingling. She has tried acetaminophen for the symptoms.       The following portions of the patient's history were reviewed and updated as appropriate: allergies, current medications, past family history, past medical history, past social history, past surgical history and problem list.    Review of Systems   Constitutional: Positive for activity change.   Musculoskeletal: Positive for neck pain.   Neurological: Positive for tingling and numbness.   Psychiatric/Behavioral: Positive for sleep disturbance.       Objective   Physical Exam   Deferred  Neurologic Exam   Deferred    Assessment & Plan   Independent Review of Radiographic Studies:      The current x-rays done on 1/5/2022 shows stable position of the cage at the C6 and C7 vertebral segment as well as the plate and screws from C5-T1.  Agree with the report.    Medical Decision Making:      We will leave things as is.  The gabapentin dose we kept the same at 300 mg 3 times daily.  We will do a televisit in 6 weeks to see if there is been any change.  If things of settle down we will not change anything.  She does have a follow-up visit with me later face-to-face in October of this year.      Diagnoses and all orders for this visit:    1. Occipital neuralgia of right side (Primary)    2. Cervical myelopathy (HCC)    3. Spasticity      Return in about 6 weeks (around 6/24/2022) for As a televisit on a surgery day at about 3:00 PM.

## 2022-05-07 ENCOUNTER — HOSPITAL ENCOUNTER (EMERGENCY)
Facility: HOSPITAL | Age: 62
Discharge: HOME | End: 2022-05-07
Attending: EMERGENCY MEDICINE | Admitting: EMERGENCY MEDICINE

## 2022-05-07 ENCOUNTER — APPOINTMENT (OUTPATIENT)
Dept: CT IMAGING | Facility: HOSPITAL | Age: 62
End: 2022-05-07

## 2022-05-07 VITALS
WEIGHT: 99 LBS | DIASTOLIC BLOOD PRESSURE: 74 MMHG | HEART RATE: 84 BPM | TEMPERATURE: 97 F | OXYGEN SATURATION: 98 % | RESPIRATION RATE: 14 BRPM | HEIGHT: 55 IN | SYSTOLIC BLOOD PRESSURE: 125 MMHG | BODY MASS INDEX: 22.91 KG/M2

## 2022-05-07 DIAGNOSIS — R11.0 NAUSEA: ICD-10-CM

## 2022-05-07 DIAGNOSIS — R19.8: ICD-10-CM

## 2022-05-07 DIAGNOSIS — R10.84 GENERALIZED ABDOMINAL PAIN: Primary | ICD-10-CM

## 2022-05-07 LAB
ALBUMIN SERPL-MCNC: 3.7 G/DL (ref 3.5–5.2)
ALBUMIN/GLOB SERPL: 1.5 G/DL
ALP SERPL-CCNC: 78 U/L (ref 39–117)
ALT SERPL W P-5'-P-CCNC: 18 U/L (ref 1–33)
ANION GAP SERPL CALCULATED.3IONS-SCNC: 8 MMOL/L (ref 5–15)
AST SERPL-CCNC: 18 U/L (ref 1–32)
BASOPHILS # BLD AUTO: 0.03 10*3/MM3 (ref 0–0.2)
BASOPHILS NFR BLD AUTO: 0.3 % (ref 0–1.5)
BILIRUB SERPL-MCNC: 0.2 MG/DL (ref 0–1.2)
BILIRUB UR QL STRIP: NEGATIVE
BUN SERPL-MCNC: 15 MG/DL (ref 8–23)
BUN/CREAT SERPL: 23.4 (ref 7–25)
CALCIUM SPEC-SCNC: 8.7 MG/DL (ref 8.6–10.5)
CHLORIDE SERPL-SCNC: 107 MMOL/L (ref 98–107)
CLARITY UR: CLEAR
CO2 SERPL-SCNC: 28 MMOL/L (ref 22–29)
COLOR UR: YELLOW
CREAT SERPL-MCNC: 0.64 MG/DL (ref 0.57–1)
DEPRECATED RDW RBC AUTO: 45.4 FL (ref 37–54)
EGFRCR SERPLBLD CKD-EPI 2021: 100.1 ML/MIN/1.73
EOSINOPHIL # BLD AUTO: 0.22 10*3/MM3 (ref 0–0.4)
EOSINOPHIL NFR BLD AUTO: 2.6 % (ref 0.3–6.2)
ERYTHROCYTE [DISTWIDTH] IN BLOOD BY AUTOMATED COUNT: 12.9 % (ref 12.3–15.4)
GLOBULIN UR ELPH-MCNC: 2.4 GM/DL
GLUCOSE SERPL-MCNC: 97 MG/DL (ref 65–99)
GLUCOSE UR STRIP-MCNC: NEGATIVE MG/DL
HCT VFR BLD AUTO: 39.7 % (ref 34–46.6)
HGB BLD-MCNC: 12.4 G/DL (ref 12–15.9)
HGB UR QL STRIP.AUTO: NEGATIVE
HOLD SPECIMEN: NORMAL
IMM GRANULOCYTES # BLD AUTO: 0.04 10*3/MM3 (ref 0–0.05)
IMM GRANULOCYTES NFR BLD AUTO: 0.5 % (ref 0–0.5)
KETONES UR QL STRIP: NEGATIVE
LEUKOCYTE ESTERASE UR QL STRIP.AUTO: NEGATIVE
LIPASE SERPL-CCNC: 17 U/L (ref 13–60)
LYMPHOCYTES # BLD AUTO: 1.2 10*3/MM3 (ref 0.7–3.1)
LYMPHOCYTES NFR BLD AUTO: 14 % (ref 19.6–45.3)
MCH RBC QN AUTO: 30 PG (ref 26.6–33)
MCHC RBC AUTO-ENTMCNC: 31.2 G/DL (ref 31.5–35.7)
MCV RBC AUTO: 96.1 FL (ref 79–97)
MONOCYTES # BLD AUTO: 0.61 10*3/MM3 (ref 0.1–0.9)
MONOCYTES NFR BLD AUTO: 7.1 % (ref 5–12)
NEUTROPHILS NFR BLD AUTO: 6.49 10*3/MM3 (ref 1.7–7)
NEUTROPHILS NFR BLD AUTO: 75.5 % (ref 42.7–76)
NITRITE UR QL STRIP: NEGATIVE
NRBC BLD AUTO-RTO: 0 /100 WBC (ref 0–0.2)
PH UR STRIP.AUTO: 7 [PH] (ref 5–8)
PLATELET # BLD AUTO: 250 10*3/MM3 (ref 140–450)
PMV BLD AUTO: 8.9 FL (ref 6–12)
POTASSIUM SERPL-SCNC: 4.3 MMOL/L (ref 3.5–5.2)
PROT SERPL-MCNC: 6.1 G/DL (ref 6–8.5)
PROT UR QL STRIP: NEGATIVE
RBC # BLD AUTO: 4.13 10*6/MM3 (ref 3.77–5.28)
SODIUM SERPL-SCNC: 143 MMOL/L (ref 136–145)
SP GR UR STRIP: 1.01 (ref 1–1.03)
UROBILINOGEN UR QL STRIP: NORMAL
WBC NRBC COR # BLD: 8.59 10*3/MM3 (ref 3.4–10.8)
WHOLE BLOOD HOLD SPECIMEN: NORMAL
WHOLE BLOOD HOLD SPECIMEN: NORMAL

## 2022-05-07 PROCEDURE — 85025 COMPLETE CBC W/AUTO DIFF WBC: CPT | Performed by: EMERGENCY MEDICINE

## 2022-05-07 PROCEDURE — 81003 URINALYSIS AUTO W/O SCOPE: CPT | Performed by: EMERGENCY MEDICINE

## 2022-05-07 PROCEDURE — 74176 CT ABD & PELVIS W/O CONTRAST: CPT

## 2022-05-07 PROCEDURE — 80053 COMPREHEN METABOLIC PANEL: CPT | Performed by: EMERGENCY MEDICINE

## 2022-05-07 PROCEDURE — 99283 EMERGENCY DEPT VISIT LOW MDM: CPT

## 2022-05-07 PROCEDURE — 83690 ASSAY OF LIPASE: CPT | Performed by: EMERGENCY MEDICINE

## 2022-05-07 RX ORDER — MAG HYDROX/ALUMINUM HYD/SIMETH 400-400-40
1 SUSPENSION, ORAL (FINAL DOSE FORM) ORAL AS NEEDED
Qty: 12 EACH | Refills: 0 | Status: SHIPPED | OUTPATIENT
Start: 2022-05-07 | End: 2022-07-12

## 2022-05-07 RX ORDER — SODIUM CHLORIDE 0.9 % (FLUSH) 0.9 %
10 SYRINGE (ML) INJECTION AS NEEDED
Status: DISCONTINUED | OUTPATIENT
Start: 2022-05-07 | End: 2022-05-07 | Stop reason: HOSPADM

## 2022-05-07 NOTE — ED PROVIDER NOTES
EMERGENCY DEPARTMENT ENCOUNTER    Room Number:  15/15  Date of encounter:  5/7/2022  PCP: Mary Lou Carroll MD  Historian: Patient    Patient was placed in face mask during triage process. Patient was wearing facemask when I entered the room and throughout our encounter. I wore full protective equipment throughout this patient encounter including a face mask, eye protection, and gloves. Hand hygiene was performed before donning protective equipment and again following doffing of PPE after leaving the room.    HPI:  Chief Complaint: Abdominal pain  A complete HPI/ROS/PMH/PSH/SH/FH are unobtainable due to: N/A   Context: Noemi Bartholomew is a 62 y.o. female who presents to the ED c/o right-sided and suprapubic abdominal pain that comes and goes over the last 48 hours.  She noted during the night on Thursday night and again overnight last night.  Pain is gone at this time.  Minutes present as it is associated with some significant nausea.  She had no dysuria hematuria.  No black or bloody stools.  No longer has menses.  No chest pain or shortness of breath.  No clear exacerbating relieving factors.  Not changed by food.  Of note, patient is following with her primary neurologist for some right facial paresthesia/discomfort which is improving with steroids.      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   • Cervical spondylosis with myelopathy 11/22/2019   • Cervical stenosis of spine 12/06/2019   • Cervical myelopathy (HCC) 12/13/2019   • Weakness of both hands 03/23/2020   • Occipital neuralgia of  right side 07/31/2020   • S/P cervical spinal fusion 11/02/2020   • Spasticity 08/16/2021     Resolved Ambulatory Problems     Diagnosis Date Noted   • Spinal stenosis in cervical region 11/22/2019   • Postoperative anemia due to acute blood loss 12/09/2019   • Steroid-induced hyperglycemia 12/09/2019   • Cord compression (HCC) 12/09/2019   • Leukocytosis (due to steroids) 12/09/2019   • Postoperative CSF leak 11/22/2019   • CSF leak 12/19/2019   • Postoperative cerebrospinal fluid leak 01/09/2020   • Weakness of both arms 06/05/2020   • Neck pain 07/07/2020   • Anemia, chronic disease 07/07/2020     Past Medical History:   Diagnosis Date   • Abnormal alkaline phosphatase test 06/12/2015   • Acute bronchitis 06/12/2015   • Allergic child age   • Cervical spinal stenosis    • Diverticulosis    • H/O electromyography 10/13/2014   • H/O mammogram 07/29/2014   • Headache occassionally   • High cholesterol    • History of EKG 06/12/2015   • Hypercalcemia 06/12/2015   • Left arm pain    • Low back pain March 2017   • Numbness in both hands    • Osteopenia several years   • Paresthesia 06/12/2015         PAST SURGICAL HISTORY  Past Surgical History:   Procedure Laterality Date   • ANTERIOR CHANNEL VERTEBRECTOMY/CORPECTOMY N/A 12/6/2019    Procedure: CERVICAL 6, CERVICAL 7 ANTERIOR CERVICAL CORPECTOMY WITH CAGE AND PLATE;  Surgeon: Antonio Noe MD;  Location: [facility];  Service: Neurosurgery   • BACK SURGERY  1999   • COLONOSCOPY  05/20/2011   • INCISION AND DRAINAGE OF WOUND N/A 12/19/2019    Procedure: followed by drainage of anterior cervical fluid collection;  Surgeon: Antonio Noe MD;  Location: [facility] OR;  Service: Neurosurgery   • LUMBAR DRAIN INSERTION EXTERNAL N/A 12/19/2019    Procedure: LUMBAR DRAIN INSERTION EXTERNAL;  Surgeon: Antonio Noe MD;  Location: [facility] OR;  Service: Neurosurgery   • LUMBAR PERITONEAL SHUNT N/A 1/10/2020    Procedure: LUMBAR PERITONEAL SHUNT PLACEMENT;   Surgeon: Antonio Noe MD;  Location: [facility] OR;  Service: Neurosurgery   • SPINE SURGERY  1999 back    dr abad quintero   • TONSILLECTOMY  child age   • TYMPANOPLASTY           FAMILY HISTORY  Family History   Problem Relation Age of Onset   • Breast cancer Mother    • Stroke Mother         Cerebrovascular Accident   • Colon cancer Mother    • Diabetes Father         Borderline Diabetes Mellitus   • Heart disease Father         Cardiac Disorder   • Heart failure Father    • Anxiety disorder Sister    • Nephrolithiasis Sister         Kidney Stones   • Malig Hyperthermia Neg Hx          SOCIAL HISTORY  Social History     Socioeconomic History   • Marital status: Single   • Number of children: 0   • Years of education: college   Tobacco Use   • Smoking status: Never Smoker   • Smokeless tobacco: Never Used   Substance and Sexual Activity   • Alcohol use: No   • Drug use: No   • Sexual activity: Never         ALLERGIES  Penicillins and Topamax [topiramate]        REVIEW OF SYSTEMS  Review of Systems     All systems reviewed and negative except for those discussed in HPI.       PHYSICAL EXAM    I have reviewed the triage vital signs and nursing notes.    ED Triage Vitals [05/07/22 0429]   Temp Heart Rate Resp BP SpO2   97 °F (36.1 °C) 84 14 -- 98 %      Temp src Heart Rate Source Patient Position BP Location FiO2 (%)   Tympanic -- -- -- --         Physical Exam    Physical Exam   Constitutional: No distress.  Nontoxic  HENT:  Head: Normocephalic and atraumatic.  Face symmetric.  Patient points to the area of the second and third branches of the trigeminal nerve distribution where she has her paresthesia.  Oropharynx: Mucous membranes are moist.   Eyes: No scleral icterus. No conjunctival pallor.  Neck: Painless range of motion noted. Neck supple.   Cardiovascular: Normal rate, regular rhythm and intact distal pulses.  Pulmonary/Chest: No respiratory distress. There are no wheezes, no rhonchi, and no rales.    Abdominal: Soft. There is no tenderness. There is no rebound and no guarding.  No CVA tenderness to percussion  Musculoskeletal: Moves all extremities equally. There is no pedal edema or calf tenderness.   Neurological: Alert.  Baseline strength and sensation noted.   Skin: Skin is pink, warm, and dry. No pallor.   Psychiatric: Mood and affect normal.   Nursing note and vitals reviewed.    LAB RESULTS  Recent Results (from the past 24 hour(s))   Urinalysis With Microscopic If Indicated (No Culture) - Urine, Clean Catch    Collection Time: 05/07/22  7:22 AM    Specimen: Urine, Clean Catch   Result Value Ref Range    Color, UA Yellow Yellow, Straw    Appearance, UA Clear Clear    pH, UA 7.0 5.0 - 8.0    Specific Gravity, UA 1.015 1.005 - 1.030    Glucose, UA Negative Negative    Ketones, UA Negative Negative    Bilirubin, UA Negative Negative    Blood, UA Negative Negative    Protein, UA Negative Negative    Leuk Esterase, UA Negative Negative    Nitrite, UA Negative Negative    Urobilinogen, UA 0.2 E.U./dL 0.2 - 1.0 E.U./dL   Comprehensive Metabolic Panel    Collection Time: 05/07/22  7:30 AM    Specimen: Blood   Result Value Ref Range    Glucose 97 65 - 99 mg/dL    BUN 15 8 - 23 mg/dL    Creatinine 0.64 0.57 - 1.00 mg/dL    Sodium 143 136 - 145 mmol/L    Potassium 4.3 3.5 - 5.2 mmol/L    Chloride 107 98 - 107 mmol/L    CO2 28.0 22.0 - 29.0 mmol/L    Calcium 8.7 8.6 - 10.5 mg/dL    Total Protein 6.1 6.0 - 8.5 g/dL    Albumin 3.70 3.50 - 5.20 g/dL    ALT (SGPT) 18 1 - 33 U/L    AST (SGOT) 18 1 - 32 U/L    Alkaline Phosphatase 78 39 - 117 U/L    Total Bilirubin 0.2 0.0 - 1.2 mg/dL    Globulin 2.4 gm/dL    A/G Ratio 1.5 g/dL    BUN/Creatinine Ratio 23.4 7.0 - 25.0    Anion Gap 8.0 5.0 - 15.0 mmol/L    eGFR 100.1 >60.0 mL/min/1.73   Lipase    Collection Time: 05/07/22  7:30 AM    Specimen: Blood   Result Value Ref Range    Lipase 17 13 - 60 U/L   CBC Auto Differential    Collection Time: 05/07/22  7:30 AM    Specimen:  Blood   Result Value Ref Range    WBC 8.59 3.40 - 10.80 10*3/mm3    RBC 4.13 3.77 - 5.28 10*6/mm3    Hemoglobin 12.4 12.0 - 15.9 g/dL    Hematocrit 39.7 34.0 - 46.6 %    MCV 96.1 79.0 - 97.0 fL    MCH 30.0 26.6 - 33.0 pg    MCHC 31.2 (L) 31.5 - 35.7 g/dL    RDW 12.9 12.3 - 15.4 %    RDW-SD 45.4 37.0 - 54.0 fl    MPV 8.9 6.0 - 12.0 fL    Platelets 250 140 - 450 10*3/mm3    Neutrophil % 75.5 42.7 - 76.0 %    Lymphocyte % 14.0 (L) 19.6 - 45.3 %    Monocyte % 7.1 5.0 - 12.0 %    Eosinophil % 2.6 0.3 - 6.2 %    Basophil % 0.3 0.0 - 1.5 %    Immature Grans % 0.5 0.0 - 0.5 %    Neutrophils, Absolute 6.49 1.70 - 7.00 10*3/mm3    Lymphocytes, Absolute 1.20 0.70 - 3.10 10*3/mm3    Monocytes, Absolute 0.61 0.10 - 0.90 10*3/mm3    Eosinophils, Absolute 0.22 0.00 - 0.40 10*3/mm3    Basophils, Absolute 0.03 0.00 - 0.20 10*3/mm3    Immature Grans, Absolute 0.04 0.00 - 0.05 10*3/mm3    nRBC 0.0 0.0 - 0.2 /100 WBC       Ordered the above labs and independently reviewed the results.        RADIOLOGY  CT Abdomen Pelvis Stone Protocol    Result Date: 5/7/2022  CT ABDOMEN PELVIS STONE PROTOCOL-  INDICATIONS: Right-sided pain  TECHNIQUE: Radiation dose reduction techniques were utilized, including automated exposure control and exposure modulation based on body size. Unenhanced ABDOMEN AND PELVIS CT  COMPARISON: None available  FINDINGS:  A 2 mm nonobstructive calcification is seen in the left kidney. Punctate nonobstructive calcifications are seen in the right kidney. No hydronephrosis.  Otherwise unremarkable on the unenhanced appearance of the liver, gallbladder, spleen, adrenal glands, pancreas, kidneys, bladder. A spinal drain is apparent extending into the right aspect of the abdomen.  No bowel obstruction or abnormal bowel thickening is identified. Colonic diverticula are seen that do not appear inflamed. The appendix does not appear inflamed. Mild colonic fecal retention is present.  No free intraperitoneal gas or free fluid.  Small umbilical hernia of fat is seen.  A borderline prominent left para-aortic lymph node, 7 mm short axis on axial image 71, is nonspecific, but appears stable from 06/15/2018. Scattered small mesenteric and para-aortic lymph nodes are seen that are not significant by size criteria.  Abdominal aorta is not aneurysmal. Aortic and other arterial calcifications are present.  The lung bases show minimal atelectasis.  Degenerative changes are seen in the spine. No acute fracture is identified.          1. Small nonobstructive stones in each kidneys. No hydronephrosis. 2. Colonic diverticulosis. No acute inflammatory process of bowel is identified. Follow-up as indications persist.  This report was finalized on 5/7/2022 7:55 AM by Dr. Jose Ortiz M.D.        I ordered the above noted radiological studies. Reviewed by me and discussed with radiologist.  See dictation for official radiology interpretation.      PROCEDURES    Procedures        MEDICATIONS GIVEN IN ER    Medications   sodium chloride 0.9 % flush 10 mL (has no administration in time range)         PROGRESS, DATA ANALYSIS, CONSULTS, AND MEDICAL DECISION MAKING    My differential diagnosis for abdominal pain includes but is not limited to:  Gastritis, gastroenteritis, peptic ulcer disease, GERD, esophageal perforation, acute appendicitis, mesenteric adenitis, Meckel’s diverticulum, epiploic appendagitis, diverticulitis, colon cancer, ulcerative colitis, Crohn’s disease, intussusception, small bowel obstruction, adhesions, ischemic bowel, perforated viscus, ileus, obstipation, biliary colic, cholecystitis, cholelithiasis, Solomon-Emmanuel Bryan, hepatitis, pancreatitis, common bile duct obstruction, cholangitis, bile leak, splenic trauma, splenic rupture, splenic infarction, splenic abscess, abdominal abscess, ascites, spontaneous bacterial peritonitis, hernia, UTI, cystitis,ureterolithiasis, urinary obstruction, ovarian cyst, torsion, pregnancy, ectopic  pregnancy, PID, pelvic abscess, mittelschmerz, endometriosis, AAA, myocardial infarction, pneumonia, cancer, porphyria, DKA, medications, sickle cell, viral syndrome, zoster      All labs have been independently reviewed by me.  All radiology studies have been reviewed by me and discussed with radiologist dictating the report.   EKG's independently viewed and interpreted by me.  Discussion below represents my analysis of pertinent findings related to patient's condition, differential diagnosis, treatment plan and final disposition.      ED Course as of 05/07/22 0826   Sat May 07, 2022   0816 WBC: 8.59 [RS]   0816 Hemoglobin: 12.4 [RS]   0816 Platelets: 250 [RS]   0819 Lipase: 17 [RS]   0820 Glucose: 97 [RS]   0820 BUN: 15 [RS]   0820 Creatinine: 0.64 [RS]   0820 ALT (SGPT): 18 [RS]   0820 AST (SGOT): 18 [RS]   0820 Total Bilirubin: 0.2 [RS]   0820 Nitrite, UA: Negative [RS]   0820 Leukocytes, UA: Negative [RS]   0820 Patient's right facial paresthesia is being evaluated by neurosurgery.  Is consistent with trigeminal neuralgia and is responding to steroids.  No further ED evaluation for this complaint is indicated. [RS]   0820 No acute life threats identified on laboratory or CT results.  Abdominal exam is benign on my examination.  Outpatient follow-up with PCP is recommended. [RS]      ED Course User Index  [RS] Cheng Buitrago MD       AS OF 08:26 EDT VITALS:    BP - 125/74  HR - 84  TEMP - 97 °F (36.1 °C) (Tympanic)  O2 SATS - 98%        DIAGNOSIS  Final diagnoses:   Generalized abdominal pain   Rectal fullness due to feces   Nausea         DISPOSITION  DISCHARGE    Patient discharged in stable condition.    Reviewed implications of results, diagnosis, meds, responsibility to follow up, warning signs and symptoms of possible worsening, potential complications and reasons to return to ER.    Patient/Family voiced understanding of above instructions.    Discussed plan for discharge, as there is no emergent  indication for admission. Patient referred to primary care provider for regular health maintenance. Pt/family is agreeable and understands need for follow up and possible repeat testing.  Pt is aware that discharge does not mean that nothing is wrong but it indicates no emergency is present that requires admission and they must continue care with follow-up as given below or physician of their choice.     FOLLOW-UP  Mary Lou Carroll MD  [address] [id number] [phone]    Schedule an appointment as soon as possible for a visit            Medication List      New Prescriptions    glycerin adult 2 g suppository  Insert 1 suppository into the rectum As Needed for Constipation.           Where to Get Your Medications      You can get these medications from any pharmacy    Bring a paper prescription for each of these medications  · glycerin adult 2 g suppository            Cheng Buitrago MD  05/07/22 [id number]

## 2022-05-07 NOTE — ED TRIAGE NOTES
Pt to ED via PV accompanied by family member. Pt reports generalized abdominal pain that started 2 days ago and has been intermittent. Pt also reports a right side headache that she has received treatment for from her PCP with a steroid regiment.     This RN in appropriate PPE for all patient care interactions. Pt masked and redirected for proper mask use when necessary. Hand hygiene performed before and after all patient care interactions.

## 2022-05-12 ENCOUNTER — TELEPHONE (OUTPATIENT)
Dept: INTERNAL MEDICINE | Facility: CLINIC | Age: 62
End: 2022-05-12

## 2022-05-12 RX ORDER — ATORVASTATIN CALCIUM 20 MG/1
20 TABLET, FILM COATED ORAL NIGHTLY
Qty: 90 TABLET | Refills: 1 | Status: SHIPPED | OUTPATIENT
Start: 2022-05-12 | End: 2022-10-27

## 2022-05-13 ENCOUNTER — OFFICE VISIT (OUTPATIENT)
Dept: INTERNAL MEDICINE | Facility: CLINIC | Age: 62
End: 2022-05-13

## 2022-05-13 ENCOUNTER — OFFICE VISIT (OUTPATIENT)
Dept: NEUROSURGERY | Facility: CLINIC | Age: 62
End: 2022-05-13

## 2022-05-13 VITALS
HEART RATE: 80 BPM | OXYGEN SATURATION: 98 % | DIASTOLIC BLOOD PRESSURE: 84 MMHG | SYSTOLIC BLOOD PRESSURE: 128 MMHG | WEIGHT: 99.5 LBS | HEIGHT: 55 IN | BODY MASS INDEX: 23.03 KG/M2

## 2022-05-13 DIAGNOSIS — M54.81 OCCIPITAL NEURALGIA OF RIGHT SIDE: Primary | ICD-10-CM

## 2022-05-13 DIAGNOSIS — R51.9 INTRACTABLE HEADACHE, UNSPECIFIED CHRONICITY PATTERN, UNSPECIFIED HEADACHE TYPE: ICD-10-CM

## 2022-05-13 DIAGNOSIS — R25.2 SPASTICITY: ICD-10-CM

## 2022-05-13 DIAGNOSIS — G95.9 CERVICAL MYELOPATHY: ICD-10-CM

## 2022-05-13 DIAGNOSIS — R10.30 LOWER ABDOMINAL PAIN: Primary | ICD-10-CM

## 2022-05-13 DIAGNOSIS — K59.00 CONSTIPATION, UNSPECIFIED CONSTIPATION TYPE: ICD-10-CM

## 2022-05-13 PROCEDURE — 99213 OFFICE O/P EST LOW 20 MIN: CPT | Performed by: INTERNAL MEDICINE

## 2022-05-13 PROCEDURE — 99442 PR PHYS/QHP TELEPHONE EVALUATION 11-20 MIN: CPT | Performed by: NEUROLOGICAL SURGERY

## 2022-05-13 RX ORDER — GABAPENTIN 300 MG/1
300 CAPSULE ORAL 3 TIMES DAILY
Qty: 270 CAPSULE | Refills: 1 | Status: SHIPPED | OUTPATIENT
Start: 2022-05-13 | End: 2022-06-24

## 2022-05-13 NOTE — PROGRESS NOTES
Eduardo Bartholomew is a 62 y.o. female who presents with   Chief Complaint   Patient presents with   • Abdominal Pain       History of Present Illness     The following data was reviewed by: Mary Lou Carroll MD on 05/13/2022:  CMP    CMP 8/30/21 3/11/22 5/7/22   Glucose   97   BUN   15   Creatinine   0.64   Sodium   143   Potassium   4.3   Chloride   107   Calcium 10.0 10.4 8.7   Albumin   3.70   Total Bilirubin   0.2   Alkaline Phosphatase   78   AST (SGOT)   18   ALT (SGPT)   18           CBC    CBC 6/24/21 5/7/22   WBC 4.39 8.59   RBC 4.23 4.13   Hemoglobin 12.8 12.4   Hematocrit 39.8 39.7   MCV 94.1 96.1   MCH 30.3 30.0   MCHC 32.2 31.2 (A)   RDW 13.1 12.9   Platelets 249 250   (A) Abnormal value            UA    Urinalysis 6/24/21 6/24/21 5/7/22    0812 0812    Specific [place], UA   1.015   Ketones, UA   Negative   Blood, UA Negative  Negative   Leukocytes, UA Negative  Negative   Nitrite, UA Negative  Negative   RBC, UA  0-2    Bacteria, UA  Comment       Comments are available for some flowsheets but are not being displayed.           Data reviewed: Radiologic studies ct abd     Abdominal pain started one week ago.  Pain was severe.  She went to the ER on Saturday.  Pain continues off and on.  She has constipation.  No diarrhea.  No fever.  Nausea.      Review of Systems   Respiratory: Negative.    Cardiovascular: Negative.        The following portions of the patient's history were reviewed and updated as appropriate: allergies, current medications and problem list.    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     Note Last Updated: 11/22/2019     Added automatically from request for surgery [id number]     •  Cervical spondylosis with myelopathy 11/22/2019     Note Last Updated: 11/22/2019     Added automatically from request for surgery [id number]     • Neuropathic pain, leg, left 06/25/2018   • DDD (degenerative disc disease), lumbar 05/02/2018   • Chronic left-sided lumbar radiculopathy 05/02/2018     Note Last Updated: 1/21/2021 Jan2021 Non-Regulatory IMO with [facility]/CMS designations     • Congenital spondylolysis, lumbosacral region 05/02/2018   • Carpal tunnel syndrome 06/08/2016   • Hyperlipidemia 06/08/2016   • Osteoporosis 06/08/2016     Note Last Updated: 6/29/2020     Description: Boniva for a number of years.  Forteo for a year.  Managed by Gyn. .  I do recommend 1200mg calcium and 1000 IUs of vitamin D in supplements/diet as well as weight bearing exercise to prevent further decline in BMD.  Prolia started 2020.         Current Outpatient Medications on File Prior to Visit   Medication Sig Dispense Refill   • atorvastatin (LIPITOR) 20 MG tablet Take 1 tablet by mouth Every Night. 90 tablet 1   • azelastine (ASTELIN) 0.1 % nasal spray 2 sprays into the nostril(s) as directed by provider 2 (Two) Times a Day. Use in each nostril as directed 30 mL 12   • baclofen (LIORESAL) 10 MG tablet Take 1 tablet by mouth 2 (Two) Times a Day. 60 tablet 3   • Calcium Carb-Cholecalciferol (CALCIUM 1000 + D PO) Take  by mouth Daily. 2 caps     • cetirizine (zyrTEC) 10 MG tablet Take 10 mg by mouth Daily.     • cholecalciferol (VITAMIN D3) 10 MCG (400 UNIT) tablet Take 2,000 Units by mouth Daily.     • denosumab (PROLIA) 60 MG/ML solution prefilled syringe syringe      • docusate sodium (COLACE) 100 MG capsule Take 100 mg by mouth 2 (Two) Times a Day.     • glycerin adult 2 g suppository Insert 1 suppository into the rectum As Needed for Constipation. 12 each 0   • melatonin 3 MG tablet Take  by mouth.     • methylPREDNISolone (MEDROL) 4 MG dose pack Take as directed on package instructions. 1 each 0   • naproxen sodium (ALEVE)  "220 MG tablet Take 220 mg by mouth 2 (Two) Times a Day With Meals.     • Probiotic Product (Remotemedical PO) Take  by mouth.     • [DISCONTINUED] gabapentin (NEURONTIN) 300 MG capsule TAKE ONE CAPSULE BY MOUTH THREE TIMES A DAY 90 capsule 5     No current facility-administered medications on file prior to visit.       Objective     /84   Pulse 80   Ht 134.6 cm (52.99\")   Wt 45.1 kg (99 lb 8 oz)   SpO2 98%   BMI 24.91 kg/m²     Physical Exam  Constitutional:       Appearance: She is well-developed.   HENT:      Head: Normocephalic and atraumatic.   Pulmonary:      Effort: Pulmonary effort is normal.   Abdominal:      General: There is no distension.      Palpations: Abdomen is soft. There is no mass.      Tenderness: There is no guarding or rebound.   Neurological:      Mental Status: She is alert.         Assessment & Plan   Diagnoses and all orders for this visit:    1. Lower abdominal pain (Primary)    2. Constipation, unspecified constipation type        Discussion   Patient presents with abdominal discomfort with negative ER work up including CT, labs.  Colonoscopy is recent.  Pain possibly secondary to constipation.  We discussed increased attention to water, fiber and stool softener. Prn miralax as needed.  The patient is instructed to let our office know if not feeling better over the next several week or if there is any change in symptoms.           Future Appointments   Date Time Provider Department Center   5/13/2022  4:00 PM Antonio Noe MD [facility]   7/5/2022  8:20 AM [facility]   7/12/2022  7:45 AM Mary Lou Carroll MD [facility]   9/13/2022  3:00 PM REFERRED INJECTION CHAIR [facility]   10/19/2022  2:00 PM Antonio Noe MD [facility]         "

## 2022-06-14 ENCOUNTER — APPOINTMENT (OUTPATIENT)
Dept: WOMENS IMAGING | Facility: HOSPITAL | Age: 62
End: 2022-06-14

## 2022-06-14 PROCEDURE — 77067 SCR MAMMO BI INCL CAD: CPT | Performed by: RADIOLOGY

## 2022-06-14 PROCEDURE — 77063 BREAST TOMOSYNTHESIS BI: CPT | Performed by: RADIOLOGY

## 2022-06-17 ENCOUNTER — TELEPHONE (OUTPATIENT)
Dept: NEUROSURGERY | Facility: CLINIC | Age: 62
End: 2022-06-17

## 2022-06-17 NOTE — TELEPHONE ENCOUNTER
Caller: PATIENT    Best call back number: [phone]    Requested Prescriptions:   MEDROL 4MG     Pharmacy where request should be sent: [facility] (IN CH[facility])     Additional details provided by patient: PATIENT WOULD LIKE TO KNOW IF SHE CAN HAVE ANOTHER MEDROL 4MG.  SHE SAID LAST MONTH SHE HAD A DOSE AND IT REALLY WORK BUT THE PAIN HAS COME BACK,  PLEASE ADVISE    Does the patient have less than a 3 day supply:  [x] Yes  [] No

## 2022-06-19 ENCOUNTER — HOSPITAL ENCOUNTER (EMERGENCY)
Facility: HOSPITAL | Age: 62
Discharge: HOME | End: 2022-06-19
Attending: EMERGENCY MEDICINE | Admitting: EMERGENCY MEDICINE

## 2022-06-19 ENCOUNTER — APPOINTMENT (OUTPATIENT)
Dept: CT IMAGING | Facility: HOSPITAL | Age: 62
End: 2022-06-19

## 2022-06-19 VITALS
OXYGEN SATURATION: 95 % | DIASTOLIC BLOOD PRESSURE: 54 MMHG | SYSTOLIC BLOOD PRESSURE: 110 MMHG | TEMPERATURE: 98.1 F | HEART RATE: 54 BPM | RESPIRATION RATE: 17 BRPM

## 2022-06-19 DIAGNOSIS — R68.84 JAW PAIN: ICD-10-CM

## 2022-06-19 DIAGNOSIS — S16.1XXA STRAIN OF NECK MUSCLE, INITIAL ENCOUNTER: Primary | ICD-10-CM

## 2022-06-19 PROCEDURE — 72125 CT NECK SPINE W/O DYE: CPT

## 2022-06-19 PROCEDURE — 99283 EMERGENCY DEPT VISIT LOW MDM: CPT

## 2022-06-19 PROCEDURE — 25010000002 MORPHINE PER 10 MG: Performed by: NURSE PRACTITIONER

## 2022-06-19 PROCEDURE — 96375 TX/PRO/DX INJ NEW DRUG ADDON: CPT

## 2022-06-19 PROCEDURE — 25010000002 ONDANSETRON PER 1 MG: Performed by: NURSE PRACTITIONER

## 2022-06-19 PROCEDURE — 96374 THER/PROPH/DIAG INJ IV PUSH: CPT

## 2022-06-19 RX ORDER — MORPHINE SULFATE 2 MG/ML
4 INJECTION, SOLUTION INTRAMUSCULAR; INTRAVENOUS ONCE
Status: COMPLETED | OUTPATIENT
Start: 2022-06-19 | End: 2022-06-19

## 2022-06-19 RX ORDER — ONDANSETRON 2 MG/ML
4 INJECTION INTRAMUSCULAR; INTRAVENOUS ONCE
Status: COMPLETED | OUTPATIENT
Start: 2022-06-19 | End: 2022-06-19

## 2022-06-19 RX ORDER — METHYLPREDNISOLONE 4 MG/1
TABLET ORAL
Qty: 1 EACH | Refills: 0 | Status: SHIPPED | OUTPATIENT
Start: 2022-06-19 | End: 2022-07-12

## 2022-06-19 RX ORDER — LIDOCAINE 50 MG/G
1 PATCH TOPICAL EVERY 24 HOURS
Qty: 10 PATCH | Refills: 0 | Status: SHIPPED | OUTPATIENT
Start: 2022-06-19 | End: 2023-03-07

## 2022-06-19 RX ADMIN — MORPHINE SULFATE 4 MG: 2 INJECTION, SOLUTION INTRAMUSCULAR; INTRAVENOUS at 10:05

## 2022-06-19 RX ADMIN — ONDANSETRON 4 MG: 2 INJECTION INTRAMUSCULAR; INTRAVENOUS at 10:05

## 2022-06-19 NOTE — ED PROVIDER NOTES
" EMERGENCY DEPARTMENT ENCOUNTER    Room Number:  05/05  Date of encounter:  6/20/2022  PCP: Mary Lou Carroll MD  Historian: patient   Full history not obtainable due to: none     HPI:  Chief Complaint: Neck pain    Context: Noemi Bartohlomew is a 62 y.o. female with a hx of spinal stenosis who presents to the ED c/o neck pain onset 6 weeks ago. Pain is constant. Sharp, tingling, and piercing. Radiates to jaw and right ear. Sitting in a chair worsens the pain. She did a telephone call with Dr Noe whom she sees for a \"spinal cord disorder\" and he Rx steroids. She states it did improve temporarily raina seems to have worsened over the past several days. No fall or injury. Associated decreased hearing of the right ear. No fever.   She states she has had similar pain about 2 years ago that again responded to steroids.          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   • Cervical spondylosis with myelopathy 11/22/2019   • Cervical stenosis of spine 12/06/2019   • Cervical myelopathy (HCC) 12/13/2019   • Weakness of both hands 03/23/2020   • Occipital neuralgia of right side 07/31/2020   • S/P cervical spinal fusion 11/02/2020   • Spasticity 08/16/2021     Resolved Ambulatory Problems     Diagnosis Date Noted   • Spinal stenosis in cervical region 11/22/2019   • Postoperative anemia due to acute blood loss 12/09/2019   • Steroid-induced hyperglycemia 12/09/2019   • Cord compression (HCC) 12/09/2019   • Leukocytosis (due to steroids) 12/09/2019   • Postoperative CSF leak 11/22/2019   • CSF leak 12/19/2019   • Postoperative cerebrospinal " fluid leak 01/09/2020   • Weakness of both arms 06/05/2020   • Neck pain 07/07/2020   • Anemia, chronic disease 07/07/2020     Past Medical History:   Diagnosis Date   • Abnormal alkaline phosphatase test 06/12/2015   • Acute bronchitis 06/12/2015   • Allergic child age   • Cervical spinal stenosis    • Diverticulosis    • H/O electromyography 10/13/2014   • H/O mammogram 07/29/2014   • Headache occassionally   • High cholesterol    • History of EKG 06/12/2015   • Hypercalcemia 06/12/2015   • Left arm pain    • Low back pain March 2017   • Numbness in both hands    • Osteopenia several years   • Paresthesia 06/12/2015         PAST SURGICAL HISTORY  Past Surgical History:   Procedure Laterality Date   • ANTERIOR CHANNEL VERTEBRECTOMY/CORPECTOMY N/A 12/6/2019    Procedure: CERVICAL 6, CERVICAL 7 ANTERIOR CERVICAL CORPECTOMY WITH CAGE AND PLATE;  Surgeon: Antonio Noe MD;  Location: [facility];  Service: Neurosurgery   • BACK SURGERY  1999   • COLONOSCOPY  05/20/2011   • INCISION AND DRAINAGE OF WOUND N/A 12/19/2019    Procedure: followed by drainage of anterior cervical fluid collection;  Surgeon: Antonio Noe MD;  Location: [facility] OR;  Service: Neurosurgery   • LUMBAR DRAIN INSERTION EXTERNAL N/A 12/19/2019    Procedure: LUMBAR DRAIN INSERTION EXTERNAL;  Surgeon: Antonio Noe MD;  Location: [facility] OR;  Service: Neurosurgery   • LUMBAR PERITONEAL SHUNT N/A 1/10/2020    Procedure: LUMBAR PERITONEAL SHUNT PLACEMENT;  Surgeon: Antonio Noe MD;  Location: [facility] OR;  Service: Neurosurgery   • SPINE SURGERY  1999 back    dr abad quintero   • TONSILLECTOMY  child age   • TYMPANOPLASTY           FAMILY HISTORY  Family History   Problem Relation Age of Onset   • Breast cancer Mother    • Stroke Mother    • Colon cancer Mother    • Diabetes Father    • Heart disease Father    • Heart failure Father    • Anxiety disorder Sister    • Nephrolithiasis Sister         Kidney Stones   •  Malig Hyperthermia Neg Hx          SOCIAL HISTORY  Social History     Socioeconomic History   • Marital status: Single   • Number of children: 0   • Years of education: college   Tobacco Use   • Smoking status: Never Smoker   • Smokeless tobacco: Never Used   Substance and Sexual Activity   • Alcohol use: No   • Drug use: No   • Sexual activity: Never         ALLERGIES  Penicillins and Topamax [topiramate]        REVIEW OF SYSTEMS  Review of Systems   All systems reviewed and marked as negative except as listed in HPI       PHYSICAL EXAM    I have reviewed the triage vital signs and nursing notes.    ED Triage Vitals [06/19/22 0856]   Temp Heart Rate Resp BP SpO2   98.1 °F (36.7 °C) 82 17 -- 99 %      Temp src Heart Rate Source Patient Position BP Location FiO2 (%)   Tympanic -- -- -- --       GENERAL: alert well developed, well nourished in no distress  HENT: NCAT, neck supple, trachea midline. Right submandibular region tender. No LAD. No palpable induration. Skin is unremarkable   EYES: no scleral icterus, PERRL, normal conjunctivae  CV: regular rhythm, regular rate, no murmur  RESPIRATORY: unlabored effort, CTAB  ABDOMEN: soft, nontender, nondistended, bowel sounds present  MUSCULOSKELETAL: no gross deformity. Diffuse tenderness of C spine. No paraspinous spasm. BUE strength is intact.   NEURO: alert,  sensory and motor function of extremities grossly intact, speech clear, mental status normal/baseline  SKIN: warm, dry, no rash  PSYCH:  Appropriate mood and affect    Vital signs and nursing notes reviewed.        RADIOLOGY  CT Cervical Spine Without Contrast   Final Result           No acute fracture is identified. Postsurgical and degenerative changes.   If further imaging evaluation is indicated, MRI could be considered       This report was finalized on 6/19/2022 11:25 AM by Dr. Jose Ortiz M.D.              I ordered the above noted radiological studies. Independently reviewed by me and discussed  with radiologist.  See dictation above for official radiology interpretation.      PROCEDURES    Procedures        MEDICATIONS GIVEN IN ER    Medications   morphine injection 4 mg (4 mg Intravenous Given 6/19/22 1005)   ondansetron (ZOFRAN) injection 4 mg (4 mg Intravenous Given 6/19/22 1005)         PROGRESS, DATA ANALYSIS, CONSULTS, AND MEDICAL DECISION MAKING    All labs have been independently reviewed by me.  All radiology studies have been reviewed by me.   EKG's independently reviewed by me.  Discussion below represents my analysis of pertinent findings related to patient's condition, differential diagnosis, treatment plan and final disposition.    DIFFERENTIAL DIAGNOSIS INCLUDE BUT NOT LIMITED TO: Cervical strain, cervical radiculopathy, cervical fracture, retropharyngeal abscess, tonsillitis, pharyngitis, tonsillar abscess, anginal equivalent         MDM: Patient presents with ongoing neck pain.  Her work-up in the ED is essentially unremarkable.  I do not suspect an infectious etiology of her symptoms.  She has had prior similar presentation and seen Dr. Noe, neurosurgeon in the past.  She prescribed Lidoderm patches for pain control and a short course of oral steroids to determine symptomatic improvement.  She will be referred back to see Dr. Noe and close follow-up and I recommended she call for an appointment for Pablo in the morning.  Overall she appears comfortable and in no distress.  She is well-appearing.  Appears stable at time of discharge.      AS OF 09:44 EDT VITALS:        BP - 110/54  HR - 82  TEMP - 98.1 °F (36.7 °C) (Tympanic)  O2 SATS - 95%         Medication List      New Prescriptions    lidocaine 5 %  Commonly known as: Lidoderm  Place 1 patch on the skin as directed by provider Daily. Remove & Discard patch within 12 hours or as directed by MD     methylPREDNISolone 4 MG dose pack  Commonly known as: MEDROL  Take as directed on package instructions.           Where to Get  Your Medications      These medications were sent to [facility] - [address] AT [facility] - [phone] [facility] [phone]   [address], [facility] [address]    Phone: [phone]   · lidocaine 5 %  · methylPREDNISolone 4 MG dose pack           DIAGNOSIS  Final diagnoses:   Strain of neck muscle, initial encounter   Jaw pain         DISPOSITION  Discharge     Pt masked in first look. I wore appropriate PPE throughout my encounters with the pt. I performed hand hygiene on entry into the pt room and upon exit.     Dictated utilizing Dragon dictation:  Much of this encounter note is an electronic transcription/translation of spoken language to printed text.      Diya Buitrago, APRN  06/20/22 2018

## 2022-06-19 NOTE — ED PROVIDER NOTES
The MANDI and I have discussed this patient's history, physical exam and treatment plan.  I provided a substantive portion of the care of this patient.  I have reviewed the documentation and personally had a face to face interaction with the patient and personally performed the physical exam, in its entirety.  I affirm the documentation and agree with the treatment and plan.  The following describes my personal findings.      The patient presents complaining of right neck pain for 6 weeks.  Patient reports the pain radiates to the back of her head into her right ear.  Patient reports her pain is worse with movement of her head to the right, denies recent injury, new weakness, numbness, incontinence.    Comprehensive Physical exam:  Patient is nontoxic appearing conversant, oriented awake, alert  HEENT: normocephalic, atraumatic  Right lateral paravertebral musculature tender palpation without guarding or rebound  Neck: No JVD, no goiter, no pain with ROM  Pulmonary: Nontachypneic breath sounds heard well bilaterally  cardiovascular: Nontachycardic, heart rate in the 50s  Abdomen: Soft, nontender  musculoskeletal: Patient with good range of motion, pulse, sensation x4 extremities  Neuro/psychiatric:calm, appropriate, cooperative  Skin:warm, dry    Anticipate outpatient discharge with plans for follow-up closely with Dr. Noe for recheck, further testing, treatment as needed.  Patient was wearing facemask when I entered the room and throughout our encounter. Full protective equipment was worn throughout this patient encounter including a face mask, eye protection and gloves. Hand hygiene was performed before donning protective equipment and after removal when leaving the room.           Keila Mars MD  06/19/22 7192

## 2022-06-19 NOTE — ED TRIAGE NOTES
Pt to ER from home, c/o right sided neck pain radiating into right ear and head. Pt states this has been going on approx 1 month. Pt aaox4, abc's intact, ambulatory with steady gait, NAD noted at this time.  Pt placed in mask at triage. This RN also wearing a mask.

## 2022-06-19 NOTE — DISCHARGE INSTRUCTIONS
Lidoderm patches as needed  Take steroids as directed  Follow up with Dr Schmitz  Return if worse or new concerns   Continue care with your primary care physician and have your blood pressure regularly checked and managed. Normal blood pressure is 120/80.

## 2022-06-20 ENCOUNTER — TELEPHONE (OUTPATIENT)
Dept: NEUROSURGERY | Facility: CLINIC | Age: 62
End: 2022-06-20

## 2022-06-24 ENCOUNTER — OFFICE VISIT (OUTPATIENT)
Dept: NEUROSURGERY | Facility: CLINIC | Age: 62
End: 2022-06-24

## 2022-06-24 DIAGNOSIS — R25.2 SPASTICITY: ICD-10-CM

## 2022-06-24 DIAGNOSIS — G95.9 CERVICAL MYELOPATHY: ICD-10-CM

## 2022-06-24 DIAGNOSIS — M54.81 OCCIPITAL NEURALGIA OF RIGHT SIDE: Primary | ICD-10-CM

## 2022-06-24 DIAGNOSIS — M54.2 CHRONIC NECK PAIN: ICD-10-CM

## 2022-06-24 DIAGNOSIS — G89.29 CHRONIC NECK PAIN: ICD-10-CM

## 2022-06-24 PROCEDURE — 99442 PR PHYS/QHP TELEPHONE EVALUATION 11-20 MIN: CPT | Performed by: NEUROLOGICAL SURGERY

## 2022-06-24 RX ORDER — GABAPENTIN 400 MG/1
400 CAPSULE ORAL 3 TIMES DAILY
Qty: 90 CAPSULE | Refills: 3 | Status: SHIPPED | OUTPATIENT
Start: 2022-06-24 | End: 2022-07-12

## 2022-06-24 NOTE — PROGRESS NOTES
Subjective   Patient ID: Noemi Bartholomew is a 62 y.o. female is here today for follow-up. She has known neck pain with radiating occipital neuralgia.     You have chosen to receive care through a telephone visit. Do you consent to use a telephone visit for your medical care today? Yes    Ms. Bartholomew reports her pain is still present, it is not unchanged. She states that she did go to the ER on Sunday and they give her a steroid pack and lidocaine patches. She states that they performed a CT scan in the ER.     Unable to complete visit using a video connection to the patient. A phone visit was used to complete this visits. Total time of discussion was 12 minutes.  Was calling from the hospital.  She was at home.  She continues to have the neck pain in fact went to the emergency room and got some pain medications.  The pains in the neck and radiate up the backside of the head.  This is the same pain we been dealing with for the last several years.  She is over 2-1/2 years out from her cervical corpectomy at C6 and C7 for myelopathy.  We talked about increasing her gabapentin last time and she did not want to but is willing to do so this time so we will increase her from 300 mg 3 times daily to 40 mg 3 times daily.  I think it would be reasonable to get a new MRI.  I suspect we are dealing with some cervical facet disease up in the upper cervical region.  She she does not want to go the route of injections although we discussed it before.  Perhaps we can retry some physical therapy.  We will discuss this when she returns after the MRI.    History of Present Illness    The following portions of the patient's history were reviewed and updated as appropriate: allergies, current medications, past family history, past medical history, past social history, past surgical history and problem list.    Review of Systems   Musculoskeletal: Positive for neck pain.   Neurological: Positive for numbness. Negative for weakness.    Psychiatric/Behavioral: Negative for sleep disturbance.           Objective     There were no vitals filed for this visit.  There is no height or weight on file to calculate BMI.      Physical Exam  Neurologic Exam        Assessment & Plan   Independent Review of Radiographic Studies:      The current x-rays done on 1/5/2022 shows stable position of the cage at the C6 and C7 vertebral segment as well as the plate and screws from C5-T1.  Agree with the report.  I personally reviewed the images from the following studies.        Medical Decision Making:      We will go ahead and get a new cervical MRI with and without contrast and increase her gabapentin to 400 mg 3 times daily.  We will see her after the images are done.  Chances are we will try some physical therapy again but I like to examine her.      Diagnoses and all orders for this visit:    1. Occipital neuralgia of right side (Primary)  -     MRI Cervical Spine With & Without Contrast; Future  -     gabapentin (NEURONTIN) 400 MG capsule; Take 1 capsule by mouth 3 (Three) Times a Day.  Dispense: 90 capsule; Refill: 3    2. Chronic neck pain  -     MRI Cervical Spine With & Without Contrast; Future  -     gabapentin (NEURONTIN) 400 MG capsule; Take 1 capsule by mouth 3 (Three) Times a Day.  Dispense: 90 capsule; Refill: 3    3. Spasticity  -     MRI Cervical Spine With & Without Contrast; Future    4. Cervical myelopathy (HCC)  -     MRI Cervical Spine With & Without Contrast; Future      Return in about 3 weeks (around 7/15/2022) for After MRI is done.

## 2022-06-27 ENCOUNTER — TELEPHONE (OUTPATIENT)
Dept: NEUROSURGERY | Facility: CLINIC | Age: 62
End: 2022-06-27

## 2022-06-27 NOTE — TELEPHONE ENCOUNTER
Pt needs to schedule an appt with Dr LABOY after she has her MRI on 7/10/22.  [phone] (Mobile)

## 2022-06-28 ENCOUNTER — TELEPHONE (OUTPATIENT)
Dept: NEUROSURGERY | Facility: CLINIC | Age: 62
End: 2022-06-28

## 2022-06-28 NOTE — TELEPHONE ENCOUNTER
Patient has her MRI scheduled for 7/10/22. She would like to be scheduled for a follow up after MRI appointment.

## 2022-07-05 DIAGNOSIS — Z00.00 HEALTH MAINTENANCE EXAMINATION: Primary | ICD-10-CM

## 2022-07-05 DIAGNOSIS — M81.0 OSTEOPOROSIS, UNSPECIFIED OSTEOPOROSIS TYPE, UNSPECIFIED PATHOLOGICAL FRACTURE PRESENCE: ICD-10-CM

## 2022-07-06 LAB
25(OH)D3+25(OH)D2 SERPL-MCNC: 32.7 NG/ML (ref 30–100)
ALBUMIN SERPL-MCNC: 3.9 G/DL (ref 3.8–4.8)
ALBUMIN/GLOB SERPL: 1.7 {RATIO} (ref 1.2–2.2)
ALP SERPL-CCNC: 74 IU/L (ref 44–121)
ALT SERPL-CCNC: 36 IU/L (ref 0–32)
APPEARANCE UR: CLEAR
AST SERPL-CCNC: 45 IU/L (ref 0–40)
BACTERIA #/AREA URNS HPF: NORMAL /[HPF]
BASOPHILS # BLD AUTO: 0.1 X10E3/UL (ref 0–0.2)
BASOPHILS NFR BLD AUTO: 1 %
BILIRUB SERPL-MCNC: <0.2 MG/DL (ref 0–1.2)
BILIRUB UR QL STRIP: NEGATIVE
BUN SERPL-MCNC: 9 MG/DL (ref 8–27)
BUN/CREAT SERPL: 10 (ref 12–28)
CALCIUM SERPL-MCNC: 8.2 MG/DL (ref 8.7–10.3)
CASTS URNS QL MICRO: NORMAL /LPF
CHLORIDE SERPL-SCNC: 107 MMOL/L (ref 96–106)
CHOLEST SERPL-MCNC: 184 MG/DL (ref 100–199)
CO2 SERPL-SCNC: 25 MMOL/L (ref 20–29)
COLOR UR: YELLOW
CREAT SERPL-MCNC: 0.87 MG/DL (ref 0.57–1)
EGFRCR SERPLBLD CKD-EPI 2021: 75 ML/MIN/1.73
EOSINOPHIL # BLD AUTO: 0.4 X10E3/UL (ref 0–0.4)
EOSINOPHIL NFR BLD AUTO: 6 %
EPI CELLS #/AREA URNS HPF: NORMAL /HPF (ref 0–10)
ERYTHROCYTE [DISTWIDTH] IN BLOOD BY AUTOMATED COUNT: 13 % (ref 11.7–15.4)
GLOBULIN SER CALC-MCNC: 2.3 G/DL (ref 1.5–4.5)
GLUCOSE SERPL-MCNC: 82 MG/DL (ref 65–99)
GLUCOSE UR QL STRIP: NEGATIVE
HCT VFR BLD AUTO: 36.5 % (ref 34–46.6)
HDLC SERPL-MCNC: 56 MG/DL
HGB BLD-MCNC: 11.7 G/DL (ref 11.1–15.9)
HGB UR QL STRIP: NEGATIVE
IMM GRANULOCYTES # BLD AUTO: 0 X10E3/UL (ref 0–0.1)
IMM GRANULOCYTES NFR BLD AUTO: 0 %
KETONES UR QL STRIP: NEGATIVE
LDLC SERPL CALC-MCNC: 97 MG/DL (ref 0–99)
LEUKOCYTE ESTERASE UR QL STRIP: NEGATIVE
LYMPHOCYTES # BLD AUTO: 1.7 X10E3/UL (ref 0.7–3.1)
LYMPHOCYTES NFR BLD AUTO: 29 %
MCH RBC QN AUTO: 31 PG (ref 26.6–33)
MCHC RBC AUTO-ENTMCNC: 32.1 G/DL (ref 31.5–35.7)
MCV RBC AUTO: 97 FL (ref 79–97)
MICRO URNS: NORMAL
MICRO URNS: NORMAL
MONOCYTES # BLD AUTO: 0.5 X10E3/UL (ref 0.1–0.9)
MONOCYTES NFR BLD AUTO: 8 %
NEUTROPHILS # BLD AUTO: 3.2 X10E3/UL (ref 1.4–7)
NEUTROPHILS NFR BLD AUTO: 56 %
NITRITE UR QL STRIP: NEGATIVE
PH UR STRIP: 6.5 [PH] (ref 5–7.5)
PLATELET # BLD AUTO: 198 X10E3/UL (ref 150–450)
POTASSIUM SERPL-SCNC: 4.4 MMOL/L (ref 3.5–5.2)
PROT SERPL-MCNC: 6.2 G/DL (ref 6–8.5)
PROT UR QL STRIP: NEGATIVE
RBC # BLD AUTO: 3.78 X10E6/UL (ref 3.77–5.28)
RBC #/AREA URNS HPF: NORMAL /HPF (ref 0–2)
SODIUM SERPL-SCNC: 144 MMOL/L (ref 134–144)
SP GR UR STRIP: 1.02 (ref 1–1.03)
TRIGL SERPL-MCNC: 183 MG/DL (ref 0–149)
TSH SERPL DL<=0.005 MIU/L-ACNC: 3.87 UIU/ML (ref 0.45–4.5)
UROBILINOGEN UR STRIP-MCNC: 0.2 MG/DL (ref 0.2–1)
VLDLC SERPL CALC-MCNC: 31 MG/DL (ref 5–40)
WBC # BLD AUTO: 5.8 X10E3/UL (ref 3.4–10.8)
WBC #/AREA URNS HPF: NORMAL /HPF (ref 0–5)

## 2022-07-10 ENCOUNTER — HOSPITAL ENCOUNTER (OUTPATIENT)
Dept: MRI IMAGING | Facility: HOSPITAL | Age: 62
Discharge: HOME | End: 2022-07-10
Admitting: NEUROLOGICAL SURGERY

## 2022-07-10 DIAGNOSIS — M54.2 CHRONIC NECK PAIN: ICD-10-CM

## 2022-07-10 DIAGNOSIS — G89.29 CHRONIC NECK PAIN: ICD-10-CM

## 2022-07-10 DIAGNOSIS — M54.81 OCCIPITAL NEURALGIA OF RIGHT SIDE: ICD-10-CM

## 2022-07-10 DIAGNOSIS — R25.2 SPASTICITY: ICD-10-CM

## 2022-07-10 DIAGNOSIS — G95.9 CERVICAL MYELOPATHY: ICD-10-CM

## 2022-07-10 LAB — CREAT BLDA-MCNC: 0.5 MG/DL (ref 0.6–1.3)

## 2022-07-10 PROCEDURE — 0 GADOBENATE DIMEGLUMINE 529 MG/ML SOLUTION: Performed by: NEUROLOGICAL SURGERY

## 2022-07-10 PROCEDURE — 72156 MRI NECK SPINE W/O & W/DYE: CPT

## 2022-07-10 PROCEDURE — A9577 INJ MULTIHANCE: HCPCS | Performed by: NEUROLOGICAL SURGERY

## 2022-07-10 PROCEDURE — 82565 ASSAY OF CREATININE: CPT

## 2022-07-10 RX ADMIN — GADOBENATE DIMEGLUMINE 10 ML: 529 INJECTION, SOLUTION INTRAVENOUS at 14:19

## 2022-07-12 ENCOUNTER — OFFICE VISIT (OUTPATIENT)
Dept: INTERNAL MEDICINE | Facility: CLINIC | Age: 62
End: 2022-07-12

## 2022-07-12 VITALS
OXYGEN SATURATION: 97 % | HEIGHT: 55 IN | HEART RATE: 75 BPM | DIASTOLIC BLOOD PRESSURE: 76 MMHG | BODY MASS INDEX: 24.07 KG/M2 | WEIGHT: 104 LBS | SYSTOLIC BLOOD PRESSURE: 120 MMHG

## 2022-07-12 DIAGNOSIS — R94.5 ABNORMAL RESULTS OF LIVER FUNCTION STUDIES: ICD-10-CM

## 2022-07-12 DIAGNOSIS — Z00.00 WELCOME TO MEDICARE PREVENTIVE VISIT: Primary | ICD-10-CM

## 2022-07-12 DIAGNOSIS — M54.81 OCCIPITAL NEURALGIA OF RIGHT SIDE: ICD-10-CM

## 2022-07-12 PROCEDURE — 1170F FXNL STATUS ASSESSED: CPT | Performed by: INTERNAL MEDICINE

## 2022-07-12 PROCEDURE — G0403 EKG FOR INITIAL PREVENT EXAM: HCPCS | Performed by: INTERNAL MEDICINE

## 2022-07-12 PROCEDURE — 1159F MED LIST DOCD IN RCRD: CPT | Performed by: INTERNAL MEDICINE

## 2022-07-12 PROCEDURE — G0402 INITIAL PREVENTIVE EXAM: HCPCS | Performed by: INTERNAL MEDICINE

## 2022-07-12 RX ORDER — BUTALBITAL, ACETAMINOPHEN AND CAFFEINE 50; 325; 40 MG/1; MG/1; MG/1
1 TABLET ORAL EVERY 4 HOURS PRN
Qty: 30 TABLET | Refills: 0 | Status: SHIPPED | OUTPATIENT
Start: 2022-07-12

## 2022-07-12 RX ORDER — GABAPENTIN 300 MG/1
300 CAPSULE ORAL 3 TIMES DAILY
Qty: 270 CAPSULE | Refills: 1
Start: 2022-07-12 | End: 2022-07-13 | Stop reason: SDUPTHER

## 2022-07-12 NOTE — PATIENT INSTRUCTIONS
Medicare Wellness  Personal Prevention Plan of Service     Date of Office Visit:    Encounter Provider:  Mary Lou Carroll MD  Place of Service:  [facility]  Patient Name: Noemi Bartholomew  :  1960    As part of the Medicare Wellness portion of your visit today, we are providing you with this personalized preventive plan of services (PPPS). This plan is based upon recommendations of the [place] Preventive Services Task Force (USPSTF) and the Advisory Committee on Immunization Practices (ACIP).    This lists the preventive care services that should be considered, and provides dates of when you are due. Items listed as completed are up-to-date and do not require any further intervention.    Health Maintenance   Topic Date Due    ANNUAL WELLNESS VISIT  Never done    DXA SCAN  2020    PAP SMEAR  2020    MAMMOGRAM  2021    INFLUENZA VACCINE  10/01/2022    LIPID PANEL  2023    COLORECTAL CANCER SCREENING  2026    TDAP/TD VACCINES (3 - Td or Tdap) 2026    COVID-19 Vaccine  Completed    ZOSTER VACCINE  Completed    HEPATITIS C SCREENING  Addressed    Pneumococcal Vaccine 0-64  Aged Out       Orders Placed This Encounter   Procedures    Comprehensive Metabolic Panel     Order Specific Question:   Release to patient     Answer:   Immediate       Return in about 1 year (around 2023).

## 2022-07-12 NOTE — PROGRESS NOTES
The ABCs of the Annual Wellness Visit  [place] to Medicare Visit    Chief Complaint   Patient presents with   • Welcome To Medicare     Subjective {   History of Present Illness:  Noemi Bartholomew is a 62 y.o. female who presents for a  Welcome to Medicare Visit.    The following data was reviewed by: Mary Lou Carroll MD on 07/12/2022:  Common labs    Common Labsle 5/7/22 5/7/22 7/5/22 7/5/22 7/5/22 7/10/22    0730 0730 0811 0811 0811    Glucose  97  82     BUN  15  9     Creatinine  0.64  0.87  0.50 (A)   Sodium  143  144     Potassium  4.3  4.4     Chloride  107  107 (A)     Calcium  8.7  8.2 (A)     Total Protein    6.2     Albumin  3.70  3.9     Total Bilirubin  0.2  <0.2     Alkaline Phosphatase  78  74     AST (SGOT)  18  45 (A)     ALT (SGPT)  18  36 (A)     WBC 8.59  5.8      Hemoglobin 12.4  11.7      Hematocrit 39.7  36.5      Platelets 250  198      Total Cholesterol     184    Triglycerides     183 (A)    HDL Cholesterol     56    LDL Cholesterol      97    (A) Abnormal value       Comments are available for some flowsheets but are not being displayed.           HLD. Good control.  OP.  Managed by her gynecologist.    Minor increased in LFTs.    Pap and mammograms done by GYN.      Biggest issue with intermittent right sided neck pain/posterior headache.  Dr LABOY has diagnosed occipatal neuralgia.  She manages with gabapentin, prn steroid paks, Fioricet is also helpful.      The following portions of the patient's history were reviewed and   updated as appropriate: allergies, current medications, past family history, past medical history, past social history, past surgical history and problem list.     Compared to one year ago, the patient feels her physical   health is the same.    Compared to one year ago, the patient feels her mental   health is the same.    Recent Hospitalizations:  She was not admitted to the hospital during the last year.       Current Medical Providers:  Patient Care Team:  Carly  Mary Lou LIU MD as PCP - General (Internal Medicine)  Kerri Hernandez MD as Consulting Physician (Obstetrics and Gynecology)    Outpatient Medications Prior to Visit   Medication Sig Dispense Refill   • atorvastatin (LIPITOR) 20 MG tablet Take 1 tablet by mouth Every Night. 90 tablet 1   • Calcium Carb-Cholecalciferol (CALCIUM 1000 + D PO) Take  by mouth Daily. 2 caps     • cetirizine (zyrTEC) 10 MG tablet Take 10 mg by mouth Daily.     • denosumab (PROLIA) 60 MG/ML solution prefilled syringe syringe      • docusate sodium (COLACE) 100 MG capsule Take 100 mg by mouth 2 (Two) Times a Day.     • lidocaine (Lidoderm) 5 % Place 1 patch on the skin as directed by provider Daily. Remove & Discard patch within 12 hours or as directed by MD 10 patch 0   • melatonin 3 MG tablet Take  by mouth.     • naproxen sodium (ALEVE) 220 MG tablet Take 220 mg by mouth 2 (Two) Times a Day With Meals.     • Probiotic Product (Pixia PO) Take  by mouth.     • baclofen (LIORESAL) 10 MG tablet Take 1 tablet by mouth 2 (Two) Times a Day. 60 tablet 3   • gabapentin (NEURONTIN) 400 MG capsule Take 1 capsule by mouth 3 (Three) Times a Day. 90 capsule 3   • methylPREDNISolone (MEDROL) 4 MG dose pack Take as directed on package instructions. 1 each 0   • azelastine (ASTELIN) 0.1 % nasal spray 2 sprays into the nostril(s) as directed by provider 2 (Two) Times a Day. Use in each nostril as directed 30 mL 12   • cholecalciferol (VITAMIN D3) 10 MCG (400 UNIT) tablet Take 2,000 Units by mouth Daily.     • glycerin adult 2 g suppository Insert 1 suppository into the rectum As Needed for Constipation. 12 each 0     No facility-administered medications prior to visit.       No opioid medication identified on active medication list. I have reviewed chart for other potential  high risk medication/s and harmful drug interactions in the elderly.          Aspirin is not on active medication list.  Aspirin use is not indicated based on review  "of current medical condition/s. Risk of harm outweighs potential benefits.  .    Patient Active Problem List   Diagnosis   • Carpal tunnel syndrome   • Hyperlipidemia   • Osteoporosis   • DDD (degenerative disc disease), lumbar   • Chronic left-sided lumbar radiculopathy   • Congenital spondylolysis, lumbosacral region   • Neuropathic pain, leg, left   • Ossification of spinal ligament   • Cervical spondylosis with myelopathy   • Cervical stenosis of spine   • Cervical myelopathy (HCC)   • Weakness of both hands   • Chronic neck pain   • Occipital neuralgia of right side   • S/P cervical spinal fusion   • Spasticity     Advance Care Planning  Advance Directive is on file.  ACP discussion was held with the patient during this visit. Patient has an advance directive in EMR which is still valid.     Review of Systems   Respiratory: Negative.    Cardiovascular: Negative.    Musculoskeletal: Positive for neck pain.        Objective      Vitals:    07/12/22 0749   BP: 120/76   Pulse: 75   SpO2: 97%   Weight: 47.2 kg (104 lb)   Height: 134.6 cm (52.99\")   PainSc: 0-No pain     Estimated body mass index is 26.04 kg/m² as calculated from the following:    Height as of this encounter: 134.6 cm (52.99\").    Weight as of this encounter: 47.2 kg (104 lb).    BMI is >= 25 and <30. (Overweight) The following options were offered after discussion;: exercise counseling/recommendations      Does the patient have evidence of cognitive impairment? No    Physical Exam  Constitutional:       Appearance: She is well-developed.   HENT:      Head: Normocephalic and atraumatic.      Right Ear: Hearing, tympanic membrane and external ear normal.      Left Ear: Hearing, tympanic membrane and external ear normal.      Nose: Nose normal.   Neck:      Thyroid: No thyromegaly.   Cardiovascular:      Rate and Rhythm: Normal rate and regular rhythm.      Heart sounds: Normal heart sounds. No murmur heard.  Pulmonary:      Effort: Pulmonary effort is " normal.      Breath sounds: Normal breath sounds.   Chest:   Breasts:      Right: No mass.      Left: No mass.       Abdominal:      General: There is no distension.      Palpations: Abdomen is soft.      Tenderness: There is no abdominal tenderness.   Musculoskeletal:      Cervical back: Neck supple.   Lymphadenopathy:      Cervical: No cervical adenopathy.   Skin:     General: Skin is warm and dry.   Neurological:      Mental Status: She is alert and oriented to person, place, and time.   Psychiatric:         Speech: Speech normal.         Behavior: Behavior normal.         Thought Content: Thought content normal.         Judgment: Judgment normal.         Lab Results   Component Value Date    CHLPL 184 07/05/2022    TRIG 183 (H) 07/05/2022    HDL 56 07/05/2022    LDL 97 07/05/2022    VLDL 31 07/05/2022         ECG 12 Lead    Date/Time: 7/12/2022 8:40 AM  Performed by: Mary Lou Carroll MD  Authorized by: Mary Lou Carroll MD   Comparison: compared with previous ECG from 8/9/2021  Similar to previous ECG  Rhythm: sinus rhythm  Rate: normal  Conduction: conduction normal  ST Segments: ST segments normal  T Waves: T waves normal  QRS axis: normal    Clinical impression: normal ECG               HEALTH RISK ASSESSMENT    Smoking Status:  Social History     Tobacco Use   Smoking Status Never Smoker   Smokeless Tobacco Never Used     Alcohol Consumption:  Social History     Substance and Sexual Activity   Alcohol Use No       Fall Risk Screen:    [facility] Fall Risk Assessment has not been completed.    Depression Screen:   PHQ-2/PHQ-9 Depression Screening 7/12/2022   Retired Total Score -   Little Interest or Pleasure in Doing Things 0-->not at all   Feeling Down, Depressed or Hopeless 0-->not at all   PHQ-9: Brief Depression Severity Measure Score 0       Health Habits and Functional and Cognitive Screening:  No flowsheet data found.    Visual Acuity:    No exam data present    Age-appropriate Screening Schedule:  Refer  to the list below for future screening recommendations based on patient's age, sex and/or medical conditions. Orders for these recommended tests are listed in the plan section. The patient has been provided with a written plan.    Health Maintenance   Topic Date Due   • DXA SCAN  08/01/2020   • PAP SMEAR  08/16/2020   • MAMMOGRAM  08/01/2021   • INFLUENZA VACCINE  10/01/2022   • LIPID PANEL  07/05/2023   • TDAP/TD VACCINES (3 - Td or Tdap) 11/04/2026   • ZOSTER VACCINE  Completed        .procdo    Assessment & Plan   CMS Preventative Services Quick Reference  Risk Factors Identified During Encounter  Chronic Pain   The above risks/problems have been discussed with the patient.  Pertinent information has been shared with the patient in the After Visit Summary.  Follow up plans and orders are seen below in the Assessment/Plan Section.    Diagnoses and all orders for this visit:    1. Welcome to Medicare preventive visit (Primary)    2. Occipital neuralgia of right side  -     gabapentin (NEURONTIN) 300 MG capsule; Take 1 capsule by mouth 3 (Three) Times a Day.  Dispense: 270 capsule; Refill: 1  -     butalbital-acetaminophen-caffeine (Esgic) -40 MG per tablet; Take 1 tablet by mouth Every 4 (Four) Hours As Needed for Headache.  Dispense: 30 tablet; Refill: 0    3. Abnormal results of liver function studies  -     Comprehensive Metabolic Panel    Other orders  -     ECG 12 Lead    Occipital neuralgia.  Continue gabapentin.  Max tolerated is 300 tid.  Prn Fioricet.  Prn medrol dose [place].  At upcoming visit with neurosurgery she will inquire at PT or epidurals.    Increased LFTs.  Recheck today.      Follow Up:   Return in about 1 year (around 7/12/2023).     An After Visit Summary and PPPS were made available to the patient.

## 2022-07-13 DIAGNOSIS — M54.81 OCCIPITAL NEURALGIA OF RIGHT SIDE: ICD-10-CM

## 2022-07-13 LAB
ALBUMIN SERPL-MCNC: 4.4 G/DL (ref 3.8–4.8)
ALBUMIN/GLOB SERPL: 1.8 {RATIO} (ref 1.2–2.2)
ALP SERPL-CCNC: 78 IU/L (ref 44–121)
ALT SERPL-CCNC: 31 IU/L (ref 0–32)
AST SERPL-CCNC: 31 IU/L (ref 0–40)
BILIRUB SERPL-MCNC: 0.2 MG/DL (ref 0–1.2)
BUN SERPL-MCNC: 23 MG/DL (ref 8–27)
BUN/CREAT SERPL: 32 (ref 12–28)
CALCIUM SERPL-MCNC: 9.7 MG/DL (ref 8.7–10.3)
CHLORIDE SERPL-SCNC: 101 MMOL/L (ref 96–106)
CO2 SERPL-SCNC: 23 MMOL/L (ref 20–29)
CREAT SERPL-MCNC: 0.72 MG/DL (ref 0.57–1)
EGFRCR SERPLBLD CKD-EPI 2021: 94 ML/MIN/1.73
GLOBULIN SER CALC-MCNC: 2.4 G/DL (ref 1.5–4.5)
GLUCOSE SERPL-MCNC: 85 MG/DL (ref 65–99)
POTASSIUM SERPL-SCNC: 4.9 MMOL/L (ref 3.5–5.2)
PROT SERPL-MCNC: 6.8 G/DL (ref 6–8.5)
SODIUM SERPL-SCNC: 140 MMOL/L (ref 134–144)

## 2022-07-13 RX ORDER — GABAPENTIN 300 MG/1
300 CAPSULE ORAL 3 TIMES DAILY
Qty: 270 CAPSULE | Refills: 1 | Status: SHIPPED | OUTPATIENT
Start: 2022-07-13 | End: 2023-01-17

## 2022-07-22 ENCOUNTER — OFFICE VISIT (OUTPATIENT)
Dept: NEUROSURGERY | Facility: CLINIC | Age: 62
End: 2022-07-22

## 2022-07-22 VITALS
SYSTOLIC BLOOD PRESSURE: 120 MMHG | OXYGEN SATURATION: 99 % | HEART RATE: 70 BPM | BODY MASS INDEX: 24.07 KG/M2 | WEIGHT: 104 LBS | HEIGHT: 55 IN | DIASTOLIC BLOOD PRESSURE: 60 MMHG | TEMPERATURE: 97.8 F

## 2022-07-22 DIAGNOSIS — M54.81 OCCIPITAL NEURALGIA OF RIGHT SIDE: Primary | ICD-10-CM

## 2022-07-22 DIAGNOSIS — R25.2 SPASTICITY: ICD-10-CM

## 2022-07-22 DIAGNOSIS — G95.9 CERVICAL MYELOPATHY: ICD-10-CM

## 2022-07-22 DIAGNOSIS — M54.2 CHRONIC NECK PAIN: ICD-10-CM

## 2022-07-22 DIAGNOSIS — G89.29 CHRONIC NECK PAIN: ICD-10-CM

## 2022-07-22 PROCEDURE — 99214 OFFICE O/P EST MOD 30 MIN: CPT | Performed by: NEUROLOGICAL SURGERY

## 2022-07-22 NOTE — PROGRESS NOTES
Subjective   Patient ID: Noemi Bartholomew is a 62 y.o. female is here today for follow-up for occipital neuralgia of right side with neck pain after MRI.    Ms. Bartholomew reports her pain is a little bit better but she needs to learn how to manage it better. She reports she does have pain in her hands. She reports right sided neck pain right behind her ear and down into the shoulder and top of her back. She reports her neck is stiff. She also went back to 300 mg of Gabapentin as the 400 mg was too much for her.     She came back to discuss her MRI.  She does have a known right C2-C3 bone spur compressing the upper cervical roots contributing to her occipital neuralgia.  She has a lot of chronic neck pain.  There is a little bit of residual bone spur on the area of the decompression but by enlarge it looks pretty good.  She could not tolerate the 400 mg 3 times daily of gabapentin so she went back to 300 3 times daily which was given by her primary care physician.  She is still reluctant to do injections but she is willing to consider it.  But we decided both to try some therapy again.  I will asked the therapist to try dry needling and electrostimulation.  We will have her come back in 3 months if she is not any better then we can send her to pain management for some injections, perhaps occipital nerve injections or even facet injections and a possible ablation.      Neck Pain   This is a chronic problem. The current episode started more than 1 year ago. The problem occurs constantly. The problem has been unchanged. The pain is present in the right side. The quality of the pain is described as aching. The pain is at a severity of 7/10. The pain is moderate. The symptoms are aggravated by position, bending and twisting. Associated symptoms include headaches, numbness and tingling. Pertinent negatives include no fever, leg pain or weakness.       The following portions of the patient's history were reviewed and updated as  "appropriate: allergies, current medications, past family history, past medical history, past social history, past surgical history and problem list.    Review of Systems   Constitutional: Negative for activity change and fever.   Musculoskeletal: Positive for neck pain and neck stiffness.   Neurological: Positive for tingling, numbness and headaches. Negative for weakness.   Psychiatric/Behavioral: Negative for sleep disturbance.   All other systems reviewed and are negative.          Objective     Vitals:    07/22/22 1453   BP: 120/60   Pulse: 70   Temp: 97.8 °F (36.6 °C)   SpO2: 99%   Weight: 47.2 kg (104 lb)   Height: 134.6 cm (52.99\")     Body mass index is 26.04 kg/m².      Physical Exam  Constitutional:       Appearance: She is well-developed.   HENT:      Head: Normocephalic and atraumatic.   Eyes:      Extraocular Movements: EOM normal.      Conjunctiva/sclera: Conjunctivae normal.      Pupils: Pupils are equal, round, and reactive to light.   Neck:      Vascular: No carotid bruit.   Neurological:      Mental Status: She is oriented to person, place, and time.      Coordination: Finger-Nose-Finger Test and Heel to Shin Test normal.      Gait: Gait is intact.      Deep Tendon Reflexes:      Reflex Scores:       Tricep reflexes are 2+ on the right side and 2+ on the left side.       Bicep reflexes are 2+ on the right side and 2+ on the left side.       Brachioradialis reflexes are 2+ on the right side and 2+ on the left side.       Patellar reflexes are 2+ on the right side and 2+ on the left side.       Achilles reflexes are 2+ on the right side and 2+ on the left side.  Psychiatric:         Speech: Speech normal.       Neurologic Exam     Mental Status   Oriented to person, place, and time.   Registration of memory: Good recent and remote memory.   Attention: normal. Concentration: normal.   Speech: speech is normal   Level of consciousness: alert  Knowledge: consistent with education.     Cranial Nerves "     CN II   Visual fields full to confrontation.   Visual acuity: normal    CN III, IV, VI   Pupils are equal, round, and reactive to light.  Extraocular motions are normal.     CN V   Facial sensation intact.   Right corneal reflex: normal  Left corneal reflex: normal    CN VII   Facial expression full, symmetric.   Right facial weakness: none  Left facial weakness: none    CN VIII   Hearing: intact    CN IX, X   Palate: symmetric    CN XI   Right sternocleidomastoid strength: normal  Left sternocleidomastoid strength: normal    CN XII   Tongue: not atrophic  Tongue deviation: none    Motor Exam   Muscle bulk: normal  Right arm tone: normal  Left arm tone: normal  Right leg tone: normal  Left leg tone: normal    Strength   Strength 5/5 except as noted.     Sensory Exam   Light touch normal.     Gait, Coordination, and Reflexes     Gait  Gait: normal    Coordination   Finger to nose coordination: normal  Heel to shin coordination: normal    Reflexes   Right brachioradialis: 2+  Left brachioradialis: 2+  Right biceps: 2+  Left biceps: 2+  Right triceps: 2+  Left triceps: 2+  Right patellar: 2+  Left patellar: 2+  Right achilles: 2+  Left achilles: 2+  Right : 2+  Left : 2+          Assessment & Plan   Independent Review of Radiographic Studies:      I personally reviewed the images from the following studies.    The MRI done on 7/10/2022 shows a right C2-C3 bone spur compressing the upper cervical root on the right.  There is no residual stenosis from C7-T1 of any appreciable significance.  There is a little bit of residual bone spur laterally in the canal.  Agree with the report.        Medical Decision Making:      She will stick with her gabapentin at 300 mg 3 times daily which she actually gets from her primary care physician.  We will try some physical therapy and have her come back in 3 months.  She is now willing to try injections and we might have the pain doctors try some facet injections in the neck  but will hold off on that until the next time when we see her to reassess.      Diagnoses and all orders for this visit:    1. Occipital neuralgia of right side (Primary)  -     Ambulatory Referral to Physical Therapy Evaluate and treat    2. Chronic neck pain  -     Ambulatory Referral to Physical Therapy Evaluate and treat    3. Cervical myelopathy (HCC)  -     Ambulatory Referral to Physical Therapy Evaluate and treat    4. Spasticity  -     Ambulatory Referral to Physical Therapy Evaluate and treat      Return in about 3 months (around 10/22/2022) for Face-to-face.

## 2022-08-16 ENCOUNTER — TREATMENT (OUTPATIENT)
Dept: PHYSICAL THERAPY | Facility: CLINIC | Age: 62
End: 2022-08-16

## 2022-08-16 DIAGNOSIS — M54.2 PAIN, NECK: Primary | ICD-10-CM

## 2022-08-16 PROCEDURE — 97161 PT EVAL LOW COMPLEX 20 MIN: CPT | Performed by: PHYSICAL THERAPIST

## 2022-08-16 PROCEDURE — 97140 MANUAL THERAPY 1/> REGIONS: CPT | Performed by: PHYSICAL THERAPIST

## 2022-08-16 NOTE — PROGRESS NOTES
Physical Therapy Initial Evaluation and Plan of Care      Patient: Noemi Bartholomew   : 1960  Diagnosis/ICD-10 Code:  Pain, neck [M54.2]  Referring practitioner: Antonio Noe MD    Subjective Evaluation    History of Present Illness  Mechanism of injury: Ms. Bartholomew reports her pain is a little bit better but she wants to know what to do to help it. She reports right sided neck pain right behind her ear and down into the shoulder and top of her back. She reports her neck is stiff. SHe is taking GAbapentin and also Tylenol and Advil.      MRI report per MD note: She does have a known right C2-C3 bone spur compressing the upper cervical roots contributing to her occipital neuralgia.  She has a lot of chronic neck pain.  There is a little bit of residual bone spur on the area of the decompression but by enlarge it looks pretty good.    C5-T1 plate and screws /corpectomy 2019 first one. 2 followup surgeries. 2020 pain behind ear. Alleve helped.  Pain started 2022  Walk 30 min every day  Dry needling on back ?      Patient Occupation: disability   Precautions and Work Restrictions: lives with her twin sisterQuality of life: good    Pain  Location: R upper cervical to scapula  Quality: dull ache, tight, pressure and discomfort  Relieving factors: medications  Aggravating factors: movement, prolonged positioning and repetitive movement  Progression: no change    Social Support  Lives with: sister.    Hand dominance: right    Diagnostic Tests  MRI studies: abnormal    Treatments  Previous treatment: medication  Patient Goals  Patient goals for therapy: decreased pain, increased motion and independence with ADLs/IADLs       This is a chronic problem. The current episode started more than 1 year ago. The problem occurs constantly. The problem has been unchanged. The pain is present in the right side. The quality of the pain is described as aching. The pain is at a severity of 7/10. The pain is moderate.  The symptoms are aggravated by position, bending and twisting. Associated symptoms include headaches, numbness and tingling. Pertinent negatives include no fever, leg pain or weakness.       Objective          Active Range of Motion   Cervical/Thoracic Spine   Cervical    Flexion: 45 degrees   Extension: 25 degrees   Left lateral flexion: 25 degrees   Right lateral flexion: 25 degrees with pain  Left rotation: 50 (flexes) degrees   Right rotation: 50 degrees with pain  Left Shoulder   Flexion: WFL  Abduction: WFL    Right Shoulder   Flexion: Right shoulder active forward flexion: neck pain and shoulder. WFL and with pain  Abduction: WFL and with pain    Additional Active Range of Motion Details  Decreased segmental mobility R C2-3 and OA    Strength/Myotome Testing     Left Shoulder     Planes of Motion   Flexion: 5   Extension: 5   Abduction: 5     Right Shoulder     Planes of Motion   Flexion: 5   Extension: 5   Abduction: 5     Left Elbow   Flexion: 5  Extension: 4    Right Elbow   Flexion: 5  Extension: 4          Assessment & Plan     Assessment  Impairments: abnormal muscle tone, abnormal or restricted ROM, activity intolerance, lacks appropriate home exercise program and pain with function  Functional Limitations: carrying objects, uncomfortable because of pain and moving in bed  Assessment details: Pt is a good candidate for skilled PT intervention to restore functional AROM and strength to return to previous level of ADL's.  Prognosis: good    Goals  Plan Goals: Short Term Goals ( 2 weeks)  1. Pt to be independent with HEP  2. Pt to exhibit increased cervical segmental mobility to allow for increased AROM  3. Pt to demonstrate proper posture without verbal cues  4. Pt to report decreased pain with ADL's    Long Term Goals (  6 weeks)  1. Pt to exhibit 60 degrees of cervical rotation B to allow for viewing traffic without pain or limitations  2. Pt to exhibit 5/5 strength for DCF to allow for lifting and  more strenuous daily activities  3. Pt to report ability to sleep through the night without awakening  4. Pt able to perform ADL's and recreational activities without pain     Plan  Therapy options: will be seen for skilled therapy services  Planned modality interventions: cryotherapy, electrical stimulation/Russian stimulation, thermotherapy (hydrocollator packs) and ultrasound  Planned therapy interventions: body mechanics training, flexibility, home exercise program, joint mobilization, manual therapy, neuromuscular re-education, postural training, soft tissue mobilization, spinal/joint mobilization, strengthening, stretching and therapeutic activities  Frequency: 2x week  Duration in weeks: 6  Treatment plan discussed with: patient        Manual Therapy:    20     mins  57867;  Therapeutic Exercise:    5     mins  06247;     Neuromuscular Jared:        mins  32242;    Therapeutic Activity:          mins  36225;     Gait Training:           mins  18844;     Ultrasound:          mins  72305;    Electrical Stimulation:         mins  18849 ( );  Dry Needling          mins self-pay    Timed Treatment:   25   mins   Total Treatment:     50   mins    PT SIGNATURE: Jolynn Jauregui PT   [place] License # [id number]  DATE TREATMENT INITIATED: 8/18/2022    Medicare Initial Certification  Certification Period: 11/16/2022  I certify that the therapy services are furnished while this patient is under my care.  The services outlined above are required by this patient, and will be reviewed every 90 days.     PHYSICIAN: Antonio Noe MD      DATE:     Please sign and return via fax to [phone].. Thank you, [facility].

## 2022-08-19 ENCOUNTER — TREATMENT (OUTPATIENT)
Dept: PHYSICAL THERAPY | Facility: CLINIC | Age: 62
End: 2022-08-19

## 2022-08-19 DIAGNOSIS — M54.2 PAIN, NECK: Primary | ICD-10-CM

## 2022-08-19 PROCEDURE — 97140 MANUAL THERAPY 1/> REGIONS: CPT | Performed by: PHYSICAL THERAPIST

## 2022-08-19 NOTE — PROGRESS NOTES
Physical Therapy Daily Progress Note        Subjective     Noemi [place] reports: No change yet. Getting dizzy with getting up from supine to sit. I have seen Nancy in the past for vertigo    Objective   + Pa Halpike L  See Exercise, Manual, and Modality Logs for complete treatment.       Assessment/Plan  Performed Epley on L. Pt stated that the manual treatment to her neck felt good.    Progress per Plan of Care           Manual Therapy:  25       mins  45585;  Therapeutic Exercise: 8      mins  48017;     Neuromuscular Jared:       mins  38445;    Therapeutic Activity:         mins  14956;     Gait Training:         mins  46993;     Ultrasound:         mins  75522;    Electrical Stimulation:        mins  92648 ( );  Dry Needling         mins self-pay    Timed Treatment:   33   mins   Total Treatment:     43   mins    Jolynn Jauregui, PT  Physical Therapist  [place] License # [id number]

## 2022-08-22 ENCOUNTER — TREATMENT (OUTPATIENT)
Dept: PHYSICAL THERAPY | Facility: CLINIC | Age: 62
End: 2022-08-22

## 2022-08-22 DIAGNOSIS — H81.12 VERTIGO, BENIGN PAROXYSMAL, LEFT: ICD-10-CM

## 2022-08-22 DIAGNOSIS — M54.2 PAIN, NECK: Primary | ICD-10-CM

## 2022-08-22 PROCEDURE — 97110 THERAPEUTIC EXERCISES: CPT | Performed by: PHYSICAL THERAPIST

## 2022-08-22 PROCEDURE — 97140 MANUAL THERAPY 1/> REGIONS: CPT | Performed by: PHYSICAL THERAPIST

## 2022-08-22 NOTE — PROGRESS NOTES
Physical Therapy Daily Progress Note        Subjective     Noemi [place] reports: LAst treatment helped a little. DIzziness a little better    Objective   Very mild Pa HAlpike L  See Exercise, Manual, and Modality Logs for complete treatment.       Assessment/Plan  Improving PROM Cspine with less pain. Had some dizziness at end of Epley which she didn't have the other day    Progress per Plan of Care           Manual Therapy:  20       mins  09322;  Therapeutic Exercise: 8      mins  40450;     Neuromuscular Jared:   5    mins  55137;    Therapeutic Activity:         mins  53148;     Gait Training:         mins  49954;     Ultrasound:         mins  06446;    Electrical Stimulation:        mins  72507 ( );  Dry Needling         mins self-pay    Timed Treatment:   33   mins   Total Treatment:     45   mins    Jolynn Jauregui, PT  Physical Therapist  [place] License # [id number]

## 2022-08-24 ENCOUNTER — TREATMENT (OUTPATIENT)
Dept: PHYSICAL THERAPY | Facility: CLINIC | Age: 62
End: 2022-08-24

## 2022-08-24 DIAGNOSIS — M54.2 PAIN, NECK: Primary | ICD-10-CM

## 2022-08-24 DIAGNOSIS — H81.12 VERTIGO, BENIGN PAROXYSMAL, LEFT: ICD-10-CM

## 2022-08-24 PROCEDURE — 97140 MANUAL THERAPY 1/> REGIONS: CPT | Performed by: PHYSICAL THERAPIST

## 2022-08-24 NOTE — PROGRESS NOTES
Physical Therapy Daily Progress Note        Subjective     Noemi [place] reports: Neck feeling a little better. Still having some vertigo with turning in bed and getting up. Will plan to schedule with Nancy Diego    Objective   See Exercise, Manual, and Modality Logs for complete treatment.       Assessment/Plan  Did well with new exercises. Improving cervical mobility. Attempted to do thoracic open books but pt became dizzy with turning head to R in L sidelying postion    Progress per Plan of Care           Manual Therapy:  25       mins  30670;  Therapeutic Exercise: 10      mins  22768;     Neuromuscular Jared:       mins  19048;    Therapeutic Activity:         mins  23425;     Gait Training:         mins  33628;     Ultrasound:         mins  34579;    Electrical Stimulation:        mins  73553 ( );  Dry Needling         mins self-pay    Timed Treatment:   35   mins   Total Treatment:     47   mins    Jolynn Jauregui, PT  Physical Therapist  [place] License # [id number]

## 2022-08-30 ENCOUNTER — TREATMENT (OUTPATIENT)
Dept: PHYSICAL THERAPY | Facility: CLINIC | Age: 62
End: 2022-08-30

## 2022-08-30 DIAGNOSIS — M54.2 PAIN, NECK: Primary | ICD-10-CM

## 2022-08-30 PROCEDURE — 97140 MANUAL THERAPY 1/> REGIONS: CPT | Performed by: PHYSICAL THERAPIST

## 2022-08-30 NOTE — PROGRESS NOTES
Physical Therapy Daily Progress Note        Subjective     Noemi [place] reports: I am feeling a little better. Some days my neck feels good then next day hurts again. I want to keep doing PT until my neck feels better most days. My R jaw hurts sometimes too and I feel like it is affecting my hearing on R side.L side is already bad    Objective   See Exercise, Manual, and Modality Logs for complete treatment.       Assessment/Plan  Progressed to CT stab with chin tuck off pillow with B arm raise. Difficult but able to do with rest periods    Progress per Plan of Care           Manual Therapy:  30       mins  71776;  Therapeutic Exercise: 5      mins  36504;     Neuromuscular Jared:       mins  89014;    Therapeutic Activity:         mins  55865;     Gait Training:         mins  96301;     Ultrasound:         mins  66424;    Electrical Stimulation:        mins  96064 ( );  Dry Needling         mins self-pay    Timed Treatment:   35   mins   Total Treatment:     47   mins    Jolynn Jauregui, PT  Physical Therapist  [place] License # [id number]

## 2022-09-01 ENCOUNTER — TREATMENT (OUTPATIENT)
Dept: PHYSICAL THERAPY | Facility: CLINIC | Age: 62
End: 2022-09-01

## 2022-09-01 DIAGNOSIS — M54.2 PAIN, NECK: Primary | ICD-10-CM

## 2022-09-01 PROCEDURE — 97110 THERAPEUTIC EXERCISES: CPT | Performed by: PHYSICAL THERAPIST

## 2022-09-01 PROCEDURE — 97012 MECHANICAL TRACTION THERAPY: CPT | Performed by: PHYSICAL THERAPIST

## 2022-09-01 PROCEDURE — 97140 MANUAL THERAPY 1/> REGIONS: CPT | Performed by: PHYSICAL THERAPIST

## 2022-09-01 NOTE — PROGRESS NOTES
Re-Assessment / Re-Certification      Patient: Noemi Bartholomew   : 1960  Diagnosis/ICD-10 Code:  Pain, neck [M54.2]  Referring practitioner: Antonio Noe MD  Date of Initial Visit: 2022  Today's Date: 2022  Patient seen for 5 sessions      Subjective:   Noemi Bartholomew reports: I am a little better but still tight and sore R neck and jaw area.   Subjective Questionnaire: NDI:38%  Clinical Progress: improved  Home Program Compliance: Yes  Treatment has included: therapeutic exercise, manual therapy, traction and moist heat    Objective   Flexion:55/ 45 degrees   Extension: 25/25 degrees   Left lateral flexion: 30/25 degrees   Right lateral flexion: 3025 degrees   Left rotation: 43/50 (flexes) degrees   Right rotation: 47/50 degrees with pain    Triceps    Assessment/Plan   Improved flexion ad sidebending. SLightly less rotation B. Improved posture and neck flexor strength    Short Term Goals ( 2 weeks)  1. Pt to be independent with HEP MET  2. Pt to exhibit increased cervical segmental mobility to allow for increased AROM MET  3. Pt to demonstrate proper posture without verbal cues MET  4. Pt to report decreased pain with ADL's SLIGHTLY    Long Term Goals (  6 weeks) PROGRESSING  1. Pt to exhibit 60 degrees of cervical rotation B to allow for viewing traffic without pain or limitations  2. Pt to exhibit 5/5 strength for DCF to allow for lifting and more strenuous daily activities MET  3. Pt to report ability to sleep through the night without awakening  4. Pt able to perform ADL's and recreational activities without pain    Plan  Progress toward previous goals: Partially Met        Recommendations: Continue as planned  Timeframe: 1 month  Prognosis to achieve goals: good    PT Signature: Jolynn Jauregui, PT  [place] License # [id number]    Based upon review of the patient's progress and continued therapy plan, it is my medical opinion that Noemi Bartholomew should continue physical [facility]  [facility]  [address],   [facility] [address] [phone].    Signature: __________________________________  Antonio Noe MD    Manual Therapy:    20     mins  40563;  Therapeutic Exercise:    10     mins  12792;     Neuromuscular Jared:        mins  86043;    Therapeutic Activity:          mins  80143;     Gait Training:           mins  91648;     Ultrasound:          mins  98364;    Electrical Stimulation:         mins  38847 ( );  Dry Needling          mins self-pay  Traction   10 mins    Timed Treatment:   30   mins   Total Treatment:     40   mins

## 2022-09-06 ENCOUNTER — APPOINTMENT (OUTPATIENT)
Dept: WOMENS IMAGING | Facility: HOSPITAL | Age: 62
End: 2022-09-06

## 2022-09-06 PROCEDURE — 77063 BREAST TOMOSYNTHESIS BI: CPT | Performed by: RADIOLOGY

## 2022-09-06 PROCEDURE — 77067 SCR MAMMO BI INCL CAD: CPT | Performed by: RADIOLOGY

## 2022-09-08 ENCOUNTER — TREATMENT (OUTPATIENT)
Dept: PHYSICAL THERAPY | Facility: CLINIC | Age: 62
End: 2022-09-08

## 2022-09-08 DIAGNOSIS — M54.2 PAIN, NECK: Primary | ICD-10-CM

## 2022-09-08 PROCEDURE — 97035 APP MDLTY 1+ULTRASOUND EA 15: CPT | Performed by: PHYSICAL THERAPIST

## 2022-09-08 PROCEDURE — 97140 MANUAL THERAPY 1/> REGIONS: CPT | Performed by: PHYSICAL THERAPIST

## 2022-09-08 NOTE — PROGRESS NOTES
Physical Therapy Daily Progress Note        Subjective     Noemi [place] reports: A little improvement. Pain and stiffness are aggravating. I am doing my HEP    Objective   See Exercise, Manual, and Modality Logs for complete treatment.       Assessment/Plan  Improving cervical mobility with less pain. Did US to R upper trap. Will assess effectiveness next visit    Progress per Plan of Care           Manual Therapy:  30       mins  50972;  Therapeutic Exercise: 3      mins  97409;     Neuromuscular Jared:       mins  82753;    Therapeutic Activity:         mins  32168;     Gait Training:         mins  71008;     Ultrasound:   7      mins  16159;    Electrical Stimulation:        mins  58170 ( );  Dry Needling         mins self-pay    Timed Treatment:   40   mins   Total Treatment:     52   mins    Jolynn Jauregui, PT  Physical Therapist  [place] License # [id number]

## 2022-09-13 ENCOUNTER — TREATMENT (OUTPATIENT)
Dept: PHYSICAL THERAPY | Facility: CLINIC | Age: 62
End: 2022-09-13

## 2022-09-13 ENCOUNTER — APPOINTMENT (OUTPATIENT)
Dept: ONCOLOGY | Facility: HOSPITAL | Age: 62
End: 2022-09-13

## 2022-09-13 DIAGNOSIS — M54.2 PAIN, NECK: Primary | ICD-10-CM

## 2022-09-13 PROCEDURE — 97140 MANUAL THERAPY 1/> REGIONS: CPT | Performed by: PHYSICAL THERAPIST

## 2022-09-13 NOTE — PROGRESS NOTES
Physical Therapy Daily Progress Note        Subjective     Noemi [place] reports: MAybe a little better    Objective   See Exercise, Manual, and Modality Logs for complete treatment.       Assessment/Plan  No pain with passive stretching of Cspine. Tender R OA and midcervical. Compliant with HEP    Progress per Plan of Care           Manual Therapy:  25       mins  85525;  Therapeutic Exercise:       mins  92590;     Neuromuscular Jared:       mins  27944;    Therapeutic Activity:         mins  71195;     Gait Training:         mins  93470;     Ultrasound:         mins  78188;    Electrical Stimulation:        mins  40861 ( );  Dry Needling         mins self-pay    Timed Treatment:   25   mins   Total Treatment:     50   mins    Jolynn Jauregui, PT  Physical Therapist  [place] License # [id number]

## 2022-09-16 ENCOUNTER — TREATMENT (OUTPATIENT)
Dept: PHYSICAL THERAPY | Facility: CLINIC | Age: 62
End: 2022-09-16

## 2022-09-16 ENCOUNTER — TELEPHONE (OUTPATIENT)
Dept: INTERNAL MEDICINE | Facility: CLINIC | Age: 62
End: 2022-09-16

## 2022-09-16 DIAGNOSIS — M54.2 PAIN, NECK: Primary | ICD-10-CM

## 2022-09-16 DIAGNOSIS — H81.12 BPPV (BENIGN PAROXYSMAL POSITIONAL VERTIGO), LEFT: Primary | ICD-10-CM

## 2022-09-16 DIAGNOSIS — R42 VERTIGO: Primary | ICD-10-CM

## 2022-09-16 PROCEDURE — 97161 PT EVAL LOW COMPLEX 20 MIN: CPT | Performed by: PHYSICAL THERAPIST

## 2022-09-16 PROCEDURE — 95992 CANALITH REPOSITIONING PROC: CPT | Performed by: PHYSICAL THERAPIST

## 2022-09-16 PROCEDURE — 97140 MANUAL THERAPY 1/> REGIONS: CPT | Performed by: PHYSICAL THERAPIST

## 2022-09-16 NOTE — PROGRESS NOTES
Physical Therapy Daily Progress Note        Subjective     Noemi [place] reports: good sore after PT. Pain still aggravating but good and bad times    Objective   See Exercise, Manual, and Modality Logs for complete treatment.       Assessment/Plan  Improving PROM with less pain. Compliant with HEP. May need to add more CT stability exercises    Progress per Plan of Care           Manual Therapy:  30       mins  58136;  Therapeutic Exercise:       mins  01245;     Neuromuscular Jared:       mins  01863;    Therapeutic Activity:         mins  96979;     Gait Training:         mins  49218;     Ultrasound:         mins  76022;    Electrical Stimulation:        mins  15386 ( );  Dry Needling         mins self-pay    Timed Treatment:   30   mins   Total Treatment:     45   mins    Jolynn Jauregui, PT  Physical Therapist  [place] License # [id number]

## 2022-09-16 NOTE — PROGRESS NOTES
Physical Therapy Initial Evaluation-  Vestibular Therapy    Patient Name: Noemi LIU Dooley       Patient MRN: [id number]  : 1960  PHYSICIAN: Brigid Coleman MD  NPI:                                      Date: 2022  Encounter Diagnoses   Name Primary?   • BPPV (benign paroxysmal positional vertigo), left Yes       Subjective:  Subjective Outcome Measures  Dizziness Handicap Inventory: Moderate Perception of having a handicap (52/100)       Objective Testing:     Positional Testing  Positional Testing: With infrared goggles  Vertebrobasilar Artery Screen - Right: Negative  Vertebrobasilar Artery Screen - Left: Negative  Pa-Hallpike Right:  (NA)  Pa-Hallpike Left: Upbeat, left rotatory nystagmus  Pa-Hallpike Left Onset Time : 2 sec  Pa-Hallpike Left Duration Time : 8 sec  Horizontal Roll Test Right:  (NA)  Horizontal Roll Test Left:  (NA)     Occulomotor Exam Fixation Present  Spontaneous Nystagmus: Absent  VOR with Occulomotor Exam Fixation Absent   Spontaneous Nystagmus: Absent       THERAPY ASSESSMENT: Patient is a 62 y.o. female. Patient presents to physical therapy due to complaints of episodes of dizziness/vertigo that started in 2022. Patient has a hx of BPPV for which she has had successful treatment before. She reports a few seconds of a spinning sensation when she moves from a sitting to standing, supine to sitting, looking up and bending over. Signs and symptoms are consistent with L PC canalithiasis BPPV. Deferred testing of all other canals to treat with CRP.  Patient is a good candidate for physical therapy to address the following:    Functional Limitations: Difficulty moving, Decreased ability to perform ADL's   Length of Therapy:  (6 weeks)   PT Frequency: PT 1x week   PT Interventions: Home Exercise Program, Canal Repositioning Procedure, Retraining of Balance Strategies   Patient Agrees with Plan of Care: Yes    History # of Personal Factors and/or Comorbidities: MODERATE  (1-2)  Examination of Body System(s): # of elements: LOW (1-2)  Clinical Presentation: STABLE   Clinical Decision Making: LOW       REHAB POTENTIAL: excellent            SHORT TERM GOALS: To be met in 2 weeks:  1. Patient is independent with HEP.  2. Patient will report at least 30% improvement in overall condition.  3. Patient will report no falls.  LONG TERM GOALS:To be met in 4 weeks:  1. Patient will report decreased disequilibrium/dizziness by at least 90% which demonstrates improved quality of life.  2. Patient will report no loss of balance with ADLs to demonstrate improved functional balance and reduced risk for falls.  3. Patient denies dizziness with daily activity especially with transitional movements.  4. DHI score is less than 10 to demonstrate improved balance and dizziness.       Other Procedure CPT 82060 minutes 0    Timed Code Treatment: 0   Minutes     Total Treatment Time: 40      Minutes      PT SIGNATURE: Nancy Diego PT, DPT, PHILIP, KESHIA   [place] license #[id number]  DATE TREATMENT INITIATED: 9/18/2022     Medicare Initial Certification  Certification Period: 9/18/2022 thru 12/16/2022  I certify that the therapy services are furnished while this patient is under my care.  The services outlined above are required by this patient, and will be reviewed every 90 days.     PHYSICIAN: Brigid Coleman MD  DATE:     Please sign and return via fax to [phone].. Thank you, [facility].

## 2022-09-23 ENCOUNTER — TREATMENT (OUTPATIENT)
Dept: PHYSICAL THERAPY | Facility: CLINIC | Age: 62
End: 2022-09-23

## 2022-09-23 DIAGNOSIS — H81.12 BPPV (BENIGN PAROXYSMAL POSITIONAL VERTIGO), LEFT: Primary | ICD-10-CM

## 2022-09-23 DIAGNOSIS — M54.2 PAIN, NECK: Primary | ICD-10-CM

## 2022-09-23 PROCEDURE — 97140 MANUAL THERAPY 1/> REGIONS: CPT | Performed by: PHYSICAL THERAPIST

## 2022-09-23 PROCEDURE — 95992 CANALITH REPOSITIONING PROC: CPT | Performed by: PHYSICAL THERAPIST

## 2022-09-23 NOTE — PROGRESS NOTES
Physical Therapy Daily Progress Note        Subjective     Noemi [place] reports: Just a little better.Vertigo doesn't seem any different    Objective   See Exercise, Manual, and Modality Logs for complete treatment.       Assessment/Plan   Still slight restricted mobility C1 to L and mid cervical spine. No specific pain with manual treatment.    Progress per Plan of Care           Manual Therapy:  25       mins  13193;  Therapeutic Exercise:       mins  05278;     Neuromuscular Jared:       mins  71694;    Therapeutic Activity:         mins  85922;     Gait Training:         mins  91067;     Ultrasound:         mins  08564;    Electrical Stimulation:        mins  66542 ( );  Dry Needling         mins self-pay    Timed Treatment:   25   mins   Total Treatment:     35   mins    Jolynn Jauregui, PT  Physical Therapist  [place] License # [id number]

## 2022-09-23 NOTE — PROGRESS NOTES
Vestibular Daily Progress Note    Visit#:2  Subjective   I am still having dizziness but it is not bad.   Objective            Positional Testing  Positional Testing: With infrared goggles  [place]-Hallpike Right: Upbeat, right rotatory nystagmus  [place]-Hallpike Right Onset Time : 3 sec  [place]-Hallpike Right Duration Time : 10 sec  [place]-Hallpike Left: Upbeat, left rotatory nystagmus  Pa-Hallpike Left Onset Time : 2 sec  [place]-Hallpike Left Duration Time : 2 sec  Horizontal Roll Test Right:  (NA)  Horizontal Roll Test Left:  (NA)  Treatment: L Epley, R Epley             PROCEDURES AND MODALITIES:  Paraffin:    Moist Heat:    Ice:    E-Stim:    Ultrasound:    Ionto:   Traction:    See Exercise, Manual, and Modality Logs for complete treatment.   Other Procedure CPT 58122 minutes 0       Timed Code Treatment: 0 Minutes  Total Treatment Time: 30 Minutes    Assessment & Plan   Patient has positive B DH tests for PC canalithiasis BPPV. Today R response stronger than the L. Treated with B Epley CRP. Response on L improved. Continue CRP until BPPV is cleared and is not causing functional limitations with transitional movement.     Progress per Plan of Care    Nancy Diego, PT, DPT, CHT, CIDN  Physical Therapist  [place] license # [id number]

## 2022-09-26 ENCOUNTER — TREATMENT (OUTPATIENT)
Dept: PHYSICAL THERAPY | Facility: CLINIC | Age: 62
End: 2022-09-26

## 2022-09-26 DIAGNOSIS — H81.12 BPPV (BENIGN PAROXYSMAL POSITIONAL VERTIGO), LEFT: Primary | ICD-10-CM

## 2022-09-26 PROCEDURE — 95992 CANALITH REPOSITIONING PROC: CPT | Performed by: PHYSICAL THERAPIST

## 2022-09-26 NOTE — PROGRESS NOTES
Vestibular Daily Progress Note    Visit#:3  Subjective   I still feel some dizziness when I get out of bed in the morning. No better, no worse.   Objective            Positional Testing  Positional Testing: With infrared goggles  Pa-Hallpike Right: No nystagmus  [place]-Hallpike Left: Downbeat, left rotatory nystagmus  [place]-Hallpike Left Onset Time : 2 sec  [place]-Hallpike Left Duration Time : 2 sec  Horizontal Roll Test Right: No nystagmus  Horizontal Roll Test Left: No nystagmus  Treatment: L 360 degree forward roll             PROCEDURES AND MODALITIES:  Paraffin:    Moist Heat:    Ice:    E-Stim:    Ultrasound:    Ionto:   Traction:    See Exercise, Manual, and Modality Logs for complete treatment.   Other Procedure CPT 53618 minutes 0       Timed Code Treatment: 0 Minutes  Total Treatment Time: 30 Minutes    Assessment & Plan   R PC is clear today. L DH test continues to cause a few seconds of dizziness however the nystamgus was downbeating in nature today. Attempted treatment of R AC canalithiasis BPPV instead of the PC for greater effectiveness in resolving BPPV.     Progress per Plan of Care    Nancy Diego, PT, DPT, CHT, CIDN  Physical Therapist  [place] license # [id number]

## 2022-09-29 ENCOUNTER — TREATMENT (OUTPATIENT)
Dept: PHYSICAL THERAPY | Facility: CLINIC | Age: 62
End: 2022-09-29

## 2022-09-29 DIAGNOSIS — M54.2 PAIN, NECK: Primary | ICD-10-CM

## 2022-09-29 PROCEDURE — 97140 MANUAL THERAPY 1/> REGIONS: CPT | Performed by: PHYSICAL THERAPIST

## 2022-09-29 NOTE — PROGRESS NOTES
Re-Assessment / Re-Certification      Patient: Noemi Bartholomew   : 1960  Diagnosis/ICD-10 Code:  Pain, neck [M54.2]  Referring practitioner: Antonio Noe MD  Date of Initial Visit: 2022  Today's Date: 2022  Patient seen for 10 sessions      Subjective:   Noemi Bartholomew reports: ROM better but I still hurt when I move neck.  Subjective Questionnaire: NDI:34%  Clinical Progress: improved  Home Program Compliance: Yes  Treatment has included: therapeutic exercise, manual therapy and moist heat    Objective   Objective   Flexion:55 degrees   Extension: 25 degrees   Left lateral flexion: 35 degrees   Right lateral flexion: 35 degrees   Left rotation: 55 (flexes) degrees   Right rotation: 58 degrees with pain     Assessment/Plan   Short Term Goals ( 2 weeks)  1. Pt to be independent with HEP MET  2. Pt to exhibit increased cervical segmental mobility to allow for increased AROM MET  3. Pt to demonstrate proper posture without verbal cues MET  4. Pt to report decreased pain with ADL's SLIGHTLY    Long Term Goals (  6 weeks) PROGRESSING  1. Pt to exhibit 60 degrees of cervical rotation B to allow for viewing traffic without pain or limitations  2. Pt to exhibit 5/5 strength for DCF to allow for lifting and more strenuous daily activities MET  3. Pt to report ability to sleep through the night without awakening MET  4. Pt able to perform ADL's and recreational activities without pain  NO  Progress toward previous goals: Partially Met        Recommendations: Continue as planned  Timeframe: 6 weeks @ 1x/week  Prognosis to achieve goals: good    PT Signature: Jolynn Jauregui, PT  [place] License # [id number]    Based upon review of the patient's progress and continued therapy plan, it is my medical opinion that Noemi Bartholomew should continue physical therapy treatment at [facility]  [address] [place] [address], [address] [address] [phone].    Signature:  __________________________________  Antonio Noe MD    Manual Therapy:    25     mins  49597;  Therapeutic Exercise:    5     mins  14155;     Neuromuscular Jared:        mins  64435;    Therapeutic Activity:          mins  51837;     Gait Training:           mins  49238;     Ultrasound:          mins  05444;    Electrical Stimulation:         mins  73742 ( );  Dry Needling          mins self-pay    Timed Treatment:   30   mins   Total Treatment:     42   mins

## 2022-09-30 ENCOUNTER — TREATMENT (OUTPATIENT)
Dept: PHYSICAL THERAPY | Facility: CLINIC | Age: 62
End: 2022-09-30

## 2022-09-30 DIAGNOSIS — H81.12 BPPV (BENIGN PAROXYSMAL POSITIONAL VERTIGO), LEFT: Primary | ICD-10-CM

## 2022-09-30 PROCEDURE — 95992 CANALITH REPOSITIONING PROC: CPT | Performed by: PHYSICAL THERAPIST

## 2022-09-30 NOTE — PROGRESS NOTES
Vestibular Daily Progress Note    Visit#:4  Subjective   I feel wobble. Patient denies improvement in dizziness thus far.   Objective            Positional Testing  Positional Testing: With infrared goggles  [place]-Hallpike Right: Downbeat Nystagmus  [place]-Hallpike Right Duration Time : > 1 min  Pa-Hallpike Left: Downbeat Nystagmus  Pa-Hallpike Left Duration Time : > 1 min  Horizontal Roll Test Right:  (NA)  Horizontal Roll Test Left:  (NA)  Treatment: R Modified Jonathan             PROCEDURES AND MODALITIES:  Paraffin:    Moist Heat:    Ice:    E-Stim:    Ultrasound:    Ionto:   Traction:    See Exercise, Manual, and Modality Logs for complete treatment.   Other Procedure CPT 70860 minutes 0       Timed Code Treatment: 0 Minutes  Total Treatment Time: 30 Minutes    Assessment & Plan   Nystagmus was different with testing today and had B down beating nystamgus in both DH positions. Treated for R AC cupulolithiasis BPPV. Tolerated well and issued for HEP.     Progress per Plan of Care    Nancy Diego, PT, DPT, CHT, CIDN  Physical Therapist  [place] license # [id number]

## 2022-10-06 ENCOUNTER — TREATMENT (OUTPATIENT)
Dept: PHYSICAL THERAPY | Facility: CLINIC | Age: 62
End: 2022-10-06

## 2022-10-06 DIAGNOSIS — M54.2 PAIN, NECK: Primary | ICD-10-CM

## 2022-10-06 PROCEDURE — 97140 MANUAL THERAPY 1/> REGIONS: CPT | Performed by: PHYSICAL THERAPIST

## 2022-10-07 ENCOUNTER — TREATMENT (OUTPATIENT)
Dept: PHYSICAL THERAPY | Facility: CLINIC | Age: 62
End: 2022-10-07

## 2022-10-07 DIAGNOSIS — H81.12 BPPV (BENIGN PAROXYSMAL POSITIONAL VERTIGO), LEFT: Primary | ICD-10-CM

## 2022-10-07 PROCEDURE — 95992 CANALITH REPOSITIONING PROC: CPT | Performed by: PHYSICAL THERAPIST

## 2022-10-07 NOTE — PROGRESS NOTES
Vestibular Daily Progress Note    Visit#:5  Subjective   I am maybe a little better.   Objective            Positional Testing  Positional Testing: With infrared goggles  [place]-Hallpike Right: Downbeat Nystagmus (after 6 seconds transitions to LB)  [place]-Hallpike Left: Left-beating Nystagmus  Horizontal Roll Test Right: Left-beating  Horizontal Roll Test Right Onset Time : immediate  Horizontal Roll Test Right Duration Time : > 1 min  Horizontal Roll Test Left: Right-beating (minimal response)  Horizontal Roll Test Left Onset Time : immediate  Horizontal Roll Test Left Duration Time : > 1 min  Treatment: L [place]             PROCEDURES AND MODALITIES:  Paraffin:    Moist Heat:    Ice:    E-Stim:    Ultrasound:    Ionto:   Traction:    See Exercise, Manual, and Modality Logs for complete treatment.   Other Procedure CPT 12775 minutes 0       Timed Code Treatment: 0 Minutes  Total Treatment Time: 30 Minutes    Assessment & Plan   Today canalithiasis BBP of AC and PC is improved. Patient tested positive for L LC cupulolithiasis BPPV. Treated with CRP and tolerated well. Overall symptoms are improved. Continue treatment until symptoms are resolved.     Progress per Plan of Care    Nancy Diego PT, DPT, CHT, CIDN  Physical Therapist  [place] license # [id number]

## 2022-10-10 NOTE — PROGRESS NOTES
Subjective   Patient ID: Noemi Bartholomew is a 62 y.o. female is here today for follow-up of occipital neuralgia of the R side.  Last seen on 7/22/22 and reported right sided neck pain right behind her ear and down into the shoulder and top of her back. She reports her neck is stiff. She also c/o N/T and headaches.  Pt will continue with Gabapentin 300 mg tid and was referred to physical therapy at last visit.  Tobacco Use: Low Risk    • Smoking Tobacco Use: Never   • Smokeless Tobacco Use: Never   • Passive Exposure: Not on file   Today pt reports physical therapy helped only marginally.  She still has neck pain with weakness and stiffness.    Patient has chronic neck pain and some occipital neuralgia.  The therapy helped somewhat but not completely.  She does not feel that she is at the point where we want to send her to pain management for occipital nerve blocks and cervical epidural blocks or an ablation.  But she is run out of visits for therapy.  The gabapentin at 300 mg 3 times daily seems to help a bit.  She has no motor deficits.  She got her disability and that is a relief to her.  We will continue watching her.  She does not feel like she wants to get injections.  We will see her in 7 months.      History of Present Illness    The following portions of the patient's history were reviewed and updated as appropriate: allergies, current medications, past family history, past medical history, past social history, past surgical history and problem list.    Review of Systems   Constitutional: Negative for fever.   Musculoskeletal: Positive for neck pain (R sided behind the ear and to the top of shoulder) and neck stiffness.   Neurological: Positive for weakness (bilateral hands). Negative for headaches.   Psychiatric/Behavioral: Negative for sleep disturbance.   All other systems reviewed and are negative.      Objective   Physical Exam  Constitutional:       Appearance: She is well-developed.   HENT:      Head:  Normocephalic and atraumatic.   Eyes:      Extraocular Movements: EOM normal.      Conjunctiva/sclera: Conjunctivae normal.      Pupils: Pupils are equal, round, and reactive to light.   Neck:      Vascular: No carotid bruit.   Neurological:      Mental Status: She is oriented to person, place, and time.      Coordination: Finger-Nose-Finger Test and Heel to Shin Test normal.      Gait: Gait is intact.      Deep Tendon Reflexes:      Reflex Scores:       Tricep reflexes are 2+ on the right side and 2+ on the left side.       Bicep reflexes are 2+ on the right side and 2+ on the left side.       Brachioradialis reflexes are 2+ on the right side and 2+ on the left side.       Patellar reflexes are 2+ on the right side and 2+ on the left side.       Achilles reflexes are 2+ on the right side and 2+ on the left side.  Psychiatric:         Speech: Speech normal.       Neurologic Exam     Mental Status   Oriented to person, place, and time.   Registration of memory: Good recent and remote memory.   Attention: normal. Concentration: normal.   Speech: speech is normal   Level of consciousness: alert  Knowledge: consistent with education.     Cranial Nerves     CN II   Visual fields full to confrontation.   Visual acuity: normal    CN III, IV, VI   Pupils are equal, round, and reactive to light.  Extraocular motions are normal.     CN V   Facial sensation intact.   Right corneal reflex: normal  Left corneal reflex: normal    CN VII   Facial expression full, symmetric.   Right facial weakness: none  Left facial weakness: none    CN VIII   Hearing: intact    CN IX, X   Palate: symmetric    CN XI   Right sternocleidomastoid strength: normal  Left sternocleidomastoid strength: normal    CN XII   Tongue: not atrophic  Tongue deviation: none    Motor Exam   Muscle bulk: normal  Right arm tone: normal  Left arm tone: normal  Right leg tone: normal  Left leg tone: normal    Strength   Strength 5/5 except as noted.     Sensory Exam    Light touch normal.     Gait, Coordination, and Reflexes     Gait  Gait: normal    Coordination   Finger to nose coordination: normal  Heel to shin coordination: normal    Reflexes   Right brachioradialis: 2+  Left brachioradialis: 2+  Right biceps: 2+  Left biceps: 2+  Right triceps: 2+  Left triceps: 2+  Right patellar: 2+  Left patellar: 2+  Right achilles: 2+  Left achilles: 2+  Right : 2+  Left : 2+      Assessment & Plan   Independent Review of Radiographic Studies:      The MRI done on 7/10/2022 shows a right C2-C3 bone spur compressing the upper cervical root on the right.  There is no residual stenosis from C7-T1 of any appreciable significance.  There is a little bit of residual bone spur laterally in the canal.  Agree with the report.    Medical Decision Making:      Will follow her and see her in 7 months.  She will continue her gabapentin at 300 mg 3 times daily which she gets from her PCP.  She will do her exercises.  If she gets worse and she wants to consider occipital nerve blocks or a possible radiofrequency ablation in the neck, she will let us know.  But we will hold off on that for right now.      Diagnoses and all orders for this visit:    1. Occipital neuralgia of right side (Primary)    2. Chronic neck pain    3. Cervical myelopathy (HCC)    4. Spasticity      Return in about 7 months (around 5/19/2023) for Face-to-face.

## 2022-10-12 ENCOUNTER — TREATMENT (OUTPATIENT)
Dept: PHYSICAL THERAPY | Facility: CLINIC | Age: 62
End: 2022-10-12

## 2022-10-12 DIAGNOSIS — M54.2 PAIN, NECK: Primary | ICD-10-CM

## 2022-10-12 PROCEDURE — 20561 NDL INSJ W/O NJX 3+ MUSC: CPT | Performed by: PHYSICAL THERAPIST

## 2022-10-12 NOTE — PROGRESS NOTES
Physical Therapy Daily Treatment Note      Patient: Noemi LIU [place]   : 1960  Referring practitioner: No ref. provider found  Date of Initial Visit: Type: THERAPY  Noted: 2022  Today's Date: 10/12/2022  Patient seen for 12 sessions       Visit Diagnoses:    ICD-10-CM ICD-9-CM   1. Pain, neck  M54.2 723.1       Subjective   Requesting dry needling for persistent R sided neck pain. Describes pain radiating to head and R posterior shoulder/axilalry region.    Objective   Soft tissue was assessed at R cervical spine and shoulder.  TTP: R upper trapezius, levator scapulae, suboccipitals, infraspinatus    On this date patient stated that they would like to undergo a dry needling procedure for the soft tissue dysfunction.   Patient was educated on the procedure for dry needling and consent waver was signed. Patient was informed of the risks, possible adverse effects, along with the benefits of TDN.     See Exercise, Manual, and Modality Logs for complete treatment.     Assessment/Plan  Achieved desirable twitch response in R upper trapezius and levator scapulae. Pt reported no adverse symptoms w/ treatment. Follow up as needed.     Timed:         Manual Therapy:    0     mins  16365;     Therapeutic Exercise:    0     mins  85505;     Neuromuscular Jared:    0    mins  26359;    Therapeutic Activity:     0     mins  98041;     Gait Trainin     mins  56234;     Ultrasound:     0     mins  58204;    Ionto                               0    mins   98221  Dry needling 15 mins         Timed Treatment:   0   mins   Total Treatment:     30   mins    Mari Pereira PT  [place] License: [id number]

## 2022-10-19 ENCOUNTER — OFFICE VISIT (OUTPATIENT)
Dept: NEUROSURGERY | Facility: CLINIC | Age: 62
End: 2022-10-19

## 2022-10-19 VITALS
OXYGEN SATURATION: 99 % | SYSTOLIC BLOOD PRESSURE: 146 MMHG | WEIGHT: 104 LBS | DIASTOLIC BLOOD PRESSURE: 80 MMHG | RESPIRATION RATE: 16 BRPM | TEMPERATURE: 96.9 F | HEART RATE: 93 BPM | BODY MASS INDEX: 24.07 KG/M2 | HEIGHT: 55 IN

## 2022-10-19 DIAGNOSIS — M54.2 CHRONIC NECK PAIN: ICD-10-CM

## 2022-10-19 DIAGNOSIS — G95.9 CERVICAL MYELOPATHY: ICD-10-CM

## 2022-10-19 DIAGNOSIS — R25.2 SPASTICITY: ICD-10-CM

## 2022-10-19 DIAGNOSIS — M54.81 OCCIPITAL NEURALGIA OF RIGHT SIDE: Primary | ICD-10-CM

## 2022-10-19 DIAGNOSIS — G89.29 CHRONIC NECK PAIN: ICD-10-CM

## 2022-10-19 PROCEDURE — 99214 OFFICE O/P EST MOD 30 MIN: CPT | Performed by: NEUROLOGICAL SURGERY

## 2022-10-24 ENCOUNTER — TREATMENT (OUTPATIENT)
Dept: PHYSICAL THERAPY | Facility: CLINIC | Age: 62
End: 2022-10-24

## 2022-10-24 DIAGNOSIS — H81.12 BPPV (BENIGN PAROXYSMAL POSITIONAL VERTIGO), LEFT: Primary | ICD-10-CM

## 2022-10-24 PROCEDURE — 95992 CANALITH REPOSITIONING PROC: CPT | Performed by: PHYSICAL THERAPIST

## 2022-10-24 NOTE — PROGRESS NOTES
Recertification    Name: Noemi Bartholomew  Date: 10/24/2022  Diagnosis/ICD-10 Code:  BPPV (benign paroxysmal positional vertigo), left [H81.12]  Referring practitioner: Brigid Coleman MD  Date of Initial Visit: 9/16/22  Visit #: 6  Subjective:   Noemi Bartholomew reports: I am still having some dizziness when I raise up from lying down. It only lasts a few seconds.  Subjective Questionnaire: DHI: 70/100,  declined from 52/100  Clinical Progress: unchanged  Home Program Compliance: Yes  Treatment has included: CRP and HEP  Objective:   Objective         Positional Testing  Positional Testing: With infrared goggles  [place]-Hallpike Right: Upbeat, right rotatory nystagmus  Pa-Hallpike Right Onset Time : 2 sec  Pa-Hallpike Right Duration Time : 7 sec  [place]-Hallpike Left:  (NA)  Horizontal Roll Test Right: Left-beating  Horizontal Roll Test Right Onset Time : immediate  Horizontal Roll Test Right Duration Time : > 1 min  Horizontal Roll Test Left: Right-beating  Horizontal Roll Test Left Onset Time : immediate  Horizontal Roll Test Left Duration Time : > 1 min                  Assessment:   Functional Limitations: Difficulty moving, Decreased ability to perform ADL's  Patient has not responded to CRP treatments as I would have hoped. She continues to demonstrate nystamgus and symptoms indicative of BPPV, but does not have the typical response to testing and treatment. Issued alternate HEP today in effort to clear BPPV as patient remains at risk for falls. She seems to have multi canal involvement which further complicates treatment. Additional skilled PT is required to improve safety and QOL.  Plan:   PT Interventions: Home Exercise Program, Canal Repositioning Procedure, Retraining of Balance Strategies  Progress toward previous goals: Partially Met   SHORT TERM GOALS: To be met in 2 weeks:  1. Patient is independent with HEP. MET   2. Patient will report at least 30% improvement in overall condition.PROGRESSING   3. Patient will  report no falls. MET   LONG TERM GOALS:To be met in 4 weeks: (ALL LTGS PROGRESSING)  1. Patient will report decreased disequilibrium/dizziness by at least 90% which demonstrates improved quality of life.  2. Patient will report no loss of balance with ADLs to demonstrate improved functional balance and reduced risk for falls.  3. Patient denies dizziness with daily activity especially with transitional movements.  4. DHI score is less than 10 to demonstrate improved balance and dizziness.      Recommendations: Continue as planned  Timeframe: 1 month, 2x a week  Prognosis to achieve goals: good    10/24/2022 Treatments:     Other Procedure CPT 71234 minutes 0    Timed Code Treatment: 0   Minutes     Total Treatment Time: 30      Minutes      PT Signature: Nancy Diego PT, DPT, CHT, KESHIA   [place] License #: [id number]    Based upon review of the patient's progress and continued therapy plan, it is my medical opinion that Noemi Bartholomew should continue physical therapy treatment at [facility]  [address] [place] [id number] [phone].    Signature:

## 2022-10-27 RX ORDER — ATORVASTATIN CALCIUM 20 MG/1
TABLET, FILM COATED ORAL
Qty: 90 TABLET | Refills: 1 | Status: SHIPPED | OUTPATIENT
Start: 2022-10-27

## 2022-11-18 ENCOUNTER — TREATMENT (OUTPATIENT)
Dept: PHYSICAL THERAPY | Facility: CLINIC | Age: 62
End: 2022-11-18

## 2022-11-18 PROCEDURE — 95992 CANALITH REPOSITIONING PROC: CPT | Performed by: PHYSICAL THERAPIST

## 2022-11-18 NOTE — PROGRESS NOTES
Vestibular Daily Progress Note    Visit#:7  Subjective   My dizziness is a little better but I still feel it when I am getting out of bed in the morning. I have been doing the exercise every day.   Objective            Positional Testing  Positional Testing: With infrared goggles  Pa-Hallpike Right: Upbeat, right rotatory nystagmus  [place]-Hallpike Right Onset Time : 2 sec  Pa-Hallpike Right Duration Time : 15 sec  Pa-Hallpike Left: No nystagmus  Horizontal Roll Test Right:  (NA)  Horizontal Roll Test Left:  (NA)  Treatment: Clotilde             PROCEDURES AND MODALITIES:  Paraffin:    Moist Heat:    Ice:    E-Stim:    Ultrasound:    Ionto:   Traction:    See Exercise, Manual, and Modality Logs for complete treatment.   Other Procedure CPT 39382 minutes 0       Timed Code Treatment: 0 Minutes  Total Treatment Time: 30 Minutes    Assessment & Plan   Patient continues to have dizziness daily. She has been consistent with performance of the Epley, which has been ineffective thus far. Alternate CRP was performed today in effort to clear R PC BPPV. Issued for HEP as well. Patient warrants additional PT to clear BPPV and resolve related symptoms. Patient remains at risk for falls due to symptoms.     Progress per Plan of Care    Nancy Diego, PT, DPT, CHT, CIDN  Physical Therapist  [place] license # [id number]

## 2022-11-18 NOTE — PATIENT INSTRUCTIONS
Patient was educated on BPPV dx, CRP treatment, HEP and post CRP instructions.  HEP: Keyshawn Calderon

## 2023-01-06 ENCOUNTER — TREATMENT (OUTPATIENT)
Dept: PHYSICAL THERAPY | Facility: CLINIC | Age: 63
End: 2023-01-06
Payer: MEDICARE

## 2023-01-06 DIAGNOSIS — H81.12 BPPV (BENIGN PAROXYSMAL POSITIONAL VERTIGO), LEFT: ICD-10-CM

## 2023-01-06 DIAGNOSIS — H81.11 BPPV (BENIGN PAROXYSMAL POSITIONAL VERTIGO), RIGHT: ICD-10-CM

## 2023-01-06 PROCEDURE — 97530 THERAPEUTIC ACTIVITIES: CPT | Performed by: PHYSICAL THERAPIST

## 2023-01-06 PROCEDURE — 95992 CANALITH REPOSITIONING PROC: CPT | Performed by: PHYSICAL THERAPIST

## 2023-01-06 NOTE — PROGRESS NOTES
Recertification    Name: Noemi Bartholomew  Date: 01/06/2023  Diagnosis/ICD-10 Code:  BPPV (benign paroxysmal positional vertigo), left [H81.12]  Referring practitioner: Brigid Coleman MD  Date of Initial Visit: 9/16/2022  Visit #: 8  Subjective:   Noemi Bartholomew reports: I was not able to get into PT during December because of scheduling and transportation issues. I have done the home exercise some. I am better but I still feel it.   Subjective Questionnaire: DHI: 56/100,  improved from 70/100  Clinical Progress: improved  Home Program Compliance: Yes  Treatment has included: CRP and HEP  Objective:   Objective      Positional Testing  Positional Testing: Without infrared goggles  Vertebrobasilar Artery Screen - Right: Negative  Vertebrobasilar Artery Screen - Left: Negative  Pa-Hallpike Right: Upbeat, right rotatory nystagmus  Pa-Hallpike Right Onset Time : 2 sec  Pa-Hallpike Right Duration Time : 15 sec  [place]-Hallpike Left:  (NA)  Horizontal Roll Test Right:  (NA)  Horizontal Roll Test Left:  (NA)                  Assessment:   Functional Limitations: Difficulty moving, Decreased ability to perform ADL's  Patient has not been seen since 11/18/2022 due to scheduling and transportation issues. This has been a barrier to progress in PT. Patient has ongoing R PC canalithiasis BPPV. Patient has not responded to Epley or Mahajan-Daroff CRP treatments to date. Another alternate treatment was performed today, Half sommersault CRP, in effort to clear R PC BPPV. Patient is having minimal symptoms but this does put her at risk for falls. Additional PT is warranted to address listed functional limitations.   Plan:   PT Interventions: Home Exercise Program, Canal Repositioning Procedure, Retraining of Balance Strategies  Progress toward previous goals: Partially Met   SHORT TERM GOALS: To be met in 2 weeks:  1. Patient is independent with HEP. MET   2. Patient will report at least 30% improvement in overall condition.PROGRESSING    3. Patient will report no falls. MET   LONG TERM GOALS:To be met in 4 weeks: (ALL LTGS PROGRESSING)  1. Patient will report decreased disequilibrium/dizziness by at least 90% which demonstrates improved quality of life.  2. Patient will report no loss of balance with ADLs to demonstrate improved functional balance and reduced risk for falls.  3. Patient denies dizziness with daily activity especially with transitional movements.  4. DHI score is less than 10 to demonstrate improved balance and dizziness.   Recommendations: Continue as planned  Timeframe: 2 months, 1 x a week  Prognosis to achieve goals: good    01/06/2023 Treatments:     Ther Act 37930 8 minutes and Other Procedure CPT 38143 minutes 15    Timed Code Treatment: 8   Minutes     Total Treatment Time: 30      Minutes      PT Signature: Nancy Diego PT, DPT, CHT, KESHIA   [place] License #: [id number]      Based upon review of the patient's progress and continued therapy plan, it is my medical opinion that Noemi Bartholomew should continue physical therapy treatment at [facility]  [address] [place] [id number] [phone].    Signature: __________________________________

## 2023-01-13 ENCOUNTER — TREATMENT (OUTPATIENT)
Dept: PHYSICAL THERAPY | Facility: CLINIC | Age: 63
End: 2023-01-13
Payer: MEDICARE

## 2023-01-13 DIAGNOSIS — H81.11 BPPV (BENIGN PAROXYSMAL POSITIONAL VERTIGO), RIGHT: ICD-10-CM

## 2023-01-13 DIAGNOSIS — H81.12 BPPV (BENIGN PAROXYSMAL POSITIONAL VERTIGO), LEFT: Primary | ICD-10-CM

## 2023-01-13 PROCEDURE — 97530 THERAPEUTIC ACTIVITIES: CPT | Performed by: PHYSICAL THERAPIST

## 2023-01-13 PROCEDURE — 95992 CANALITH REPOSITIONING PROC: CPT | Performed by: PHYSICAL THERAPIST

## 2023-01-13 NOTE — PROGRESS NOTES
Vestibular Daily Progress Note    Visit#:9  Subjective   The last exercise we did did not help and it was hurting my neck. So I stopped doing it.   Objective            Positional Testing  Positional Testing: Without infrared goggles  [place]-Hallpike Right: Upbeat, right rotatory nystagmus (stronger response than the L side)  [place]-Hallpike Right Onset Time : 3 sec  [place]-Hallpike Right Duration Time : 15 sec  Pa-Hallpike Left: Upbeat, left rotatory nystagmus  [place]-Hallpike Left Onset Time : 3 sec  Pa-Hallpike Left Duration Time : 3 sec  Horizontal Roll Test Right:  (NA)  Horizontal Roll Test Left:  (NA)  Treatment: R Epley             PROCEDURES AND MODALITIES:  See Exercise, Manual, and Modality Logs for complete treatment.     Paraffin:   pre-rx  Moist Heat:    Ice:    post-rx  E-Stim:    Ultrasound:    Ionto:   Traction:      Ther Act 15444 8 minutes and Other Procedure CPT 02306 minutes 22    Timed Code Treatment: 8 Minutes  Total Treatment Time: 30 Minutes    Assessment & Plan   Patient has not responded as hoped to the CRP treatments. She did not tolerate half sommersault due to neck pain and it did not clear BPPV. Mahajan-Daroff was not effective either. We are revisiting the Epley CRP. She continues to have B PC canalithiasis BPPV. R side is more reactive than the L.     Progress per Plan of Care    Nancy Diego PT, DPT, CHT, COLLETTEN  Physical Therapist  [place] license # [id number]

## 2023-01-17 DIAGNOSIS — M54.81 OCCIPITAL NEURALGIA OF RIGHT SIDE: ICD-10-CM

## 2023-01-17 RX ORDER — GABAPENTIN 300 MG/1
CAPSULE ORAL
Qty: 270 CAPSULE | Refills: 1 | Status: SHIPPED | OUTPATIENT
Start: 2023-01-17 | End: 2023-03-15 | Stop reason: ALTCHOICE

## 2023-01-20 ENCOUNTER — TREATMENT (OUTPATIENT)
Dept: PHYSICAL THERAPY | Facility: CLINIC | Age: 63
End: 2023-01-20
Payer: MEDICARE

## 2023-01-20 DIAGNOSIS — H81.12 BPPV (BENIGN PAROXYSMAL POSITIONAL VERTIGO), LEFT: Primary | ICD-10-CM

## 2023-01-20 DIAGNOSIS — H81.11 BPPV (BENIGN PAROXYSMAL POSITIONAL VERTIGO), RIGHT: ICD-10-CM

## 2023-01-20 PROCEDURE — 95992 CANALITH REPOSITIONING PROC: CPT | Performed by: PHYSICAL THERAPIST

## 2023-01-20 PROCEDURE — 97530 THERAPEUTIC ACTIVITIES: CPT | Performed by: PHYSICAL THERAPIST

## 2023-01-20 NOTE — PROGRESS NOTES
Vestibular Daily Progress Note    Visit#:10  Subjective   My dizziness feels unchanged. I still get dizzy when I get up out of bed or go from sit to stand  Objective            Positional Testing  Positional Testing: With infrared goggles  [place]-Hallpike Right: Upbeat, right rotatory nystagmus  [place]-Hallpike Right Onset Time : 3 sec  Pa-Hallpike Right Duration Time : 5  sec  [place]-Hallpike Left: No nystagmus  Treatment: R Epley (X2)             PROCEDURES AND MODALITIES:  See Exercise, Manual, and Modality Logs for complete treatment.     Paraffin:   pre-rx  Moist Heat:    Ice:    post-rx  E-Stim:    Ultrasound:    Ionto:   Traction:      Ther Act 88911 8 minutes and Other Procedure CPT 91296 minutes 22    Timed Code Treatment: 30 Minutes  Total Treatment Time: 30 Minutes    Assessment & Plan   Patient seems to demonstrate improvement in BPPV today. Patient did not demonstrate a positive test for BPPV on the L. She did still have symptoms with DH testing on the R today. Treated again with R Epley CRP and educated on possible reasons why this treatment is not as effective as we hoped.    Progress per Plan of Care    Nancy Diego, PT, DPT, CHT, COLLETTEN  Physical Therapist  [place] license # [id number]

## 2023-02-03 ENCOUNTER — TREATMENT (OUTPATIENT)
Dept: PHYSICAL THERAPY | Facility: CLINIC | Age: 63
End: 2023-02-03
Payer: MEDICARE

## 2023-02-03 DIAGNOSIS — H81.12 BPPV (BENIGN PAROXYSMAL POSITIONAL VERTIGO), LEFT: Primary | ICD-10-CM

## 2023-02-03 DIAGNOSIS — H81.11 BPPV (BENIGN PAROXYSMAL POSITIONAL VERTIGO), RIGHT: ICD-10-CM

## 2023-02-03 PROCEDURE — 95992 CANALITH REPOSITIONING PROC: CPT | Performed by: PHYSICAL THERAPIST

## 2023-02-03 PROCEDURE — 97530 THERAPEUTIC ACTIVITIES: CPT | Performed by: PHYSICAL THERAPIST

## 2023-02-03 NOTE — PROGRESS NOTES
Vestibular Daily Progress Note    Visit#:11  Subjective   I think my dizziness is better.  Objective            Positional Testing  Positional Testing: Without infrared goggles  Pa-Hallpike Right: No nystagmus  [place]-Hallpike Left: Upbeat, left rotatory nystagmus (minimal)  [place]-Hallpike Left Onset Time : 3 sec  [place]-Hallpike Left Duration Time : 10 sec  Treatment: L Epley             PROCEDURES AND MODALITIES:  See Exercise, Manual, and Modality Logs for complete treatment.     Paraffin:   pre-rx  Moist Heat:    Ice:    post-rx  E-Stim:    Ultrasound:    Ionto:   Traction:      Ther Act 45748 8 minutes and Other Procedure CPT 33913 minutes 10    Timed Code Treatment: 8 Minutes  Total Treatment Time: 30 Minutes    Assessment & Plan   Patient continues to have minimal dizziness when she gets out of bed. R side tested negative today. She had a very minimally positive test on the L for PC canalithiasis BPPV. Encouraged L Epley performance at home.     Progress per Plan of Care    Nancy Diego PT, DPT, CHT, COLLETTEN  Physical Therapist  [place] license # [id number]

## 2023-02-06 ENCOUNTER — TELEPHONE (OUTPATIENT)
Dept: NEUROSURGERY | Facility: CLINIC | Age: 63
End: 2023-02-06
Payer: MEDICARE

## 2023-02-06 NOTE — TELEPHONE ENCOUNTER
----- Message from Alireza Tellez sent at 2/6/2023  9:16 AM EST -----  Regarding: BREAK THE GLASS PATIENT  Good morning,    This patient called this morning to speak with someone about getting an order for injections. This is a break the glass patient and per  policy, hub cannot break the glass. Attempted to wt to [place], but could not get call transferred. Please call Noemi to speak with her about getting injections.     Thank you  Liane DICKSON

## 2023-02-07 NOTE — TELEPHONE ENCOUNTER
I called and LVM for patient. She is scheduled for a telephone visit with Dr. LABOY on 02-14-23 @ 2:00

## 2023-02-10 ENCOUNTER — TREATMENT (OUTPATIENT)
Dept: PHYSICAL THERAPY | Facility: CLINIC | Age: 63
End: 2023-02-10
Payer: MEDICARE

## 2023-02-10 DIAGNOSIS — H81.12 BPPV (BENIGN PAROXYSMAL POSITIONAL VERTIGO), LEFT: Primary | ICD-10-CM

## 2023-02-10 DIAGNOSIS — H81.11 BPPV (BENIGN PAROXYSMAL POSITIONAL VERTIGO), RIGHT: ICD-10-CM

## 2023-02-10 PROCEDURE — 95992 CANALITH REPOSITIONING PROC: CPT | Performed by: PHYSICAL THERAPIST

## 2023-02-10 PROCEDURE — 97530 THERAPEUTIC ACTIVITIES: CPT | Performed by: PHYSICAL THERAPIST

## 2023-02-10 NOTE — PROGRESS NOTES
Vestibular Daily Progress Note    Visit#:12  Subjective   No changes.  Objective            Positional Testing  Positional Testing: Without infrared goggles  Pa-Hallpike Right: No nystagmus  [place]-Hallpike Left: Upbeat Nystagmus (very subtle, dizziness was upon sitting up from test position)  Treatment: L Epley, L [place]             PROCEDURES AND MODALITIES:  See Exercise, Manual, and Modality Logs for complete treatment.     Paraffin:   pre-rx  Moist Heat:    Ice:    post-rx  E-Stim:    Ultrasound:    Ionto:   Traction:      Ther Act 86456 8 minutes and Other Procedure CPT 03222 minutes 14    Timed Code Treatment: 8 Minutes  Total Treatment Time: 30 Minutes    Assessment & Plan   Patient continues to demonstrate negative testing on the R for BPPV. She continues to have very minimal symptoms with L DH. Less nystagmus and less symptoms noted with testing today. Treated for L PC canalithiasis and cupulolithiasis BPPV today. Tolerated well.    Progress per Plan of Care    Nancy Diego PT, DPT, CHT, COLLETTEN  Physical Therapist  [place] license # [id number]

## 2023-02-10 NOTE — PATIENT INSTRUCTIONS
Problem: Occupational Therapy Goal  Goal: Occupational Therapy Goal  Description: Goals to be met by: 7/18/20     Patient will increase functional independence with ADLs by performing:    LE Dressing with Moderate Assistance.  Grooming while seated with Supervision.  Toileting from bedside commode with Moderate Assistance for hygiene and clothing management.   Supine to sit with Contact Guard Assistance.  Step transfer with Minimal Assistance  Toilet transfer to bedside commode with Minimal Assistance.  Increased functional strength RUE to 3+/5 for self care skills and functional mobility.  Upper extremity exercise program x10 reps per handout, with independence.    Outcome: Ongoing, Progressing     Pt was agreeable to OT and was noted to make progress towards her goals in therapy.  Pt's goals remain appropriate at this time.  She will continue to benefit from skilled OT services in order to assist her with increasing her safety and level of independence with self care and mobility tasks.     Marina Ramires, OT  6/21/2020     Patient was educated on BPPV dx, CRP treatment, HEP and post CRP instructions.  HEP: L EPLEY

## 2023-02-13 NOTE — PROGRESS NOTES
Subjective   Patient ID: Noemi Bartholomew is a 62 y.o. female is here today for follow-up via telephone visit to discuss injections.    You have chosen to receive care through a telephone visit. Do you consent to use a telephone visit for your medical care today? Yes    Unable to complete visit using a video connection to the patient. A phone visit was used to complete this visits. Total time of discussion was 11 minutes.    I was calling from the office.  She was at home.  Since I last spoke to her about 6 months ago she has been having more neck pain and occipital pain.  She wants to move forward with pain management.  She has had injections in the low back before but never the neck.  She wants to go to [place].  We will make that referral.  I will leave it to their discretion whether or not they want to try epidural blocks, an ablation or even occipital nerve injections.  She already has a visit with me in May.    History of Present Illness    The following portions of the patient's history were reviewed and updated as appropriate: allergies, current medications, past family history, past medical history, past social history, past surgical history and problem list.    Review of Systems        Objective     There were no vitals filed for this visit.  There is no height or weight on file to calculate BMI.    Tobacco Use: Low Risk    • Smoking Tobacco Use: Never   • Smokeless Tobacco Use: Never   • Passive Exposure: Not on file          Physical Exam  Neurologic Exam        Assessment & Plan   Independent Review of Radiographic Studies:      I personally reviewed the images from the following studies.    The MRI done on 7/10/2022 shows a right C2-C3 bone spur compressing the upper cervical root on the right.  There is no residual stenosis from C7-T1 of any appreciable significance.  There is a little bit of residual bone spur laterally in the canal.  Agree with the report.    Medical Decision Making:      We will  make the referral to either Dr. Monreal or Minnie.  She has a follow-up visit with me in May.  I will see her then.      Diagnoses and all orders for this visit:    1. Occipital neuralgia of right side (Primary)  -     Ambulatory Referral to Pain Management    2. Chronic neck pain  -     Ambulatory Referral to Pain Management      Return in about 3 months (around 5/15/2023) for Already has a face-to-face visit on this day, keep it.

## 2023-02-14 ENCOUNTER — OFFICE VISIT (OUTPATIENT)
Dept: NEUROSURGERY | Facility: CLINIC | Age: 63
End: 2023-02-14
Payer: MEDICARE

## 2023-02-14 DIAGNOSIS — M54.2 CHRONIC NECK PAIN: ICD-10-CM

## 2023-02-14 DIAGNOSIS — G89.29 CHRONIC NECK PAIN: ICD-10-CM

## 2023-02-14 DIAGNOSIS — M54.81 OCCIPITAL NEURALGIA OF RIGHT SIDE: Primary | ICD-10-CM

## 2023-02-14 PROCEDURE — 99442 PR PHYS/QHP TELEPHONE EVALUATION 11-20 MIN: CPT | Performed by: NEUROLOGICAL SURGERY

## 2023-02-17 ENCOUNTER — TREATMENT (OUTPATIENT)
Dept: PHYSICAL THERAPY | Facility: CLINIC | Age: 63
End: 2023-02-17
Payer: MEDICARE

## 2023-02-17 DIAGNOSIS — H81.12 BPPV (BENIGN PAROXYSMAL POSITIONAL VERTIGO), LEFT: Primary | ICD-10-CM

## 2023-02-17 DIAGNOSIS — H81.11 BPPV (BENIGN PAROXYSMAL POSITIONAL VERTIGO), RIGHT: ICD-10-CM

## 2023-02-17 PROCEDURE — 97530 THERAPEUTIC ACTIVITIES: CPT | Performed by: PHYSICAL THERAPIST

## 2023-02-17 PROCEDURE — 95992 CANALITH REPOSITIONING PROC: CPT | Performed by: PHYSICAL THERAPIST

## 2023-02-17 NOTE — PROGRESS NOTES
Vestibular Daily Progress Note  [facility] [place]  [address], [address]  [facility] [address]      Visit#:13  Subjective   I am still having a few seconds of dizziness when I get out of bed.  Objective            Positional Testing  Positional Testing: Without infrared goggles  Pa-Hallpike Right: No nystagmus  Pa-Hallpike Left: Upbeat Nystagmus  Benedict-Hallpike Left Onset Time : 3 sec  Pa-Hallpike Left Duration Time : 10 sec  Treatment: L Epley (with vibration)             PROCEDURES AND MODALITIES:  See Exercise, Manual, and Modality Logs for complete treatment.     Paraffin:   pre-rx  Moist Heat:    Ice:    post-rx  E-Stim:    Ultrasound:    Ionto:   Traction:      Ther Act 12524 8 minutes and Other Procedure CPT 41191 minutes 10    Timed Code Treatment: 8 Minutes  Total Treatment Time: 30 Minutes    Assessment & Plan   Patient continues to have a neagtive test on the R. L side tested positive for L PC canalithasis BPPV. Tried Epley again today with vibration. Negative retest today.     Progress per Plan of Care    Nancy Diego, PT, DPT, CHT, CIDN  Physical Therapist  [place] license # [id number]

## 2023-03-03 ENCOUNTER — TELEPHONE (OUTPATIENT)
Dept: PHYSICAL THERAPY | Facility: CLINIC | Age: 63
End: 2023-03-03

## 2023-03-06 NOTE — PROGRESS NOTES
The patient has a pain history of the following:  Occipital neuralgia on the right side 2020  Chronic neck pain    Previous interventions that the patient has received include:   Low back injections    Pain medications include:  Gabapentin 300mg TID - helps a little   Naproxen  Tylenol     Previously: Lidocaine patch, muscle relaxant     Other conservative modalities which the patient reports using include:  Physical Therapy: yes  Chiropractor: yes prior to surgery  Massage Therapy: no  TENS: yes  Neck or back surgery: yes  Past pain management: yes in the past for her back     Past Significant Surgical History:  C5-T1 fusion 12/6/19    HPI:       CHIEF COMPLAINT: Neck Pain    Noemi Bartholomew is a 62 y.o. female referred here by Mary Lou Carroll MD. Noemi Bartholomew presents to the office for evaluation and treatment of Neck Pain      History of Present Illness  Onset:  Almost 1 year   Inciting Event:  Nothing in particular  Location:  Right side of her neck  Pain: Pain described as ache. Located in her neck and does radiate into her head, right jaw and right shoulder areas.  Severity:  Pain rated as a 7 /10.  Symptoms have been episodic.  Exacerbation:  Lifting.   Alleviation:  Gabapentin.  Associated Symptoms:   She denies any new onset of bowel or bladder weakness, significant leg weakness or saddle anesthesia. Notes sometimes drops items.  She has weakness in her upper extremities for a few years.   Ambulates: Without assistive device.   Limitations: This pain limits the patient from bending over to lift things from the ground or lifting above her head.  Goals: Functional goals include ability to do the above (although she's been told to not do these things since her neck issues started).    She has numbness and tingling in her hands and feet.     She takes gabapentin 300mg TID and has some sleepiness and dizziness.        PEG Assessment   What number best describes your pain on average in the past week?7  What number  best describes how, during the past week, pain has interfered with your enjoyment of life?0  What number best describes how, during the past week, pain has interfered with your general activity?  3        Current Outpatient Medications:   •  acetaminophen (TYLENOL) 500 MG tablet, Take 2 tablets by mouth Every 6 (Six) Hours As Needed for Mild Pain., Disp: , Rfl:   •  atorvastatin (LIPITOR) 20 MG tablet, TAKE 1 TABLET EVERY NIGHT, Disp: 90 tablet, Rfl: 1  •  butalbital-acetaminophen-caffeine (Esgic) -40 MG per tablet, Take 1 tablet by mouth Every 4 (Four) Hours As Needed for Headache., Disp: 30 tablet, Rfl: 0  •  Calcium Carb-Cholecalciferol (CALCIUM 1000 + D PO), Take  by mouth Daily. 2 caps, Disp: , Rfl:   •  cetirizine (zyrTEC) 10 MG tablet, Take 1 tablet by mouth Daily., Disp: , Rfl:   •  denosumab (PROLIA) 60 MG/ML solution prefilled syringe syringe, , Disp: , Rfl:   •  docusate sodium (COLACE) 100 MG capsule, Take 1 capsule by mouth 2 (Two) Times a Day., Disp: , Rfl:   •  fluticasone (Flonase Allergy Relief) 50 MCG/ACT nasal spray, 2 sprays into the nostril(s) as directed by provider Daily., Disp: , Rfl:   •  gabapentin (NEURONTIN) 300 MG capsule, TAKE 1 CAPSULE 3 TIMES A   DAY. NEW DOSING. UNABLE TO TOLERATE 400MG., Disp: 270 capsule, Rfl: 1  •  ibuprofen (ADVIL,MOTRIN) 200 MG tablet, Take 2 tablets by mouth Every 6 (Six) Hours As Needed for Mild Pain., Disp: , Rfl:   •  melatonin 3 MG tablet, Take  by mouth., Disp: , Rfl:   •  Metamucil Fiber chewable tablet, Chew., Disp: , Rfl:   •  methocarbamol (ROBAXIN) 500 MG tablet, Take 1 tablet by mouth Daily As Needed for Muscle Spasms., Disp: , Rfl:   •  naproxen sodium (ALEVE) 220 MG tablet, Take 1 tablet by mouth 2 (Two) Times a Day With Meals., Disp: , Rfl:   •  Probiotic Product (SemEquip PO), Take  by mouth., Disp: , Rfl:   •  pregabalin (Lyrica) 25 MG capsule, Take 1 capsule by mouth 2 (Two) Times a Day., Disp: 60 capsule, Rfl: 0    The  following portions of the patient's history were reviewed and updated as appropriate: allergies, current medications, past family history, past medical history, past social history, past surgical history and problem list.      REVIEW OF PERTINENT MEDICAL DATA    7/10/22 MRI OF THE CERVICAL SPINE WITH AND WITHOUT CONTRAST     CLINICAL HISTORY: Chronic neck pain and chronic occipital pain.     TECHNIQUE: MRI of the cervical spine was obtained with sagittal  precontrast and postcontrast T1, gradient echo, T2, and STIR images.  Additionally, there are axial precontrast and postcontrast T1, gradient  echo, and T2-weighted images through the cervical spine.      Comparison is made to previous MRI of the cervical spine dated  07/07/2020.     FINDINGS:     Arthritic changes are again identified within the articulation of the  anterior arch of C1 and the odontoid process. Similar findings were  noted on the prior study.     At C2-3, there is a disc osteophyte complex which is eccentric to the  right and this disc osteophyte complex results in a mild-to-moderate  degree of canal stenosis along the right side of the spinal canal. A  mild degree of canal narrowing is noted along the left side of the  cervical spinal canal. These findings are unchanged since the prior  exam. There is uncovertebral joint hypertrophy that results in a  moderate-to-severe degree of right foraminal stenosis and these findings  are stable when compared to the prior examination.     At C3-4, there is a disc osteophyte complex which results in a moderate  degree of canal narrowing. Similar findings were noted on the prior  exam. Uncovertebral joint hypertrophy results in severe bilateral  foraminal stenosis and again, no significant interval change is seen.     At C4-5, there are prominent degenerative disc changes with adjacent  degenerative endplate marrow changes. A disc osteophyte complex is seen  at C4-5 that results in a moderate-to-severe degree  of canal stenosis.  This disc osteophyte complex abuts and compresses the ventral surface of  the cervical spinal cord. Uncovertebral joint hypertrophy results in a  severe degree of bilateral foraminal stenosis at the C4-5 level. Again,  similar findings are noted on the prior study. There are foci of  myelomalacia identified within the cervical spinal cord as also noted on  the prior exam.     The patient is status post corpectomies at the C6 and C7 levels. There  is a strut graft in place which extends from the level of the inferior  body of C5 to the superior body of T1. An anterior cervical plate and  traversing screws fuse C5 down to T1. These postsurgical changes were  also identified on the prior exam.     At C5-6, there is a combination of disc osteophyte complex and  ossification of the posterior longitudinal ligament along the left side  of the cervical spinal canal that results in a moderate-to-severe degree  of canal narrowing along the left side of the cervical spinal canal  where it abuts and flattens the left side of the cervical spinal cord.  There is a mild-to-moderate degree of left and a moderate-to-severe  degree of right foraminal narrowing at the C5-6 level secondary to  uncovertebral joint hypertrophy and these findings are unchanged since  the previous exam.     At C6-7, again there is a disc osteophyte complex in addition to  ossification of the posterior longitudinal ligament that results in a  moderate-to-severe degree of canal stenosis and flattens the left aspect  of the cervical spinal cord. This shell of residual ossification is a  rather sharp and pointed configuration where it indents the ventral  aspect of the cervical spinal cord. There is a mild degree of left and a  moderate degree of right foraminal narrowing secondary to uncovertebral  joint hypertrophy. Again, no significant interval change is seen when  compared to the prior study. There is a prominently  myelomalacic  appearance of the right aspect of the cervical spinal cord at the C6-7  level. These findings were also appreciated on the prior study. However,  the degree of myelomalacia at C6-7 may be mildly more prominent on the  current exam.     At C7-T1, again there is a combination of a disc osteophyte complex and  ossification of the posterior longitudinal ligament that results in a  moderate-to-severe degree of canal narrowing along the left side of the  spinal canal where again this residual ossification has a sharp pointed  configuration indenting the ventral left aspect of the cervical spinal  cord. Again, no significant interval change is seen when compared to the  prior study. There is no significant degree of foraminal narrowing at  the C7-T1 level.     IMPRESSION:     Very similar findings are appreciated when compared to the prior study  dated 07/07/2020. There is a suggestion of more pronounced myelomalacia  along the right aspect of the cervical spinal cord at the C6-7 level.  Otherwise, the remaining findings are without significant interval  change when compared to the prior study dated 07/07/2020. Again noted  are foci of myelomalacia within the cervical spinal cord noted  bilaterally at the C4-5 level.     The patient is status post corpectomies at the C6 and C7 levels. A strut  graft is in place. There is an anterior cervical plate and traversing  screws which fuse C5 down to T1. There is some residual disc osteophyte  complex as well as ossification of the posterior longitudinal ligament  along the left side of the spinal canal extending from the level of the  C5-6 interspace down to the level of the C7-T1 interspace. This residual  disc osteophyte complex/ossification of the posterior longitudinal  ligament results in a moderate-to-severe degree of canal stenosis along  the left aspect of the cervical spinal canal and prominently compresses  the ventral surface of the cervical spinal cord.  "Of note, the residual  ossification of the posterior longitudinal ligament/disc osteophyte  complex has a relatively pointed configuration at the C6 and C7 levels  where it appears to sharply indent the ventral aspect of the cervical  spinal cord. Again, multilevel canal and foraminal stenotic change  within the cervical spine demonstrates no significant interval change  when compared to the prior exam. These multilevel canal and foraminal  stenotic changes have been discussed in detail above.           This report was finalized on 7/11/2022 4:46 PM by Dr. Mina Portillo M.D.         7/12/22 Creatinine 0.72    7/5/22 Platelets 198 (10*3)    Review of Systems   Constitutional: Negative for activity change, chills, fatigue and fever.   HENT: Negative for congestion.    Eyes: Negative for visual disturbance.   Respiratory: Negative for chest tightness and shortness of breath.    Cardiovascular: Negative for chest pain.   Gastrointestinal: Negative for abdominal pain and constipation.   Genitourinary: Negative for difficulty urinating, dyspareunia and dysuria.   Musculoskeletal: Positive for neck pain.   Neurological: Positive for dizziness, weakness (gunnar arms and hands), light-headedness and numbness (hands and feet). Negative for headaches.   Psychiatric/Behavioral: Negative for agitation and sleep disturbance. The patient is nervous/anxious.      I have reviewed and confirmed the accuracy of the ROS as documented by the MA/LPN/RN Leah Hartman MD      Vitals:    03/07/23 1242   BP: 136/84   Pulse: 79   Temp: 98.4 °F (36.9 °C)   SpO2: 94%   Weight: 49.4 kg (108 lb 12.8 oz)   Height: 134.6 cm (53\")   PainSc:   7   PainLoc: Neck         Objective   Physical Exam  Vitals reviewed.   Constitutional:       General: She is not in acute distress.  Pulmonary:      Effort: Pulmonary effort is normal. No respiratory distress.   Musculoskeletal:      Comments: Cervical Examination:  Appearance: Posterior scarring absent.  Range " of Motion: diminished range with pain  Spurling's test is negative, bilateral.  Cervical paravertebral regions and transverse processes are tender.  Greater occipital nerves are tender to palpation, right. The Cervical Paraspinous, Trapezius and Levator muscles are tender to palpation, right.   Skin:     General: Skin is warm and dry.   Neurological:      Mental Status: She is alert.      Deep Tendon Reflexes:      Reflex Scores:       Bicep reflexes are 2+ on the right side and 2+ on the left side.       Brachioradialis reflexes are 2+ on the right side and 2+ on the left side.     Comments: Negative Jacob's bilaterally   Psychiatric:         Mood and Affect: Mood normal.         Thought Content: Thought content normal.         Assessment & Plan   Diagnoses and all orders for this visit:    1. Myelomalacia of cervical cord (HCC) (Primary)  -     pregabalin (Lyrica) 25 MG capsule; Take 1 capsule by mouth 2 (Two) Times a Day.  Dispense: 60 capsule; Refill: 0    2. Occipital neuralgia of right side  -     Case Request    3. Myofasciitis  -     Case Request        - Baseline urine drug screen was obtained.  Routine UDS in office today as part of monitoring requirements for controlled substances.  The specimen was viewed and the immunoassay result reviewed and is negative.  This specimen will be sent to [email or web address] laboratory for confirmation.      -Pertinent labs reviewed.  -Pertinent imaging reviewed.  -Discontinue gabapentin.  -Start pregabalin 25 mg twice a day. Discussed medication with the patient.  Included in this discussion was the potential for side effects and adverse events.  Patient verbalized understanding and wished to proceed.  Prescription will be sent to pharmacy.  If this medication is too expensive, she will continue with gabapentin.  If she has severe side effects she will resume gabapentin and discontinue pregabalin.  -We will schedule an office right occipital nerve block and right sided trigger  point injections for the cervical paraspinous, levator, and trapezius muscle regions.Risks discussed including but not limited to bleeding, bruising, infection, damage to surrounding structures, headache, and rare things such as being paralyzed, seizure, stroke, heart attack and death.  The risk of steroid medications include but are not limited to immunosuppression, which can increase the risk of dexter an infectious disease as well as decrease the immune response to a vaccine.   - Noemi Bartholomew reports a pain score of 7.  Given her pain assessment as noted, treatment options were discussed and the following options were decided upon as a follow-up plan to address the patient's pain: prescription for non-opiod analgesics and steroid injections.    --- Follow-up for right occipital nerve block and Trigger point injection(s) on the right side in the cervical paraspinous, levator and trapezius muscle regions then office visit 3-4 weeks following.            WILY REPORT    As part of the patient's treatment plan, I am prescribing controlled substances. The patient has been made aware of appropriate use of such medications, including potential risk of somnolence, limited ability to drive and/or work safely, and the potential for dependence or overdose. It has also bee made clear that these medications are for use by this patient only, without concomitant use of alcohol or other substances unless prescribed.     As the clinician, I personally reviewed the WILY from 3/7/23 while the patient was in the office today.    History and physical exam exhibit continued safe and appropriate use of controlled substances.         While examining this patient, I wore protective equipment including a mask and gloves.  I washed my hands before and after this patient encounter.  The patient wore a mask throughout the visit as well.     Leah Hartman MD  Pain Management    EMR Dragon/Transcription disclaimer:   Much of this  encounter note is an electronic transcription/translation of spoken language to printed text. The electronic translation of spoken language may permit erroneous, or at times, nonsensical words or phrases to be inadvertently transcribed; Although I have reviewed the note for such errors, some may still exist.

## 2023-03-07 ENCOUNTER — OFFICE VISIT (OUTPATIENT)
Dept: PAIN MEDICINE | Facility: CLINIC | Age: 63
End: 2023-03-07
Payer: MEDICARE

## 2023-03-07 VITALS
DIASTOLIC BLOOD PRESSURE: 84 MMHG | HEIGHT: 55 IN | BODY MASS INDEX: 25.18 KG/M2 | OXYGEN SATURATION: 94 % | TEMPERATURE: 98.4 F | WEIGHT: 108.8 LBS | HEART RATE: 79 BPM | SYSTOLIC BLOOD PRESSURE: 136 MMHG

## 2023-03-07 DIAGNOSIS — G95.89 MYELOMALACIA OF CERVICAL CORD: Primary | ICD-10-CM

## 2023-03-07 DIAGNOSIS — M54.81 OCCIPITAL NEURALGIA OF RIGHT SIDE: ICD-10-CM

## 2023-03-07 DIAGNOSIS — G89.4 CHRONIC PAIN SYNDROME: ICD-10-CM

## 2023-03-07 DIAGNOSIS — M60.9 MYOFASCIITIS: ICD-10-CM

## 2023-03-07 PROCEDURE — 99204 OFFICE O/P NEW MOD 45 MIN: CPT | Performed by: ANESTHESIOLOGY

## 2023-03-07 PROCEDURE — 80305 DRUG TEST PRSMV DIR OPT OBS: CPT | Performed by: ANESTHESIOLOGY

## 2023-03-07 RX ORDER — PREGABALIN 25 MG/1
25 CAPSULE ORAL 2 TIMES DAILY
Qty: 60 CAPSULE | Refills: 0 | Status: SHIPPED | OUTPATIENT
Start: 2023-03-07 | End: 2023-03-15 | Stop reason: SDUPTHER

## 2023-03-07 RX ORDER — FLUTICASONE PROPIONATE 50 MCG
2 SPRAY, SUSPENSION (ML) NASAL DAILY
COMMUNITY

## 2023-03-07 RX ORDER — METHOCARBAMOL 500 MG/1
500 TABLET, FILM COATED ORAL DAILY PRN
COMMUNITY

## 2023-03-07 RX ORDER — ACETAMINOPHEN 500 MG
1000 TABLET ORAL EVERY 6 HOURS PRN
COMMUNITY

## 2023-03-07 RX ORDER — IBUPROFEN 200 MG
400 TABLET ORAL EVERY 6 HOURS PRN
COMMUNITY

## 2023-03-07 RX ORDER — PYRITHIONE ZINC 1 G/ML
LOTION/SHAMPOO TOPICAL
COMMUNITY

## 2023-03-07 NOTE — PATIENT INSTRUCTIONS
When you get the pregabalin, stop gabapentin and start pregabalin.      Some common side effects of pregabalin include sleepiness, swelling in hands or feet, depression, blurred vision, and drowsiness.  Please contact the clinic immediately with any significant side effects so that the treatment plan may be adjusted in a safe manner.

## 2023-03-08 LAB
POC AMPHETAMINES: NEGATIVE
POC BARBITURATES: NEGATIVE
POC BENZODIAZEPHINES: NEGATIVE
POC COCAINE: NEGATIVE
POC METHADONE: NEGATIVE
POC METHAMPHETAMINE SCREEN URINE: NEGATIVE
POC OPIATES: NEGATIVE
POC OXYCODONE: NEGATIVE
POC PHENCYCLIDINE: NEGATIVE
POC PROPOXYPHENE: NEGATIVE
POC THC: NEGATIVE
POC TRICYCLIC ANTIDEPRESSANTS: NEGATIVE

## 2023-03-10 ENCOUNTER — TREATMENT (OUTPATIENT)
Dept: PHYSICAL THERAPY | Facility: CLINIC | Age: 63
End: 2023-03-10
Payer: MEDICARE

## 2023-03-10 DIAGNOSIS — H81.12 BPPV (BENIGN PAROXYSMAL POSITIONAL VERTIGO), LEFT: Primary | ICD-10-CM

## 2023-03-10 DIAGNOSIS — H81.11 BPPV (BENIGN PAROXYSMAL POSITIONAL VERTIGO), RIGHT: ICD-10-CM

## 2023-03-10 PROCEDURE — 97530 THERAPEUTIC ACTIVITIES: CPT | Performed by: PHYSICAL THERAPIST

## 2023-03-10 PROCEDURE — 95992 CANALITH REPOSITIONING PROC: CPT | Performed by: PHYSICAL THERAPIST

## 2023-03-10 NOTE — PROGRESS NOTES
Physical Therapy Daily Progress Note-Vestibular Rehab  [facility] [place]  [address], [address]  [facility] [address]      Visit#:14  Subjective   I still have some dizziness but it is not much. I can live with it. I would like to be done with this PT. I am going to have injections in my neck soon.  Objective            Positional Testing  Positional Testing: Without infrared goggles  Pa-Hallpike Left: Downbeat Nystagmus  Pa-Hallpike Left Onset Time : 3 sec  Pa-Hallpike Left Duration Time : 7 sec  Treatment: L 360 degree forward roll             PROCEDURES AND MODALITIES:  See Exercise, Manual, and Modality Logs for complete treatment.     Paraffin:   pre-rx  Moist Heat:    Ice:    post-rx  E-Stim:    Ultrasound:    Ionto:   Traction:      Ther Act 87437 8 minutes and Other Procedure CPT 35782 minutes 10    Timed Code Treatment: 8 Minutes  Total Treatment Time: 30 Minutes    Assessment & Plan   Patient continues to have a positive test on the L. Today her nystamgus was downbeating in nature, thus an alternate treatment was performed for AC canalithiasis BPPV. Tolerated treatment well. Her symptoms are very minimal, only a few seconds of dizziness when she sits up in bed. This is not limiting her functionally. Issued treatment performed today for HEP in the event it helped. Patient is appropriate for DC to HEP at this time with goals partially met.    Progress per Plan of Care    Nancy Diego, PT, DPT, CHT, COLLETTEN  Physical Therapist  [place] license # [id number]

## 2023-03-10 NOTE — PATIENT INSTRUCTIONS
Patient was educated on BPPV dx, CRP treatment, HEP and post CRP instructions.  HEP: L 360 deg fwd roll

## 2023-03-13 ENCOUNTER — TELEPHONE (OUTPATIENT)
Dept: INTERNAL MEDICINE | Facility: CLINIC | Age: 63
End: 2023-03-13
Payer: MEDICARE

## 2023-03-13 ENCOUNTER — TELEPHONE (OUTPATIENT)
Dept: NEUROSURGERY | Facility: CLINIC | Age: 63
End: 2023-03-13
Payer: MEDICARE

## 2023-03-13 NOTE — TELEPHONE ENCOUNTER
"LM for patient asking per Dr. Noe,    \"if she feels unable to sit in one place for an extended period of time\"    Ask patient to call office in the morning to inform  "

## 2023-03-13 NOTE — TELEPHONE ENCOUNTER
Patient request letter for jury duty. She state she is disabled and can not do jury duty at this time.

## 2023-03-13 NOTE — TELEPHONE ENCOUNTER
Patient called and stated that she needs a permanent excuse letter written to excuse her from jury duty.    Patient stated that she needs a letter written for her to be excused permanently  from jury duty. Patient stated that letter needs to contain description of her condition and what Dr. Noe said about her back .

## 2023-03-14 ENCOUNTER — TELEPHONE (OUTPATIENT)
Dept: NEUROSURGERY | Facility: CLINIC | Age: 63
End: 2023-03-14
Payer: MEDICARE

## 2023-03-14 NOTE — TELEPHONE ENCOUNTER
Patient is unable to sit for extended periods of time. Patient would like this to be a permanent letter excusing her from jury duty.

## 2023-03-14 NOTE — TELEPHONE ENCOUNTER
I called and LVM for patient letting her know that Dr. LBAOY will not right a permanent letter at this time but if she needs another letter in the future we would be happy to write another letter.

## 2023-03-14 NOTE — TELEPHONE ENCOUNTER
"Ms VarshaManuela Bartholomew called and needed another like added to her letter stating    \" This patient is permanently excused form jury duty now and going forward\"    I tried editing the letter but it wouldn't allow me to  "

## 2023-03-14 NOTE — TELEPHONE ENCOUNTER
I called and LVM for patient letting her know that Dr. LABOY will not right a permanent letter at this time but if she needs another letter in the future we would be happy to write another letter.

## 2023-03-15 ENCOUNTER — TELEPHONE (OUTPATIENT)
Dept: PAIN MEDICINE | Facility: CLINIC | Age: 63
End: 2023-03-15
Payer: MEDICARE

## 2023-03-15 DIAGNOSIS — G95.89 MYELOMALACIA OF CERVICAL CORD: ICD-10-CM

## 2023-03-15 RX ORDER — PREGABALIN 50 MG/1
50 CAPSULE ORAL 2 TIMES DAILY
Qty: 60 CAPSULE | Refills: 1 | Status: SHIPPED | OUTPATIENT
Start: 2023-03-15 | End: 2023-04-04 | Stop reason: SINTOL

## 2023-03-15 NOTE — TELEPHONE ENCOUNTER
If she has no side effects, lets increase to 50mg twice daily.  I will send in a new prescription.  Until then, she can take two 25mg capsules at a time.

## 2023-03-15 NOTE — TELEPHONE ENCOUNTER
Patient called and stated that she is still having pain with the Lyrica. She wanted to know if she could increase the Lyrica or return to the gabapentin. Please advise.

## 2023-03-17 ENCOUNTER — TELEPHONE (OUTPATIENT)
Dept: NEUROSURGERY | Facility: CLINIC | Age: 63
End: 2023-03-17
Payer: MEDICARE

## 2023-03-17 NOTE — TELEPHONE ENCOUNTER
S/w patient and informed that I will mail out her letter today that excuses her for jury duty, patient expressed understanding

## 2023-04-03 NOTE — PROGRESS NOTES
"CHIEF COMPLAINT: Neck Pain      Noemi Bartholomew is a 62 y.o. female.  She is here for the following procedure:  right occipital nerve block and Trigger point injection(s) on the right side in the cervical paraspinous, levator and trapezius muscle regions .     Vitals:    04/04/23 1251   BP: 117/77   BP Location: Left arm   Patient Position: Sitting   Cuff Size: Adult   Pulse: 66   Resp: 12   Temp: 97.1 °F (36.2 °C)   TempSrc: Temporal   SpO2: 98%   Weight: 50.6 kg (111 lb 9.6 oz)   Height: 134.6 cm (53\")   PainSc:   8       Bupivacaine Lot#: [id number] Exp: 07/31/2023 NDC:58511-585-59  Depo Medrol Lot#: [id number] Exp: 2/29/2024 NDC:[address]-6     Trigger Point Injection.      Patient reassessed immediately prior to procedure    Patient location during procedure: Pain Clinic    Reason for block: procedure for pain  Performed by  Anesthesiologist: Leah Hartman MD  Preanesthetic Checklist  Completed: patient identified, site marked, risks and benefits discussed, surgical consent, pre-op evaluation and timeout performed  Prep:  Patient Position:sitting  Sterile Tech:gloves and mask  Prep:Chloraprep  Procedure:  Guidance:landmark technique and palpation technique  Muscle Group:2  Needle Gauge:25 G  Aspiration:negative  Medications:  Depomedrol:40    Post Assessment  Injection Assessment: negative  Patient Tolerance:comfortable throughout block  Complications:no    Occipital      Patient reassessed immediately prior to procedure    Patient location during procedure: Pain Clinic    Reason for block: procedure for pain  Performed by  Anesthesiologist: Leah Hartman MD  Preanesthetic Checklist  Completed: patient identified, site marked, risks and benefits discussed, surgical consent, pre-op evaluation and timeout performed  Prep:  Patient position: sitting  Sterile barriers:gloves and mask  Prep: ChloraPrep  Procedure:  Approach:right  Guidance:landmark technique  Needle Gauge:25 G  Aspiration:negative    Post " Assessment  Injection Assessment: negative  Patient Tolerance:comfortable throughout block  Complications:no  Additional Notes  The base of the skull was then palpated.  The occipital artery on the right was palpated approximately two finger breadths lateral to the occipital notch.  A 27-gauge 1.25 inch needle was advanced percutaneously medial to the artery to contact periosteum at the base of the skull.  After negative aspiration, a test dose of 1mL of injectate was injected.  There were no signs or symptoms of vascular uptake.  Subsequently, a volume of 3mL consisting of 40mg Methylprednisolone and 0.5% Bupivacaine was injected without resistance over approximately one minute after multiple negative aspirations.             Visit Diagnoses:  No diagnosis found.    Leah Hartman MD  Pain Management

## 2023-04-04 ENCOUNTER — PROCEDURE VISIT (OUTPATIENT)
Dept: PAIN MEDICINE | Facility: CLINIC | Age: 63
End: 2023-04-04
Payer: MEDICARE

## 2023-04-04 VITALS
WEIGHT: 111.6 LBS | TEMPERATURE: 97.1 F | DIASTOLIC BLOOD PRESSURE: 77 MMHG | HEIGHT: 55 IN | RESPIRATION RATE: 12 BRPM | BODY MASS INDEX: 25.83 KG/M2 | SYSTOLIC BLOOD PRESSURE: 117 MMHG | OXYGEN SATURATION: 98 % | HEART RATE: 66 BPM

## 2023-04-04 DIAGNOSIS — M60.9 MYOFASCIITIS: ICD-10-CM

## 2023-04-04 DIAGNOSIS — M54.81 OCCIPITAL NEURALGIA OF RIGHT SIDE: Primary | ICD-10-CM

## 2023-04-04 PROCEDURE — 20552 NJX 1/MLT TRIGGER POINT 1/2: CPT | Performed by: ANESTHESIOLOGY

## 2023-04-04 PROCEDURE — 64405 NJX AA&/STRD GR OCPL NRV: CPT | Performed by: ANESTHESIOLOGY

## 2023-04-04 RX ORDER — GABAPENTIN 300 MG/1
300 CAPSULE ORAL 3 TIMES DAILY
COMMUNITY

## 2023-05-02 ENCOUNTER — OFFICE VISIT (OUTPATIENT)
Dept: PAIN MEDICINE | Facility: CLINIC | Age: 63
End: 2023-05-02
Payer: MEDICARE

## 2023-05-02 ENCOUNTER — PREP FOR SURGERY (OUTPATIENT)
Dept: SURGERY | Facility: SURGERY CENTER | Age: 63
End: 2023-05-02
Payer: MEDICARE

## 2023-05-02 VITALS
HEART RATE: 61 BPM | TEMPERATURE: 97.1 F | RESPIRATION RATE: 12 BRPM | OXYGEN SATURATION: 99 % | WEIGHT: 112.8 LBS | DIASTOLIC BLOOD PRESSURE: 80 MMHG | SYSTOLIC BLOOD PRESSURE: 127 MMHG | HEIGHT: 55 IN | BODY MASS INDEX: 26.11 KG/M2

## 2023-05-02 DIAGNOSIS — M54.2 CHRONIC NECK PAIN: Primary | ICD-10-CM

## 2023-05-02 DIAGNOSIS — G89.29 CHRONIC NECK PAIN: Primary | ICD-10-CM

## 2023-05-02 DIAGNOSIS — G95.89 MYELOMALACIA OF CERVICAL CORD: ICD-10-CM

## 2023-05-02 DIAGNOSIS — M60.9 MYOFASCIITIS: ICD-10-CM

## 2023-05-02 DIAGNOSIS — G89.4 CHRONIC PAIN SYNDROME: ICD-10-CM

## 2023-05-02 DIAGNOSIS — M54.81 OCCIPITAL NEURALGIA OF RIGHT SIDE: Primary | ICD-10-CM

## 2023-05-02 NOTE — PROGRESS NOTES
"CHIEF COMPLAINT  Neck pain    Subjective   Noemi Bartholomew is a 63 y.o. female  who presents for follow-up.  She has a history of chronic neck pain. She reports that her pain has remained consistent since her last office visit.       Today pain is 7/10VAS in severity. Pain is located on the right side of her neck and radiates into right shoulder/upper portion of right arm and occasionally into right occipital region. Describes this pain as a nearly continuous ache.  She reports numbness/tingling of bilateral hands and feet. Pain is worsened by nothing. Patient reports \"pain is just there\". Pain improves with rest/repostion and medication.  She has not tried heat or ice for this issue.  Reports that she has completed PT and massage therapy in the past with minimal benefit.    She was prescribed Pregabalin at her office visit on 3/7/23. She was unable to tolerate this medication due to GI distress. She has discontinued use and resumed Gabapentin 300mg TID. Also utilizes Aleve PRN.     She reports that the TPI she received on 4/4/23 only offered minimal relief. She stated \"she only did 2 injections and I need more of them\". She would like to repeat them when able.     She is scheduled to follow up with Dr. Noe on 5/15/23.     Procedures:  4/4/23 - Right occipital nerve block and TPI to cervical paraspinous, levator, and trapezius muscles - minimal relief    Surgical History:  12/6/19 - C5-T1 Fusion     Neck Pain   This is a chronic problem. The current episode started more than 1 year ago. The problem occurs constantly. The problem has been waxing and waning. The pain is associated with an unknown factor. The pain is present in the right side and occipital region (radiates into right shoulder - denies radicular pain down right arm). The quality of the pain is described as aching. The pain is at a severity of 7/10. The pain is moderate. Nothing (\"pain is just there\") aggravates the symptoms. Associated symptoms " include numbness (hands/feet), tingling (bilateral hands/feet) and weakness (hands). Pertinent negatives include no chest pain, fever or headaches. She has tried NSAIDs (Pregabalin, Gabapentin) for the symptoms. The treatment provided mild relief.      PEG Assessment   What number best describes your pain on average in the past week?7  What number best describes how, during the past week, pain has interfered with your enjoyment of life?6  What number best describes how, during the past week, pain has interfered with your general activity?  6    Review of Pertinent Medical Data ---  Reviewed office note from Dr. Antonio Noe from 2/14/2023.  Patient presents via telephone visit to discuss injections.  Patient reports an increased pain to neck and occipital region.  She would like to move forward with pain management.  She has had injections in the past but never in her neck.  He noted that he would leave it up to pain management and whether to try epidural blocks, ablation, or occipital nerve blocks.    Reviewed office note from Dr. Leah Hartman from 3/7/2023.  Patient presents for evaluation treatment of right sided neck pain.  Reports pain has been present for almost a year.  She reports numbness and tingling in her hands and feet.  She takes gabapentin 300 mg 3 times daily and reports intermittent drowsiness and dizziness.  Plan at this point was to discontinue gabapentin trial Lyrica 25 mg twice a day.  Plan was to also schedule a right occipital nerve block and right-sided trigger point injections.  Patient instructed to follow-up 3 to 4 weeks following injection.    MRI OF THE CERVICAL SPINE WITH AND WITHOUT CONTRAST     CLINICAL HISTORY: Chronic neck pain and chronic occipital pain.     TECHNIQUE: MRI of the cervical spine was obtained with sagittal  precontrast and postcontrast T1, gradient echo, T2, and STIR images.  Additionally, there are axial precontrast and postcontrast T1, gradient  echo, and  T2-weighted images through the cervical spine.      Comparison is made to previous MRI of the cervical spine dated  07/07/2020.     FINDINGS:     Arthritic changes are again identified within the articulation of the  anterior arch of C1 and the odontoid process. Similar findings were  noted on the prior study.     At C2-3, there is a disc osteophyte complex which is eccentric to the  right and this disc osteophyte complex results in a mild-to-moderate  degree of canal stenosis along the right side of the spinal canal. A  mild degree of canal narrowing is noted along the left side of the  cervical spinal canal. These findings are unchanged since the prior  exam. There is uncovertebral joint hypertrophy that results in a  moderate-to-severe degree of right foraminal stenosis and these findings  are stable when compared to the prior examination.     At C3-4, there is a disc osteophyte complex which results in a moderate  degree of canal narrowing. Similar findings were noted on the prior  exam. Uncovertebral joint hypertrophy results in severe bilateral  foraminal stenosis and again, no significant interval change is seen.     At C4-5, there are prominent degenerative disc changes with adjacent  degenerative endplate marrow changes. A disc osteophyte complex is seen  at C4-5 that results in a moderate-to-severe degree of canal stenosis.  This disc osteophyte complex abuts and compresses the ventral surface of  the cervical spinal cord. Uncovertebral joint hypertrophy results in a  severe degree of bilateral foraminal stenosis at the C4-5 level. Again,  similar findings are noted on the prior study. There are foci of  myelomalacia identified within the cervical spinal cord as also noted on  the prior exam.     The patient is status post corpectomies at the C6 and C7 levels. There  is a strut graft in place which extends from the level of the inferior  body of C5 to the superior body of T1. An anterior cervical plate  and  traversing screws fuse C5 down to T1. These postsurgical changes were  also identified on the prior exam.     At C5-6, there is a combination of disc osteophyte complex and  ossification of the posterior longitudinal ligament along the left side  of the cervical spinal canal that results in a moderate-to-severe degree  of canal narrowing along the left side of the cervical spinal canal  where it abuts and flattens the left side of the cervical spinal cord.  There is a mild-to-moderate degree of left and a moderate-to-severe  degree of right foraminal narrowing at the C5-6 level secondary to  uncovertebral joint hypertrophy and these findings are unchanged since  the previous exam.     At C6-7, again there is a disc osteophyte complex in addition to  ossification of the posterior longitudinal ligament that results in a  moderate-to-severe degree of canal stenosis and flattens the left aspect  of the cervical spinal cord. This shell of residual ossification is a  rather sharp and pointed configuration where it indents the ventral  aspect of the cervical spinal cord. There is a mild degree of left and a  moderate degree of right foraminal narrowing secondary to uncovertebral  joint hypertrophy. Again, no significant interval change is seen when  compared to the prior study. There is a prominently myelomalacic  appearance of the right aspect of the cervical spinal cord at the C6-7  level. These findings were also appreciated on the prior study. However,  the degree of myelomalacia at C6-7 may be mildly more prominent on the  current exam.     At C7-T1, again there is a combination of a disc osteophyte complex and  ossification of the posterior longitudinal ligament that results in a  moderate-to-severe degree of canal narrowing along the left side of the  spinal canal where again this residual ossification has a sharp pointed  configuration indenting the ventral left aspect of the cervical spinal  cord. Again, no  significant interval change is seen when compared to the  prior study. There is no significant degree of foraminal narrowing at  the C7-T1 level.     IMPRESSION:     Very similar findings are appreciated when compared to the prior study  dated 07/07/2020. There is a suggestion of more pronounced myelomalacia  along the right aspect of the cervical spinal cord at the C6-7 level.  Otherwise, the remaining findings are without significant interval  change when compared to the prior study dated 07/07/2020. Again noted  are foci of myelomalacia within the cervical spinal cord noted  bilaterally at the C4-5 level.     The patient is status post corpectomies at the C6 and C7 levels. A strut  graft is in place. There is an anterior cervical plate and traversing  screws which fuse C5 down to T1. There is some residual disc osteophyte  complex as well as ossification of the posterior longitudinal ligament  along the left side of the spinal canal extending from the level of the  C5-6 interspace down to the level of the C7-T1 interspace. This residual  disc osteophyte complex/ossification of the posterior longitudinal  ligament results in a moderate-to-severe degree of canal stenosis along  the left aspect of the cervical spinal canal and prominently compresses  the ventral surface of the cervical spinal cord. Of note, the residual  ossification of the posterior longitudinal ligament/disc osteophyte  complex has a relatively pointed configuration at the C6 and C7 levels  where it appears to sharply indent the ventral aspect of the cervical  spinal cord. Again, multilevel canal and foraminal stenotic change  within the cervical spine demonstrates no significant interval change  when compared to the prior exam. These multilevel canal and foraminal  stenotic changes have been discussed in detail above.    This report was finalized on 7/11/2022 4:46 PM by Dr. Mina Portillo M.D.    The following portions of the patient's history  "were reviewed and updated as appropriate: allergies, current medications, past family history, past medical history, past social history, past surgical history and problem list.    Review of Systems   Constitutional: Negative for activity change, fatigue and fever.   HENT: Negative for congestion.    Eyes: Negative for visual disturbance.   Respiratory: Negative for cough and chest tightness.    Cardiovascular: Negative for chest pain.   Gastrointestinal: Negative for abdominal pain, constipation and diarrhea.   Genitourinary: Negative for difficulty urinating and dysuria.   Musculoskeletal: Positive for neck pain.   Neurological: Positive for dizziness, tingling (bilateral hands/feet), weakness (hands), light-headedness and numbness (hands/feet). Negative for headaches.   Psychiatric/Behavioral: Negative for agitation, sleep disturbance and suicidal ideas. The patient is not nervous/anxious.      I have reviewed and confirmed the accuracy of the ROS as documented by the MA/LPN/RN MIRYAM Mayorga    Vitals:    05/02/23 1240   BP: 127/80   BP Location: Left arm   Patient Position: Sitting   Cuff Size: Adult   Pulse: 61   Resp: 12   Temp: 97.1 °F (36.2 °C)   TempSrc: Temporal   SpO2: 99%   Weight: 51.2 kg (112 lb 12.8 oz)   Height: 134.6 cm (53\")   PainSc:   7     Objective   Physical Exam  Constitutional:       Appearance: Normal appearance.   HENT:      Head: Normocephalic.   Cardiovascular:      Rate and Rhythm: Normal rate and regular rhythm.   Pulmonary:      Effort: Pulmonary effort is normal.      Breath sounds: Normal breath sounds.   Musculoskeletal:      Cervical back: Tenderness present. Pain with movement present. Decreased range of motion.   Skin:     General: Skin is warm and dry.      Capillary Refill: Capillary refill takes less than 2 seconds.   Neurological:      General: No focal deficit present.      Mental Status: She is alert and oriented to person, place, and time.   Psychiatric:         " Mood and Affect: Mood is anxious.         Behavior: Behavior normal.         Thought Content: Thought content normal.         Cognition and Memory: Cognition normal.       Assessment & Plan   Diagnoses and all orders for this visit:    1. Occipital neuralgia of right side (Primary)    2. Myofasciitis    3. Myelomalacia of cervical cord    4. Chronic pain syndrome    --- MALCOLM  ---  Indications for epidural injection:  Plan is to proceed with epidural at the appropriate level.  If the patient receives significant pain reduction and improvement in function and the plan will be to repeat the epidural when the pain worsens.  If a second epidural provides at least 6 weeks of sustained improvement that includes both pain reduction and improvement in function then an epidural injection could be repeated once again at the same level.  This is a mutual decision between the clinician and the patient that includes discussions including risks and benefits in detail as well as alternative therapies.  Patient's questions were answered to their satisfaction and to their understanding.  ---  --- Offered referral for PT due to increased neck and right shoulder pain - patient declined at this time  --- Continue Gabapentin. Patient would like her PCP Dr. Carroll to assume responsibility for Gabapentin management and refills. No refills needed at this time.   --- Follow up with Dr. Page - scheduled for 5/15/23  --- Follow up for procedure      WILY REPORT  As the clinician, I personally reviewed the WILY from 5/2/23 while the patient was in the office today.    Dictated utilizing Dragon dictation.

## 2023-05-02 NOTE — PATIENT INSTRUCTIONS
---  Indications for epidural injection:  Plan is to proceed with epidural at the appropriate level.  If the patient receives significant pain reduction and improvement in function and the plan will be to repeat the epidural when the pain worsens.  If a second epidural provides at least 6 weeks of sustained improvement that includes both pain reduction and improvement in function then an epidural injection could be repeated once again at the same level.  This is a mutual decision between the clinician and the patient that includes discussions including risks and benefits in detail as well as alternative therapies.  Patient's questions were answered to their satisfaction and to their understanding.  ---

## 2023-05-03 DIAGNOSIS — M81.0 OSTEOPOROSIS, UNSPECIFIED OSTEOPOROSIS TYPE, UNSPECIFIED PATHOLOGICAL FRACTURE PRESENCE: Primary | ICD-10-CM

## 2023-05-05 RX ORDER — ATORVASTATIN CALCIUM 20 MG/1
20 TABLET, FILM COATED ORAL NIGHTLY
Qty: 90 TABLET | Refills: 1 | Status: SHIPPED | OUTPATIENT
Start: 2023-05-05

## 2023-05-12 NOTE — PROGRESS NOTES
Subjective   Patient ID: Noemi Bartholomew is a 63 y.o. female is here today for follow-up.    Today patient reports that she had a right occipital nerve injection and some trigger point injections.  This was last month by Dr. Hartman.  She says it really did not do that much.  She is somewhat frustrated.  Her hands are still weak.  Its been about 3 and half years since her two-level corpectomy at C6 and C7 for myelopathy.  She apparently got her long-term disability but there is some additional paperwork that needs to be done.  She still taking gabapentin at 300 mg 3 times daily.  The pain in the right occiput and the right side of the neck may be related to some extent to her underlying spasticity from her myelopathy.  We can retry some muscle relaxants in the form of baclofen.  The nurse practitioner at pain management had suggested trying epidurals which I do not have a real objection to.  But at some point we might need to have her see the movement disorder physicians at [facility] and consider Botox injections.  Some of this neck spasm may be a form of dystonia which could benefit from Botox but we will hold off on that for now and stick with the right occipital nerve injections and trigger point injections.  Apparently she had dry needling and physical therapy a while back and it did not do much.  We will try some baclofen at 10 mg twice daily.  I encouraged her to try the occipital nerve block and trigger point injections again and follow-up with me in a few months.        History of Present Illness    The following portions of the patient's history were reviewed and updated as appropriate: allergies, current medications, past family history, past medical history, past social history, past surgical history and problem list.    Review of Systems   Constitutional: Negative for fever.   Musculoskeletal: Positive for neck pain and neck stiffness.   All other systems reviewed and are negative.          Objective  "    Vitals:    05/15/23 1351   BP: 122/74   BP Location: Left arm   Patient Position: Sitting   Cuff Size: Adult   Weight: 50.8 kg (112 lb)   Height: 134.6 cm (52.99\")   PainSc:   8   PainLoc: Neck     Body mass index is 28.04 kg/m².    Tobacco Use: Low Risk    • Smoking Tobacco Use: Never   • Smokeless Tobacco Use: Never   • Passive Exposure: Not on file          Physical Exam  Constitutional:       Appearance: She is well-developed.   HENT:      Head: Normocephalic and atraumatic.   Eyes:      Extraocular Movements: EOM normal.      Conjunctiva/sclera: Conjunctivae normal.      Pupils: Pupils are equal, round, and reactive to light.   Neck:      Vascular: No carotid bruit.   Neurological:      Mental Status: She is oriented to person, place, and time.      Coordination: Finger-Nose-Finger Test and Heel to Shin Test normal.      Gait: Gait is intact.      Deep Tendon Reflexes:      Reflex Scores:       Tricep reflexes are 2+ on the right side and 2+ on the left side.       Bicep reflexes are 2+ on the right side and 2+ on the left side.       Brachioradialis reflexes are 2+ on the right side and 2+ on the left side.       Patellar reflexes are 2+ on the right side and 2+ on the left side.       Achilles reflexes are 2+ on the right side and 2+ on the left side.  Psychiatric:         Speech: Speech normal.       Neurologic Exam     Mental Status   Oriented to person, place, and time.   Registration of memory: Good recent and remote memory.   Attention: normal. Concentration: normal.   Speech: speech is normal   Level of consciousness: alert  Knowledge: consistent with education.     Cranial Nerves     CN II   Visual fields full to confrontation.   Visual acuity: normal    CN III, IV, VI   Pupils are equal, round, and reactive to light.  Extraocular motions are normal.     CN V   Facial sensation intact.   Right corneal reflex: normal  Left corneal reflex: normal    CN VII   Facial expression full, symmetric.   Right " facial weakness: none  Left facial weakness: none    CN VIII   Hearing: intact    CN IX, X   Palate: symmetric    CN XI   Right sternocleidomastoid strength: normal  Left sternocleidomastoid strength: normal    CN XII   Tongue: not atrophic  Tongue deviation: none    Motor Exam   Muscle bulk: normal  Right arm tone: normal  Left arm tone: normal  Right leg tone: normal  Left leg tone: normal    Strength   Strength 5/5 except as noted.     Sensory Exam   Light touch normal.     Gait, Coordination, and Reflexes     Gait  Gait: normal    Coordination   Finger to nose coordination: normal  Heel to shin coordination: normal    Reflexes   Right brachioradialis: 2+  Left brachioradialis: 2+  Right biceps: 2+  Left biceps: 2+  Right triceps: 2+  Left triceps: 2+  Right patellar: 2+  Left patellar: 2+  Right achilles: 2+  Left achilles: 2+  Right : 2+  Left : 2+          Assessment & Plan   Independent Review of Radiographic Studies:      I personally reviewed the images from the following studies.    The MRI done on 7/10/2022 shows a right C2-C3 bone spur compressing the upper cervical root on the right.  There is no residual stenosis from C7-T1 of any appreciable significance.  There is a little bit of residual bone spur laterally in the canal.  Agree with the report.    Medical Decision Making:      She will continue her gabapentin at 300 mg 3 times daily.  We will try the baclofen again but this time at 10 mg twice daily scheduled.  She will continue to work with Dr. Hartman and try the occipital nerve block and trigger point injections again.  I do not have any objection to an epidural block but at some point we might want to consider Botox injections to the neck.  Apparently there are some insurance papers that need to be filled out for her long-term disability.  We will address these.  We will see her in 4 months.        Diagnoses and all orders for this visit:    1. Occipital neuralgia of right side  (Primary)    2. Cervical myelopathy    3. Chronic neck pain    4. Spasticity    5. Weakness of both hands    Other orders  -     baclofen (LIORESAL) 10 MG tablet; Take 1 tablet by mouth 2 (Two) Times a Day.  Dispense: 60 tablet; Refill: 5      Return in about 4 years (around 5/15/2027) for face to face.

## 2023-05-15 ENCOUNTER — OFFICE VISIT (OUTPATIENT)
Dept: NEUROSURGERY | Facility: CLINIC | Age: 63
End: 2023-05-15
Payer: MEDICARE

## 2023-05-15 VITALS
WEIGHT: 112 LBS | HEIGHT: 55 IN | DIASTOLIC BLOOD PRESSURE: 74 MMHG | SYSTOLIC BLOOD PRESSURE: 122 MMHG | BODY MASS INDEX: 25.92 KG/M2

## 2023-05-15 DIAGNOSIS — M54.2 CHRONIC NECK PAIN: ICD-10-CM

## 2023-05-15 DIAGNOSIS — M54.81 OCCIPITAL NEURALGIA OF RIGHT SIDE: Primary | ICD-10-CM

## 2023-05-15 DIAGNOSIS — G89.29 CHRONIC NECK PAIN: ICD-10-CM

## 2023-05-15 DIAGNOSIS — R29.898 WEAKNESS OF BOTH HANDS: ICD-10-CM

## 2023-05-15 DIAGNOSIS — R25.2 SPASTICITY: ICD-10-CM

## 2023-05-15 DIAGNOSIS — G95.9 CERVICAL MYELOPATHY: ICD-10-CM

## 2023-05-15 PROCEDURE — 1160F RVW MEDS BY RX/DR IN RCRD: CPT | Performed by: NEUROLOGICAL SURGERY

## 2023-05-15 PROCEDURE — 99214 OFFICE O/P EST MOD 30 MIN: CPT | Performed by: NEUROLOGICAL SURGERY

## 2023-05-15 PROCEDURE — 1159F MED LIST DOCD IN RCRD: CPT | Performed by: NEUROLOGICAL SURGERY

## 2023-05-15 RX ORDER — BACLOFEN 10 MG/1
10 TABLET ORAL 2 TIMES DAILY
Qty: 60 TABLET | Refills: 5 | Status: SHIPPED | OUTPATIENT
Start: 2023-05-15

## 2023-05-18 ENCOUNTER — TELEPHONE (OUTPATIENT)
Dept: NEUROSURGERY | Facility: CLINIC | Age: 63
End: 2023-05-18

## 2023-05-18 DIAGNOSIS — M54.81 OCCIPITAL NEURALGIA OF RIGHT SIDE: Primary | ICD-10-CM

## 2023-05-18 DIAGNOSIS — M60.9 MYOFASCIITIS: ICD-10-CM

## 2023-05-18 NOTE — TELEPHONE ENCOUNTER
Caller: Noemi Bartholomew    Relationship: Self    Best call back number: [phone]    Do you require a callback: YES. PT LEFT ONE PAGE OF PAPERWORK WITH DR LABOY ON Monday TO FILL OUT AND FAX FOR HER DISABILITY. SHE WANTS TO KNOW IF IT HAS BEEN FAXED.     PLEASE CALL TO ADVISE    THANK YOU,

## 2023-05-24 NOTE — PROGRESS NOTES
"CHIEF COMPLAINT: Neck Pain      Noemi Bartholomew is a 63 y.o. female.  She is here for the following procedure:  bilateral occipital nerve block and Trigger point injection(s).     Vitals:    05/25/23 1421   BP: 131/89   Pulse: 66   Resp: 18   Temp: 98.2 °F (36.8 °C)   SpO2: 95%   Height: 134.6 cm (53\")   PainSc:   9   PainLoc: Neck       Bupivacaine Lot#: [id number] Exp: [id number] NDC:9950-3622-54  Depo Medrol Lot#: [id number] Exp: 02/2024 [id number]:[address]-1     Occipital      Patient reassessed immediately prior to procedure    Patient location during procedure: Pain Clinic    Reason for block: procedure for pain  Performed by  Anesthesiologist: Leah Hartman MD  Preanesthetic Checklist  Completed: patient identified, site marked, risks and benefits discussed, surgical consent, pre-op evaluation and timeout performed  Prep:  Patient position: sitting  Sterile barriers:gloves and mask  Prep: alcohol swabs  Procedure:  Approach:right  Guidance:landmark technique  Needle Gauge:25 G  Aspiration:negative  Medications:  Depomedrol:40 mg    Post Assessment  Injection Assessment: negative  Patient Tolerance:comfortable throughout block  Complications:no    Trigger Point Injection.      Patient reassessed immediately prior to procedure    Patient location during procedure: Pain Clinic    Reason for block: procedure for pain  Performed by  Anesthesiologist: Leah Hartman MD  Preanesthetic Checklist  Completed: patient identified, site marked, risks and benefits discussed, surgical consent, pre-op evaluation and timeout performed  Prep:  Patient Position:sitting  Sterile Tech:gloves and mask  Prep:alcohol swabs  Procedure:  Sedation:no  Guidance:palpation technique  Location:cervical  Muscle Group:2  Needle Gauge:25 G  Aspiration:negative    Post Assessment  Injection Assessment: negative  Patient Tolerance:comfortable throughout block  Complications:no          Visit Diagnoses:  1. Occipital neuralgia of right side    2. " Myofasciitis        Leah Hartman MD  Pain Management

## 2023-05-25 ENCOUNTER — PROCEDURE VISIT (OUTPATIENT)
Dept: PAIN MEDICINE | Facility: CLINIC | Age: 63
End: 2023-05-25
Payer: MEDICARE

## 2023-05-25 VITALS
SYSTOLIC BLOOD PRESSURE: 131 MMHG | OXYGEN SATURATION: 95 % | RESPIRATION RATE: 18 BRPM | DIASTOLIC BLOOD PRESSURE: 89 MMHG | HEART RATE: 66 BPM | TEMPERATURE: 98.2 F | HEIGHT: 55 IN | BODY MASS INDEX: 28.03 KG/M2

## 2023-05-25 DIAGNOSIS — M60.9 MYOFASCIITIS: ICD-10-CM

## 2023-05-25 DIAGNOSIS — M54.81 OCCIPITAL NEURALGIA OF RIGHT SIDE: Primary | ICD-10-CM

## 2023-05-31 ENCOUNTER — OFFICE VISIT (OUTPATIENT)
Dept: INTERNAL MEDICINE | Facility: CLINIC | Age: 63
End: 2023-05-31

## 2023-05-31 VITALS
BODY MASS INDEX: 28.04 KG/M2 | OXYGEN SATURATION: 96 % | HEART RATE: 72 BPM | DIASTOLIC BLOOD PRESSURE: 84 MMHG | SYSTOLIC BLOOD PRESSURE: 130 MMHG | HEIGHT: 55 IN

## 2023-05-31 DIAGNOSIS — R26.89 IMBALANCE: ICD-10-CM

## 2023-05-31 DIAGNOSIS — R25.2 SPASTICITY: Primary | ICD-10-CM

## 2023-05-31 PROCEDURE — 1160F RVW MEDS BY RX/DR IN RCRD: CPT | Performed by: INTERNAL MEDICINE

## 2023-05-31 PROCEDURE — 99213 OFFICE O/P EST LOW 20 MIN: CPT | Performed by: INTERNAL MEDICINE

## 2023-05-31 PROCEDURE — 1159F MED LIST DOCD IN RCRD: CPT | Performed by: INTERNAL MEDICINE

## 2023-05-31 RX ORDER — BACLOFEN 5 MG/1
5 TABLET ORAL 2 TIMES DAILY
Qty: 60 TABLET | Refills: 5 | Status: SHIPPED | OUTPATIENT
Start: 2023-05-31

## 2023-05-31 NOTE — PROGRESS NOTES
Eduardo Bartholomew is a 63 y.o. female who presents with   Chief Complaint   Patient presents with   • Dizziness   • Medication Reaction       History of Present Illness     Feels wobbly for two weeks.  Feels off balance and like she is going to fall.  No syncope.  She started Baclofen 10 BID two weeks ago.  She thinks that is impacting.      Review of Systems   Respiratory: Negative.    Cardiovascular: Negative.    Musculoskeletal: Positive for gait problem.   Neurological: Positive for dizziness. Negative for syncope and light-headedness.       The following portions of the patient's history were reviewed and updated as appropriate: allergies, current medications and problem list.    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     Note Last Updated: 11/22/2019     Added automatically from request for surgery [id number]     • Cervical spondylosis with myelopathy 11/22/2019     Note Last Updated: 11/22/2019     Added automatically from request for surgery [id number]     • Neuropathic pain, leg, left 06/25/2018   • DDD (degenerative disc disease), lumbar 05/02/2018   • Chronic left-sided lumbar radiculopathy 05/02/2018     Note Last Updated: 1/21/2021 Jan2021 Non-Regulatory IMO with [facility]/CMS designations     • Congenital spondylolysis, lumbosacral region 05/02/2018   • Carpal tunnel syndrome 06/08/2016   • Hyperlipidemia 06/08/2016   • Osteoporosis 06/08/2016     Note Last Updated: 6/29/2020     Description: Boniva for a number of years.  Forteo for a year.  Managed by Gyn. .  I do recommend 1200mg calcium and 1000 IUs of vitamin D in supplements/diet as well as weight bearing exercise to prevent  "further decline in BMD.  Prolia started 2020.         Current Outpatient Medications on File Prior to Visit   Medication Sig Dispense Refill   • acetaminophen (TYLENOL) 500 MG tablet Take 2 tablets by mouth Every 6 (Six) Hours As Needed for Mild Pain.     • atorvastatin (LIPITOR) 20 MG tablet Take 1 tablet by mouth Every Night. 90 tablet 1   • baclofen (LIORESAL) 10 MG tablet Take 1 tablet by mouth 2 (Two) Times a Day. 60 tablet 5   • butalbital-acetaminophen-caffeine (Esgic) -40 MG per tablet Take 1 tablet by mouth Every 4 (Four) Hours As Needed for Headache. 30 tablet 0   • Calcium Carb-Cholecalciferol (CALCIUM 1000 + D PO) Take  by mouth Daily. 2 caps     • cetirizine (zyrTEC) 10 MG tablet Take 1 tablet by mouth Daily.     • denosumab (PROLIA) 60 MG/ML solution prefilled syringe syringe      • docusate sodium (COLACE) 100 MG capsule Take 1 capsule by mouth 2 (Two) Times a Day.     • fluticasone (FLONASE) 50 MCG/ACT nasal spray 2 sprays into the nostril(s) as directed by provider Daily.     • gabapentin (NEURONTIN) 300 MG capsule Take 1 capsule by mouth 3 (Three) Times a Day.     • ibuprofen (ADVIL,MOTRIN) 200 MG tablet Take 2 tablets by mouth Every 6 (Six) Hours As Needed for Mild Pain.     • melatonin 3 MG tablet Take  by mouth.     • Metamucil Fiber chewable tablet Chew.     • methocarbamol (ROBAXIN) 500 MG tablet Take 1 tablet by mouth Daily As Needed for Muscle Spasms.     • naproxen sodium (ALEVE) 220 MG tablet Take 1 tablet by mouth 2 (Two) Times a Day With Meals.     • Probiotic Product ([place] PO) Take  by mouth.       No current facility-administered medications on file prior to visit.       Objective     /84   Pulse 72   Ht 134.6 cm (52.99\")   SpO2 96%   BMI 28.04 kg/m²     Physical Exam  Constitutional:       Appearance: She is well-developed.   HENT:      Head: Normocephalic and atraumatic.   Cardiovascular:      Rate and Rhythm: Normal rate and regular rhythm.      " Heart sounds: Normal heart sounds.   Pulmonary:      Effort: Pulmonary effort is normal.      Breath sounds: Normal breath sounds.   Skin:     General: Skin is warm and dry.   Neurological:      Mental Status: She is alert and oriented to person, place, and time.   Psychiatric:         Behavior: Behavior normal.         Assessment & Plan   Diagnoses and all orders for this visit:    1. Spasticity (Primary)    2. Imbalance    Other orders  -     baclofen (LIORESAL) 5 MG tablet; Take 1 tablet by mouth 2 (Two) Times a Day.  Dispense: 60 tablet; Refill: 5        Discussion    Patient presents with imbalance after recent addition of Baclofen 10mg.  She would like to try a lower dose for spasticity.  Rx is sent in.  The patient is instructed to let our office know if not feeling better over the next week or if there is any change in symptoms.           Future Appointments   Date Time Provider Department Center   5/31/2023  1:45 PM Mary Lou Carroll MD [facility]   7/13/2023  8:00 AM [facility]   7/17/2023  7:45 AM Mary Lou Carroll MD [facility]   7/25/2023  2:20 PM Diya Puente APRN [facility]   9/18/2023  2:00 PM Antonio Noe MD [facility]         Answers for HPI/ROS submitted by the patient on 5/30/2023  Please describe your symptoms.: have become more wobbly in my walking. can't really walk normally. I am dizzy and tired.  Have you had these symptoms before?: Yes  How long have you been having these symptoms?: Greater than 2 weeks  Please list any medications you are currently taking for this condition.: Baclofen  Please describe any probable cause for these symptoms. : Started from spinal card surgery in December 2019.  What is the primary reason for your visit?: Other

## 2023-06-24 NOTE — PLAN OF CARE
Problem: Patient Care Overview  Goal: Plan of Care Review  Outcome: Ongoing (interventions implemented as appropriate)  Flowsheets (Taken 12/12/2019 1614)  Progress: improving  Plan of Care Reviewed With: patient  Outcome Summary: Pt tolerated treatment well this date. Increased gait distance to 25ft w/ HHA-min A x2. Much improved success, though still having some difficulty w/ stepping up. Educated pt to step up w/ R LE first. PT will continue to address functional mobility deficits as tolerated.      No

## 2023-07-25 DIAGNOSIS — M54.2 CHRONIC NECK PAIN: Primary | ICD-10-CM

## 2023-07-25 DIAGNOSIS — G89.29 CHRONIC NECK PAIN: Primary | ICD-10-CM

## 2023-07-25 RX ORDER — GABAPENTIN 300 MG/1
300 CAPSULE ORAL 3 TIMES DAILY
Qty: 270 CAPSULE | Refills: 1 | Status: SHIPPED | OUTPATIENT
Start: 2023-07-25

## 2023-07-26 RX ORDER — BACLOFEN 5 MG/1
5 TABLET ORAL 2 TIMES DAILY
Qty: 180 TABLET | Refills: 0 | Status: SHIPPED | OUTPATIENT
Start: 2023-07-26

## 2023-08-07 ENCOUNTER — OFFICE VISIT (OUTPATIENT)
Dept: PAIN MEDICINE | Facility: CLINIC | Age: 63
End: 2023-08-07
Payer: MEDICARE

## 2023-08-07 ENCOUNTER — PREP FOR SURGERY (OUTPATIENT)
Dept: SURGERY | Facility: SURGERY CENTER | Age: 63
End: 2023-08-07
Payer: MEDICARE

## 2023-08-07 VITALS
BODY MASS INDEX: 25.32 KG/M2 | TEMPERATURE: 97 F | DIASTOLIC BLOOD PRESSURE: 70 MMHG | HEART RATE: 64 BPM | WEIGHT: 109.4 LBS | SYSTOLIC BLOOD PRESSURE: 114 MMHG | OXYGEN SATURATION: 94 % | HEIGHT: 55 IN | RESPIRATION RATE: 18 BRPM

## 2023-08-07 DIAGNOSIS — G89.29 CHRONIC NECK PAIN: Primary | ICD-10-CM

## 2023-08-07 DIAGNOSIS — G89.4 CHRONIC PAIN SYNDROME: ICD-10-CM

## 2023-08-07 DIAGNOSIS — M54.81 OCCIPITAL NEURALGIA OF RIGHT SIDE: Primary | ICD-10-CM

## 2023-08-07 DIAGNOSIS — M60.9 MYOFASCIITIS: ICD-10-CM

## 2023-08-07 DIAGNOSIS — M54.2 CHRONIC NECK PAIN: Primary | ICD-10-CM

## 2023-08-07 DIAGNOSIS — G95.9 CERVICAL MYELOPATHY: ICD-10-CM

## 2023-08-07 DIAGNOSIS — G95.89 MYELOMALACIA OF CERVICAL CORD: ICD-10-CM

## 2023-08-07 PROCEDURE — 1125F AMNT PAIN NOTED PAIN PRSNT: CPT

## 2023-08-07 PROCEDURE — 99214 OFFICE O/P EST MOD 30 MIN: CPT

## 2023-08-07 RX ORDER — DIAZEPAM 5 MG/1
10 TABLET ORAL ONCE
OUTPATIENT
Start: 2023-08-07

## 2023-08-07 NOTE — PROGRESS NOTES
"CHIEF COMPLAINT  Neck pain    Subjective   Noemi Bartholomew is a 63 y.o. female  who presents for follow-up.  She has a history of neck pain. She reports that her pain has remained consistent since her last office visit.     Today pain is 8/10VAS in severity. Pain is located on the right side of her neck and radiates into right shoulder/upper portion of right arm and occasionally into right occipital region. Describes this pain as a nearly continuous ache.  She reports numbness/tingling of bilateral hands and feet. Pain is worsened by nothing. Patient reports \"pain is just there\". Pain improves with rest/repostion and medication.  She has not tried heat or ice for this issue.  Reports that she has completed PT and massage therapy in the past with minimal benefit.    She continues with Gabapentin 300mg TID. Dr. Noe started her on Baclofen 10mg at her office visit on 5/15/23. She reports that this made her dizzy. Medication was decreased to 5mg BID. She also utilizes Aleve PRN.     She is scheduled to follow up with Dr. Noe on 9/25/23. In his note from 5/15/23, he mentioned patient may benefit from being seen by a physician at [facility] for movement disorders and possible Botox injections. Patient would like to proceed with MALCOLM first.    Procedures:  5/25/23 - Bilateral occiputal nerve block and TPI injections - 50% relief x 2 weeks  4/4/23 - Right occipital nerve block and TPI to cervical paraspinous, levator, and trapezius muscles - minimal relief     Surgical History:  12/6/19 - C5-T1 Fusion     Neck Pain   This is a chronic problem. The current episode started more than 1 year ago. The problem occurs constantly. The problem has been waxing and waning. The pain is associated with an unknown factor. The pain is present in the right side and occipital region (radiates into right shoulder and into upper porton of right arm). The quality of the pain is described as aching. The pain is at a severity of 8/10. " "The pain is moderate. Nothing (\"pain is just there\") aggravates the symptoms. Associated symptoms include numbness and tingling (bilateral hands/feet). Pertinent negatives include no chest pain, fever, headaches or weakness. She has tried NSAIDs (Pregabalin, Gabapentin) for the symptoms. The treatment provided mild relief.      PEG Assessment   What number best describes your pain on average in the past week?8  What number best describes how, during the past week, pain has interfered with your enjoyment of life?2  What number best describes how, during the past week, pain has interfered with your general activity?  2    Review of Pertinent Medical Data ---  Reviewed office note from Dr. Antonio Noe from 5/15/2023.  Patient presents for follow-up.  Patient received right occipital nerve block and trigger point injections with Dr. Hartman last month with minimal relief. History of C6 and C7 corpectomy 3 and half years ago.  Currently takes gabapentin 300 mg 3 times a day.  She reports that the nurse practitioner at [facility] pain management recommended an epidural.  Dr. Noe sees no issue with this but thinks at some point patient may need to see physician at [facility] movement disorder and Botox injections.  Relief with physical therapy and dry needling.  Patient was started on baclofen 10 mg twice a day.  Encourage patient to trial occipital nerve blocks and trigger point injections with Dr. Hartman again.  Was instructed to follow-up in 4 months or sooner if needed.    MRI OF THE CERVICAL SPINE WITH AND WITHOUT CONTRAST     CLINICAL HISTORY: Chronic neck pain and chronic occipital pain.     TECHNIQUE: MRI of the cervical spine was obtained with sagittal  precontrast and postcontrast T1, gradient echo, T2, and STIR images.  Additionally, there are axial precontrast and postcontrast T1, gradient  echo, and T2-weighted images through the cervical spine.      Comparison is made to previous MRI of the cervical spine " dated  07/07/2020.     FINDINGS:     Arthritic changes are again identified within the articulation of the  anterior arch of C1 and the odontoid process. Similar findings were  noted on the prior study.     At C2-3, there is a disc osteophyte complex which is eccentric to the  right and this disc osteophyte complex results in a mild-to-moderate  degree of canal stenosis along the right side of the spinal canal. A  mild degree of canal narrowing is noted along the left side of the  cervical spinal canal. These findings are unchanged since the prior  exam. There is uncovertebral joint hypertrophy that results in a  moderate-to-severe degree of right foraminal stenosis and these findings  are stable when compared to the prior examination.     At C3-4, there is a disc osteophyte complex which results in a moderate  degree of canal narrowing. Similar findings were noted on the prior  exam. Uncovertebral joint hypertrophy results in severe bilateral  foraminal stenosis and again, no significant interval change is seen.     At C4-5, there are prominent degenerative disc changes with adjacent  degenerative endplate marrow changes. A disc osteophyte complex is seen  at C4-5 that results in a moderate-to-severe degree of canal stenosis.  This disc osteophyte complex abuts and compresses the ventral surface of  the cervical spinal cord. Uncovertebral joint hypertrophy results in a  severe degree of bilateral foraminal stenosis at the C4-5 level. Again,  similar findings are noted on the prior study. There are foci of  myelomalacia identified within the cervical spinal cord as also noted on  the prior exam.     The patient is status post corpectomies at the C6 and C7 levels. There  is a strut graft in place which extends from the level of the inferior  body of C5 to the superior body of T1. An anterior cervical plate and  traversing screws fuse C5 down to T1. These postsurgical changes were  also identified on the prior exam.      At C5-6, there is a combination of disc osteophyte complex and  ossification of the posterior longitudinal ligament along the left side  of the cervical spinal canal that results in a moderate-to-severe degree  of canal narrowing along the left side of the cervical spinal canal  where it abuts and flattens the left side of the cervical spinal cord.  There is a mild-to-moderate degree of left and a moderate-to-severe  degree of right foraminal narrowing at the C5-6 level secondary to  uncovertebral joint hypertrophy and these findings are unchanged since  the previous exam.     At C6-7, again there is a disc osteophyte complex in addition to  ossification of the posterior longitudinal ligament that results in a  moderate-to-severe degree of canal stenosis and flattens the left aspect  of the cervical spinal cord. This shell of residual ossification is a  rather sharp and pointed configuration where it indents the ventral  aspect of the cervical spinal cord. There is a mild degree of left and a  moderate degree of right foraminal narrowing secondary to uncovertebral  joint hypertrophy. Again, no significant interval change is seen when  compared to the prior study. There is a prominently myelomalacic  appearance of the right aspect of the cervical spinal cord at the C6-7  level. These findings were also appreciated on the prior study. However,  the degree of myelomalacia at C6-7 may be mildly more prominent on the  current exam.     At C7-T1, again there is a combination of a disc osteophyte complex and  ossification of the posterior longitudinal ligament that results in a  moderate-to-severe degree of canal narrowing along the left side of the  spinal canal where again this residual ossification has a sharp pointed  configuration indenting the ventral left aspect of the cervical spinal  cord. Again, no significant interval change is seen when compared to the  prior study. There is no significant degree of foraminal  narrowing at  the C7-T1 level.     IMPRESSION:     Very similar findings are appreciated when compared to the prior study  dated 07/07/2020. There is a suggestion of more pronounced myelomalacia  along the right aspect of the cervical spinal cord at the C6-7 level.  Otherwise, the remaining findings are without significant interval  change when compared to the prior study dated 07/07/2020. Again noted  are foci of myelomalacia within the cervical spinal cord noted  bilaterally at the C4-5 level.     The patient is status post corpectomies at the C6 and C7 levels. A strut  graft is in place. There is an anterior cervical plate and traversing  screws which fuse C5 down to T1. There is some residual disc osteophyte  complex as well as ossification of the posterior longitudinal ligament  along the left side of the spinal canal extending from the level of the  C5-6 interspace down to the level of the C7-T1 interspace. This residual  disc osteophyte complex/ossification of the posterior longitudinal  ligament results in a moderate-to-severe degree of canal stenosis along  the left aspect of the cervical spinal canal and prominently compresses  the ventral surface of the cervical spinal cord. Of note, the residual  ossification of the posterior longitudinal ligament/disc osteophyte  complex has a relatively pointed configuration at the C6 and C7 levels  where it appears to sharply indent the ventral aspect of the cervical  spinal cord. Again, multilevel canal and foraminal stenotic change  within the cervical spine demonstrates no significant interval change  when compared to the prior exam. These multilevel canal and foraminal  stenotic changes have been discussed in detail above.     This report was finalized on 7/11/2022 4:46 PM by Dr. Mina Portillo M.D.       The following portions of the patient's history were reviewed and updated as appropriate: allergies, current medications, past family history, past medical  "history, past social history, past surgical history, and problem list.    Review of Systems   Constitutional:  Positive for fatigue. Negative for activity change and fever.   Cardiovascular:  Negative for chest pain.   Gastrointestinal:  Negative for abdominal pain, constipation and diarrhea.   Genitourinary:  Negative for difficulty urinating and dysuria.   Musculoskeletal:  Positive for neck pain.   Neurological:  Positive for dizziness, tingling (bilateral hands/feet), light-headedness and numbness. Negative for weakness and headaches.   Psychiatric/Behavioral:  Negative for agitation, sleep disturbance and suicidal ideas. The patient is nervous/anxious.      I have reviewed and confirmed the accuracy of the ROS as documented by the MA/LPN/RN MIRYAM Mayorga    Vitals:    08/07/23 1345   BP: 114/70   BP Location: Right arm   Patient Position: Sitting   Cuff Size: Adult   Pulse: 64   Resp: 18   Temp: 97 øF (36.1 øC)   SpO2: 94%   Weight: 49.6 kg (109 lb 6.4 oz)   Height: 134.6 cm (52.99\")   PainSc:   8   PainLoc: Neck     Objective   Physical Exam  Constitutional:       Appearance: Normal appearance.   HENT:      Head: Normocephalic.   Cardiovascular:      Rate and Rhythm: Normal rate and regular rhythm.   Pulmonary:      Effort: Pulmonary effort is normal.      Breath sounds: Normal breath sounds.   Musculoskeletal:      Cervical back: Tenderness present. Pain with movement present. Decreased range of motion.      Comments:     Skin:     General: Skin is warm and dry.      Capillary Refill: Capillary refill takes less than 2 seconds.   Neurological:      General: No focal deficit present.      Mental Status: She is alert and oriented to person, place, and time.   Psychiatric:         Mood and Affect: Mood is anxious.         Behavior: Behavior normal.         Thought Content: Thought content normal.         Cognition and Memory: Cognition normal.     Assessment & Plan   Diagnoses and all orders for this " visit:    1. Occipital neuralgia of right side (Primary)    2. Myofasciitis    3. Myelomalacia of cervical cord    4. Chronic pain syndrome    --- MALCOLM  ---  Indications for epidural injection:  Plan is to proceed with epidural at the appropriate level.  If the patient receives significant pain reduction and improvement in function and the plan will be to repeat the epidural when the pain worsens.  If a second epidural provides at least 6 weeks of sustained improvement that includes both pain reduction and improvement in function then an epidural injection could be repeated once again at the same level.  This is a mutual decision between the clinician and the patient that includes discussions including risks and benefits in detail as well as alternative therapies.  Patient's questions were answered to their satisfaction and to their understanding.  ---  Discussed with the patient that sedation is optional for this procedure.  The sedation offered is called conscious sedation which is different from general anesthesia that is utilized in surgical procedures. The dosing of the sedation is determined by the physician and they will be monitored throughout the procedure. With conscious sedation it is possible to remember parts or all of the procedure, this is normal. They will need to have a  with them as driving is prohibited following conscious sedation.      NPO instructions for conscious sedation:  --- Do not eat 6 hours prior to the procedure.   --- Do not drink any dairy or citrus 4 hours prior to the procedure.   --- Do not drink anything, including clear liquids, 2 hours prior to procedure.      If the NPO instructions are not followed then the procedure may be performed without sedation or the procedure will need to be rescheduled.   --- Offered referral for PT due to increased neck and right shoulder pain - patient declined at this time   --- Follow up with Dr. Noe on 9/18/23  --- Follow up for  procedure     Noemi Bartholomew reports a pain score of 8.  Given her pain assessment as noted, treatment options were discussed and the following options were decided upon as a follow-up plan to address the patient's pain:  patient scheduled for MALCOLM with Dr. Hartman .       WILY REPORT  As the clinician, I personally reviewed the WILY from 8/7/23 while the patient was in the office today.    Dictated utilizing Dragon dictation.

## 2023-08-07 NOTE — H&P (VIEW-ONLY)
"CHIEF COMPLAINT  Neck pain    Subjective   Noemi Bartholomew is a 63 y.o. female  who presents for follow-up.  She has a history of neck pain. She reports that her pain has remained consistent since her last office visit.     Today pain is 8/10VAS in severity. Pain is located on the right side of her neck and radiates into right shoulder/upper portion of right arm and occasionally into right occipital region. Describes this pain as a nearly continuous ache.  She reports numbness/tingling of bilateral hands and feet. Pain is worsened by nothing. Patient reports \"pain is just there\". Pain improves with rest/repostion and medication.  She has not tried heat or ice for this issue.  Reports that she has completed PT and massage therapy in the past with minimal benefit.    She continues with Gabapentin 300mg TID. Dr. Noe started her on Baclofen 10mg at her office visit on 5/15/23. She reports that this made her dizzy. Medication was decreased to 5mg BID. She also utilizes Aleve PRN.     She is scheduled to follow up with Dr. Noe on 9/25/23. In his note from 5/15/23, he mentioned patient may benefit from being seen by a physician at [facility] for movement disorders and possible Botox injections. Patient would like to proceed with MALCOLM first.    Procedures:  5/25/23 - Bilateral occiputal nerve block and TPI injections - 50% relief x 2 weeks  4/4/23 - Right occipital nerve block and TPI to cervical paraspinous, levator, and trapezius muscles - minimal relief     Surgical History:  12/6/19 - C5-T1 Fusion     Neck Pain   This is a chronic problem. The current episode started more than 1 year ago. The problem occurs constantly. The problem has been waxing and waning. The pain is associated with an unknown factor. The pain is present in the right side and occipital region (radiates into right shoulder and into upper porton of right arm). The quality of the pain is described as aching. The pain is at a severity of 8/10. " "The pain is moderate. Nothing (\"pain is just there\") aggravates the symptoms. Associated symptoms include numbness and tingling (bilateral hands/feet). Pertinent negatives include no chest pain, fever, headaches or weakness. She has tried NSAIDs (Pregabalin, Gabapentin) for the symptoms. The treatment provided mild relief.      PEG Assessment   What number best describes your pain on average in the past week?8  What number best describes how, during the past week, pain has interfered with your enjoyment of life?2  What number best describes how, during the past week, pain has interfered with your general activity?  2    Review of Pertinent Medical Data ---  Reviewed office note from Dr. Antonio Noe from 5/15/2023.  Patient presents for follow-up.  Patient received right occipital nerve block and trigger point injections with Dr. Hartman last month with minimal relief. History of C6 and C7 corpectomy 3 and half years ago.  Currently takes gabapentin 300 mg 3 times a day.  She reports that the nurse practitioner at [facility] pain management recommended an epidural.  Dr. Noe sees no issue with this but thinks at some point patient may need to see physician at [facility] movement disorder and Botox injections.  Relief with physical therapy and dry needling.  Patient was started on baclofen 10 mg twice a day.  Encourage patient to trial occipital nerve blocks and trigger point injections with Dr. Hartman again.  Was instructed to follow-up in 4 months or sooner if needed.    MRI OF THE CERVICAL SPINE WITH AND WITHOUT CONTRAST     CLINICAL HISTORY: Chronic neck pain and chronic occipital pain.     TECHNIQUE: MRI of the cervical spine was obtained with sagittal  precontrast and postcontrast T1, gradient echo, T2, and STIR images.  Additionally, there are axial precontrast and postcontrast T1, gradient  echo, and T2-weighted images through the cervical spine.      Comparison is made to previous MRI of the cervical spine " dated  07/07/2020.     FINDINGS:     Arthritic changes are again identified within the articulation of the  anterior arch of C1 and the odontoid process. Similar findings were  noted on the prior study.     At C2-3, there is a disc osteophyte complex which is eccentric to the  right and this disc osteophyte complex results in a mild-to-moderate  degree of canal stenosis along the right side of the spinal canal. A  mild degree of canal narrowing is noted along the left side of the  cervical spinal canal. These findings are unchanged since the prior  exam. There is uncovertebral joint hypertrophy that results in a  moderate-to-severe degree of right foraminal stenosis and these findings  are stable when compared to the prior examination.     At C3-4, there is a disc osteophyte complex which results in a moderate  degree of canal narrowing. Similar findings were noted on the prior  exam. Uncovertebral joint hypertrophy results in severe bilateral  foraminal stenosis and again, no significant interval change is seen.     At C4-5, there are prominent degenerative disc changes with adjacent  degenerative endplate marrow changes. A disc osteophyte complex is seen  at C4-5 that results in a moderate-to-severe degree of canal stenosis.  This disc osteophyte complex abuts and compresses the ventral surface of  the cervical spinal cord. Uncovertebral joint hypertrophy results in a  severe degree of bilateral foraminal stenosis at the C4-5 level. Again,  similar findings are noted on the prior study. There are foci of  myelomalacia identified within the cervical spinal cord as also noted on  the prior exam.     The patient is status post corpectomies at the C6 and C7 levels. There  is a strut graft in place which extends from the level of the inferior  body of C5 to the superior body of T1. An anterior cervical plate and  traversing screws fuse C5 down to T1. These postsurgical changes were  also identified on the prior exam.      At C5-6, there is a combination of disc osteophyte complex and  ossification of the posterior longitudinal ligament along the left side  of the cervical spinal canal that results in a moderate-to-severe degree  of canal narrowing along the left side of the cervical spinal canal  where it abuts and flattens the left side of the cervical spinal cord.  There is a mild-to-moderate degree of left and a moderate-to-severe  degree of right foraminal narrowing at the C5-6 level secondary to  uncovertebral joint hypertrophy and these findings are unchanged since  the previous exam.     At C6-7, again there is a disc osteophyte complex in addition to  ossification of the posterior longitudinal ligament that results in a  moderate-to-severe degree of canal stenosis and flattens the left aspect  of the cervical spinal cord. This shell of residual ossification is a  rather sharp and pointed configuration where it indents the ventral  aspect of the cervical spinal cord. There is a mild degree of left and a  moderate degree of right foraminal narrowing secondary to uncovertebral  joint hypertrophy. Again, no significant interval change is seen when  compared to the prior study. There is a prominently myelomalacic  appearance of the right aspect of the cervical spinal cord at the C6-7  level. These findings were also appreciated on the prior study. However,  the degree of myelomalacia at C6-7 may be mildly more prominent on the  current exam.     At C7-T1, again there is a combination of a disc osteophyte complex and  ossification of the posterior longitudinal ligament that results in a  moderate-to-severe degree of canal narrowing along the left side of the  spinal canal where again this residual ossification has a sharp pointed  configuration indenting the ventral left aspect of the cervical spinal  cord. Again, no significant interval change is seen when compared to the  prior study. There is no significant degree of foraminal  narrowing at  the C7-T1 level.     IMPRESSION:     Very similar findings are appreciated when compared to the prior study  dated 07/07/2020. There is a suggestion of more pronounced myelomalacia  along the right aspect of the cervical spinal cord at the C6-7 level.  Otherwise, the remaining findings are without significant interval  change when compared to the prior study dated 07/07/2020. Again noted  are foci of myelomalacia within the cervical spinal cord noted  bilaterally at the C4-5 level.     The patient is status post corpectomies at the C6 and C7 levels. A strut  graft is in place. There is an anterior cervical plate and traversing  screws which fuse C5 down to T1. There is some residual disc osteophyte  complex as well as ossification of the posterior longitudinal ligament  along the left side of the spinal canal extending from the level of the  C5-6 interspace down to the level of the C7-T1 interspace. This residual  disc osteophyte complex/ossification of the posterior longitudinal  ligament results in a moderate-to-severe degree of canal stenosis along  the left aspect of the cervical spinal canal and prominently compresses  the ventral surface of the cervical spinal cord. Of note, the residual  ossification of the posterior longitudinal ligament/disc osteophyte  complex has a relatively pointed configuration at the C6 and C7 levels  where it appears to sharply indent the ventral aspect of the cervical  spinal cord. Again, multilevel canal and foraminal stenotic change  within the cervical spine demonstrates no significant interval change  when compared to the prior exam. These multilevel canal and foraminal  stenotic changes have been discussed in detail above.     This report was finalized on 7/11/2022 4:46 PM by Dr. Mina Portillo M.D.       The following portions of the patient's history were reviewed and updated as appropriate: allergies, current medications, past family history, past medical  "history, past social history, past surgical history, and problem list.    Review of Systems   Constitutional:  Positive for fatigue. Negative for activity change and fever.   Cardiovascular:  Negative for chest pain.   Gastrointestinal:  Negative for abdominal pain, constipation and diarrhea.   Genitourinary:  Negative for difficulty urinating and dysuria.   Musculoskeletal:  Positive for neck pain.   Neurological:  Positive for dizziness, tingling (bilateral hands/feet), light-headedness and numbness. Negative for weakness and headaches.   Psychiatric/Behavioral:  Negative for agitation, sleep disturbance and suicidal ideas. The patient is nervous/anxious.      I have reviewed and confirmed the accuracy of the ROS as documented by the MA/LPN/RN MIRYAM Mayorga    Vitals:    08/07/23 1345   BP: 114/70   BP Location: Right arm   Patient Position: Sitting   Cuff Size: Adult   Pulse: 64   Resp: 18   Temp: 97 øF (36.1 øC)   SpO2: 94%   Weight: 49.6 kg (109 lb 6.4 oz)   Height: 134.6 cm (52.99\")   PainSc:   8   PainLoc: Neck     Objective   Physical Exam  Constitutional:       Appearance: Normal appearance.   HENT:      Head: Normocephalic.   Cardiovascular:      Rate and Rhythm: Normal rate and regular rhythm.   Pulmonary:      Effort: Pulmonary effort is normal.      Breath sounds: Normal breath sounds.   Musculoskeletal:      Cervical back: Tenderness present. Pain with movement present. Decreased range of motion.      Comments:     Skin:     General: Skin is warm and dry.      Capillary Refill: Capillary refill takes less than 2 seconds.   Neurological:      General: No focal deficit present.      Mental Status: She is alert and oriented to person, place, and time.   Psychiatric:         Mood and Affect: Mood is anxious.         Behavior: Behavior normal.         Thought Content: Thought content normal.         Cognition and Memory: Cognition normal.     Assessment & Plan   Diagnoses and all orders for this " visit:    1. Occipital neuralgia of right side (Primary)    2. Myofasciitis    3. Myelomalacia of cervical cord    4. Chronic pain syndrome    --- MALCOLM  ---  Indications for epidural injection:  Plan is to proceed with epidural at the appropriate level.  If the patient receives significant pain reduction and improvement in function and the plan will be to repeat the epidural when the pain worsens.  If a second epidural provides at least 6 weeks of sustained improvement that includes both pain reduction and improvement in function then an epidural injection could be repeated once again at the same level.  This is a mutual decision between the clinician and the patient that includes discussions including risks and benefits in detail as well as alternative therapies.  Patient's questions were answered to their satisfaction and to their understanding.  ---  Discussed with the patient that sedation is optional for this procedure.  The sedation offered is called conscious sedation which is different from general anesthesia that is utilized in surgical procedures. The dosing of the sedation is determined by the physician and they will be monitored throughout the procedure. With conscious sedation it is possible to remember parts or all of the procedure, this is normal. They will need to have a  with them as driving is prohibited following conscious sedation.      NPO instructions for conscious sedation:  --- Do not eat 6 hours prior to the procedure.   --- Do not drink any dairy or citrus 4 hours prior to the procedure.   --- Do not drink anything, including clear liquids, 2 hours prior to procedure.      If the NPO instructions are not followed then the procedure may be performed without sedation or the procedure will need to be rescheduled.   --- Offered referral for PT due to increased neck and right shoulder pain - patient declined at this time   --- Follow up with Dr. Noe on 9/18/23  --- Follow up for  procedure     Noemi Bartholomew reports a pain score of 8.  Given her pain assessment as noted, treatment options were discussed and the following options were decided upon as a follow-up plan to address the patient's pain:  patient scheduled for MALCOLM with Dr. Hartman .       WILY REPORT  As the clinician, I personally reviewed the WILY from 8/7/23 while the patient was in the office today.    Dictated utilizing Dragon dictation.

## 2023-08-08 ENCOUNTER — TRANSCRIBE ORDERS (OUTPATIENT)
Dept: SURGERY | Facility: SURGERY CENTER | Age: 63
End: 2023-08-08
Payer: MEDICARE

## 2023-08-08 DIAGNOSIS — Z41.9 SURGERY, ELECTIVE: Primary | ICD-10-CM

## 2023-08-25 NOTE — DISCHARGE INSTRUCTIONS
[facility] - Post-procedure Instructions          --  While there are no absolute restrictions, it is recommended that you do not perform strenuous activity today. In the morning, you may resume your level of activity as before your block.    --  If you have a band-aid at your injection site, please remove it later today. Observe the area for any redness, swelling, pus-like drainage, or a temperature over 101ø. If any of these symptoms occur, please call your doctor at [phone]. If after office hours, leave a message and the on-call provider will return your call.    --  Ice may be applied to your injection site. It is recommended you avoid direct heat (heating pad; hot tub) for 1-2 days.    --  Call [facility] at [phone] if you experience persistent headache, persistent bleeding from the injection site, or severe pain not relieved by heat or oral medication.    --  Do not make important decisions today.    --  Due to the effects of the block and/or the I.V. Sedation, DO NOT drive or operate hazardous machinery for 12 hours.  Local anesthetics may cause numbness after procedure and precautions must be taken with regards to operating equipment as well as with walking, even if ambulating with assistance of another person or with an assistive device.    --  Do not drink alcohol for 12 hours.    -- You may return to work tomorrow, or as directed by your referring doctor.    --  Occasionally you may notice a slight increase in your pain after the procedure. This should start to improve within the next 24-48 hours. Radiofrequency ablation procedure pain may last 3-4 weeks.    --  It may take as long as 3-4 days before you notice a gradual improvement in your pain and/or other symptoms.    -- You may continue to take your prescribed pain medication as needed.    --  Some normal possible side effects of steroid use could include fluid retention, increased blood sugar, dull headache,  increased sweating, increased appetite, mood swings and flushing.    --  Diabetics are recommended to watch their blood glucose level closely for 24-48 hours after the injection.    --  Must stay in PACU for 20 min upon arrival and prove no leg weakness before being discharged.    --  IN THE EVENT OF A LIFE THREATENING EMERGENCY, (CHEST PAIN, BREATHING DIFFICULTIES, PARALYSIS.) YOU SHOULD GO TO YOUR NEAREST EMERGENCY ROOM.    --  You should be contacted by our office within 2-3 days to schedule follow up or next appointment date.  If not contacted within 7 days, please call the office at [phone]

## 2023-08-28 ENCOUNTER — HOSPITAL ENCOUNTER (OUTPATIENT)
Dept: GENERAL RADIOLOGY | Facility: SURGERY CENTER | Age: 63
Setting detail: HOSPITAL OUTPATIENT SURGERY
End: 2023-08-28
Payer: MEDICARE

## 2023-08-28 ENCOUNTER — HOSPITAL ENCOUNTER (OUTPATIENT)
Facility: SURGERY CENTER | Age: 63
Setting detail: HOSPITAL OUTPATIENT SURGERY
Discharge: HOME | End: 2023-08-28
Attending: ANESTHESIOLOGY | Admitting: ANESTHESIOLOGY
Payer: MEDICARE

## 2023-08-28 VITALS
HEIGHT: 55 IN | DIASTOLIC BLOOD PRESSURE: 81 MMHG | RESPIRATION RATE: 16 BRPM | TEMPERATURE: 97 F | SYSTOLIC BLOOD PRESSURE: 115 MMHG | HEART RATE: 70 BPM | BODY MASS INDEX: 25.46 KG/M2 | WEIGHT: 110 LBS | OXYGEN SATURATION: 98 %

## 2023-08-28 DIAGNOSIS — G89.29 CHRONIC NECK PAIN: ICD-10-CM

## 2023-08-28 DIAGNOSIS — G95.9 CERVICAL MYELOPATHY: ICD-10-CM

## 2023-08-28 DIAGNOSIS — M54.2 CHRONIC NECK PAIN: ICD-10-CM

## 2023-08-28 DIAGNOSIS — Z41.9 SURGERY, ELECTIVE: ICD-10-CM

## 2023-08-28 PROCEDURE — 25510000001 IOPAMIDOL 61 % SOLUTION 30 ML VIAL: Performed by: ANESTHESIOLOGY

## 2023-08-28 PROCEDURE — 76000 FLUOROSCOPY <1 HR PHYS/QHP: CPT

## 2023-08-28 PROCEDURE — 62321 NJX INTERLAMINAR CRV/THRC: CPT | Performed by: ANESTHESIOLOGY

## 2023-08-28 PROCEDURE — 25010000002 DEXAMETHASONE SODIUM PHOSPHATE 100 MG/10ML SOLUTION 10 ML VIAL: Performed by: ANESTHESIOLOGY

## 2023-08-28 PROCEDURE — 77002 NEEDLE LOCALIZATION BY XRAY: CPT

## 2023-08-28 RX ORDER — DIAZEPAM 5 MG/1
10 TABLET ORAL ONCE
Status: COMPLETED | OUTPATIENT
Start: 2023-08-28 | End: 2023-08-28

## 2023-08-28 RX ADMIN — DIAZEPAM 10 MG: 5 TABLET ORAL at 10:36

## 2023-08-28 NOTE — OP NOTE
Cervical/Thoracic Epidural Steroid Injection   [facility]    PREOPERATIVE DIAGNOSIS:  Cervical Radiculopathy, Cervical Spinal Stenosis, Chronic Neck Pain, and Radiculopathy  POSTOPERATIVE DIAGNOSIS:  Same as preop diagnosis    PROCEDURE:   Cervical/Thoracic Epidural Steroid Injection, Therapeutic Interlaminar Injection, with epidurogram, at  T1-T2    PRE-PROCEDURE DISCUSSION WITH PATIENT:    Risks and complications were discussed with the patient prior to starting the procedure and informed consent was obtained.  We discussed various topics including but not limited to bleeding, infection, injury, paralysis, nerve injury, dural puncture, coma, death, worsening of clinical picture, lack of pain relief, and postprocedural soreness.    SURGEON:  Leah Hartman MD    REASON FOR PROCEDURE:   Diagnostic injection at this level is needed, Stenotic area is noted, and is likely contributing to this chronic &/or recurrent pain. , and Radiating pattern of pain is likely consistent with degenerative changes in the area.    SEDATION:  Anxiolysis was used for this procedure which included PO Valium 10mg  ANESTHETIC:  None  STEROID:   10mg Dexamethasone    DESCRIPTON OF PROCEDURE:  After obtaining informed consent, the patient was taken to the operating room and placed in the prone position.  EKG, blood pressure, and pulse oximeter were monitored throughout, and sedation was provided as needed by the RN under my guidance. All pressure points were well padded.  The cervicothoracic spine area was prepped with Chloraprep and draped in a sterile fashion.      AP fluoroscopic image was used to visualize the T1-T2 interspace.  The skin and subcutaneous tissue over the area was anesthetized with 1% Lidocaine.  An 18-Gauge Tuohy needle was then advanced through the anesthetized skin tract under fluoroscopic guidance in a coaxial view using a loss of resistance technique.  Lateral fluoroscopy was used to verify  appropriate needle depth.  Once the needle tip was felt to be in the posterior epidural space, aspiration was noted to be negative for blood or CSF.  A volume of 1mL of Isovue 61% was then injected under live fluoroscopy in an AP view which produced good epidural spread with no evidence of loculation, vascular run-off or intrathecal spread.  Subsequently, a total volume of 3mL consisting of 10mg of dexamethasone mixed with normal saline was injected without resistance.      ESTIMATED BLOOD LOSS:  <5 mL  SPECIMENS:  None    COMPLICATIONS:     No complications were noted., There was no indication of vascular uptake on live injection of contrast dye., and There was no indication of intrathecal uptake on live injection of contrast dye.    TOLERANCE & DISCHARGE CONDITION:    The patient tolerated the procedure well.  The patient was transported to the recovery area without difficulties.  The patient was discharged to home under the care of family in stable and satisfactory condition.    PLAN OF CARE:  The patient was given our standard instruction sheet.        2.   The patient will Return to clinic 4-6 wks.  3.    The patient will resume all medications as per the medication reconciliation sheet.

## 2023-09-25 ENCOUNTER — OFFICE VISIT (OUTPATIENT)
Dept: PAIN MEDICINE | Facility: CLINIC | Age: 63
End: 2023-09-25

## 2023-09-25 VITALS
HEIGHT: 55 IN | WEIGHT: 109.8 LBS | HEART RATE: 66 BPM | BODY MASS INDEX: 25.41 KG/M2 | DIASTOLIC BLOOD PRESSURE: 70 MMHG | OXYGEN SATURATION: 99 % | SYSTOLIC BLOOD PRESSURE: 100 MMHG | TEMPERATURE: 98.1 F | RESPIRATION RATE: 12 BRPM

## 2023-09-25 DIAGNOSIS — M54.81 OCCIPITAL NEURALGIA OF RIGHT SIDE: Primary | ICD-10-CM

## 2023-09-25 DIAGNOSIS — G89.4 CHRONIC PAIN SYNDROME: ICD-10-CM

## 2023-09-25 DIAGNOSIS — M60.9 MYOFASCIITIS: ICD-10-CM

## 2023-09-25 DIAGNOSIS — G95.89 MYELOMALACIA OF CERVICAL CORD: ICD-10-CM

## 2023-09-25 PROCEDURE — 1125F AMNT PAIN NOTED PAIN PRSNT: CPT

## 2023-09-25 PROCEDURE — 1160F RVW MEDS BY RX/DR IN RCRD: CPT

## 2023-09-25 PROCEDURE — 99213 OFFICE O/P EST LOW 20 MIN: CPT

## 2023-09-25 PROCEDURE — 1159F MED LIST DOCD IN RCRD: CPT

## 2023-09-25 NOTE — PROGRESS NOTES
"CHIEF COMPLAINT  Neck pain    Subjective   Noemi Bartholomew is a 63 y.o. female  who presents to the office for follow-up of procedure.  She completed a T1-T2 MALCOLM   on  8/28/23 performed by Dr. Hartman for management of neck pain. Patient reports minimal relief from the procedure. Pain has been consistent since the procedure.      Today pain is 8/10VAS in severity. Pain is located on the right side of her neck and radiates into right shoulder/upper portion of right arm and occasionally into right occipital region. Describes this pain as a nearly continuous ache.  She reports numbness/tingling of bilateral hands and feet. Pain is worsened by nothing. Patient reports \"pain is just there\". Pain improves with rest/repostion and medication.  She has not tried heat or ice for this issue.  She has completed PT and massage therapy in the past with minimal benefit.      She continues with Gabapentin 300mg TID, Baclofen 5mg BID PRN for muscle spasms, and OTC Aleve PRN. She reports that this regimen is somewhat helpful to her pain. She denies any side effects from the medication including somnolence and constipation.      She is scheduled to follow up with Dr. Noe on 9/25/23. In his note from 5/15/23, he mentioned patient may benefit from being seen by a physician at [facility] for movement disorders and possible Botox injections. She is scheduled to follow up with him again on 9/30/23.      Procedures:  8/28/23 - T1-T2 MALCOLM - minimal relief for a couple days  5/25/23 - Bilateral occiputal nerve block and TPI injections - 50% relief x 2 weeks  4/4/23 - Right occipital nerve block and TPI to cervical paraspinous, levator, and trapezius muscles - minimal relief     Surgical History:  12/6/19 - C5-T1 ACDF Fusion     Neck Pain   This is a chronic problem. The current episode started more than 1 year ago. The problem occurs constantly. The problem has been unchanged (unchanged since last office visit). The pain is associated with an " "unknown factor. The pain is present in the right side and occipital region (radiates into right shoulder and into upper porton of right arm). The quality of the pain is described as aching. The pain is at a severity of 8/10. The pain is moderate. Nothing (\"pain is just there\") aggravates the symptoms. Associated symptoms include headaches, numbness (hand,feet), tingling (bilateral hands/feet) and weakness (feet). Pertinent negatives include no chest pain or fever. She has tried NSAIDs (Pregabalin, Gabapentin) for the symptoms. The treatment provided mild relief.      PEG Assessment   What number best describes your pain on average in the past week?8  What number best describes how, during the past week, pain has interfered with your enjoyment of life?8  What number best describes how, during the past week, pain has interfered with your general activity?  8    Review of Pertinent Medical Data ---  MRI OF THE CERVICAL SPINE WITH AND WITHOUT CONTRAST     CLINICAL HISTORY: Chronic neck pain and chronic occipital pain.     TECHNIQUE: MRI of the cervical spine was obtained with sagittal  precontrast and postcontrast T1, gradient echo, T2, and STIR images.  Additionally, there are axial precontrast and postcontrast T1, gradient  echo, and T2-weighted images through the cervical spine.      Comparison is made to previous MRI of the cervical spine dated  07/07/2020.     FINDINGS:     Arthritic changes are again identified within the articulation of the  anterior arch of C1 and the odontoid process. Similar findings were  noted on the prior study.     At C2-3, there is a disc osteophyte complex which is eccentric to the  right and this disc osteophyte complex results in a mild-to-moderate  degree of canal stenosis along the right side of the spinal canal. A  mild degree of canal narrowing is noted along the left side of the  cervical spinal canal. These findings are unchanged since the prior  exam. There is uncovertebral " joint hypertrophy that results in a  moderate-to-severe degree of right foraminal stenosis and these findings  are stable when compared to the prior examination.     At C3-4, there is a disc osteophyte complex which results in a moderate  degree of canal narrowing. Similar findings were noted on the prior  exam. Uncovertebral joint hypertrophy results in severe bilateral  foraminal stenosis and again, no significant interval change is seen.     At C4-5, there are prominent degenerative disc changes with adjacent  degenerative endplate marrow changes. A disc osteophyte complex is seen  at C4-5 that results in a moderate-to-severe degree of canal stenosis.  This disc osteophyte complex abuts and compresses the ventral surface of  the cervical spinal cord. Uncovertebral joint hypertrophy results in a  severe degree of bilateral foraminal stenosis at the C4-5 level. Again,  similar findings are noted on the prior study. There are foci of  myelomalacia identified within the cervical spinal cord as also noted on  the prior exam.     The patient is status post corpectomies at the C6 and C7 levels. There  is a strut graft in place which extends from the level of the inferior  body of C5 to the superior body of T1. An anterior cervical plate and  traversing screws fuse C5 down to T1. These postsurgical changes were  also identified on the prior exam.     At C5-6, there is a combination of disc osteophyte complex and  ossification of the posterior longitudinal ligament along the left side  of the cervical spinal canal that results in a moderate-to-severe degree  of canal narrowing along the left side of the cervical spinal canal  where it abuts and flattens the left side of the cervical spinal cord.  There is a mild-to-moderate degree of left and a moderate-to-severe  degree of right foraminal narrowing at the C5-6 level secondary to  uncovertebral joint hypertrophy and these findings are unchanged since  the previous exam.      At C6-7, again there is a disc osteophyte complex in addition to  ossification of the posterior longitudinal ligament that results in a  moderate-to-severe degree of canal stenosis and flattens the left aspect  of the cervical spinal cord. This shell of residual ossification is a  rather sharp and pointed configuration where it indents the ventral  aspect of the cervical spinal cord. There is a mild degree of left and a  moderate degree of right foraminal narrowing secondary to uncovertebral  joint hypertrophy. Again, no significant interval change is seen when  compared to the prior study. There is a prominently myelomalacic  appearance of the right aspect of the cervical spinal cord at the C6-7  level. These findings were also appreciated on the prior study. However,  the degree of myelomalacia at C6-7 may be mildly more prominent on the  current exam.     At C7-T1, again there is a combination of a disc osteophyte complex and  ossification of the posterior longitudinal ligament that results in a  moderate-to-severe degree of canal narrowing along the left side of the  spinal canal where again this residual ossification has a sharp pointed  configuration indenting the ventral left aspect of the cervical spinal  cord. Again, no significant interval change is seen when compared to the  prior study. There is no significant degree of foraminal narrowing at  the C7-T1 level.     IMPRESSION:     Very similar findings are appreciated when compared to the prior study  dated 07/07/2020. There is a suggestion of more pronounced myelomalacia  along the right aspect of the cervical spinal cord at the C6-7 level.  Otherwise, the remaining findings are without significant interval  change when compared to the prior study dated 07/07/2020. Again noted  are foci of myelomalacia within the cervical spinal cord noted  bilaterally at the C4-5 level.     The patient is status post corpectomies at the C6 and C7 levels. A  strut  graft is in place. There is an anterior cervical plate and traversing  screws which fuse C5 down to T1. There is some residual disc osteophyte  complex as well as ossification of the posterior longitudinal ligament  along the left side of the spinal canal extending from the level of the  C5-6 interspace down to the level of the C7-T1 interspace. This residual  disc osteophyte complex/ossification of the posterior longitudinal  ligament results in a moderate-to-severe degree of canal stenosis along  the left aspect of the cervical spinal canal and prominently compresses  the ventral surface of the cervical spinal cord. Of note, the residual  ossification of the posterior longitudinal ligament/disc osteophyte  complex has a relatively pointed configuration at the C6 and C7 levels  where it appears to sharply indent the ventral aspect of the cervical  spinal cord. Again, multilevel canal and foraminal stenotic change  within the cervical spine demonstrates no significant interval change  when compared to the prior exam. These multilevel canal and foraminal  stenotic changes have been discussed in detail above.    This report was finalized on 7/11/2022 4:46 PM by Dr. Mina Portillo M.D.     The following portions of the patient's history were reviewed and updated as appropriate: allergies, current medications, past family history, past medical history, past social history, past surgical history, and problem list.    Review of Systems   Constitutional:  Negative for activity change, fatigue and fever.   HENT:  Negative for congestion.    Respiratory:  Negative for cough and chest tightness.    Cardiovascular:  Negative for chest pain.   Gastrointestinal:  Negative for abdominal pain, constipation and diarrhea.   Genitourinary:  Negative for difficulty urinating and dysuria.   Musculoskeletal:  Positive for neck pain.   Neurological:  Positive for dizziness, tingling (bilateral hands/feet), weakness (feet),  "light-headedness, numbness (hand,feet) and headaches.   Psychiatric/Behavioral:  Negative for agitation, sleep disturbance and suicidal ideas. The patient is not nervous/anxious.      I have reviewed and confirmed the accuracy of the ROS as documented by the MA/LPN/RN MIRYAM Mayorga     Vitals:    09/25/23 1402   BP: 100/70   BP Location: Right arm   Patient Position: Sitting   Cuff Size: Adult   Pulse: 66   Resp: 12   Temp: 98.1 °F (36.7 °C)   TempSrc: Temporal   SpO2: 99%   Weight: 49.8 kg (109 lb 12.8 oz)   Height: 134.6 cm (53\")   PainSc:   8     Objective   Physical Exam  Constitutional:       Appearance: Normal appearance.   HENT:      Head: Normocephalic.   Cardiovascular:      Rate and Rhythm: Normal rate and regular rhythm.   Pulmonary:      Effort: Pulmonary effort is normal.      Breath sounds: Normal breath sounds.   Musculoskeletal:      Cervical back: Tenderness present. Pain with movement present. Decreased range of motion.      Comments:     Skin:     General: Skin is warm and dry.      Capillary Refill: Capillary refill takes less than 2 seconds.   Neurological:      General: No focal deficit present.      Mental Status: She is alert and oriented to person, place, and time.   Psychiatric:         Mood and Affect: Mood is anxious.         Behavior: Behavior normal.         Thought Content: Thought content normal.         Cognition and Memory: Cognition normal.     Assessment & Plan   Diagnoses and all orders for this visit:    1. Occipital neuralgia of right side (Primary)    2. Myofasciitis    3. Myelomalacia of cervical cord    4. Chronic pain syndrome      --- Brief discussion about Cervical MBB/RFA. Patient is scheduled to see Dr. Noe on 9/30/23 and would like to discuss this with him.   --- Encouraged the use of heat or ice to see if this is helpful to her pain.  --- Follow-up as needed for increased pain and/or to proceed with cervical MBB/RFA  when ready.    Noemi Bartholomew" reports a pain score of 8.  Given her pain assessment as noted, treatment options were discussed and the following options were decided upon as a follow-up plan to address the patient's pain: continuation of current treatment plan for pain and follow up with Dr. Noe on 9/30/23.       WILY REPORT  As the clinician, I personally reviewed the WILY from 9/25/23 while the patient was in the office today.    Dictated utilizing Dragon dictation.

## 2023-09-28 NOTE — PROGRESS NOTES
"Subjective   Patient ID: Noeim Bartholomew is a 63 y.o. female is here today for follow-up.    She is here for follow-up.  The baclofen that I gave her was too strong at 10 mg twice daily so her PCP cut it back to 5 mg twice daily and it helps her spasticity a bit.  She feels her walking has improved a bit.  Her PCP Dr. Carroll also took over the gabapentin at 300 mg 3 times daily.  I certainly do not have any objection to that.  She got an epidural block by Dr. Hartman and it was of minimal benefit.  She wanted to know if I objected to her trying it again and I certainly do not.  The pain office brought up the possibility of trying an ablation approach if the epidural opened up and I think that is fine too.  That might actually be a better option than Botox injections so we will put the Botox injection option to the side for now.  Her main pain remains the neck in the occipital area.  I will continue to follow her even though her primary care physician is taking care of the baclofen and gabapentin and I will see her in 6 months.      History of Present Illness    The following portions of the patient's history were reviewed and updated as appropriate: allergies, current medications, past family history, past medical history, past social history, past surgical history, and problem list.    Review of Systems   Constitutional:  Negative for fever.   Musculoskeletal:  Positive for neck pain. Negative for neck stiffness.   Neurological:  Positive for weakness. Negative for numbness.   All other systems reviewed and are negative.        Objective     Vitals:    09/30/23 0908   BP: 118/72   BP Location: Left arm   Patient Position: Sitting   Cuff Size: Adult   Weight: 49.4 kg (109 lb)   Height: 134.6 cm (52.99\")   PainSc:   4   PainLoc: Neck     Body mass index is 27.29 kg/m².    Tobacco Use: Low Risk     Smoking Tobacco Use: Never    Smokeless Tobacco Use: Never    Passive Exposure: Not on file          Physical Exam  Eyes:     "  Extraocular Movements: EOM normal.      Pupils: Pupils are equal, round, and reactive to light.   Neurological:      Mental Status: She is oriented to person, place, and time.      Coordination: Finger-Nose-Finger Test and Heel to Shin Test normal.      Gait: Gait is intact.      Deep Tendon Reflexes:      Reflex Scores:       Tricep reflexes are 2+ on the right side and 2+ on the left side.       Bicep reflexes are 2+ on the right side and 2+ on the left side.       Brachioradialis reflexes are 2+ on the right side and 2+ on the left side.       Patellar reflexes are 2+ on the right side and 2+ on the left side.       Achilles reflexes are 2+ on the right side and 2+ on the left side.  Psychiatric:         Speech: Speech normal.     Neurologic Exam     Mental Status   Oriented to person, place, and time.   Registration of memory: Good recent and remote memory.   Attention: normal. Concentration: normal.   Speech: speech is normal   Level of consciousness: alert  Knowledge: consistent with education.     Cranial Nerves     CN II   Visual fields full to confrontation.   Visual acuity: normal    CN III, IV, VI   Pupils are equal, round, and reactive to light.  Extraocular motions are normal.     CN V   Facial sensation intact.   Right corneal reflex: normal  Left corneal reflex: normal    CN VII   Facial expression full, symmetric.   Right facial weakness: none  Left facial weakness: none    CN VIII   Hearing: intact    CN IX, X   Palate: symmetric    CN XI   Right sternocleidomastoid strength: normal  Left sternocleidomastoid strength: normal    CN XII   Tongue: not atrophic  Tongue deviation: none    Motor Exam   Muscle bulk: normal  Right arm tone: normal  Left arm tone: normal  Right leg tone: normal  Left leg tone: normal    Strength   Strength 5/5 except as noted.   Right neck flexion: 4/5  Left neck flexion: 4/5  Right neck extension: 4/5  Left neck extension: 4/5  Right deltoid: 4/5  Left deltoid: 4/5  Right  biceps: 4/5  Left biceps: 4/5  Right triceps: 4/5  Left triceps: 4/5  Right wrist flexion: 4/5  Left wrist flexion: 4/5  Right wrist extension: 4/5  Left wrist extension: 4/5  Right interossei: 4/5  Left interossei: 4/5  Right abdominals: 4/5  Left abdominals: 4/5  Right iliopsoas: 4/5  Left iliopsoas: 4/5  Right quadriceps: 4/5  Left quadriceps: 4/5  Right hamstrin/5  Left hamstrin/5  Right glutei: 4/5  Left glutei: 4/5  Right anterior tibial: 4/5  Left anterior tibial: 4/5  Right posterior tibial: 4/5  Left posterior tibial: 4/5  Right peroneal: 4/5  Left peroneal: 4/5  Right gastroc: 4/5  Left gastroc: 4/5    Sensory Exam   Light touch normal.     Gait, Coordination, and Reflexes     Gait  Gait: normal    Coordination   Finger to nose coordination: normal  Heel to shin coordination: normal    Reflexes   Right brachioradialis: 2+  Left brachioradialis: 2+  Right biceps: 2+  Left biceps: 2+  Right triceps: 2+  Left triceps: 2+  Right patellar: 2+  Left patellar: 2+  Right achilles: 2+  Left achilles: 2+  Right : 2+  Left : 2+        Assessment & Plan   Independent Review of Radiographic Studies:      I personally reviewed the images from the following studies.    The MRI done on 7/10/2022 shows a right C2-C3 bone spur compressing the upper cervical root on the right.  There is no residual stenosis from C7-T1 of any appreciable significance.  There is a little bit of residual bone spur laterally in the canal.  Agree with the report.       Medical Decision Making:      I will continue to follow her.  She will continue to work with Dr. Hartman.  I do not object to a another cervical epidural block.  I also do not object to an ablation approach if the epidural block is not helpful enough.  I will see her in 6 months.        Diagnoses and all orders for this visit:    1. Occipital neuralgia of right side (Primary)    2. Cervical myelopathy    3. Chronic neck pain    4. Spasticity    5. Weakness of both  hands      Return in about 6 months (around 3/30/2024) for face to face.

## 2023-09-30 ENCOUNTER — OFFICE VISIT (OUTPATIENT)
Dept: NEUROSURGERY | Facility: CLINIC | Age: 63
End: 2023-09-30
Payer: MEDICARE

## 2023-09-30 VITALS
BODY MASS INDEX: 25.22 KG/M2 | SYSTOLIC BLOOD PRESSURE: 118 MMHG | WEIGHT: 109 LBS | HEIGHT: 55 IN | DIASTOLIC BLOOD PRESSURE: 72 MMHG

## 2023-09-30 DIAGNOSIS — R29.898 WEAKNESS OF BOTH HANDS: ICD-10-CM

## 2023-09-30 DIAGNOSIS — G95.9 CERVICAL MYELOPATHY: ICD-10-CM

## 2023-09-30 DIAGNOSIS — R25.2 SPASTICITY: ICD-10-CM

## 2023-09-30 DIAGNOSIS — M54.81 OCCIPITAL NEURALGIA OF RIGHT SIDE: Primary | ICD-10-CM

## 2023-09-30 DIAGNOSIS — M54.2 CHRONIC NECK PAIN: ICD-10-CM

## 2023-09-30 DIAGNOSIS — G89.29 CHRONIC NECK PAIN: ICD-10-CM

## 2023-09-30 PROCEDURE — 99214 OFFICE O/P EST MOD 30 MIN: CPT | Performed by: NEUROLOGICAL SURGERY

## 2023-09-30 PROCEDURE — 1160F RVW MEDS BY RX/DR IN RCRD: CPT | Performed by: NEUROLOGICAL SURGERY

## 2023-09-30 PROCEDURE — 1159F MED LIST DOCD IN RCRD: CPT | Performed by: NEUROLOGICAL SURGERY

## 2023-10-09 RX ORDER — ATORVASTATIN CALCIUM 20 MG/1
20 TABLET, FILM COATED ORAL NIGHTLY
Qty: 90 TABLET | Refills: 1 | Status: SHIPPED | OUTPATIENT
Start: 2023-10-09

## 2023-10-09 RX ORDER — BACLOFEN 5 MG/1
5 TABLET ORAL 2 TIMES DAILY
Qty: 180 TABLET | Refills: 0 | Status: SHIPPED | OUTPATIENT
Start: 2023-10-09

## 2024-01-03 ENCOUNTER — OFFICE VISIT (OUTPATIENT)
Dept: INTERNAL MEDICINE | Facility: CLINIC | Age: 64
End: 2024-01-03
Payer: MEDICARE

## 2024-01-03 VITALS
DIASTOLIC BLOOD PRESSURE: 84 MMHG | WEIGHT: 107 LBS | OXYGEN SATURATION: 98 % | HEIGHT: 55 IN | HEART RATE: 94 BPM | BODY MASS INDEX: 24.76 KG/M2 | SYSTOLIC BLOOD PRESSURE: 110 MMHG

## 2024-01-03 DIAGNOSIS — R29.2 HYPERREFLEXIA: ICD-10-CM

## 2024-01-03 DIAGNOSIS — R26.89 IMPAIRED GAIT AND MOBILITY: ICD-10-CM

## 2024-01-03 DIAGNOSIS — M47.12 CERVICAL SPONDYLOSIS WITH MYELOPATHY: ICD-10-CM

## 2024-01-03 DIAGNOSIS — M48.02 CERVICAL STENOSIS OF SPINE: ICD-10-CM

## 2024-01-03 DIAGNOSIS — R29.898 WEAKNESS OF BOTH LOWER EXTREMITIES: Primary | ICD-10-CM

## 2024-01-03 DIAGNOSIS — Q76.2 CONGENITAL SPONDYLOLYSIS, LUMBOSACRAL REGION: ICD-10-CM

## 2024-01-03 DIAGNOSIS — G95.9 CERVICAL MYELOPATHY: ICD-10-CM

## 2024-01-03 PROCEDURE — 1159F MED LIST DOCD IN RCRD: CPT | Performed by: INTERNAL MEDICINE

## 2024-01-03 PROCEDURE — 99214 OFFICE O/P EST MOD 30 MIN: CPT | Performed by: INTERNAL MEDICINE

## 2024-01-03 PROCEDURE — 1160F RVW MEDS BY RX/DR IN RCRD: CPT | Performed by: INTERNAL MEDICINE

## 2024-01-03 NOTE — PROGRESS NOTES
Subjective     Noemi Bartholomew is a 63 y.o. female who presents with   Chief Complaint   Patient presents with    Balance Issues    Dizziness       History of Present Illness     C/o balance problems for two months.  She feels unsteady.  No syncope or presyncope.  Numbness and tingling in her hand and feet is associated.  No tremor.  No HAs.  No vision changes.  Known issues in her cervical and lumbar spine.      Review of Systems   Gastrointestinal:  Positive for abdominal pain and constipation.   Musculoskeletal:  Positive for arthralgias and myalgias.       The following portions of the patient's history were reviewed and updated as appropriate: allergies, current medications and problem list.    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     Note Last Updated: 11/22/2019     Added automatically from request for surgery [id number]      Cervical spondylosis with myelopathy 11/22/2019     Note Last Updated: 11/22/2019     Added automatically from request for surgery [id number]      Neuropathic pain, leg, left 06/25/2018    DDD (degenerative disc disease), lumbar 05/02/2018    Chronic left-sided lumbar radiculopathy 05/02/2018     Note Last Updated: 1/21/2021 Jan2021 Non-Regulatory IMO with [facility]/CMS designations      Congenital spondylolysis, lumbosacral region 05/02/2018    Carpal tunnel syndrome 06/08/2016    Hyperlipidemia 06/08/2016    Osteoporosis 06/08/2016     Note Last Updated: 6/29/2020     Description: Boniva for a number of years.  Forteo for a year.  Managed by Gyn. .  I do recommend 1200mg calcium and 1000 IUs of vitamin D in supplements/diet as well as weight bearing exercise to prevent further  "decline in BMD.  Prolia started 2020.         Current Outpatient Medications on File Prior to Visit   Medication Sig Dispense Refill    acetaminophen (TYLENOL) 500 MG tablet Take 2 tablets by mouth Every 6 (Six) Hours As Needed for Mild Pain.      atorvastatin (LIPITOR) 20 MG tablet TAKE 1 TABLET EVERY NIGHT 90 tablet 1    Baclofen (LIORESAL) 5 MG tablet TAKE 1 TABLET TWICE A  tablet 0    butalbital-acetaminophen-caffeine (Esgic) -40 MG per tablet Take 1 tablet by mouth Every 4 (Four) Hours As Needed for Headache. 30 tablet 0    Calcium Carb-Cholecalciferol (CALCIUM 1000 + D PO) Take  by mouth Daily. 2 caps      cetirizine (zyrTEC) 10 MG tablet Take 1 tablet by mouth Daily.      denosumab (PROLIA) 60 MG/ML solution prefilled syringe syringe       docusate sodium (COLACE) 100 MG capsule Take 1 capsule by mouth 2 (Two) Times a Day.      fluticasone (FLONASE) 50 MCG/ACT nasal spray 2 sprays into the nostril(s) as directed by provider Daily.      gabapentin (NEURONTIN) 300 MG capsule Take 1 capsule by mouth 3 (Three) Times a Day. 270 capsule 1    melatonin 3 MG tablet Take  by mouth.      Metamucil Fiber chewable tablet Chew.      naproxen sodium (ALEVE) 220 MG tablet Take 1 tablet by mouth 2 (Two) Times a Day With Meals.      Probiotic Product (Burpple PO) Take  by mouth.       No current facility-administered medications on file prior to visit.       Objective     /84   Pulse 94   Ht 134.6 cm (52.99\")   Wt 48.5 kg (107 lb)   SpO2 98%   BMI 26.79 kg/m²     Physical Exam  Constitutional:       Appearance: She is well-developed.   HENT:      Head: Normocephalic and atraumatic.   Pulmonary:      Effort: Pulmonary effort is normal.   Abdominal:      General: There is no distension.      Palpations: Abdomen is soft. There is no mass.      Tenderness: There is no abdominal tenderness. There is no guarding or rebound.   Neurological:      Mental Status: She is alert and oriented to " person, place, and time.      Motor: Motor function is intact.      Gait: Gait abnormal.      Deep Tendon Reflexes:      Reflex Scores:       Bicep reflexes are 3+ on the right side and 3+ on the left side.       Brachioradialis reflexes are 3+ on the right side and 3+ on the left side.       Patellar reflexes are 3+ on the right side and 3+ on the left side.       Achilles reflexes are 3+ on the right side and 3+ on the left side.  Psychiatric:         Behavior: Behavior normal.         Assessment & Plan   Diagnoses and all orders for this visit:    1. Weakness of both lower extremities (Primary)  -     CBC & Differential  -     Comprehensive Metabolic Panel  -     TSH Rfx On Abnormal To Free T4  -     Urinalysis With Microscopic - Urine, Clean Catch  -     Vitamin B12  -     MRI Brain With & Without Contrast; Future  -     MRI cervical spine w wo contrast; Future  -     MRI Lumbar Spine With & Without Contrast; Future  -     EMG & Nerve Conduction Test; Future  -     Ambulatory Referral to Physical Therapy Evaluate and treat    2. Hyperreflexia  -     CBC & Differential  -     Comprehensive Metabolic Panel  -     TSH Rfx On Abnormal To Free T4  -     Urinalysis With Microscopic - Urine, Clean Catch  -     Vitamin B12  -     MRI Brain With & Without Contrast; Future  -     MRI cervical spine w wo contrast; Future  -     MRI Lumbar Spine With & Without Contrast; Future  -     EMG & Nerve Conduction Test; Future    3. Congenital spondylolysis, lumbosacral region  -     MRI Lumbar Spine With & Without Contrast; Future  -     EMG & Nerve Conduction Test; Future    4. Cervical spondylosis with myelopathy  -     MRI cervical spine w wo contrast; Future  -     EMG & Nerve Conduction Test; Future    5. Cervical stenosis of spine  -     MRI cervical spine w wo contrast; Future  -     EMG & Nerve Conduction Test; Future    6. Cervical myelopathy  -     MRI cervical spine w wo contrast; Future  -     EMG & Nerve Conduction  Test; Future    7. Impaired gait and mobility  -     Ambulatory Referral to Physical Therapy Evaluate and treat    Other orders  -     Unable To Void        Discussion    Patient with known cervical and lumbar spinal pathology presents with difficulty walking, subjective weakness and hyperreflexia.  Check labs.  Imaging of the cervical and lumbar is ordered to further evaluate as well as EMG/NCS.  I think she would benefit from a physical therapy evaluation.         Future Appointments   Date Time Provider Department Center   1/20/2024  9:00 AM [facility] MRI 1 [facility] E [facility]   1/20/2024 10:00 AM [facility] MRI 1 [facility]   1/20/2024 11:00 AM [facility] MRI 1  [facility] MRI [facility]   1/26/2024  2:30 PM EMG [facility]   4/1/2024  2:00 PM Antonio Noe MD [facility]   7/17/2024  8:10 AM [facility]   7/24/2024  7:45 AM Mary Lou Carroll MD [facility]

## 2024-01-04 LAB
ALBUMIN SERPL-MCNC: 4.4 G/DL (ref 3.9–4.9)
ALBUMIN/GLOB SERPL: 1.8 {RATIO} (ref 1.2–2.2)
ALP SERPL-CCNC: 113 IU/L (ref 44–121)
ALT SERPL-CCNC: 16 IU/L (ref 0–32)
AST SERPL-CCNC: 28 IU/L (ref 0–40)
BASOPHILS # BLD AUTO: 0.1 X10E3/UL (ref 0–0.2)
BASOPHILS NFR BLD AUTO: 1 %
BILIRUB SERPL-MCNC: 0.3 MG/DL (ref 0–1.2)
BUN SERPL-MCNC: 12 MG/DL (ref 8–27)
BUN/CREAT SERPL: 17 (ref 12–28)
CALCIUM SERPL-MCNC: 10.8 MG/DL (ref 8.7–10.3)
CHLORIDE SERPL-SCNC: 99 MMOL/L (ref 96–106)
CO2 SERPL-SCNC: 27 MMOL/L (ref 20–29)
CREAT SERPL-MCNC: 0.71 MG/DL (ref 0.57–1)
EGFRCR SERPLBLD CKD-EPI 2021: 95 ML/MIN/1.73
EOSINOPHIL # BLD AUTO: 0.4 X10E3/UL (ref 0–0.4)
EOSINOPHIL NFR BLD AUTO: 7 %
ERYTHROCYTE [DISTWIDTH] IN BLOOD BY AUTOMATED COUNT: 12.6 % (ref 11.7–15.4)
GLOBULIN SER CALC-MCNC: 2.4 G/DL (ref 1.5–4.5)
GLUCOSE SERPL-MCNC: 104 MG/DL (ref 70–99)
HCT VFR BLD AUTO: 39.9 % (ref 34–46.6)
HGB BLD-MCNC: 13.3 G/DL (ref 11.1–15.9)
IMM GRANULOCYTES # BLD AUTO: 0 X10E3/UL (ref 0–0.1)
IMM GRANULOCYTES NFR BLD AUTO: 0 %
LYMPHOCYTES # BLD AUTO: 1.2 X10E3/UL (ref 0.7–3.1)
LYMPHOCYTES NFR BLD AUTO: 21 %
MCH RBC QN AUTO: 30.4 PG (ref 26.6–33)
MCHC RBC AUTO-ENTMCNC: 33.3 G/DL (ref 31.5–35.7)
MCV RBC AUTO: 91 FL (ref 79–97)
MONOCYTES # BLD AUTO: 0.5 X10E3/UL (ref 0.1–0.9)
MONOCYTES NFR BLD AUTO: 8 %
NEUTROPHILS # BLD AUTO: 3.7 X10E3/UL (ref 1.4–7)
NEUTROPHILS NFR BLD AUTO: 63 %
PLATELET # BLD AUTO: 261 X10E3/UL (ref 150–450)
POTASSIUM SERPL-SCNC: 4.4 MMOL/L (ref 3.5–5.2)
PROT SERPL-MCNC: 6.8 G/DL (ref 6–8.5)
RBC # BLD AUTO: 4.38 X10E6/UL (ref 3.77–5.28)
SODIUM SERPL-SCNC: 142 MMOL/L (ref 134–144)
TSH SERPL DL<=0.005 MIU/L-ACNC: 2.38 UIU/ML (ref 0.45–4.5)
UNABLE TO VOID: NORMAL
VIT B12 SERPL-MCNC: 282 PG/ML (ref 232–1245)
WBC # BLD AUTO: 5.9 X10E3/UL (ref 3.4–10.8)

## 2024-01-10 DIAGNOSIS — E83.52 HYPERCALCEMIA: ICD-10-CM

## 2024-01-10 DIAGNOSIS — E53.8 LOW SERUM VITAMIN B12: Primary | ICD-10-CM

## 2024-01-22 ENCOUNTER — TREATMENT (OUTPATIENT)
Dept: PHYSICAL THERAPY | Facility: CLINIC | Age: 64
End: 2024-01-22
Payer: MEDICARE

## 2024-01-22 DIAGNOSIS — R29.898 WEAKNESS OF BOTH LOWER EXTREMITIES: ICD-10-CM

## 2024-01-22 DIAGNOSIS — R26.81 GAIT INSTABILITY: Primary | ICD-10-CM

## 2024-01-22 DIAGNOSIS — R42 DIZZINESS: ICD-10-CM

## 2024-01-22 PROCEDURE — 97162 PT EVAL MOD COMPLEX 30 MIN: CPT | Performed by: PHYSICAL THERAPIST

## 2024-01-22 PROCEDURE — 97530 THERAPEUTIC ACTIVITIES: CPT | Performed by: PHYSICAL THERAPIST

## 2024-01-22 PROCEDURE — 97110 THERAPEUTIC EXERCISES: CPT | Performed by: PHYSICAL THERAPIST

## 2024-01-22 NOTE — PROGRESS NOTES
"    Physical Therapy Initial Evaluation and Plan of Care  Clinic Location [address] [facility] [address], [place] [id number]    Patient: Noemi Bartholomew   : 1960  Diagnosis/ICD-10 Code:  Gait instability [R26.81]  Referring practitioner: Mary Lou Carroll MD  Date of Initial Visit: 2024  Today's Date: 2024  Patient seen for 1 session         Visit Diagnoses:    ICD-10-CM ICD-9-CM   1. Gait instability  R26.81 781.2   2. Weakness of both lower extremities  R29.898 729.89   3. Dizziness  R42 780.4         Subjective Questionnaire:   LEFS:   ABC: 16%  [place]     Subjective Evaluation    History of Present Illness  Mechanism of injury: Difficulty walking off/on. Worsened 2023. Has been using cane since that time. Grabbing furniture for balance. Feels dizzy often (in bed, rising from chair, walking). No falls. When standing, feels like she will fall backwards. Afraid that she will not be able to get up from fall independently.    Last participated in PT for BPPV 2023    Lives with sister. Uses railing and sister to go up/down basement steps.    Chronic neck pain, C6,7 ANTERIOR CERVICAL CORPECTOMY WITH CAGE AND PLATE 2019, per pt \"nerve damage\" in B feet & hands    Takes gabapentin, baclofen daily      Patient Occupation: Disability Pain  Current pain rating: 10  Location: \"Everywhere\"  Quality: dull ache  Relieving factors: medications  Aggravating factors: ambulation    Social Support  Lives in: one-story house (Basement)  Lives with: Sister.    Treatments  Previous treatment: physical therapy  Current treatment: physical therapy  Patient Goals  Patient goals for therapy: improved balance, increased strength and independence with ADLs/IADLs  Patient goal: Walk w/o cane           Objective          Strength/Myotome Testing     Left Hip   Planes of Motion   Flexion: 4-  Abduction: 3+    Right Hip   Planes of Motion   Flexion: 4-  Abduction: 3+    Left Knee   Flexion: 4  Extension: " 4    Right Knee   Flexion: 4  Extension: 4    Left Ankle/Foot   Dorsiflexion: 4+    Right Ankle/Foot   Dorsiflexion: 4+    Ambulation   Weight-Bearing Status   Assistive device used: single point cane    Observational Gait   Decreased walking speed and stride length.     Functional Assessment     Comments  5x STS: 19s (1 LOB)  TUs (R sided cane)          Assessment & Plan       Assessment  Impairments: abnormal coordination, abnormal gait, impaired balance, impaired physical strength, lacks appropriate home exercise program and safety issue   Functional limitations: carrying objects, lifting, walking, uncomfortable because of pain, moving in bed, sitting, standing, stooping and reaching overhead   Assessment details: Pt presents with impaired gait, LE weakness, and poor balance. Will benefit from skilled PT services in order to address listed impairments and increase tolerance to normal daily activities including ADLs and recreational activities.    Prognosis: good    Goals  Plan Goals: Plan Goals: Short Term Goals: 6 weeks. Patient will:  1. Be independent with initial HEP  2. Complete vestibular assessment  3. Tolerate CKC LE strengthening w/o significant increase in pain    Long Term Goals: 6-12 weeks. Patient will:  1. Ambulate 100ft unassisted w/o significant pain, limitation, or deviation in gait mechanics  2. Ascend/descend 4 stairs using reciprocal steps w/o significant pain, limitation, or deviation in gait mechanics  3. Step over 4 inch obstacle w/o HHA demonstrating balance required for navigating uneven terrain  4. Be independent with long term HEP    Plan  Therapy options: will be seen for skilled therapy services  Planned modality interventions: cryotherapy and thermotherapy (hydrocollator packs)  Planned therapy interventions: abdominal trunk stabilization, balance/weight-bearing training, body mechanics training, ADL retraining, flexibility, functional ROM exercises, gait training, home exercise  program, manual therapy, neuromuscular re-education, strengthening, stretching, therapeutic activities and motor coordination training  Frequency: 2x week  Duration in weeks: 12  Treatment plan discussed with: patient        History # of Personal Factors and/or Comorbidities: MODERATE (1-2)  Examination of Body System(s): # of elements: MODERATE (3)  Clinical Presentation: STABLE   Clinical Decision Making: MODERATE      Timed:         Manual Therapy:    0     mins  65004;     Therapeutic Exercise:    15     mins  47101;     Neuromuscular Jared:    0    mins  68928;    Therapeutic Activity:     8     mins  06837;     Gait Trainin     mins  66144;     Ultrasound:     0     mins  33236;    Ionto                               0    mins   89606  Self Care                       0     mins   39872  Canalith Repos    0     mins 36292      Un-Timed:  Electrical Stimulation:    0     mins  65115 ( );  Dry Needling     0     mins self-pay  Traction     0     mins 63924  Low Eval     0     Mins  42389  Mod Eval     35     Mins  06800  High Eval                       0     Mins  94755        Timed Treatment:   23   mins   Total Treatment:     58   mins          PT: Mari Pereira PT     License Number: [id number]  Electronically signed by Mari Pereira PT, 24, 2:29 PM EST    Certification Period: 2024 thru 2024  I certify that the therapy services are furnished while this patient is under my care.  The services outlined above are required by this patient, and will be reviewed every 90 days.         Physician Signature:__________________________________________________    PHYSICIAN: Mary Lou Carroll MD  NPI: [id number]                                      DATE:      Please sign and return via fax to [email or web address] . Thank you, [facility].

## 2024-01-22 NOTE — PATIENT INSTRUCTIONS
Access Code: [email or web address]  URL: [email or web address]  Date: 01/22/2024  Prepared by: Mari Pereira    Exercises  - Seated March  - 1 x daily - 7 x weekly - 1 sets - 15 reps  - Seated Hip Adduction Isometrics with Ball  - 1 x daily - 7 x weekly - 1 sets - 10 reps - 5s hold  - Seated Long Arc Quad  - 1 x daily - 7 x weekly - 1 sets - 30 reps  - Sit to Stand with Counter Support  - 1 x daily - 7 x weekly - 1 sets - 10 reps

## 2024-01-26 ENCOUNTER — HOSPITAL ENCOUNTER (OUTPATIENT)
Dept: INFUSION THERAPY | Facility: HOSPITAL | Age: 64
Discharge: HOME | End: 2024-01-26
Payer: MEDICARE

## 2024-01-26 DIAGNOSIS — R29.2 HYPERREFLEXIA: ICD-10-CM

## 2024-01-26 DIAGNOSIS — Q76.2 CONGENITAL SPONDYLOLYSIS, LUMBOSACRAL REGION: ICD-10-CM

## 2024-01-26 DIAGNOSIS — G95.9 CERVICAL MYELOPATHY: ICD-10-CM

## 2024-01-26 DIAGNOSIS — M47.12 CERVICAL SPONDYLOSIS WITH MYELOPATHY: ICD-10-CM

## 2024-01-26 DIAGNOSIS — R29.898 WEAKNESS OF BOTH LOWER EXTREMITIES: ICD-10-CM

## 2024-01-26 DIAGNOSIS — M48.02 CERVICAL STENOSIS OF SPINE: ICD-10-CM

## 2024-01-26 PROCEDURE — 95886 MUSC TEST DONE W/N TEST COMP: CPT | Performed by: PSYCHIATRY & NEUROLOGY

## 2024-01-26 PROCEDURE — 95909 NRV CNDJ TST 5-6 STUDIES: CPT | Performed by: PSYCHIATRY & NEUROLOGY

## 2024-01-26 PROCEDURE — 95886 MUSC TEST DONE W/N TEST COMP: CPT

## 2024-01-26 PROCEDURE — 95909 NRV CNDJ TST 5-6 STUDIES: CPT

## 2024-01-26 NOTE — PROCEDURES
"EMG and Nerve Conduction Studies    I.      Instrument used: Neuromax 1002  II.     Please see data sheets for tabular summary of NCS and details on methods, temperatures and lab standards.   III.    EMG muscles tested for upper extremity studies include the deltoid, biceps, triceps, pronator teres, extensor digitorum communis, first dorsal interosseous and abductor pollicis brevis.    IV.   EMG muscles tested for lower extremity studies include the vastus lateralis, tibialis anterior, peroneus longus, medial gastrocnemius and extensor digitorum brevis.    V.    Additional muscles tested as needed.  Paraspinal muscles tested as needed.   VI.   Please see data sheets for tabular summary of EMG findings.   VII. The complete report includes the data sheets.      Indication: Numbness in the feet  History: 63-year-old white female with numbness in the feet.  She had an anterior cervical discectomy for cervical myelopathy with radiculopathy in 2019 with persistent myelopathy and weakness in the hands.  She developed numbness in the feet after surgery which has worsened over time.  She states it feels like she is walking on pillows.  Both feet act the same.  She describes balance trouble.  She denies diabetes.  She has previous history in the 1990s of a lumbar decompression.      Ht: 4 foot 5 inches tall  Wt: 105 pounds  HbA1C: No results found for: \"HGBA1C\"  TSH:   Lab Results   Component Value Date    TSH 2.380 01/03/2024       Technical summary:  Nerve conduction studies were obtained in the right leg with 1 comparison on the left.  Skin temperatures were cold and so the feet were warmed prior to study.  Temperature correction was not needed.  Needle examination was obtained on selected muscles in both legs.    Results:  1.  Normal right sural sensory distal latency and amplitude.  2.  Normal right superficial peroneal sensory distal latency and amplitude.  3.  Normal right peroneal motor conduction velocities with a " normal distal latency but the amplitude was a little low at 1.9 mV from ankle stimulation.  Normal left peroneal motor conduction velocities with normal distal latency and amplitudes.  4.  Normal right tibial motor conduction velocity with a normal distal latency and amplitudes.  5.  Needle examination of selected muscles in both legs showed fibrillations and positive sharp waves in the right medial gastrocnemius muscle with an increased number of large motor units increased firing rates and reduced interference patterns.  The remaining muscles tested showed normal insertional activities, motor units and recruitment except that the extensor digitorum brevis muscles showed poor recruitment.  Lumbar paraspinals were not studied due to previous lumbar surgery.    Impression:  Abnormal study showing evidence of a right S1 radiculopathy.  The low amplitude right peroneal motor study could relate to the S1 radiculopathy or just be a normal variant.  There was no slowing of the right peroneal motor conduction.  Study results were discussed with the patient.    Zaid Buitrago M.D.              Dictated utilizing Dragon dictation.

## 2024-01-27 LAB
BUN SERPL-MCNC: 12 MG/DL (ref 8–27)
BUN/CREAT SERPL: 16 (ref 12–28)
CA-I SERPL ISE-MCNC: 5.4 MG/DL (ref 4.5–5.6)
CALCIUM SERPL-MCNC: 10.7 MG/DL (ref 8.7–10.3)
CHLORIDE SERPL-SCNC: 99 MMOL/L (ref 96–106)
CO2 SERPL-SCNC: 24 MMOL/L (ref 20–29)
CREAT SERPL-MCNC: 0.77 MG/DL (ref 0.57–1)
EGFRCR SERPLBLD CKD-EPI 2021: 87 ML/MIN/1.73
GLUCOSE SERPL-MCNC: 88 MG/DL (ref 70–99)
IF BLOCK AB SER-ACNC: 1.1 AU/ML (ref 0–1.1)
METHYLMALONATE SERPL-SCNC: 315 NMOL/L (ref 0–378)
PCA AB SER-ACNC: 35 UNITS (ref 0–20)
POTASSIUM SERPL-SCNC: 4.3 MMOL/L (ref 3.5–5.2)
PTH-INTACT SERPL-MCNC: 16 PG/ML (ref 15–65)
SODIUM SERPL-SCNC: 141 MMOL/L (ref 134–144)

## 2024-01-29 ENCOUNTER — TELEPHONE (OUTPATIENT)
Dept: INTERNAL MEDICINE | Facility: CLINIC | Age: 64
End: 2024-01-29
Payer: MEDICARE

## 2024-01-29 DIAGNOSIS — M54.2 CHRONIC NECK PAIN: ICD-10-CM

## 2024-01-29 DIAGNOSIS — G89.29 CHRONIC NECK PAIN: ICD-10-CM

## 2024-01-29 DIAGNOSIS — D51.0 PERNICIOUS ANEMIA: Primary | ICD-10-CM

## 2024-01-29 RX ORDER — ATORVASTATIN CALCIUM 20 MG/1
20 TABLET, FILM COATED ORAL NIGHTLY
Qty: 90 TABLET | Refills: 3 | Status: SHIPPED | OUTPATIENT
Start: 2024-01-29

## 2024-01-29 RX ORDER — GABAPENTIN 300 MG/1
300 CAPSULE ORAL 3 TIMES DAILY
Qty: 270 CAPSULE | Refills: 1 | Status: SHIPPED | OUTPATIENT
Start: 2024-01-29

## 2024-01-29 RX ORDER — CYANOCOBALAMIN 1000 UG/ML
1000 INJECTION, SOLUTION INTRAMUSCULAR; SUBCUTANEOUS
Status: SHIPPED | OUTPATIENT
Start: 2024-01-29

## 2024-01-29 RX ORDER — BACLOFEN 5 MG/1
5 TABLET ORAL 2 TIMES DAILY
Qty: 180 TABLET | Refills: 3 | Status: SHIPPED | OUTPATIENT
Start: 2024-01-29

## 2024-01-29 NOTE — TELEPHONE ENCOUNTER
Patient called and stated that pharmacy advised her to call her pcp office and request refills of 3 medications.    She needs refilled atorvastatin 20 mg, baclofen 5 mg, and gabapentin 300 mg sent to [facility].    Their fax # [phone]

## 2024-01-30 ENCOUNTER — TREATMENT (OUTPATIENT)
Dept: PHYSICAL THERAPY | Facility: CLINIC | Age: 64
End: 2024-01-30
Payer: MEDICARE

## 2024-01-30 DIAGNOSIS — R29.898 WEAKNESS OF BOTH LOWER EXTREMITIES: ICD-10-CM

## 2024-01-30 DIAGNOSIS — R26.81 GAIT INSTABILITY: Primary | ICD-10-CM

## 2024-01-30 DIAGNOSIS — R42 DIZZINESS: ICD-10-CM

## 2024-01-30 PROCEDURE — 97112 NEUROMUSCULAR REEDUCATION: CPT | Performed by: PHYSICAL THERAPIST

## 2024-01-30 PROCEDURE — 97110 THERAPEUTIC EXERCISES: CPT | Performed by: PHYSICAL THERAPIST

## 2024-01-30 PROCEDURE — 97530 THERAPEUTIC ACTIVITIES: CPT | Performed by: PHYSICAL THERAPIST

## 2024-01-30 NOTE — PROGRESS NOTES
Physical Therapy Daily Treatment Note      Patient: Noemi Bartholomew   : 1960  Referring practitioner: Mary Lou Carroll MD  Date of Initial Visit: Type: THERAPY  Noted: 2024  Today's Date: 2024  Patient seen for 2 sessions       Visit Diagnoses:    ICD-10-CM ICD-9-CM   1. Gait instability  R26.81 781.2   2. Weakness of both lower extremities  R29.898 729.89   3. Dizziness  R42 780.4       Subjective   No complaints w/ HEP.    Objective   See Exercise, Manual, and Modality Logs for complete treatment.       Assessment/Plan  Continues to have unstable gait, using cane and/or furniture to steady herself. Recommend using cane at all times to decrease fall risk. Tolerated new functional strength and stability exercises well w/o c/o pain. Updated HEP to include tandem stance at counter. Progress per POC.    Timed:         Manual Therapy:    0     mins  07642;     Therapeutic Exercise:    10     mins  77617;     Neuromuscular Jared:    15    mins  31024;    Therapeutic Activity:     15     mins  36417;     Gait Trainin     mins  94359;     Ultrasound:     0     mins  25133;    Ionto                               0    mins   79625        Timed Treatment:   40   mins   Total Treatment:     40   mins    Mari Pereira PT  [place] License: [id number]

## 2024-02-01 ENCOUNTER — CLINICAL SUPPORT (OUTPATIENT)
Dept: INTERNAL MEDICINE | Facility: CLINIC | Age: 64
End: 2024-02-01
Payer: MEDICARE

## 2024-02-01 DIAGNOSIS — E53.8 B12 DEFICIENCY: Primary | ICD-10-CM

## 2024-02-01 PROCEDURE — 96372 THER/PROPH/DIAG INJ SC/IM: CPT | Performed by: INTERNAL MEDICINE

## 2024-02-01 RX ADMIN — CYANOCOBALAMIN 1000 MCG: 1000 INJECTION, SOLUTION INTRAMUSCULAR; SUBCUTANEOUS at 14:07

## 2024-02-02 ENCOUNTER — TREATMENT (OUTPATIENT)
Dept: PHYSICAL THERAPY | Facility: CLINIC | Age: 64
End: 2024-02-02
Payer: MEDICARE

## 2024-02-02 DIAGNOSIS — R29.898 WEAKNESS OF BOTH LOWER EXTREMITIES: ICD-10-CM

## 2024-02-02 DIAGNOSIS — R26.81 GAIT INSTABILITY: Primary | ICD-10-CM

## 2024-02-02 DIAGNOSIS — R42 DIZZINESS: ICD-10-CM

## 2024-02-02 DIAGNOSIS — H81.11 BPPV (BENIGN PAROXYSMAL POSITIONAL VERTIGO), RIGHT: ICD-10-CM

## 2024-02-02 PROCEDURE — 97161 PT EVAL LOW COMPLEX 20 MIN: CPT | Performed by: PHYSICAL THERAPIST

## 2024-02-02 PROCEDURE — 95992 CANALITH REPOSITIONING PROC: CPT | Performed by: PHYSICAL THERAPIST

## 2024-02-02 PROCEDURE — 97530 THERAPEUTIC ACTIVITIES: CPT | Performed by: PHYSICAL THERAPIST

## 2024-02-02 NOTE — PROGRESS NOTES
Physical Therapy Daily Progress Note-Vestibular Rehab  [facility] [place]  [address]  [facility] [address]      Visit#:3  Subjective   I am having dizziness that never really went away since out last treatments. I am having issues with my feet and I am feeling more off balance. The other therapist thought I may need to see you again for vertigo.   Objective            Positional Testing  Positional Testing: With infrared goggles  [place]-Hallpike Right: Upbeat, right rotatory nystagmus  Pa-Hallpike Right Onset Time : 2 sec  Pa-Hallpike Right Duration Time : 15 sec  Treatment: R Epley             PROCEDURES AND MODALITIES:  See Exercise, Manual, and Modality Logs for complete treatment.     Paraffin:   pre-rx  Moist Heat:    Ice:    post-rx  E-Stim:    Ultrasound:    Ionto:   Traction:      Ther Act 12077 8 minutes and Other Procedure CPT 45398 minutes 10    Timed Code Treatment: 8 Minutes  Total Treatment Time: 30 Minutes    Assessment & Plan   Patient has a hx of BPPV and is currently having dizziness and imbalance that has been worsening. She was screened for BPPV and tested positive for R PC Canalithasis BPPV. Testing of all other canals was deferred in effort to treat the R PC with CRP. Tolerated well and issued for HEP. She is seeing another PT for LE weakness, gait instability and imbalance. I will continue to see her for BPPV treatments 1 time a week in effort to resolve dizziness and improve use of the vestibular system. Patient remains at risk for falls.     Progress per Plan of Care    Nancy Diego, PT, DPT, CHT, COLLETTEN  Physical Therapist  [place] license # [id number]

## 2024-02-04 ENCOUNTER — HOSPITAL ENCOUNTER (OUTPATIENT)
Dept: MRI IMAGING | Facility: HOSPITAL | Age: 64
Discharge: HOME | End: 2024-02-04
Payer: MEDICARE

## 2024-02-04 DIAGNOSIS — M48.02 CERVICAL STENOSIS OF SPINE: ICD-10-CM

## 2024-02-04 DIAGNOSIS — R29.898 WEAKNESS OF BOTH LOWER EXTREMITIES: ICD-10-CM

## 2024-02-04 DIAGNOSIS — G95.9 CERVICAL MYELOPATHY: ICD-10-CM

## 2024-02-04 DIAGNOSIS — Q76.2 CONGENITAL SPONDYLOLYSIS, LUMBOSACRAL REGION: ICD-10-CM

## 2024-02-04 DIAGNOSIS — R29.2 HYPERREFLEXIA: ICD-10-CM

## 2024-02-04 DIAGNOSIS — M47.12 CERVICAL SPONDYLOSIS WITH MYELOPATHY: ICD-10-CM

## 2024-02-04 PROCEDURE — 0 GADOBENATE DIMEGLUMINE 529 MG/ML SOLUTION: Performed by: INTERNAL MEDICINE

## 2024-02-04 PROCEDURE — 72156 MRI NECK SPINE W/O & W/DYE: CPT

## 2024-02-04 PROCEDURE — 70553 MRI BRAIN STEM W/O & W/DYE: CPT

## 2024-02-04 PROCEDURE — A9577 INJ MULTIHANCE: HCPCS | Performed by: INTERNAL MEDICINE

## 2024-02-04 PROCEDURE — 72158 MRI LUMBAR SPINE W/O & W/DYE: CPT

## 2024-02-04 RX ADMIN — GADOBENATE DIMEGLUMINE 10 ML: 529 INJECTION, SOLUTION INTRAVENOUS at 10:47

## 2024-02-05 NOTE — PROGRESS NOTES
Physical Therapy Vestibular Initial Evaluation and Plan of Care  [facility] [place]  [address], [address]  [place] [address]      Patient Name: Noemi Bartholomew       Patient MRN: [id number]  : 1960  PHYSICIAN: Mary Lou Carroll MD  NPI: [id number]                                     Date: 2024  Encounter Diagnoses   Name Primary?    Gait instability Yes    Dizziness     Weakness of both lower extremities     BPPV (benign paroxysmal positional vertigo), right        Subjective:   I am having dizziness that never really went away since out last treatments. I am having issues with my feet and I am feeling more off balance. The other therapist thought I may need to see you again for vertigo.        Objective Testing:     Positional Testing  Positional Testing: With infrared goggles  Pa-Hallpike Right: Upbeat, right rotatory nystagmus  [place]-Hallpike Right Onset Time : 2 sec  [place]-Hallpike Right Duration Time : 15 sec     Occulomotor Exam Fixation Present  Spontaneous Nystagmus: Absent  VOR with Occulomotor Exam Fixation Absent   Spontaneous Nystagmus: Absent       THERAPY ASSESSMENT: Patient is a 63 y.o. female.  Patient has a hx of BPPV and is currently having dizziness and imbalance that has been worsening. She was screened for BPPV today as she has a hx of BPPV. Patient tested positive for R PC Canalithasis BPPV. Testing of all other canals was deferred in effort to treat the R PC with CRP. CRP was tolerated well and issued for HEP. She is seeing another PT for LE weakness, gait instability and imbalance. Patient remains at risk for falls. Patient is a good candidate for physical therapy to address the following:    Functional Limitations: Walking, Difficulty moving, Decreased ability to perform ADL's   Length of Therapy:  (6 weeks)   PT Frequency: PT 1x week   PT Interventions: Canal Repositioning Procedure, Home Exercise Program   Patient Agrees with Plan of Care:  Yes    History # of Personal Factors and/or Comorbidities: HIGH (3+)  Examination of Body System(s): # of elements: LOW (1-2)  Clinical Presentation: EVOLVING  Clinical Decision Making: LOW       REHAB POTENTIAL: excellent            SHORT TERM GOALS: To be met in 2 weeks:  1. Patient is independent with HEP.  2. Patient will report at least 30% improvement in overall condition.  3. Patient will report no falls.  LONG TERM GOALS:To be met in 4 weeks:  1. Patient will report decreased disequilibrium/dizziness by at least 90% which demonstrates improved quality of life.  2. Patient will report no loss of balance with ADLs to demonstrate improved functional balance and reduced risk for falls.  3. Patient denies dizziness with daily activity especially with transitional movements.  4. DHI score is less than 10 to demonstrate improved balance and dizziness.       Ther Act 13247 8 minutes and Other Procedure CPT 29931 minutes 10    Timed Code Treatment: 8   Minutes     Total Treatment Time: 30      Minutes      PT SIGNATURE: Nancy Diego PT, DPT, FERT, KESHIA   [place] license #[id number]  DATE TREATMENT INITIATED: 2/5/2024     Medicare Initial Certification  Certification Period: 2/5/2024 thru 5/4/2024  I certify that the therapy services are furnished while this patient is under my care.  The services outlined above are required by this patient, and will be reviewed every 90 days.     PHYSICIAN: Mary Lou Carroll MD      DATE:     Please sign and return via fax to [phone].. Thank you, [facility].

## 2024-02-06 ENCOUNTER — OFFICE (OUTPATIENT)
Dept: URBAN - METROPOLITAN AREA CLINIC 66 | Facility: CLINIC | Age: 64
End: 2024-02-06

## 2024-02-06 VITALS
SYSTOLIC BLOOD PRESSURE: 109 MMHG | DIASTOLIC BLOOD PRESSURE: 74 MMHG | HEIGHT: 55 IN | HEART RATE: 92 BPM | WEIGHT: 130 LBS

## 2024-02-06 DIAGNOSIS — Z80.0 FAMILY HISTORY OF MALIGNANT NEOPLASM OF DIGESTIVE ORGANS: ICD-10-CM

## 2024-02-06 DIAGNOSIS — K21.9 GASTRO-ESOPHAGEAL REFLUX DISEASE WITHOUT ESOPHAGITIS: ICD-10-CM

## 2024-02-06 DIAGNOSIS — R14.3 FLATULENCE: ICD-10-CM

## 2024-02-06 PROCEDURE — 99204 OFFICE O/P NEW MOD 45 MIN: CPT | Performed by: NURSE PRACTITIONER

## 2024-02-07 ENCOUNTER — TREATMENT (OUTPATIENT)
Dept: PHYSICAL THERAPY | Facility: CLINIC | Age: 64
End: 2024-02-07
Payer: MEDICARE

## 2024-02-07 DIAGNOSIS — R26.81 GAIT INSTABILITY: Primary | ICD-10-CM

## 2024-02-07 DIAGNOSIS — R29.898 WEAKNESS OF BOTH LOWER EXTREMITIES: ICD-10-CM

## 2024-02-07 DIAGNOSIS — R42 DIZZINESS: ICD-10-CM

## 2024-02-07 PROCEDURE — 97112 NEUROMUSCULAR REEDUCATION: CPT | Performed by: PHYSICAL THERAPIST

## 2024-02-07 PROCEDURE — 97110 THERAPEUTIC EXERCISES: CPT | Performed by: PHYSICAL THERAPIST

## 2024-02-07 NOTE — PROGRESS NOTES
Physical Therapy Daily Treatment Note  [facility] [place]   [address], [address]  [place] [address]  P: [phone]       F: [phone]    Patient: Noemi Bartholomew   : 1960  Diagnosis/ICD-10 Code:  Gait instability [R26.81]  Referring practitioner: Mary Lou Carroll MD  Date of Initial Visit: Type: THERAPY  Noted: 2024  Today's Date: 2024  Patient seen for 4 sessions       Noemi Bartholomew reports: feeling a little discouraged as this point although I know it takes time for improvement. Last session was hard, but I did okay, not too sore.     Subjective     Objective   See Exercise, Manual, and Modality Logs for complete treatment.       Assessment/Plan  Subjectively, pt reports no increase of pain or discomfort with interventions performed today. Performed well with continued LE strengthening and balance activities. Continues to demonstrate difficulty with tandem stance. Unstable gait throughout clinic.  Continues to benefit from verbal/tactile cues to ensure proper form and technique for exercise performance.     Progress per Plan of Care           Manual Therapy:         mins  81019;  Therapeutic Exercise:    15     mins  81808;     Neuromuscular Jared:    15    mins  76346;    Therapeutic Activity:          mins  02934;     Gait Training:           mins  80086;     Ultrasound:          mins  20009;    Electrical Stimulation:         mins  75262;  Traction          mins 33307    Timed Treatment:   30   mins   Total Treatment:     30   mins    Sharmila Gordon PTA  Physical Therapist Assistant [id number]

## 2024-02-09 ENCOUNTER — TREATMENT (OUTPATIENT)
Dept: PHYSICAL THERAPY | Facility: CLINIC | Age: 64
End: 2024-02-09
Payer: MEDICARE

## 2024-02-09 DIAGNOSIS — R26.81 GAIT INSTABILITY: Primary | ICD-10-CM

## 2024-02-09 DIAGNOSIS — R42 DIZZINESS: ICD-10-CM

## 2024-02-09 DIAGNOSIS — H81.11 BPPV (BENIGN PAROXYSMAL POSITIONAL VERTIGO), RIGHT: ICD-10-CM

## 2024-02-09 PROCEDURE — 97530 THERAPEUTIC ACTIVITIES: CPT | Performed by: PHYSICAL THERAPIST

## 2024-02-09 PROCEDURE — 95992 CANALITH REPOSITIONING PROC: CPT | Performed by: PHYSICAL THERAPIST

## 2024-02-09 NOTE — PROGRESS NOTES
Physical Therapy Daily Progress Note-Vestibular Rehab  [facility] [place]  [address], [address]  [facility] [address]      Visit#:5  Subjective   I am having less dizziness.   Objective            Positional Testing  Positional Testing: With infrared goggles  [place]-Hallpike Right: No nystagmus  [place]-Hallpike Left: Upbeat, left rotatory nystagmus  Pa-Hallpike Left Onset Time : 3 sec  Pa-Hallpike Left Duration Time : 9 sec  Horizontal Roll Test Right:  (NA)  Horizontal Roll Test Left:  (NA)  Treatment: L Epley             PROCEDURES AND MODALITIES:  See Exercise, Manual, and Modality Logs for complete treatment.     Paraffin:   pre-rx  Moist Heat:    Ice:    post-rx  E-Stim:    Ultrasound:    Ionto:   Traction:      Ther Act 53473 8 minutes and Other Procedure CPT 30160 minutes 5    Timed Code Treatment: 8 Minutes  Total Treatment Time: 30 Minutes    Assessment & Plan   R PC was negative. L PC/AC was tested and patient tested positive for L PC Canalithiasis BPPV. Treated with L EPLEY which was tolerated well. Pt is progressing well under current treatment plan and will continue to improve with skilled PT and HEP performance.       Progress per Plan of Care    Nancy Diego, PT, DPT, CHT, CIDN  Physical Therapist  [place] license # [id number]

## 2024-02-12 ENCOUNTER — TREATMENT (OUTPATIENT)
Dept: PHYSICAL THERAPY | Facility: CLINIC | Age: 64
End: 2024-02-12
Payer: MEDICARE

## 2024-02-12 DIAGNOSIS — R26.81 GAIT INSTABILITY: Primary | ICD-10-CM

## 2024-02-12 PROCEDURE — 97110 THERAPEUTIC EXERCISES: CPT | Performed by: PHYSICAL THERAPIST

## 2024-02-12 PROCEDURE — 97530 THERAPEUTIC ACTIVITIES: CPT | Performed by: PHYSICAL THERAPIST

## 2024-02-14 ENCOUNTER — OFFICE VISIT (OUTPATIENT)
Dept: INTERNAL MEDICINE | Facility: CLINIC | Age: 64
End: 2024-02-14
Payer: MEDICARE

## 2024-02-14 VITALS
HEIGHT: 55 IN | SYSTOLIC BLOOD PRESSURE: 110 MMHG | OXYGEN SATURATION: 97 % | BODY MASS INDEX: 24.76 KG/M2 | DIASTOLIC BLOOD PRESSURE: 74 MMHG | WEIGHT: 107 LBS | HEART RATE: 76 BPM

## 2024-02-14 DIAGNOSIS — R20.2 NUMBNESS AND TINGLING OF BOTH FEET: ICD-10-CM

## 2024-02-14 DIAGNOSIS — R53.1 WEAKNESS: Primary | ICD-10-CM

## 2024-02-14 DIAGNOSIS — R26.89 IMBALANCE: ICD-10-CM

## 2024-02-14 DIAGNOSIS — R20.0 NUMBNESS AND TINGLING OF BOTH FEET: ICD-10-CM

## 2024-02-14 PROCEDURE — 1159F MED LIST DOCD IN RCRD: CPT | Performed by: INTERNAL MEDICINE

## 2024-02-14 PROCEDURE — 1160F RVW MEDS BY RX/DR IN RCRD: CPT | Performed by: INTERNAL MEDICINE

## 2024-02-14 PROCEDURE — 99214 OFFICE O/P EST MOD 30 MIN: CPT | Performed by: INTERNAL MEDICINE

## 2024-02-15 ENCOUNTER — TREATMENT (OUTPATIENT)
Dept: PHYSICAL THERAPY | Facility: CLINIC | Age: 64
End: 2024-02-15
Payer: MEDICARE

## 2024-02-15 DIAGNOSIS — R26.81 GAIT INSTABILITY: Primary | ICD-10-CM

## 2024-02-15 DIAGNOSIS — R29.898 WEAKNESS OF BOTH LOWER EXTREMITIES: ICD-10-CM

## 2024-02-15 DIAGNOSIS — R42 DIZZINESS: ICD-10-CM

## 2024-02-16 ENCOUNTER — TREATMENT (OUTPATIENT)
Dept: PHYSICAL THERAPY | Facility: CLINIC | Age: 64
End: 2024-02-16
Payer: MEDICARE

## 2024-02-16 ENCOUNTER — TELEPHONE (OUTPATIENT)
Dept: INTERNAL MEDICINE | Facility: CLINIC | Age: 64
End: 2024-02-16
Payer: MEDICARE

## 2024-02-16 DIAGNOSIS — H81.11 BPPV (BENIGN PAROXYSMAL POSITIONAL VERTIGO), RIGHT: ICD-10-CM

## 2024-02-16 DIAGNOSIS — R42 DIZZINESS: Primary | ICD-10-CM

## 2024-02-16 DIAGNOSIS — R26.81 GAIT INSTABILITY: ICD-10-CM

## 2024-02-16 PROCEDURE — 95992 CANALITH REPOSITIONING PROC: CPT | Performed by: PHYSICAL THERAPIST

## 2024-02-16 PROCEDURE — 97530 THERAPEUTIC ACTIVITIES: CPT | Performed by: PHYSICAL THERAPIST

## 2024-02-19 ENCOUNTER — TREATMENT (OUTPATIENT)
Dept: PHYSICAL THERAPY | Facility: CLINIC | Age: 64
End: 2024-02-19
Payer: MEDICARE

## 2024-02-19 DIAGNOSIS — R42 DIZZINESS: ICD-10-CM

## 2024-02-19 DIAGNOSIS — R29.898 WEAKNESS OF BOTH LOWER EXTREMITIES: ICD-10-CM

## 2024-02-19 DIAGNOSIS — R26.81 GAIT INSTABILITY: Primary | ICD-10-CM

## 2024-02-19 PROCEDURE — 97530 THERAPEUTIC ACTIVITIES: CPT | Performed by: PHYSICAL THERAPIST

## 2024-02-19 PROCEDURE — 97116 GAIT TRAINING THERAPY: CPT | Performed by: PHYSICAL THERAPIST

## 2024-02-19 PROCEDURE — 97112 NEUROMUSCULAR REEDUCATION: CPT | Performed by: PHYSICAL THERAPIST

## 2024-02-19 PROCEDURE — 97110 THERAPEUTIC EXERCISES: CPT | Performed by: PHYSICAL THERAPIST

## 2024-02-19 NOTE — PROGRESS NOTES
Physical Therapy Daily Treatment Note      Patient: Noemi LIU Lforida   : 1960  Referring practitioner: No ref. provider found  Date of Initial Visit: Type: THERAPY  Noted: 2024  Today's Date: 2024  Patient seen for 6 sessions       Visit Diagnoses:    ICD-10-CM ICD-9-CM   1. Gait instability  R26.81 781.2   2. Weakness of both lower extremities  R29.898 729.89   3. Dizziness  R42 780.4       Subjective   No new changes.    Objective   See Exercise, Manual, and Modality Logs for complete treatment.     Assessment/Plan  Tolerates LE strength, gait, and balance activities well w/o c/o pain. Unable to maintain stance on foam with eyes closed w/o use of handrail. Improved stability using 4 inch step w/ min use of hand rail. Progress per POC.    Timed:         Manual Therapy:    0     mins  78003;     Therapeutic Exercise:    15     mins  89900;     Neuromuscular Jared:    15    mins  67924;    Therapeutic Activity:     15     mins  68174;     Gait Training:      10     mins  94361;     Ultrasound:     0     mins  79111;    Ionto                               0    mins   31823        Timed Treatment:   55   mins   Total Treatment:     55   mins    Mari Pereira, PT  [place] License: [id number]

## 2024-02-20 NOTE — ANESTHESIA PREPROCEDURE EVALUATION
Anesthesia Evaluation     Patient summary reviewed and Nursing notes reviewed   NPO Solid Status: > 8 hours  NPO Liquid Status: > 2 hours           Airway   Mallampati: III  TM distance: <3 FB  Neck ROM: limited  Possible difficult intubation  Comment: asymetric bite  Dental - normal exam     Pulmonary - normal exam   Cardiovascular - normal exam        Neuro/Psych  (+) psychiatric history Anxiety and Depression,     GI/Hepatic/Renal/Endo    (+)   hypothyroidism,     Musculoskeletal     Abdominal    Substance History      OB/GYN          Other                      Anesthesia Plan    ASA 2     MAC     Anesthetic plan, all risks, benefits, and alternatives have been provided, discussed and informed consent has been obtained with: patient.       [facility] Pregnancy

## 2024-02-21 ENCOUNTER — OFFICE VISIT (OUTPATIENT)
Dept: NEUROSURGERY | Facility: CLINIC | Age: 64
End: 2024-02-21
Payer: MEDICARE

## 2024-02-21 VITALS
HEART RATE: 109 BPM | DIASTOLIC BLOOD PRESSURE: 68 MMHG | SYSTOLIC BLOOD PRESSURE: 112 MMHG | WEIGHT: 107 LBS | RESPIRATION RATE: 16 BRPM | OXYGEN SATURATION: 95 % | HEIGHT: 55 IN | BODY MASS INDEX: 24.76 KG/M2

## 2024-02-21 DIAGNOSIS — R25.2 SPASTICITY: ICD-10-CM

## 2024-02-21 DIAGNOSIS — G95.9 CERVICAL MYELOPATHY: Primary | ICD-10-CM

## 2024-02-21 DIAGNOSIS — R29.898 WEAKNESS OF BOTH HANDS: ICD-10-CM

## 2024-02-21 DIAGNOSIS — M48.02 CERVICAL SPINAL STENOSIS: ICD-10-CM

## 2024-02-21 DIAGNOSIS — Z98.1 HISTORY OF FUSION OF CERVICAL SPINE: ICD-10-CM

## 2024-02-21 DIAGNOSIS — G89.29 CHRONIC NECK PAIN: ICD-10-CM

## 2024-02-21 DIAGNOSIS — M54.2 CHRONIC NECK PAIN: ICD-10-CM

## 2024-02-21 PROCEDURE — 1160F RVW MEDS BY RX/DR IN RCRD: CPT | Performed by: NEUROLOGICAL SURGERY

## 2024-02-21 PROCEDURE — 1159F MED LIST DOCD IN RCRD: CPT | Performed by: NEUROLOGICAL SURGERY

## 2024-02-21 PROCEDURE — 99214 OFFICE O/P EST MOD 30 MIN: CPT | Performed by: NEUROLOGICAL SURGERY

## 2024-02-21 NOTE — PROGRESS NOTES
Subjective   Patient ID: Noemi Bartholomew is a 63 y.o. female is here today for follow-up for occipital neuralgia of the right side.    Imaging:  MRI of the lumbar, cervical and brain completed on 02/04/2024    She just completed an EMG on 01/26/2024 and she is currently in physical therapy.    The patient returns to the office somewhat earlier at the request of her primary care physician.  It has been a little bit more than 5 years since she underwent a C6 and C7 corpectomy with a cage and plate from C5-T1 for severe cervical stenosis and her rapidly progressive myelopathy.  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  The spinal cord was decompressed.  Unfortunately the operation was associated with a fairly significant CSF leak that required ultimately the placement of a lumboperitoneal shunt and evacuation of a CSF collection in the neck.  She eventually got over the CSF leak without any further problems but it did require a prolonged period of time in the hospital and a total of 3 operations.  She never returned to her normal baseline and still had signs and symptoms of myelopathy.  She ultimately got Social Security/disability.  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  When I saw her last fall things seem to be stable.  We had also treated her for chronic neck pain and occipital neuralgia.  Shortly after the last visit she began having recurrence of balance problems, further loss of use of her hands and a sense of burning in the feet as well as numbness.  She told this to her primary care physician not me who ordered an MRI of the brain, cervical spine, lumbar spine.  The main issue is recurrence of stenosis at levels above her fusion particularly at C3-C4 and C4-C5.  She did have a known right-sided C2-C3 disc herniation but at the time of her surgery I did not think that  "was the main problem so I focused at the lower levels.  That C2-C3 disc herniation by enlarge is about the same but what is progressed is the circumferential stenosis at C3-C4 and C4-C5.  At C3-C4, the worst level most the compression is on the left.  In any event I told her that the treatment for this is another surgery to decompress the cord at those 3 levels.  Given the problems with the anterior approach, I would not do that again.  I proposed that we do a C2-C3, C3-C4, C4-C5 laminoplasty on the left side with hardware.  She was very reluctant to do this given the problems with the previous surgery which I fully understand.  She asked for a second opinion and in fact brought up the name of Dr. David Boyd.  I told her that I took no offense that her wanted to get a second opinion and in fact would try and expedite that so we can come to a conclusion about what to do which I told her should be surgery.  Will have her come back in 2 weeks with flexion-extension x-rays, hopefully after the second opinion.  I asked her to bring her sister so she can be involved in the discussion about surgery.    History of Present Illness    The following portions of the patient's history were reviewed and updated as appropriate: allergies, current medications, past family history, past medical history, past social history, past surgical history, and problem list.    Review of Systems   Constitutional:  Positive for fatigue.   Gastrointestinal:  Positive for nausea.   Musculoskeletal:  Positive for back pain (feet and hand pain), gait problem (balance issues), neck pain and neck stiffness (all over body stiffness).   Neurological:  Positive for dizziness and light-headedness.   All other systems reviewed and are negative.          Objective     Vitals:    02/21/24 1659   BP: 112/68   Pulse: 109   Resp: 16   SpO2: 95%   Weight: 48.5 kg (107 lb)   Height: 134.6 cm (53\")     Body mass index is 26.78 kg/m².    Tobacco Use: Low Risk  " (2/21/2024)    Patient History     Smoking Tobacco Use: Never     Smokeless Tobacco Use: Never     Passive Exposure: Not on file          Physical Exam  Constitutional:       Appearance: She is well-developed.   HENT:      Head: Normocephalic and atraumatic.   Eyes:      Extraocular Movements: EOM normal.      Conjunctiva/sclera: Conjunctivae normal.      Pupils: Pupils are equal, round, and reactive to light.   Neck:      Vascular: No carotid bruit.   Neurological:      Mental Status: She is oriented to person, place, and time.      Coordination: Finger-Nose-Finger Test and Heel to Shin Test normal.      Gait: Gait is intact.      Deep Tendon Reflexes:      Reflex Scores:       Tricep reflexes are 3+ on the right side and 3+ on the left side.       Bicep reflexes are 3+ on the right side and 3+ on the left side.       Brachioradialis reflexes are 3+ on the right side and 3+ on the left side.       Patellar reflexes are 3+ on the right side and 3+ on the left side.       Achilles reflexes are 3+ on the right side and 3+ on the left side.  Psychiatric:         Speech: Speech normal.       Neurologic Exam     Mental Status   Oriented to person, place, and time.   Registration of memory: Good recent and remote memory.   Attention: normal. Concentration: normal.   Speech: speech is normal   Level of consciousness: alert  Knowledge: consistent with education.     Cranial Nerves     CN II   Visual fields full to confrontation.   Visual acuity: normal    CN III, IV, VI   Pupils are equal, round, and reactive to light.  Extraocular motions are normal.     CN V   Facial sensation intact.   Right corneal reflex: normal  Left corneal reflex: normal    CN VII   Facial expression full, symmetric.   Right facial weakness: none  Left facial weakness: none    CN VIII   Hearing: intact    CN IX, X   Palate: symmetric    CN XI   Right sternocleidomastoid strength: normal  Left sternocleidomastoid strength: normal    CN XII   Tongue:  not atrophic  Tongue deviation: none    Motor Exam   Muscle bulk: normal  Right arm tone: normal  Left arm tone: normal  Right leg tone: normal  Left leg tone: normal    Strength   Strength 5/5 except as noted.   Right neck flexion: 4/5  Left neck flexion: 4/5  Right neck extension: 4/5  Left neck extension: 4/5  Right deltoid: 4/5  Left deltoid: 4/5  Right biceps: 4/5  Left biceps: 4/5  Right triceps: 4/5  Left triceps: 4/5  Right wrist flexion: 4/5  Left wrist flexion: 4/5  Right wrist extension: 4/5  Left wrist extension: 4/5  Right interossei: 4/5  Left interossei: 4/5  Right abdominals: 4/5  Left abdominals: 4/5  Right iliopsoas: 4/5  Left iliopsoas: 4/5  Right quadriceps: 4/5  Left quadriceps: 4/5  Right hamstrin/5  Left hamstrin/5  Right glutei: 4/5  Left glutei: 4/5  Right anterior tibial: 4/5  Left anterior tibial: 4/5  Right posterior tibial: 4/5  Left posterior tibial: 4/5  Right peroneal: 4/5  Left peroneal: 4/5  Right gastroc: 4/5  Left gastroc: 4/5    Sensory Exam   Light touch normal.     Gait, Coordination, and Reflexes     Gait  Gait: normal    Coordination   Finger to nose coordination: normal  Heel to shin coordination: normal    Reflexes   Right brachioradialis: 3+  Left brachioradialis: 3+  Right biceps: 3+  Left biceps: 3+  Right triceps: 3+  Left triceps: 3+  Right patellar: 3+  Left patellar: 3+  Right achilles: 3+  Left achilles: 3+  Right : 2+  Left : 2+  Right Rivers: present  Left Rivers: presentAtaxic gait, using a cane           Assessment & Plan   Independent Review of Radiographic Studies:      I personally reviewed the images from the following studies.    The MRI done on 2024 of the cervical spine was reviewed.  She is fused from C5-T1.  There is some mild persistence of the ossified ligament there but the main issue seems to be progression of the cervical stenosis particularly at C3-C4 and C4-C5 compared to the 2022 scan.  She did have a right C2-C3 disc  herniation that was present even in 2019 that does not seem to have changed.  The brain MRI is unremarkable.  The lumbar MRI shows some nonspecific disc disease and mild spinal stenosis.    Medical Decision Making:      I recommended a C2-C3, C3-C4, C4-C5 laminoplasty.  The laminoplasty would be done on the left side since that is the worst side of compression of the worst level which is C3-C4.  The goal of surgery is arrest of her progressive myelopathy and hopefully some return of spinal cord function such as better use of the hands, better balance, and perhaps reduction in the overall numbness in her feet.  I do not think she will get back to normal but we are hoping for return to at least a baseline before her decline in the fall.  The risks include, but are not limited to, infection, hemorrhage requiring transfusion or reoperation, CSF leak requiring reoperation, incomplete relief of symptoms, potential need for additional surgeries in the future, stroke, paralysis, coma, and death.  I stressed to her that I cannot eliminate the risk of a CSF leak from a posterior approach but I think it is considerably less compared to the anterior approach which I had performed last time.    She wants to get a second opinion which I am fully sympathetic with.  Will try and get her to see Dr. Boyd for that opinion as soon as possible since she is exhibiting a progressive myelopathy.  I asked her to come back and see me in 2 weeks, hopefully after the second opinion with flexion-extension x-rays.  I urged her to bring her sister along who is very involved with her care.        Diagnoses and all orders for this visit:    1. Cervical myelopathy (Primary)  -     Ambulatory Referral to Neurosurgery    2. Chronic neck pain    3. Spasticity    4. Weakness of both hands    5. Cervical spinal stenosis  -     Ambulatory Referral to Neurosurgery    6. History of fusion of cervical spine  -     XR spine cervical ap and lat w flex and ext;  Future      Return in about 2 weeks (around 3/6/2024) for After second opinion with Dr. Boyd and xrays, face to face.

## 2024-02-21 NOTE — LETTER
February 21, 2024       No Recipients    Patient: Noemi Bartholomew   YOB: 1960   Date of Visit: 2/21/2024     Dear Mary Lou Carroll MD:       Thank you for referring Noemi Bartholomew to me for evaluation. Below are the relevant portions of my assessment and plan of care.    If you have questions, please do not hesitate to call me. I look forward to following Noemi along with you.         Sincerely,        Antonio Noe MD        CC:   No Recipients    Antonio Noe MD  02/21/24 1817  Sign when Signing Visit  Subjective  Patient ID: Noemi Bartholomew is a 63 y.o. female is here today for follow-up for occipital neuralgia of the right side.    Imaging:  MRI of the lumbar, cervical and brain completed on 02/04/2024    She just completed an EMG on 01/26/2024 and she is currently in physical therapy.    The patient returns to the office somewhat earlier at the request of her primary care physician.  It has been a little bit more than 5 years since she underwent a C6 and C7 corpectomy with a cage and plate from C5-T1 for severe cervical stenosis and her rapidly progressive myelopathy.  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  The spinal cord was decompressed.  Unfortunately the operation was associated with a fairly significant CSF leak that required ultimately the placement of a lumboperitoneal shunt and evacuation of a CSF collection in the neck.  She eventually got over the CSF leak without any further problems but it did require a prolonged period of time in the hospital and a total of 3 operations.  She never returned to her normal baseline and still had signs and symptoms of myelopathy.  She ultimately got Social Security/disability.  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  When I saw her last fall things seem to be stable.  We had also treated her for chronic  neck pain and occipital neuralgia.  Shortly after the last visit she began having recurrence of balance problems, further loss of use of her hands and a sense of burning in the feet as well as numbness.  She told this to her primary care physician not me who ordered an MRI of the brain, cervical spine, lumbar spine.  The main issue is recurrence of stenosis at levels above her fusion particularly at C3-C4 and C4-C5.  She did have a known right-sided C2-C3 disc herniation but at the time of her surgery I did not think that was the main problem so I focused at the lower levels.  That C2-C3 disc herniation by enlarge is about the same but what is progressed is the circumferential stenosis at C3-C4 and C4-C5.  At C3-C4, the worst level most the compression is on the left.  In any event I told her that the treatment for this is another surgery to decompress the cord at those 3 levels.  Given the problems with the anterior approach, I would not do that again.  I proposed that we do a C2-C3, C3-C4, C4-C5 laminoplasty on the left side with hardware.  She was very reluctant to do this given the problems with the previous surgery which I fully understand.  She asked for a second opinion and in fact brought up the name of Dr. David Boyd.  I told her that I took no offense that her wanted to get a second opinion and in fact would try and expedite that so we can come to a conclusion about what to do which I told her should be surgery.  Will have her come back in 2 weeks with flexion-extension x-rays, hopefully after the second opinion.  I asked her to bring her sister so she can be involved in the discussion about surgery.    History of Present Illness    The following portions of the patient's history were reviewed and updated as appropriate: allergies, current medications, past family history, past medical history, past social history, past surgical history, and problem list.    Review of Systems   Constitutional:  Positive  "for fatigue.   Gastrointestinal:  Positive for nausea.   Musculoskeletal:  Positive for back pain (feet and hand pain), gait problem (balance issues), neck pain and neck stiffness (all over body stiffness).   Neurological:  Positive for dizziness and light-headedness.   All other systems reviewed and are negative.          Objective    Vitals:    02/21/24 1659   BP: 112/68   Pulse: 109   Resp: 16   SpO2: 95%   Weight: 48.5 kg (107 lb)   Height: 134.6 cm (53\")     Body mass index is 26.78 kg/m².    Tobacco Use: Low Risk  (2/21/2024)    Patient History    • Smoking Tobacco Use: Never    • Smokeless Tobacco Use: Never    • Passive Exposure: Not on file          Physical Exam  Constitutional:       Appearance: She is well-developed.   HENT:      Head: Normocephalic and atraumatic.   Eyes:      Extraocular Movements: EOM normal.      Conjunctiva/sclera: Conjunctivae normal.      Pupils: Pupils are equal, round, and reactive to light.   Neck:      Vascular: No carotid bruit.   Neurological:      Mental Status: She is oriented to person, place, and time.      Coordination: Finger-Nose-Finger Test and Heel to Shin Test normal.      Gait: Gait is intact.      Deep Tendon Reflexes:      Reflex Scores:       Tricep reflexes are 3+ on the right side and 3+ on the left side.       Bicep reflexes are 3+ on the right side and 3+ on the left side.       Brachioradialis reflexes are 3+ on the right side and 3+ on the left side.       Patellar reflexes are 3+ on the right side and 3+ on the left side.       Achilles reflexes are 3+ on the right side and 3+ on the left side.  Psychiatric:         Speech: Speech normal.       Neurologic Exam     Mental Status   Oriented to person, place, and time.   Registration of memory: Good recent and remote memory.   Attention: normal. Concentration: normal.   Speech: speech is normal   Level of consciousness: alert  Knowledge: consistent with education.     Cranial Nerves     CN II   Visual " fields full to confrontation.   Visual acuity: normal    CN III, IV, VI   Pupils are equal, round, and reactive to light.  Extraocular motions are normal.     CN V   Facial sensation intact.   Right corneal reflex: normal  Left corneal reflex: normal    CN VII   Facial expression full, symmetric.   Right facial weakness: none  Left facial weakness: none    CN VIII   Hearing: intact    CN IX, X   Palate: symmetric    CN XI   Right sternocleidomastoid strength: normal  Left sternocleidomastoid strength: normal    CN XII   Tongue: not atrophic  Tongue deviation: none    Motor Exam   Muscle bulk: normal  Right arm tone: normal  Left arm tone: normal  Right leg tone: normal  Left leg tone: normal    Strength   Strength 5/5 except as noted.   Right neck flexion: 4/5  Left neck flexion: 4/5  Right neck extension: 4/5  Left neck extension: 4/5  Right deltoid: 4/5  Left deltoid: 4/5  Right biceps: 4/5  Left biceps: 4/5  Right triceps: 4/5  Left triceps: 4/5  Right wrist flexion: 4/5  Left wrist flexion: 4/5  Right wrist extension: 4/5  Left wrist extension: 4/5  Right interossei: 4/5  Left interossei: 4/5  Right abdominals: 4/5  Left abdominals: 4/5  Right iliopsoas: 4/5  Left iliopsoas: 4/5  Right quadriceps: 4/5  Left quadriceps: 4/5  Right hamstrin/5  Left hamstrin/5  Right glutei: 4/5  Left glutei: 4/5  Right anterior tibial: 4/5  Left anterior tibial: 4/5  Right posterior tibial: 4/5  Left posterior tibial: 4/5  Right peroneal: 4/5  Left peroneal: 4/5  Right gastroc: 4/5  Left gastroc: 4/5    Sensory Exam   Light touch normal.     Gait, Coordination, and Reflexes     Gait  Gait: normal    Coordination   Finger to nose coordination: normal  Heel to shin coordination: normal    Reflexes   Right brachioradialis: 3+  Left brachioradialis: 3+  Right biceps: 3+  Left biceps: 3+  Right triceps: 3+  Left triceps: 3+  Right patellar: 3+  Left patellar: 3+  Right achilles: 3+  Left achilles: 3+  Right : 2+  Left :  2+  Right Rivers: present  Left Rivers: presentAtaxic gait, using a cane           Assessment & Plan  Independent Review of Radiographic Studies:      I personally reviewed the images from the following studies.    The MRI done on 2/4/2024 of the cervical spine was reviewed.  She is fused from C5-T1.  There is some mild persistence of the ossified ligament there but the main issue seems to be progression of the cervical stenosis particularly at C3-C4 and C4-C5 compared to the July 2022 scan.  She did have a right C2-C3 disc herniation that was present even in 2019 that does not seem to have changed.  The brain MRI is unremarkable.  The lumbar MRI shows some nonspecific disc disease and mild spinal stenosis.    Medical Decision Making:      I recommended a C2-C3, C3-C4, C4-C5 laminoplasty.  The laminoplasty would be done on the left side since that is the worst side of compression of the worst level which is C3-C4.  The goal of surgery is arrest of her progressive myelopathy and hopefully some return of spinal cord function such as better use of the hands, better balance, and perhaps reduction in the overall numbness in her feet.  I do not think she will get back to normal but we are hoping for return to at least a baseline before her decline in the fall.  The risks include, but are not limited to, infection, hemorrhage requiring transfusion or reoperation, CSF leak requiring reoperation, incomplete relief of symptoms, potential need for additional surgeries in the future, stroke, paralysis, coma, and death.  I stressed to her that I cannot eliminate the risk of a CSF leak from a posterior approach but I think it is considerably less compared to the anterior approach which I had performed last time.    She wants to get a second opinion which I am fully sympathetic with.  Will try and get her to see Dr. Boyd for that opinion as soon as possible since she is exhibiting a progressive myelopathy.  I asked her to come  back and see me in 2 weeks, hopefully after the second opinion with flexion-extension x-rays.  I urged her to bring her sister along who is very involved with her care.        Diagnoses and all orders for this visit:    1. Cervical myelopathy (Primary)  -     Ambulatory Referral to Neurosurgery    2. Chronic neck pain    3. Spasticity    4. Weakness of both hands    5. Cervical spinal stenosis  -     Ambulatory Referral to Neurosurgery    6. History of fusion of cervical spine  -     XR spine cervical ap and lat w flex and ext; Future      Return in about 2 weeks (around 3/6/2024) for After second opinion with Dr. Boyd and xrays, face to face.

## 2024-02-22 ENCOUNTER — TREATMENT (OUTPATIENT)
Dept: PHYSICAL THERAPY | Facility: CLINIC | Age: 64
End: 2024-02-22
Payer: MEDICARE

## 2024-02-22 DIAGNOSIS — R42 DIZZINESS: ICD-10-CM

## 2024-02-22 DIAGNOSIS — R29.898 WEAKNESS OF BOTH LOWER EXTREMITIES: ICD-10-CM

## 2024-02-22 DIAGNOSIS — R26.81 GAIT INSTABILITY: Primary | ICD-10-CM

## 2024-02-22 PROCEDURE — 97530 THERAPEUTIC ACTIVITIES: CPT | Performed by: PHYSICAL THERAPIST

## 2024-02-22 PROCEDURE — 97112 NEUROMUSCULAR REEDUCATION: CPT | Performed by: PHYSICAL THERAPIST

## 2024-02-22 PROCEDURE — 97110 THERAPEUTIC EXERCISES: CPT | Performed by: PHYSICAL THERAPIST

## 2024-02-22 PROCEDURE — 97116 GAIT TRAINING THERAPY: CPT | Performed by: PHYSICAL THERAPIST

## 2024-02-22 NOTE — PROGRESS NOTES
Physical Therapy Daily Treatment Note      Patient: Noemi Bartholomew   : 1960  Referring practitioner: Mary Lou Carroll MD  Date of Initial Visit: Type: THERAPY  Noted: 2024  Today's Date: 2024  Patient seen for 7 sessions       Visit Diagnoses:    ICD-10-CM ICD-9-CM   1. Gait instability  R26.81 781.2   2. Weakness of both lower extremities  R29.898 729.89   3. Dizziness  R42 780.4       Subjective   Per pt, Hasmukh recommending laminoplasty. Plans to seek second opinion. No new changes re: balance. Can walk short distances w/o cane, cautiously.    Objective   See Exercise, Manual, and Modality Logs for complete treatment.       Assessment/Plan  Tolerates LE and core strengthening as well as functional balance activities well w/o c/o pain. Slight decrease in use of handrail w/ several ambulatory activities. Progress per POC.    Timed:         Manual Therapy:    0     mins  29821;     Therapeutic Exercise:    15     mins  97904;     Neuromuscular Jared:    15    mins  27564;    Therapeutic Activity:     15     mins  86527;     Gait Training:      10     mins  16655;     Ultrasound:     0     mins  77234;    Ionto                               0    mins   67362        Timed Treatment:   55   mins   Total Treatment:     55   mins    Mari Pereira PT  [place] License: [id number]

## 2024-02-26 ENCOUNTER — TREATMENT (OUTPATIENT)
Dept: PHYSICAL THERAPY | Facility: CLINIC | Age: 64
End: 2024-02-26
Payer: MEDICARE

## 2024-02-26 DIAGNOSIS — R26.81 GAIT INSTABILITY: Primary | ICD-10-CM

## 2024-02-26 DIAGNOSIS — H81.11 BPPV (BENIGN PAROXYSMAL POSITIONAL VERTIGO), RIGHT: ICD-10-CM

## 2024-02-26 DIAGNOSIS — R42 DIZZINESS: ICD-10-CM

## 2024-02-26 PROCEDURE — 95992 CANALITH REPOSITIONING PROC: CPT | Performed by: PHYSICAL THERAPIST

## 2024-02-26 PROCEDURE — 97530 THERAPEUTIC ACTIVITIES: CPT | Performed by: PHYSICAL THERAPIST

## 2024-02-26 NOTE — PROGRESS NOTES
Physical Therapy Daily Progress Note-Vestibular Rehab  [facility] [place]  [address]  [facility] [address]      Visit#:4  Subjective   I am still having dizziness when I sit up. I am not having much spinning anymore. My neurosurgeon says I need surgery on my neck and that is what is causing the issues in my legs and feet.   Objective            Positional Testing  Positional Testing: With infrared goggles  Pa-Hallpike Right: No nystagmus (dizziness upon sitting up)  Bennett-Hallpike Left: No nystagmus (dizziness upon sitting up)  Horizontal Roll Test Right: Left-beating (subtle)  Horizontal Roll Test Right Onset Time : immediate  Horizontal Roll Test Right Duration Time : > 1 min  Horizontal Roll Test Left: No nystagmus  Treatment: ALEXA Remy             PROCEDURES AND MODALITIES:  See Exercise, Manual, and Modality Logs for complete treatment.     Paraffin:   pre-rx  Moist Heat:    Ice:    post-rx  E-Stim:    Ultrasound:    Ionto:   Traction:      Ther Act 17183 8 minutes and Other Procedure CPT 52396 minutes 10    Timed Code Treatment: 8 Minutes  Total Treatment Time: 30 Minutes    Assessment & Plan   R/L PC is clear of canalithiasis BPPV. She continues to have dizziness with going from supine to sit. Today she had a very mildly positive test for L LC Cupulolithiasis BPPV. Treated with CRP which was tolerated well and issued for HEP. Pt is progressing well under current treatment plan and will continue to improve with skilled PT and HEP performance.      Progress per Plan of Care    Nancy Diego, PT, DPT, CHT, COLLETTEN  Physical Therapist  [place] license # [id number]

## 2024-02-28 ENCOUNTER — TELEPHONE (OUTPATIENT)
Dept: NEUROSURGERY | Facility: CLINIC | Age: 64
End: 2024-02-28
Payer: MEDICARE

## 2024-02-28 NOTE — TELEPHONE ENCOUNTER
Ms. Bartholomew called to check the status of her referral to see Dr. Boyd, she said that if that we call her back with information ASAP that would be appreciated, thanks!

## 2024-02-29 ENCOUNTER — TREATMENT (OUTPATIENT)
Dept: PHYSICAL THERAPY | Facility: CLINIC | Age: 64
End: 2024-02-29
Payer: MEDICARE

## 2024-02-29 DIAGNOSIS — R42 DIZZINESS: ICD-10-CM

## 2024-02-29 DIAGNOSIS — R26.81 GAIT INSTABILITY: Primary | ICD-10-CM

## 2024-02-29 DIAGNOSIS — R29.898 WEAKNESS OF BOTH LOWER EXTREMITIES: ICD-10-CM

## 2024-02-29 PROCEDURE — 97110 THERAPEUTIC EXERCISES: CPT | Performed by: PHYSICAL THERAPIST

## 2024-02-29 PROCEDURE — 97112 NEUROMUSCULAR REEDUCATION: CPT | Performed by: PHYSICAL THERAPIST

## 2024-02-29 PROCEDURE — 97530 THERAPEUTIC ACTIVITIES: CPT | Performed by: PHYSICAL THERAPIST

## 2024-02-29 NOTE — PROGRESS NOTES
Physical Therapy Daily Treatment Note      Patient: Noemi Bartholomew   : 1960  Referring practitioner: Mary Lou Carroll MD  Date of Initial Visit: Type: THERAPY  Noted: 2024  Today's Date: 2024  Patient seen for 8 sessions       Visit Diagnoses:    ICD-10-CM ICD-9-CM   1. Gait instability  R26.81 781.2   2. Dizziness  R42 780.4   3. Weakness of both lower extremities  R29.898 729.89       Subjective   Dizzy with supine to sit.     Objective   See Exercise, Manual, and Modality Logs for complete treatment.       Assessment/Plan  Core instability observed with several exercises. Unstable with bridge. Tolerated new exercises well w/o c/o pain. Requires CGA/SBA due to fall risk. Progress per POC.    Timed:         Manual Therapy:    0     mins  41196;     Therapeutic Exercise:    15     mins  85863;     Neuromuscular Jared:    10    mins  55572;    Therapeutic Activity:     15     mins  65785;     Gait Trainin     mins  49307;     Ultrasound:     0     mins  38459;    Ionto                               0    mins   86669        Timed Treatment:   40   mins   Total Treatment:     40   mins    Mari Pereira, PT  [place] License: [id number]

## 2024-03-01 ENCOUNTER — TELEPHONE (OUTPATIENT)
Dept: NEUROSURGERY | Facility: CLINIC | Age: 64
End: 2024-03-01

## 2024-03-01 NOTE — TELEPHONE ENCOUNTER
Pt called to check on the expiration time for a Xray- informed One year     Pt also wants to know if she can get an appt for Dr Noe after she goes to 2nd opinion appt with Dr Boyd.    Request to have appt on April 10th around 2:30pm.    Please call

## 2024-03-04 ENCOUNTER — TREATMENT (OUTPATIENT)
Dept: PHYSICAL THERAPY | Facility: CLINIC | Age: 64
End: 2024-03-04
Payer: MEDICARE

## 2024-03-04 DIAGNOSIS — R29.898 WEAKNESS OF BOTH LOWER EXTREMITIES: ICD-10-CM

## 2024-03-04 DIAGNOSIS — R26.81 GAIT INSTABILITY: Primary | ICD-10-CM

## 2024-03-04 DIAGNOSIS — R42 DIZZINESS: ICD-10-CM

## 2024-03-04 NOTE — PROGRESS NOTES
Physical Therapy Daily Treatment Note      Patient: Noemi LIU [place]   : 1960  Referring practitioner: No ref. provider found  Date of Initial Visit: Type: THERAPY  Noted: 2024  Today's Date: 3/4/2024  Patient seen for 9 sessions       Visit Diagnoses:    ICD-10-CM ICD-9-CM   1. Gait instability  R26.81 781.2   2. Dizziness  R42 780.4   3. Weakness of both lower extremities  R29.898 729.89       Subjective   No new changes.    Objective   See Exercise, Manual, and Modality Logs for complete treatment.       Assessment/Plan  Improved stability on several exercises requiring SBA instead of CGA. Still needs min 1 HHA for 6 inch step up/down. Ambulates in clinic w/o AD. Progress per POC.    Timed:         Manual Therapy:    0     mins  91519;     Therapeutic Exercise:    0     mins  57401;     Neuromuscular Jared:    15    mins  32250;    Therapeutic Activity:     25     mins  27571;     Gait Trainin     mins  03002;     Ultrasound:     0     mins  97944;    Ionto                               0    mins   59340        Timed Treatment:   40   mins   Total Treatment:     40   mins    Mari Pereira PT  [place] License: [id number]

## 2024-03-07 ENCOUNTER — TREATMENT (OUTPATIENT)
Dept: PHYSICAL THERAPY | Facility: CLINIC | Age: 64
End: 2024-03-07
Payer: MEDICARE

## 2024-03-07 DIAGNOSIS — R42 DIZZINESS: ICD-10-CM

## 2024-03-07 DIAGNOSIS — R29.898 WEAKNESS OF BOTH LOWER EXTREMITIES: ICD-10-CM

## 2024-03-07 DIAGNOSIS — R26.81 GAIT INSTABILITY: Primary | ICD-10-CM

## 2024-03-07 NOTE — PROGRESS NOTES
Physical Therapy Daily Treatment Note      Patient: Noemi LIU [place]   : 1960  Referring practitioner: No ref. provider found  Date of Initial Visit: Type: THERAPY  Noted: 2024  Today's Date: 3/7/2024  Patient seen for 10 sessions       Visit Diagnoses:    ICD-10-CM ICD-9-CM   1. Gait instability  R26.81 781.2   2. Dizziness  R42 780.4   3. Weakness of both lower extremities  R29.898 729.89       Subjective   No new changes. Does not wish to request insurance authorization for more (strengthening) visits at this time. Next step is second opinion with neuro.    Objective   See Exercise, Manual, and Modality Logs for complete treatment.     Assessment/Plan  Tolerates core and LE strengthening well w/o c/o pain. Able to step up/down 6 inch step w/ min use of railing which is an improvement. Continues to require SBA with ambulatory activities due to impaired balance/fall risk. Will DC to HEP at this time per pt request.      Timed:         Manual Therapy:    0     mins  83014;     Therapeutic Exercise:    15     mins  24201;     Neuromuscular Jared:    10    mins  10213;    Therapeutic Activity:     15     mins  24780;     Gait Trainin     mins  30000;     Ultrasound:     0     mins  79491;    Ionto                               0    mins   94264        Timed Treatment:   40   mins   Total Treatment:     40   mins    LENORA Hernandez License: [id number]

## 2024-03-09 ENCOUNTER — HOSPITAL ENCOUNTER (OUTPATIENT)
Dept: GENERAL RADIOLOGY | Facility: HOSPITAL | Age: 64
Discharge: HOME | End: 2024-03-09
Payer: MEDICARE

## 2024-03-09 DIAGNOSIS — Z98.1 HISTORY OF FUSION OF CERVICAL SPINE: ICD-10-CM

## 2024-03-09 PROCEDURE — 72050 X-RAY EXAM NECK SPINE 4/5VWS: CPT

## 2024-03-11 ENCOUNTER — TREATMENT (OUTPATIENT)
Dept: PHYSICAL THERAPY | Facility: CLINIC | Age: 64
End: 2024-03-11
Payer: MEDICARE

## 2024-03-11 ENCOUNTER — TELEPHONE (OUTPATIENT)
Dept: NEUROSURGERY | Facility: CLINIC | Age: 64
End: 2024-03-11
Payer: MEDICARE

## 2024-03-11 DIAGNOSIS — H81.11 BPPV (BENIGN PAROXYSMAL POSITIONAL VERTIGO), RIGHT: ICD-10-CM

## 2024-03-11 DIAGNOSIS — R26.81 GAIT INSTABILITY: Primary | ICD-10-CM

## 2024-03-11 DIAGNOSIS — R42 DIZZINESS: ICD-10-CM

## 2024-03-11 PROCEDURE — 95992 CANALITH REPOSITIONING PROC: CPT | Performed by: PHYSICAL THERAPIST

## 2024-03-11 PROCEDURE — 97530 THERAPEUTIC ACTIVITIES: CPT | Performed by: PHYSICAL THERAPIST

## 2024-03-11 NOTE — TELEPHONE ENCOUNTER
Patient called needing referral sent to her for the second opinion with David Boyd MD. She asks if it can be sent via [employer] since mailing will take too long. I offered to have it printed out and her  at office but pt is unable to come and get it. I am mailing out referral today and informed pt it can take 2-3 weeks. Pt states as long as it comes in by 04/09/2024 it's fine but still inquires if Mychart is an option. Please advise.

## 2024-03-11 NOTE — PROGRESS NOTES
Physical Therapy Daily Progress Note-Vestibular Rehab  [facility] [place]  [address], [address]  [facility] [address]      Visit#:5  Subjective   I am still feeling woozy and wobbly but I don't think that is my vertigo causing that.   Objective            Positional Testing  Positional Testing: With infrared goggles  Winthrop-Hallpike Right:  (subtle R torsion)  Pa-Hallpike Right Onset Time : immediate  Winthrop-Hallpike Left: No nystagmus  Horizontal Roll Test Right: No nystagmus  Horizontal Roll Test Left: No nystagmus  Treatment: R Colton             PROCEDURES AND MODALITIES:  See Exercise, Manual, and Modality Logs for complete treatment.     Paraffin:   pre-rx  Moist Heat:    Ice:    post-rx  E-Stim:    Ultrasound:    Ionto:   Traction:      Ther Act 91062 8 minutes and Other Procedure CPT 52013 minutes 10    Timed Code Treatment: 8 Minutes  Total Treatment Time: 30 Minutes    Assessment & Plan   This patient has shown great progress in clearing multicanal BPPV. She no longer is having vertigo. She does still endorse imbalance which is multifactorial. Patient continues to have R PC Cupulolithiasis BPPV which is likely contributing to her imbalance. Treated today with R COLTON. This is not one that she can perform at home independently. Will see her back at the end of the week.     Progress per Plan of Care    Nancy Diego, PT, DPT, CHT, KESHIA  Physical Therapist  [place] license # [id number]

## 2024-03-13 ENCOUNTER — CLINICAL SUPPORT (OUTPATIENT)
Dept: INTERNAL MEDICINE | Facility: CLINIC | Age: 64
End: 2024-03-13
Payer: MEDICARE

## 2024-03-13 DIAGNOSIS — E53.8 B12 DEFICIENCY: Primary | ICD-10-CM

## 2024-03-13 PROCEDURE — 96372 THER/PROPH/DIAG INJ SC/IM: CPT | Performed by: INTERNAL MEDICINE

## 2024-03-13 RX ADMIN — CYANOCOBALAMIN 1000 MCG: 1000 INJECTION, SOLUTION INTRAMUSCULAR; SUBCUTANEOUS at 14:19

## 2024-03-15 ENCOUNTER — TREATMENT (OUTPATIENT)
Dept: PHYSICAL THERAPY | Facility: CLINIC | Age: 64
End: 2024-03-15
Payer: MEDICARE

## 2024-03-15 DIAGNOSIS — R42 DIZZINESS: ICD-10-CM

## 2024-03-15 DIAGNOSIS — H81.11 BPPV (BENIGN PAROXYSMAL POSITIONAL VERTIGO), RIGHT: ICD-10-CM

## 2024-03-15 DIAGNOSIS — R26.81 GAIT INSTABILITY: Primary | ICD-10-CM

## 2024-03-15 PROCEDURE — 97530 THERAPEUTIC ACTIVITIES: CPT | Performed by: PHYSICAL THERAPIST

## 2024-03-15 NOTE — PROGRESS NOTES
Physical Therapy Daily Progress Note-Vestibular Rehab  [facility] [place]  [address]  [facility] [address]      Visit#:6  Subjective   My dizziness and balance are worse but I don't think it is related to my crystals. I know it is from my other issue.   Objective            Positional Testing  Positional Testing: With infrared goggles  Pa-Hallpike Right: No nystagmus  Mantua-Hallpike Left: No nystagmus  Horizontal Roll Test Right: No nystagmus  Horizontal Roll Test Left: No nystagmus  Treatment:  (none)             PROCEDURES AND MODALITIES:  See Exercise, Manual, and Modality Logs for complete treatment.     Paraffin:   pre-rx  Moist Heat:    Ice:    post-rx  E-Stim:    Ultrasound:    Ionto:   Traction:      Ther Act 30250 20 minutes    Timed Code Treatment: 20 Minutes  Total Treatment Time: 20 Minutes    Assessment & Plan   Patient is testing negative for BPPV in all canals at this time. She is appropriate to DC but goals are not all met due to her ongoing cervical issue causing imbalance and balance issues. She is going to neuro for ongoing care.     Progress per Plan of Care    Nancy Diego, PT, DPT, CHT, CIDN  Physical Therapist  [place] license # [id number]

## 2024-03-15 NOTE — PROGRESS NOTES
Physical Therapy Monthly Progress Note  [facility] [place]  [address], [address]  [place] [id number]    Name: Noemi Bartholomew  Date: 03/15/2024  Diagnosis/ICD-10 Code:  Gait instability [R26.81]  Referring practitioner: Mary Lou Carroll MD  Date of Initial Visit:   Visit #: 6  Subjective:   Noemi Bartholomew reports: ***  Subjective Questionnaire: DHI: **/100,  improved from **/100  Clinical Progress: {UNCHANGED, IMPROVED, WORSE:33065}  Home Program Compliance: {YES/NA/NO:67111}  Treatment has included: {PT interventions:91837}  Objective:   Objective                            Assessment:        Plan:      Progress toward previous goals: {all/partially/not met:85121}  Recommendations: {Reassess plan:53095}  Timeframe: { timeframe:9743738523}  Prognosis to achieve goals: {GOOD/FAIR/POOR:89108}    03/15/2024 Treatments:     {PT Rx Minutes:34582}    Timed Code Treatment: ***   Minutes     Total Treatment Time: ***      Minutes    PT: Nancy Diego PT, DPT, CHT, CIDN  License Number: [id number]  Electronically signed by Nancy Diego PT, 03/15/24, 2:33 PM EDT    Certification Period: 3/15/2024 thru 6/12/2024  I certify that the therapy services are furnished while this patient is under my care.  The services outlined above are required by this patient, and will be reviewed every 90 days.         Physician Signature:__________________________________________________    PHYSICIAN: Mary Lou Carroll MD  NPI: [id number]                                      DATE:    Please sign and return via fax to [phone] at [facility] . Thank you, [facility]   2

## 2024-03-18 ENCOUNTER — OFFICE (OUTPATIENT)
Dept: URBAN - METROPOLITAN AREA PATHOLOGY 4 | Facility: PATHOLOGY | Age: 64
End: 2024-03-18

## 2024-03-18 ENCOUNTER — AMBULATORY SURGICAL CENTER (OUTPATIENT)
Dept: URBAN - METROPOLITAN AREA SURGERY 20 | Facility: SURGERY | Age: 64
End: 2024-03-18
Payer: COMMERCIAL

## 2024-03-18 VITALS — HEIGHT: 55 IN

## 2024-03-18 DIAGNOSIS — Z80.0 FAMILY HISTORY OF MALIGNANT NEOPLASM OF DIGESTIVE ORGANS: ICD-10-CM

## 2024-03-18 DIAGNOSIS — K31.89 OTHER DISEASES OF STOMACH AND DUODENUM: ICD-10-CM

## 2024-03-18 DIAGNOSIS — K21.00 GASTRO-ESOPHAGEAL REFLUX DISEASE WITH ESOPHAGITIS, WITHOUT B: ICD-10-CM

## 2024-03-18 DIAGNOSIS — Z12.11 ENCOUNTER FOR SCREENING FOR MALIGNANT NEOPLASM OF COLON: ICD-10-CM

## 2024-03-18 DIAGNOSIS — K21.9 GASTRO-ESOPHAGEAL REFLUX DISEASE WITHOUT ESOPHAGITIS: ICD-10-CM

## 2024-03-18 DIAGNOSIS — K31.1 ADULT HYPERTROPHIC PYLORIC STENOSIS: ICD-10-CM

## 2024-03-18 DIAGNOSIS — K44.9 DIAPHRAGMATIC HERNIA WITHOUT OBSTRUCTION OR GANGRENE: ICD-10-CM

## 2024-03-18 PROBLEM — K22.89 OTHER SPECIFIED DISEASE OF ESOPHAGUS: Status: ACTIVE | Noted: 2024-03-18

## 2024-03-18 LAB
GI HISTOLOGY: A. DUODENUM: (no result)
GI HISTOLOGY: B. GASTRIC: (no result)
GI HISTOLOGY: C. DISTAL ESOPHAGUS: (no result)
GI HISTOLOGY: PDF REPORT: (no result)

## 2024-03-18 PROCEDURE — 88305 TISSUE EXAM BY PATHOLOGIST: CPT | Performed by: PATHOLOGY

## 2024-03-18 PROCEDURE — 45378 DIAGNOSTIC COLONOSCOPY: CPT | Mod: 33 | Performed by: INTERNAL MEDICINE

## 2024-03-18 PROCEDURE — 88342 IMHCHEM/IMCYTCHM 1ST ANTB: CPT | Performed by: PATHOLOGY

## 2024-03-18 PROCEDURE — 43239 EGD BIOPSY SINGLE/MULTIPLE: CPT | Performed by: INTERNAL MEDICINE

## 2024-03-19 ENCOUNTER — TELEPHONE (OUTPATIENT)
Dept: NEUROSURGERY | Facility: CLINIC | Age: 64
End: 2024-03-19
Payer: MEDICARE

## 2024-03-19 NOTE — TELEPHONE ENCOUNTER
Patient called requesting a sooner appt to see Dr. Noe.  She requested to go to a second opinion with Dr. Boyd,   She is now wishing to schedule an appt with Dr. Noe.  I scheduled her in May.  This is the first available.  Patient is unhappy with the appt and wants an earlier appt..

## 2024-03-20 NOTE — TELEPHONE ENCOUNTER
Spoke to patient this morning and she wanted to let us know that she got a 2nd opinion yesterday and she is now ready to discuss having surgery as soon as possible as she is in a lot of pain. Patient had an appointment scheduled for 05/22/24 she said that this appointment is too far out and she needed something sooner, checked the schedule and was able to move her to 05/06/24 patient says that is still too far out, apologized advised that I do not have anything sooner than that at this time, patient is requesting a call back from Dr. Elda MAYFIELD, thanks!

## 2024-03-21 NOTE — TELEPHONE ENCOUNTER
Patient is requesting to speak with Ana regarding appointment and surgery, please return her call thanks!

## 2024-03-22 ENCOUNTER — OFFICE VISIT (OUTPATIENT)
Dept: NEUROSURGERY | Facility: CLINIC | Age: 64
End: 2024-03-22
Payer: MEDICARE

## 2024-03-22 VITALS
DIASTOLIC BLOOD PRESSURE: 68 MMHG | HEIGHT: 55 IN | BODY MASS INDEX: 24.76 KG/M2 | WEIGHT: 107 LBS | RESPIRATION RATE: 20 BRPM | SYSTOLIC BLOOD PRESSURE: 124 MMHG

## 2024-03-22 DIAGNOSIS — G95.9 CERVICAL MYELOPATHY: Primary | ICD-10-CM

## 2024-03-22 DIAGNOSIS — M48.02 CERVICAL SPINAL STENOSIS: ICD-10-CM

## 2024-03-22 DIAGNOSIS — G89.29 CHRONIC NECK PAIN: ICD-10-CM

## 2024-03-22 DIAGNOSIS — R29.898 WEAKNESS OF BOTH HANDS: ICD-10-CM

## 2024-03-22 DIAGNOSIS — R25.2 SPASTICITY: ICD-10-CM

## 2024-03-22 DIAGNOSIS — Z98.1 HISTORY OF FUSION OF CERVICAL SPINE: ICD-10-CM

## 2024-03-22 DIAGNOSIS — M54.2 CHRONIC NECK PAIN: ICD-10-CM

## 2024-03-22 PROCEDURE — 99214 OFFICE O/P EST MOD 30 MIN: CPT | Performed by: NEUROLOGICAL SURGERY

## 2024-03-22 PROCEDURE — 1160F RVW MEDS BY RX/DR IN RCRD: CPT | Performed by: NEUROLOGICAL SURGERY

## 2024-03-22 PROCEDURE — 1159F MED LIST DOCD IN RCRD: CPT | Performed by: NEUROLOGICAL SURGERY

## 2024-03-22 NOTE — LETTER
March 22, 2024       No Recipients    Patient: Noemi Bartholomew   YOB: 1960   Date of Visit: 3/22/2024     Dear Mary Lou Carroll MD:       Thank you for referring Noeim Bartholomew to me for evaluation. Below are the relevant portions of my assessment and plan of care.    If you have questions, please do not hesitate to call me. I look forward to following Noemi along with you.         Sincerely,        Antonio Noe MD        CC:   No Recipients    Antonio Noe MD  03/22/24 1449  Sign when Signing Visit  Subjective  Patient ID: Noemi Bartholomew is a 63 y.o. female is here today for follow-up for surgical discussion     The patient returns to the office somewhat earlier at the request of her primary care physician. It has been a little bit more than 5 years since she underwent a C6 and C7 corpectomy with a cage and plate from C5-T1 for severe cervical stenosis and her rapidly progressive myelopathy. 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 The spinal cord was decompressed. Unfortunately the operation was associated with a fairly significant CSF leak that required ultimately the placement of a lumboperitoneal shunt and evacuation of a CSF collection in the neck. She eventually got over the CSF leak without any further problems but it did require a prolonged period of time in the hospital and a total of 3 operations. She never returned to her normal baseline and still had signs and symptoms of myelopathy. She ultimately got Social Security/disability. 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 When I saw her last fall things seem to be stable. We had also treated her for chronic neck pain and occipital neuralgia. Shortly after the last visit she began having recurrence of balance problems, further loss of use of her hands and a sense of burning in the feet as well as  "numbness. She told this to her primary care physician not me who ordered an MRI of the brain, cervical spine, lumbar spine. The main issue is recurrence of stenosis at levels above her fusion particularly at C3-C4 and C4-C5. She did have a known right-sided C2-C3 disc herniation but at the time of her surgery I did not think that was the main problem so I focused at the lower levels. That C2-C3 disc herniation by enlarge is about the same but what is progressed is the circumferential stenosis at C3-C4 and C4-C5. At C3-C4, the worst level most the compression is on the left. In any event I told her that the treatment for this is another surgery to decompress the cord at those 3 levels. Given the problems with the anterior approach, I would not do that again. I proposed that we do a C2-C3, C3-C4, C4-C5 laminoplasty on the left side with hardware.     She got the second opinion from Dr. Boyd.  He agreed with the need for a posterior surgery.  He mention the possibility of a decompression alone or decompression and fusion from C2-T2.  I think in the absence of instability and the presence of a solid fusion from C5-T1, that the laminoplasty from C2-C3 C3-C4 and C4-C5 that I discussed last time would be an acceptable approach.  Will move forward with it next week.  She will probably need inpatient rehab again at [facility].      History of Present Illness    The following portions of the patient's history were reviewed and updated as appropriate: allergies, current medications, past family history, past medical history, past social history, past surgical history, and problem list.    Review of Systems        Objective    Vitals:    03/22/24 1348   BP: 124/68   BP Location: Left arm   Patient Position: Sitting   Cuff Size: Adult   Resp: 20   Weight: 48.5 kg (107 lb)   Height: 134.6 cm (52.99\")   PainSc:   9   PainLoc: Back     Body mass index is 26.79 kg/m².    Tobacco Use: Low Risk  (3/22/2024)    Patient History    • " Smoking Tobacco Use: Never    • Smokeless Tobacco Use: Never    • Passive Exposure: Not on file          Physical Exam  Neurologic Exam    Physical Exam  Constitutional:       Appearance: She is well-developed.   HENT:      Head: Normocephalic and atraumatic.   Eyes:      Extraocular Movements: EOM normal.      Conjunctiva/sclera: Conjunctivae normal.      Pupils: Pupils are equal, round, and reactive to light.   Neck:      Vascular: No carotid bruit.   Neurological:      Mental Status: She is oriented to person, place, and time.      Coordination: Finger-Nose-Finger Test and Heel to Shin Test normal.      Gait: Gait is intact.      Deep Tendon Reflexes:      Reflex Scores:       Tricep reflexes are 3+ on the right side and 3+ on the left side.       Bicep reflexes are 3+ on the right side and 3+ on the left side.       Brachioradialis reflexes are 3+ on the right side and 3+ on the left side.       Patellar reflexes are 3+ on the right side and 3+ on the left side.       Achilles reflexes are 3+ on the right side and 3+ on the left side.  Psychiatric:         Speech: Speech normal.         Neurologic Exam      Mental Status   Oriented to person, place, and time.   Registration of memory: Good recent and remote memory.   Attention: normal. Concentration: normal.   Speech: speech is normal   Level of consciousness: alert  Knowledge: consistent with education.      Cranial Nerves      CN II   Visual fields full to confrontation.   Visual acuity: normal     CN III, IV, VI   Pupils are equal, round, and reactive to light.  Extraocular motions are normal.      CN V   Facial sensation intact.   Right corneal reflex: normal  Left corneal reflex: normal     CN VII   Facial expression full, symmetric.   Right facial weakness: none  Left facial weakness: none     CN VIII   Hearing: intact     CN IX, X   Palate: symmetric     CN XI   Right sternocleidomastoid strength: normal  Left sternocleidomastoid strength: normal     CN  XII   Tongue: not atrophic  Tongue deviation: none     Motor Exam   Muscle bulk: normal  Right arm tone: normal  Left arm tone: normal  Right leg tone: normal  Left leg tone: normal     Strength   Strength 5/5 except as noted.   Right neck flexion: 4/5  Left neck flexion: 4/5  Right neck extension: 4/5  Left neck extension: 4/5  Right deltoid: 4/5  Left deltoid: 4/5  Right biceps: 4/5  Left biceps: 4/5  Right triceps: 4/5  Left triceps: 4/5  Right wrist flexion: 4/5  Left wrist flexion: 4/5  Right wrist extension: 4/5  Left wrist extension: 4/5  Right interossei: 4/5  Left interossei: 4/5  Right abdominals: 4/5  Left abdominals: 4/5  Right iliopsoas: 4/5  Left iliopsoas: 4/5  Right quadriceps: 4/5  Left quadriceps: 4/5  Right hamstrin/5  Left hamstrin/5  Right glutei: 4/5  Left glutei: 4/5  Right anterior tibial: 4/5  Left anterior tibial: 4/5  Right posterior tibial: 4/5  Left posterior tibial: 4/5  Right peroneal: 4/5  Left peroneal: 4/5  Right gastroc: 4/5  Left gastroc: 4/5     Sensory Exam   Light touch normal.      Gait, Coordination, and Reflexes      Gait  Gait: normal     Coordination   Finger to nose coordination: normal  Heel to shin coordination: normal     Reflexes   Right brachioradialis: 3+  Left brachioradialis: 3+  Right biceps: 3+  Left biceps: 3+  Right triceps: 3+  Left triceps: 3+  Right patellar: 3+  Left patellar: 3+  Right achilles: 3+  Left achilles: 3+  Right : 2+  Left : 2+  Right Rivers: present  Left Rivers: presentAtaxic gait, using a cane        Assessment & Plan  Independent Review of Radiographic Studies:      I personally reviewed the images from the following studies.      The MRI done on 2024 of the cervical spine was reviewed. She is fused from C5-T1. There is some mild persistence of the ossified ligament there but the main issue seems to be progression of the cervical stenosis particularly at C3-C4 and C4-C5 compared to the 2022 scan. She did have a  right C2-C3 disc herniation that was present even in 2019 that does not seem to have changed. The brain MRI is unremarkable. The lumbar MRI shows some nonspecific disc disease and mild spinal stenosis.  The current x-rays done on 3/9/2024 show no evidence of instability.    Medical Decision Making:      I recommended a C2-C3, C3-C4, C4-C5 laminoplasty. The laminoplasty would be done on the left side since that is the worst side of compression of the worst level which is C3-C4. The goal of surgery is arrest of her progressive myelopathy and hopefully some return of spinal cord function such as better use of the hands, better balance, and perhaps reduction in the overall numbness in her feet. I do not think she will get back to normal but we are hoping for return to at least a baseline before her decline in the fall. The risks include, but are not limited to, infection, hemorrhage requiring transfusion or reoperation, CSF leak requiring reoperation, incomplete relief of symptoms, potential need for additional surgeries in the future, stroke, paralysis, coma, and death. I stressed to her that I cannot eliminate the risk of a CSF leak from a posterior approach but I think it is considerably less compared to the anterior approach which I had performed last time.     Diagnoses and all orders for this visit:    1. Cervical myelopathy (Primary)  -     Case Request; Standing  -     ceFAZolin (ANCEF) 2 g in sodium chloride 0.9 % 100 mL IVPB  -     Case Request    2. Chronic neck pain    3. Spasticity  -     Case Request; Standing  -     ceFAZolin (ANCEF) 2 g in sodium chloride 0.9 % 100 mL IVPB  -     Case Request    4. Weakness of both hands  -     Case Request; Standing  -     ceFAZolin (ANCEF) 2 g in sodium chloride 0.9 % 100 mL IVPB  -     Case Request    5. Cervical spinal stenosis  -     Case Request; Standing  -     ceFAZolin (ANCEF) 2 g in sodium chloride 0.9 % 100 mL IVPB  -     Case Request    6. History of fusion  of cervical spine    Other orders  -     Outpatient In A Bed; Standing  -     Follow Anesthesia Guidelines / Protocol; Future  -     Follow Anesthesia Guidelines / Protocol; Standing  -     SCD (Sequential Compression Device) - To Be Placed on Patient in Pre-Op; Standing  -     Verify / Perform Chlorhexidine Skin Prep; Standing  -     Provide Patient With Instructions on NPO Status; Future  -     Provide Chlorhexidine Skin Prep Wipes and Instructions; Future      Return for 2 weeks after surgery with an APC.

## 2024-03-22 NOTE — PROGRESS NOTES
Subjective   Patient ID: Noemi Bartholomew is a 63 y.o. female is here today for follow-up for surgical discussion     The patient returns to the office somewhat earlier at the request of her primary care physician. It has been a little bit more than 5 years since she underwent a C6 and C7 corpectomy with a cage and plate from C5-T1 for severe cervical stenosis and her rapidly progressive myelopathy. 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 The spinal cord was decompressed. Unfortunately the operation was associated with a fairly significant CSF leak that required ultimately the placement of a lumboperitoneal shunt and evacuation of a CSF collection in the neck. She eventually got over the CSF leak without any further problems but it did require a prolonged period of time in the hospital and a total of 3 operations. She never returned to her normal baseline and still had signs and symptoms of myelopathy. She ultimately got Social Security/disability. 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 When I saw her last fall things seem to be stable. We had also treated her for chronic neck pain and occipital neuralgia. Shortly after the last visit she began having recurrence of balance problems, further loss of use of her hands and a sense of burning in the feet as well as numbness. She told this to her primary care physician not me who ordered an MRI of the brain, cervical spine, lumbar spine. The main issue is recurrence of stenosis at levels above her fusion particularly at C3-C4 and C4-C5. She did have a known right-sided C2-C3 disc herniation but at the time of her surgery I did not think that was the main problem so I focused at the lower levels. That C2-C3 disc herniation by enlarge is about the same but what is progressed is the circumferential stenosis at C3-C4 and C4-C5. At  "C3-C4, the worst level most the compression is on the left. In any event I told her that the treatment for this is another surgery to decompress the cord at those 3 levels. Given the problems with the anterior approach, I would not do that again. I proposed that we do a C2-C3, C3-C4, C4-C5 laminoplasty on the left side with hardware.     She got the second opinion from Dr. Boyd.  He agreed with the need for a posterior surgery.  He mention the possibility of a decompression alone or decompression and fusion from C2-T2.  I think in the absence of instability and the presence of a solid fusion from C5-T1, that the laminoplasty from C2-C3 C3-C4 and C4-C5 that I discussed last time would be an acceptable approach.  Will move forward with it next week.  She will probably need inpatient rehab again at [facility].      History of Present Illness    The following portions of the patient's history were reviewed and updated as appropriate: allergies, current medications, past family history, past medical history, past social history, past surgical history, and problem list.    Review of Systems        Objective     Vitals:    03/22/24 1348   BP: 124/68   BP Location: Left arm   Patient Position: Sitting   Cuff Size: Adult   Resp: 20   Weight: 48.5 kg (107 lb)   Height: 134.6 cm (52.99\")   PainSc:   9   PainLoc: Back     Body mass index is 26.79 kg/m².    Tobacco Use: Low Risk  (3/22/2024)    Patient History     Smoking Tobacco Use: Never     Smokeless Tobacco Use: Never     Passive Exposure: Not on file          Physical Exam  Neurologic Exam    Physical Exam  Constitutional:       Appearance: She is well-developed.   HENT:      Head: Normocephalic and atraumatic.   Eyes:      Extraocular Movements: EOM normal.      Conjunctiva/sclera: Conjunctivae normal.      Pupils: Pupils are equal, round, and reactive to light.   Neck:      Vascular: No carotid bruit.   Neurological:      Mental Status: She is oriented to person, place, and " time.      Coordination: Finger-Nose-Finger Test and Heel to Shin Test normal.      Gait: Gait is intact.      Deep Tendon Reflexes:      Reflex Scores:       Tricep reflexes are 3+ on the right side and 3+ on the left side.       Bicep reflexes are 3+ on the right side and 3+ on the left side.       Brachioradialis reflexes are 3+ on the right side and 3+ on the left side.       Patellar reflexes are 3+ on the right side and 3+ on the left side.       Achilles reflexes are 3+ on the right side and 3+ on the left side.  Psychiatric:         Speech: Speech normal.         Neurologic Exam      Mental Status   Oriented to person, place, and time.   Registration of memory: Good recent and remote memory.   Attention: normal. Concentration: normal.   Speech: speech is normal   Level of consciousness: alert  Knowledge: consistent with education.      Cranial Nerves      CN II   Visual fields full to confrontation.   Visual acuity: normal     CN III, IV, VI   Pupils are equal, round, and reactive to light.  Extraocular motions are normal.      CN V   Facial sensation intact.   Right corneal reflex: normal  Left corneal reflex: normal     CN VII   Facial expression full, symmetric.   Right facial weakness: none  Left facial weakness: none     CN VIII   Hearing: intact     CN IX, X   Palate: symmetric     CN XI   Right sternocleidomastoid strength: normal  Left sternocleidomastoid strength: normal     CN XII   Tongue: not atrophic  Tongue deviation: none     Motor Exam   Muscle bulk: normal  Right arm tone: normal  Left arm tone: normal  Right leg tone: normal  Left leg tone: normal     Strength   Strength 5/5 except as noted.   Right neck flexion: 4/5  Left neck flexion: 4/5  Right neck extension: 4/5  Left neck extension: 4/5  Right deltoid: 4/5  Left deltoid: 4/5  Right biceps: 4/5  Left biceps: 4/5  Right triceps: 4/5  Left triceps: 4/5  Right wrist flexion: 4/5  Left wrist flexion: 4/5  Right wrist extension: 4/5  Left  wrist extension: 4/5  Right interossei: 4/5  Left interossei: 4/5  Right abdominals: 4/5  Left abdominals: 4/5  Right iliopsoas: 4/5  Left iliopsoas: 4/5  Right quadriceps: 4/5  Left quadriceps: 4/5  Right hamstrin/5  Left hamstrin/5  Right glutei: 4/5  Left glutei: 4/5  Right anterior tibial: 4/5  Left anterior tibial: 4/5  Right posterior tibial: 4/5  Left posterior tibial: 4/5  Right peroneal: 4/5  Left peroneal: 4/5  Right gastroc: 4/5  Left gastroc: 4/5     Sensory Exam   Light touch normal.      Gait, Coordination, and Reflexes      Gait  Gait: normal     Coordination   Finger to nose coordination: normal  Heel to shin coordination: normal     Reflexes   Right brachioradialis: 3+  Left brachioradialis: 3+  Right biceps: 3+  Left biceps: 3+  Right triceps: 3+  Left triceps: 3+  Right patellar: 3+  Left patellar: 3+  Right achilles: 3+  Left achilles: 3+  Right : 2+  Left : 2+  Right Rivers: present  Left Rivers: presentAtaxic gait, using a cane        Assessment & Plan   Independent Review of Radiographic Studies:      I personally reviewed the images from the following studies.      The MRI done on 2024 of the cervical spine was reviewed. She is fused from C5-T1. There is some mild persistence of the ossified ligament there but the main issue seems to be progression of the cervical stenosis particularly at C3-C4 and C4-C5 compared to the 2022 scan. She did have a right C2-C3 disc herniation that was present even in 2019 that does not seem to have changed. The brain MRI is unremarkable. The lumbar MRI shows some nonspecific disc disease and mild spinal stenosis.  The current x-rays done on 3/9/2024 show no evidence of instability.    Medical Decision Making:      I recommended a C2-C3, C3-C4, C4-C5 laminoplasty. The laminoplasty would be done on the left side since that is the worst side of compression of the worst level which is C3-C4. The goal of surgery is arrest of her progressive  myelopathy and hopefully some return of spinal cord function such as better use of the hands, better balance, and perhaps reduction in the overall numbness in her feet. I do not think she will get back to normal but we are hoping for return to at least a baseline before her decline in the fall. The risks include, but are not limited to, infection, hemorrhage requiring transfusion or reoperation, CSF leak requiring reoperation, incomplete relief of symptoms, potential need for additional surgeries in the future, stroke, paralysis, coma, and death. I stressed to her that I cannot eliminate the risk of a CSF leak from a posterior approach but I think it is considerably less compared to the anterior approach which I had performed last time.     Diagnoses and all orders for this visit:    1. Cervical myelopathy (Primary)  -     Case Request; Standing  -     ceFAZolin (ANCEF) 2 g in sodium chloride 0.9 % 100 mL IVPB  -     Case Request    2. Chronic neck pain    3. Spasticity  -     Case Request; Standing  -     ceFAZolin (ANCEF) 2 g in sodium chloride 0.9 % 100 mL IVPB  -     Case Request    4. Weakness of both hands  -     Case Request; Standing  -     ceFAZolin (ANCEF) 2 g in sodium chloride 0.9 % 100 mL IVPB  -     Case Request    5. Cervical spinal stenosis  -     Case Request; Standing  -     ceFAZolin (ANCEF) 2 g in sodium chloride 0.9 % 100 mL IVPB  -     Case Request    6. History of fusion of cervical spine    Other orders  -     Outpatient In A Bed; Standing  -     Follow Anesthesia Guidelines / Protocol; Future  -     Follow Anesthesia Guidelines / Protocol; Standing  -     SCD (Sequential Compression Device) - To Be Placed on Patient in Pre-Op; Standing  -     Verify / Perform Chlorhexidine Skin Prep; Standing  -     Provide Patient With Instructions on NPO Status; Future  -     Provide Chlorhexidine Skin Prep Wipes and Instructions; Future      Return for 2 weeks after surgery with an APC.

## 2024-03-25 ENCOUNTER — TELEPHONE (OUTPATIENT)
Dept: NEUROSURGERY | Facility: CLINIC | Age: 64
End: 2024-03-25
Payer: MEDICARE

## 2024-03-25 NOTE — TELEPHONE ENCOUNTER
Patient needs a 2 week post op with APC on a day Dr. LABOY is in office if possible. Around 2nd-3rd week of April.

## 2024-03-26 ENCOUNTER — PRE-ADMISSION TESTING (OUTPATIENT)
Dept: PREADMISSION TESTING | Facility: HOSPITAL | Age: 64
DRG: 519 | End: 2024-03-26
Payer: MEDICARE

## 2024-03-26 VITALS
RESPIRATION RATE: 16 BRPM | DIASTOLIC BLOOD PRESSURE: 77 MMHG | OXYGEN SATURATION: 97 % | HEART RATE: 62 BPM | WEIGHT: 107 LBS | TEMPERATURE: 97.5 F | SYSTOLIC BLOOD PRESSURE: 122 MMHG | HEIGHT: 55 IN | BODY MASS INDEX: 24.76 KG/M2

## 2024-03-26 LAB
ANION GAP SERPL CALCULATED.3IONS-SCNC: 7 MMOL/L (ref 5–15)
BUN SERPL-MCNC: 12 MG/DL (ref 8–23)
BUN/CREAT SERPL: 17.1 (ref 7–25)
CALCIUM SPEC-SCNC: 7.5 MG/DL (ref 8.6–10.5)
CHLORIDE SERPL-SCNC: 110 MMOL/L (ref 98–107)
CO2 SERPL-SCNC: 25 MMOL/L (ref 22–29)
CREAT SERPL-MCNC: 0.7 MG/DL (ref 0.57–1)
DEPRECATED RDW RBC AUTO: 46.8 FL (ref 37–54)
EGFRCR SERPLBLD CKD-EPI 2021: 97.3 ML/MIN/1.73
ERYTHROCYTE [DISTWIDTH] IN BLOOD BY AUTOMATED COUNT: 13.4 % (ref 12.3–15.4)
GLUCOSE SERPL-MCNC: 94 MG/DL (ref 65–99)
HCT VFR BLD AUTO: 37.6 % (ref 34–46.6)
HGB BLD-MCNC: 12.1 G/DL (ref 12–15.9)
MCH RBC QN AUTO: 30.4 PG (ref 26.6–33)
MCHC RBC AUTO-ENTMCNC: 32.2 G/DL (ref 31.5–35.7)
MCV RBC AUTO: 94.5 FL (ref 79–97)
PLATELET # BLD AUTO: 241 10*3/MM3 (ref 140–450)
PMV BLD AUTO: 9.5 FL (ref 6–12)
POTASSIUM SERPL-SCNC: 4.5 MMOL/L (ref 3.5–5.2)
QT INTERVAL: 438 MS
QTC INTERVAL: 434 MS
RBC # BLD AUTO: 3.98 10*6/MM3 (ref 3.77–5.28)
SODIUM SERPL-SCNC: 142 MMOL/L (ref 136–145)
WBC NRBC COR # BLD AUTO: 4.62 10*3/MM3 (ref 3.4–10.8)

## 2024-03-26 PROCEDURE — 36415 COLL VENOUS BLD VENIPUNCTURE: CPT

## 2024-03-26 PROCEDURE — 93010 ELECTROCARDIOGRAM REPORT: CPT | Performed by: INTERNAL MEDICINE

## 2024-03-26 PROCEDURE — 80048 BASIC METABOLIC PNL TOTAL CA: CPT

## 2024-03-26 PROCEDURE — 85027 COMPLETE CBC AUTOMATED: CPT

## 2024-03-26 PROCEDURE — 93005 ELECTROCARDIOGRAM TRACING: CPT

## 2024-03-26 NOTE — DISCHARGE INSTRUCTIONS
Take the following medications the morning of surgery with a small sip of water:    Baclofen, gabapentin    If you are on prescription narcotic pain medication to control your pain you may also take that medication the morning of surgery.    General Instructions:  Do not eat or drink anything after midnight the night before surgery. ( Pt. Stated per Dr. Noe )  Bring any papers given to you in the doctor’s office.  Wear clean comfortable clothes.  Do not wear contact lenses, false eyelashes or make-up.  Bring a case for your glasses.   Remove all piercings.  Leave jewelry and any other valuables at home.  Hair extensions with metal clips must be removed prior to surgery.  The Pre-Admission Testing nurse will instruct you to bring medications if unable to obtain an accurate list in Pre-Admission Testing.            Preventing a Surgical Site Infection:  For 2 to 3 days before surgery, avoid shaving with a razor because the razor can irritate skin and make it easier to develop an infection.    Any areas of open skin can increase the risk of a post-operative wound infection by allowing bacteria to enter and travel throughout the body.  Notify your surgeon if you have any skin wounds / rashes even if it is not near the expected surgical site.  The area will need assessed to determine if surgery should be delayed until it is healed.  The night prior to surgery shower using a fresh bar of anti-bacterial soap (such as Dial) and clean washcloth.  Sleep in a clean bed with clean clothing.  Do not allow pets to sleep with you.  Shower on the morning of surgery using a fresh bar of anti-bacterial soap (such as Dial) and clean washcloth.  Dry with a clean towel and dress in clean clothing.  Ask your surgeon if you will be receiving antibiotics prior to surgery.  Make sure you, your family, and all healthcare providers clean their hands with soap and water or an alcohol based hand  before caring for you or your  wound.    Day of surgery:  Your arrival time is approximately two hours before your scheduled surgery time.  Upon arrival, a Pre-op nurse and Anesthesiologist will review your health history, obtain vital signs, and answer questions you may have.  The only belongings needed at this time will be your home medications and if applicable your C-PAP/BI-PAP machine.  A Pre-op nurse will start an IV and you may receive medication in preparation for surgery, including something to help you relax.      Please be aware that surgery does come with discomfort.  We want to make every effort to control your discomfort so please discuss any uncontrolled symptoms with your nurse.   Your doctor will most likely have prescribed pain medications.      CHLORHEXIDINE CLOTH INSTRUCTIONS  The morning of surgery follow these instructions using the Chlorhexidine cloths you've been given.  These steps reduce bacteria on the body.  Do not use the cloths near your eyes, ears mouth, genitalia or on open wounds.  Throw the cloths away after use but do not try to flush them down a toilet.      Open and remove one cloth at a time from the package.    Leave the cloth unfolded and begin the bathing.  Massage the skin with the cloths using gentle pressure to remove bacteria.  Do not scrub harshly.   Follow the steps below with one 2% CHG cloth per area (6 total cloths).  One cloth for neck, shoulders and chest.  One cloth for both arms, hands, fingers and underarms (do underarms last).  One cloth for the abdomen followed by groin.  One cloth for right leg and foot including between the toes.  One cloth for left leg and foot including between the toes.  The last cloth is to be used for the back of the neck, back and buttocks.    Allow the CHG to air dry 3 minutes on the skin which will give it time to work and decrease the chance of irritation.  The skin may feel sticky until it is dry.  Do not rinse with water or any other liquid or you will lose the  beneficial effects of the CHG.  If mild skin irritation occurs, do rinse the skin to remove the CHG.  Report this to the nurse at time of admission.  Do not apply lotions, creams, ointments, deodorants or perfumes after using the clothes. Dress in clean clothes before coming to the hospital.    If you are staying overnight following surgery, you will be transported to your hospital room following the recovery period.  [facility] has all private rooms.    If you have any questions please call Pre-Admission Testing at [phone].  Deductibles and co-payments are collected on the day of service. Please be prepared to pay the required co-pay, deductible or deposit on the day of service as defined by your plan.    Call your surgeon immediately if you experience any of the following symptoms:  Sore Throat  Shortness of Breath or difficulty breathing  Cough  Chills  Body soreness or muscle pain  Headache  Fever  New loss of taste or smell  Do not arrive for your surgery ill.  Your procedure will need to be rescheduled to another time.  You will need to call your physician before the day of surgery to avoid any unnecessary exposure to hospital staff as well as other patients.

## 2024-03-28 ENCOUNTER — ANESTHESIA EVENT (OUTPATIENT)
Dept: PERIOP | Facility: HOSPITAL | Age: 64
End: 2024-03-28
Payer: MEDICARE

## 2024-03-28 ENCOUNTER — HOSPITAL ENCOUNTER (INPATIENT)
Facility: HOSPITAL | Age: 64
LOS: 3 days | Discharge: HOME | DRG: 519 | End: 2024-03-31
Attending: NEUROLOGICAL SURGERY | Admitting: NEUROLOGICAL SURGERY
Payer: MEDICARE

## 2024-03-28 ENCOUNTER — APPOINTMENT (OUTPATIENT)
Dept: GENERAL RADIOLOGY | Facility: HOSPITAL | Age: 64
DRG: 519 | End: 2024-03-28
Payer: MEDICARE

## 2024-03-28 ENCOUNTER — ANESTHESIA (OUTPATIENT)
Dept: PERIOP | Facility: HOSPITAL | Age: 64
End: 2024-03-28
Payer: MEDICARE

## 2024-03-28 DIAGNOSIS — Z98.1 S/P CERVICAL SPINAL FUSION: Primary | ICD-10-CM

## 2024-03-28 DIAGNOSIS — G95.9 CERVICAL MYELOPATHY: ICD-10-CM

## 2024-03-28 DIAGNOSIS — R29.898 WEAKNESS OF BOTH HANDS: ICD-10-CM

## 2024-03-28 DIAGNOSIS — M48.02 CERVICAL SPINAL STENOSIS: ICD-10-CM

## 2024-03-28 DIAGNOSIS — R25.2 SPASTICITY: ICD-10-CM

## 2024-03-28 LAB
ABO GROUP BLD: NORMAL
BASOPHILS # BLD AUTO: 0.03 10*3/MM3 (ref 0–0.2)
BASOPHILS NFR BLD AUTO: 0.2 % (ref 0–1.5)
BLD GP AB SCN SERPL QL: NEGATIVE
DEPRECATED RDW RBC AUTO: 45.5 FL (ref 37–54)
EOSINOPHIL # BLD AUTO: 0 10*3/MM3 (ref 0–0.4)
EOSINOPHIL NFR BLD AUTO: 0 % (ref 0.3–6.2)
ERYTHROCYTE [DISTWIDTH] IN BLOOD BY AUTOMATED COUNT: 13.3 % (ref 12.3–15.4)
HCT VFR BLD AUTO: 33.1 % (ref 34–46.6)
HGB BLD-MCNC: 10.6 G/DL (ref 12–15.9)
IMM GRANULOCYTES # BLD AUTO: 0.04 10*3/MM3 (ref 0–0.05)
IMM GRANULOCYTES NFR BLD AUTO: 0.3 % (ref 0–0.5)
LYMPHOCYTES # BLD AUTO: 0.54 10*3/MM3 (ref 0.7–3.1)
LYMPHOCYTES NFR BLD AUTO: 4.3 % (ref 19.6–45.3)
MCH RBC QN AUTO: 29.9 PG (ref 26.6–33)
MCHC RBC AUTO-ENTMCNC: 32 G/DL (ref 31.5–35.7)
MCV RBC AUTO: 93.5 FL (ref 79–97)
MONOCYTES # BLD AUTO: 0.24 10*3/MM3 (ref 0.1–0.9)
MONOCYTES NFR BLD AUTO: 1.9 % (ref 5–12)
NEUTROPHILS NFR BLD AUTO: 11.68 10*3/MM3 (ref 1.7–7)
NEUTROPHILS NFR BLD AUTO: 93.3 % (ref 42.7–76)
NRBC BLD AUTO-RTO: 0 /100 WBC (ref 0–0.2)
PLATELET # BLD AUTO: 228 10*3/MM3 (ref 140–450)
PMV BLD AUTO: 9.5 FL (ref 6–12)
RBC # BLD AUTO: 3.54 10*6/MM3 (ref 3.77–5.28)
RH BLD: POSITIVE
T&S EXPIRATION DATE: NORMAL
WBC NRBC COR # BLD AUTO: 12.53 10*3/MM3 (ref 3.4–10.8)

## 2024-03-28 PROCEDURE — 25810000003 LACTATED RINGERS PER 1000 ML: Performed by: NEUROLOGICAL SURGERY

## 2024-03-28 PROCEDURE — 63051 C-LAMINOPLASTY W/GRAFT/PLATE: CPT | Performed by: NEUROLOGICAL SURGERY

## 2024-03-28 PROCEDURE — 25010000002 PROPOFOL 200 MG/20ML EMULSION

## 2024-03-28 PROCEDURE — 86850 RBC ANTIBODY SCREEN: CPT | Performed by: ANESTHESIOLOGY

## 2024-03-28 PROCEDURE — 25810000003 LACTATED RINGERS PER 1000 ML

## 2024-03-28 PROCEDURE — 25810000003 SODIUM CHLORIDE 0.9 % SOLUTION 250 ML FLEX CONT

## 2024-03-28 PROCEDURE — 63051 C-LAMINOPLASTY W/GRAFT/PLATE: CPT | Performed by: SPECIALIST/TECHNOLOGIST, OTHER

## 2024-03-28 PROCEDURE — 25010000002 DEXAMETHASONE PER 1 MG

## 2024-03-28 PROCEDURE — 25810000003 SODIUM CHLORIDE 0.9 % SOLUTION

## 2024-03-28 PROCEDURE — 25010000002 MAGNESIUM SULFATE PER 500 MG OF MAGNESIUM

## 2024-03-28 PROCEDURE — C1713 ANCHOR/SCREW BN/BN,TIS/BN: HCPCS | Performed by: NEUROLOGICAL SURGERY

## 2024-03-28 PROCEDURE — 25010000002 VANCOMYCIN 1 G RECONSTITUTED SOLUTION 1 EACH VIAL: Performed by: NEUROLOGICAL SURGERY

## 2024-03-28 PROCEDURE — 25010000002 ONDANSETRON PER 1 MG

## 2024-03-28 PROCEDURE — 25010000002 FENTANYL CITRATE (PF) 50 MCG/ML SOLUTION

## 2024-03-28 PROCEDURE — 86900 BLOOD TYPING SEROLOGIC ABO: CPT | Performed by: ANESTHESIOLOGY

## 2024-03-28 PROCEDURE — 0PH304Z INSERTION OF INTERNAL FIXATION DEVICE INTO CERVICAL VERTEBRA, OPEN APPROACH: ICD-10-PCS | Performed by: NEUROLOGICAL SURGERY

## 2024-03-28 PROCEDURE — 25810000003 LACTATED RINGERS PER 1000 ML: Performed by: ANESTHESIOLOGY

## 2024-03-28 PROCEDURE — 25010000002 HYDROMORPHONE PER 4 MG

## 2024-03-28 PROCEDURE — 86901 BLOOD TYPING SEROLOGIC RH(D): CPT | Performed by: ANESTHESIOLOGY

## 2024-03-28 PROCEDURE — 25010000002 CEFAZOLIN PER 500 MG: Performed by: NEUROLOGICAL SURGERY

## 2024-03-28 PROCEDURE — 76000 FLUOROSCOPY <1 HR PHYS/QHP: CPT

## 2024-03-28 PROCEDURE — 00NW0ZZ RELEASE CERVICAL SPINAL CORD, OPEN APPROACH: ICD-10-PCS | Performed by: NEUROLOGICAL SURGERY

## 2024-03-28 PROCEDURE — 85025 COMPLETE CBC W/AUTO DIFF WBC: CPT | Performed by: NEUROLOGICAL SURGERY

## 2024-03-28 PROCEDURE — 25010000002 MIDAZOLAM PER 1 MG: Performed by: ANESTHESIOLOGY

## 2024-03-28 PROCEDURE — 25010000002 PHENYLEPHRINE 10 MG/ML SOLUTION 5 ML VIAL

## 2024-03-28 PROCEDURE — 25010000002 SUGAMMADEX 200 MG/2ML SOLUTION

## 2024-03-28 DEVICE — BONE SCREW 853-467 2.6 X 7MM
Type: IMPLANTABLE DEVICE | Site: SPINE CERVICAL | Status: FUNCTIONAL
Brand: CENTERPIECE™ PLATE FIXATION SYSTEM

## 2024-03-28 DEVICE — SSC BONE WAX
Type: IMPLANTABLE DEVICE | Site: SPINE CERVICAL | Status: FUNCTIONAL
Brand: SSC BONE WAX

## 2024-03-28 DEVICE — FLOSEAL WITH RECOTHROM - 10ML.
Type: IMPLANTABLE DEVICE | Site: SPINE CERVICAL | Status: FUNCTIONAL
Brand: FLOSEAL HEMOSTATIC MATRIX

## 2024-03-28 RX ORDER — IPRATROPIUM BROMIDE AND ALBUTEROL SULFATE 2.5; .5 MG/3ML; MG/3ML
3 SOLUTION RESPIRATORY (INHALATION) ONCE AS NEEDED
Status: COMPLETED | OUTPATIENT
Start: 2024-03-28 | End: 2024-03-28

## 2024-03-28 RX ORDER — ONDANSETRON 4 MG/1
4 TABLET, ORALLY DISINTEGRATING ORAL EVERY 6 HOURS PRN
Status: DISCONTINUED | OUTPATIENT
Start: 2024-03-28 | End: 2024-03-31 | Stop reason: HOSPADM

## 2024-03-28 RX ORDER — ACETAMINOPHEN 325 MG/1
650 TABLET ORAL EVERY 4 HOURS PRN
Status: DISCONTINUED | OUTPATIENT
Start: 2024-03-28 | End: 2024-03-31 | Stop reason: HOSPADM

## 2024-03-28 RX ORDER — FENTANYL CITRATE 50 UG/ML
50 INJECTION, SOLUTION INTRAMUSCULAR; INTRAVENOUS
Status: DISCONTINUED | OUTPATIENT
Start: 2024-03-28 | End: 2024-03-28 | Stop reason: HOSPADM

## 2024-03-28 RX ORDER — SODIUM CHLORIDE 9 MG/ML
30 INJECTION, SOLUTION INTRAVENOUS CONTINUOUS PRN
Status: DISCONTINUED | OUTPATIENT
Start: 2024-03-28 | End: 2024-03-31 | Stop reason: HOSPADM

## 2024-03-28 RX ORDER — METHOCARBAMOL 750 MG/1
750 TABLET, FILM COATED ORAL 4 TIMES DAILY PRN
Status: DISCONTINUED | OUTPATIENT
Start: 2024-03-28 | End: 2024-03-31 | Stop reason: HOSPADM

## 2024-03-28 RX ORDER — SODIUM CHLORIDE 9 MG/ML
40 INJECTION, SOLUTION INTRAVENOUS AS NEEDED
Status: DISCONTINUED | OUTPATIENT
Start: 2024-03-28 | End: 2024-03-31 | Stop reason: HOSPADM

## 2024-03-28 RX ORDER — PSYLLIUM SEED (WITH DEXTROSE)
2 POWDER (GRAM) ORAL EVERY MORNING
Status: DISCONTINUED | OUTPATIENT
Start: 2024-03-29 | End: 2024-03-31 | Stop reason: HOSPADM

## 2024-03-28 RX ORDER — MIDAZOLAM HYDROCHLORIDE 1 MG/ML
1 INJECTION INTRAMUSCULAR; INTRAVENOUS
Status: DISCONTINUED | OUTPATIENT
Start: 2024-03-28 | End: 2024-03-28 | Stop reason: HOSPADM

## 2024-03-28 RX ORDER — BACLOFEN 10 MG/1
5 TABLET ORAL 2 TIMES DAILY
Status: DISCONTINUED | OUTPATIENT
Start: 2024-03-28 | End: 2024-03-31 | Stop reason: HOSPADM

## 2024-03-28 RX ORDER — SODIUM CHLORIDE 0.9 % (FLUSH) 0.9 %
10 SYRINGE (ML) INJECTION EVERY 12 HOURS SCHEDULED
Status: DISCONTINUED | OUTPATIENT
Start: 2024-03-28 | End: 2024-03-31 | Stop reason: HOSPADM

## 2024-03-28 RX ORDER — SUCCINYLCHOLINE/SOD CL,ISO/PF 200MG/10ML
SYRINGE (ML) INTRAVENOUS AS NEEDED
Status: DISCONTINUED | OUTPATIENT
Start: 2024-03-28 | End: 2024-03-28 | Stop reason: SURG

## 2024-03-28 RX ORDER — ATORVASTATIN CALCIUM 20 MG/1
20 TABLET, FILM COATED ORAL NIGHTLY
Status: DISCONTINUED | OUTPATIENT
Start: 2024-03-28 | End: 2024-03-31 | Stop reason: HOSPADM

## 2024-03-28 RX ORDER — LIDOCAINE HYDROCHLORIDE 10 MG/ML
0.5 INJECTION, SOLUTION INFILTRATION; PERINEURAL ONCE AS NEEDED
Status: DISCONTINUED | OUTPATIENT
Start: 2024-03-28 | End: 2024-03-28 | Stop reason: HOSPADM

## 2024-03-28 RX ORDER — SODIUM CHLORIDE, SODIUM LACTATE, POTASSIUM CHLORIDE, CALCIUM CHLORIDE 600; 310; 30; 20 MG/100ML; MG/100ML; MG/100ML; MG/100ML
9 INJECTION, SOLUTION INTRAVENOUS CONTINUOUS
Status: DISCONTINUED | OUTPATIENT
Start: 2024-03-28 | End: 2024-03-28

## 2024-03-28 RX ORDER — ONDANSETRON 2 MG/ML
4 INJECTION INTRAMUSCULAR; INTRAVENOUS EVERY 6 HOURS PRN
Status: DISCONTINUED | OUTPATIENT
Start: 2024-03-28 | End: 2024-03-31 | Stop reason: HOSPADM

## 2024-03-28 RX ORDER — ROCURONIUM BROMIDE 10 MG/ML
INJECTION, SOLUTION INTRAVENOUS AS NEEDED
Status: DISCONTINUED | OUTPATIENT
Start: 2024-03-28 | End: 2024-03-28 | Stop reason: SURG

## 2024-03-28 RX ORDER — ONDANSETRON 2 MG/ML
4 INJECTION INTRAMUSCULAR; INTRAVENOUS ONCE AS NEEDED
Status: DISCONTINUED | OUTPATIENT
Start: 2024-03-28 | End: 2024-03-28 | Stop reason: HOSPADM

## 2024-03-28 RX ORDER — PROMETHAZINE HYDROCHLORIDE 25 MG/1
25 TABLET ORAL ONCE AS NEEDED
Status: DISCONTINUED | OUTPATIENT
Start: 2024-03-28 | End: 2024-03-28 | Stop reason: HOSPADM

## 2024-03-28 RX ORDER — PROMETHAZINE HYDROCHLORIDE 25 MG/1
25 SUPPOSITORY RECTAL ONCE AS NEEDED
Status: DISCONTINUED | OUTPATIENT
Start: 2024-03-28 | End: 2024-03-28 | Stop reason: HOSPADM

## 2024-03-28 RX ORDER — DROPERIDOL 2.5 MG/ML
0.62 INJECTION, SOLUTION INTRAMUSCULAR; INTRAVENOUS
Status: DISCONTINUED | OUTPATIENT
Start: 2024-03-28 | End: 2024-03-28 | Stop reason: HOSPADM

## 2024-03-28 RX ORDER — SODIUM CHLORIDE 0.9 % (FLUSH) 0.9 %
3 SYRINGE (ML) INJECTION EVERY 12 HOURS SCHEDULED
Status: DISCONTINUED | OUTPATIENT
Start: 2024-03-28 | End: 2024-03-28 | Stop reason: HOSPADM

## 2024-03-28 RX ORDER — GABAPENTIN 300 MG/1
300 CAPSULE ORAL 3 TIMES DAILY
Status: DISCONTINUED | OUTPATIENT
Start: 2024-03-28 | End: 2024-03-31 | Stop reason: HOSPADM

## 2024-03-28 RX ORDER — SODIUM CHLORIDE 0.9 % (FLUSH) 0.9 %
10 SYRINGE (ML) INJECTION AS NEEDED
Status: DISCONTINUED | OUTPATIENT
Start: 2024-03-28 | End: 2024-03-31 | Stop reason: HOSPADM

## 2024-03-28 RX ORDER — PHENYLEPHRINE HCL IN 0.9% NACL 1 MG/10 ML
SYRINGE (ML) INTRAVENOUS AS NEEDED
Status: DISCONTINUED | OUTPATIENT
Start: 2024-03-28 | End: 2024-03-28 | Stop reason: SURG

## 2024-03-28 RX ORDER — ONDANSETRON 2 MG/ML
INJECTION INTRAMUSCULAR; INTRAVENOUS AS NEEDED
Status: DISCONTINUED | OUTPATIENT
Start: 2024-03-28 | End: 2024-03-28 | Stop reason: SURG

## 2024-03-28 RX ORDER — PHENYLEPHRINE HCL IN 0.9% NACL 1 MG/10 ML
SYRINGE (ML) INTRAVENOUS AS NEEDED
Status: DISCONTINUED | OUTPATIENT
Start: 2024-03-28 | End: 2024-03-28

## 2024-03-28 RX ORDER — EPHEDRINE SULFATE 50 MG/ML
INJECTION INTRAVENOUS AS NEEDED
Status: DISCONTINUED | OUTPATIENT
Start: 2024-03-28 | End: 2024-03-28 | Stop reason: SURG

## 2024-03-28 RX ORDER — FENTANYL CITRATE 50 UG/ML
50 INJECTION, SOLUTION INTRAMUSCULAR; INTRAVENOUS ONCE AS NEEDED
Status: DISCONTINUED | OUTPATIENT
Start: 2024-03-28 | End: 2024-03-28 | Stop reason: HOSPADM

## 2024-03-28 RX ORDER — OXYCODONE AND ACETAMINOPHEN 7.5; 325 MG/1; MG/1
1 TABLET ORAL EVERY 4 HOURS PRN
Status: DISCONTINUED | OUTPATIENT
Start: 2024-03-28 | End: 2024-03-28 | Stop reason: HOSPADM

## 2024-03-28 RX ORDER — NALOXONE HCL 0.4 MG/ML
0.1 VIAL (ML) INJECTION
Status: DISCONTINUED | OUTPATIENT
Start: 2024-03-28 | End: 2024-03-31 | Stop reason: HOSPADM

## 2024-03-28 RX ORDER — DOCUSATE SODIUM 100 MG/1
200 CAPSULE, LIQUID FILLED ORAL 2 TIMES DAILY
Status: DISCONTINUED | OUTPATIENT
Start: 2024-03-28 | End: 2024-03-31 | Stop reason: HOSPADM

## 2024-03-28 RX ORDER — SODIUM CHLORIDE, SODIUM LACTATE, POTASSIUM CHLORIDE, CALCIUM CHLORIDE 600; 310; 30; 20 MG/100ML; MG/100ML; MG/100ML; MG/100ML
75 INJECTION, SOLUTION INTRAVENOUS CONTINUOUS
Status: DISCONTINUED | OUTPATIENT
Start: 2024-03-28 | End: 2024-03-29

## 2024-03-28 RX ORDER — MAGNESIUM SULFATE HEPTAHYDRATE 500 MG/ML
INJECTION, SOLUTION INTRAMUSCULAR; INTRAVENOUS AS NEEDED
Status: DISCONTINUED | OUTPATIENT
Start: 2024-03-28 | End: 2024-03-28 | Stop reason: SURG

## 2024-03-28 RX ORDER — FLUMAZENIL 0.1 MG/ML
0.2 INJECTION INTRAVENOUS AS NEEDED
Status: DISCONTINUED | OUTPATIENT
Start: 2024-03-28 | End: 2024-03-28 | Stop reason: HOSPADM

## 2024-03-28 RX ORDER — SODIUM CHLORIDE 0.9 % (FLUSH) 0.9 %
3-10 SYRINGE (ML) INJECTION AS NEEDED
Status: DISCONTINUED | OUTPATIENT
Start: 2024-03-28 | End: 2024-03-28 | Stop reason: HOSPADM

## 2024-03-28 RX ORDER — HYDROMORPHONE HYDROCHLORIDE 1 MG/ML
0.5 INJECTION, SOLUTION INTRAMUSCULAR; INTRAVENOUS; SUBCUTANEOUS
Status: DISCONTINUED | OUTPATIENT
Start: 2024-03-28 | End: 2024-03-28 | Stop reason: HOSPADM

## 2024-03-28 RX ORDER — PROPOFOL 10 MG/ML
INJECTION, EMULSION INTRAVENOUS AS NEEDED
Status: DISCONTINUED | OUTPATIENT
Start: 2024-03-28 | End: 2024-03-28 | Stop reason: SURG

## 2024-03-28 RX ORDER — HYDRALAZINE HYDROCHLORIDE 20 MG/ML
5 INJECTION INTRAMUSCULAR; INTRAVENOUS
Status: DISCONTINUED | OUTPATIENT
Start: 2024-03-28 | End: 2024-03-28 | Stop reason: HOSPADM

## 2024-03-28 RX ORDER — DEXAMETHASONE SODIUM PHOSPHATE 4 MG/ML
INJECTION, SOLUTION INTRA-ARTICULAR; INTRALESIONAL; INTRAMUSCULAR; INTRAVENOUS; SOFT TISSUE AS NEEDED
Status: DISCONTINUED | OUTPATIENT
Start: 2024-03-28 | End: 2024-03-28 | Stop reason: SURG

## 2024-03-28 RX ORDER — SODIUM CHLORIDE, SODIUM LACTATE, POTASSIUM CHLORIDE, CALCIUM CHLORIDE 600; 310; 30; 20 MG/100ML; MG/100ML; MG/100ML; MG/100ML
INJECTION, SOLUTION INTRAVENOUS CONTINUOUS PRN
Status: DISCONTINUED | OUTPATIENT
Start: 2024-03-28 | End: 2024-03-28 | Stop reason: SURG

## 2024-03-28 RX ORDER — FAMOTIDINE 10 MG/ML
20 INJECTION, SOLUTION INTRAVENOUS ONCE
Status: COMPLETED | OUTPATIENT
Start: 2024-03-28 | End: 2024-03-28

## 2024-03-28 RX ORDER — DIPHENHYDRAMINE HYDROCHLORIDE 50 MG/ML
12.5 INJECTION INTRAMUSCULAR; INTRAVENOUS
Status: DISCONTINUED | OUTPATIENT
Start: 2024-03-28 | End: 2024-03-28 | Stop reason: HOSPADM

## 2024-03-28 RX ORDER — EPHEDRINE SULFATE 50 MG/ML
5 INJECTION, SOLUTION INTRAVENOUS ONCE AS NEEDED
Status: DISCONTINUED | OUTPATIENT
Start: 2024-03-28 | End: 2024-03-28 | Stop reason: HOSPADM

## 2024-03-28 RX ORDER — LABETALOL HYDROCHLORIDE 5 MG/ML
5 INJECTION, SOLUTION INTRAVENOUS
Status: DISCONTINUED | OUTPATIENT
Start: 2024-03-28 | End: 2024-03-28 | Stop reason: HOSPADM

## 2024-03-28 RX ORDER — HYDROMORPHONE HCL/0.9% NACL/PF 10 MG/50ML
PATIENT CONTROLLED ANALGESIA SYRINGE INTRAVENOUS CONTINUOUS
Status: DISCONTINUED | OUTPATIENT
Start: 2024-03-28 | End: 2024-03-29

## 2024-03-28 RX ORDER — DOCUSATE SODIUM 100 MG/1
100 CAPSULE, LIQUID FILLED ORAL EVERY MORNING
Status: DISCONTINUED | OUTPATIENT
Start: 2024-03-29 | End: 2024-03-28 | Stop reason: SDUPTHER

## 2024-03-28 RX ORDER — HYDROCODONE BITARTRATE AND ACETAMINOPHEN 7.5; 325 MG/1; MG/1
1 TABLET ORAL ONCE AS NEEDED
Status: DISCONTINUED | OUTPATIENT
Start: 2024-03-28 | End: 2024-03-28 | Stop reason: HOSPADM

## 2024-03-28 RX ORDER — NALOXONE HCL 0.4 MG/ML
0.2 VIAL (ML) INJECTION AS NEEDED
Status: DISCONTINUED | OUTPATIENT
Start: 2024-03-28 | End: 2024-03-28 | Stop reason: HOSPADM

## 2024-03-28 RX ORDER — FENTANYL CITRATE 50 UG/ML
INJECTION, SOLUTION INTRAMUSCULAR; INTRAVENOUS AS NEEDED
Status: DISCONTINUED | OUTPATIENT
Start: 2024-03-28 | End: 2024-03-28 | Stop reason: SURG

## 2024-03-28 RX ORDER — BUPIVACAINE HYDROCHLORIDE AND EPINEPHRINE 2.5; 5 MG/ML; UG/ML
INJECTION, SOLUTION EPIDURAL; INFILTRATION; INTRACAUDAL; PERINEURAL AS NEEDED
Status: DISCONTINUED | OUTPATIENT
Start: 2024-03-28 | End: 2024-03-28 | Stop reason: HOSPADM

## 2024-03-28 RX ADMIN — GABAPENTIN 300 MG: 300 CAPSULE ORAL at 21:18

## 2024-03-28 RX ADMIN — Medication: at 15:16

## 2024-03-28 RX ADMIN — EPHEDRINE SULFATE 7.5 MG: 50 INJECTION INTRAVENOUS at 13:12

## 2024-03-28 RX ADMIN — EPHEDRINE SULFATE 5 MG: 50 INJECTION INTRAVENOUS at 12:31

## 2024-03-28 RX ADMIN — Medication 100 MCG: at 12:21

## 2024-03-28 RX ADMIN — SODIUM CHLORIDE 2 G: 900 INJECTION INTRAVENOUS at 11:40

## 2024-03-28 RX ADMIN — Medication 100 MCG: at 13:24

## 2024-03-28 RX ADMIN — ATORVASTATIN CALCIUM 20 MG: 20 TABLET, FILM COATED ORAL at 21:19

## 2024-03-28 RX ADMIN — ROCURONIUM BROMIDE 10 MG: 10 INJECTION, SOLUTION INTRAVENOUS at 12:55

## 2024-03-28 RX ADMIN — PHENYLEPHRINE HYDROCHLORIDE 0.5 MCG/KG/MIN: 10 INJECTION, SOLUTION INTRAVENOUS at 12:25

## 2024-03-28 RX ADMIN — FENTANYL CITRATE 25 MCG: 50 INJECTION, SOLUTION INTRAMUSCULAR; INTRAVENOUS at 13:27

## 2024-03-28 RX ADMIN — Medication 100 MCG: at 12:25

## 2024-03-28 RX ADMIN — Medication 100 MCG: at 13:31

## 2024-03-28 RX ADMIN — Medication 100 MCG: at 13:12

## 2024-03-28 RX ADMIN — DEXAMETHASONE SODIUM PHOSPHATE 6 MG: 4 INJECTION, SOLUTION INTRA-ARTICULAR; INTRALESIONAL; INTRAMUSCULAR; INTRAVENOUS; SOFT TISSUE at 12:20

## 2024-03-28 RX ADMIN — Medication 10 ML: at 21:19

## 2024-03-28 RX ADMIN — IPRATROPIUM BROMIDE AND ALBUTEROL SULFATE 3 ML: .5; 3 SOLUTION RESPIRATORY (INHALATION) at 15:33

## 2024-03-28 RX ADMIN — MIDAZOLAM 1 MG: 1 INJECTION INTRAMUSCULAR; INTRAVENOUS at 09:55

## 2024-03-28 RX ADMIN — Medication 100 MCG: at 13:27

## 2024-03-28 RX ADMIN — Medication 100 MG: at 11:50

## 2024-03-28 RX ADMIN — ROCURONIUM BROMIDE 10 MG: 10 INJECTION, SOLUTION INTRAVENOUS at 12:35

## 2024-03-28 RX ADMIN — Medication 150 MCG: at 12:01

## 2024-03-28 RX ADMIN — Medication 100 MCG: at 12:41

## 2024-03-28 RX ADMIN — ROCURONIUM BROMIDE 10 MG: 10 INJECTION, SOLUTION INTRAVENOUS at 11:50

## 2024-03-28 RX ADMIN — FENTANYL CITRATE 50 MCG: 50 INJECTION, SOLUTION INTRAMUSCULAR; INTRAVENOUS at 15:27

## 2024-03-28 RX ADMIN — Medication 150 MCG: at 12:05

## 2024-03-28 RX ADMIN — EPHEDRINE SULFATE 5 MG: 50 INJECTION INTRAVENOUS at 13:24

## 2024-03-28 RX ADMIN — SODIUM CHLORIDE, POTASSIUM CHLORIDE, SODIUM LACTATE AND CALCIUM CHLORIDE 75 ML/HR: 600; 310; 30; 20 INJECTION, SOLUTION INTRAVENOUS at 17:03

## 2024-03-28 RX ADMIN — BACLOFEN 5 MG: 10 TABLET ORAL at 21:17

## 2024-03-28 RX ADMIN — FAMOTIDINE 20 MG: 10 INJECTION INTRAVENOUS at 09:55

## 2024-03-28 RX ADMIN — FENTANYL CITRATE 25 MCG: 50 INJECTION, SOLUTION INTRAMUSCULAR; INTRAVENOUS at 11:50

## 2024-03-28 RX ADMIN — HYDROMORPHONE HYDROCHLORIDE 0.5 MG: 1 INJECTION, SOLUTION INTRAMUSCULAR; INTRAVENOUS; SUBCUTANEOUS at 15:29

## 2024-03-28 RX ADMIN — MAGNESIUM SULFATE HEPTAHYDRATE 1 G: 500 INJECTION, SOLUTION INTRAMUSCULAR; INTRAVENOUS at 12:20

## 2024-03-28 RX ADMIN — Medication 3 ML: at 09:57

## 2024-03-28 RX ADMIN — Medication 100 MCG: at 12:31

## 2024-03-28 RX ADMIN — PROPOFOL 150 MG: 10 INJECTION, EMULSION INTRAVENOUS at 11:50

## 2024-03-28 RX ADMIN — SODIUM CHLORIDE, POTASSIUM CHLORIDE, SODIUM LACTATE AND CALCIUM CHLORIDE 9 ML/HR: 600; 310; 30; 20 INJECTION, SOLUTION INTRAVENOUS at 09:55

## 2024-03-28 RX ADMIN — SODIUM CHLORIDE, POTASSIUM CHLORIDE, SODIUM LACTATE AND CALCIUM CHLORIDE: 600; 310; 30; 20 INJECTION, SOLUTION INTRAVENOUS at 12:05

## 2024-03-28 RX ADMIN — ONDANSETRON 4 MG: 2 INJECTION INTRAMUSCULAR; INTRAVENOUS at 13:52

## 2024-03-28 RX ADMIN — PROPOFOL 50 MG: 10 INJECTION, EMULSION INTRAVENOUS at 11:55

## 2024-03-28 RX ADMIN — ROCURONIUM BROMIDE 30 MG: 10 INJECTION, SOLUTION INTRAVENOUS at 12:05

## 2024-03-28 RX ADMIN — SUGAMMADEX 150 MG: 100 INJECTION, SOLUTION INTRAVENOUS at 14:42

## 2024-03-28 NOTE — BRIEF OP NOTE
CERVICAL DISCECTOMY POSTERIOR FUSION WITH INSTRUMENTATION 3-4 LEVELS  Progress Note    Noemi Bartholomew  3/28/2024    Pre-op Diagnosis:   Cervical myelopathy [G95.9]  Spasticity [R25.2]  Weakness of both hands [R29.898]  Cervical spinal stenosis [M48.02]       Post-Op Diagnosis Codes:     * Cervical myelopathy [G95.9]     * Spasticity [R25.2]     * Weakness of both hands [R29.898]     * Cervical spinal stenosis [M48.02]    Procedure/CPT® Codes:        Procedure(s):  Posterior cervical laminoplasty cervical two/three, cervical three/four, cervical four/five with left sided hardware              Surgeon(s):  Antonio Noe MD    Anesthesia: General    Staff:   Circulator: Riddhi Morel RN; Mark Albrecht RN  Scrub Person: Kane Ramirez RN; Roz Aguilar  Assistant: Cheli Bran CSA  Assistant: Cheli Bran CSA      Estimated Blood Loss: 75 mL    Urine Voided: 150 mL    Specimens:                none          Drains:   Closed/Suction Drain Posterior Neck Bulb (Active)       Urethral Catheter Straight-tip;Silicone 16 Fr. (Active)       Findings: severe stenosis, particularly at C3/4        Complications: none    Assistant: Cheli Bran CSA  was responsible for performing the following activities: Retraction, Suction, Irrigation, Suturing, Closing, and Placing Dressing and their skilled assistance was necessary for the success of this case.    Antonio Noe MD     Date: 3/28/2024  Time: 14:27 EDT

## 2024-03-28 NOTE — ANESTHESIA PROCEDURE NOTES
Airway  Urgency: elective    Date/Time: 3/28/2024 11:54 AM  Difficult airway (small mouth opening; limited ROM neck)    General Information and Staff    Patient location during procedure: OR  Anesthesiologist: Remi [facility] MD  CRNA/CAA: Darcie Cam CRNA    Indications and Patient Condition  Indications for airway management: airway protection    Preoxygenated: yes  MILS not maintained throughout  Mask difficulty assessment: 1 - vent by mask    Final Airway Details  Final airway type: endotracheal airway      Successful airway: ETT and reinforced tube  Cuffed: yes   Successful intubation technique: video laryngoscopy  Facilitating devices/methods: intubating stylet  Endotracheal tube insertion site: oral  Blade: CMAC  Blade size: D  ETT size (mm): 6.5  Cormack-Lehane Classification: grade IIa - partial view of glottis  Placement verified by: chest auscultation and capnometry   Cuff volume (mL): 6  Measured from: teeth  ETT/EBT  to teeth (cm): 20  Number of attempts at approach: 1  Assessment: lips, teeth, and gum same as pre-op and atraumatic intubation

## 2024-03-28 NOTE — ANESTHESIA POSTPROCEDURE EVALUATION
"Patient: Noemi LIU [place]    Procedure Summary       Date: 03/28/24 Room / Location: [facility] OR [address] [place] [address] MAIN OR    Anesthesia Start: 1145 Anesthesia Stop: 1458    Procedure: Posterior cervical laminoplasty cervical two/three, cervical three/four, cervical four/five with left sided hardware (Spine Cervical) Diagnosis:       Cervical myelopathy      Spasticity      Weakness of both hands      Cervical spinal stenosis      (Cervical myelopathy [G95.9])      (Spasticity [R25.2])      (Weakness of both hands [R29.898])      (Cervical spinal stenosis [M48.02])    Surgeons: Antonio Noe MD Provider: Remi Freeman MD    Anesthesia Type: general ASA Status: 3            Anesthesia Type: general    Vitals  Vitals Value Taken Time   BP 92/52 03/28/24 1608   Temp 36.4 °C (97.5 °F) 03/28/24 1453   Pulse 75 03/28/24 1611   Resp 14 03/28/24 1545   SpO2 100 % 03/28/24 1611   Vitals shown include unfiled device data.        Post Anesthesia Care and Evaluation    Patient location during evaluation: PACU  Patient participation: complete - patient participated  Level of consciousness: awake and alert  Pain management: adequate    Airway patency: patent  Anesthetic complications: No anesthetic complications  PONV Status: controlled  Cardiovascular status: acceptable and hemodynamically stable  Respiratory status: acceptable  Hydration status: acceptable    Comments: BP (!) 85/55   Pulse 63   Temp 36.4 °C (97.5 °F) (Oral)   Resp 14   Ht 134.6 cm (53\")   Wt 48.5 kg (106 lb 14.8 oz)   SpO2 100%   BMI 26.76 kg/m²     "

## 2024-03-28 NOTE — OP NOTE
Preoperative diagnosis: 1. S/p C6, C7 corpectomy with cage and plate from C5 to T1 for cervical myelopathy and OPLL; 2. Adjacent level cervical stenosis with cord compression and recurrent cervical myelopathy at C2/3, C3/4, C4/5    Postoperative diagnosis: Same as above    Procedures performed: Posterior cervical laminoplasty C2/3, C3/4, C4/5 with left sided screws and plates    Spinal Surgery Levels Completed:3 Levels     Surgeon: Hasmukh    First Assistant: Cheli Bran  (She greatly assisted in the exposure, visualization of neural structures, control of bleeding, retraction, and closure of the incision. Her skilled assistance was necessary for the success of this case.)     Anesthesia: GET    EBL: 75 cc    Complications: none    Specimen sent: none    Drains: 7 mm flat ALONDRA epidural drain    Findings: severe stenosis, particularly at C3/4    Postoperative condition: stable    Indications for the operation: The patient is a 63-year-old female who 5 years ago underwent an anterior cervical corpectomy at C6 and C7 with a cage and plate from C5-T1 for progressive cervical myelopathy from stenosis and OPLL.  Her postoperative course was characterized by problems related to CSF leak anteriorly which ultimately required placement of a lumbar peritoneal shunt.  She stabilized although she never got back to her baseline in terms of balance and hand function.  Over the last 6 months she is experienced a recurrence of balance dysfunction and gait ataxia as well as a decrease overall in the use of her hands and she was found to have adjacent level disease above her fused segment particularly at C3-C4 but also at C4-C5 and C2-C3.  She clearly need another surgery but because of the problems with the anterior approach from her OPLL, I elected to approach this posteriorly with a laminoplasty from C2-C5.  She was brought to the operating room today for that purpose.    Informed consent: She understood that the goal of  surgery is arrest of her recurrent progressive myelopathy and hopefully return of some spinal cord function.  She knew that the risks include, but are not limited to, infection, hemorrhage requiring transfusion or reoperation, CSF leak requiring reoperation, incomplete relief of symptoms, potential need for additional surgeries in the future, stroke, paralysis, coma, and death.  She agreed to proceed.    Details of the operation: The patient was taken the operating room and remained in her gurney in the supine position.  After induction and endotracheal ovation, 500 mg of vancomycin was given IV.  A Lizarraga catheter was placed.  SCDs were placed.  Venous access was secured.  She was rolled over the prone position on a radiolucent Dontae spinal table.  All pressure points were padded include the brachial plexus.  We retracted the shoulders inferiorly.  The posterior occipital cervical region was then shaved and the C arm was used to ben out the incision from C1-C6.  The posterior cervical region was then prepped and draped in the usual sterile fashion.  We did a surgical timeout.  I injected 30 cc of quarter percent Marcaine with epinephrine in the paraspinous muscular from C2-C5 bilaterally.  A linear incision was made from C2-C5 with a #10 skin knife.  Hemostasis was obtained with monopolar cautery.  Dissection was taken all the way down to the cervical midline fascia which was divided to the left and to the right from C2-C5.  Using a technique of subperiosteal dissection and with the monopolar cautery I dissected out to the lateral masses at C2-C3, C3-C4, and C4-C5 bilaterally.  We got an x-ray which showed that the towel clip was at the C2 spinous process and the C5 spinous process.  Microscope was draped and brought into the field.  She is was essential to performance of microneurosurgical technique.  Using a 2 mm matchstick drill bit on the electric Midas drill and under magnification I drilled a linear trough  laminotomy just at the medial border of the facet and lateral masses at C2-C3, C3-C4 and C4-C5 all the way down to the dura.  On the other side I drilled long term down on the right side and then using a Guardado periosteal light tract upwards and created a laminoplasty at all 3 levels.  At C5 I placed laminoplasty plates propping up the lamina using 7 mm screws laterally and 5 mm screws medially, the same thing was done at C4 with two 7 mm screws laterally and 5 mm screws medially, the same at C3 and the same at C2 although at C2 I used a single plate both medially and laterally.  X-rays show good placement of the plates and the screws just jacking up the lamina.  Hemostasis was obtained with thrombin-soaked Gelfoam and Floseal.  Antibiotic irrigation was used.  A 7 Vincent 5 Dontae-Ricardo drain was left in the epidural space and exit through separate superior stab incision and secured to skin with 2-0 silk.  The fascia was reapproximated with 0 Vicryl interrupted suture.  Subcutaneous layer was closed with 2-0 interrupted Vicryl suture.  The skin was closed with running 4-0 Vicryl subcuticular.  Steri-Strips and a clean dry jericho dressing dressing were placed.  A soft collar was placed.  The patient tolerated the procedure well there were no complications and all counts correct.  She was rolled over the supine position extubated and taken recovery in satisfactory condition.

## 2024-03-28 NOTE — ANESTHESIA PREPROCEDURE EVALUATION
Anesthesia Evaluation     Patient summary reviewed and Nursing notes reviewed   history of anesthetic complications:  difficult airway  NPO Solid Status: > 8 hours  NPO Liquid Status: > 2 hours           Airway   Mallampati: III  TM distance: <3 FB  Neck ROM: limited  Difficult intubation highly probable and Small opening  Comment: Grade IV views with MAC 3, Vincent 2, CMAC D and CMAC 3, fiberoptic scope needed due to small oropharynx and limited mouth opening  Dental - normal exam     Pulmonary - normal exam    breath sounds clear to auscultation  Cardiovascular - normal exam    ECG reviewed  Rhythm: regular  Rate: normal    (+) hyperlipidemia      Neuro/Psych  (+) headaches, numbness    ROS Comment: CTS/numbness in both hands/left arm pain  GI/Hepatic/Renal/Endo      Musculoskeletal     (+) back pain, neck pain, radiculopathy Left upper extremity and Right upper extremity      ROS comment: Cervical and lumbar DDD/cervical spinal stenosis/hx cervical fusion/hx lumbar-peritoneal shunt  Abdominal  - normal exam   Substance History      OB/GYN          Other   arthritis,                   Anesthesia Plan    ASA 3     general     (Will plan on fiberoptic intubation with hx of difficult intubations in past requiring pediatric fiberoptic scope/will also have a CMAC for help with laryngoscopy during fiberoptic intubation)  intravenous induction     Anesthetic plan, risks, benefits, and alternatives have been provided, discussed and informed consent has been obtained with: patient.      CODE STATUS:

## 2024-03-28 NOTE — CONSULTS
Received call from RN in main pre-post stating that patient would like a visit.     Met patient at bedside, patient's twin sister also in room.     Patient requested prayer before surgery, which this  led.     Patient educated on how to reach out to chaplains if desired in future. Pastoral care remains available.

## 2024-03-29 ENCOUNTER — APPOINTMENT (OUTPATIENT)
Dept: GENERAL RADIOLOGY | Facility: HOSPITAL | Age: 64
DRG: 519 | End: 2024-03-29
Payer: MEDICARE

## 2024-03-29 LAB
BASOPHILS # BLD AUTO: 0.01 10*3/MM3 (ref 0–0.2)
BASOPHILS NFR BLD AUTO: 0.1 % (ref 0–1.5)
DEPRECATED RDW RBC AUTO: 46.4 FL (ref 37–54)
EOSINOPHIL # BLD AUTO: 0 10*3/MM3 (ref 0–0.4)
EOSINOPHIL NFR BLD AUTO: 0 % (ref 0.3–6.2)
ERYTHROCYTE [DISTWIDTH] IN BLOOD BY AUTOMATED COUNT: 13.4 % (ref 12.3–15.4)
HCT VFR BLD AUTO: 30.8 % (ref 34–46.6)
HGB BLD-MCNC: 10.1 G/DL (ref 12–15.9)
IMM GRANULOCYTES # BLD AUTO: 0.03 10*3/MM3 (ref 0–0.05)
IMM GRANULOCYTES NFR BLD AUTO: 0.3 % (ref 0–0.5)
LYMPHOCYTES # BLD AUTO: 0.66 10*3/MM3 (ref 0.7–3.1)
LYMPHOCYTES NFR BLD AUTO: 6.8 % (ref 19.6–45.3)
MCH RBC QN AUTO: 31 PG (ref 26.6–33)
MCHC RBC AUTO-ENTMCNC: 32.8 G/DL (ref 31.5–35.7)
MCV RBC AUTO: 94.5 FL (ref 79–97)
MONOCYTES # BLD AUTO: 0.66 10*3/MM3 (ref 0.1–0.9)
MONOCYTES NFR BLD AUTO: 6.8 % (ref 5–12)
NEUTROPHILS NFR BLD AUTO: 8.32 10*3/MM3 (ref 1.7–7)
NEUTROPHILS NFR BLD AUTO: 86 % (ref 42.7–76)
NRBC BLD AUTO-RTO: 0 /100 WBC (ref 0–0.2)
PLATELET # BLD AUTO: 229 10*3/MM3 (ref 140–450)
PMV BLD AUTO: 10.1 FL (ref 6–12)
RBC # BLD AUTO: 3.26 10*6/MM3 (ref 3.77–5.28)
WBC NRBC COR # BLD AUTO: 9.68 10*3/MM3 (ref 3.4–10.8)

## 2024-03-29 PROCEDURE — 99024 POSTOP FOLLOW-UP VISIT: CPT

## 2024-03-29 PROCEDURE — 97166 OT EVAL MOD COMPLEX 45 MIN: CPT

## 2024-03-29 PROCEDURE — 97535 SELF CARE MNGMENT TRAINING: CPT

## 2024-03-29 PROCEDURE — 97162 PT EVAL MOD COMPLEX 30 MIN: CPT

## 2024-03-29 PROCEDURE — 72040 X-RAY EXAM NECK SPINE 2-3 VW: CPT

## 2024-03-29 PROCEDURE — 25810000003 LACTATED RINGERS PER 1000 ML: Performed by: NEUROLOGICAL SURGERY

## 2024-03-29 PROCEDURE — 25010000002 ONDANSETRON PER 1 MG: Performed by: NEUROLOGICAL SURGERY

## 2024-03-29 PROCEDURE — 97530 THERAPEUTIC ACTIVITIES: CPT

## 2024-03-29 PROCEDURE — 85025 COMPLETE CBC W/AUTO DIFF WBC: CPT | Performed by: NEUROLOGICAL SURGERY

## 2024-03-29 RX ORDER — HYDROMORPHONE HYDROCHLORIDE 1 MG/ML
0.25 INJECTION, SOLUTION INTRAMUSCULAR; INTRAVENOUS; SUBCUTANEOUS
Status: DISCONTINUED | OUTPATIENT
Start: 2024-03-29 | End: 2024-03-31 | Stop reason: HOSPADM

## 2024-03-29 RX ORDER — HYDROCODONE BITARTRATE AND ACETAMINOPHEN 5; 325 MG/1; MG/1
1 TABLET ORAL EVERY 6 HOURS PRN
Status: DISCONTINUED | OUTPATIENT
Start: 2024-03-29 | End: 2024-03-31 | Stop reason: HOSPADM

## 2024-03-29 RX ADMIN — DOCUSATE SODIUM 200 MG: 100 CAPSULE, LIQUID FILLED ORAL at 21:09

## 2024-03-29 RX ADMIN — Medication 10 ML: at 08:50

## 2024-03-29 RX ADMIN — GABAPENTIN 300 MG: 300 CAPSULE ORAL at 17:04

## 2024-03-29 RX ADMIN — DOCUSATE SODIUM 200 MG: 100 CAPSULE, LIQUID FILLED ORAL at 08:49

## 2024-03-29 RX ADMIN — GABAPENTIN 300 MG: 300 CAPSULE ORAL at 08:49

## 2024-03-29 RX ADMIN — Medication 10 ML: at 21:12

## 2024-03-29 RX ADMIN — Medication 2 WAFER: at 06:16

## 2024-03-29 RX ADMIN — BACLOFEN 5 MG: 10 TABLET ORAL at 21:11

## 2024-03-29 RX ADMIN — BACLOFEN 5 MG: 10 TABLET ORAL at 08:49

## 2024-03-29 RX ADMIN — GABAPENTIN 300 MG: 300 CAPSULE ORAL at 21:09

## 2024-03-29 RX ADMIN — SODIUM CHLORIDE, POTASSIUM CHLORIDE, SODIUM LACTATE AND CALCIUM CHLORIDE 75 ML/HR: 600; 310; 30; 20 INJECTION, SOLUTION INTRAVENOUS at 04:20

## 2024-03-29 RX ADMIN — ONDANSETRON 4 MG: 2 INJECTION INTRAMUSCULAR; INTRAVENOUS at 08:49

## 2024-03-29 RX ADMIN — HYDROCODONE BITARTRATE AND ACETAMINOPHEN 1 TABLET: 5; 325 TABLET ORAL at 17:04

## 2024-03-29 RX ADMIN — ATORVASTATIN CALCIUM 20 MG: 20 TABLET, FILM COATED ORAL at 21:09

## 2024-03-29 RX ADMIN — CALCIUM CARBONATE-VITAMIN D TAB 500 MG-200 UNIT 2 TABLET: 500-200 TAB at 17:04

## 2024-03-29 NOTE — PLAN OF CARE
No new events throughout the shift.    Problem: Adult Inpatient Plan of Care  Goal: Plan of Care Review  Outcome: Ongoing, Progressing  Goal: Patient-Specific Goal (Individualized)  Outcome: Ongoing, Progressing  Goal: Absence of Hospital-Acquired Illness or Injury  Outcome: Ongoing, Progressing  Intervention: Identify and Manage Fall Risk  Recent Flowsheet Documentation  Taken 3/29/2024 0405 by Bob Pritchard RN  Safety Promotion/Fall Prevention: safety round/check completed  Taken 3/29/2024 0210 by Bob Pritchard RN  Safety Promotion/Fall Prevention: safety round/check completed  Taken 3/29/2024 0005 by Bob Pritchard RN  Safety Promotion/Fall Prevention: safety round/check completed  Taken 3/28/2024 2207 by Bob Pritchard RN  Safety Promotion/Fall Prevention: safety round/check completed  Taken 3/28/2024 2105 by Bob Pritchard RN  Safety Promotion/Fall Prevention:   activity supervised   assistive device/personal items within reach   clutter free environment maintained   fall prevention program maintained   lighting adjusted   nonskid shoes/slippers when out of bed   safety round/check completed  Intervention: Prevent Skin Injury  Recent Flowsheet Documentation  Taken 3/29/2024 0405 by Bob Pritchard RN  Body Position: weight shifting  Taken 3/29/2024 0210 by Bob Pritchard RN  Body Position:   supine, legs elevated   patient/family refused  Taken 3/29/2024 0005 by Bob Pritchard RN  Body Position:   weight shifting   patient/family refused  Taken 3/28/2024 2207 by Bob Pritchard RN  Body Position:   supine, legs elevated   patient/family refused  Taken 3/28/2024 2105 by Bob Pritchard RN  Body Position: supine, legs elevated  Intervention: Prevent and Manage VTE (Venous Thromboembolism) Risk  Recent Flowsheet Documentation  Taken 3/28/2024 2105 by Bob Pritchard RN  Activity Management: activity encouraged  VTE Prevention/Management:   bilateral   sequential compression devices on  Range of Motion:  active ROM (range of motion) encouraged  Intervention: Prevent Infection  Recent Flowsheet Documentation  Taken 3/28/2024 2207 by Bob Pritchard RN  Infection Prevention: hand hygiene promoted  Taken 3/28/2024 2105 by Bob Pritchard RN  Infection Prevention: hand hygiene promoted  Goal: Optimal Comfort and Wellbeing  Outcome: Ongoing, Progressing  Intervention: Monitor Pain and Promote Comfort  Recent Flowsheet Documentation  Taken 3/28/2024 2105 by Bob Pritchard RN  Pain Management Interventions: pain pump in use  Taken 3/28/2024 1931 by Bob Pritchard RN  Pain Management Interventions: pain pump in use  Intervention: Provide Person-Centered Care  Recent Flowsheet Documentation  Taken 3/28/2024 2105 by Bob Pritchard RN  Trust Relationship/Rapport:   care explained   choices provided   questions answered   questions encouraged   thoughts/feelings acknowledged  Goal: Readiness for Transition of Care  Outcome: Ongoing, Progressing     Problem: Pain Acute  Goal: Acceptable Pain Control and Functional Ability  Outcome: Ongoing, Progressing  Intervention: Prevent or Manage Pain  Recent Flowsheet Documentation  Taken 3/29/2024 0405 by Bob Pritchard RN  Sensory Stimulation Regulation:   care clustered   lighting decreased  Taken 3/29/2024 0005 by Bob Pritchard RN  Sensory Stimulation Regulation:   care clustered   lighting decreased   television on  Taken 3/28/2024 2105 by Bob Pritchard RN  Sensory Stimulation Regulation:   care clustered   lighting decreased   television on  Medication Review/Management: medications reviewed  Intervention: Develop Pain Management Plan  Recent Flowsheet Documentation  Taken 3/28/2024 2105 by Bob Pritchard RN  Pain Management Interventions: pain pump in use  Taken 3/28/2024 1931 by Bob Pritchard RN  Pain Management Interventions: pain pump in use  Intervention: Optimize Psychosocial Wellbeing  Recent Flowsheet Documentation  Taken 3/28/2024 2105 by Bob Pritchard  RN  Diversional Activities: television     Problem: Fall Injury Risk  Goal: Absence of Fall and Fall-Related Injury  Outcome: Ongoing, Progressing  Intervention: Identify and Manage Contributors  Recent Flowsheet Documentation  Taken 3/28/2024 2105 by Bob Pritchard RN  Medication Review/Management: medications reviewed  Intervention: Promote Injury-Free Environment  Recent Flowsheet Documentation  Taken 3/29/2024 0405 by Bob Pritchard RN  Safety Promotion/Fall Prevention: safety round/check completed  Taken 3/29/2024 0210 by Bob Pritchard RN  Safety Promotion/Fall Prevention: safety round/check completed  Taken 3/29/2024 0005 by Bob Pritchard RN  Safety Promotion/Fall Prevention: safety round/check completed  Taken 3/28/2024 2207 by Bob Pritchard RN  Safety Promotion/Fall Prevention: safety round/check completed  Taken 3/28/2024 2105 by Bob Pritchard RN  Safety Promotion/Fall Prevention:   activity supervised   assistive device/personal items within reach   clutter free environment maintained   fall prevention program maintained   lighting adjusted   nonskid shoes/slippers when out of bed   safety round/check completed     Problem: Infection  Goal: Absence of Infection Signs and Symptoms  Outcome: Ongoing, Progressing   Goal Outcome Evaluation:

## 2024-03-29 NOTE — PROGRESS NOTES
[facility] Acute [facility]    Referral received. Chart work done. Noted OT is ordered. Will follow for progress with therapy and see pt to discuss Acute Rehab and DC plan.   If approved, would need a precert with [employer] Alexis Casas RN  Acute Rehab Admission Nurse

## 2024-03-29 NOTE — THERAPY EVALUATION
Patient Name: Noemi LIU Dooley  : 1960    MRN: [id number]                              Today's Date: 3/29/2024       Admit Date: 3/28/2024    Visit Dx:     ICD-10-CM ICD-9-CM   1. Cervical myelopathy  G95.9 721.1   2. Spasticity  R25.2 781.0   3. Weakness of both hands  R29.898 729.89   4. Cervical spinal stenosis  M48.02 723.0     Patient Active Problem List   Diagnosis    Carpal tunnel syndrome    Hyperlipidemia    Osteoporosis    DDD (degenerative disc disease), lumbar    Chronic left-sided lumbar radiculopathy    Congenital spondylolysis, lumbosacral region    Neuropathic pain, leg, left    Ossification of spinal ligament    Cervical spondylosis with myelopathy    Cervical stenosis of spine    Cervical myelopathy    Weakness of both hands    Chronic neck pain    Occipital neuralgia of right side    S/P cervical spinal fusion    Spasticity    Pernicious anemia    Cervical spinal stenosis     Past Medical History:   Diagnosis Date    Abnormal alkaline phosphatase test 2015    Resolved    Acute bronchitis 2015    Resolved    Allergic child age    pencillin    Cervical spinal stenosis     Diverticulosis     H/O electromyography 10/13/2014    H/O mammogram 2014    Hard to intubate     Headache occassionally    High cholesterol     History of EKG 2024    Hypercalcemia 2015    Resolved, Full w/u done, Likely secondary to Forteo    Left arm pain     Low back pain 2017    Numbness in both hands     Osteopenia several years    Osteoporosis     Paresthesia 2015    Resolved     Past Surgical History:   Procedure Laterality Date    ANTERIOR CHANNEL VERTEBRECTOMY/CORPECTOMY N/A 2019    Procedure: CERVICAL 6, CERVICAL 7 ANTERIOR CERVICAL CORPECTOMY WITH CAGE AND PLATE;  Surgeon: Antonio Noe MD;  Location: [facility];  Service: Neurosurgery    BACK SURGERY      CERVICAL DISCECTOMY POSTERIOR FUSION WITH INSTRUMENTATION N/A 3/28/2024    Procedure: Posterior cervical  laminoplasty cervical two/three, cervical three/four, cervical four/five with left sided hardware;  Surgeon: Antonio Noe MD;  [facility] MA[facility];  Service: Neurosurgery;  Laterality: N/A;    CERVICAL EPIDURAL N/A 8/28/2023    Procedure: CERVICAL EPIDURAL STEROID INJECTION CPT: 65901;  Surgeon: Leah Hartman MD;  Location: [facility];  Service: Pain Management;  Laterality: N/A;    COLONOSCOPY  05/20/2011    INCISION AND DRAINAGE OF WOUND N/A 12/19/2019    Procedure: followed by drainage of anterior cervical fluid collection;  Surgeon: Antonio Noe MD;  [facility];  Service: Neurosurgery    LUMBAR DRAIN INSERTION EXTERNAL N/A 12/19/2019    Procedure: LUMBAR DRAIN INSERTION EXTERNAL;  Surgeon: Antonio Noe MD;  Location: [facility] MAIN OR;  Service: Neurosurgery    LUMBAR PERITONEAL SHUNT N/A 1/10/2020    Procedure: LUMBAR PERITONEAL SHUNT PLACEMENT;  Surgeon: Antonio Noe MD;  Location: [facility] MAIN OR;  Service: Neurosurgery    SPINE SURGERY  1999 back    dr abad quintero    TONSILLECTOMY  child age    TYMPANOPLASTY        General Information       Row Name 03/29/24 1449          OT Time and Intention    Document Type evaluation  -     Mode of Treatment occupational therapy;individual therapy  -       Row Name 03/29/24 1449          General Information    Patient Profile Reviewed yes  -SM     Prior Level of Function independent:;ADL's;all household mobility  sister assists with supervision for showering.  -     Existing Precautions/Restrictions fall;spinal  -       Row Name 03/29/24 1449          Occupational Profile    Environmental Supports and Barriers (Occupational Profile) Home DME includes shower chair, rwx, SPC. Pt typically doesnt use any AD in the home. She uses a cane for community mobility.  -       Row Name 03/29/24 1449          Living Environment    People in Home sibling(s)  -       Row Name 03/29/24 1449          Home Main Entrance     Number of Stairs, Main Entrance none  -       Row Name 03/29/24 1449          Stairs Within Home, Primary    Stairs, Within Home, Primary basement  -[facility] Name 03/29/24 1449          Cognition    Orientation Status (Cognition) oriented x 4  -[facility] Name 03/29/24 1449          Safety Issues, Functional Mobility    Impairments Affecting Function (Mobility) sensation/sensory awareness;strength;pain;endurance/activity tolerance;balance  -               User Key  (r) = Recorded By, (t) = Taken By, (c) = Cosigned By      Initials Name Provider Type     Harriet Abel, OT Occupational Therapist                     Mobility/ADL's       [facility] Name 03/29/24 1452          Bed Mobility    Supine-Sit [place] (Bed Mobility) contact guard  -     Sit-Supine [place] (Bed Mobility) contact guard  -SM       Row Name 03/29/24 1452          Sit-Stand Transfer    Sit-Stand [place] (Transfers) contact guard  -     Assistive Device (Sit-Stand Transfers) walker, front-wheeled  -[facility] Name 03/29/24 1452          Functional Mobility    Functional Mobility- Ind. Level contact guard assist  -     Functional Mobility-Distance (Feet) --  50  -     Functional Mobility- Comment no LOB for ADLs  -[facility] Name 03/29/24 1452          Activities of Daily Living    BADL Assessment/Intervention toileting;grooming;feeding  -[facility] Name 03/29/24 1452          Toileting Assessment/Training    [place] Level (Toileting) contact guard assist;standby assist  -SM       Row Name 03/29/24 1452          Grooming Assessment/Training    [place] Level (Grooming) contact guard assist;standby assist  -     Comment, (Grooming) rwx level  -[facility] Name 03/29/24 1452          Self-Feeding Assessment/Training    [place] Level (Feeding) liquids to mouth;independent  -     Position (Self-Feeding) sitting up in bed  -     Comment, (Feeding) pt reports someone assisted her for lunch but  reports primarly due to positioning and denies any difficulty with utensil use, encouraged up in the chair for meals.  -               User Key  (r) = Recorded By, (t) = Taken By, (c) = Cosigned By      Initials Name Provider Type    Harriet Leggett OT Occupational Therapist                   Obj/Interventions       [facility] Name 03/29/24 1458          Sensory Assessment (Somatosensory)    Sensory Subjective Reports numbness;tingling  -     Sensory Assessment hands/feets  -[facility] Name 03/29/24 145          Range of Motion Comprehensive    Comment, General Range of Motion shoulder flexion 3/4, elbow to hand WFL  -[facility] Name 03/29/24 145          Strength Comprehensive (MMT)    Comment, General Manual Muscle Testing (MMT) Assessment shoulder flex 3-/5, elbow flex/ext 3+/5, gross grasp fair +  -[facility] Name 03/29/24 1456          Motor Skills    Motor Skills coordination  -     Coordination fine motor deficit;bilateral;upper extremity;minimal impairment  -SM       Row Name 03/29/24 1452          Balance    Static Sitting Balance standby assist  -     Position, Sitting Balance sitting edge of bed  -     Static Standing Balance contact guard  -     Dynamic Standing Balance contact guard  -     Position/Device Used, Standing Balance supported;walker, rolling  -               User Key  (r) = Recorded By, (t) = Taken By, (c) = Cosigned By      Initials Name Provider Type    Harriet Leggett OT Occupational Therapist                   Goals/Plan       Row Name 03/29/24 4359          Transfer Goal 1 (OT)    Activity/Assistive Device (Transfer Goal 1, OT) toilet;shower chair  -     [place] Level/Cues Needed (Transfer Goal 1, OT) modified independence  -     Time Frame (Transfer Goal 1, OT) short term goal (STG);2 weeks  -     Progress/Outcome (Transfer Goal 1, OT) goal ongoing  -[facility] Name 03/29/24 0254          Dressing Goal 1 (OT)    Activity/Device (Dressing Goal  1, OT) dressing skills, all  -SM     [place]/Cues Needed (Dressing Goal 1, OT) modified independence  -SM     Time Frame (Dressing Goal 1, OT) short term goal (STG);2 weeks  -SM     Progress/Outcome (Dressing Goal 1, OT) goal ongoing  -       Row Name 03/29/24 1585          Toileting Goal 1 (OT)    Activity/Device (Toileting Goal 1, OT) toileting skills, all  -SM     [place] Level/Cues Needed (Toileting Goal 1, OT) modified independence  -SM     Time Frame (Toileting Goal 1, OT) short term goal (STG);2 weeks  -SM     Progress/Outcome (Toileting Goal 1, OT) goal ongoing  -[facility] Name 03/29/24 5263          Problem Specific Goal 1 (OT)    Problem Specific Goal 1 (OT) Pt to be independent with hand strengthening and [facility] HEP.  -SM     Time Frame (Problem Specific Goal 1, OT) short term goal (STG);2 weeks  -SM     Progress/Outcome (Problem Specific Goal 1, OT) goal ongoing  -[facility] Name 03/29/24 8365          Therapy Assessment/Plan (OT)    Planned Therapy Interventions (OT) activity tolerance training;functional balance retraining;occupation/activity based interventions;adaptive equipment training;neuromuscular control/coordination retraining;patient/caregiver education/training;transfer/mobility retraining;strengthening exercise;cognitive/visual perception retraining;ROM/therapeutic exercise;IADL retraining  -               User Key  (r) = Recorded By, (t) = Taken By, (c) = Cosigned By      Initials Name Provider Type    SM Harriet Abel, OT Occupational Therapist                   Clinical Impression       Row Name 03/29/24 2147          Pain Assessment    Pretreatment Pain Rating 8/10  -SM     Posttreatment Pain Rating 8/10  -SM     Pain Location incisional  -SM     Pain Location - neck  -SM     Pain Intervention(s) Repositioned;Rest;Medication (See MAR)  -SM       Row Name 03/29/24 8714          Plan of Care Review    Plan of Care Reviewed With patient;sibling  -SM     Outcome  Evaluation Pt is a 63 y.o female POD 1 Posterior cervical laminoplasty C2-C. Pt previously underwent anterior cervical corpectomy at C6 and 7 with ACDF from C5-T1 who developed worsening gait ataxia and balance dysfunction.  She also was having issues with use of hands. Pt reports she has noticed a decline in  strength as well as handwritting. She can complete most ADLs independently. Pt is able to grasp utensils for grooming, she does have 1 episode of dropping toothpaste cap when brushing her teeth. She reports she drops items frequently at home. She is able to complete mobility to bathroom CGA with rwx. Tested ADLs today she was able to complete SBA/CGA. she does report some dizziness following ~15 minute ADL engagement. Recommend home with her sister who works from home and continued HH vs OP OT to address hand deficits.  -       Row Name 03/29/24 4998          Therapy Assessment/Plan (OT)    Rehab Potential (OT) good, to achieve stated therapy goals  -     Criteria for Skilled Therapeutic Interventions Met (OT) yes;skilled treatment is necessary  -     Therapy Frequency (OT) 3 times/wk  -       Row Name 03/29/24 7908          Therapy Plan Review/Discharge Plan (OT)    Anticipated Discharge Disposition (OT) home with home health;home with outpatient therapy services  -       Row Name 03/29/24 8424          Positioning and Restraints    Pre-Treatment Position in bed  -     Post Treatment Position bed  -SM     In Bed fowlers;call light within reach;encouraged to call for assist;exit alarm on;notified nsg;with family/caregiver  -               User Key  (r) = Recorded By, (t) = Taken By, (c) = Cosigned By      Initials Name Provider Type    Harriet Leggett, OT Occupational Therapist                   Outcome Measures       Row Name 03/29/24 1500          How much help from another is currently needed...    Putting on and taking off regular lower body clothing? 3  -SM     Bathing (including  washing, rinsing, and drying) 3  -SM     Toileting (which includes using toilet bed pan or urinal) 3  -SM     Putting on and taking off regular upper body clothing 3  -SM     Taking care of personal grooming (such as brushing teeth) 3  -SM     Eating meals 3  -SM     AM-PAC 6 Clicks Score (OT) 18  -SM       Row Name 03/29/24 1055 03/29/24 0849       How much help from another person do you currently need...    Turning from your back to your side while in flat bed without using bedrails? 3  -CW 3  -LC    Moving from lying on back to sitting on the side of a flat bed without bedrails? 3  -CW 2  -LC    Moving to and from a bed to a chair (including a wheelchair)? 3  -CW 2  -LC    Standing up from a chair using your arms (e.g., wheelchair, bedside chair)? 3  -CW 2  -LC    Climbing 3-5 steps with a railing? 2  -CW 1  -LC    To walk in hospital room? 3  -CW 2  -LC    AM-PAC 6 Clicks Score (PT) 17  -CW 12  -LC    Highest Level of Mobility Goal 5 --> Static standing  -CW 4 --> Transfer to chair/commode  -LC      Row Name 03/29/24 1500 03/29/24 1055       Functional Assessment    Outcome Measure Options AM-PAC 6 Clicks Daily Activity (OT)  - AM-PAC 6 Clicks Basic Mobility (PT)  -              User Key  (r) = Recorded By, (t) = Taken By, (c) = Cosigned By      Initials Name Provider Type    Cecille Rm RN Registered Nurse    Harriet Leggett, OT Occupational Therapist    Jannette Angel, PT Physical Therapist                    Occupational Therapy Education       Title: PT OT SLP Therapies (In Progress)       Topic: Occupational Therapy (In Progress)       Point: ADL training (Done)       Description:   Instruct learner(s) on proper safety adaptation and remediation techniques during self care or transfers.   Instruct in proper use of assistive devices.                  Learning Progress Summary             Patient Acceptance, E, VU by LUIS ANTONIO at 3/29/2024 1501    Comment: safe technique for ADLs, transfers  with rwx, recommended continued OT at dc for hands.   Family Acceptance, E, VU by  at 3/29/2024 1501    Comment: safe technique for ADLs, transfers with rwx, recommended continued OT at dc for hands.                         Point: Home exercise program (Not Started)       Description:   Instruct learner(s) on appropriate technique for monitoring, assisting and/or progressing therapeutic exercises/activities.                  Learner Progress:  Not documented in this visit.              Point: Precautions (Not Started)       Description:   Instruct learner(s) on prescribed precautions during self-care and functional transfers.                  Learner Progress:  Not documented in this visit.              Point: Body mechanics (Not Started)       Description:   Instruct learner(s) on proper positioning and spine alignment during self-care, functional mobility activities and/or exercises.                  Learner Progress:  Not documented in this visit.                              User Key       Initials Effective Dates Name Provider Type Discipline     04/02/20 -  Harriet Abel, OT Occupational Therapist OT                  OT Recommendation and Plan  Planned Therapy Interventions (OT): activity tolerance training, functional balance retraining, occupation/activity based interventions, adaptive equipment training, neuromuscular control/coordination retraining, patient/caregiver education/training, transfer/mobility retraining, strengthening exercise, cognitive/visual perception retraining, ROM/therapeutic exercise, IADL retraining  Therapy Frequency (OT): 3 times/wk  Plan of Care Review  Plan of Care Reviewed With: patient, sibling  Outcome Evaluation: Pt is a 63 y.o female POD 1 Posterior cervical laminoplasty C2-C. Pt previously underwent anterior cervical corpectomy at C6 and 7 with ACDF from C5-T1 who developed worsening gait ataxia and balance dysfunction.  She also was having issues with use of hands.  Pt reports she has noticed a decline in  strength as well as handwritting. She can complete most ADLs independently. Pt is able to grasp utensils for grooming, she does have 1 episode of dropping toothpaste cap when brushing her teeth. She reports she drops items frequently at home. She is able to complete mobility to bathroom CGA with rwx. Tested ADLs today she was able to complete SBA/CGA. she does report some dizziness following ~15 minute ADL engagement. Recommend home with her sister who works from home and continued HH vs OP OT to address hand deficits.     Time Calculation:   Evaluation Complexity (OT)  Review Occupational Profile/Medical/Therapy History Complexity: expanded/moderate complexity  Assessment, Occupational Performance/Identification of Deficit Complexity: 3-5 performance deficits  Clinical Decision Making Complexity (OT): detailed assessment/moderate complexity  Overall Complexity of Evaluation (OT): moderate complexity     Time Calculation- OT       Row Name 03/29/24 1501             Time Calculation- OT    OT Start Time 1405  -SM      OT Stop Time 1432  -SM      OT Time Calculation (min) 27 min  -SM      Total Timed Code Minutes- OT 20 minute(s)  -SM      OT Received On 03/29/24  -SM      OT - Next Appointment 04/01/24  -      OT Goal Re-Cert Due Date 04/12/24  -         Timed Charges    98435 - OT Self Care/Mgmt Minutes 20  -SM         Untimed Charges    OT Eval/Re-eval Minutes 7  -SM         Total Minutes    Timed Charges Total Minutes 20  -SM      Untimed Charges Total Minutes 7  -SM       Total Minutes 27  -SM                User Key  (r) = Recorded By, (t) = Taken By, (c) = Cosigned By      Initials Name Provider Type     Harriet Abel OT Occupational Therapist                  Therapy Charges for Today       Code Description Service Date Service Provider Modifiers Qty    [id number]  OT SELF CARE/MGMT/TRAIN EA 15 MIN 3/29/2024 Harriet Abel OT GO 1    [id number]   OT EVAL MOD COMPLEXITY 3 3/29/2024 Harriet Abel, OT GO 1                 Harriet Abel OT  3/29/2024

## 2024-03-29 NOTE — PLAN OF CARE
Goal Outcome Evaluation:      VSS throughout shift. PCA pump discontinued this shift. ALONDRA drain and valdez discontinued and removed per order. Pain well controlled per MAR. XR completed per order. Pt no complaints at this time.

## 2024-03-29 NOTE — PLAN OF CARE
Goal Outcome Evaluation:  Plan of Care Reviewed With: patient, sibling           Outcome Evaluation: Pt is a 63 y.o female POD 1 Posterior cervical laminoplasty C2-C. Pt previously underwent anterior cervical corpectomy at C6 and 7 with ACDF from C5-T1 who developed worsening gait ataxia and balance dysfunction.  She also was having issues with use of hands. Pt reports she has noticed a decline in  strength as well as handwritting. She can complete most ADLs independently. Pt is able to grasp utensils for grooming, she does have 1 episode of dropping toothpaste cap when brushing her teeth. She reports she drops items frequently at home. She is able to complete mobility to bathroom CGA with rwx. Tested ADLs today she was able to complete SBA/CGA. she does report some dizziness following ~15 minute ADL engagement. Recommend home with her sister who works from home and continued HH vs OP OT to address hand deficits.      Anticipated Discharge Disposition (OT): home with home health, home with outpatient therapy services

## 2024-03-29 NOTE — THERAPY EVALUATION
Patient Name: Noemi LIU Dooley  : 1960    MRN: [id number]                              Today's Date: 3/29/2024       Admit Date: 3/28/2024    Visit Dx:     ICD-10-CM ICD-9-CM   1. Cervical myelopathy  G95.9 721.1   2. Spasticity  R25.2 781.0   3. Weakness of both hands  R29.898 729.89   4. Cervical spinal stenosis  M48.02 723.0     Patient Active Problem List   Diagnosis    Carpal tunnel syndrome    Hyperlipidemia    Osteoporosis    DDD (degenerative disc disease), lumbar    Chronic left-sided lumbar radiculopathy    Congenital spondylolysis, lumbosacral region    Neuropathic pain, leg, left    Ossification of spinal ligament    Cervical spondylosis with myelopathy    Cervical stenosis of spine    Cervical myelopathy    Weakness of both hands    Chronic neck pain    Occipital neuralgia of right side    S/P cervical spinal fusion    Spasticity    Pernicious anemia    Cervical spinal stenosis     Past Medical History:   Diagnosis Date    Abnormal alkaline phosphatase test 2015    Resolved    Acute bronchitis 2015    Resolved    Allergic child age    pencillin    Cervical spinal stenosis     Diverticulosis     H/O electromyography 10/13/2014    H/O mammogram 2014    Hard to intubate     Headache occassionally    High cholesterol     History of EKG 2024    Hypercalcemia 2015    Resolved, Full w/u done, Likely secondary to [place]eo    Left arm pain     Low back pain 2017    Numbness in both hands     Osteopenia several years    Osteoporosis     Paresthesia 2015    Resolved     Past Surgical History:   Procedure Laterality Date    ANTERIOR CHANNEL VERTEBRECTOMY/CORPECTOMY N/A 2019    Procedure: CERVICAL 6, CERVICAL 7 ANTERIOR CERVICAL CORPECTOMY WITH CAGE AND PLATE;  Surgeon: Antonio Noe MD;  Location: [facility];  Service: Neurosurgery    BACK SURGERY      CERVICAL DISCECTOMY POSTERIOR FUSION WITH INSTRUMENTATION N/A 3/28/2024    Procedure: Posterior cervical  laminoplasty cervical two/three, cervical three/four, cervical four/five with left sided hardware;  Surgeon: Antonio Noe MD;  Location: [facility] MAIN OR;  Service: Neurosurgery;  Laterality: N/A;    CERVICAL EPIDURAL N/A 8/28/2023    Procedure: CERVICAL EPIDURAL STEROID INJECTION CPT: 71547;  Surgeon: Leah Hartman MD;  Location: [facility];  Service: Pain Management;  Laterality: N/A;    COLONOSCOPY  05/20/2011    INCISION AND DRAINAGE OF WOUND N/A 12/19/2019    Procedure: followed by drainage of anterior cervical fluid collection;  Surgeon: Antonio Noe MD;  Location: [facility] MAIN OR;  Service: Neurosurgery    LUMBAR DRAIN INSERTION EXTERNAL N/A 12/19/2019    Procedure: LUMBAR DRAIN INSERTION EXTERNAL;  Surgeon: Antonio Noe MD;  Location: [facility] MAIN OR;  Service: Neurosurgery    LUMBAR PERITONEAL SHUNT N/A 1/10/2020    Procedure: LUMBAR PERITONEAL SHUNT PLACEMENT;  Surgeon: Antonio Neo MD;  Location: [facility] MAIN OR;  Service: Neurosurgery    SPINE SURGERY  1999 back    dr abad quintero    TONSILLECTOMY  child age    TYMPANOPLASTY        General Information       Row Name 03/29/24 1039          Physical Therapy Time and Intention    Document Type evaluation  -CW     Mode of Treatment individual therapy;physical therapy  -       Row Name 03/29/24 1039          General Information    Patient Profile Reviewed yes  -CW     Prior Level of Function independent:;transfer;all household mobility;bed mobility  owns cane and walker but does not use. sister assists with stairs and showers  -CW     Existing Precautions/Restrictions fall;spinal  -CW     Barriers to Rehab none identified  -CW       Row Name 03/29/24 1039          Living Environment    People in Home sibling(s)  -CW       Row Name 03/29/24 1039          Home Main Entrance    Number of Stairs, Main Entrance none  -CW       Row Name 03/29/24 1039          Stairs Within Home, Primary    Number of Stairs, Within Home, Primary  twelve;other (see comments)  basement  -CW       Row Name 03/29/24 1039          Cognition    Orientation Status (Cognition) oriented x 4  -CW       Row Name 03/29/24 1039          Safety Issues, Functional Mobility    Impairments Affecting Function (Mobility) endurance/activity tolerance;strength;pain;balance  -CW               User Key  (r) = Recorded By, (t) = Taken By, (c) = Cosigned By      Initials Name Provider Type    Jannette Angel PT Physical Therapist                   Mobility       Row Name 03/29/24 1042          Bed Mobility    Bed Mobility supine-sit;sit-supine  -CW     Supine-Sit [place] (Bed Mobility) minimum assist (75% patient effort)  -CW     Sit-Supine [place] (Bed Mobility) minimum assist (75% patient effort)  -CW     Assistive Device (Bed Mobility) head of bed elevated;bed rails  -CW     Comment, (Bed Mobility) cues for log roll technique  -CW       Row Name 03/29/24 1042          Sit-Stand Transfer    Sit-Stand [place] (Transfers) minimum assist (75% patient effort);1 person assist;verbal cues  -CW     Assistive Device (Sit-Stand Transfers) walker, front-wheeled  -CW       Row Name 03/29/24 1042          Gait/Stairs (Locomotion)    [place] Level (Gait) contact guard;verbal cues;minimum assist (75% patient effort);1 person assist  -CW     Assistive Device (Gait) walker, front-wheeled  -CW     Distance in Feet (Gait) 25  -CW     Deviations/Abnormal Patterns (Gait) ally decreased;gait speed decreased;stride length decreased  -CW               User Key  (r) = Recorded By, (t) = Taken By, (c) = Cosigned By      Initials Name Provider Type    Jannette Angel PT Physical Therapist                   Obj/Interventions       Row Name 03/29/24 1046          Range of Motion Comprehensive    General Range of Motion bilateral lower extremity ROM WFL  -CW       Row Name 03/29/24 1046          Strength Comprehensive (MMT)    Comment, General Manual Muscle Testing (MMT)  Assessment Generalized weakness present  -CW       Row Name 03/29/24 1046          Balance    Balance Assessment standing dynamic balance;standing static balance;sitting static balance  -CW     Static Sitting Balance standby assist  -CW     Position, Sitting Balance sitting edge of bed;unsupported  -CW     Static Standing Balance contact guard  -CW     Dynamic Standing Balance minimal assist;contact guard  -CW     Position/Device Used, Standing Balance supported;walker, front-wheeled  -CW       Row Name 03/29/24 1046          Sensory Assessment (Somatosensory)    Sensory Assessment (Somatosensory) other (see comments)  reports numbness/tingling in b hands/feeet  -CW               User Key  (r) = Recorded By, (t) = Taken By, (c) = Cosigned By      Initials Name Provider Type    CW Jannette Ayala, PT Physical Therapist                   Goals/Plan       Row Name 03/29/24 1054          Bed Mobility Goal 1 (PT)    Activity/Assistive Device (Bed Mobility Goal 1, PT) bed mobility activities, all  -CW     [place] Level/Cues Needed (Bed Mobility Goal 1, PT) modified independence  -CW     Time Frame (Bed Mobility Goal 1, PT) 2 weeks  -CW       Row Name 03/29/24 1054          Transfer Goal 1 (PT)    Activity/Assistive Device (Transfer Goal 1, PT) sit-to-stand/stand-to-sit;bed-to-chair/chair-to-bed  -CW     [place] Level/Cues Needed (Transfer Goal 1, PT) modified independence  -CW     Time Frame (Transfer Goal 1, PT) 2 weeks  -CW       Row Name 03/29/24 1054          Gait Training Goal 1 (PT)    Activity/Assistive Device (Gait Training Goal 1, PT) gait (walking locomotion)  -CW     [place] Level (Gait Training Goal 1, PT) modified independence  -CW     Distance (Gait Training Goal 1, PT) 120'  -CW     Time Frame (Gait Training Goal 1, PT) 2 weeks  -CW       Row Name 03/29/24 1054          Therapy Assessment/Plan (PT)    Planned Therapy Interventions (PT) balance training;bed mobility training;gait  training;ROM (range of motion);stair training;strengthening;patient/family education;transfer training  -CW               User Key  (r) = Recorded By, (t) = Taken By, (c) = Cosigned By      Initials Name Provider Type    CW Jannette Ayala, PT Physical Therapist                   Clinical Impression       Row Name 03/29/24 1048          Pain    Pretreatment Pain Rating 5/10  -CW     Posttreatment Pain Rating 5/10  -CW     Pain Location posterior  -CW     Pain Location - neck  -CW     Pain Intervention(s) Repositioned;Rest;Ambulation/increased activity  -CW       Row Name 03/29/24 1048          Plan of Care Review    Plan of Care Reviewed With patient  -CW     Outcome Evaluation Pt is a 62 yo female seen for PT evaluation s/p Posterior cervical laminoplasty C2-C5. Pt lives with her sister, is independent with functional mobility except requires assist from sister with stair climbing and showering. Pt reports a basement within her own but only accesses it once per week. She owns a cane and walker but does not typically utilize. Upon exam, pt demo generalized weakness, impaired balance, decreased activity tolerance, post op pain and general functional mobility deficits below her baseline. She completed bed mobility with min A via log roll technique, STS with min A and ambulated 25' cga to min A using walker. Pt may benefit from ongoing skilled PT services while inpatient to address functional mobility deficits. Anticipate d/c home with family assist and HH.  -CW       Row Name 03/29/24 1048          Therapy Assessment/Plan (PT)    Rehab Potential (PT) good, to achieve stated therapy goals  -CW     Criteria for Skilled Interventions Met (PT) yes  -CW     Therapy Frequency (PT) 6 times/wk  -CW       Row Name 03/29/24 1048          Vital Signs    O2 Delivery Pre Treatment room air  -CW       Row Name 03/29/24 1048          Positioning and Restraints    Pre-Treatment Position in bed  -CW     Post Treatment Position bed   pt declined chair as she wanted to take a nap  -CW     In Bed notified nsg;call light within reach;encouraged to call for assist;supine;exit alarm on  -CW               User Key  (r) = Recorded By, (t) = Taken By, (c) = Cosigned By      Initials Name Provider Type    Jannette Angel PT Physical Therapist                   Outcome Measures       Row Name 03/29/24 1055 03/29/24 0849       How much help from another person do you currently need...    Turning from your back to your side while in flat bed without using bedrails? 3  -CW 3  -LC    Moving from lying on back to sitting on the side of a flat bed without bedrails? 3  -CW 2  -LC    Moving to and from a bed to a chair (including a wheelchair)? 3  -CW 2  -LC    Standing up from a chair using your arms (e.g., wheelchair, bedside chair)? 3  -CW 2  -LC    Climbing 3-5 steps with a railing? 2  -CW 1  -LC    To walk in hospital room? 3  -CW 2  -LC    AM-PAC 6 Clicks Score (PT) 17  -CW 12  -LC    Highest Level of Mobility Goal 5 --> Static standing  -CW 4 --> Transfer to chair/commode  -LC      Row Name 03/29/24 1055          Functional Assessment    Outcome Measure Options AM-PAC 6 Clicks Basic Mobility (PT)  -CW               User Key  (r) = Recorded By, (t) = Taken By, (c) = Cosigned By      Initials Name Provider Type     Cecille Brand RN Registered Nurse    Jannette Angel, PT Physical Therapist                                 Physical Therapy Education       Title: PT OT SLP Therapies (Done)       Topic: Physical Therapy (Done)       Point: Mobility training (Done)       Learning Progress Summary             Patient Acceptance, E, VU by CONRAD at 3/29/2024 1055    Comment: reviewed cervical precautions                         Point: Home exercise program (Done)       Learning Progress Summary             Patient Acceptance, E, VU by CONRAD at 3/29/2024 1055    Comment: reviewed cervical precautions                         Point: Body mechanics (Done)        Learning Progress Summary             Patient Acceptance, E, VU by CW at 3/29/2024 1055    Comment: reviewed cervical precautions                         Point: Precautions (Done)       Learning Progress Summary             Patient Acceptance, E, VU by CW at 3/29/2024 1055    Comment: reviewed cervical precautions                                         User Key       Initials Effective Dates Name Provider Type Discipline    CW 12/13/22 -  Jannette Ayala, PT Physical Therapist PT                  PT Recommendation and Plan  Planned Therapy Interventions (PT): balance training, bed mobility training, gait training, ROM (range of motion), stair training, strengthening, patient/family education, transfer training  Plan of Care Reviewed With: patient  Outcome Evaluation: Pt is a 64 yo female seen for PT evaluation s/p Posterior cervical laminoplasty C2-C5. Pt lives with her sister, is independent with functional mobility except requires assist from sister with stair climbing and showering. Pt reports a basement within her own but only accesses it once per week. She owns a cane and walker but does not typically utilize. Upon exam, pt demo generalized weakness, impaired balance, decreased activity tolerance, post op pain and general functional mobility deficits below her baseline. She completed bed mobility with min A via log roll technique, STS with min A and ambulated 25' cga to min A using walker. Pt may benefit from ongoing skilled PT services while inpatient to address functional mobility deficits. Anticipate d/c home with family assist and HH.     Time Calculation:         PT Charges       Row Name 03/29/24 1038             Time Calculation    Start Time 0927  -CW      Stop Time 0955  -CW      Time Calculation (min) 28 min  -CW      PT Received On 03/29/24  -CW      PT - Next Appointment 03/30/24  -CW      PT Goal Re-Cert Due Date 04/12/24  -CW         Time Calculation- PT    Total Timed Code Minutes- PT 15  minute(s)  -CW         Timed Charges    10586 - PT Therapeutic Activity Minutes 15  -CW         Total Minutes    Timed Charges Total Minutes 15  -CW       Total Minutes 15  -CW                User Key  (r) = Recorded By, (t) = Taken By, (c) = Cosigned By      Initials Name Provider Type    CW Jannette Ayala, PT Physical Therapist                  Therapy Charges for Today       Code Description Service Date Service Provider Modifiers Qty    32576786055 HC PT EVAL MOD COMPLEXITY 3 3/29/2024 Jannette Ayala, PT GP 1    [id number]  PT THERAPEUTIC ACT EA 15 MIN 3/29/2024 Jannette Ayala, PT GP 1            PT G-Codes  Outcome Measure Options: AM-PAC 6 Clicks Basic Mobility (PT)  AM-PAC 6 Clicks Score (PT): 17  PT Discharge Summary  Anticipated Discharge Disposition (PT): home with home health, home with assist    Jannette Ayala, PT  3/29/2024

## 2024-03-29 NOTE — PROGRESS NOTES
Discharge Planning Assessment  [facility]     Patient Name: Noemi Bartholomew  MRN: [id number]  Today's Date: 3/29/2024    Admit Date: 3/28/2024    [facility]   Discharge Needs Assessment       Row Name 03/29/24 1120       Living Environment    People in Home sibling(s)    Name(s) of People in Home Sister/HCS on file Donita Bartholomew [phone]    Current Living Arrangements home    Primary Care Provided by self    Provides Primary Care For no one    Family Caregiver if Needed sibling(s)    Quality of Family Relationships helpful;involved;supportive    Able to Return to Prior Arrangements yes       Resource/Environmental Concerns    Resource/Environmental Concerns none       Transition Planning    Patient/Family Anticipates Transition to inpatient rehabilitation facility    Patient/Family Anticipated Services at Transition rehabilitation services    Transportation Anticipated agency;family or friend will provide       Discharge Needs Assessment    Equipment Currently Used at Home walker, rolling;cane, straight    Concerns to be Addressed discharge planning    Outpatient/Agency/Support Group Needs inpatient rehabilitation facility    Discharge Facility/Level of Care Needs acute rehab    Provided Post Acute Provider List? Yes    Discharge Coordination/Progress Pending                   Discharge Plan       Row Name 03/29/24 1121       Plan    [facility]    Patient/Family in Agreement with Plan yes    Plan Comments CCP following to assist with d/c planning. Pt resides with her sister in a home with basement steps, has cane/walker but does not use regularly, and [facility]. CCP to follow. Gina Nobles LCSW                  Continued Care and Services - Admitted Since 3/28/2024    No active coordination exists for this encounter.          Demographic Summary       Row Name 03/29/24 1119       General  Information    Admission Type inpatient    Arrived From home    Required Notices Provided Important Message from Medicare    Referral Source admission list    Reason for Consult discharge planning    Preferred Language English                   Functional Status       Row Name 03/29/24 1119       Functional Status    Usual Activity Tolerance good    Current Activity Tolerance moderate       Functional Status, IADL    Medications assistive equipment and person    Meal Preparation assistive equipment and person    Housekeeping assistive equipment and person    Laundry assistive equipment and person    Shopping assistive equipment and person       Mental Status Summary    Recent Changes in Mental Status/Cognitive Functioning no changes                   Psychosocial    No documentation.                  Abuse/Neglect    No documentation.                  Legal    No documentation.                  Substance Abuse    No documentation.                  Patient Forms    No documentation.                     Ama Nobles LCSW

## 2024-03-29 NOTE — PROGRESS NOTES
[facility] THORACIC/LUMBAR NEUROSURGERY POSTOP NOTE      CC: POD #1 status post posterior left-sided C2-5 laminoplasty      Subjective     Interval History: No complaints overnight.  Utilizing Dilaudid PCA pump.  Lizarraga catheter remains in place.  Having some expected neck pain.  States she does not feel much different since surgery.  Vital signs stable with some hypotension overnight.      Objective     Vital signs in last 24 hours:  Temp:  [97.5 °F (36.4 °C)-98.2 °F (36.8 °C)] 98.2 °F (36.8 °C)  Heart Rate:  [60-76] 60  Resp:  [14-16] 16  BP: ()/(41-80) 102/57    Intake/Output this shift:  I/O this shift:  In: 240 [P.O.:240]  Out: -     ALONDRA 90 cc since placement yesterday.    LABS:  .  Results from last 7 days   Lab Units 03/29/24  0605 03/28/24  1721 03/26/24  0702   WBC 10*3/mm3 9.68 12.53* 4.62   HEMOGLOBIN g/dL 10.1* 10.6* 12.1   HEMATOCRIT % 30.8* 33.1* 37.6   PLATELETS 10*3/mm3 229 228 241     .  Results from last 7 days   Lab Units 03/26/24  0702   SODIUM mmol/L 142   POTASSIUM mmol/L 4.5   CHLORIDE mmol/L 110*   CO2 mmol/L 25.0   BUN mg/dL 12   CREATININE mg/dL 0.70   CALCIUM mg/dL 7.5*   GLUCOSE mg/dL 94         IMAGING STUDIES:  X-ray cervical spine 3/29/2024:  Shows previous ACDF from T5-T1 with anterior plate and screws and strut grafts.  Shows also an interval C2-5 laminoplasty with left-sided plates and screws which appear to be in good alignment without any apparent hardware malfunction or fracture.    I personally viewed and interpreted the patient's labs, imaging study, medications and chart.    Meds reviewed/changed: Yes    Current Facility-Administered Medications:     acetaminophen (TYLENOL) tablet 650 mg, 650 mg, Oral, Q4H PRN, Antonio Noe MD    atorvastatin (LIPITOR) tablet 20 mg, 20 mg, Oral, Nightly, Antonio Noe MD, 20 mg at 03/28/24 2119    baclofen (LIORESAL) tablet 5 mg, 5 mg, Oral, BID, Antonio Noe MD, 5 mg at 03/29/24 0893    calcium 500 mg vitamin D 5 mcg (200  UT) per tablet 2 tablet, 2 tablet, Oral, Daily, Antonio Noe MD    docusate sodium (COLACE) capsule 200 mg, 200 mg, Oral, BID, Antonio Noe MD, 200 mg at 03/29/24 0849    gabapentin (NEURONTIN) capsule 300 mg, 300 mg, Oral, TID, Antonio Noe MD, 300 mg at 03/29/24 0849    HYDROmorphone (DILAUDID) PCA 0.2 mg/ml 50 mL syringe, , Intravenous, Continuous, Antonio Noe MD, New Bag at 03/28/24 1516    lactated ringers infusion, 75 mL/hr, Intravenous, Continuous, Antonio Noe MD, Last Rate: 75 mL/hr at 03/29/24 0420, 75 mL/hr at 03/29/24 0420    Metamucil wafer 2 wafer, 2 wafer, Oral, QAM, Antonio Noe MD, 2 wafer at 03/29/24 0616    methocarbamol (ROBAXIN) tablet 750 mg, 750 mg, Oral, 4x Daily PRN, Antonio Noe MD    naloxone (NARCAN) injection 0.1 mg, 0.1 mg, Intravenous, Q5 Min PRN, Antonio Noe MD    ondansetron ODT (ZOFRAN-ODT) disintegrating tablet 4 mg, 4 mg, Oral, Q6H PRN **OR** ondansetron (ZOFRAN) injection 4 mg, 4 mg, Intravenous, Q6H PRN, Antonio Noe MD, 4 mg at 03/29/24 0849    Pharmacy Consult, , Does not apply, Continuous PRN, Antonio Noe MD    sodium chloride 0.9 % flush 10 mL, 10 mL, Intravenous, Q12H, Antonio Noe MD, 10 mL at 03/29/24 0850    sodium chloride 0.9 % flush 10 mL, 10 mL, Intravenous, PRN, Antonio Noe MD    sodium chloride 0.9 % infusion 40 mL, 40 mL, Intravenous, PRN, Antonio Neo MD    sodium chloride 0.9 % infusion, 30 mL/hr, Intravenous, Continuous PRN, Antonio Noe MD      Physical Exam:    General:   Awake, alert.  Back:   Posterior cervical incision with dressing clean dry and intact.  ALONDRA drain draining sanguinous fluid.    Abdomen:   NT/ND   Motor:   Normal muscle strength, bulk and tone in lower extremities.  No fasciculations, rigidity, spasticity, or abnormal movements.  Reflexes:  2+ in bilateral upper extremities.  Negative Rivers's.    Sensation:   Sensation at baseline.  Does have some very  mild numbness in arms which is at baseline.  Station and Gait:             Per PT note 3/29/2024:Pt is a 64 yo female seen for PT evaluation s/p Posterior cervical laminoplasty C2-C5. Pt lives with her sister, is independent with functional mobility except requires assist from sister with stair climbing and showering. Pt reports a basement within her own but only accesses it once per week. She owns a cane and walker but does not typically utilize. Upon exam, pt demo generalized weakness, impaired balance, decreased activity tolerance, post op pain and general functional mobility deficits below her baseline. She completed bed mobility with min A via log roll technique, STS with min A and ambulated 25' cga to min A using walker. Pt may benefit from ongoing skilled PT services while inpatient to address functional mobility deficits. Anticipate d/c home with family assist and HH.   Extremities:   Wearing SCD      Assessment & Plan     ASSESSMENT:      Cervical myelopathy    Weakness of both hands    Spasticity    Cervical spinal stenosis      63-year-old female previously underwent anterior cervical corpectomy at C6 and 7 with ACDF from C5-T1 who developed worsening gait ataxia and balance dysfunction.  She also was having issues with use of hands.  She is day 1 status post left-sided C2-5 laminoplasty.  She is having some expected neck pain.  She has not noticed much improvement since surgery but does state she is not having a lot of issues with the use of her hands today.  She did okay with ambulation with PT but we will ask for the continued assistance.  Dr. Noe is requesting she be considered for [facility] acute rehab.      PLAN:       rehab eval  Keep ALONDRA drain  Continue bowel regimen  DC Lizarraga catheter  DC Dilaudid PCA  Add as needed Dilaudid  Utilize p.o. pain medications/muscle relaxers  Utilize ice  Encourage use of incentive spirometer  SCD/DVT prophylaxis  PT/OT        I discussed the patient's findings  "and my recommendations with patient and Dr. Noe.    During patient visit, I utilized appropriate personal protective equipment including mask and gloves.  Mask used was standard procedure mask. Appropriate PPE was worn during the entire visit.  Hand hygiene was completed before and after.      LOS: 1 day       Mat Sigifredo Song, APRN  3/29/2024  10:59 EDT    \"Dictated utilizing Dragon dictation\".    "

## 2024-03-29 NOTE — PLAN OF CARE
Goal Outcome Evaluation:  Plan of Care Reviewed With: patient           Outcome Evaluation: Pt is a 62 yo female seen for PT evaluation s/p Posterior cervical laminoplasty C2-C5. Pt lives with her sister, is independent with functional mobility except requires assist from sister with stair climbing and showering. Pt reports a basement within her own but only accesses it once per week. She owns a cane and walker but does not typically utilize. Upon exam, pt demo generalized weakness, impaired balance, decreased activity tolerance, post op pain and general functional mobility deficits below her baseline. She completed bed mobility with min A via log roll technique, STS with min A and ambulated 25' cga to min A using walker. Pt may benefit from ongoing skilled PT services while inpatient to address functional mobility deficits. Anticipate d/c home with family assist and HH.      Anticipated Discharge Disposition (PT): home with home health, home with assist

## 2024-03-30 LAB
BASOPHILS # BLD AUTO: 0.03 10*3/MM3 (ref 0–0.2)
BASOPHILS NFR BLD AUTO: 0.4 % (ref 0–1.5)
DEPRECATED RDW RBC AUTO: 44.4 FL (ref 37–54)
EOSINOPHIL # BLD AUTO: 0.05 10*3/MM3 (ref 0–0.4)
EOSINOPHIL NFR BLD AUTO: 0.6 % (ref 0.3–6.2)
ERYTHROCYTE [DISTWIDTH] IN BLOOD BY AUTOMATED COUNT: 13.2 % (ref 12.3–15.4)
HCT VFR BLD AUTO: 29 % (ref 34–46.6)
HGB BLD-MCNC: 9.4 G/DL (ref 12–15.9)
IMM GRANULOCYTES # BLD AUTO: 0.02 10*3/MM3 (ref 0–0.05)
IMM GRANULOCYTES NFR BLD AUTO: 0.2 % (ref 0–0.5)
LYMPHOCYTES # BLD AUTO: 1.85 10*3/MM3 (ref 0.7–3.1)
LYMPHOCYTES NFR BLD AUTO: 22.8 % (ref 19.6–45.3)
MCH RBC QN AUTO: 29.6 PG (ref 26.6–33)
MCHC RBC AUTO-ENTMCNC: 32.4 G/DL (ref 31.5–35.7)
MCV RBC AUTO: 91.2 FL (ref 79–97)
MONOCYTES # BLD AUTO: 0.58 10*3/MM3 (ref 0.1–0.9)
MONOCYTES NFR BLD AUTO: 7.1 % (ref 5–12)
NEUTROPHILS NFR BLD AUTO: 5.59 10*3/MM3 (ref 1.7–7)
NEUTROPHILS NFR BLD AUTO: 68.9 % (ref 42.7–76)
NRBC BLD AUTO-RTO: 0 /100 WBC (ref 0–0.2)
PLATELET # BLD AUTO: 206 10*3/MM3 (ref 140–450)
PMV BLD AUTO: 9.2 FL (ref 6–12)
RBC # BLD AUTO: 3.18 10*6/MM3 (ref 3.77–5.28)
WBC NRBC COR # BLD AUTO: 8.12 10*3/MM3 (ref 3.4–10.8)

## 2024-03-30 PROCEDURE — 85025 COMPLETE CBC W/AUTO DIFF WBC: CPT | Performed by: NEUROLOGICAL SURGERY

## 2024-03-30 PROCEDURE — 25010000002 HYDROMORPHONE PER 4 MG: Performed by: NEUROLOGICAL SURGERY

## 2024-03-30 PROCEDURE — 99024 POSTOP FOLLOW-UP VISIT: CPT | Performed by: NURSE PRACTITIONER

## 2024-03-30 PROCEDURE — 25010000002 ONDANSETRON PER 1 MG: Performed by: NEUROLOGICAL SURGERY

## 2024-03-30 RX ADMIN — HYDROCODONE BITARTRATE AND ACETAMINOPHEN 1 TABLET: 5; 325 TABLET ORAL at 06:21

## 2024-03-30 RX ADMIN — GABAPENTIN 300 MG: 300 CAPSULE ORAL at 08:16

## 2024-03-30 RX ADMIN — HYDROCODONE BITARTRATE AND ACETAMINOPHEN 1 TABLET: 5; 325 TABLET ORAL at 14:49

## 2024-03-30 RX ADMIN — ONDANSETRON 4 MG: 2 INJECTION INTRAMUSCULAR; INTRAVENOUS at 18:14

## 2024-03-30 RX ADMIN — Medication 2 WAFER: at 06:13

## 2024-03-30 RX ADMIN — DOCUSATE SODIUM 200 MG: 100 CAPSULE, LIQUID FILLED ORAL at 08:16

## 2024-03-30 RX ADMIN — HYDROMORPHONE HYDROCHLORIDE 0.25 MG: 1 INJECTION, SOLUTION INTRAMUSCULAR; INTRAVENOUS; SUBCUTANEOUS at 18:14

## 2024-03-30 RX ADMIN — DOCUSATE SODIUM 200 MG: 100 CAPSULE, LIQUID FILLED ORAL at 21:21

## 2024-03-30 RX ADMIN — ACETAMINOPHEN 325MG 650 MG: 325 TABLET ORAL at 12:10

## 2024-03-30 RX ADMIN — GABAPENTIN 300 MG: 300 CAPSULE ORAL at 15:24

## 2024-03-30 RX ADMIN — Medication 10 ML: at 21:24

## 2024-03-30 RX ADMIN — HYDROCODONE BITARTRATE AND ACETAMINOPHEN 1 TABLET: 5; 325 TABLET ORAL at 21:25

## 2024-03-30 RX ADMIN — BACLOFEN 5 MG: 10 TABLET ORAL at 21:21

## 2024-03-30 RX ADMIN — CALCIUM CARBONATE-VITAMIN D TAB 500 MG-200 UNIT 2 TABLET: 500-200 TAB at 08:16

## 2024-03-30 RX ADMIN — BACLOFEN 5 MG: 10 TABLET ORAL at 08:16

## 2024-03-30 RX ADMIN — GABAPENTIN 300 MG: 300 CAPSULE ORAL at 21:21

## 2024-03-30 RX ADMIN — ATORVASTATIN CALCIUM 20 MG: 20 TABLET, FILM COATED ORAL at 21:21

## 2024-03-30 RX ADMIN — HYDROCODONE BITARTRATE AND ACETAMINOPHEN 1 TABLET: 5; 325 TABLET ORAL at 00:02

## 2024-03-30 RX ADMIN — Medication 10 ML: at 08:16

## 2024-03-30 NOTE — PROGRESS NOTES
[facility]    Met with patient at bedside to discuss acute rehab.  Will review with Dr. Abraham.    Thank you,  Yareli Jimenez, RN  Rehab Admission Nurse

## 2024-03-30 NOTE — PROGRESS NOTES
[facility] NEUROSURGERY CERVICAL PROGRESS NOTE      Cc:POD # 2 s/p Posterior cervical laminoplasty C2/3, C3/4, C4/5 with left sided screws and plates       Subjective     Interval History: No significant overnight events.  ALONDRA was removed yesterday due to a crack in the tubing.  Patient using walker, walking out of the bathroom upon my arrival this morning.  States pain is under control.      Objective     Vital signs in last 24 hours:  Temp:  [97.2 °F (36.2 °C)-98.1 °F (36.7 °C)] 97.7 °F (36.5 °C)  Heart Rate:  [66-80] 66  Resp:  [16-18] 18  BP: ()/(44-65) 102/61    Intake/Output this shift:  I/O this shift:  In: 480 [P.O.:480]  Out: -     LABS:  Results from last 7 days   Lab Units 03/30/24  0527 03/29/24  0605 03/28/24  1721   WBC 10*3/mm3 8.12 9.68 12.53*   HEMOGLOBIN g/dL 9.4* 10.1* 10.6*   HEMATOCRIT % 29.0* 30.8* 33.1*   PLATELETS 10*3/mm3 206 229 228     Results from last 7 days   Lab Units 03/26/24  0702   SODIUM mmol/L 142   POTASSIUM mmol/L 4.5   CHLORIDE mmol/L 110*   CO2 mmol/L 25.0   BUN mg/dL 12   CREATININE mg/dL 0.70   CALCIUM mg/dL 7.5*   GLUCOSE mg/dL 94         IMAGING STUDIES:  No new imaging to review    I personally viewed the patient's chart, it was also reviewed by and discussed with Dr Hasmukh Rider reviewed/changed: Yes  Lipitor 20 mg p.o. nightly  Baclofen 5 mg p.o. twice daily  Colace 200 mg p.o. twice daily  Neurontin 300 mg p.o. 3 times daily  Tylenol 650 mg p.o. every 4 hours as needed  Hydrocodone 5-3 25 1 p.o. every 6 hours as needed      Physical Exam:    General:  Awake, alert.  Neck: Posterior cervical dressing is clean, dry and intact.  Motor:   Normal muscle strength, bulk and tone in upper and lower extremities.  No fasciculations, rigidity, spasticity, or abnormal movements.  Sensation:  Normal to light touch bilaterally  Station and Gait:   Observed patient walking out of the bathroom using walker with steady gait      Assessment & Plan     ASSESSMENT:      Cervical  "myelopathy    Weakness of both hands    Spasticity    Cervical spinal stenosis    POD # 2 s/p Posterior cervical laminoplasty C2/3, C3/4, C4/5 with left sided screws and plates.  Pain being controlled with oral pain medication.  Patient ambulating with walker with minimal assist.  PT anticipates home with home health, patient states she does live with her sister.  She does note some improvement in her hands since surgery.  If patient continues to do well could potentially plan for discharge home tomorrow.    PLAN:   -Continue to work with PT  -Ice  collar as much as possible  -Possible discharge home tomorrow    I discussed the patient's findings and my recommendations with patient, nursing staff, and Dr. Noe    During patient visit, I utilized appropriate personal protective equipment including gloves.  Appropriate PPE was worn during the entire visit.  Hand hygiene was completed before and after.      LOS: 2 days       Lucila Tompkins, APRN  3/30/2024  09:51 EDT    \"Dictated utilizing Dragon dictation\".      "

## 2024-03-30 NOTE — PLAN OF CARE
Goal Outcome Evaluation:      VSS throughout shift. Pt tearful during shift about pain and headache. Treated per MAR.

## 2024-03-30 NOTE — PLAN OF CARE
Pt alert and oriented x 4. VSS but BP is on soft side. Pt complaint of incisional pain. See MAR. Pt ambulated to the bathroom. No BM. Denies nausea. Pt slept most of the night.      Problem: Adult Inpatient Plan of Care  Goal: Plan of Care Review  Outcome: Ongoing, Progressing  Goal: Patient-Specific Goal (Individualized)  Outcome: Ongoing, Progressing  Goal: Absence of Hospital-Acquired Illness or Injury  Outcome: Ongoing, Progressing  Intervention: Identify and Manage Fall Risk  Recent Flowsheet Documentation  Taken 3/30/2024 0410 by Bob Pritchard RN  Safety Promotion/Fall Prevention: safety round/check completed  Taken 3/30/2024 0208 by Bob Pritchard RN  Safety Promotion/Fall Prevention: safety round/check completed  Taken 3/30/2024 0002 by Bob Pritchard RN  Safety Promotion/Fall Prevention: safety round/check completed  Taken 3/29/2024 2212 by Bob Pritchard RN  Safety Promotion/Fall Prevention: safety round/check completed  Taken 3/29/2024 2059 by Bob Pritchard RN  Safety Promotion/Fall Prevention:   activity supervised   assistive device/personal items within reach   clutter free environment maintained   fall prevention program maintained   lighting adjusted   nonskid shoes/slippers when out of bed   safety round/check completed  Intervention: Prevent Skin Injury  Recent Flowsheet Documentation  Taken 3/30/2024 0410 by Bob Pritchard RN  Body Position: weight shifting  Taken 3/30/2024 0208 by Bob Pritchard RN  Body Position: weight shifting  Taken 3/30/2024 0002 by Bob Pritchard RN  Body Position: position changed independently  Taken 3/29/2024 2212 by Bob Pritchard RN  Body Position: position changed independently  Taken 3/29/2024 2059 by Bob Pritchard RN  Body Position: position changed independently  Intervention: Prevent and Manage VTE (Venous Thromboembolism) Risk  Recent Flowsheet Documentation  Taken 3/29/2024 2059 by Bob Pritchard RN  VTE Prevention/Management:   bilateral    sequential compression devices on  Range of Motion: active ROM (range of motion) encouraged  Intervention: Prevent Infection  Recent Flowsheet Documentation  Taken 3/30/2024 0002 by Bob Pritchard RN  Infection Prevention: rest/sleep promoted  Taken 3/29/2024 2212 by Bob Pritchard RN  Infection Prevention: rest/sleep promoted  Taken 3/29/2024 2059 by Bob Pritchard RN  Infection Prevention: hand hygiene promoted  Goal: Optimal Comfort and Wellbeing  Outcome: Ongoing, Progressing  Intervention: Monitor Pain and Promote Comfort  Recent Flowsheet Documentation  Taken 3/30/2024 0002 by Bob Pritchard RN  Pain Management Interventions: see MAR  Taken 3/29/2024 2059 by Bob Pritchard RN  Pain Management Interventions: (pt states tolerable)   care clustered   pillow support provided   position adjusted   no interventions per patient request  Intervention: Provide Person-Centered Care  Recent Flowsheet Documentation  Taken 3/29/2024 2059 by Bob Pritchard RN  Trust Relationship/Rapport:   care explained   choices provided   questions answered   questions encouraged   thoughts/feelings acknowledged  Goal: Readiness for Transition of Care  Outcome: Ongoing, Progressing     Problem: Pain Acute  Goal: Acceptable Pain Control and Functional Ability  Outcome: Ongoing, Progressing  Intervention: Prevent or Manage Pain  Recent Flowsheet Documentation  Taken 3/30/2024 0410 by Bob Pritchard RN  Sensory Stimulation Regulation:   care clustered   lighting decreased  Taken 3/30/2024 0002 by Bob Pritchard RN  Sensory Stimulation Regulation:   care clustered   lighting decreased  Taken 3/29/2024 2059 by Bob Pritchard RN  Sensory Stimulation Regulation:   care clustered   lighting decreased   television on  Bowel Elimination Promotion: adequate fluid intake promoted  Medication Review/Management: medications reviewed  Intervention: Develop Pain Management Plan  Recent Flowsheet Documentation  Taken 3/30/2024 0002 by Macho  ADRLING Rojas  Pain Management Interventions: see MAR  Taken 3/29/2024 2059 by Bob Pritchard RN  Pain Management Interventions: (pt states tolerable)   care clustered   pillow support provided   position adjusted   no interventions per patient request  Intervention: Optimize Psychosocial Wellbeing  Recent Flowsheet Documentation  Taken 3/29/2024 2059 by Bob Pritchard RN  Diversional Activities: television     Problem: Fall Injury Risk  Goal: Absence of Fall and Fall-Related Injury  Outcome: Ongoing, Progressing  Intervention: Identify and Manage Contributors  Recent Flowsheet Documentation  Taken 3/29/2024 2059 by Bob Pritchard RN  Medication Review/Management: medications reviewed  Intervention: Promote Injury-Free Environment  Recent Flowsheet Documentation  Taken 3/30/2024 0410 by Bob Pritchard RN  Safety Promotion/Fall Prevention: safety round/check completed  Taken 3/30/2024 0208 by Bob Pritchard RN  Safety Promotion/Fall Prevention: safety round/check completed  Taken 3/30/2024 0002 by Bob Pritchard RN  Safety Promotion/Fall Prevention: safety round/check completed  Taken 3/29/2024 2212 by Bob Pritchard RN  Safety Promotion/Fall Prevention: safety round/check completed  Taken 3/29/2024 2059 by Bob Pritchard RN  Safety Promotion/Fall Prevention:   activity supervised   assistive device/personal items within reach   clutter free environment maintained   fall prevention program maintained   lighting adjusted   nonskid shoes/slippers when out of bed   safety round/check completed     Problem: Infection  Goal: Absence of Infection Signs and Symptoms  Outcome: Ongoing, Progressing  Intervention: Prevent or Manage Infection  Recent Flowsheet Documentation  Taken 3/29/2024 2059 by Bob Pritchard RN  Isolation Precautions: precautions maintained   Goal Outcome Evaluation:

## 2024-03-31 ENCOUNTER — READMISSION MANAGEMENT (OUTPATIENT)
Dept: CALL CENTER | Facility: HOSPITAL | Age: 64
End: 2024-03-31
Payer: MEDICARE

## 2024-03-31 VITALS
HEIGHT: 55 IN | RESPIRATION RATE: 18 BRPM | BODY MASS INDEX: 24.74 KG/M2 | TEMPERATURE: 98.2 F | WEIGHT: 106.92 LBS | HEART RATE: 72 BPM | OXYGEN SATURATION: 95 % | SYSTOLIC BLOOD PRESSURE: 121 MMHG | DIASTOLIC BLOOD PRESSURE: 71 MMHG

## 2024-03-31 PROCEDURE — 97530 THERAPEUTIC ACTIVITIES: CPT

## 2024-03-31 PROCEDURE — 25010000002 ONDANSETRON PER 1 MG: Performed by: NEUROLOGICAL SURGERY

## 2024-03-31 PROCEDURE — 99024 POSTOP FOLLOW-UP VISIT: CPT | Performed by: NURSE PRACTITIONER

## 2024-03-31 RX ORDER — ACETAMINOPHEN 325 MG/1
650 TABLET ORAL EVERY 4 HOURS PRN
COMMUNITY
Start: 2024-03-31

## 2024-03-31 RX ORDER — METHOCARBAMOL 750 MG/1
750 TABLET, FILM COATED ORAL 4 TIMES DAILY PRN
Qty: 40 TABLET | Refills: 1 | Status: SHIPPED | OUTPATIENT
Start: 2024-03-31

## 2024-03-31 RX ORDER — ONDANSETRON 4 MG/1
4 TABLET, ORALLY DISINTEGRATING ORAL EVERY 6 HOURS PRN
Qty: 18 TABLET | Refills: 0 | Status: SHIPPED | OUTPATIENT
Start: 2024-03-31

## 2024-03-31 RX ORDER — HYDROCODONE BITARTRATE AND ACETAMINOPHEN 5; 325 MG/1; MG/1
1 TABLET ORAL EVERY 6 HOURS PRN
Qty: 25 TABLET | Refills: 0 | Status: SHIPPED | OUTPATIENT
Start: 2024-03-31

## 2024-03-31 RX ADMIN — CALCIUM CARBONATE-VITAMIN D TAB 500 MG-200 UNIT 2 TABLET: 500-200 TAB at 08:48

## 2024-03-31 RX ADMIN — ONDANSETRON 4 MG: 2 INJECTION INTRAMUSCULAR; INTRAVENOUS at 06:00

## 2024-03-31 RX ADMIN — HYDROCODONE BITARTRATE AND ACETAMINOPHEN 1 TABLET: 5; 325 TABLET ORAL at 06:00

## 2024-03-31 RX ADMIN — GABAPENTIN 300 MG: 300 CAPSULE ORAL at 08:48

## 2024-03-31 RX ADMIN — BACLOFEN 5 MG: 10 TABLET ORAL at 08:48

## 2024-03-31 RX ADMIN — HYDROCODONE BITARTRATE AND ACETAMINOPHEN 1 TABLET: 5; 325 TABLET ORAL at 12:35

## 2024-03-31 RX ADMIN — DOCUSATE SODIUM 200 MG: 100 CAPSULE, LIQUID FILLED ORAL at 08:48

## 2024-03-31 RX ADMIN — Medication 2 WAFER: at 06:00

## 2024-03-31 RX ADMIN — Medication 10 ML: at 08:48

## 2024-03-31 NOTE — DISCHARGE SUMMARY
Noemi Bartholomew  1960    Patient Care Team:  Mary Lou Carroll MD as PCP - General (Internal Medicine)  Kerri Hernandez MD as Consulting Physician (Obstetrics and Gynecology)    Date of Admit: 3/28/2024    Date of Discharge:  3/31/2024    Discharge Diagnosis:  Cervical myelopathy    Weakness of both hands    Spasticity    Cervical spinal stenosis      Procedures Performed  Procedure(s):  Posterior cervical laminoplasty cervical two/three, cervical three/four, cervical four/five with left sided hardware       Complications: None    Consultants:   Consults       Date and Time Order Name Status Description    3/28/2024  3:01 PM Inpatient Physical Medicine Rehab Consult              Condition on Discharge: stable    Discharge disposition: home        Brief HPI: Patient evaluated in office for complaints of balance dysfunction and gait ataxia as well as decreased overall in the use of her hands. Imaging revealed adjacent level disease above her fusion segment particularly at C3-C4 but also at C4-C5 and C2-C3. RBAs of treatment were discussed including the above procedure. Patient consented to above procedure.    Hospital Course: Patient admitted for above procedure. The procedure itself was without complication. The patient was transferred to [facility] following recovery.  Patient had uncomplicated postop recovery stay.  Her ALONDRA drain was removed on postop day 1 due to a break in the tubing.  She has tolerated diet, had some nausea that was resolved with Zofran.  Posterior neck pain is being controlled with oral pain medication.  She has worked with physical therapy who feel she is safe to go home with family assist and home health.  She has not had a bowel movement at time of discharge however she is passing flatus and voiding without difficulty.  Her posterior cervical surgical dressing is clean, dry and intact.  She will follow-up in the office in 2 to 3 weeks for her first postop appointment.  On day of  discharge she is stable and ready to go home.    Discharge Physical Exam:    Temp:  [97.6 °F (36.4 °C)-98.2 °F (36.8 °C)] 98.2 °F (36.8 °C)  Heart Rate:  [70-75] 72  Resp:  [18] 18  BP: (100-121)/(55-71) 121/71    Current labs:  Lab Results (last 24 hours)       ** No results found for the last 24 hours. **              General Appearance No acute distress   HEENT NC/AT;    Neurological Awake, Alert, and oriented x 3   Motor Strength normal, tone normal to bilateral upper extremities   Sensory Intact to light touch to bilateral upper extremities   Gait and station Ambulating with use of walker   Neck Supple, trachea midline.  Posterior cervical surgical dressing is clean, dry and intact.   Extremities Moves all extremities well, no edema, no cyanosis, no redness         Discharge Medications  WILY has been reviewed and narcotic consent is on file in the patient's chart.     Your medication list        START taking these medications        Instructions Last Dose Given Next Dose Due   acetaminophen 325 MG tablet  Commonly known as: TYLENOL      Take 2 tablets by mouth Every 4 (Four) Hours As Needed for Mild Pain.       HYDROcodone-acetaminophen 5-325 MG per tablet  Commonly known as: NORCO      Take 1 tablet by mouth Every 6 (Six) Hours As Needed for Moderate Pain.       methocarbamol 750 MG tablet  Commonly known as: ROBAXIN      Take 1 tablet by mouth 4 (Four) Times a Day As Needed for Muscle Spasms.       ondansetron ODT 4 MG disintegrating tablet  Commonly known as: ZOFRAN-ODT      Place 1 tablet on the tongue Every 6 (Six) Hours As Needed for Nausea or Vomiting.              CONTINUE taking these medications        Instructions Last Dose Given Next Dose Due   atorvastatin 20 MG tablet  Commonly known as: LIPITOR      Take 1 tablet by mouth Every Night.       Baclofen 5 MG tablet  Commonly known as: LIORESAL      Take 1 tablet by mouth 2 (Two) Times a Day.       CALCIUM 1000 + D PO      Take 2 tablets by mouth  Daily. 2 caps       denosumab 60 MG/ML solution prefilled syringe syringe  Commonly known as: PROLIA      1 mL Every 6 (Six) Months.       docusate sodium 100 MG capsule  Commonly known as: COLACE      Take 1 capsule by mouth Every Morning.       gabapentin 300 MG capsule  Commonly known as: NEURONTIN      Take 1 capsule by mouth 3 (Three) Times a Day.       Metamucil Fiber chewable tablet      Chew 2 tablets Every Morning.              STOP taking these medications      naproxen sodium 220 MG tablet  Commonly known as: ALEVE                  Where to Get Your Medications        These medications were sent to [place] [id number]      Hours: Monday to Friday 7 AM to 6 PM, Saturday & Sunday 8 AM to 4:30 PM (Closed 12 PM to 12:30 PM) Phone: [phone]   HYDROcodone-acetaminophen 5-325 MG per tablet  methocarbamol 750 MG tablet  ondansetron ODT 4 MG disintegrating tablet       You can get these medications from any pharmacy    You don't need a prescription for these medications  acetaminophen 325 MG tablet         Discharge Diet:   Diet Instructions       Diet: Regular/House Diet; Regular (IDDSI 7); Thin (IDDSI 0)      Discharge Diet: Regular/House Diet    Texture: Regular (IDDSI 7)    Fluid Consistency: Thin (IDDSI 0)          Diet Order   Procedures    Diet: Regular/House; Fluid Consistency: Thin (IDDSI 0)       Activity at Discharge:   Activity Instructions       Discharge Activity      1) No driving until cleared by us and no longer taking narcotics.   2) Off work/school until cleared by us.  3) May shower but no submerging wound in tub, pool, etc.  4) Do not lift / push / pull more then 5 lbs.  5.) Wear ice collar as much as possible  6.) No overhead activity/ No bending/twisting/impact activity    Other Restrictions (Specify)      Pelvic Rest              Call for: questions or concerns    Follow-up Appointments  Future Appointments   Date Time Provider  "[facility] Center   7/17/2024  8:10 AM [facility]   7/24/2024  7:45 AM Mary Lou Carroll MD [facility]      Follow-up Information       Mary Lou Carroll MD .    Specialty: Internal Medicine  Contact information:  [address] [address]  [phone]                           Additional Instructions for the Follow-ups that You Need to Schedule       Dressing Change Instructions   As directed      Remove dressing on 4/424 and leave off at that time    Order Comments: Remove dressing on 4/424 and leave off at that time         Notify Physician or Go To The ED For the Following Conditions   As directed      Including but not limited to fever >100.5, chills, wound concerns (redness, swelling, drainage), new symptoms of numbness, tingling, weakness; new or uncontrolled pain despite using prescribed medications    Order Comments: Including but not limited to fever >100.5, chills, wound concerns (redness, swelling, drainage), new symptoms of numbness, tingling, weakness; new or uncontrolled pain despite using prescribed medications                 Test Results Pending at Discharge     None    I discussed the discharge instructions with patient, nursing staff, and Dr. Hasmukh Tompkins, APRN  03/31/24  09:24 EDT        \"Dictated utilizing Dragon dictation\".      "

## 2024-03-31 NOTE — OUTREACH NOTE
Prep Survey      Flowsheet Row Responses   [facility] patient discharged from? [place]   Is LACE score < 7 ? Yes   Eligibility [facility]   Date of Admission 03/28/24   Date of Discharge 03/31/24   Discharge Disposition Home or Self Care   Discharge diagnosis :  Cervical myelopathyPosterior cervical laminoplasty cervical two/three, cervical three/four, cervical four/five with left sided hardware   Does the patient have one of the following disease processes/diagnoses(primary or secondary)? General Surgery   Does the patient have Home health ordered? No   Is there a DME ordered? No   Prep survey completed? Yes            DARYN MULTANI - Registered Nurse

## 2024-03-31 NOTE — THERAPY TREATMENT NOTE
Patient Name: Noemi Bartholomew  : 1960    MRN: [id number]                              Today's Date: 3/31/2024       Admit Date: 3/28/2024    Visit Dx:     ICD-10-CM ICD-9-CM   1. S/P cervical spinal fusion  Z98.1 V45.4   2. Cervical myelopathy  G95.9 721.1   3. Spasticity  R25.2 781.0   4. Weakness of both hands  R29.898 729.89   5. Cervical spinal stenosis  M48.02 723.0     Patient Active Problem List   Diagnosis    Carpal tunnel syndrome    Hyperlipidemia    Osteoporosis    DDD (degenerative disc disease), lumbar    Chronic left-sided lumbar radiculopathy    Congenital spondylolysis, lumbosacral region    Neuropathic pain, leg, left    Ossification of spinal ligament    Cervical spondylosis with myelopathy    Cervical stenosis of spine    Cervical myelopathy    Weakness of both hands    Chronic neck pain    Occipital neuralgia of right side    S/P cervical spinal fusion    Spasticity    Pernicious anemia    Cervical spinal stenosis     Past Medical History:   Diagnosis Date    Abnormal alkaline phosphatase test 2015    Resolved    Acute bronchitis 2015    Resolved    Allergic child age    pencillin    Cervical spinal stenosis     Diverticulosis     H/O electromyography 10/13/2014    H/O mammogram 2014    Hard to intubate     Headache occassionally    High cholesterol     History of EKG 2024    Hypercalcemia 2015    Resolved, Full w/u done, Likely secondary to [place]    Left arm pain     Low back pain 2017    Numbness in both hands     Osteopenia several years    Osteoporosis     Paresthesia 2015    Resolved     Past Surgical History:   Procedure Laterality Date    ANTERIOR CHANNEL VERTEBRECTOMY/CORPECTOMY N/A 2019    Procedure: CERVICAL 6, CERVICAL 7 ANTERIOR CERVICAL CORPECTOMY WITH CAGE AND PLATE;  Surgeon: Antonio Noe MD;  Location: [facility];  Service: Neurosurgery    BACK SURGERY      CERVICAL DISCECTOMY POSTERIOR FUSION WITH INSTRUMENTATION  N/A 3/28/2024    Procedure: Posterior cervical laminoplasty cervical two/three, cervical three/four, cervical four/five with left sided hardware;  Surgeon: Antonio Noe MD;  [facility];  Service: Neurosurgery;  Laterality: N/A;    CERVICAL EPIDURAL N/A 8/28/2023    Procedure: CERVICAL EPIDURAL STEROID INJECTION CPT: 94802;  Surgeon: Leah Hartman MD;  Location: [facility] MAIN OR;  Service: Pain Management;  Laterality: N/A;    COLONOSCOPY  05/20/2011    INCISION AND DRAINAGE OF WOUND N/A 12/19/2019    Procedure: followed by drainage of anterior cervical fluid collection;  Surgeon: Antonio Noe MD;  Location: [facility] MAIN OR;  Service: Neurosurgery    LUMBAR DRAIN INSERTION EXTERNAL N/A 12/19/2019    Procedure: LUMBAR DRAIN INSERTION EXTERNAL;  Surgeon: Antonio Noe MD;  Location: [facility] MAIN OR;  Service: Neurosurgery    LUMBAR PERITONEAL SHUNT N/A 1/10/2020    Procedure: LUMBAR PERITONEAL SHUNT PLACEMENT;  Surgeon: Antonio Noe MD;  Location: [facility] MAIN OR;  Service: Neurosurgery    SPINE SURGERY  1999 back    dr abad quintero    TONSILLECTOMY  child age    TYMPANOPLASTY        General Information       Row Name 03/31/24 1112          Physical Therapy Time and Intention    Document Type therapy note (daily note)  -     Mode of Treatment individual therapy;physical therapy  -       Row Name 03/31/24 1112          General Information    Patient Profile Reviewed yes  -     Existing Precautions/Restrictions fall;spinal  -       Row Name 03/31/24 1112          Cognition    Orientation Status (Cognition) oriented x 4  -       Row Name 03/31/24 1112          Safety Issues, Functional Mobility    Impairments Affecting Function (Mobility) strength;pain;endurance/activity tolerance;balance  -               User Key  (r) = Recorded By, (t) = Taken By, (c) = Cosigned By      Initials Name Provider Type    Ama Henao PT Physical Therapist                   Mobility        Row Name 03/31/24 1113          Bed Mobility    Bed Mobility supine-sit;sit-supine  -JL     Supine-Sit [place] (Bed Mobility) standby assist  -JL     Sit-Supine [place] (Bed Mobility) standby assist  -JL     Assistive Device (Bed Mobility) head of bed elevated;bed rails  -JL       Row Name 03/31/24 1113          Sit-Stand Transfer    Sit-Stand [place] (Transfers) standby assist  -JL     Assistive Device (Sit-Stand Transfers) walker, front-wheeled  -JL       Row Name 03/31/24 1113          Gait/Stairs (Locomotion)    [place] Level (Gait) contact guard;verbal cues  -JL     Assistive Device (Gait) walker, front-wheeled  -JL     Patient was able to Ambulate yes  -JL     Distance in Feet (Gait) 400  -JL     Deviations/Abnormal Patterns (Gait) stride length decreased;gait speed decreased  -JL               User Key  (r) = Recorded By, (t) = Taken By, (c) = Cosigned By      Initials Name Provider Type    Ama Henao, PT Physical Therapist                   Obj/Interventions    No documentation.                  Goals/Plan    No documentation.                  Clinical Impression       Row Name 03/31/24 1114          Pain    Pre/Posttreatment Pain Comment No numerical value but pt says pain was not bad  -JL       Row Name 03/31/24 1114          Plan of Care Review    Plan of Care Reviewed With patient  -JL     Progress improving  -     Outcome Evaluation Pt was able to transfer supine to sitting EOB wiht SBA. Pt transferred STS with SBA. Pt ambulated in [facility] for 400ft. She did not require rest breaks, but her gait speed was decreased and she was cautious with turning. Pt then used the bathroom when returning to room with CGA for transfer to toilet. She was able to transfer back to bed SBA. Pt tolerates treatment well and discharge rommendation is home health.  -       Row Name 03/31/24 1114          Therapy Assessment/Plan (PT)    Rehab Potential (PT) good, to achieve stated  therapy goals  -     Criteria for Skilled Interventions Met (PT) yes  -JL     Therapy Frequency (PT) 6 times/wk  -JL       Row Name 03/31/24 1114          Positioning and Restraints    Pre-Treatment Position in bed  -JL     Post Treatment Position bed  -JL     In Bed supine;call light within reach;encouraged to call for assist;exit alarm on  -JL               User Key  (r) = Recorded By, (t) = Taken By, (c) = Cosigned By      Initials Name Provider Type    Ama Henao, PT Physical Therapist                   Outcome Measures       Row Name 03/31/24 1117 03/31/24 0848       How much help from another person do you currently need...    Turning from your back to your side while in flat bed without using bedrails? 4  -JL 4  -LC    Moving from lying on back to sitting on the side of a flat bed without bedrails? 4  -JL 3  -LC    Moving to and from a bed to a chair (including a wheelchair)? 4  -JL 3  -LC    Standing up from a chair using your arms (e.g., wheelchair, bedside chair)? 4  -JL 3  -LC    Climbing 3-5 steps with a railing? 3  -JL 3  -LC    To walk in hospital room? 4  -JL 3  -LC    AM-PAC 6 Clicks Score (PT) 23  -JL 19  -LC    Highest Level of Mobility Goal 7 --> Walk 25 feet or more  -JL 6 --> Walk 10 steps or more  -              User Key  (r) = Recorded By, (t) = Taken By, (c) = Cosigned By      Initials Name Provider Type     Cecille Brand, RN Registered Nurse    Ama Henao, PT Physical Therapist                                 Physical Therapy Education       Title: PT OT SLP Therapies (Done)       Topic: Physical Therapy (Done)       Point: Mobility training (Done)       Learning Progress Summary             Patient Acceptance, E, VU by LIANNA at 3/31/2024 1117    Acceptance, E, VU by CONRAD at 3/29/2024 1055    Comment: reviewed cervical precautions                         Point: Home exercise program (Done)       Learning Progress Summary             Patient Acceptance, E, VU by LIANNA at  3/31/2024 1117    Acceptance, E, VU by CW at 3/29/2024 1055    Comment: reviewed cervical precautions                         Point: Body mechanics (Done)       Learning Progress Summary             Patient Acceptance, E, VU by JL at 3/31/2024 1117    Acceptance, E, VU by CW at 3/29/2024 1055    Comment: reviewed cervical precautions                         Point: Precautions (Done)       Learning Progress Summary             Patient Acceptance, E, VU by JL at 3/31/2024 1117    Acceptance, E, VU by CW at 3/29/2024 1055    Comment: reviewed cervical precautions                                         User Key       Initials Effective Dates Name Provider Type Discipline    CW 12/13/22 -  Jannette Ayala, PT Physical Therapist PT     01/16/24 -  Ama Santiago, PT Physical Therapist PT                  PT Recommendation and Plan     Plan of Care Reviewed With: patient  Progress: improving  Outcome Evaluation: Pt was able to transfer supine to sitting EOB wiht SBA. Pt transferred STS [facility]. [facility] She did not require rest breaks, but her gait speed was decreased and she was cautious with turning. Pt then used the bathroom when returning to room with CGA for transfer to toilet. She was able to transfer back to bed SBA. Pt tolerates treatment well and discharge rommendation is home health.     Time Calculation:         PT Charges       Row Name 03/31/24 1118             Time Calculation    Start Time 0957  -JL      Stop Time 1021  -JL      Time Calculation (min) 24 min  -JL      PT Received On 03/31/24  -JL      PT - Next Appointment 04/01/24  -JL         Time Calculation- PT    Total Timed Code Minutes- PT 24 minute(s)  -JL         Timed Charges    29850 - PT Therapeutic Activity Minutes 24  -JL         Total Minutes    Timed Charges Total Minutes 24  -JL       Total Minutes 24  -JL                User Key  (r) = Recorded By, (t) = Taken By, (c) = Cosigned By      Initials Name  Provider Type    JL Ama Santaigo, PT Physical Therapist                  Therapy Charges for Today       Code Description Service Date Service Provider Modifiers Qty    55804795996 HC PT THERAPEUTIC ACT EA 15 MIN 3/31/2024 Ama Santiago, PT GP 2            PT G-Codes  Outcome Measure Options: AM-PAC 6 Clicks Daily Activity (OT)  AM-PAC 6 Clicks Score (PT): 23  AM-PAC 6 Clicks Score (OT): 18  PT Discharge Summary  Anticipated Discharge Disposition (PT): home with assist, home with home health    Ama Santiago, PT  3/31/2024

## 2024-03-31 NOTE — PROGRESS NOTES
[facility]    Noted plans for patient to discharge to home. Will sign off.    Thank you,  Yareli Jimenez RN  Rehab Admission Nurse

## 2024-03-31 NOTE — PLAN OF CARE
Goal Outcome Evaluation:  Plan of Care Reviewed With: patient        Progress: improving  Outcome Evaluation: Pt was able to transfer supine to sitting EOB wiht SBA. Pt transferred STS with SBA. Pt ambulated in hallway [facility] CGA for 400ft. She did not require rest breaks, but her gait speed was decreased and she was cautious with turning. Pt then used the bathroom when returning to room with CGA for transfer to toilet. She was able to transfer back to bed SBA. Pt tolerates treatment well and discharge rommendation is home health.      Anticipated Discharge Disposition (PT): home with assist, home with home health

## 2024-03-31 NOTE — PLAN OF CARE
Pt alert and oriented x 4. VSS. Pt complaint of incisional neck pain. See MAR. Pt slept most of the shift.       Problem: Adult Inpatient Plan of Care  Goal: Plan of Care Review  Outcome: Ongoing, Progressing  Goal: Patient-Specific Goal (Individualized)  Outcome: Ongoing, Progressing  Goal: Absence of Hospital-Acquired Illness or Injury  Outcome: Ongoing, Progressing  Intervention: Identify and Manage Fall Risk  Recent Flowsheet Documentation  Taken 3/31/2024 0410 by Bob Pritchard RN  Safety Promotion/Fall Prevention: safety round/check completed  Taken 3/31/2024 0205 by Bob Pritchard RN  Safety Promotion/Fall Prevention: safety round/check completed  Taken 3/31/2024 0010 by Bob Pritchard RN  Safety Promotion/Fall Prevention: safety round/check completed  Taken 3/30/2024 2240 by Bob Pritchard RN  Safety Promotion/Fall Prevention: safety round/check completed  Taken 3/30/2024 2125 by Bob Pritchard RN  Safety Promotion/Fall Prevention: safety round/check completed  Intervention: Prevent Skin Injury  Recent Flowsheet Documentation  Taken 3/31/2024 0410 by Bob Pritchard RN  Body Position: position changed independently  Taken 3/31/2024 0205 by Bob Pritchard RN  Body Position: position changed independently  Taken 3/31/2024 0010 by Bob Pritchard RN  Body Position: position changed independently  Taken 3/30/2024 2240 by Bob Pritchard RN  Body Position: position changed independently  Taken 3/30/2024 2125 by Bob Pritchard RN  Body Position: position changed independently  Skin Protection: adhesive use limited  Intervention: Prevent and Manage VTE (Venous Thromboembolism) Risk  Recent Flowsheet Documentation  Taken 3/30/2024 2125 by Bob Pritchard RN  VTE Prevention/Management:   bilateral   sequential compression devices off   patient refused intervention  Range of Motion: active ROM (range of motion) encouraged  Intervention: Prevent Infection  Recent Flowsheet Documentation  Taken 3/31/2024 0205 by  Bob Pritchard RN  Infection Prevention: rest/sleep promoted  Taken 3/31/2024 0010 by Bob Pritchard RN  Infection Prevention: rest/sleep promoted  Taken 3/30/2024 2240 by Bob Pritchard RN  Infection Prevention: rest/sleep promoted  Taken 3/30/2024 2125 by Bob Pritchard RN  Infection Prevention: hand hygiene promoted  Goal: Optimal Comfort and Wellbeing  Outcome: Ongoing, Progressing  Intervention: Monitor Pain and Promote Comfort  Recent Flowsheet Documentation  Taken 3/30/2024 2125 by Bob Pritchard RN  Pain Management Interventions: see MAR  Intervention: Provide Person-Centered Care  Recent Flowsheet Documentation  Taken 3/30/2024 2125 by Bob Pritchard RN  Trust Relationship/Rapport:   care explained   choices provided   questions answered   questions encouraged   thoughts/feelings acknowledged  Goal: Readiness for Transition of Care  Outcome: Ongoing, Progressing     Problem: Pain Acute  Goal: Acceptable Pain Control and Functional Ability  Outcome: Ongoing, Progressing  Intervention: Prevent or Manage Pain  Recent Flowsheet Documentation  Taken 3/31/2024 0410 by Bob Pritchard RN  Sensory Stimulation Regulation:   care clustered   lighting decreased  Taken 3/31/2024 0010 by Bob Pritchard RN  Sensory Stimulation Regulation:   care clustered   lighting decreased  Taken 3/30/2024 2125 by Bob Pritchard RN  Sensory Stimulation Regulation:   care clustered   lighting decreased   television on  Bowel Elimination Promotion: ambulation promoted  Medication Review/Management: medications reviewed  Intervention: Develop Pain Management Plan  Recent Flowsheet Documentation  Taken 3/30/2024 2125 by Bob Pritchard RN  Pain Management Interventions: see MAR  Intervention: Optimize Psychosocial Wellbeing  Recent Flowsheet Documentation  Taken 3/30/2024 2125 by Bob Pritchard RN  Diversional Activities: television     Problem: Fall Injury Risk  Goal: Absence of Fall and Fall-Related Injury  Outcome: Ongoing,  Progressing  Intervention: Identify and Manage Contributors  Recent Flowsheet Documentation  Taken 3/30/2024 2125 by Bob Pritchard RN  Medication Review/Management: medications reviewed  Intervention: Promote Injury-Free Environment  Recent Flowsheet Documentation  Taken 3/31/2024 0410 by Bob Pritchard RN  Safety Promotion/Fall Prevention: safety round/check completed  Taken 3/31/2024 0205 by Bob Pritchard RN  Safety Promotion/Fall Prevention: safety round/check completed  Taken 3/31/2024 0010 by Bob Pritchard RN  Safety Promotion/Fall Prevention: safety round/check completed  Taken 3/30/2024 2240 by Bob Pritchard RN  Safety Promotion/Fall Prevention: safety round/check completed  Taken 3/30/2024 2125 by Bob Pritchard RN  Safety Promotion/Fall Prevention: safety round/check completed     Problem: Infection  Goal: Absence of Infection Signs and Symptoms  Outcome: Ongoing, Progressing  Intervention: Prevent or Manage Infection  Recent Flowsheet Documentation  Taken 3/30/2024 2125 by Bob Pritchard RN  Isolation Precautions: precautions maintained   Goal Outcome Evaluation:

## 2024-03-31 NOTE — DISCHARGE INSTRUCTIONS
ADWNA KOWALSKI M.D.  [address], [address]  [place] [address]  [phone]    Posterior Cervical Decompression or Laminoplasty    Post-Operative Instructions    1. You should have a post-operative visit scheduled with the Physician Assistant or Nurse Practitioner for 2 to 2 ½ weeks post-operatively. If you do not have one, call the office when you return home to schedule this appointment. You are to remain off work at least until the first visit.    2. No lifting overhead, although you may place your arms above your head. Walking is allowed and encouraged. There are no restrictions on sitting or climbing stairs, but refrain from overly strenuous activity. Please do not drive until the first visit, although you may be driven. In general, activity restrictions will be lessened following the first visit.    3. If your doctor has provided a cervical collar, please wear at all times, except while in the shower. You do not have a fusion so some movement is allowed in your neck. The collar is intended primarily for relief of neck discomfort.    4. Take your dressings off April 4th and leave it off. Keep the wound dry. IF you have staples/stiches clean your incision with peroxide three times daily. IF your incision is closed with glue, do not pick, scratch or rub the glue on the wound, so it does not loosen before the wound heals. Do not soak your wound in water until the glue film falls off. Avoid swimming or using a hot tub while the glue is in place. When you shower or bathe, let water run over the wound but do not rub. Pat the wound gently with a soft towel to dry. Avoid direct sunlight to the wound and do not use tanning beds or lamps with the glue film in place. Do not apply any cream, lotion, or ointment to the skin near the wound; it could loosen the glue before the wound heals. Do not apply any tape, sticky dressing, alcohol or Chloraprep to the glue site as these could loosen the glue.    5. You will  leave the hospital with medications for pain and possibly oral antibiotics if indicated. These medications must be taken as prescribed only. If a refill of your pain medication is required, in order to have this medication refilled you must contact the office four days prior to the due date.    6. If there are any problems such as excessive neck or arm pain, persistent temperature of 100 degrees or greater despite Tylenol, chills, excessive bloody discharge from the wound, redness and hot skin around the incision, clear or yellowish discharge from the wound, or severe headache, particularly when sitting or standing, please call the office. A small amount of bleeding from the incision during the first few days is not unusual.    MST Score 2 or >

## 2024-04-01 ENCOUNTER — TRANSITIONAL CARE MANAGEMENT TELEPHONE ENCOUNTER (OUTPATIENT)
Dept: CALL CENTER | Facility: HOSPITAL | Age: 64
End: 2024-04-01
Payer: MEDICARE

## 2024-04-01 NOTE — OUTREACH NOTE
Call Center TCM Note      Flowsheet Row Responses   [facility] patient discharged from? [place]   Does the patient have one of the following disease processes/diagnoses(primary or secondary)? General Surgery   TCM attempt successful? No   Unsuccessful attempts Attempt 1            Yareli Cox LPN    4/1/2024, 14:54 EDT

## 2024-04-01 NOTE — PROGRESS NOTES
Case Management Discharge Note      Final Note: Pt discharged home with sister    Provided Post Acute Provider List?: Yes    Selected Continued Care - Discharged on 3/31/2024 Admission date: 3/28/2024 - Discharge disposition: Home or Self Care      Destination    No services have been selected for the patient.                Durable Medical Equipment    No services have been selected for the patient.                Dialysis/Infusion    No services have been selected for the patient.                Home Medical Care    No services have been selected for the patient.                Therapy    No services have been selected for the patient.                Community Resources    No services have been selected for the patient.                Community & DME    No services have been selected for the patient.                    Transportation Services  Private: Car    Final Discharge Disposition Code: 01 - home or self-care

## 2024-04-01 NOTE — OUTREACH NOTE
Call Center TCM Note      Flowsheet Row Responses   [facility] patient discharged from? [place]   Does the patient have one of the following disease processes/diagnoses(primary or secondary)? General Surgery   TCM attempt successful? No   Unsuccessful attempts Attempt 2            Yareli Cox LPN    4/1/2024, 17:22 EDT

## 2024-04-02 ENCOUNTER — TRANSITIONAL CARE MANAGEMENT TELEPHONE ENCOUNTER (OUTPATIENT)
Dept: CALL CENTER | Facility: HOSPITAL | Age: 64
End: 2024-04-02
Payer: MEDICARE

## 2024-04-02 NOTE — OUTREACH NOTE
Call Center TCM Note      Flowsheet Row Responses   [facility] [place] patient discharged from? [place]   Does the patient have one of the following disease processes/diagnoses(primary or secondary)? General Surgery   TCM attempt successful? No   Unsuccessful attempts Attempt 3   Call Status Left message   Does the patient have an appointment with their PCP within 7-14 days of discharge? Yes  [4/9/2024 at 2:45 PM]            Mary Beth Hooks RN    4/2/2024, 16:58 EDT

## 2024-04-03 ENCOUNTER — OFFICE (OUTPATIENT)
Dept: URBAN - METROPOLITAN AREA CLINIC 66 | Facility: CLINIC | Age: 64
End: 2024-04-03

## 2024-04-03 VITALS
RESPIRATION RATE: 16 BRPM | HEIGHT: 55 IN | HEART RATE: 70 BPM | SYSTOLIC BLOOD PRESSURE: 120 MMHG | WEIGHT: 130 LBS | DIASTOLIC BLOOD PRESSURE: 67 MMHG

## 2024-04-03 DIAGNOSIS — K21.9 GASTRO-ESOPHAGEAL REFLUX DISEASE WITHOUT ESOPHAGITIS: ICD-10-CM

## 2024-04-03 PROCEDURE — 99213 OFFICE O/P EST LOW 20 MIN: CPT | Performed by: NURSE PRACTITIONER

## 2024-04-03 RX ORDER — FAMOTIDINE 40 MG/1
80 TABLET, FILM COATED ORAL
Qty: 180 | Refills: 3 | Status: ACTIVE
Start: 2024-04-03

## 2024-04-09 ENCOUNTER — OFFICE VISIT (OUTPATIENT)
Dept: INTERNAL MEDICINE | Facility: CLINIC | Age: 64
End: 2024-04-09
Payer: MEDICARE

## 2024-04-09 VITALS
BODY MASS INDEX: 24.76 KG/M2 | HEIGHT: 55 IN | WEIGHT: 107 LBS | SYSTOLIC BLOOD PRESSURE: 130 MMHG | DIASTOLIC BLOOD PRESSURE: 82 MMHG | OXYGEN SATURATION: 98 % | HEART RATE: 77 BPM

## 2024-04-09 DIAGNOSIS — Z98.1 S/P CERVICAL SPINAL FUSION: Primary | ICD-10-CM

## 2024-04-09 DIAGNOSIS — D51.0 PERNICIOUS ANEMIA: ICD-10-CM

## 2024-04-09 DIAGNOSIS — E83.51 LOW CALCIUM LEVELS: ICD-10-CM

## 2024-04-09 DIAGNOSIS — D64.9 ANEMIA, UNSPECIFIED TYPE: ICD-10-CM

## 2024-04-09 PROCEDURE — 99495 TRANSJ CARE MGMT MOD F2F 14D: CPT | Performed by: INTERNAL MEDICINE

## 2024-04-09 PROCEDURE — 1159F MED LIST DOCD IN RCRD: CPT | Performed by: INTERNAL MEDICINE

## 2024-04-09 PROCEDURE — 1111F DSCHRG MED/CURRENT MED MERGE: CPT | Performed by: INTERNAL MEDICINE

## 2024-04-09 PROCEDURE — 1160F RVW MEDS BY RX/DR IN RCRD: CPT | Performed by: INTERNAL MEDICINE

## 2024-04-09 PROCEDURE — 96372 THER/PROPH/DIAG INJ SC/IM: CPT | Performed by: INTERNAL MEDICINE

## 2024-04-09 RX ORDER — HYDROCODONE BITARTRATE AND ACETAMINOPHEN 5; 325 MG/1; MG/1
1 TABLET ORAL EVERY 6 HOURS PRN
Qty: 25 TABLET | Refills: 0 | Status: SHIPPED | OUTPATIENT
Start: 2024-04-09

## 2024-04-09 RX ADMIN — CYANOCOBALAMIN 1000 MCG: 1000 INJECTION, SOLUTION INTRAMUSCULAR; SUBCUTANEOUS at 14:52

## 2024-04-09 NOTE — PROGRESS NOTES
Transitional Care Follow Up Visit  Subjective     Noemi Bartholomew is a 63 y.o. female who presents for a transitional care management visit.    Within 48 business hours after discharge our office contacted her via telephone to coordinate her care and needs.      I reviewed and discussed the details of that call along with the discharge summary, hospital problems, inpatient lab results, inpatient diagnostic studies, and consultation reports with Noemi.     Current outpatient and discharge medications have been reconciled for the patient.  Reviewed by: Mary Lou Carroll MD          3/31/2024     2:34 PM   Date of TCM Phone Call   [facility]   Date of Admission 3/28/2024   Date of Discharge 3/31/2024   Discharge Disposition Home or Self Care     Risk for Readmission (LACE) Score: 6 (3/31/2024  6:00 AM)      History of Present Illness   Course During Hospital Stay:  Patient presents in hospital f/u.  She was admitted for cervical spine surgery.  I have reviewed discharge summary, labs, scans, cardiovascular studies and medication changes. Overall she is doing well.  She requests a refill of hydrocodone.     The following data was reviewed by: Mary Lou Carroll MD on 04/09/2024:  Common labs          3/28/2024    17:21 3/29/2024    06:05 3/30/2024    05:27   Common Labs   WBC 12.53  9.68  8.12    Hemoglobin 10.6  10.1  9.4    Hematocrit 33.1  30.8  29.0    Platelets 228  229  206                The following portions of the patient's history were reviewed and updated as appropriate: allergies, current medications, past family history, past medical history, past social history, past surgical history, and problem list.    Review of Systems   Respiratory: Negative.     Cardiovascular: Negative.        Objective   Physical Exam  Constitutional:       Appearance: She is well-developed.   HENT:      Head: Normocephalic and atraumatic.   Pulmonary:      Effort: Pulmonary effort is normal.   Neurological:       Mental Status: She is alert and oriented to person, place, and time.   Psychiatric:         Behavior: Behavior normal.         Assessment & Plan   Diagnoses and all orders for this visit:    1. S/P cervical spinal fusion (Primary)  -     HYDROcodone-acetaminophen (NORCO) 5-325 MG per tablet; Take 1 tablet by mouth Every 6 (Six) Hours As Needed for Moderate Pain.  Dispense: 25 tablet; Refill: 0    2. Pernicious anemia    3. Anemia, unspecified type  -     CBC & Differential  -     Comprehensive Metabolic Panel    4. Low calcium levels  -     CBC & Differential  -     Comprehensive Metabolic Panel    F/U cervical spinal surgery.  I went ahead and refilled her hydrocodone.  She is still having some pain.  Contract is up to date.  Luis Alfredo is reviewed.   Pernicious anemia.  She is due for B12 shot.    Anemia.  Post op.  Check f/u labs today.    Low calcium level.  F/u labs are ordered.

## 2024-04-10 LAB
ALBUMIN SERPL-MCNC: 4 G/DL (ref 3.5–5.2)
ALBUMIN/GLOB SERPL: 1.7 G/DL
ALP SERPL-CCNC: 103 U/L (ref 39–117)
ALT SERPL-CCNC: 10 U/L (ref 1–33)
AST SERPL-CCNC: 16 U/L (ref 1–32)
BASOPHILS # BLD AUTO: 0.06 10*3/MM3 (ref 0–0.2)
BASOPHILS NFR BLD AUTO: 0.7 % (ref 0–1.5)
BILIRUB SERPL-MCNC: <0.2 MG/DL (ref 0–1.2)
BUN SERPL-MCNC: 7 MG/DL (ref 8–23)
BUN/CREAT SERPL: 10.4 (ref 7–25)
CALCIUM SERPL-MCNC: 9.2 MG/DL (ref 8.6–10.5)
CHLORIDE SERPL-SCNC: 105 MMOL/L (ref 98–107)
CO2 SERPL-SCNC: 28.9 MMOL/L (ref 22–29)
CREAT SERPL-MCNC: 0.67 MG/DL (ref 0.57–1)
EGFRCR SERPLBLD CKD-EPI 2021: 98.4 ML/MIN/1.73
EOSINOPHIL # BLD AUTO: 0.3 10*3/MM3 (ref 0–0.4)
EOSINOPHIL NFR BLD AUTO: 3.6 % (ref 0.3–6.2)
ERYTHROCYTE [DISTWIDTH] IN BLOOD BY AUTOMATED COUNT: 13.2 % (ref 12.3–15.4)
GLOBULIN SER CALC-MCNC: 2.3 GM/DL
GLUCOSE SERPL-MCNC: 105 MG/DL (ref 65–99)
HCT VFR BLD AUTO: 31.6 % (ref 34–46.6)
HGB BLD-MCNC: 10.1 G/DL (ref 12–15.9)
IMM GRANULOCYTES # BLD AUTO: 0.02 10*3/MM3 (ref 0–0.05)
IMM GRANULOCYTES NFR BLD AUTO: 0.2 % (ref 0–0.5)
LYMPHOCYTES # BLD AUTO: 1.1 10*3/MM3 (ref 0.7–3.1)
LYMPHOCYTES NFR BLD AUTO: 13.3 % (ref 19.6–45.3)
MCH RBC QN AUTO: 30.1 PG (ref 26.6–33)
MCHC RBC AUTO-ENTMCNC: 32 G/DL (ref 31.5–35.7)
MCV RBC AUTO: 94.3 FL (ref 79–97)
MONOCYTES # BLD AUTO: 0.52 10*3/MM3 (ref 0.1–0.9)
MONOCYTES NFR BLD AUTO: 6.3 % (ref 5–12)
NEUTROPHILS # BLD AUTO: 6.29 10*3/MM3 (ref 1.7–7)
NEUTROPHILS NFR BLD AUTO: 75.9 % (ref 42.7–76)
NRBC BLD AUTO-RTO: 0 /100 WBC (ref 0–0.2)
PLATELET # BLD AUTO: 424 10*3/MM3 (ref 140–450)
POTASSIUM SERPL-SCNC: 4.2 MMOL/L (ref 3.5–5.2)
PROT SERPL-MCNC: 6.3 G/DL (ref 6–8.5)
RBC # BLD AUTO: 3.35 10*6/MM3 (ref 3.77–5.28)
SODIUM SERPL-SCNC: 142 MMOL/L (ref 136–145)
WBC # BLD AUTO: 8.29 10*3/MM3 (ref 3.4–10.8)

## 2024-04-17 ENCOUNTER — TELEPHONE (OUTPATIENT)
Dept: INTERNAL MEDICINE | Facility: CLINIC | Age: 64
End: 2024-04-17
Payer: MEDICARE

## 2024-04-17 RX ORDER — ATORVASTATIN CALCIUM 20 MG/1
20 TABLET, FILM COATED ORAL NIGHTLY
Qty: 90 TABLET | Refills: 3 | Status: SHIPPED | OUTPATIENT
Start: 2024-04-17

## 2024-04-17 NOTE — TELEPHONE ENCOUNTER
Pt calling stating she is needing a refill for prescription, atorvastatin (LIPITOR) 20 MG tablet - Pt would like it sent into her mail order Pharmacy please.          [facility] - [place], [place] - [address] - [phone]  - [phone] [address], [facility] [place] [address]  Phone: [phone]  Fax: [phone]

## 2024-04-17 NOTE — PROGRESS NOTES
HPI:   Noemi Bartholomew is a 63 y.o. female for follow-up of Cervical myelopathy . She is status post C2-5 left posterior cervical laminoplasty on March 28, 2024 with Dr. Noe.  She was discharged to home.  She remains on gabapentin 300 mg p.o. 3 times daily.  She is on Robaxin but taking only intermittently.  She has a lot of discomfort in her neck.  She states ice packs help.  She has not utilizing pain medication as it caused her nausea and headaches.  She still has stiffness in her legs, a sense of imbalance in her upper body when she is seated and tingling in her feet.  No issues with her incision or fevers.    She presents unaccompanied.      Review of Systems   Constitutional:  Negative for chills and fever.   HENT:  Negative for trouble swallowing.    Genitourinary:  Negative for difficulty urinating and enuresis.   Musculoskeletal:  Positive for neck pain and neck stiffness.   Skin:  Negative for wound ( redness, swelling, drainage).   Neurological:  Positive for weakness and numbness.   Psychiatric/Behavioral:  Negative for sleep disturbance.         /80   Pulse 70   Temp 97 °F (36.1 °C)   Wt 47.3 kg (104 lb 3.2 oz)   SpO2 98%   BMI 26.09 kg/m²     Physical Exam  Vitals reviewed.   Constitutional:       Comments: Small stature   Neck:      Comments: Midline posterior cervical incision well approximated with no redness drainage or swelling.  Steri-Strips still in place  Pulmonary:      Effort: Pulmonary effort is normal.   Musculoskeletal:      Cervical back: Spasms and tenderness present. No bony tenderness. Pain with movement present.   Neurological:      General: No focal deficit present.      Mental Status: She is alert.      Coordination: Romberg Test abnormal.   Psychiatric:         Mood and Affect: Mood normal.         Speech: Speech normal.         Thought Content: Thought content normal.       Neurologic Exam     Mental Status   Speech: speech is normal   Level of consciousness:  alert  Knowledge: good.   Normal comprehension.     Motor Exam   Muscle bulk: normal  Overall muscle tone: normal  5/5 bilateral upper extremities     Sensory Exam   Right arm light touch: normal  Left arm light touch: normal    Gait, Coordination, and Reflexes     Gait  Gait: (Gait is stable ambulating in room unassisted but stiff appearing)    Coordination   Romberg: positive    Reflexes   Right Rivers: absent  Left Rivers: absent      Findings/Results:  No new imaging    Assessment/Plan:  Diagnoses and all orders for this visit:    1. Cervical myelopathy (Primary)  -     Ambulatory Referral to Physical Therapy Evaluate and treat  -     XR Spine Cervical 2 or 3 View; Future    2. Postop check  -     XR Spine Cervical 2 or 3 View; Future    3. Chronic neck pain  -     Ambulatory Referral to Physical Therapy Evaluate and treat    Other orders  -     methocarbamol (ROBAXIN) 750 MG tablet; Take 1 tablet by mouth 4 (Four) Times a Day As Needed for Muscle Spasms.  Dispense: 40 tablet; Refill: 1        Discussion/Summary  Patient presents for first postoperative visit after multilevel posterior cervical laminoplasty.  She has expected posterior neck pain.  She has muscle spasm.  She is only intermittently using the muscle relaxant.  She is using her ice collar frequently which helps.  She continues to report some stiffness in her legs, tingling in her feet and imbalance sensation especially when seated.  Her gait is actually pretty stable but she has positive Romberg.  Her incision site looks good and is near fully healed.  Steri-Strips are still in place.  Advised her that he can let this fall off on their own in the shower.  She would benefit from some outpatient physical therapy at this time to work on neck range of motion, muscle spasm relief, and gait/balance.  Will have her follow-up in 3 months with Dr. Noe with cervical x-rays.  She will call in the meantime with questions or concerns.  She should avoid  "lifting pushing pulling more than 10 pounds for 2 weeks and no more than 25 pounds for 3 months.  Okay to return to driving when she feels comfortable.    Plan: Return in about 3 months (around 7/19/2024) for Follow-up with Dr. Noe.         Patient Care Team    Patient Care Team:  Mary Lou Carroll MD as PCP - General (Internal Medicine)  Kerri Hernandez MD as Consulting Physician (Obstetrics and Gynecology)    Patricia Rodriguez, APRN  4/19/2024    \"Dictated utilizing Dragon dictation\".    Answers submitted by the patient for this visit:  Post-Op on 4/19/2024  3:30 PM with Patricia Pappas (Submitted on 4/13/2024)  Please describe your symptoms.: post op to surgery on 3/28/24, , neck pain  Have you had these symptoms before?: Yes  How long have you been having these symptoms?: Greater than 2 weeks  Please list any medications you are currently taking for this condition.: see list on my chart  Please describe any probable cause for these symptoms. : surgery    "

## 2024-04-17 NOTE — TELEPHONE ENCOUNTER
Spoke with patient and let her know prescription was sent 01.29.24 for #90 with 3 refills to [facility] and should be refilled next week towards the 29th of the month along with her other two medications that were filled on 01.29.24. Patient states they do not have refills on the Atorvastatin. Refill placed.

## 2024-04-19 ENCOUNTER — OFFICE VISIT (OUTPATIENT)
Dept: NEUROSURGERY | Facility: CLINIC | Age: 64
End: 2024-04-19
Payer: MEDICARE

## 2024-04-19 ENCOUNTER — TELEPHONE (OUTPATIENT)
Dept: NEUROSURGERY | Facility: CLINIC | Age: 64
End: 2024-04-19

## 2024-04-19 VITALS
WEIGHT: 104.2 LBS | SYSTOLIC BLOOD PRESSURE: 120 MMHG | BODY MASS INDEX: 26.09 KG/M2 | DIASTOLIC BLOOD PRESSURE: 80 MMHG | HEART RATE: 70 BPM | TEMPERATURE: 97 F | OXYGEN SATURATION: 98 %

## 2024-04-19 DIAGNOSIS — Z09 POSTOP CHECK: ICD-10-CM

## 2024-04-19 DIAGNOSIS — M54.2 CHRONIC NECK PAIN: ICD-10-CM

## 2024-04-19 DIAGNOSIS — G89.29 CHRONIC NECK PAIN: ICD-10-CM

## 2024-04-19 DIAGNOSIS — G95.9 CERVICAL MYELOPATHY: Primary | ICD-10-CM

## 2024-04-19 PROCEDURE — 99024 POSTOP FOLLOW-UP VISIT: CPT | Performed by: NURSE PRACTITIONER

## 2024-04-19 RX ORDER — METHOCARBAMOL 750 MG/1
750 TABLET, FILM COATED ORAL 4 TIMES DAILY PRN
Qty: 40 TABLET | Refills: 1 | Status: SHIPPED | OUTPATIENT
Start: 2024-04-19

## 2024-04-23 ENCOUNTER — TELEPHONE (OUTPATIENT)
Dept: NEUROSURGERY | Facility: CLINIC | Age: 64
End: 2024-04-23
Payer: MEDICARE

## 2024-04-23 NOTE — TELEPHONE ENCOUNTER
Informed patient at follow up with Dr. Noe to please get Xrays 1-2 days prior, pt expressed understanding. Orders are ready.

## 2024-05-13 ENCOUNTER — TREATMENT (OUTPATIENT)
Dept: PHYSICAL THERAPY | Facility: CLINIC | Age: 64
End: 2024-05-13
Payer: MEDICARE

## 2024-05-13 DIAGNOSIS — Z98.890 HX OF NECK SURGERY: ICD-10-CM

## 2024-05-13 DIAGNOSIS — G89.29 CHRONIC NECK PAIN: ICD-10-CM

## 2024-05-13 DIAGNOSIS — M54.2 CHRONIC NECK PAIN: ICD-10-CM

## 2024-05-13 DIAGNOSIS — Z74.09 IMPAIRED FUNCTIONAL MOBILITY, BALANCE, GAIT, AND ENDURANCE: ICD-10-CM

## 2024-05-13 DIAGNOSIS — Z09 STATUS POST NECK SURGERY, FOLLOW-UP EXAM: Primary | ICD-10-CM

## 2024-05-13 PROCEDURE — 97162 PT EVAL MOD COMPLEX 30 MIN: CPT | Performed by: PHYSICAL THERAPIST

## 2024-05-13 PROCEDURE — 97110 THERAPEUTIC EXERCISES: CPT | Performed by: PHYSICAL THERAPIST

## 2024-05-13 PROCEDURE — 97530 THERAPEUTIC ACTIVITIES: CPT | Performed by: PHYSICAL THERAPIST

## 2024-05-13 NOTE — PROGRESS NOTES
"    Physical Therapy Initial Evaluation and Plan of Care  Clinic Location [address] [facility] [address], [place] [id number]    Patient: Noemi Bartholomew   : 1960  Diagnosis/ICD-10 Code:  Status post neck surgery, follow-up exam [Z09]  Referring practitioner: MIRYAM Lynch  Date of Initial Visit: 2024  Today's Date: 2024  Patient seen for 1 session         Visit Diagnoses:    ICD-10-CM ICD-9-CM   1. Status post neck surgery, follow-up exam  Z09 V67.09   2. Hx of neck surgery  Z98.890 V15.29   3. Chronic neck pain  M54.2 723.1    G89.29 338.29   4. Impaired functional mobility, balance, gait, and endurance  Z74.09 V49.89         Subjective Questionnaire: NDI:44/100      Subjective Evaluation    History of Present Illness  Mechanism of injury: S/p C2-5 left posterior cervical laminoplasty 3/28/24. Off pain Rx. Weaning off muscle relaxer. Icing at night. Very helpful. Neck is stiff, mild pain. Post op, less whole body pain. Feet are \"crunchy\". Hands are tingling and numb. Difficulty writing. Sways when walking. Very cautious in/out of bed. Would like to get back to walking outdoors. No AD.    Hx cervical fusion w/ complication (Sx x3) Dec 2019/2020            Precautions and Work Restrictions: As of 24 \"She should avoid lifting pushing pulling more than 10 pounds for 2 weeks and no more than 25 pounds for 3 months.\"Pain  Current pain ratin  Location: R side of neck, B hands, B feet  Quality: dull ache and tight  Relieving factors: ice and medications  Aggravating factors: ambulation and lifting  Progression: improved    Social Support  Lives in: one-story house  Lives with: Sister.    Hand dominance: right    Treatments  Current treatment: physical therapy  Patient Goals  Patient goals for therapy: decreased pain, improved balance, increased strength and independence with ADLs/IADLs             Objective        Special Questions      Additional Special Questions  Incision clean " w/ routine healing  Denies disturbed sleep      Active Range of Motion   Cervical/Thoracic Spine   Cervical    Flexion: 40 degrees   Extension: 0 degrees   Left lateral flexion: 22 degrees   Right lateral flexion: 18 degrees with pain  Left rotation: 37 degrees   Right rotation: 34 degrees with pain    Additional Active Range of Motion Details  R sided neck pain    Strength/Myotome Testing     Left Shoulder     Planes of Motion   Flexion: 4   Abduction: 4+   External rotation at 0°: 4-   Internal rotation at 0°: 4+     Right Shoulder     Planes of Motion   Flexion: 4   Abduction: 4+   External rotation at 0°: 4   Internal rotation at 0°: 4+     Left Elbow   Flexion: 4+  Extension: 4-    Right Elbow   Flexion: 4+  Extension: 4    Left Wrist/Hand      (2nd hand position)   Left  strength (2nd hand position) 10 lbs    Right Wrist/Hand      (2nd hand position)   Right  strength (2nd hand position) 22 lbs    Functional Assessment     Single Leg Stance   Left: 2 seconds  Right: 3 seconds    Comments  5x STS: 19s  TUs  Tandem: 9s (R fwd), 29s (L fwd)           Assessment & Plan       Assessment  Impairments: abnormal gait, abnormal or restricted ROM, activity intolerance, impaired balance, impaired physical strength, lacks appropriate home exercise program and pain with function   Functional limitations: carrying objects, lifting, walking, uncomfortable because of pain and moving in bed   Assessment details: Pt presents s/p C2-5 laminoplasty w/ neck pain, limited cervical AROM, UE weakness, and impaired gait and balance. Will benefit from skilled PT services in order to address listed impairments and increase tolerance to normal daily activities including ADLs and recreational activities.    Prognosis: good    Goals  Plan Goals: Short Term Goals: 6 weeks. Patient will:  1. Be independent with initial HEP  2. Demonstrate safe ambulation w/ uneven surfaces, indicating readiness for walking outdoors for  exercise  3. Report pain of </= 4/10 with all daily activities    Long Term Goals: 6-12 weeks. Patient will:  1. Demonstrate improved Bilateral upper extremity MMT of >/= 4+/5 to allow for performance of daily activities   2. Report pain of </= 2/10 with all daily activities.  3. Perceived disability </= 20% as measured by NDI Questionnaire.  4. Be independent with long term HEP    Plan  Therapy options: will be seen for skilled therapy services  Planned modality interventions: cryotherapy, electrical stimulation/Russian stimulation and thermotherapy (hydrocollator packs)  Planned therapy interventions: abdominal trunk stabilization, ADL retraining, body mechanics training, balance/weight-bearing training, flexibility, functional ROM exercises, gait training, home exercise program, joint mobilization, manual therapy, neuromuscular re-education, soft tissue mobilization, spinal/joint mobilization, strengthening, stretching and therapeutic activities  Frequency: 2x week  Duration in weeks: 12  Treatment plan discussed with: patient        History # of Personal Factors and/or Comorbidities: MODERATE (1-2)  Examination of Body System(s): # of elements: MODERATE (3)  Clinical Presentation: STABLE   Clinical Decision Making: MODERATE      Timed:         Manual Therapy:    0     mins  11087;     Therapeutic Exercise:    15     mins  28099;     Neuromuscular Jared:    0    mins  40321;    Therapeutic Activity:     8     mins  06775;     Gait Trainin     mins  86676;     Ultrasound:     0     mins  16735;    Ionto                               0    mins   79577  Self Care                       0     mins   33500  Canalith Repos    0     mins 85420      Un-Timed:  Electrical Stimulation:    0     mins  98216 (MC );  Dry Needling     0     mins self-pay  Traction     0     mins 95258  Low Eval     0     Mins  48809  Mod Eval     30     Mins  91092  High Eval                       0     Mins  99667        Timed  Treatment:   23   mins   Total Treatment:     53   mins          PT: Mari Pereira PT     License Number: [id number]  Electronically signed by Mari Pereira, PT, 05/13/24, 10:50 AM EDT    Certification Period: 5/13/2024 thru 8/10/2024  I certify that the therapy services are furnished while this patient is under my care.  The services outlined above are required by this patient, and will be reviewed every 90 days.         Physician Signature:__________________________________________________    PHYSICIAN: Patricia Rodriguez APRN  NPI: [id number]                                      DATE:      Please sign and return via fax to [email or web address] . Thank you, [facility].

## 2024-05-16 ENCOUNTER — TREATMENT (OUTPATIENT)
Dept: PHYSICAL THERAPY | Facility: CLINIC | Age: 64
End: 2024-05-16
Payer: MEDICARE

## 2024-05-16 DIAGNOSIS — Z98.890 HX OF NECK SURGERY: ICD-10-CM

## 2024-05-16 DIAGNOSIS — G89.29 CHRONIC NECK PAIN: ICD-10-CM

## 2024-05-16 DIAGNOSIS — M54.2 CHRONIC NECK PAIN: ICD-10-CM

## 2024-05-16 DIAGNOSIS — Z74.09 IMPAIRED FUNCTIONAL MOBILITY, BALANCE, GAIT, AND ENDURANCE: ICD-10-CM

## 2024-05-16 DIAGNOSIS — Z09 STATUS POST NECK SURGERY, FOLLOW-UP EXAM: Primary | ICD-10-CM

## 2024-05-16 NOTE — PROGRESS NOTES
Physical Therapy Daily Treatment Note      Patient: Noemi Bartholomew   : 1960  Referring practitioner: MIRYAM Lynch  Date of Initial Visit: Type: THERAPY  Noted: 2024  Today's Date: 2024  Patient seen for 2 sessions       Visit Diagnoses:    ICD-10-CM ICD-9-CM   1. Status post neck surgery, follow-up exam  Z09 V67.09   2. Hx of neck surgery  Z98.890 V15.29   3. Chronic neck pain  M54.2 723.1    G89.29 338.29   4. Impaired functional mobility, balance, gait, and endurance  Z74.09 V49.89       Subjective   Neck less stiff after massage.    Objective   See Exercise, Manual, and Modality Logs for complete treatment.       Assessment/Plan  Required verbal and tactile cuing for scapular retraction w/o shoulder elevation. Tolerated cervical strength and balance progressions well w/o c/o pain. Progress per POC.    Timed:         Manual Therapy:    8     mins  78126;     Therapeutic Exercise:    20     mins  02961;     Neuromuscular Jared:    0    mins  07551;    Therapeutic Activity:     0     mins  71831;     Gait Trainin     mins  68402;     Ultrasound:     0     mins  68114;    Ionto                               0    mins   88735        Timed Treatment:   28   mins   Total Treatment:     28   mins    Mari Pereira PT  [place] License: [id number]

## 2024-05-21 ENCOUNTER — TELEPHONE (OUTPATIENT)
Dept: NEUROSURGERY | Facility: CLINIC | Age: 64
End: 2024-05-21

## 2024-05-23 ENCOUNTER — TREATMENT (OUTPATIENT)
Dept: PHYSICAL THERAPY | Facility: CLINIC | Age: 64
End: 2024-05-23
Payer: MEDICARE

## 2024-05-23 DIAGNOSIS — Z98.890 HX OF NECK SURGERY: ICD-10-CM

## 2024-05-23 DIAGNOSIS — Z09 STATUS POST NECK SURGERY, FOLLOW-UP EXAM: Primary | ICD-10-CM

## 2024-05-23 DIAGNOSIS — Z74.09 IMPAIRED FUNCTIONAL MOBILITY, BALANCE, GAIT, AND ENDURANCE: ICD-10-CM

## 2024-05-23 DIAGNOSIS — M54.2 CHRONIC NECK PAIN: ICD-10-CM

## 2024-05-23 DIAGNOSIS — G89.29 CHRONIC NECK PAIN: ICD-10-CM

## 2024-05-23 NOTE — PROGRESS NOTES
Physical Therapy Daily Treatment Note      Patient: Noemi LIU [place]   : 1960  Referring practitioner: MIRYAM Lynch  Date of Initial Visit: Type: THERAPY  Noted: 2024  Today's Date: 2024  Patient seen for 3 sessions       Visit Diagnoses:    ICD-10-CM ICD-9-CM   1. Status post neck surgery, follow-up exam  Z09 V67.09   2. Hx of neck surgery  Z98.890 V15.29   3. Chronic neck pain  M54.2 723.1    G89.29 338.29   4. Impaired functional mobility, balance, gait, and endurance  Z74.09 V49.89       Subjective   Neck is stiff. Ice feels good. Balance needs work.    Objective   See Exercise, Manual, and Modality Logs for complete treatment.       Assessment/Plan  Pt reports decreased neck stiffness with massage. Tolerated new strength and balance exercises well w/o c/o pain. Progress per POC.    Timed:         Manual Therapy:    8     mins  72855;     Therapeutic Exercise:    15     mins  13944;     Neuromuscular Jared:    0    mins  07260;    Therapeutic Activity:     15     mins  84690;     Gait Trainin     mins  19623;     Ultrasound:     0     mins  30777;    Ionto                               0    mins   99957        Timed Treatment:   38   mins   Total Treatment:     38   mins    Mari Pereira, PT  [place] License: [id number]

## 2024-06-03 ENCOUNTER — TREATMENT (OUTPATIENT)
Dept: PHYSICAL THERAPY | Facility: CLINIC | Age: 64
End: 2024-06-03
Payer: MEDICARE

## 2024-06-03 DIAGNOSIS — Z98.890 HX OF NECK SURGERY: ICD-10-CM

## 2024-06-03 DIAGNOSIS — M54.2 CHRONIC NECK PAIN: ICD-10-CM

## 2024-06-03 DIAGNOSIS — Z09 STATUS POST NECK SURGERY, FOLLOW-UP EXAM: Primary | ICD-10-CM

## 2024-06-03 DIAGNOSIS — Z74.09 IMPAIRED FUNCTIONAL MOBILITY, BALANCE, GAIT, AND ENDURANCE: ICD-10-CM

## 2024-06-03 DIAGNOSIS — G89.29 CHRONIC NECK PAIN: ICD-10-CM

## 2024-06-03 PROCEDURE — 97140 MANUAL THERAPY 1/> REGIONS: CPT | Performed by: PHYSICAL THERAPIST

## 2024-06-03 PROCEDURE — 97110 THERAPEUTIC EXERCISES: CPT | Performed by: PHYSICAL THERAPIST

## 2024-06-03 NOTE — PROGRESS NOTES
Physical Therapy Daily Treatment Note      Patient: Noemi Bartholomew   : 1960  Referring practitioner: MIRYAM Lynch  Date of Initial Visit: Type: THERAPY  Noted: 2024  Today's Date: 6/3/2024  Patient seen for 4 sessions       Visit Diagnoses:    ICD-10-CM ICD-9-CM   1. Status post neck surgery, follow-up exam  Z09 V67.09   2. Hx of neck surgery  Z98.890 V15.29   3. Chronic neck pain  M54.2 723.1    G89.29 338.29   4. Impaired functional mobility, balance, gait, and endurance  Z74.09 V49.89       Subjective   Hips ache. Not able to identify trigger or aggravating activity. No new changes re: neck or balance.    Objective   See Exercise, Manual, and Modality Logs for complete treatment.     Assessment/Plan  Tolerated core, UE/LE strength, and balance progressions well w/o c/o pain. Updated HEP. Progress per POC.    Timed:         Manual Therapy:    8     mins  12602;     Therapeutic Exercise:    20     mins  01584;     Neuromuscular Jared:    0    mins  44559;    Therapeutic Activity:     0     mins  94650;     Gait Trainin     mins  84307;     Ultrasound:     0     mins  68749;    Ionto                               0    mins   78860        Timed Treatment:   28   mins   Total Treatment:     28   mins    LENORA Hernandez License: [id number]

## 2024-06-04 ENCOUNTER — TELEPHONE (OUTPATIENT)
Dept: NEUROSURGERY | Facility: CLINIC | Age: 64
End: 2024-06-04
Payer: MEDICARE

## 2024-06-04 NOTE — TELEPHONE ENCOUNTER
LVM for the patient to let her know that her [place] paper work was faxed over and scanned into her chart on 05/23/24

## 2024-06-04 NOTE — TELEPHONE ENCOUNTER
06/04/24 PT CALLED TO GET UPDATE ON HER [facility] PAPERWORK.  SHE SAID, SHE TURNED IN ON 05/21/24   PLEASE CALL HER AT: [phone]

## 2024-06-06 ENCOUNTER — TREATMENT (OUTPATIENT)
Dept: PHYSICAL THERAPY | Facility: CLINIC | Age: 64
End: 2024-06-06
Payer: MEDICARE

## 2024-06-06 DIAGNOSIS — Z74.09 IMPAIRED FUNCTIONAL MOBILITY, BALANCE, GAIT, AND ENDURANCE: ICD-10-CM

## 2024-06-06 DIAGNOSIS — Z09 STATUS POST NECK SURGERY, FOLLOW-UP EXAM: Primary | ICD-10-CM

## 2024-06-06 DIAGNOSIS — G89.29 CHRONIC NECK PAIN: ICD-10-CM

## 2024-06-06 DIAGNOSIS — M54.2 CHRONIC NECK PAIN: ICD-10-CM

## 2024-06-06 DIAGNOSIS — Z98.890 HX OF NECK SURGERY: ICD-10-CM

## 2024-06-06 NOTE — PROGRESS NOTES
Physical Therapy Daily Treatment Note      Patient: Noemi Bartholomew   : 1960  Referring practitioner: MIRYAM Lynch  Date of Initial Visit: Type: THERAPY  Noted: 2024  Today's Date: 2024  Patient seen for 5 sessions       Visit Diagnoses:    ICD-10-CM ICD-9-CM   1. Status post neck surgery, follow-up exam  Z09 V67.09   2. Hx of neck surgery  Z98.890 V15.29   3. Chronic neck pain  M54.2 723.1    G89.29 338.29   4. Impaired functional mobility, balance, gait, and endurance  Z74.09 V49.89       Subjective   Feels off balance sitting up from bed and standing from chair.    Objective   See Exercise, Manual, and Modality Logs for complete treatment.     Assessment/Plan  Recommend consulting PCP re BP. No pain w/ strength and balance progressions. Progress per POC.    Timed:         Manual Therapy:    0     mins  08369;     Therapeutic Exercise:    15     mins  54670;     Neuromuscular Jared:    0    mins  30718;    Therapeutic Activity:     15     mins  15743;     Gait Trainin     mins  82005;     Ultrasound:     0     mins  54498;    Ionto                               0    mins   28968        Timed Treatment:   30   mins   Total Treatment:     30   mins    LENORA Hernandez License: [id number]

## 2024-06-10 ENCOUNTER — TREATMENT (OUTPATIENT)
Dept: PHYSICAL THERAPY | Facility: CLINIC | Age: 64
End: 2024-06-10
Payer: MEDICARE

## 2024-06-10 DIAGNOSIS — G89.29 CHRONIC NECK PAIN: ICD-10-CM

## 2024-06-10 DIAGNOSIS — M54.2 CHRONIC NECK PAIN: ICD-10-CM

## 2024-06-10 DIAGNOSIS — Z98.890 HX OF NECK SURGERY: ICD-10-CM

## 2024-06-10 DIAGNOSIS — Z09 STATUS POST NECK SURGERY, FOLLOW-UP EXAM: Primary | ICD-10-CM

## 2024-06-10 PROCEDURE — 97110 THERAPEUTIC EXERCISES: CPT | Performed by: PHYSICAL THERAPIST

## 2024-06-10 PROCEDURE — 97112 NEUROMUSCULAR REEDUCATION: CPT | Performed by: PHYSICAL THERAPIST

## 2024-06-10 NOTE — PROGRESS NOTES
Physical Therapy Daily Treatment Note      Patient: Noemi LIU [place]   : 1960  Referring practitioner: MIRYAM Lynch  Date of Initial Visit: Type: THERAPY  Noted: 2024  Today's Date: 6/10/2024  Patient seen for 6 sessions       Visit Diagnoses:    ICD-10-CM ICD-9-CM   1. Status post neck surgery, follow-up exam  Z09 V67.09   2. Hx of neck surgery  Z98.890 V15.29   3. Chronic neck pain  M54.2 723.1    G89.29 338.29       Subjective   No new changes.    Objective   See Exercise, Manual, and Modality Logs for complete treatment.       Assessment/Plan  Excellent tolerance to strength and stability progressions w/o c/o pain. Continues to have difficulty w/ transfers due to weakness. Unable to stand from floor w/o UE assist.    Timed:         Manual Therapy:    0     mins  97308;     Therapeutic Exercise:    20     mins  94342;     Neuromuscular Jared:   10     mins  48463;    Therapeutic Activity:     0     mins  22105;     Gait Trainin     mins  42240;     Ultrasound:     0     mins  29382;    Ionto                               0    mins   07484        Timed Treatment:   30   mins   Total Treatment:     30   mins    Mari Pereira, PT  [place] License: [id number]

## 2024-06-13 ENCOUNTER — TREATMENT (OUTPATIENT)
Dept: PHYSICAL THERAPY | Facility: CLINIC | Age: 64
End: 2024-06-13
Payer: MEDICARE

## 2024-06-13 DIAGNOSIS — Z98.890 HX OF NECK SURGERY: ICD-10-CM

## 2024-06-13 DIAGNOSIS — Z09 STATUS POST NECK SURGERY, FOLLOW-UP EXAM: Primary | ICD-10-CM

## 2024-06-13 DIAGNOSIS — M54.2 CHRONIC NECK PAIN: ICD-10-CM

## 2024-06-13 DIAGNOSIS — Z74.09 IMPAIRED FUNCTIONAL MOBILITY, BALANCE, GAIT, AND ENDURANCE: ICD-10-CM

## 2024-06-13 DIAGNOSIS — G89.29 CHRONIC NECK PAIN: ICD-10-CM

## 2024-06-13 NOTE — PROGRESS NOTES
Re-Assessment / Re-Certification        Patient: Noemi Bartholomew   : 1960  Diagnosis/ICD-10 Code:  Status post neck surgery, follow-up exam [Z09]  Referring practitioner: MIRYAM Lynch  Date of Initial Evaluation:  Type: THERAPY  Noted: 2024  Patient seen for 7 sessions      Subjective:   Noemi Bartholomew reports: R side of neck is tight. No pain. Would like to get back to walking for exercise in neighborhood. Feels unsteady getting out of bed or up from chair. Denies dizziness.  Subjective Questionnaire: NDI:34/100  Clinical Progress: improved  Home Program Compliance: 4x/week  Treatment has included: therapeutic exercise, neuromuscular re-education, manual therapy, and therapeutic activity    Subjective   Objective   Strength/Myotome Testing      Left Shoulder      Planes of Motion   Flexion: 4+   Abduction: 4+   External rotation at 0°: 4  Internal rotation at 0°: 5      Right Shoulder      Planes of Motion   Flexion: 4+   Abduction: 4+   External rotation at 0°: 4   Internal rotation at 0°: 5     Left Elbow   Flexion: 5  Extension: 4     Right Elbow   Flexion: 5  Extension: 4    Functional Assessment      Single Leg Stance   Left: 2 seconds  Right: 3 seconds     Comments  5x STS: 10s  TUs  Tandem: 3s (R fwd), 3s (L fwd)         Short Term Goals: 6 weeks. Patient will:  1. Be independent with initial HEP MET  2. Demonstrate safe ambulation w/ uneven surfaces, indicating readiness for walking outdoors for exercise PROGRESSING  3. Report pain of </= 4/10 with all daily activities MET     Long Term Goals: 6-12 weeks. Patient will:  1. Demonstrate improved Bilateral upper extremity MMT of >/= 4+/5 to allow for performance of daily activities PROGRESSING  2. Report pain of </= 2/10 with all daily activities. MET  3. Perceived disability </= 20% as measured by NDI Questionnaire. PROGRESSING  4. Be independent with long term HEP NOT MET    Assessment/Plan  Progress toward previous goals: Partially  Met    Continues to have poor single leg balance and intermittent LOB w/ WB activities in clinic. UE strength improving. Functional tests (5x STS and TUG) improved and are within age matched norms.      Recommendations: Continue as planned  Timeframe: 2 months  Prognosis to achieve goals: good    PT Signature: Mari Pereira, PT      Based upon review of the patient's progress and continued therapy plan, it is my medical opinion that Noemi Bartholomew should continue physical therapy treatment at [facility]  [address] [place] [id number] [phone].    Signature: __________________________________  Patricia Rodriguez, MIRYAM    Manual Therapy:    8     mins  11269;  Therapeutic Exercise:    8     mins  92746;     Neuromuscular Jared:    15    mins  09936;    Therapeutic Activity:     15     mins  17190;     Gait Trainin     mins  76407;     Ultrasound:     0     mins  69304;    Work Hardening           0      mins 32685  Iontophoresis               0   mins 57321    Timed Treatment:   46   mins   Total Treatment:     46   mins

## 2024-06-17 ENCOUNTER — TREATMENT (OUTPATIENT)
Dept: PHYSICAL THERAPY | Facility: CLINIC | Age: 64
End: 2024-06-17
Payer: MEDICARE

## 2024-06-17 DIAGNOSIS — M54.2 CHRONIC NECK PAIN: ICD-10-CM

## 2024-06-17 DIAGNOSIS — Z98.890 HX OF NECK SURGERY: ICD-10-CM

## 2024-06-17 DIAGNOSIS — G89.29 CHRONIC NECK PAIN: ICD-10-CM

## 2024-06-17 DIAGNOSIS — Z09 STATUS POST NECK SURGERY, FOLLOW-UP EXAM: Primary | ICD-10-CM

## 2024-06-17 DIAGNOSIS — Z74.09 IMPAIRED FUNCTIONAL MOBILITY, BALANCE, GAIT, AND ENDURANCE: ICD-10-CM

## 2024-06-17 NOTE — PROGRESS NOTES
Physical Therapy Daily Treatment Note      Patient: Noemi Lovey   : 1960  Referring practitioner: MIRYAM Lynch  Date of Initial Visit: Type: THERAPY  Noted: 2024  Today's Date: 2024  Patient seen for 8 sessions       Visit Diagnoses:    ICD-10-CM ICD-9-CM   1. Status post neck surgery, follow-up exam  Z09 V67.09   2. Hx of neck surgery  Z98.890 V15.29   3. Chronic neck pain  M54.2 723.1    G89.29 338.29   4. Impaired functional mobility, balance, gait, and endurance  Z74.09 V49.89       Subjective   R side of neck is stiff.     Objective   See Exercise, Manual, and Modality Logs for complete treatment.       Assessment/Plan  Continues to have significant weakness limiting transfers and bed mobility. Able to transfer on/off floor w/ 1 HHA and verbal cuing. Progress per POC.    Timed:         Manual Therapy:    8     mins  01568;     Therapeutic Exercise:    10     mins  79134;     Neuromuscular Jared:    0    mins  25813;    Therapeutic Activity:     20     mins  94956;     Gait Trainin     mins  59411;     Ultrasound:     0     mins  41237;    Ionto                               0    mins   07738        Timed Treatment:   38   mins   Total Treatment:     38   mins    LENORA Hernandez License: [id number]

## 2024-06-19 ENCOUNTER — HOSPITAL ENCOUNTER (OUTPATIENT)
Dept: GENERAL RADIOLOGY | Facility: HOSPITAL | Age: 64
Discharge: HOME | End: 2024-06-19
Admitting: NURSE PRACTITIONER
Payer: MEDICARE

## 2024-06-19 DIAGNOSIS — G95.9 CERVICAL MYELOPATHY: ICD-10-CM

## 2024-06-19 DIAGNOSIS — Z09 POSTOP CHECK: ICD-10-CM

## 2024-06-19 PROCEDURE — 72040 X-RAY EXAM NECK SPINE 2-3 VW: CPT

## 2024-06-20 ENCOUNTER — TREATMENT (OUTPATIENT)
Dept: PHYSICAL THERAPY | Facility: CLINIC | Age: 64
End: 2024-06-20
Payer: MEDICARE

## 2024-06-20 DIAGNOSIS — Z09 STATUS POST NECK SURGERY, FOLLOW-UP EXAM: Primary | ICD-10-CM

## 2024-06-20 DIAGNOSIS — G89.29 CHRONIC NECK PAIN: ICD-10-CM

## 2024-06-20 DIAGNOSIS — Z74.09 IMPAIRED FUNCTIONAL MOBILITY, BALANCE, GAIT, AND ENDURANCE: ICD-10-CM

## 2024-06-20 DIAGNOSIS — Z98.890 HX OF NECK SURGERY: ICD-10-CM

## 2024-06-20 DIAGNOSIS — M54.2 CHRONIC NECK PAIN: ICD-10-CM

## 2024-06-20 NOTE — PROGRESS NOTES
Physical Therapy Daily Treatment Note      Patient: Noemi LIU [place]   : 1960  Referring practitioner: MIRYAM Lynch  Date of Initial Visit: Type: THERAPY  Noted: 2024  Today's Date: 2024  Patient seen for 9 sessions       Visit Diagnoses:    ICD-10-CM ICD-9-CM   1. Status post neck surgery, follow-up exam  Z09 V67.09   2. Hx of neck surgery  Z98.890 V15.29   3. Chronic neck pain  M54.2 723.1    G89.29 338.29   4. Impaired functional mobility, balance, gait, and endurance  Z74.09 V49.89       Subjective   No new changes.    Objective   See Exercise, Manual, and Modality Logs for complete treatment.     Assessment/Plan  Tolerates balance and functional strength progressions well w/o c/o pain. Improved balance on foam w/ eyes closed (30s) and with anterior weight shifting (wobble board, 0 HHA). Able to transfer on/off floor independently using a chair for UE assist. Progress per POC.    Timed:         Manual Therapy:    0     mins  62445;     Therapeutic Exercise:    8     mins  98739;     Neuromuscular Jared:    15    mins  56612;    Therapeutic Activity:     15     mins  08201;     Gait Trainin     mins  00242;     Ultrasound:     0     mins  48402;    Ionto                               0    mins   11237  Self Care                       0     mins   52501      Un-Timed:  Electrical Stimulation:    0     mins  93045 ( );  Dry Needling     0     mins self-pay  Traction     0     mins 98383  Canalith Repos    0     mins 67946    Timed Treatment:   38   mins   Total Treatment:     38   mins    Mari Pereira PT  [place] License: [id number]

## 2024-06-27 ENCOUNTER — TREATMENT (OUTPATIENT)
Dept: PHYSICAL THERAPY | Facility: CLINIC | Age: 64
End: 2024-06-27
Payer: MEDICARE

## 2024-06-27 DIAGNOSIS — Z98.890 HX OF NECK SURGERY: ICD-10-CM

## 2024-06-27 DIAGNOSIS — G89.29 CHRONIC NECK PAIN: ICD-10-CM

## 2024-06-27 DIAGNOSIS — Z74.09 IMPAIRED FUNCTIONAL MOBILITY, BALANCE, GAIT, AND ENDURANCE: ICD-10-CM

## 2024-06-27 DIAGNOSIS — Z09 STATUS POST NECK SURGERY, FOLLOW-UP EXAM: Primary | ICD-10-CM

## 2024-06-27 DIAGNOSIS — M54.2 CHRONIC NECK PAIN: ICD-10-CM

## 2024-06-27 NOTE — PROGRESS NOTES
Physical Therapy Daily Treatment Note      Patient: Noemi LIU [place]   : 1960  Referring practitioner: MIRYAM Lynch  Date of Initial Visit: Type: THERAPY  Noted: 2024  Today's Date: 2024  Patient seen for 10 sessions       Visit Diagnoses:    ICD-10-CM ICD-9-CM   1. Status post neck surgery, follow-up exam  Z09 V67.09   2. Hx of neck surgery  Z98.890 V15.29   3. Chronic neck pain  M54.2 723.1    G89.29 338.29   4. Impaired functional mobility, balance, gait, and endurance  Z74.09 V49.89       Subjective   R side of neck stiff since surgery.    Objective   See Exercise, Manual, and Modality Logs for complete treatment.       Assessment/Plan  Improved functional strength getting on floor independently. Utilizes furniture to stand from floor due to LE weakness. Improved balance on uneven surfaces. Continues to have tender cervical/thoracic paraspinals. Neck stiffness improves with manual interventions. Progress per POC.    Timed:         Manual Therapy:    15    mins  07670;     Therapeutic Exercise:    0     mins  97334;     Neuromuscular Jared:    10    mins  16288;    Therapeutic Activity:     30     mins  00061;     Gait Trainin     mins  98463;     Ultrasound:     0     mins  61388;    Ionto                               0    mins   91499  Self Care                       0     mins   15316      Un-Timed:  Electrical Stimulation:    0     mins  99287 ( );  Dry Needling     0     mins self-pay  Traction     0     mins 67371  Canalith Repos    0     mins 82598    Timed Treatment:   55   mins   Total Treatment:     55   mins    Mari Pereira, PT  [place] License: [id number]

## 2024-07-02 ENCOUNTER — TELEPHONE (OUTPATIENT)
Dept: INTERNAL MEDICINE | Facility: CLINIC | Age: 64
End: 2024-07-02
Payer: MEDICARE

## 2024-07-02 DIAGNOSIS — G89.29 CHRONIC NECK PAIN: ICD-10-CM

## 2024-07-02 DIAGNOSIS — M54.2 CHRONIC NECK PAIN: ICD-10-CM

## 2024-07-02 RX ORDER — GABAPENTIN 300 MG/1
300 CAPSULE ORAL 3 TIMES DAILY
Qty: 270 CAPSULE | Refills: 1 | Status: SHIPPED | OUTPATIENT
Start: 2024-07-02

## 2024-07-02 NOTE — TELEPHONE ENCOUNTER
Called patient in regards to recent message about a prescription refill- Pt is needing a refill for prescription for gabapentin (NEURONTIN) 300 MG capsule - Pt would like it sent to [facility] please.    [facility] - [place], [place] - [address] [facility] - [phone]  - [phone] [address], [facility] [address]  Phone: [phone]  Fax: [phone]

## 2024-07-09 ENCOUNTER — TREATMENT (OUTPATIENT)
Dept: PHYSICAL THERAPY | Facility: CLINIC | Age: 64
End: 2024-07-09
Payer: MEDICARE

## 2024-07-09 DIAGNOSIS — Z09 STATUS POST NECK SURGERY, FOLLOW-UP EXAM: Primary | ICD-10-CM

## 2024-07-09 DIAGNOSIS — Z98.890 HX OF NECK SURGERY: ICD-10-CM

## 2024-07-09 DIAGNOSIS — G89.29 CHRONIC NECK PAIN: ICD-10-CM

## 2024-07-09 DIAGNOSIS — M54.2 CHRONIC NECK PAIN: ICD-10-CM

## 2024-07-09 DIAGNOSIS — Z74.09 IMPAIRED FUNCTIONAL MOBILITY, BALANCE, GAIT, AND ENDURANCE: ICD-10-CM

## 2024-07-09 NOTE — PROGRESS NOTES
Physical Therapy Daily Treatment Note      Patient: Noemi Bartholomew   : 1960  Referring practitioner: MIRYAM Lynch  Date of Initial Visit: Type: THERAPY  Noted: 2024  Today's Date: 2024  Patient seen for 11 sessions       Visit Diagnoses:    ICD-10-CM ICD-9-CM   1. Status post neck surgery, follow-up exam  Z09 V67.09   2. Hx of neck surgery  Z98.890 V15.29   3. Chronic neck pain  M54.2 723.1    G89.29 338.29   4. Impaired functional mobility, balance, gait, and endurance  Z74.09 V49.89       Subjective   Started taking Aleve which has helped R sided neck pain some.     Objective   See Exercise, Manual, and Modality Logs for complete treatment.       Assessment/Plan  Tolerated functional strength and balance exercises well w/o c/o pain. Unable to stand from kneeling w/o UE assist due to LE weakness. R sided neck pain improves with massage. Recommend dry needling.    Timed:         Manual Therapy:    10     mins  69182;     Therapeutic Exercise:    0     mins  68624;     Neuromuscular Jared:    15    mins  34834;    Therapeutic Activity:     15     mins  26846;     Gait Trainin     mins  42173;     Ultrasound:     0     mins  44333;    Ionto                               0    mins   96779  Self Care                       0     mins   67725      Un-Timed:  Electrical Stimulation:    0     mins  01980 ( );  Dry Needling     0     mins self-pay  Traction     0     mins 48071  Canalith Repos    0     mins 11443    Timed Treatment:   40   mins   Total Treatment:     40   mins    Mari Pereira, PT  [place] License: [id number]

## 2024-07-11 ENCOUNTER — TREATMENT (OUTPATIENT)
Dept: PHYSICAL THERAPY | Facility: CLINIC | Age: 64
End: 2024-07-11
Payer: MEDICARE

## 2024-07-11 DIAGNOSIS — Z09 STATUS POST NECK SURGERY, FOLLOW-UP EXAM: Primary | ICD-10-CM

## 2024-07-11 DIAGNOSIS — M54.2 CHRONIC NECK PAIN: ICD-10-CM

## 2024-07-11 DIAGNOSIS — Z74.09 IMPAIRED FUNCTIONAL MOBILITY, BALANCE, GAIT, AND ENDURANCE: ICD-10-CM

## 2024-07-11 DIAGNOSIS — Z98.890 HX OF NECK SURGERY: ICD-10-CM

## 2024-07-11 DIAGNOSIS — G89.29 CHRONIC NECK PAIN: ICD-10-CM

## 2024-07-11 NOTE — PROGRESS NOTES
Physical Therapy Re Certification Of Plan of Care  Patient: Noemi Bartholomew   : 1960  Diagnosis/ICD-10 Code:  Status post neck surgery, follow-up exam [Z09]  Referring practitioner: MIRYAM Lynch  Date of Initial Visit: Type: THERAPY  Noted: 2024  Today's Date: 2024  Patient seen for 12 sessions         Visit Diagnoses:    ICD-10-CM ICD-9-CM   1. Status post neck surgery, follow-up exam  Z09 V67.09   2. Hx of neck surgery  Z98.890 V15.29   3. Chronic neck pain  M54.2 723.1    G89.29 338.29   4. Impaired functional mobility, balance, gait, and endurance  Z74.09 V49.89         Noemi Mooreoley reports: Balance improving. Continues to have R sided neck pain. Rates pain 6/10. Intermittent hand and foot pain (bilateral).    Subjective Questionnaire: NDI:16/100  Clinical Progress: improved  Home Program Compliance: Yes  Treatment has included: therapeutic exercise, neuromuscular re-education, manual therapy, and therapeutic activity      Subjective       Objective      Left Shoulder      Planes of Motion   Flexion: 4+   Abduction: 4+   External rotation at 0°: 4+  Internal rotation at 0°: 5      Right Shoulder      Planes of Motion   Flexion: 4+   Abduction: 4+   External rotation at 0°: 4   Internal rotation at 0°: 5     Left Elbow   Flexion: 5  Extension: 5     Right Elbow   Flexion: 5  Extension: 5     Functional Assessment      Single Leg Stance   Left: 2 seconds  Right: 3 seconds     Comments  Tandem: 21s (R fwd), 30s (L fwd)       Short Term Goals: 6 weeks. Patient will:  1. Be independent with initial HEP MET  2. Demonstrate safe ambulation w/ uneven surfaces, indicating readiness for walking outdoors for exercise PROGRESSING  3. Report pain of </= 4/10 with all daily activities MET     Long Term Goals: 6-12 weeks. Patient will:  1. Demonstrate improved Bilateral upper extremity MMT of >/= 4+/5 to allow for performance of daily activities PROGRESSING  2. Report pain of </= 2/10 with all daily  activities. MET  3. Perceived disability </= 20% as measured by NDI Questionnaire. PROGRESSING  4. Be independent with long term HEP NOT MET    Assessment/Plan    Subjective reports of pain and balance improving. Continues to have very limited SL balance, putting pt at risk for fall. Unable to transfer on/off floor independently due to LE weakness.     Recommendations: Continue with recommendations 2x/week  Timeframe: 1 month  Prognosis to achieve goals: good      Timed:         Manual Therapy:    10     mins  59793;     Therapeutic Exercise:    25     mins  45433;     Neuromuscular Jared:    10    mins  36686;    Therapeutic Activity:     10     mins  79115;     Gait Trainin     mins  35163;     Ultrasound:     0     mins  88114;    Ionto                               0    mins   13525  Self Care                       0     mins   37648    Un-Timed:  Electrical Stimulation:    0     mins  13210 ( );  Dry Needling     0     mins self-pay  Traction     0     mins 31097  Re-Eval                           0    mins  81230  Canalith Repos    0     mins 62297    Timed Treatment:   55   mins   Total Treatment:     55   mins          PT: Mari Pereira PT     [place] License:  [id number]    Electronically signed by Mari Pereira PT, 24, 1:16 PM EDT    Certification Period: 2024 thru 10/8/2024  I certify that the therapy services are furnished while this patient is under my care.  The services outlined above are required by this patient, and will be reviewed every 90 days.         Physician Signature:__________________________________________________    PHYSICIAN: Patricia Rodriguez APRN  NPI: [id number]                                      DATE:  :     Please sign and return via fax to [email or web address] . Thank you, [facility]

## 2024-07-12 DIAGNOSIS — Z13.29 THYROID DISORDER SCREENING: ICD-10-CM

## 2024-07-12 DIAGNOSIS — E78.5 HYPERLIPIDEMIA, UNSPECIFIED HYPERLIPIDEMIA TYPE: Primary | ICD-10-CM

## 2024-07-12 DIAGNOSIS — E83.52 HYPERCALCEMIA: ICD-10-CM

## 2024-07-17 LAB
ALBUMIN SERPL-MCNC: 4.3 G/DL (ref 3.5–5.2)
ALBUMIN/GLOB SERPL: 2 G/DL
ALP SERPL-CCNC: 112 U/L (ref 39–117)
ALT SERPL-CCNC: 19 U/L (ref 1–33)
AST SERPL-CCNC: 32 U/L (ref 1–32)
BASOPHILS # BLD AUTO: 0.06 10*3/MM3 (ref 0–0.2)
BASOPHILS NFR BLD AUTO: 1.2 % (ref 0–1.5)
BILIRUB SERPL-MCNC: 0.3 MG/DL (ref 0–1.2)
BUN SERPL-MCNC: 14 MG/DL (ref 8–23)
BUN/CREAT SERPL: 18.4 (ref 7–25)
CALCIUM SERPL-MCNC: 9.4 MG/DL (ref 8.6–10.5)
CHLORIDE SERPL-SCNC: 103 MMOL/L (ref 98–107)
CHOLEST SERPL-MCNC: 166 MG/DL (ref 0–200)
CO2 SERPL-SCNC: 29.9 MMOL/L (ref 22–29)
CREAT SERPL-MCNC: 0.76 MG/DL (ref 0.57–1)
EGFRCR SERPLBLD CKD-EPI 2021: 87.6 ML/MIN/1.73
EOSINOPHIL # BLD AUTO: 0.21 10*3/MM3 (ref 0–0.4)
EOSINOPHIL NFR BLD AUTO: 4.1 % (ref 0.3–6.2)
ERYTHROCYTE [DISTWIDTH] IN BLOOD BY AUTOMATED COUNT: 13.2 % (ref 12.3–15.4)
GLOBULIN SER CALC-MCNC: 2.1 GM/DL
GLUCOSE SERPL-MCNC: 85 MG/DL (ref 65–99)
HCT VFR BLD AUTO: 39.2 % (ref 34–46.6)
HDLC SERPL-MCNC: 79 MG/DL (ref 40–60)
HGB BLD-MCNC: 12.6 G/DL (ref 12–15.9)
IMM GRANULOCYTES # BLD AUTO: 0 10*3/MM3 (ref 0–0.05)
IMM GRANULOCYTES NFR BLD AUTO: 0 % (ref 0–0.5)
LDLC SERPL CALC-MCNC: 74 MG/DL (ref 0–100)
LYMPHOCYTES # BLD AUTO: 1.11 10*3/MM3 (ref 0.7–3.1)
LYMPHOCYTES NFR BLD AUTO: 21.4 % (ref 19.6–45.3)
MCH RBC QN AUTO: 30.1 PG (ref 26.6–33)
MCHC RBC AUTO-ENTMCNC: 32.1 G/DL (ref 31.5–35.7)
MCV RBC AUTO: 93.6 FL (ref 79–97)
MONOCYTES # BLD AUTO: 0.33 10*3/MM3 (ref 0.1–0.9)
MONOCYTES NFR BLD AUTO: 6.4 % (ref 5–12)
NEUTROPHILS # BLD AUTO: 3.47 10*3/MM3 (ref 1.7–7)
NEUTROPHILS NFR BLD AUTO: 66.9 % (ref 42.7–76)
NRBC BLD AUTO-RTO: 0 /100 WBC (ref 0–0.2)
PLATELET # BLD AUTO: 293 10*3/MM3 (ref 140–450)
POTASSIUM SERPL-SCNC: 5.2 MMOL/L (ref 3.5–5.2)
PROT SERPL-MCNC: 6.4 G/DL (ref 6–8.5)
RBC # BLD AUTO: 4.19 10*6/MM3 (ref 3.77–5.28)
SODIUM SERPL-SCNC: 141 MMOL/L (ref 136–145)
TRIGL SERPL-MCNC: 68 MG/DL (ref 0–150)
TSH SERPL DL<=0.005 MIU/L-ACNC: 2.45 UIU/ML (ref 0.27–4.2)
VLDLC SERPL CALC-MCNC: 13 MG/DL (ref 5–40)
WBC # BLD AUTO: 5.18 10*3/MM3 (ref 3.4–10.8)

## 2024-07-24 ENCOUNTER — OFFICE VISIT (OUTPATIENT)
Dept: INTERNAL MEDICINE | Facility: CLINIC | Age: 64
End: 2024-07-24
Payer: MEDICARE

## 2024-07-24 VITALS
WEIGHT: 104 LBS | HEIGHT: 55 IN | OXYGEN SATURATION: 98 % | SYSTOLIC BLOOD PRESSURE: 124 MMHG | BODY MASS INDEX: 24.07 KG/M2 | DIASTOLIC BLOOD PRESSURE: 76 MMHG | HEART RATE: 57 BPM

## 2024-07-24 DIAGNOSIS — Z00.00 MEDICARE ANNUAL WELLNESS VISIT, SUBSEQUENT: Primary | ICD-10-CM

## 2024-07-24 DIAGNOSIS — D51.0 PERNICIOUS ANEMIA: ICD-10-CM

## 2024-07-24 DIAGNOSIS — Z00.00 WELL ADULT EXAM: ICD-10-CM

## 2024-07-24 PROCEDURE — 1159F MED LIST DOCD IN RCRD: CPT | Performed by: INTERNAL MEDICINE

## 2024-07-24 PROCEDURE — 1170F FXNL STATUS ASSESSED: CPT | Performed by: INTERNAL MEDICINE

## 2024-07-24 PROCEDURE — 96372 THER/PROPH/DIAG INJ SC/IM: CPT | Performed by: INTERNAL MEDICINE

## 2024-07-24 PROCEDURE — 1160F RVW MEDS BY RX/DR IN RCRD: CPT | Performed by: INTERNAL MEDICINE

## 2024-07-24 PROCEDURE — 1126F AMNT PAIN NOTED NONE PRSNT: CPT | Performed by: INTERNAL MEDICINE

## 2024-07-24 PROCEDURE — 99397 PER PM REEVAL EST PAT 65+ YR: CPT | Performed by: INTERNAL MEDICINE

## 2024-07-24 PROCEDURE — G0439 PPPS, SUBSEQ VISIT: HCPCS | Performed by: INTERNAL MEDICINE

## 2024-07-24 RX ORDER — CETIRIZINE HYDROCHLORIDE 10 MG/1
10 TABLET ORAL DAILY
COMMUNITY

## 2024-07-24 RX ORDER — NAPROXEN SODIUM 220 MG
220 TABLET ORAL 2 TIMES DAILY PRN
COMMUNITY

## 2024-07-24 RX ADMIN — CYANOCOBALAMIN 1000 MCG: 1000 INJECTION, SOLUTION INTRAMUSCULAR; SUBCUTANEOUS at 09:01

## 2024-07-24 NOTE — LETTER
Controlled Substance Prescribing Agreement          I, Noemi Bartholomew [PATIENT],  1960 [] a patient of  Mary Lou Carroll MD   [PROVIDER] at [facility] [PRACTICE], have been informed that  individuals who are prescribed certain Controlled Substances including, but not limited to, narcotic pain medicines, stimulants, benzodiazepine tranquilizers, and barbiturate sedatives, can abuse those substances or may allow abuse by others, and have some risk of developing an addictive disorder or suffering a relapse of a prior addiction. Therefore, I have been informed that it is necessary to observe strict rules pertaining to their use, and I agree to follow the terms and procedures described in this Agreement as consideration for, and as a condition of, the willingness of the physician whose signature appears below to consider prescribing or to continue prescribing Controlled Substances to treat my pain.     1. I will inform my physician of any current or past substance abuse, or any current or past substance abuse of any immediate member of my immediate family.     2. I agree that I may be subject to a voluntary evaluation by psychologists and/or psychiatrists, possibly at my own expense, before any Controlled Substances will be prescribed to me. I agree that the need to be evaluated by psychologists and/or psychiatrists may be revisited every three (3) to six (6) months thereafter while taking the medication.     3. All Controlled Substances must come from a provider in the PROVIDER’S PRACTICE. My Controlled Substances will come from the PROVIDER whose signature appears below, or during his or her absence, by the covering provider, unless specific written authorization is obtained from the office for an exception.     4. I will obtain all Controlled Substances from the same pharmacy. Should the need arise to change pharmacies, I will inform the PROVIDER’S office.     5. I will inform  the PROVIDER’S office of any new medications or medical conditions, and of any adverse effects I experience from any of the medications that I take.     6. I will inform my other health care providers that I am taking the Controlled Substances listed above, and of the existence of this Agreement. In the event of an emergency, I will provide the foregoing information to emergency department providers.     7. I agree that my prescribing PROVIDER has permission to discuss all diagnostic and treatment details with other health care providers, pharmacists, or other professionals who provide my health care regarding my use of Controlled Substances for purposes of maintaining accountability.     8. I will not allow anyone else to have, use sell, or otherwise have access to these medications. The sharing of medications with anyone is absolutely forbidden and is against the law.         9. I understand that Controlled Substances may be hazardous or lethal to a person who is not tolerant to their effects, especially a child, and that I must keep them out of reach of such people for their own safety.     10. I understand that tampering with a written prescription is a felony and I will not change or tamper with the PROVIDER’S written prescription.     11. I am aware that attempting to obtain a Controlled Substance under false pretenses is illegal.     12. I agree not to alter my medication in any way, and I will take my medication whole, and it will not be broken, chewed, crushed, injected, or snorted.     13. I will take my medication as instructed and prescribed, and I will not exceed the maximum prescribed dose. Any change in dosage must be approved by the PROVIDER or a physician within the PRACTICE.     14. I understand that these drugs should not be stopped abruptly, as withdrawal syndromes may develop.     15. I will cooperate with unannounced urine or serum toxicology screenings as may be requested, as well as any  random pill counts of medication by the PROVIDER. Failure to comply may result in termination of the PROVIDER-patient relationship.     16. I understand that the presence of unauthorized and/or illegal substances in the screenings described in the paragraph above may prompt referral for assessment for a substance abuse disorder or termination of the PROVIDER-patient relationship.     17. I understand that medications may not be replaced if they are lost, damaged, or stolen. If any of these situations arise that cause me to request an early refill of my medication, a copy of a filed police report or a statement from me explaining the circumstances may be required before additional prescriptions are considered. If I request an early refill secondary to lost, damaged, or stolen prescriptions twice within a year, I may be discharged from the practice.     18. I understand that a prescription may be given early if the PROVIDER or the patient will be out of town when the refill is due. These prescriptions will contain instructions to the pharmacist that the prescriptions(s) may not be filled prior to the appropriate date.     19. If the responsible legal authorities have questions concerning my treatment, as may occur, for example, if I obtained medication at several pharmacies, all confidentiality is waived, and these authorities may be given full access to my full records of Controlled Substances administration.     20. I will keep my scheduled appointments in order to receive medication renewals. If I need to cancel my appointment, I will do so a minimum of twenty-four (24) hours before it is scheduled.     21. I understand that I may be asked to bring my medications in their original container to the PROVIDER’s office while I am on controlled medication.     22. Refills generally will not be given over the phone, after office hours, during the weekends, and on holidays.     23. I understand that any medical treatment  is initially a trial, with the goal of treatment being to improve the quality of life and ability to function and/or work. These parameters will be assessed periodically to determine the benefits of continued therapy, and continued prescription is contingent on whether my physician believes that the medication usage benefits me. I will comply with all treatments as outlined by the PROVIDER.     24. I have been explained the risks and potential benefits of these therapies, including, but not limited to, psychological addiction, physical dependence, withdrawal and over dosage.     25. I understand that failure to adhere to these policies and/or failure to comply with the PROVIDER’S treatment plan may result in cessation of therapy with Controlled Substance prescribing by the PROVIDER or referral for further specialty assessment, as well as possible discharge from the PRACTICE.     26. I, the undersigned patient, attest that the foregoing was discussed with me, and that I have read, fully understand, and agree to all of the above requirements and instructions. I affirm that I have the full right and power to sign and be bound by this  Agreement.         Date:  __________________________________________    Time:  __________________________________________    Patient Printed Name:  _____________________________    Patient Signature:  ________________________________           Date:  __________________________________________    Time:  __________________________________________    Provider Signature:  _______________________________

## 2024-07-24 NOTE — PROGRESS NOTES
Subjective   The ABCs of the Annual Wellness Visit  Medicare Wellness Visit      Noemi Bartholomew is a 64 y.o. patient who presents for a Medicare Wellness Visit.    The following portions of the patient's history were reviewed and   updated as appropriate: allergies, current medications, past family history, past medical history, past social history, past surgical history, and problem list.    Compared to one year ago, the patient's physical   health is the same.  Compared to one year ago, the patient's mental   health is the same.    Recent Hospitalizations:  This patient has had a [facility] admission record on file within the last 365 days.  Current Medical Providers:  Patient Care Team:  MaryL ou Carroll MD as PCP - General (Internal Medicine)  Kerri Hernandez MD as Consulting Physician (Obstetrics and Gynecology)    Outpatient Medications Prior to Visit   Medication Sig Dispense Refill    acetaminophen (TYLENOL) 325 MG tablet Take 2 tablets by mouth Every 4 (Four) Hours As Needed for Mild Pain.      atorvastatin (LIPITOR) 20 MG tablet Take 1 tablet by mouth Every Night. 90 tablet 3    Baclofen (LIORESAL) 5 MG tablet Take 1 tablet by mouth 2 (Two) Times a Day. 180 tablet 3    Calcium Carb-Cholecalciferol (CALCIUM 1000 + D PO) Take 2 tablets by mouth Daily. 2 caps      cetirizine (zyrTEC) 10 MG tablet Take 1 tablet by mouth Daily.      denosumab (PROLIA) 60 MG/ML solution prefilled syringe syringe 1 mL Every 6 (Six) Months.      docusate sodium (COLACE) 100 MG capsule Take 1 capsule by mouth Every Morning.      gabapentin (NEURONTIN) 300 MG capsule Take 1 capsule by mouth 3 (Three) Times a Day. 270 capsule 1    Metamucil Fiber chewable tablet Chew 2 tablets Every Morning.      methocarbamol (ROBAXIN) 750 MG tablet Take 1 tablet by mouth 4 (Four) Times a Day As Needed for Muscle Spasms. 40 tablet 1    naproxen sodium (ALEVE) 220 MG tablet Take 1 tablet by mouth 2 (Two) Times a Day As Needed.       "HYDROcodone-acetaminophen (NORCO) 5-325 MG per tablet Take 1 tablet by mouth Every 6 (Six) Hours As Needed for Moderate Pain. 25 tablet 0    ondansetron ODT (ZOFRAN-ODT) 4 MG disintegrating tablet Place 1 tablet on the tongue Every 6 (Six) Hours As Needed for Nausea or Vomiting. (Patient not taking: Reported on 7/24/2024) 18 tablet 0     Facility-Administered Medications Prior to Visit   Medication Dose Route Frequency Provider Last Rate Last Admin    cyanocobalamin injection 1,000 mcg  1,000 mcg Intramuscular Q28 Days Mary Lou Carroll MD   1,000 mcg at 04/09/24 1452     No opioid medication identified on active medication list. I have reviewed chart for other potential  high risk medication/s and harmful drug interactions in the elderly.      Aspirin is not on active medication list.  Aspirin use is not indicated based on review of current medical condition/s. Risk of harm outweighs potential benefits.  .    Patient Active Problem List   Diagnosis    Carpal tunnel syndrome    Hyperlipidemia    Osteoporosis    DDD (degenerative disc disease), lumbar    Chronic left-sided lumbar radiculopathy    Congenital spondylolysis, lumbosacral region    Neuropathic pain, leg, left    Ossification of spinal ligament    Cervical spondylosis with myelopathy    Cervical stenosis of spine    Cervical myelopathy    Weakness of both hands    Chronic neck pain    Occipital neuralgia of right side    S/P cervical spinal fusion    Spasticity    Pernicious anemia    Cervical spinal stenosis     Advance Care Planning (Click this link to access ACP Navigator)  Advance Directive is on file.  ACP discussion was held with the patient during this visit. Patient has an advance directive in EMR which is still valid.         Objective   Vitals:    07/24/24 0756   BP: 124/76   Pulse: 57   SpO2: 98%   Weight: 47.2 kg (104 lb)   Height: 134.7 cm (53.02\")   PainSc: 0-No pain       Estimated body mass index is 26.01 kg/m² as calculated from the " "following:    Height as of this encounter: 134.7 cm (53.02\").    Weight as of this encounter: 47.2 kg (104 lb).             Does the patient have evidence of cognitive impairment? No  Lab Results   Component Value Date    CHLPL 166 2024    TRIG 68 2024    HDL 79 (H) 2024    LDL 74 2024    VLDL 13 2024                                                                                                Health  Risk Assessment    Smoking Status:  Social History     Tobacco Use   Smoking Status Never   Smokeless Tobacco Never     Alcohol Consumption:  Social History     Substance and Sexual Activity   Alcohol Use No     Fall Risk Screen:  STEADI Fall Risk Assessment was completed, and patient is at LOW risk for falls.Assessment completed on:2024    Depression Screenin/24/2024     7:55 AM   PHQ-2/PHQ-9 Depression Screening   Little Interest or Pleasure in Doing Things 0-->not at all   Feeling Down, Depressed or Hopeless 0-->not at all   PHQ-9: Brief Depression Severity Measure Score 0     Health Habits and Functional and Cognitive Screenin/24/2024     7:56 AM   Functional & Cognitive Status   Do you have difficulty preparing food and eating? No   Do you have difficulty bathing yourself, getting dressed or grooming yourself? No   Do you have difficulty using the toilet? No   Do you have difficulty moving around from place to place? No   Do you have trouble with steps or getting out of a bed or a chair? No   Current Diet Well Balanced Diet   Dental Exam Up to date   Eye Exam Up to date   Exercise (times per week) 3 times per week   Current Exercises Include Walking   Do you need help using the phone?  No   Are you deaf or do you have serious difficulty hearing?  No   Do you need help to go to places out of walking distance? No   Do you need help shopping? No   Do you need help preparing meals?  No   Do you need help with housework?  No   Do you need help with laundry? No   Do " you need help taking your medications? No   Do you need help managing money? No   Do you ever drive or ride in a car without wearing a seat belt? No   Have you felt unusual stress, anger or loneliness in the last month? No   Who do you live with? Alone   If you need help, do you have trouble finding someone available to you? No   Have you been bothered in the last four weeks by sexual problems? No   Do you have difficulty concentrating, remembering or making decisions? No             Age-appropriate Screening Schedule:  Refer to the list below for future screening recommendations based on patient's age, sex and/or medical conditions. Orders for these recommended tests are listed in the plan section. The patient has been provided with a written plan.    Health Maintenance List  Health Maintenance   Topic Date Due    DXA SCAN  08/01/2020    PAP SMEAR  08/16/2020    MAMMOGRAM  08/01/2021    INFLUENZA VACCINE  08/01/2024    BMI FOLLOWUP  04/19/2025    LIPID PANEL  07/17/2025    ANNUAL WELLNESS VISIT  07/24/2025    COLORECTAL CANCER SCREENING  04/19/2026    TDAP/TD VACCINES (3 - Td or Tdap) 11/04/2026    COVID-19 Vaccine  Completed    ZOSTER VACCINE  Completed    HEPATITIS C SCREENING  Addressed    Pneumococcal Vaccine 0-64  Aged Out                                                                                                                                                CMS Preventative Services Quick Reference  Risk Factors Identified During Encounter  Fall Risk-High or Moderate: Discussed Fall Prevention in the home    The above risks/problems have been discussed with the patient.  Pertinent information has been shared with the patient in the After Visit Summary.  An After Visit Summary and PPPS were made available to the patient.    Follow Up:   Next Medicare Wellness visit to be scheduled in 1 year.         Additional E&M Note during same encounter follows:  Patient has additional, significant, and separately  "identifiable condition(s)/problem(s) that require work above and beyond the Medicare Wellness Visit     Chief Complaint  Medicare Wellness-subsequent    Subjective   HPI  Noemi is also being seen today for an annual adult preventative physical exam.     Review of Systems   Respiratory: Negative.     Cardiovascular: Negative.    Musculoskeletal:  Positive for neck pain.      Chronic neck pain.  Her pain is controlled with Aleve, gabapentin and baclofen.    HLD.   Control is good on atorvastatin.    B12 deficiency.  She is maintained on shots monthly.        Objective   Vital Signs:  /76   Pulse 57   Ht 134.7 cm (53.02\")   Wt 47.2 kg (104 lb)   SpO2 98%   BMI 26.01 kg/m²   Physical Exam  Constitutional:       Appearance: She is well-developed.   HENT:      Head: Normocephalic and atraumatic.      Right Ear: Hearing, tympanic membrane and external ear normal.      Left Ear: Hearing, tympanic membrane and external ear normal.      Nose: Nose normal.   Neck:      Thyroid: No thyromegaly.   Cardiovascular:      Rate and Rhythm: Normal rate and regular rhythm.      Heart sounds: Normal heart sounds. No murmur heard.  Pulmonary:      Effort: Pulmonary effort is normal.      Breath sounds: Normal breath sounds.   Chest:   Breasts:     Right: Normal.      Left: Normal.   Abdominal:      General: There is no distension.      Palpations: Abdomen is soft.      Tenderness: There is no abdominal tenderness.   Musculoskeletal:      Cervical back: Neck supple.   Lymphadenopathy:      Cervical: No cervical adenopathy.   Skin:     General: Skin is warm and dry.   Neurological:      Mental Status: She is alert and oriented to person, place, and time.   Psychiatric:         Speech: Speech normal.         Behavior: Behavior normal.         Thought Content: Thought content normal.         Judgment: Judgment normal.         The following data was reviewed by: Mary Lou Carroll MD on 07/24/2024:  Common labs          3/30/2024    " 05:27 4/9/2024    14:57 7/17/2024    08:13   Common Labs   Glucose  105  85    BUN  7  14    Creatinine  0.67  0.76    Sodium  142  141    Potassium  4.2  5.2    Chloride  105  103    Calcium  9.2  9.4    Total Protein  6.3  6.4    Albumin  4.0  4.3    Total Bilirubin  <0.2  0.3    Alkaline Phosphatase  103  112    AST (SGOT)  16  32    ALT (SGPT)  10  19    WBC 8.12  8.29  5.18    Hemoglobin 9.4  10.1  12.6    Hematocrit 29.0  31.6  39.2    Platelets 206  424  293    Total Cholesterol   166    Triglycerides   68    HDL Cholesterol   79    LDL Cholesterol    74                Assessment and Plan Additional age appropriate preventative wellness advice topics were discussed during today's preventative wellness exam(some topics already addressed during AWV portion of the note above):    Physical Activity: Advised cardiovascular activity 150 minutes per week as tolerated. (example brisk walk for 30 minutes, 5 days a week).              Medicare annual wellness visit, subsequent    Well adult exam    Pernicious anemia    No orders of the defined types were placed in this encounter.          Discussed importance of preventative care including vaccinations, age appropriate cancer screening, routine lab work, healthy diet, and active lifestyle.    Follow Up   No follow-ups on file.  Patient was given instructions and counseling regarding her condition or for health maintenance advice. Please see specific information pulled into the AVS if appropriate.

## 2024-07-30 ENCOUNTER — TREATMENT (OUTPATIENT)
Dept: PHYSICAL THERAPY | Facility: CLINIC | Age: 64
End: 2024-07-30
Payer: MEDICARE

## 2024-07-30 DIAGNOSIS — Z74.09 IMPAIRED FUNCTIONAL MOBILITY, BALANCE, GAIT, AND ENDURANCE: ICD-10-CM

## 2024-07-30 DIAGNOSIS — Z09 STATUS POST NECK SURGERY, FOLLOW-UP EXAM: Primary | ICD-10-CM

## 2024-07-30 DIAGNOSIS — G89.29 CHRONIC NECK PAIN: ICD-10-CM

## 2024-07-30 DIAGNOSIS — Z98.890 HX OF NECK SURGERY: ICD-10-CM

## 2024-07-30 DIAGNOSIS — M54.2 CHRONIC NECK PAIN: ICD-10-CM

## 2024-07-30 PROCEDURE — 97140 MANUAL THERAPY 1/> REGIONS: CPT | Performed by: PHYSICAL THERAPIST

## 2024-07-30 PROCEDURE — 97530 THERAPEUTIC ACTIVITIES: CPT | Performed by: PHYSICAL THERAPIST

## 2024-07-30 PROCEDURE — 97110 THERAPEUTIC EXERCISES: CPT | Performed by: PHYSICAL THERAPIST

## 2024-07-30 NOTE — PROGRESS NOTES
Physical Therapy Daily Treatment Note      Patient: Noeim Lovey   : 1960  Referring practitioner: MIRYAM Lynch  Date of Initial Visit: Type: THERAPY  Noted: 2024  Today's Date: 2024  Patient seen for 13 sessions       Visit Diagnoses:    ICD-10-CM ICD-9-CM   1. Status post neck surgery, follow-up exam  Z09 V67.09   2. Hx of neck surgery  Z98.890 V15.29   3. Chronic neck pain  M54.2 723.1    G89.29 338.29   4. Impaired functional mobility, balance, gait, and endurance  Z74.09 V49.89       Subjective   C/o R sided neck pain. Some relief with Aleve. Continues to have difficulty getting on/off floor.    Objective   See Exercise, Manual, and Modality Logs for complete treatment.       Assessment/Plan  Continues to have UE & LE weakness and cannot get on/off floor w/o pulling on furniture/bar. Tolerates strengthening well w/o c/o pain. R sided neck stiffness improved w/ massage. Added new chest stretch to HEP.    Timed:         Manual Therapy:    8     mins  19399;     Therapeutic Exercise:    10     mins  67654;     Neuromuscular Jared:     0   mins  02766;    Therapeutic Activity:     20     mins  64981;     Gait Trainin     mins  11076;     Ultrasound:     0     mins  51552;    Ionto                               0    mins   88186  Self Care                       0     mins   01838      Un-Timed:  Electrical Stimulation:    0     mins  13254 ( );  Dry Needling     0     mins self-pay  Traction     0     mins 37870  Canalith Repos    0     mins 48891    Timed Treatment:   38   mins   Total Treatment:     38   mins    Mari Pereira, PT  [place] License: [id number]

## 2024-08-06 ENCOUNTER — TREATMENT (OUTPATIENT)
Dept: PHYSICAL THERAPY | Facility: CLINIC | Age: 64
End: 2024-08-06
Payer: MEDICARE

## 2024-08-06 DIAGNOSIS — Z74.09 IMPAIRED FUNCTIONAL MOBILITY, BALANCE, GAIT, AND ENDURANCE: ICD-10-CM

## 2024-08-06 DIAGNOSIS — Z09 STATUS POST NECK SURGERY, FOLLOW-UP EXAM: Primary | ICD-10-CM

## 2024-08-06 DIAGNOSIS — Z98.890 HX OF NECK SURGERY: ICD-10-CM

## 2024-08-06 DIAGNOSIS — G89.29 CHRONIC NECK PAIN: ICD-10-CM

## 2024-08-06 DIAGNOSIS — M54.2 CHRONIC NECK PAIN: ICD-10-CM

## 2024-08-06 NOTE — PROGRESS NOTES
Physical Therapy Daily Treatment Note      Patient: Noemi LIU [place]   : 1960  Referring practitioner: MIRYAM Lynch  Date of Initial Visit: Type: THERAPY  Noted: 2024  Today's Date: 2024  Patient seen for 14 sessions       Visit Diagnoses:    ICD-10-CM ICD-9-CM   1. Status post neck surgery, follow-up exam  Z09 V67.09   2. Hx of neck surgery  Z98.890 V15.29   3. Chronic neck pain  M54.2 723.1    G89.29 338.29   4. Impaired functional mobility, balance, gait, and endurance  Z74.09 V49.89       Subjective   R side of neck no longer painful, just stiff. Frustrated with inability to get off floor w/o pulling up.    Objective   See Exercise, Manual, and Modality Logs for complete treatment.       Assessment/Plan  Tolerated strength and balance exercise well w/o c/o pain. Neck stiffness improving. Progress per POC.    Timed:         Manual Therapy:    8     mins  58335;     Therapeutic Exercise:    15     mins  38198;     Neuromuscular Jared:    0    mins  04715;    Therapeutic Activity:     15     mins  63959;     Gait Trainin     mins  11348;     Ultrasound:     0     mins  54431;    Ionto                               0    mins   52403  Self Care                       0     mins   56583      Un-Timed:  Electrical Stimulation:    0     mins  86946 ( );  Dry Needling     0     mins self-pay  Traction     0     mins 67935  Canalith Repos    0     mins 31335    Timed Treatment:   38   mins   Total Treatment:     38   mins    Mari Pereira PT  [place] License: [id number]

## 2024-08-13 ENCOUNTER — TREATMENT (OUTPATIENT)
Dept: PHYSICAL THERAPY | Facility: CLINIC | Age: 64
End: 2024-08-13
Payer: MEDICARE

## 2024-08-13 DIAGNOSIS — Z74.09 IMPAIRED FUNCTIONAL MOBILITY, BALANCE, GAIT, AND ENDURANCE: ICD-10-CM

## 2024-08-13 DIAGNOSIS — Z98.890 HX OF NECK SURGERY: ICD-10-CM

## 2024-08-13 DIAGNOSIS — M54.2 CHRONIC NECK PAIN: ICD-10-CM

## 2024-08-13 DIAGNOSIS — G89.29 CHRONIC NECK PAIN: ICD-10-CM

## 2024-08-13 DIAGNOSIS — Z09 STATUS POST NECK SURGERY, FOLLOW-UP EXAM: Primary | ICD-10-CM

## 2024-08-13 NOTE — PROGRESS NOTES
Physical Therapy Daily Treatment Note      Patient: Noemi LIU [place]   : 1960  Referring practitioner: MIRYAM Lynch  Date of Initial Visit: Type: THERAPY  Noted: 2024  Today's Date: 2024  Patient seen for 15 sessions       Visit Diagnoses:    ICD-10-CM ICD-9-CM   1. Status post neck surgery, follow-up exam  Z09 V67.09   2. Hx of neck surgery  Z98.890 V15.29   3. Chronic neck pain  M54.2 723.1    G89.29 338.29   4. Impaired functional mobility, balance, gait, and endurance  Z74.09 V49.89       Subjective   R side of neck still stiff. Fearful of getting on/off floor.    Objective   See Exercise, Manual, and Modality Logs for complete treatment.       Assessment/Plan  Continues to have LE weakness and is unable to get on/off floor independently. Continues to have limited cervical AROM. Responds well to massage and stretching reporting decreased stiffness. Updated HEP w/ thoracic strength progression.    Timed:         Manual Therapy:    8     mins  96865;     Therapeutic Exercise:    30     mins  57985;     Neuromuscular Jared:    0    mins  78558;    Therapeutic Activity:     10     mins  45562;     Gait Trainin     mins  38817;     Ultrasound:     0     mins  42945;    Ionto                               0    mins   80461  Self Care                       0     mins   34400      Un-Timed:  Electrical Stimulation:    0     mins  70466 ( );  Dry Needling     0     mins self-pay  Traction     0     mins 05937  Canalith Repos    0     mins 01764    Timed Treatment:   48   mins   Total Treatment:     48   mins    Mari Pereira, PT  [place] License: [id number]

## 2024-08-15 ENCOUNTER — TREATMENT (OUTPATIENT)
Dept: PHYSICAL THERAPY | Facility: CLINIC | Age: 64
End: 2024-08-15
Payer: MEDICARE

## 2024-08-15 DIAGNOSIS — Z09 STATUS POST NECK SURGERY, FOLLOW-UP EXAM: Primary | ICD-10-CM

## 2024-08-15 DIAGNOSIS — Z74.09 IMPAIRED FUNCTIONAL MOBILITY, BALANCE, GAIT, AND ENDURANCE: ICD-10-CM

## 2024-08-15 DIAGNOSIS — M54.2 CHRONIC NECK PAIN: ICD-10-CM

## 2024-08-15 DIAGNOSIS — Z98.890 HX OF NECK SURGERY: ICD-10-CM

## 2024-08-15 DIAGNOSIS — G89.29 CHRONIC NECK PAIN: ICD-10-CM

## 2024-08-15 NOTE — PROGRESS NOTES
90 Day Physical Therapy Re Certification Of Plan of Care  Patient: Noemi Bartholomew   : 1960  Diagnosis/ICD-10 Code:  Status post neck surgery, follow-up exam [Z09]  Referring practitioner: MIRYAM Lynch  Date of Initial Visit: Type: THERAPY  Noted: 2024  Today's Date: 8/15/2024  Patient seen for 16 sessions         Visit Diagnoses:    ICD-10-CM ICD-9-CM   1. Status post neck surgery, follow-up exam  Z09 V67.09   2. Hx of neck surgery  Z98.890 V15.29   3. Chronic neck pain  M54.2 723.1    G89.29 338.29   4. Impaired functional mobility, balance, gait, and endurance  Z74.09 V49.89         Noemi Mooreoley reports: I don't have normal mobility. I have to get up from chair slowly, so that I do not fall. I have fear of falling. I cannot get on/off floor independently. I do not drive because I can't really feel the pedals; my feet are soft. I have not been able to walk outside due to heat. R side of neck is stiff. Rates pain 6/10.    Subjective Questionnaire: NDI: 28/100  Clinical Progress: unchanged  Home Program Compliance: Partial  Treatment has included: therapeutic exercise, neuromuscular re-education, manual therapy, and therapeutic activity      Subjective       Objective     Active Range of Motion   Cervical     Flexion: 40 degrees   Extension: 0 degrees   Left lateral flexion: 20 degrees   Right lateral flexion: 20 degrees  Left rotation: 40 degrees   Right rotation: 40 degrees        Strength/Myotome Testing   Left Shoulder      Planes of Motion   Flexion: 4+   Abduction: 4+   External rotation at 0°: 4+  Internal rotation at 0°: 5      Right Shoulder      Planes of Motion   Flexion: 4+   Abduction: 4+   External rotation at 0°: 4+   Internal rotation at 0°: 5     Left Elbow   Flexion: 5  Extension: 5     Right Elbow   Flexion: 5  Extension: 5     Functional Assessment      Single Leg Stance   Left: 3 seconds  Right: 2 seconds     Short Term Goals: 6 weeks. Patient will:  1. Be independent with  initial HEP MET  2. Demonstrate safe ambulation w/ uneven surfaces, indicating readiness for walking outdoors for exercise NOT MET  3. Report pain of </= 4/10 with all daily activities NOT MET     Long Term Goals: 6-12 weeks. Patient will:  1. Demonstrate improved Bilateral upper extremity MMT of >/= 4+/5 to allow for performance of daily activities MET  2. Report pain of </= 2/10 with all daily activities. NOT MET  3. Perceived disability </= 20% as measured by NDI Questionnaire. NOT MET  4. Be independent with long term HEP NOT MET    Assessment/Plan  Continues to have very limited SL balance and poor stability on uneven surfaces. UE/LE weakness limits ability to transfer on/off floor independently. Unable to kneel w/o pulling up. Continues to have limited cervical AROM and R sided neck pain.     Reviewed HEP. Pt was not aware that she was to continue exercises that were not being performed in clinic.     Recommendations: Continue with recommendations 2x/week  Timeframe: 6 weeks  Prognosis to achieve goals: fair      Timed:         Manual Therapy:    0     mins  65896;     Therapeutic Exercise:    20     mins  72350;     Neuromuscular Jared:    10    mins  32442;    Therapeutic Activity:     25     mins  50984;     Gait Trainin     mins  48921;     Ultrasound:     0     mins  32917;    Ionto                               0    mins   36699  Self Care                       0     mins   95885    Un-Timed:  Electrical Stimulation:    0     mins  45008 ( );  Dry Needling     0     mins self-pay  Traction     0     mins 71707  Re-Eval                           0    mins  19671  Canalith Repos    0     mins 94625    Timed Treatment:   55   mins   Total Treatment:     55   mins          PT: Mari Pereira PT     [place] License:  [id number]  Electronically signed by Mari Pereira PT, 08/15/24, 1:56 PM EDT    Certification Period: 8/15/2024 thru 2024  I certify that the therapy services are furnished while  this patient is under my care.  The services outlined above are required by this patient, and will be reviewed every 90 days.         Physician Signature:__________________________________________________    PHYSICIAN: Patricia Rodriguez APRN  NPI: [id number]                                      DATE:  :     Please sign and return via fax to [email or web address] . Thank you, [facility]

## 2024-08-20 ENCOUNTER — TREATMENT (OUTPATIENT)
Dept: PHYSICAL THERAPY | Facility: CLINIC | Age: 64
End: 2024-08-20
Payer: MEDICARE

## 2024-08-20 DIAGNOSIS — Z09 STATUS POST NECK SURGERY, FOLLOW-UP EXAM: Primary | ICD-10-CM

## 2024-08-20 DIAGNOSIS — G89.29 CHRONIC NECK PAIN: ICD-10-CM

## 2024-08-20 DIAGNOSIS — Z98.890 HX OF NECK SURGERY: ICD-10-CM

## 2024-08-20 DIAGNOSIS — M54.2 CHRONIC NECK PAIN: ICD-10-CM

## 2024-08-20 DIAGNOSIS — Z74.09 IMPAIRED FUNCTIONAL MOBILITY, BALANCE, GAIT, AND ENDURANCE: ICD-10-CM

## 2024-08-20 NOTE — PROGRESS NOTES
Physical Therapy Daily Treatment Note      Patient: Noemi LIU [place]   : 1960  Referring practitioner: MIRYAM Lynch  Date of Initial Visit: Type: THERAPY  Noted: 2024  Today's Date: 2024  Patient seen for 17 sessions       Visit Diagnoses:  No diagnosis found.    Subjective   Daily compliance with HEP. Still cannot stand from floor without use of nearby furniture. R side of neck remains stiff.    Objective   See Exercise, Manual, and Modality Logs for complete treatment.     Assessment/Plan  Short term relief of neck stiffness with massage. Demonstrated good understanding of HEP for functional mobility and full body strengthening. LOB x1 standing from floor. Was able to correct independently. Progress per POC.      Timed:         Manual Therapy:    8     mins  51454;     Therapeutic Exercise:    25     mins  04350;     Neuromuscular Jared:    0    mins  16511;    Therapeutic Activity:     10     mins  36359;     Gait Trainin     mins  40980;     Ultrasound:     0     mins  50559;    Ionto                               0    mins   10021  Self Care                       0     mins   67944      Un-Timed:  Electrical Stimulation:    0     mins  23069 ( );  Dry Needling     0     mins self-pay  Traction     00     mins 82714  Canalith Repos    0     mins 50371    Timed Treatment:   43   mins   Total Treatment:     43   mins    Mari Pereira, PT  [place] License: [id number]

## 2024-09-01 NOTE — THERAPY TREATMENT NOTE
Outpatient Occupational Therapy Neuro Treatment Note  [facility]     Patient Name: Noemi Bartholomew  : 1960  MRN: [id number]  Today's Date: 2020       Visit Date: 2020    Patient Active Problem List   Diagnosis   • Carpal tunnel syndrome   • Hyperlipidemia   • Osteoporosis   • DDD (degenerative disc disease), lumbar   • Chronic left lumbar radiculopathy   • Congenital spondylolysis, lumbosacral region   • Neuropathic pain, leg, left   • Ossification of spinal ligament   • Cervical spondylosis with myelopathy   • Cervical stenosis of spine   • Cord compression (CMS/HCC)   • Postoperative CSF leak   • Cervical myelopathy (CMS/HCC)   • Postoperative cerebrospinal fluid leak   • Weakness of both hands   • Weakness of both arms        Past Medical History:   Diagnosis Date   • Abnormal alkaline phosphatase test 2015    Resolved   • Acute bronchitis 2015    Resolved   • Allergic child age    pencillin   • Cervical spinal stenosis    • Diverticulosis    • H/O electromyography 10/13/2014   • H/O mammogram 2014   • Headache occassionally   • High cholesterol    • History of EKG 2015   • Hypercalcemia 2015    Resolved, Full w/u done, Likely secondary to Forteo   • Left arm pain    • Low back pain 2017   • Numbness in both hands    • Osteopenia several years   • Osteoporosis    • Paresthesia 2015    Resolved        Past Surgical History:   Procedure Laterality Date   • ANTERIOR CHANNEL VERTEBRECTOMY/CORPECTOMY N/A 2019    Procedure: CERVICAL 6, CERVICAL 7 ANTERIOR CERVICAL CORPECTOMY WITH CAGE AND PLATE;  Surgeon: Antonio Noe MD;  Location: [facility];  Service: Neurosurgery   • BACK SURGERY     • COLONOSCOPY  2011   • INCISION AND DRAINAGE OF WOUND N/A 2019    Procedure: followed by drainage of anterior cervical fluid collection;  Surgeon: Antonio Noe MD;  Location: [facility];  Service: Neurosurgery   • LUMBAR DRAIN  INSERTION EXTERNAL N/A 12/19/2019    Procedure: LUMBAR DRAIN INSERTION EXTERNAL;  Surgeon: Antonio Noe MD;  Location: [facility];  Service: Neurosurgery   • LUMBAR PERITONEAL SHUNT N/A 1/10/2020    Procedure: LUMBAR PERITONEAL SHUNT PLACEMENT;  Surgeon: Antonio Noe MD;  Location: [facility];  Service: Neurosurgery   • SPINE SURGERY  1999 back    dr abad quintero   • TONSILLECTOMY  child age   • TYMPANOPLASTY           Visit Dx:    ICD-10-CM ICD-9-CM   1. Cervical myelopathy (CMS/[facility]) G95.9 721.1   2. Cervical stenosis of spine M48.02 723.0   3. Cord compression (CMS/[facility]) G95.20 336.9   4. Upper extremity weakness R29.898 729.89                   OT Assessment/Plan     Row Name 06/16/20 1539          OT Assessment    Assessment Comments  Pt still feeling frustrated with progress but does well today following MH able to tolerate digit flexion exercises as well as phalens exercises.  -SG       User Key  (r) = Recorded By, (t) = Taken By, (c) = Cosigned By    Initials Name Provider Type    SG Roshni Morel OTR Occupational Therapist                     Therapy Education  Education Details: Added phalens and reverse phalens stretches.  Given: HEP  Program: New  How Provided: Verbal, Demonstration, Written  Provided to: Patient  Level of Understanding: Verbalized, Demonstrated      OT Exercises     Row Name 06/16/20 1500             Precautions    Existing Precautions/Restrictions  fall  -SG         Exercise 1    Exercise Name 1  MH to bilat hands; AAROM for digit flexion stretch, hold 5 sec. Joint blocking to PIP/DIP.  -SG         Exercise 2    Exercise Name 2  Phalens stretch for flexion/extension  -SG      Cueing 2  Verbal  -SG      Time (Seconds) 2  30  -SG         Exercise 3    Exercise Name 3  Foam block exercises for 2pt pinch, lateral pinch, digit flexion and thumb flexion (blue and green)  -SG        User Key  (r) = Recorded By, (t) = Taken By, (c) = Cosigned By    Initials Name  Provider Type     Roshni Morel OTR Occupational Therapist                      Time Calculation:   OT Start Time: 1430  OT Stop Time: 1518  OT Time Calculation (min): 48 min     Therapy Charges for Today     Code Description Service Date Service Provider Modifiers Qty    [id number] HC OT THER PROC EA 15 MIN 6/16/2020 Roshni Morel OTR GO 3                    BREONNA Hinton  6/16/2020   There are no Wet Read(s) to document.

## 2024-09-03 ENCOUNTER — TREATMENT (OUTPATIENT)
Dept: PHYSICAL THERAPY | Facility: CLINIC | Age: 64
End: 2024-09-03
Payer: MEDICARE

## 2024-09-03 DIAGNOSIS — G89.29 CHRONIC NECK PAIN: ICD-10-CM

## 2024-09-03 DIAGNOSIS — Z09 STATUS POST NECK SURGERY, FOLLOW-UP EXAM: Primary | ICD-10-CM

## 2024-09-03 DIAGNOSIS — Z98.890 HX OF NECK SURGERY: ICD-10-CM

## 2024-09-03 DIAGNOSIS — M54.2 CHRONIC NECK PAIN: ICD-10-CM

## 2024-09-03 DIAGNOSIS — Z74.09 IMPAIRED FUNCTIONAL MOBILITY, BALANCE, GAIT, AND ENDURANCE: ICD-10-CM

## 2024-09-03 PROCEDURE — 97110 THERAPEUTIC EXERCISES: CPT | Performed by: PHYSICAL THERAPIST

## 2024-09-03 PROCEDURE — 97140 MANUAL THERAPY 1/> REGIONS: CPT | Performed by: PHYSICAL THERAPIST

## 2024-09-03 PROCEDURE — 97530 THERAPEUTIC ACTIVITIES: CPT | Performed by: PHYSICAL THERAPIST

## 2024-09-03 NOTE — PROGRESS NOTES
Physical Therapy Daily Treatment Note      Patient: Noemi LIU [place]   : 1960  Referring practitioner: MIRYAM Lynch  Date of Initial Visit: Type: THERAPY  Noted: 2024  Today's Date: 9/3/2024  Patient seen for 18 sessions       Visit Diagnoses:    ICD-10-CM ICD-9-CM   1. Status post neck surgery, follow-up exam  Z09 V67.09   2. Hx of neck surgery  Z98.890 V15.29   3. Chronic neck pain  M54.2 723.1    G89.29 338.29   4. Impaired functional mobility, balance, gait, and endurance  Z74.09 V49.89       Subjective   Walked 30 mins outside twice over the weekend. No issues re: balance. Walked at safe, comfortable pace. R side of neck is tight.    Objective   See Exercise, Manual, and Modality Logs for complete treatment.     Assessment/Plan  Reports decreased neck pain post massage. Continues to have whole body weakness limiting transfers ie. On/off floor, positioning in bed. Tolerates exercise well w/o c/o pain. Progress per POC.    Timed:         Manual Therapy:    8     mins  41240;     Therapeutic Exercise:    25     mins  06082;     Neuromuscular Jared:    0    mins  95642;    Therapeutic Activity:     15     mins  97190;     Gait Trainin     mins  84416;     Ultrasound:     0     mins  04637;    Ionto                               0    mins   30209  Self Care                       0     mins   66137      Un-Timed:  Electrical Stimulation:    0     mins  12824 ( );  Dry Needling     0     mins self-pay  Traction     0     mins 00419  Canalith Repos    0     mins 69144    Timed Treatment:   48   mins   Total Treatment:     48   mins    Mari Pereira, PT  [place] License: [id number]

## 2024-09-05 ENCOUNTER — TREATMENT (OUTPATIENT)
Dept: PHYSICAL THERAPY | Facility: CLINIC | Age: 64
End: 2024-09-05
Payer: MEDICARE

## 2024-09-05 DIAGNOSIS — G89.29 CHRONIC NECK PAIN: ICD-10-CM

## 2024-09-05 DIAGNOSIS — Z74.09 IMPAIRED FUNCTIONAL MOBILITY, BALANCE, GAIT, AND ENDURANCE: ICD-10-CM

## 2024-09-05 DIAGNOSIS — Z09 STATUS POST NECK SURGERY, FOLLOW-UP EXAM: Primary | ICD-10-CM

## 2024-09-05 DIAGNOSIS — M54.2 CHRONIC NECK PAIN: ICD-10-CM

## 2024-09-05 DIAGNOSIS — Z98.890 HX OF NECK SURGERY: ICD-10-CM

## 2024-09-05 NOTE — PROGRESS NOTES
Physical Therapy Daily Treatment Note      Patient: Noemi LIU [place]   : 1960  Referring practitioner: MIRYAM Lynch  Date of Initial Visit: Type: THERAPY  Noted: 2024  Today's Date: 2024  Patient seen for 19 sessions       Visit Diagnoses:    ICD-10-CM ICD-9-CM   1. Status post neck surgery, follow-up exam  Z09 V67.09   2. Hx of neck surgery  Z98.890 V15.29   3. Chronic neck pain  M54.2 723.1    G89.29 338.29   4. Impaired functional mobility, balance, gait, and endurance  Z74.09 V49.89       Subjective   Purchased foam pads to practice half kneeling at home. R side of neck always feels stiff.    Objective   See Exercise, Manual, and Modality Logs for complete treatment.     Assessment/Plan  Short term improvement in R sided neck pain w/ massage and AROM exercises. Continues to have LE weakness limiting ability to get on/off floor independently.    Timed:         Manual Therapy:    8     mins  59407;     Therapeutic Exercise:    15     mins  66685;     Neuromuscular Jared:    0    mins  31316;    Therapeutic Activity:     25     mins  51835;     Gait Trainin     mins  73123;     Ultrasound:     0     mins  18235;    Ionto                               0    mins   93522  Self Care                       0     mins   40317      Un-Timed:  Electrical Stimulation:    0     mins  83463 ( );  Dry Needling     0     mins self-pay  Traction     0     mins 82031  Canalith Repos    0     mins 88468    Timed Treatment:   48   mins   Total Treatment:     48   mins    Mari Pereira, PT  [place] License: [id number]

## 2024-09-17 ENCOUNTER — TREATMENT (OUTPATIENT)
Dept: PHYSICAL THERAPY | Facility: CLINIC | Age: 64
End: 2024-09-17
Payer: MEDICARE

## 2024-09-17 DIAGNOSIS — M54.2 CHRONIC NECK PAIN: ICD-10-CM

## 2024-09-17 DIAGNOSIS — Z98.890 HX OF NECK SURGERY: ICD-10-CM

## 2024-09-17 DIAGNOSIS — Z09 STATUS POST NECK SURGERY, FOLLOW-UP EXAM: Primary | ICD-10-CM

## 2024-09-17 DIAGNOSIS — Z74.09 IMPAIRED FUNCTIONAL MOBILITY, BALANCE, GAIT, AND ENDURANCE: ICD-10-CM

## 2024-09-17 DIAGNOSIS — G89.29 CHRONIC NECK PAIN: ICD-10-CM

## 2024-09-17 PROCEDURE — 97530 THERAPEUTIC ACTIVITIES: CPT | Performed by: PHYSICAL THERAPIST

## 2024-09-17 PROCEDURE — 97110 THERAPEUTIC EXERCISES: CPT | Performed by: PHYSICAL THERAPIST

## 2024-09-20 ENCOUNTER — TREATMENT (OUTPATIENT)
Dept: PHYSICAL THERAPY | Facility: CLINIC | Age: 64
End: 2024-09-20
Payer: MEDICARE

## 2024-09-20 DIAGNOSIS — G89.29 CHRONIC NECK PAIN: ICD-10-CM

## 2024-09-20 DIAGNOSIS — M54.2 CHRONIC NECK PAIN: ICD-10-CM

## 2024-09-20 DIAGNOSIS — Z09 STATUS POST NECK SURGERY, FOLLOW-UP EXAM: Primary | ICD-10-CM

## 2024-09-20 DIAGNOSIS — Z98.890 HX OF NECK SURGERY: ICD-10-CM

## 2024-09-20 DIAGNOSIS — Z74.09 IMPAIRED FUNCTIONAL MOBILITY, BALANCE, GAIT, AND ENDURANCE: ICD-10-CM

## 2024-10-11 ENCOUNTER — TREATMENT (OUTPATIENT)
Dept: PHYSICAL THERAPY | Facility: CLINIC | Age: 64
End: 2024-10-11
Payer: MEDICARE

## 2024-10-11 DIAGNOSIS — Z74.09 IMPAIRED FUNCTIONAL MOBILITY, BALANCE, GAIT, AND ENDURANCE: ICD-10-CM

## 2024-10-11 DIAGNOSIS — G89.29 CHRONIC NECK PAIN: ICD-10-CM

## 2024-10-11 DIAGNOSIS — Z98.890 HX OF NECK SURGERY: ICD-10-CM

## 2024-10-11 DIAGNOSIS — Z09 STATUS POST NECK SURGERY, FOLLOW-UP EXAM: Primary | ICD-10-CM

## 2024-10-11 DIAGNOSIS — M54.2 CHRONIC NECK PAIN: ICD-10-CM

## 2024-10-11 NOTE — PROGRESS NOTES
Physical Therapy Re Certification Of Plan of Care  Patient: Noemi Bartholomew   : 1960  Diagnosis/ICD-10 Code:  Status post neck surgery, follow-up exam [Z09]  Referring practitioner: MIRYAM Lynch  Date of Initial Visit: Type: THERAPY  Noted: 2024  Today's Date: 10/11/2024  Patient seen for 22 sessions         Visit Diagnoses:    ICD-10-CM ICD-9-CM   1. Status post neck surgery, follow-up exam  Z09 V67.09   2. Hx of neck surgery  Z98.890 V15.29   3. Chronic neck pain  M54.2 723.1    G89.29 338.29   4. Impaired functional mobility, balance, gait, and endurance  Z74.09 V49.89         Noemi Mooreoley reports: I still cannot stand from kneeling (R) w/o UE assist. I think I know what to do at home to work on strength. Every time I get up from chair, I feel off balance. As long as I move slowly, I am fine. I am ready to DC to HEP.     Subjective Questionnaire: NDI:32/100  Clinical Progress: improved  Home Program Compliance: Yes  Treatment has included: therapeutic exercise, neuromuscular re-education, manual therapy, and therapeutic activity      Subjective       Objective   Active Range of Motion   Cervical     Flexion: 40 degrees   Extension: 0 degrees   Left lateral flexion: 25 degrees   Right lateral flexion: 35 degrees  Left rotation: 38 degrees   Right rotation: 35 degrees         Strength/Myotome Testing   Left Shoulder      Planes of Motion   Flexion: 4+   Abduction: 4+   External rotation at 0°: 4+  Internal rotation at 0°: 5      Right Shoulder      Planes of Motion   Flexion: 4+   Abduction: 4+   External rotation at 0°: 4+   Internal rotation at 0°: 5     Left Elbow   Flexion: 5  Extension: 5     Right Elbow   Flexion: 5  Extension: 5     Functional Assessment      Single Leg Stance   Left: 3 seconds  Right: 2 seconds     Short Term Goals: 6 weeks. Patient will:  1. Be independent with initial HEP MET  2. Demonstrate safe ambulation w/ uneven surfaces, indicating readiness for walking  outdoors for exercise MET  3. Report pain of </= 4/10 with all daily activities Did not assess 10/11     Long Term Goals: 6-12 weeks. Patient will:  1. Demonstrate improved Bilateral upper extremity MMT of >/= 4+/5 to allow for performance of daily activities MET  2. Report pain of </= 2/10 with all daily activities. Did not assess 10/11  3. Perceived disability </= 20% as measured by NDI Questionnaire. NOT MET  4. Be independent with long term HEP NOT MET    Assessment/Plan  Continues to have neck stiffness and poor single leg balance. Improved ability to transfer from floor with min HHA. Demonstrates good understanding of comprehensive HEP. Will DC to HEP per pt request.     Recommendations: Discharge  Timeframe:  N/A  Prognosis to achieve goals: fair      Timed:         Manual Therapy:    0     mins  82140;     Therapeutic Exercise:    30     mins  99019;     Neuromuscular Jared:    0    mins  02706;    Therapeutic Activity:     10     mins  73968;     Gait Trainin     mins  27136;     Ultrasound:     0     mins  37297;    Ionto                               0    mins   24545  Self Care                       0     mins   47010    Un-Timed:  Electrical Stimulation:    0     mins  28618 ( );  Dry Needling     0     mins self-pay  Traction     0     mins 99196  Re-Eval                           0    mins  17570  Canalith Repos    0     mins 44221    Timed Treatment:   40   mins   Total Treatment:     40   mins          PT: Mari Pereira PT     [place] License:  [id number]    Electronically signed by Mari Pereira PT, 10/11/24, 2:03 PM EDT    Certification Period: 10/11/2024 thru 2025  I certify that the therapy services are furnished while this patient is under my care.  The services outlined above are required by this patient, and will be reviewed every 90 days.         Physician Signature:__________________________________________________    PHYSICIAN: Patricia Rodriguez APRN  NPI: [id number]                                       DATE:  :     Please sign and return via fax to [email or web address] . Thank you, [facility]

## 2024-10-11 NOTE — PROGRESS NOTES
Discharge Summary  Discharge Summary from Physical Therapy Report    Patient Information  Noemi Bartholomew  1960    Dates  PT visit: 5/13/24-10/11/24      Discharge Status of Patient: See Note dated 10/11/24    Goals: Partially Met    Visit Diagnoses:    ICD-10-CM ICD-9-CM   1. Status post neck surgery, follow-up exam  Z09 V67.09   2. Hx of neck surgery  Z98.890 V15.29   3. Chronic neck pain  M54.2 723.1    G89.29 338.29   4. Impaired functional mobility, balance, gait, and endurance  Z74.09 V49.89       Discharge Plan: Continue with current home exercise program as instructed    Comments     Date of Discharge 10/11/24        Mari Pereira, PT  Physical Therapist

## 2024-10-15 ENCOUNTER — OFFICE VISIT (OUTPATIENT)
Dept: INTERNAL MEDICINE | Facility: CLINIC | Age: 64
End: 2024-10-15
Payer: MEDICARE

## 2024-10-15 VITALS
HEIGHT: 55 IN | HEART RATE: 84 BPM | BODY MASS INDEX: 25.22 KG/M2 | WEIGHT: 109 LBS | SYSTOLIC BLOOD PRESSURE: 124 MMHG | DIASTOLIC BLOOD PRESSURE: 82 MMHG | OXYGEN SATURATION: 98 %

## 2024-10-15 DIAGNOSIS — R14.0 ABDOMINAL BLOATING: ICD-10-CM

## 2024-10-15 DIAGNOSIS — R10.84 GENERALIZED ABDOMINAL PAIN: Primary | ICD-10-CM

## 2024-10-15 PROCEDURE — 1160F RVW MEDS BY RX/DR IN RCRD: CPT | Performed by: INTERNAL MEDICINE

## 2024-10-15 PROCEDURE — 1126F AMNT PAIN NOTED NONE PRSNT: CPT | Performed by: INTERNAL MEDICINE

## 2024-10-15 PROCEDURE — 99214 OFFICE O/P EST MOD 30 MIN: CPT | Performed by: INTERNAL MEDICINE

## 2024-10-15 PROCEDURE — 1159F MED LIST DOCD IN RCRD: CPT | Performed by: INTERNAL MEDICINE

## 2024-10-15 RX ORDER — OMEPRAZOLE 40 MG/1
40 CAPSULE, DELAYED RELEASE ORAL DAILY
Qty: 30 CAPSULE | Refills: 1 | Status: SHIPPED | OUTPATIENT
Start: 2024-10-15

## 2024-10-15 NOTE — PROGRESS NOTES
Subjective     Noemi Bartholomew is a 64 y.o. female who presents with   Chief Complaint   Patient presents with    GI Problem       History of Present Illness     C/o abdominal pain.  About a month.  Constant pain.  She stopped NSAIDs two weeks ago.  No bowel habit changes.  No black stool/no bloody stools.  Nausea is associated.      Review of Systems   Constitutional:  Negative for fever.   Gastrointestinal:  Positive for abdominal pain and nausea. Negative for constipation, diarrhea and vomiting.   Genitourinary:  Negative for dysuria, frequency and hematuria.   Musculoskeletal:  Negative for arthralgias and myalgias.       The following portions of the patient's history were reviewed and updated as appropriate: allergies, current medications and problem list.    Patient Active Problem List    Diagnosis Date Noted    Cervical spinal stenosis 02/21/2024    Pernicious anemia 01/29/2024    Spasticity 08/16/2021    S/P cervical spinal fusion 11/02/2020    Occipital neuralgia of right side 07/31/2020    Chronic neck pain 07/07/2020    Weakness of both hands 03/23/2020    Cervical myelopathy 12/13/2019    Cervical stenosis of spine 12/06/2019    Ossification of spinal ligament 11/22/2019     Note Last Updated: 11/22/2019     Added automatically from request for surgery [id number]      Cervical spondylosis with myelopathy 11/22/2019     Note Last Updated: 11/22/2019     Added automatically from request for surgery [id number]      Neuropathic pain, leg, left 06/25/2018    DDD (degenerative disc disease), lumbar 05/02/2018    Chronic left-sided lumbar radiculopathy 05/02/2018     Note Last Updated: 1/21/2021 Jan2021 Non-Regulatory IMO with [facility]/CMS designations      Congenital spondylolysis, lumbosacral region 05/02/2018    Carpal tunnel syndrome 06/08/2016    Hyperlipidemia 06/08/2016    Osteoporosis 06/08/2016     Note Last Updated: 6/29/2020     Description: Boniva for a number of years.  Forteo for a year.  Managed by Gyn.  ".  I do recommend 1200mg calcium and 1000 IUs of vitamin D in supplements/diet as well as weight bearing exercise to prevent further decline in BMD.  Prolia started 2020.         Current Outpatient Medications on File Prior to Visit   Medication Sig Dispense Refill    acetaminophen (TYLENOL) 325 MG tablet Take 2 tablets by mouth Every 4 (Four) Hours As Needed for Mild Pain.      atorvastatin (LIPITOR) 20 MG tablet Take 1 tablet by mouth Every Night. 90 tablet 3    Baclofen (LIORESAL) 5 MG tablet Take 1 tablet by mouth 2 (Two) Times a Day. 180 tablet 3    Calcium Carb-Cholecalciferol (CALCIUM 1000 + D PO) Take 2 tablets by mouth Daily. 2 caps      cetirizine (zyrTEC) 10 MG tablet Take 1 tablet by mouth Daily.      denosumab (PROLIA) 60 MG/ML solution prefilled syringe syringe 1 mL Every 6 (Six) Months.      docusate sodium (COLACE) 100 MG capsule Take 1 capsule by mouth Every Morning.      gabapentin (NEURONTIN) 300 MG capsule Take 1 capsule by mouth 3 (Three) Times a Day. 270 capsule 1    Metamucil Fiber chewable tablet Chew 2 tablets Every Morning.      naproxen sodium (ALEVE) 220 MG tablet Take 1 tablet by mouth 2 (Two) Times a Day As Needed.      methocarbamol (ROBAXIN) 750 MG tablet Take 1 tablet by mouth 4 (Four) Times a Day As Needed for Muscle Spasms. 40 tablet 1     Current Facility-Administered Medications on File Prior to Visit   Medication Dose Route Frequency Provider Last Rate Last Admin    cyanocobalamin injection 1,000 mcg  1,000 mcg Intramuscular Q28 Days Mary Lou Carroll MD   1,000 mcg at 07/24/24 0901       Objective     /82   Pulse 84   Ht 134.7 cm (53.03\")   Wt 49.4 kg (109 lb)   SpO2 98%   BMI 27.25 kg/m²     Physical Exam  Constitutional:       Appearance: She is well-developed.   HENT:      Head: Normocephalic and atraumatic.   Pulmonary:      Effort: Pulmonary effort is normal.   Abdominal:      General: There is no distension.      Palpations: Abdomen is soft. There is no mass. "      Tenderness: There is generalized abdominal tenderness. There is no guarding or rebound.   Neurological:      Mental Status: She is alert.         Assessment & Plan   Diagnoses and all orders for this visit:    1. Generalized abdominal pain (Primary)  -     CBC & Differential  -     Comprehensive Metabolic Panel  -     Amylase  -     Lipase  -     Urinalysis With Microscopic - Urine, Clean Catch  -     Urine Culture - Urine, Urine, Clean Catch  -     CT Abdomen Pelvis With Contrast; Future    2. Abdominal bloating  -     CT Abdomen Pelvis With Contrast; Future    Other orders  -     omeprazole (priLOSEC) 40 MG capsule; Take 1 capsule by mouth Daily.  Dispense: 30 capsule; Refill: 1        Discussion    Patient presents with new abdominal pain and bloating of uncertain etiology.  Start work up with labs and CT of the abdomen/pelvis.  Continue to avoid NSAIDs.  Trial of omeprazole.  Further labs pending results.        Future Appointments   Date Time Provider Department Center   11/27/2024  2:00 PM Antonio Noe MD [facility]   1/27/2025  8:50 AM [facility]   7/22/2025  8:10 AM [facility]   7/29/2025  7:45 AM Mary Lou Carroll MD [facility]

## 2024-10-17 LAB
ALBUMIN SERPL-MCNC: 4.5 G/DL (ref 3.5–5.2)
ALBUMIN/GLOB SERPL: 1.9 G/DL
ALP SERPL-CCNC: 101 U/L (ref 39–117)
ALT SERPL-CCNC: 14 U/L (ref 1–33)
AMYLASE SERPL-CCNC: 74 U/L (ref 28–100)
APPEARANCE UR: CLEAR
AST SERPL-CCNC: 24 U/L (ref 1–32)
BACTERIA #/AREA URNS HPF: NORMAL /HPF
BACTERIA UR CULT: NO GROWTH
BACTERIA UR CULT: NORMAL
BASOPHILS # BLD AUTO: 0.07 10*3/MM3 (ref 0–0.2)
BASOPHILS NFR BLD AUTO: 1.3 % (ref 0–1.5)
BILIRUB SERPL-MCNC: 0.3 MG/DL (ref 0–1.2)
BILIRUB UR QL STRIP: NEGATIVE
BUN SERPL-MCNC: 12 MG/DL (ref 8–23)
BUN/CREAT SERPL: 18.8 (ref 7–25)
CALCIUM SERPL-MCNC: 9.2 MG/DL (ref 8.6–10.5)
CASTS URNS MICRO: NORMAL
CHLORIDE SERPL-SCNC: 103 MMOL/L (ref 98–107)
CO2 SERPL-SCNC: 27.4 MMOL/L (ref 22–29)
COLOR UR: YELLOW
CREAT SERPL-MCNC: 0.64 MG/DL (ref 0.57–1)
EGFRCR SERPLBLD CKD-EPI 2021: 98.8 ML/MIN/1.73
EOSINOPHIL # BLD AUTO: 0.27 10*3/MM3 (ref 0–0.4)
EOSINOPHIL NFR BLD AUTO: 5.1 % (ref 0.3–6.2)
EPI CELLS #/AREA URNS HPF: NORMAL /HPF
ERYTHROCYTE [DISTWIDTH] IN BLOOD BY AUTOMATED COUNT: 13 % (ref 12.3–15.4)
GLOBULIN SER CALC-MCNC: 2.4 GM/DL
GLUCOSE SERPL-MCNC: 92 MG/DL (ref 65–99)
GLUCOSE UR QL STRIP: NEGATIVE
HCT VFR BLD AUTO: 38.8 % (ref 34–46.6)
HGB BLD-MCNC: 12.6 G/DL (ref 12–15.9)
HGB UR QL STRIP: NEGATIVE
IMM GRANULOCYTES # BLD AUTO: 0.01 10*3/MM3 (ref 0–0.05)
IMM GRANULOCYTES NFR BLD AUTO: 0.2 % (ref 0–0.5)
KETONES UR QL STRIP: NEGATIVE
LEUKOCYTE ESTERASE UR QL STRIP: NEGATIVE
LIPASE SERPL-CCNC: 20 U/L (ref 13–60)
LYMPHOCYTES # BLD AUTO: 1.32 10*3/MM3 (ref 0.7–3.1)
LYMPHOCYTES NFR BLD AUTO: 24.9 % (ref 19.6–45.3)
MCH RBC QN AUTO: 30.1 PG (ref 26.6–33)
MCHC RBC AUTO-ENTMCNC: 32.5 G/DL (ref 31.5–35.7)
MCV RBC AUTO: 92.6 FL (ref 79–97)
MONOCYTES # BLD AUTO: 0.42 10*3/MM3 (ref 0.1–0.9)
MONOCYTES NFR BLD AUTO: 7.9 % (ref 5–12)
NEUTROPHILS # BLD AUTO: 3.21 10*3/MM3 (ref 1.7–7)
NEUTROPHILS NFR BLD AUTO: 60.6 % (ref 42.7–76)
NITRITE UR QL STRIP: NEGATIVE
NRBC BLD AUTO-RTO: 0 /100 WBC (ref 0–0.2)
PH UR STRIP: 7 [PH] (ref 5–8)
PLATELET # BLD AUTO: 301 10*3/MM3 (ref 140–450)
POTASSIUM SERPL-SCNC: 4.5 MMOL/L (ref 3.5–5.2)
PROT SERPL-MCNC: 6.9 G/DL (ref 6–8.5)
PROT UR QL STRIP: NEGATIVE
RBC # BLD AUTO: 4.19 10*6/MM3 (ref 3.77–5.28)
RBC #/AREA URNS HPF: NORMAL /HPF
SODIUM SERPL-SCNC: 140 MMOL/L (ref 136–145)
SP GR UR STRIP: 1.02 (ref 1–1.03)
UROBILINOGEN UR STRIP-MCNC: NORMAL MG/DL
WBC # BLD AUTO: 5.3 10*3/MM3 (ref 3.4–10.8)
WBC #/AREA URNS HPF: NORMAL /HPF

## 2024-10-22 ENCOUNTER — OFFICE (OUTPATIENT)
Age: 64
End: 2024-10-22

## 2024-10-22 ENCOUNTER — OFFICE (OUTPATIENT)
Dept: URBAN - METROPOLITAN AREA CLINIC 76 | Facility: CLINIC | Age: 64
End: 2024-10-22

## 2024-10-22 VITALS
OXYGEN SATURATION: 95 % | HEART RATE: 86 BPM | DIASTOLIC BLOOD PRESSURE: 70 MMHG | SYSTOLIC BLOOD PRESSURE: 112 MMHG | WEIGHT: 133 LBS | OXYGEN SATURATION: 95 % | HEART RATE: 86 BPM | WEIGHT: 133 LBS | HEART RATE: 86 BPM | SYSTOLIC BLOOD PRESSURE: 112 MMHG | DIASTOLIC BLOOD PRESSURE: 70 MMHG | OXYGEN SATURATION: 95 % | HEIGHT: 55 IN | HEART RATE: 86 BPM | HEIGHT: 55 IN | DIASTOLIC BLOOD PRESSURE: 70 MMHG | HEIGHT: 55 IN | SYSTOLIC BLOOD PRESSURE: 112 MMHG | SYSTOLIC BLOOD PRESSURE: 112 MMHG | WEIGHT: 133 LBS | HEIGHT: 55 IN | WEIGHT: 133 LBS | WEIGHT: 133 LBS | SYSTOLIC BLOOD PRESSURE: 112 MMHG | WEIGHT: 133 LBS | HEART RATE: 86 BPM | DIASTOLIC BLOOD PRESSURE: 70 MMHG | OXYGEN SATURATION: 95 % | SYSTOLIC BLOOD PRESSURE: 112 MMHG | HEIGHT: 55 IN | DIASTOLIC BLOOD PRESSURE: 70 MMHG | HEIGHT: 55 IN | OXYGEN SATURATION: 95 % | DIASTOLIC BLOOD PRESSURE: 70 MMHG | SYSTOLIC BLOOD PRESSURE: 112 MMHG | HEART RATE: 86 BPM | WEIGHT: 133 LBS | OXYGEN SATURATION: 95 % | HEART RATE: 86 BPM | DIASTOLIC BLOOD PRESSURE: 70 MMHG | OXYGEN SATURATION: 95 % | HEIGHT: 55 IN

## 2024-10-22 DIAGNOSIS — K21.9 GASTRO-ESOPHAGEAL REFLUX DISEASE WITHOUT ESOPHAGITIS: ICD-10-CM

## 2024-10-22 DIAGNOSIS — Z80.0 FAMILY HISTORY OF MALIGNANT NEOPLASM OF DIGESTIVE ORGANS: ICD-10-CM

## 2024-10-22 PROCEDURE — 99213 OFFICE O/P EST LOW 20 MIN: CPT | Performed by: NURSE PRACTITIONER

## 2024-11-06 ENCOUNTER — HOSPITAL ENCOUNTER (OUTPATIENT)
Dept: CT IMAGING | Facility: HOSPITAL | Age: 64
Discharge: HOME | End: 2024-11-06
Admitting: INTERNAL MEDICINE
Payer: MEDICARE

## 2024-11-06 DIAGNOSIS — R14.0 ABDOMINAL BLOATING: ICD-10-CM

## 2024-11-06 DIAGNOSIS — R10.84 GENERALIZED ABDOMINAL PAIN: ICD-10-CM

## 2024-11-06 PROCEDURE — 25510000001 IOPAMIDOL 61 % SOLUTION: Performed by: INTERNAL MEDICINE

## 2024-11-06 PROCEDURE — 74177 CT ABD & PELVIS W/CONTRAST: CPT

## 2024-11-06 PROCEDURE — 0 DIATRIZOATE MEGLUMINE & SODIUM PER 1 ML: Performed by: INTERNAL MEDICINE

## 2024-11-06 RX ORDER — IOPAMIDOL 612 MG/ML
100 INJECTION, SOLUTION INTRAVASCULAR
Status: COMPLETED | OUTPATIENT
Start: 2024-11-06 | End: 2024-11-06

## 2024-11-06 RX ORDER — DIATRIZOATE MEGLUMINE AND DIATRIZOATE SODIUM 660; 100 MG/ML; MG/ML
30 SOLUTION ORAL; RECTAL
Status: COMPLETED | OUTPATIENT
Start: 2024-11-06 | End: 2024-11-06

## 2024-11-06 RX ADMIN — DIATRIZOATE MEGLUMINE AND DIATRIZOATE SODIUM 30 ML: 660; 100 LIQUID ORAL; RECTAL at 12:55

## 2024-11-06 RX ADMIN — IOPAMIDOL 85 ML: 612 INJECTION, SOLUTION INTRAVENOUS at 14:02

## 2024-11-13 ENCOUNTER — TELEPHONE (OUTPATIENT)
Dept: NEUROSURGERY | Facility: CLINIC | Age: 64
End: 2024-11-13
Payer: MEDICARE

## 2024-11-13 NOTE — TELEPHONE ENCOUNTER
Called patient to reschedule appt.  LVM.  Patient can be schedule for Dr. LABOY's next available appt or patient can request to see a APC.  Dr. LABOY will be out of town for the [place].  HUB can reschedule

## 2024-11-22 RX ORDER — BACLOFEN 5 MG/1
5 TABLET ORAL 2 TIMES DAILY
Qty: 180 TABLET | Refills: 3 | Status: SHIPPED | OUTPATIENT
Start: 2024-11-22

## 2024-12-18 DIAGNOSIS — G89.29 CHRONIC NECK PAIN: ICD-10-CM

## 2024-12-18 DIAGNOSIS — M54.2 CHRONIC NECK PAIN: ICD-10-CM

## 2024-12-18 RX ORDER — GABAPENTIN 300 MG/1
300 CAPSULE ORAL 3 TIMES DAILY
Qty: 270 CAPSULE | Refills: 1 | Status: SHIPPED | OUTPATIENT
Start: 2024-12-18

## 2024-12-23 RX ORDER — BACLOFEN 5 MG/1
5 TABLET ORAL 2 TIMES DAILY
Qty: 180 TABLET | Refills: 3 | Status: SHIPPED | OUTPATIENT
Start: 2024-12-23

## 2025-01-02 RX ORDER — ATORVASTATIN CALCIUM 20 MG/1
20 TABLET, FILM COATED ORAL NIGHTLY
Qty: 90 TABLET | Refills: 3 | Status: SHIPPED | OUTPATIENT
Start: 2025-01-02

## 2025-01-03 ENCOUNTER — TELEPHONE (OUTPATIENT)
Dept: INTERNAL MEDICINE | Facility: CLINIC | Age: 65
End: 2025-01-03
Payer: MEDICARE

## 2025-01-03 RX ORDER — BACLOFEN 5 MG/1
5 TABLET ORAL 2 TIMES DAILY
Qty: 180 TABLET | Refills: 3 | Status: SHIPPED | OUTPATIENT
Start: 2025-01-03

## 2025-01-03 NOTE — TELEPHONE ENCOUNTER
Patient called and stated that the pharmacy said they did not receive the Baclofen medication that was sent on 12/23/2024. Can you please re-send to pharmacy.

## 2025-01-24 NOTE — PROGRESS NOTES
Subjective   Patient ID: Noemi Bartholomew is a 64 y.o. female is here today for follow-up regarding her Physical Therapy and XR Spine Cervical     The patient is nearly a year out from C2-C5 laminoplasty.  She had a previous C6 and C7 corpectomy with a cage and plate for myelopathy.  This was the second time she had a surgery for myelopathy.  She is generally doing well and she got some strength back and balance back but she has not tightness and pain on the right side of her neck.  That is because that was the side that I uplifted the lamina in order to make room in the spinal canal.  Unfortunate that the nature of the surgery and I told her she can try some Voltaren gel or some Salonpas patches.  If those do not work we can try Lidoderm patch.  She will let us know.  Other than that I think she is doing reasonably well.  I will see her in 6 months with x-rays for follow-up.    History of Present Illness    The following portions of the patient's history were reviewed and updated as appropriate: allergies, current medications, past family history, past medical history, past social history, past surgical history, and problem list.    Review of Systems   Constitutional:  Negative for fever.   Musculoskeletal:  Positive for neck pain. Negative for neck stiffness.   Neurological:  Positive for weakness and numbness.   All other systems reviewed and are negative.      Objective   Physical Exam  Constitutional:       General: She is awake.      Appearance: She is well-developed.   HENT:      Head: Normocephalic and atraumatic.   Eyes:      General: Lids are normal.      Extraocular Movements: Extraocular movements intact.      Conjunctiva/sclera: Conjunctivae normal.      Pupils: Pupils are equal, round, and reactive to light.   Neck:      Vascular: No carotid bruit.   Neurological:      Mental Status: She is alert.      Coordination: Coordination is intact.      Deep Tendon Reflexes:      Reflex Scores:       Tricep  reflexes are 2+ on the right side and 2+ on the left side.       Bicep reflexes are 2+ on the right side and 2+ on the left side.       Brachioradialis reflexes are 2+ on the right side and 2+ on the left side.       Patellar reflexes are 2+ on the right side and 2+ on the left side.       Achilles reflexes are 2+ on the right side and 2+ on the left side.  Psychiatric:         Speech: Speech normal.       Neurological Exam  Mental Status  Awake and alert. Oriented only to person, place, time and situation. Recent and remote memory are intact. Speech is normal. Language is fluent with no aphasia. Attention and concentration are normal. Fund of knowledge is appropriate for level of education.    Cranial Nerves  CN II: Visual acuity is normal. Visual fields full to confrontation.  CN III, IV, VI: Extraocular movements intact bilaterally. Normal lids and orbits bilaterally. Pupils equal round and reactive to light bilaterally.  CN V: Facial sensation is normal.  CN VII: Full and symmetric facial movement.  CN IX, X: Palate elevates symmetrically. Normal gag reflex.  CN XI: Shoulder shrug strength is normal.  CN XII: Tongue midline without atrophy or fasciculations.    Motor  Normal muscle bulk throughout. Normal muscle tone.                                               Right                     Left  Rhomboids                            5                          5  Infraspinatus                          5                          5  Supraspinatus                       5                          5  Deltoid                                   5                          5   Biceps                                   5                          5  Brachioradialis                      5                          5   Triceps                                  5                          5   Pronator                                5                          5   Supinator                              5                           5    Wrist flexor                            5                          5   Wrist extensor                       5                          5   Finger flexor                          5                          5   Finger extensor                     5                          5   Interossei                              5                          5   Abductor pollicis brevis         5                          5   Flexor pollicis brevis             5                          5   Opponens pollicis                 5                          5  Extensor digitorum               5                          5  Abductor digiti minimi           5                          5   Abdominal                            5                          5  Glutei                                    5                          5  Hip abductor                         5                          5  Hip adductor                         5                          5   Iliopsoas                               5                          5   Quadriceps                           5                          5   Hamstring                             5                          5   Gastrocnemius                     5                           5   Anterior tibialis                      5                          5   Posterior tibialis                    5                          5   Peroneal                               5                          5  Ankle dorsiflexor                   5                          5  Ankle plantar flexor              5                           5  Extensor hallucis longus      5                           5    Sensory  Light touch is normal in upper and lower extremities. Proprioception is normal in upper and lower extremities.     Reflexes                                            Right                      Left  Brachioradialis                    2+                         2+  Biceps                                 2+                          2+  Triceps                                2+                         2+  Finger flex                           2+                         2+  Hamstring                            2+                         2+  Patellar                                2+                         2+  Achilles                                2+                         2+    Coordination    Finger-to-nose, rapid alternating movements and heel-to-shin normal bilaterally without dysmetria.    Gait  Casual gait is normal including stance, stride, and arm swing.Normal toe walking. Normal heel walking. Normal tandem gait.       Assessment & Plan   Independent Review of Radiographic Studies:      X-rays done on 6/19/2024 show good positioning of the laminoplasty plates from C2-C5 and stability of the anterior cervical hardware.  Agree with the report.    Medical Decision Making:      She will try some Voltaren gel and Salonpas patches over the right side of her neck and continue all of her other medications including the baclofen and the gabapentin.  These medicines are prescribed by her primary care physician.  I will see her in 6 months with x-rays.    Diagnoses and all orders for this visit:    1. Cervical myelopathy (Primary)    2. Chronic neck pain    3. Spasticity    4. History of fusion of cervical spine  -     XR spine cervical ap and lat w flex and ext; Future    5. History of cervical spinal surgery  -     XR spine cervical ap and lat w flex and ext; Future      Return in about 6 months (around 8/3/2025) for face to face.

## 2025-02-03 ENCOUNTER — OFFICE VISIT (OUTPATIENT)
Dept: NEUROSURGERY | Facility: CLINIC | Age: 65
End: 2025-02-03
Payer: MEDICARE

## 2025-02-03 VITALS
RESPIRATION RATE: 20 BRPM | DIASTOLIC BLOOD PRESSURE: 68 MMHG | BODY MASS INDEX: 25.22 KG/M2 | SYSTOLIC BLOOD PRESSURE: 124 MMHG | WEIGHT: 109 LBS | HEIGHT: 55 IN

## 2025-02-03 DIAGNOSIS — Z98.890 HISTORY OF CERVICAL SPINAL SURGERY: ICD-10-CM

## 2025-02-03 DIAGNOSIS — M54.2 CHRONIC NECK PAIN: ICD-10-CM

## 2025-02-03 DIAGNOSIS — R25.2 SPASTICITY: ICD-10-CM

## 2025-02-03 DIAGNOSIS — Z98.1 HISTORY OF FUSION OF CERVICAL SPINE: ICD-10-CM

## 2025-02-03 DIAGNOSIS — G95.9 CERVICAL MYELOPATHY: Primary | ICD-10-CM

## 2025-02-03 DIAGNOSIS — G89.29 CHRONIC NECK PAIN: ICD-10-CM

## 2025-02-03 PROCEDURE — 1159F MED LIST DOCD IN RCRD: CPT | Performed by: NEUROLOGICAL SURGERY

## 2025-02-03 PROCEDURE — 1160F RVW MEDS BY RX/DR IN RCRD: CPT | Performed by: NEUROLOGICAL SURGERY

## 2025-02-03 PROCEDURE — 99213 OFFICE O/P EST LOW 20 MIN: CPT | Performed by: NEUROLOGICAL SURGERY

## 2025-04-07 ENCOUNTER — TELEPHONE (OUTPATIENT)
Dept: NEUROSURGERY | Facility: CLINIC | Age: 65
End: 2025-04-07
Payer: MEDICARE

## 2025-04-07 NOTE — TELEPHONE ENCOUNTER
Patient called stating she will be dropping off paperwork for us to fill out and will like for us to mail it back to her once completely done. I confirmed carol will be done and patient voced understanding.    There are no further actions needed on this encounter.

## 2025-04-08 ENCOUNTER — TELEPHONE (OUTPATIENT)
Dept: NEUROSURGERY | Facility: CLINIC | Age: 65
End: 2025-04-08

## 2025-04-08 NOTE — PLAN OF CARE
Problem: Adult Inpatient Plan of Care  Goal: Plan of Care Review  Outcome: Ongoing, Progressing  Flowsheets (Taken 3/28/2024 1842)  Outcome Evaluation: Pt arrived to floor via stretcher, aox4, pain 8/10, PCA pump in place, ALONDRA drain and jericho dressing, scd's in place, family at bedside, f/c in place draining to bedside, will monitor for changes  Goal: Patient-Specific Goal (Individualized)  Outcome: Ongoing, Progressing  Goal: Absence of Hospital-Acquired Illness or Injury  Outcome: Ongoing, Progressing  Intervention: Identify and Manage Fall Risk  Recent Flowsheet Documentation  Taken 3/28/2024 1705 by Tracie Guido, RN  Safety Promotion/Fall Prevention:   activity supervised   assistive device/personal items within reach   fall prevention program maintained   nonskid shoes/slippers when out of bed   safety round/check completed  Intervention: Prevent Skin Injury  Recent Flowsheet Documentation  Taken 3/28/2024 1705 by Tracie Guido, RN  Body Position: supine, legs elevated  Intervention: Prevent and Manage VTE (Venous Thromboembolism) Risk  Recent Flowsheet Documentation  Taken 3/28/2024 1705 by Tracie Guido, RN  Activity Management: bedrest  Intervention: Prevent Infection  Recent Flowsheet Documentation  Taken 3/28/2024 1705 by Tracie Guido, RN  Infection Prevention:   single patient room provided   rest/sleep promoted  Goal: Optimal Comfort and Wellbeing  Outcome: Ongoing, Progressing  Intervention: Monitor Pain and Promote Comfort  Recent Flowsheet Documentation  Taken 3/28/2024 1705 by Tracie Guido, RN  Pain Management Interventions: pain pump in use  Goal: Readiness for Transition of Care  Outcome: Ongoing, Progressing     Problem: Fall Injury Risk  Goal: Absence of Fall and Fall-Related Injury  Outcome: Ongoing, Progressing  Intervention: Identify and Manage Contributors  Recent Flowsheet Documentation  Taken 3/28/2024 1705 by Tracie Guido, RN  Medication Review/Management:  medications reviewed  Intervention: Promote Injury-Free Environment  Recent Flowsheet Documentation  Taken 3/28/2024 1705 by Tracie Guido, RN  Safety Promotion/Fall Prevention:   activity supervised   assistive device/personal items within reach   fall prevention program maintained   nonskid shoes/slippers when out of bed   safety round/check completed   Goal Outcome Evaluation:              Outcome Evaluation: Pt arrived to floor via stretcher, aox4, pain 8/10, PCA pump in place, ALONDRA drain and jericho dressing, scd's in place, family at bedside, f/c in place draining to bedside, will monitor for changes                                08-Apr-2025 12:38

## 2025-04-08 NOTE — TELEPHONE ENCOUNTER
PT dropped off Disability papework to be filled out by Dr Noe. Paperwork includes 2 pages hand written, that PT wants included when paperwork. Paperwork  is to be faxed. Call PT when complete. Advised PT of 10-14 day completion of paperwork& $ 25 fee, which PT stated could be taken out of with the card on file.        Thank you

## 2025-04-09 ENCOUNTER — OFFICE (OUTPATIENT)
Dept: URBAN - METROPOLITAN AREA CLINIC 76 | Facility: CLINIC | Age: 65
End: 2025-04-09

## 2025-04-09 VITALS
WEIGHT: 133 LBS | HEART RATE: 90 BPM | HEIGHT: 55 IN | SYSTOLIC BLOOD PRESSURE: 135 MMHG | OXYGEN SATURATION: 97 % | DIASTOLIC BLOOD PRESSURE: 79 MMHG

## 2025-04-09 DIAGNOSIS — K21.9 GASTRO-ESOPHAGEAL REFLUX DISEASE WITHOUT ESOPHAGITIS: ICD-10-CM

## 2025-04-09 DIAGNOSIS — K44.9 DIAPHRAGMATIC HERNIA WITHOUT OBSTRUCTION OR GANGRENE: ICD-10-CM

## 2025-04-09 DIAGNOSIS — Z80.0 FAMILY HISTORY OF MALIGNANT NEOPLASM OF DIGESTIVE ORGANS: ICD-10-CM

## 2025-04-09 PROCEDURE — 99213 OFFICE O/P EST LOW 20 MIN: CPT | Performed by: NURSE PRACTITIONER

## 2025-04-21 ENCOUNTER — TELEPHONE (OUTPATIENT)
Dept: NEUROSURGERY | Facility: CLINIC | Age: 65
End: 2025-04-21
Payer: MEDICARE

## 2025-04-21 NOTE — TELEPHONE ENCOUNTER
Patient called in asking about Disability paperwork. There is paperwork scanned into [employer] but it is incomplete. She states she sent it 2 weeks ago.

## 2025-04-21 NOTE — TELEPHONE ENCOUNTER
Spoke to patient about her Disability paper work. Patient informed me that the paperwork is due on May 15th. I informed the patient that it has been signed and faxed over as well as mailed to her address as she requested. Patient voiced understanding.

## 2025-05-01 ENCOUNTER — TELEPHONE (OUTPATIENT)
Dept: NEUROSURGERY | Facility: CLINIC | Age: 65
End: 2025-05-01

## 2025-05-01 NOTE — TELEPHONE ENCOUNTER
Patient called wanting to speak to Ana.  I saw Ana and she said she just needs to know if she wants her paperwork mail or if she is picking it up.      Patient then advises she was confused and wants Ana to call her back    Best call back number is [phone]

## 2025-05-20 ENCOUNTER — OFFICE VISIT (OUTPATIENT)
Dept: INTERNAL MEDICINE | Facility: CLINIC | Age: 65
End: 2025-05-20
Payer: MEDICARE

## 2025-05-20 VITALS
HEIGHT: 55 IN | WEIGHT: 121 LBS | HEART RATE: 67 BPM | DIASTOLIC BLOOD PRESSURE: 70 MMHG | SYSTOLIC BLOOD PRESSURE: 136 MMHG | OXYGEN SATURATION: 96 % | BODY MASS INDEX: 28 KG/M2

## 2025-05-20 DIAGNOSIS — Z86.16 HISTORY OF COVID-19: ICD-10-CM

## 2025-05-20 DIAGNOSIS — R05.3 PERSISTENT DRY COUGH: Primary | ICD-10-CM

## 2025-05-20 PROCEDURE — 1125F AMNT PAIN NOTED PAIN PRSNT: CPT

## 2025-05-20 PROCEDURE — 99213 OFFICE O/P EST LOW 20 MIN: CPT

## 2025-05-20 PROCEDURE — 1160F RVW MEDS BY RX/DR IN RCRD: CPT

## 2025-05-20 PROCEDURE — 1159F MED LIST DOCD IN RCRD: CPT

## 2025-05-20 RX ORDER — BENZONATATE 200 MG/1
200 CAPSULE ORAL 3 TIMES DAILY PRN
Qty: 21 CAPSULE | Refills: 1 | Status: SHIPPED | OUTPATIENT
Start: 2025-05-20 | End: 2025-06-03

## 2025-05-20 RX ORDER — IBANDRONATE SODIUM 150 MG/1
TABLET, FILM COATED ORAL
COMMUNITY
Start: 2025-05-01

## 2025-05-20 NOTE — PROGRESS NOTES
"Chief Complaint  Cough    Subjective        Noemi Bartholomew presents to [facility]  History of Present Illness    Patient presenting today with a persistent dry cough that is nonproductive. Patient was diagnosed with Covid two weeks ago and is feeling better other than her lingering cough. She has been using benzonatate for the cough and she states it has been giving her relief. Patient is having mild rhinorrhea with the cough but no other symptoms.      Review of Systems   Constitutional:  Negative for chills, fatigue and fever.   HENT:  Positive for rhinorrhea. Negative for congestion, ear pain, sinus pressure, sinus pain and sore throat.    Respiratory:  Positive for cough. Negative for chest tightness and shortness of breath.    Cardiovascular:  Negative for chest pain.   Gastrointestinal:  Negative for diarrhea, nausea and vomiting.   Musculoskeletal:  Negative for myalgias.   Neurological:  Negative for headaches.        Objective   Vital Signs:  /70   Pulse 67   Ht 134.6 cm (53\")   Wt 54.9 kg (121 lb)   SpO2 96%   BMI 30.29 kg/m²   Estimated body mass index is 30.29 kg/m² as calculated from the following:    Height as of this encounter: 134.6 cm (53\").    Weight as of this encounter: 54.9 kg (121 lb).          Physical Exam  Vitals reviewed.   Constitutional:       Appearance: Normal appearance.   HENT:      Head: Normocephalic and atraumatic.      Right Ear: Tympanic membrane normal.      Left Ear: Tympanic membrane normal.      Nose: Rhinorrhea present. No congestion.      Mouth/Throat:      Pharynx: No oropharyngeal exudate or posterior oropharyngeal erythema.   Cardiovascular:      Rate and Rhythm: Normal rate and regular rhythm.      Heart sounds: Normal heart sounds.   Pulmonary:      Effort: Pulmonary effort is normal.      Breath sounds: Normal breath sounds. No decreased air movement. No decreased breath sounds, wheezing or rhonchi.      Comments: Audible dry " cough present  Musculoskeletal:      Right lower leg: No edema.      Left lower leg: No edema.   Skin:     General: Skin is warm and dry.   Neurological:      General: No focal deficit present.      Mental Status: She is alert and oriented to person, place, and time.   Psychiatric:         Attention and Perception: Attention normal.         Mood and Affect: Mood normal.         Speech: Speech normal.         Behavior: Behavior normal. Behavior is cooperative.         Thought Content: Thought content normal.         Judgment: Judgment normal.        Result Review :                Assessment and Plan   Diagnoses and all orders for this visit:    1. Persistent dry cough (Primary)  -     benzonatate (TESSALON) 200 MG capsule; Take 1 capsule by mouth 3 (Three) Times a Day As Needed for Cough for up to 14 days.  Dispense: 21 capsule; Refill: 1    2. History of COVID-19    Prescribing benzonatate for persistent cough, and encouraged patient to continue to hydrate. Discussed when to follow-up for new, persistent, or worsening symptoms.        Follow Up   Return if symptoms worsen or fail to improve.  Patient was given instructions and counseling regarding her condition or for health maintenance advice. Please see specific information pulled into the AVS if appropriate.

## 2025-05-21 DIAGNOSIS — G89.29 CHRONIC NECK PAIN: ICD-10-CM

## 2025-05-21 DIAGNOSIS — M54.2 CHRONIC NECK PAIN: ICD-10-CM

## 2025-05-21 RX ORDER — GABAPENTIN 300 MG/1
300 CAPSULE ORAL 3 TIMES DAILY
Qty: 90 CAPSULE | Refills: 0 | Status: SHIPPED | OUTPATIENT
Start: 2025-05-21

## 2025-06-11 DIAGNOSIS — M54.2 CHRONIC NECK PAIN: ICD-10-CM

## 2025-06-11 DIAGNOSIS — G89.29 CHRONIC NECK PAIN: ICD-10-CM

## 2025-06-11 RX ORDER — GABAPENTIN 300 MG/1
300 CAPSULE ORAL 3 TIMES DAILY
Qty: 270 CAPSULE | Refills: 1 | Status: SHIPPED | OUTPATIENT
Start: 2025-06-11

## 2025-06-13 RX ORDER — ATORVASTATIN CALCIUM 20 MG/1
20 TABLET, FILM COATED ORAL NIGHTLY
Qty: 90 TABLET | Refills: 3 | Status: SHIPPED | OUTPATIENT
Start: 2025-06-13

## 2025-07-20 DIAGNOSIS — E78.5 HYPERLIPIDEMIA, UNSPECIFIED HYPERLIPIDEMIA TYPE: Primary | ICD-10-CM

## 2025-07-20 DIAGNOSIS — M81.0 OSTEOPOROSIS, UNSPECIFIED OSTEOPOROSIS TYPE, UNSPECIFIED PATHOLOGICAL FRACTURE PRESENCE: ICD-10-CM

## 2025-07-22 LAB
25(OH)D3+25(OH)D2 SERPL-MCNC: 36.1 NG/ML (ref 30–100)
ALBUMIN SERPL-MCNC: 4.6 G/DL (ref 3.5–5.2)
ALBUMIN/GLOB SERPL: 1.9 G/DL
ALP SERPL-CCNC: 76 U/L (ref 39–117)
ALT SERPL-CCNC: 17 U/L (ref 1–33)
APPEARANCE UR: CLEAR
AST SERPL-CCNC: 31 U/L (ref 1–32)
BACTERIA #/AREA URNS HPF: NORMAL /HPF
BASOPHILS # BLD AUTO: 0.04 10*3/MM3 (ref 0–0.2)
BASOPHILS NFR BLD AUTO: 1 % (ref 0–1.5)
BILIRUB SERPL-MCNC: 0.3 MG/DL (ref 0–1.2)
BILIRUB UR QL STRIP: NEGATIVE
BUN SERPL-MCNC: 12 MG/DL (ref 8–23)
BUN/CREAT SERPL: 15 (ref 7–25)
CALCIUM SERPL-MCNC: 9.9 MG/DL (ref 8.6–10.5)
CASTS URNS MICRO: NORMAL
CHLORIDE SERPL-SCNC: 103 MMOL/L (ref 98–107)
CHOLEST SERPL-MCNC: 153 MG/DL (ref 0–200)
CO2 SERPL-SCNC: 29.6 MMOL/L (ref 22–29)
COLOR UR: YELLOW
CREAT SERPL-MCNC: 0.8 MG/DL (ref 0.57–1)
CRYSTALS URNS MICRO: NORMAL
EGFRCR SERPLBLD CKD-EPI 2021: 81.9 ML/MIN/1.73
EOSINOPHIL # BLD AUTO: 0.09 10*3/MM3 (ref 0–0.4)
EOSINOPHIL NFR BLD AUTO: 2.2 % (ref 0.3–6.2)
EPI CELLS #/AREA URNS HPF: NORMAL /HPF
ERYTHROCYTE [DISTWIDTH] IN BLOOD BY AUTOMATED COUNT: 12.8 % (ref 12.3–15.4)
GLOBULIN SER CALC-MCNC: 2.4 GM/DL
GLUCOSE SERPL-MCNC: 102 MG/DL (ref 65–99)
GLUCOSE UR QL STRIP: NEGATIVE
HCT VFR BLD AUTO: 41.6 % (ref 34–46.6)
HDLC SERPL-MCNC: 78 MG/DL (ref 40–60)
HGB BLD-MCNC: 13.2 G/DL (ref 12–15.9)
HGB UR QL STRIP: NEGATIVE
IMM GRANULOCYTES # BLD AUTO: 0 10*3/MM3 (ref 0–0.05)
IMM GRANULOCYTES NFR BLD AUTO: 0 % (ref 0–0.5)
KETONES UR QL STRIP: ABNORMAL
LDLC SERPL CALC-MCNC: 57 MG/DL (ref 0–100)
LEUKOCYTE ESTERASE UR QL STRIP: NEGATIVE
LYMPHOCYTES # BLD AUTO: 1.4 10*3/MM3 (ref 0.7–3.1)
LYMPHOCYTES NFR BLD AUTO: 34.5 % (ref 19.6–45.3)
MCH RBC QN AUTO: 31.6 PG (ref 26.6–33)
MCHC RBC AUTO-ENTMCNC: 31.7 G/DL (ref 31.5–35.7)
MCV RBC AUTO: 99.5 FL (ref 79–97)
MONOCYTES # BLD AUTO: 0.37 10*3/MM3 (ref 0.1–0.9)
MONOCYTES NFR BLD AUTO: 9.1 % (ref 5–12)
NEUTROPHILS # BLD AUTO: 2.16 10*3/MM3 (ref 1.7–7)
NEUTROPHILS NFR BLD AUTO: 53.2 % (ref 42.7–76)
NITRITE UR QL STRIP: NEGATIVE
NRBC BLD AUTO-RTO: 0 /100 WBC (ref 0–0.2)
PH UR STRIP: 5.5 [PH] (ref 5–8)
PLATELET # BLD AUTO: 276 10*3/MM3 (ref 140–450)
POTASSIUM SERPL-SCNC: 5.3 MMOL/L (ref 3.5–5.2)
PROT SERPL-MCNC: 7 G/DL (ref 6–8.5)
PROT UR QL STRIP: ABNORMAL
RBC # BLD AUTO: 4.18 10*6/MM3 (ref 3.77–5.28)
RBC #/AREA URNS HPF: NORMAL /HPF
SODIUM SERPL-SCNC: 142 MMOL/L (ref 136–145)
SP GR UR STRIP: >1.03 (ref 1–1.03)
TRIGL SERPL-MCNC: 98 MG/DL (ref 0–150)
TSH SERPL DL<=0.005 MIU/L-ACNC: 2.46 UIU/ML (ref 0.27–4.2)
UROBILINOGEN UR STRIP-MCNC: ABNORMAL MG/DL
VLDLC SERPL CALC-MCNC: 18 MG/DL (ref 5–40)
WBC # BLD AUTO: 4.06 10*3/MM3 (ref 3.4–10.8)
WBC #/AREA URNS HPF: NORMAL /HPF

## 2025-07-29 ENCOUNTER — OFFICE VISIT (OUTPATIENT)
Dept: INTERNAL MEDICINE | Facility: CLINIC | Age: 65
End: 2025-07-29
Payer: MEDICARE

## 2025-07-29 VITALS
DIASTOLIC BLOOD PRESSURE: 72 MMHG | WEIGHT: 116 LBS | HEIGHT: 55 IN | BODY MASS INDEX: 26.85 KG/M2 | HEART RATE: 64 BPM | OXYGEN SATURATION: 98 % | SYSTOLIC BLOOD PRESSURE: 120 MMHG

## 2025-07-29 DIAGNOSIS — Z00.00 MEDICARE ANNUAL WELLNESS VISIT, SUBSEQUENT: Primary | ICD-10-CM

## 2025-07-29 DIAGNOSIS — M54.12 CERVICAL RADICULOPATHY: ICD-10-CM

## 2025-07-29 DIAGNOSIS — M81.0 OSTEOPOROSIS, UNSPECIFIED OSTEOPOROSIS TYPE, UNSPECIFIED PATHOLOGICAL FRACTURE PRESENCE: ICD-10-CM

## 2025-07-29 DIAGNOSIS — Z00.00 WELL ADULT EXAM: ICD-10-CM

## 2025-07-29 DIAGNOSIS — R79.89 LOW VITAMIN B12 LEVEL: ICD-10-CM

## 2025-07-29 DIAGNOSIS — E78.5 HYPERLIPIDEMIA, UNSPECIFIED HYPERLIPIDEMIA TYPE: ICD-10-CM

## 2025-07-29 PROCEDURE — 1160F RVW MEDS BY RX/DR IN RCRD: CPT | Performed by: INTERNAL MEDICINE

## 2025-07-29 PROCEDURE — 90684 PCV21 VACCINE IM: CPT | Performed by: INTERNAL MEDICINE

## 2025-07-29 PROCEDURE — 1126F AMNT PAIN NOTED NONE PRSNT: CPT | Performed by: INTERNAL MEDICINE

## 2025-07-29 PROCEDURE — G0439 PPPS, SUBSEQ VISIT: HCPCS | Performed by: INTERNAL MEDICINE

## 2025-07-29 PROCEDURE — 1159F MED LIST DOCD IN RCRD: CPT | Performed by: INTERNAL MEDICINE

## 2025-07-29 PROCEDURE — 99397 PER PM REEVAL EST PAT 65+ YR: CPT | Performed by: INTERNAL MEDICINE

## 2025-07-29 PROCEDURE — G0009 ADMIN PNEUMOCOCCAL VACCINE: HCPCS | Performed by: INTERNAL MEDICINE

## 2025-07-29 PROCEDURE — 96372 THER/PROPH/DIAG INJ SC/IM: CPT | Performed by: INTERNAL MEDICINE

## 2025-07-29 RX ADMIN — CYANOCOBALAMIN 1000 MCG: 1000 INJECTION, SOLUTION INTRAMUSCULAR; SUBCUTANEOUS at 08:20

## 2025-07-29 NOTE — LETTER
Controlled Substance Prescribing Agreement          I, Noemi Bartholomew [PATIENT],  1960 [] a patient of  Mary Lou Carroll MD   [PROVIDER] at [facility] [PRACTICE], have been informed that  individuals who are prescribed certain Controlled Substances including, but not limited to, narcotic pain medicines, stimulants, benzodiazepine tranquilizers, and barbiturate sedatives, can abuse those substances or may allow abuse by others, and have some risk of developing an addictive disorder or suffering a relapse of a prior addiction. Therefore, I have been informed that it is necessary to observe strict rules pertaining to their use, and I agree to follow the terms and procedures described in this Agreement as consideration for, and as a condition of, the willingness of the physician whose signature appears below to consider prescribing or to continue prescribing Controlled Substances to treat my pain.     1. I will inform my physician of any current or past substance abuse, or any current or past substance abuse of any immediate member of my immediate family.     2. I agree that I may be subject to a voluntary evaluation by psychologists and/or psychiatrists, possibly at my own expense, before any Controlled Substances will be prescribed to me. I agree that the need to be evaluated by psychologists and/or psychiatrists may be revisited every three (3) to six (6) months thereafter while taking the medication.     3. All Controlled Substances must come from a provider in the PROVIDER’S PRACTICE. My Controlled Substances will come from the PROVIDER whose signature appears below, or during his or her absence, by the covering provider, unless specific written authorization is obtained from the office for an exception.     4. I will obtain all Controlled Substances from the same pharmacy. Should the need arise to change pharmacies, I will inform the PROVIDER’S office.     5. I will inform  the PROVIDER’S office of any new medications or medical conditions, and of any adverse effects I experience from any of the medications that I take.     6. I will inform my other health care providers that I am taking the Controlled Substances listed above, and of the existence of this Agreement. In the event of an emergency, I will provide the foregoing information to emergency department providers.     7. I agree that my prescribing PROVIDER has permission to discuss all diagnostic and treatment details with other health care providers, pharmacists, or other professionals who provide my health care regarding my use of Controlled Substances for purposes of maintaining accountability.     8. I will not allow anyone else to have, use sell, or otherwise have access to these medications. The sharing of medications with anyone is absolutely forbidden and is against the law.         9. I understand that Controlled Substances may be hazardous or lethal to a person who is not tolerant to their effects, especially a child, and that I must keep them out of reach of such people for their own safety.     10. I understand that tampering with a written prescription is a felony and I will not change or tamper with the PROVIDER’S written prescription.     11. I am aware that attempting to obtain a Controlled Substance under false pretenses is illegal.     12. I agree not to alter my medication in any way, and I will take my medication whole, and it will not be broken, chewed, crushed, injected, or snorted.     13. I will take my medication as instructed and prescribed, and I will not exceed the maximum prescribed dose. Any change in dosage must be approved by the PROVIDER or a physician within the PRACTICE.     14. I understand that these drugs should not be stopped abruptly, as withdrawal syndromes may develop.     15. I will cooperate with unannounced urine or serum toxicology screenings as may be requested, as well as any  random pill counts of medication by the PROVIDER. Failure to comply may result in termination of the PROVIDER-patient relationship.     16. I understand that the presence of unauthorized and/or illegal substances in the screenings described in the paragraph above may prompt referral for assessment for a substance abuse disorder or termination of the PROVIDER-patient relationship.     17. I understand that medications may not be replaced if they are lost, damaged, or stolen. If any of these situations arise that cause me to request an early refill of my medication, a copy of a filed police report or a statement from me explaining the circumstances may be required before additional prescriptions are considered. If I request an early refill secondary to lost, damaged, or stolen prescriptions twice within a year, I may be discharged from the practice.     18. I understand that a prescription may be given early if the PROVIDER or the patient will be out of town when the refill is due. These prescriptions will contain instructions to the pharmacist that the prescriptions(s) may not be filled prior to the appropriate date.     19. If the responsible legal authorities have questions concerning my treatment, as may occur, for example, if I obtained medication at several pharmacies, all confidentiality is waived, and these authorities may be given full access to my full records of Controlled Substances administration.     20. I will keep my scheduled appointments in order to receive medication renewals. If I need to cancel my appointment, I will do so a minimum of twenty-four (24) hours before it is scheduled.     21. I understand that I may be asked to bring my medications in their original container to the PROVIDER’s office while I am on controlled medication.     22. Refills generally will not be given over the phone, after office hours, during the weekends, and on holidays.     23. I understand that any medical treatment  is initially a trial, with the goal of treatment being to improve the quality of life and ability to function and/or work. These parameters will be assessed periodically to determine the benefits of continued therapy, and continued prescription is contingent on whether my physician believes that the medication usage benefits me. I will comply with all treatments as outlined by the PROVIDER.     24. I have been explained the risks and potential benefits of these therapies, including, but not limited to, psychological addiction, physical dependence, withdrawal and over dosage.     25. I understand that failure to adhere to these policies and/or failure to comply with the PROVIDER’S treatment plan may result in cessation of therapy with Controlled Substance prescribing by the PROVIDER or referral for further specialty assessment, as well as possible discharge from the PRACTICE.     26. I, the undersigned patient, attest that the foregoing was discussed with me, and that I have read, fully understand, and agree to all of the above requirements and instructions. I affirm that I have the full right and power to sign and be bound by this  Agreement.         Date:  __________________________________________    Time:  __________________________________________    Patient Printed Name:  _____________________________    Patient Signature:  ________________________________           Date:  __________________________________________    Time:  __________________________________________    Provider Signature:  _______________________________

## 2025-07-29 NOTE — PROGRESS NOTES
Subjective   The ABCs of the Annual Wellness Visit  Medicare Wellness Visit      Noemi Bartholomew is a 65 y.o. patient who presents for a Medicare Wellness Visit.    The following portions of the patient's history were reviewed and   updated as appropriate: allergies, current medications, past family history, past medical history, past social history, past surgical history, and problem list.    Compared to one year ago, the patient's physical   health is the same.  Compared to one year ago, the patient's mental   health is the same.    Recent Hospitalizations:  She was not admitted to the hospital during the last year.     Current Medical Providers:  Patient Care Team:  Mary Lou Carroll MD as PCP - General (Internal Medicine)  Kerri Hernandez MD as Consulting Physician (Obstetrics and Gynecology)    Outpatient Medications Prior to Visit   Medication Sig Dispense Refill    acetaminophen (TYLENOL) 325 MG tablet Take 2 tablets by mouth Every 4 (Four) Hours As Needed for Mild Pain.      atorvastatin (LIPITOR) 20 MG tablet Take 1 tablet by mouth Every Night. 90 tablet 3    Baclofen (LIORESAL) 5 MG tablet Take 1 tablet by mouth 2 (Two) Times a Day. 180 tablet 3    Calcium Carb-Cholecalciferol (CALCIUM 1000 + D PO) Take 2 tablets by mouth Daily. 2 caps      cetirizine (zyrTEC) 10 MG tablet Take 1 tablet by mouth Daily.      docusate sodium (COLACE) 100 MG capsule Take 1 capsule by mouth Every Morning.      gabapentin (NEURONTIN) 300 MG capsule TAKE ONE CAPSULE BY MOUTH THREE TIMES A  capsule 1    ibandronate (BONIVA) 150 MG tablet       Metamucil Fiber chewable tablet Chew 2 tablets Every Morning.      denosumab (PROLIA) 60 MG/ML solution prefilled syringe syringe 1 mL Every 6 (Six) Months.      naproxen sodium (ALEVE) 220 MG tablet Take 1 tablet by mouth 2 (Two) Times a Day As Needed.       Facility-Administered Medications Prior to Visit   Medication Dose Route Frequency Provider Last Rate Last Admin     "cyanocobalamin injection 1,000 mcg  1,000 mcg Intramuscular Q28 Days Mary Lou Carroll MD   1,000 mcg at 07/24/24 0901     No opioid medication identified on active medication list. I have reviewed chart for other potential  high risk medication/s and harmful drug interactions in the elderly.      Aspirin is not on active medication list.  Aspirin use is not indicated based on review of current medical condition/s. Risk of harm outweighs potential benefits.  .    Patient Active Problem List   Diagnosis    Carpal tunnel syndrome    Hyperlipidemia    Osteoporosis    DDD (degenerative disc disease), lumbar    Chronic left-sided lumbar radiculopathy    Congenital spondylolysis, lumbosacral region    Neuropathic pain, leg, left    Ossification of spinal ligament    Cervical spondylosis with myelopathy    Cervical stenosis of spine    Cervical myelopathy    Weakness of both hands    Chronic neck pain    Occipital neuralgia of right side    S/P cervical spinal fusion    Spasticity    Pernicious anemia    Cervical spinal stenosis    History of fusion of cervical spine    History of cervical spinal surgery    Low vitamin B12 level     Advance Care Planning Advance Directive is on file.  ACP discussion was held with the patient during this visit. Patient has an advance directive in EMR which is still valid.             Objective   Vitals:    07/29/25 0743   BP: 120/72   Pulse: 64   SpO2: 98%   Weight: 52.6 kg (116 lb)   Height: 134.6 cm (52.99\")   PainSc: 0-No pain       Estimated body mass index is 29.04 kg/m² as calculated from the following:    Height as of this encounter: 134.6 cm (52.99\").    Weight as of this encounter: 52.6 kg (116 lb).    BMI is >= 25 and <30. (Overweight) The following options were offered after discussion;: exercise counseling/recommendations        Does the patient have evidence of cognitive impairment? No  Lab Results   Component Value Date    CHLPL 153 07/22/2025    TRIG 98 07/22/2025    HDL 78 " (H) 2025    LDL 57 2025    VLDL 18 2025                                                                                                Health  Risk Assessment    Smoking Status:  Social History     Tobacco Use   Smoking Status Never   Smokeless Tobacco Never     Alcohol Consumption:  Social History     Substance and Sexual Activity   Alcohol Use No       Fall Risk Screen  JEVONADI Fall Risk Assessment was completed, and patient is at LOW risk for falls.Assessment completed on:2025    Depression Screening   Little interest or pleasure in doing things? Not at all   Feeling down, depressed, or hopeless? Not at all   PHQ-2 Total Score 0      Health Habits and Functional and Cognitive Screenin/22/2025    10:04 AM   Functional & Cognitive Status   Do you have difficulty preparing food and eating? No   Do you have difficulty bathing yourself, getting dressed or grooming yourself? No   Do you have difficulty using the toilet? No   Do you have difficulty moving around from place to place? Yes   Do you have trouble with steps or getting out of a bed or a chair? Yes   Current Diet Well Balanced Diet   Dental Exam Up to date   Eye Exam Up to date   Exercise (times per week) 2 times per week   Current Exercises Include Walking   Do you need help using the phone?  No   Are you deaf or do you have serious difficulty hearing?  Yes   Do you need help to go to places out of walking distance? Yes   Do you need help shopping? No   Do you need help preparing meals?  No   Do you need help with housework?  No   Do you need help with laundry? No   Do you need help taking your medications? No   Do you need help managing money? No   Do you ever drive or ride in a car without wearing a seat belt? No   Have you felt unusual fatigue (could be tiredness), stress, anger or loneliness in the last month? No   Who do you live with? Sibling   If you need help, do you have trouble finding someone available to you? No    Have you been bothered in the last four weeks by sexual problems? No   Do you have difficulty concentrating, remembering or making decisions? No           Age-appropriate Screening Schedule:  Refer to the list below for future screening recommendations based on patient's age, sex and/or medical conditions. Orders for these recommended tests are listed in the plan section. The patient has been provided with a written plan.    Health Maintenance List  Health Maintenance   Topic Date Due    Pneumococcal Vaccine 50+ (1 of 1 - PCV) Never done    DXA SCAN  08/01/2020    MAMMOGRAM  08/01/2021    COVID-19 Vaccine (8 - 2024-25 season) 05/18/2025    ANNUAL WELLNESS VISIT  07/24/2025    INFLUENZA VACCINE  10/01/2025    COLORECTAL CANCER SCREENING  04/19/2026    LIPID PANEL  07/22/2026    TDAP/TD VACCINES (3 - Td or Tdap) 11/04/2026    ZOSTER VACCINE  Completed    HEPATITIS C SCREENING  Addressed                                                                                                                                                CMS Preventative Services Quick Reference  Risk Factors Identified During Encounter  Immunizations Discussed/Encouraged: pneumonia    The above risks/problems have been discussed with the patient.  Pertinent information has been shared with the patient in the After Visit Summary.  An After Visit Summary and PPPS were made available to the patient.    Follow Up:   Next Medicare Wellness visit to be scheduled in 1 year.         Additional E&M Note during same encounter follows:  Patient has additional, significant, and separately identifiable condition(s)/problem(s) that require work above and beyond the Medicare Wellness Visit     Chief Complaint  Medicare Wellness-subsequent    Subjective   HPI  Noemi is also being seen today for an annual adult preventative physical exam.     Left jaw pain.  Hurts to chew.  Going on a couple weeks.      HLD.  Good control.  OP.  On Boniva with Gyn.    Chronic  "cervical radiculopathy.  Doing well on gabapentin.      Review of Systems   Respiratory: Negative.     Cardiovascular: Negative.                   Objective   Vital Signs:  /72   Pulse 64   Ht 134.6 cm (52.99\")   Wt 52.6 kg (116 lb)   SpO2 98%   BMI 29.04 kg/m²   Physical Exam  Constitutional:       Appearance: She is well-developed.   HENT:      Head: Normocephalic and atraumatic.      Right Ear: Hearing, tympanic membrane and external ear normal.      Left Ear: Hearing, tympanic membrane and external ear normal.      Nose: Nose normal.   Neck:      Thyroid: No thyromegaly.   Cardiovascular:      Rate and Rhythm: Normal rate and regular rhythm.      Heart sounds: Normal heart sounds. No murmur heard.  Pulmonary:      Effort: Pulmonary effort is normal.      Breath sounds: Normal breath sounds.   Abdominal:      General: There is no distension.      Palpations: Abdomen is soft.      Tenderness: There is no abdominal tenderness.   Musculoskeletal:      Cervical back: Neck supple.      Comments: Crepitus with jaw movement.     Lymphadenopathy:      Cervical: No cervical adenopathy.   Skin:     General: Skin is warm and dry.   Neurological:      Mental Status: She is alert and oriented to person, place, and time.   Psychiatric:         Speech: Speech normal.         Behavior: Behavior normal.         Thought Content: Thought content normal.         Judgment: Judgment normal.         The following data was reviewed by: Mary Lou Carroll MD on 07/29/2025:    Common labs          10/15/2024    14:29 7/22/2025    08:22   Common Labs   Glucose 92  102    BUN 12  12.0    Creatinine 0.64  0.80    Sodium 140  142    Potassium 4.5  5.3    Chloride 103  103    Calcium 9.2  9.9    Albumin 4.5  4.6    Total Bilirubin 0.3  0.3    Alkaline Phosphatase 101  76    AST (SGOT) 24  31    ALT (SGPT) 14  17    WBC 5.30  4.06    Hemoglobin 12.6  13.2    Hematocrit 38.8  41.6    Platelets 301  276    Total Cholesterol  153  "   Triglycerides  98    HDL Cholesterol  78    LDL Cholesterol   57           Assessment and Plan Additional age appropriate preventative wellness advice topics were discussed during today's preventative wellness exam(some topics already addressed during AWV portion of the note above):    Physical Activity: Advised cardiovascular activity 150 minutes per week as tolerated. (example brisk walk for 30 minutes, 5 days a week).     Medicare annual wellness visit, subsequent         Well adult exam         Hyperlipidemia, unspecified hyperlipidemia type            Osteoporosis, unspecified osteoporosis type, unspecified pathological fracture presence         Low vitamin B12 level         Cervical radiculopathy       HLD.  Control is good.  The patient is advised to continue current dosage of atorvastatin  OP.  I recommend to get 1200 mg of calcium and 1000 IUs of vitamin D through diet and supplements and to participate in a weight based exercise to prevent loss of bone mineral density. Bone mineral will be monitored every two years.  Continue Boniva through GYN.    Low B12.  I recommend monthly shots.    Chronic cervical radiculopathy.  Control is good.  The patient is advised to continue current dosage of gabapentin.  Update contract.   Luis Alfredo is reviewed.    Jaw pain.  Likely TMJ.  I discussed with the patient a trial of conservative management with:   tylenol and rest.  Offered x-rays and PT.  She elects to see her dentist for evaluation.                Follow Up   Return in about 1 year (around 7/29/2026) for Medicare Wellness.  Patient was given instructions and counseling regarding her condition or for health maintenance advice. Please see specific information pulled into the AVS if appropriate.

## 2025-07-29 NOTE — ASSESSMENT & PLAN NOTE
HLD.  Control is good.  The patient is advised to continue current dosage of atorvastatin  OP.  I recommend to get 1200 mg of calcium and 1000 IUs of vitamin D through diet and supplements and to participate in a weight based exercise to prevent loss of bone mineral density. Bone mineral will be monitored every two years.  Continue Boniva through GYN.    Low B12.  I recommend monthly shots.    Chronic cervical radiculopathy.  Control is good.  The patient is advised to continue current dosage of gabapentin.  Update contract.   Luis Alfredo is reviewed.    Jaw pain.  Likely TMJ.  I discussed with the patient a trial of conservative management with:   tylenol and rest.  Offered x-rays and PT.  She elects to see her dentist for evaluation.

## 2025-07-29 NOTE — PATIENT INSTRUCTIONS
Medicare Wellness  Personal Prevention Plan of Service     Date of Office Visit:    Encounter Provider:  Mary Lou Carroll MD  Place of Service:  [facility]  Patient Name: Noemi Bartholomew  :  1960    As part of the Medicare Wellness portion of your visit today, we are providing you with this personalized preventive plan of services (PPPS). This plan is based upon recommendations of the [place] Preventive Services Task Force (USPSTF) and the Advisory Committee on Immunization Practices (ACIP).    This lists the preventive care services that should be considered, and provides dates of when you are due. Items listed as completed are up-to-date and do not require any further intervention.    Health Maintenance   Topic Date Due    Pneumococcal Vaccine 50+ (1 of 1 - PCV) Never done    DXA SCAN  2020    MAMMOGRAM  2021    COVID-19 Vaccine ( season) 2025    ANNUAL WELLNESS VISIT  2025    INFLUENZA VACCINE  10/01/2025    COLORECTAL CANCER SCREENING  2026    LIPID PANEL  2026    TDAP/TD VACCINES (3 - Td or Tdap) 2026    ZOSTER VACCINE  Completed    HEPATITIS C SCREENING  Addressed       No orders of the defined types were placed in this encounter.      No follow-ups on file.

## 2025-08-06 ENCOUNTER — TELEPHONE (OUTPATIENT)
Dept: NEUROSURGERY | Facility: CLINIC | Age: 65
End: 2025-08-06

## (undated) DEVICE — GOWN,NON-REINFORCED,SIRUS,SET IN SLV,XXL: Brand: MEDLINE

## (undated) DEVICE — GLV SURG SENSICARE PI MIC PF SZ6.5 LF STRL

## (undated) DEVICE — COVER,TABLE,44X90,STERILE: Brand: MEDLINE

## (undated) DEVICE — HEMOST ABS SURGIFOAM SZ100 8X12 10MM
Type: IMPLANTABLE DEVICE | Site: SPINE CERVICAL | Status: NON-FUNCTIONAL
Removed: 2024-03-28

## (undated) DEVICE — TOTAL TRAY, 16FR 10ML SIL FOLEY, URN: Brand: MEDLINE

## (undated) DEVICE — Device: Brand: DEFENDO AIR/WATER/SUCTION AND BIOPSY VALVE

## (undated) DEVICE — TOWEL,OR,DSP,ST,BLUE,STD,4/PK,20PK/CS: Brand: MEDLINE

## (undated) DEVICE — GLV SURG BIOGEL LTX PF 7

## (undated) DEVICE — VIOLET BRAIDED (POLYGLACTIN 910), SYNTHETIC ABSORBABLE SUTURE: Brand: COATED VICRYL

## (undated) DEVICE — DRP MICROSCP LEICA 137X381CM

## (undated) DEVICE — GLV SURG TRIUMPH PF LTX 7 STRL

## (undated) DEVICE — ANTIBACTERIAL UNDYED BRAIDED (POLYGLACTIN 910), SYNTHETIC ABSORBABLE SUTURE: Brand: COATED VICRYL

## (undated) DEVICE — STPLR SKIN VISISTAT WD 35CT

## (undated) DEVICE — APPL CHLORAPREP W/TINT 10.5ML PERC STRL

## (undated) DEVICE — JACKSON-PRATT 100CC BULB RESERVOIR: Brand: CARDINAL HEALTH

## (undated) DEVICE — TRY L/P SFTY A/20G

## (undated) DEVICE — PK ATS CUST W CARDIOTOMY RESEVOIR

## (undated) DEVICE — 3.0MM NEURO (MATCH HEAD) LESS AGGRESSIVE

## (undated) DEVICE — GLV SURG PREMIERPRO ORTHO LTX PF SZ8 BRN

## (undated) DEVICE — MARKR SKIN W/RULR 2TP

## (undated) DEVICE — DRSNG SURESITE WNDW 4X4.5

## (undated) DEVICE — CODMAN® DISPOSABLE CATHETER PASSER: Brand: CODMAN®

## (undated) DEVICE — APPL CHLORAPREP W/TINT 26ML ORNG

## (undated) DEVICE — LIMB HOLDER, WRIST/ANKLE: Brand: DEROYAL

## (undated) DEVICE — SUT SILK 2/0 FS BLK 18IN 685G

## (undated) DEVICE — DIFFUSER: Brand: CORE, MAESTRO

## (undated) DEVICE — PK NEURO SPINE 40

## (undated) DEVICE — NDL SPINE 20G 3 1/2 YEL STRL 1P/U

## (undated) DEVICE — DRN JP FLT NO TROC SIL FUL/PERF 7MM

## (undated) DEVICE — COVER,LIGHT HANDLE,FLX,2/PK: Brand: MEDLINE INDUSTRIES, INC.

## (undated) DEVICE — SYR CONTRL PRESS/LO FIX/M/LL W/THMB/RNG 10ML

## (undated) DEVICE — SYR CONTRL LUERLOK 10CC

## (undated) DEVICE — DISPOSABLE BIPOLAR FORCEPS 7 3/4" (19.7CM) SCOVILLE BAYONET, 1.5MM TIP AND 12 FT. (3.6M) CABLE: Brand: KIRWAN

## (undated) DEVICE — BNDG ELAS ELITE V/CLOSE 6IN 5YD LF STRL

## (undated) DEVICE — SYSTEM 46705 EXACTA DRAINAGE 100ML: Brand: EXACTA™

## (undated) DEVICE — CANN NASL CO2 TRULINK W/O2 A/

## (undated) DEVICE — DRAPE,CHEST,FENES,15X10,STERIL: Brand: MEDLINE

## (undated) DEVICE — SUT SILK 3/0 SH CR5 18IN C0135

## (undated) DEVICE — DRP MICROSCOPE 4 BINOCULAR CV 54X150IN

## (undated) DEVICE — NDL HYPO PRECISIONGLIDE REG 20G 1 1/2

## (undated) DEVICE — SCANLAN® SUTURE BOOT™ INSTRUMENT JAW COVERS - ORIGINAL YELLOW, STANDARD PKG (5 PAIR/CARTRIDGE, 1 CARTRIDGE/PKG): Brand: SCANLAN® SUTURE BOOT™ INSTRUMENT JAW COVERS

## (undated) DEVICE — 3M™ IOBAN™ 2 ANTIMICROBIAL INCISE DRAPE 6650EZ: Brand: IOBAN™ 2

## (undated) DEVICE — SEAL DURL ADHERUS/AUTOSPRAY HYDROGEL DS

## (undated) DEVICE — CONN TBG Y 5 IN 1 LF STRL

## (undated) DEVICE — DRILL BIT 7080510 11 MM DRILL BIT S

## (undated) DEVICE — Device

## (undated) DEVICE — PICO 7 10CM X 20CM: Brand: PICO™ 7

## (undated) DEVICE — 3M™ STERI-STRIP™ REINFORCED ADHESIVE SKIN CLOSURES, R1546, 1/4 IN X 4 IN (6 MM X 100 MM), 10 STRIPS/ENVELOPE: Brand: 3M™ STERI-STRIP™

## (undated) DEVICE — 6.0MM PRECISION ROUND

## (undated) DEVICE — 3M™ STERI-STRIP™ COMPOUND BENZOIN TINCTURE 40 BAGS/CARTON 4 CARTONS/CASE C1544: Brand: 3M™ STERI-STRIP™

## (undated) DEVICE — GOWN,NON-REINFORCED,SIRUS,SET IN SLV,XL: Brand: MEDLINE

## (undated) DEVICE — COVER,MAYO STAND,STERILE: Brand: MEDLINE

## (undated) DEVICE — SUT VIC 4/0 P3 18IN J494G

## (undated) DEVICE — CATHETER 46419 EDM LUMBAR 80CM W/TIP

## (undated) DEVICE — OIL CARTRIDGE: Brand: CORE, MAESTRO

## (undated) DEVICE — GAUZE,SPONGE,4"X4",16PLY,XRAY,STRL,LF: Brand: MEDLINE

## (undated) DEVICE — CATHETER,FOLEY,100%SILICONE,12FR,10ML,LF: Brand: MEDLINE

## (undated) DEVICE — TOOL MR8-14MH30 MR8 14CM MATCH 3MM: Brand: MIDAS REX MR8

## (undated) DEVICE — DRSNG WND GZ PAD BORDERED 4X8IN STRL

## (undated) DEVICE — DRSNG WND BORDR/ADHS NONADHR/GZ LF 4X4IN STRL

## (undated) DEVICE — SPNG GZ WOVN 4X4IN 12PLY 10/BX STRL

## (undated) DEVICE — FRCP BX RADJAW4 NDL 2.8 240CM LG OG BX40

## (undated) DEVICE — BITEBLOCK OMNI BLOC

## (undated) DEVICE — EPIDURAL TRAY: Brand: MEDLINE INDUSTRIES, INC.

## (undated) DEVICE — SMOKE EVACUATION TUBING WITH 7/8 IN TO 1/4 IN REDUCER: Brand: BUFFALO FILTER

## (undated) DEVICE — PATIENT RETURN ELECTRODE, SINGLE-USE, CONTACT QUALITY MONITORING, ADULT, WITH 9FT CORD, FOR PATIENTS WEIGING OVER 33LBS. (15KG): Brand: MEGADYNE

## (undated) DEVICE — NEEDLE, QUINCKE 22GX3.5": Brand: MEDLINE INDUSTRIES, INC.

## (undated) DEVICE — NDL,TUOHY EPID,18GX3.5",PLASTIC STYLET: Brand: MEDLINE

## (undated) DEVICE — 1010 S-DRAPE TOWEL DRAPE 10/BX: Brand: STERI-DRAPE™

## (undated) DEVICE — Device: Brand: PORTEX

## (undated) DEVICE — TUBING, SUCTION, 1/4" X 10', STRAIGHT: Brand: MEDLINE

## (undated) DEVICE — LABEL SHEET CUSTOM 2X2 YELLOW: Brand: MEDLINE INDUSTRIES, INC.

## (undated) DEVICE — 2.0MM NEURO (MATCH HEAD) SOFT TOUCH

## (undated) DEVICE — COVER,C-ARM,41X74: Brand: MEDLINE

## (undated) DEVICE — SUT PROLN 5/0 RB2 D/A 30IN 8710H

## (undated) DEVICE — IMPLANTABLE DEVICE: Type: IMPLANTABLE DEVICE | Site: SPINE CERVICAL | Status: NON-FUNCTIONAL

## (undated) DEVICE — THE TORRENT IRRIGATION SCOPE CONNECTOR IS USED WITH THE TORRENT IRRIGATION TUBING TO PROVIDE IRRIGATION FLUIDS SUCH AS STERILE WATER DURING GASTROINTESTINAL ENDOSCOPIC PROCEDURES WHEN USED IN CONJUNCTION WITH AN IRRIGATION PUMP (OR ELECTROSURGICAL UNIT).: Brand: TORRENT

## (undated) DEVICE — DRSNG TELFA PAD NONADH STR 1S 3X4IN

## (undated) DEVICE — 3M™ STERI-STRIP™ REINFORCED ADHESIVE SKIN CLOSURES, R1547, 1/2 IN X 4 IN (12 MM X 100 MM), 6 STRIPS/ENVELOPE: Brand: 3M™ STERI-STRIP™